# Patient Record
Sex: MALE | Race: WHITE | NOT HISPANIC OR LATINO | Employment: FULL TIME | URBAN - METROPOLITAN AREA
[De-identification: names, ages, dates, MRNs, and addresses within clinical notes are randomized per-mention and may not be internally consistent; named-entity substitution may affect disease eponyms.]

---

## 2014-06-03 LAB — LDLC SERPL CALC-MCNC: 125 MG/DL

## 2014-06-12 LAB — LDLC SERPL CALC-MCNC: 137 MG/DL

## 2017-01-03 ENCOUNTER — CARE COORDINATION (OUTPATIENT)
Dept: CARDIOLOGY | Facility: CLINIC | Age: 57
End: 2017-01-03

## 2017-01-03 NOTE — PROGRESS NOTES
Patient states that his last dose of Praluent was 12/20/16.  He is unable to obtain any further medication from the drug .  He denies every trying Zetia in the past.  He would be willing to try this medication. He understands that he will be contacted with further recommendations.

## 2017-01-09 DIAGNOSIS — I26.99 PULMONARY EMBOLISM (H): ICD-10-CM

## 2017-01-09 LAB — INR PPP: 1.98 (ref 0.86–1.14)

## 2017-01-10 ENCOUNTER — ANTICOAGULATION THERAPY VISIT (OUTPATIENT)
Dept: ANTICOAGULATION | Facility: CLINIC | Age: 57
End: 2017-01-10

## 2017-01-10 DIAGNOSIS — Z79.01 LONG-TERM (CURRENT) USE OF ANTICOAGULANTS: Primary | ICD-10-CM

## 2017-01-10 DIAGNOSIS — I26.99 PULMONARY EMBOLI (H): ICD-10-CM

## 2017-01-10 NOTE — PROGRESS NOTES
ANTICOAGULATION FOLLOW-UP CLINIC VISIT    Patient Name:  Oswaldo Prabhakar  Date:  1/10/2017  Contact Type:  Telephone    SUBJECTIVE:        OBJECTIVE    INR   Date Value Ref Range Status   01/09/2017 1.98* 0.86 - 1.14 Final     CHROMOGENIC FACTOR 10   Date Value Ref Range Status   06/18/2011 81 60 - 140 % Final     Comment:     Therapeutic Range: A Chromogenic Factor 10 level of 20-40% inversely   correlates   with an INR of 2-3 for patients receiving Warfarin. Chromogenic Factor 10   levels below 20% indicate an INR greater than 3, and levels above 40%   indicate   an INR less than 2.     FACTOR 2 ASSAY   Date Value Ref Range Status   04/04/2012  60 - 140 % Final    (Note)  CANCELLED AND CREDITED PER [employer] IN MED. MONITORING,4/4/12,       ASSESSMENT / PLAN  INR assessment THER    Recheck INR In: 2 WEEKS    INR Location Clinic      Anticoagulation Summary as of 1/10/2017     INR goal 2.0-3.0   Selected INR 1.98! (1/9/2017)   Maintenance plan 7.5 mg (5 mg x 1.5) on Mon, Wed, Fri; 5 mg (5 mg x 1) all other days   Full instructions 7.5 mg on Mon, Wed, Fri; 5 mg all other days   Weekly total 42.5 mg   No change documented Marielena Chacon RN   Plan last modified Liliam Hyman, RN (9/27/2016)   Next INR check 1/24/2017   Priority INR   Target end date Indefinite    Indications   Pulmonary emboli (H) [I26.99]  Long-term (current) use of anticoagulants [Z79.01] [Z79.01]         Anticoagulation Episode Summary     INR check location     Preferred lab     Send INR reminders to [facility]    Comments HIPPA INFO OK to speak with wife Josselyn OK to leave messages on Home.work and cell phones  Contact  [phone]      Anticoagulation Care Providers     Provider Role Specialty Phone number    Samm Vazquez MD [facility] Internal Medicine [phone]            See the Encounter Report to view Anticoagulation Flowsheet and Dosing Calendar (Go to Encounters tab in chart review, and find the Anticoagulation  Therapy Visit)    Left message for patient with results and dosing recommendations. Asked patient to call back to report any missed doses, falls, signs and symptoms of bleeding or clotting, any changes in health, medication, or diet. Asked patient to call back with any questions or concerns.   Did not adjust warfarin dose because his INR tends to swing up and down, will keep dose the same and see where it is in 2 weeks.  Asked on message that Oswaldo call if he missed any doses of warfarin or if he has had any changes.    Marielena Chacon RN

## 2017-01-26 ENCOUNTER — TELEPHONE (OUTPATIENT)
Dept: OTHER | Facility: CLINIC | Age: 57
End: 2017-01-26

## 2017-01-27 NOTE — TELEPHONE ENCOUNTER
Call Regarding Onboarding [employer]    Attempt 1    Message on voicemail     Comments: dependents:  [phone], [phone], [phone]      Outreach   Sharon Eid

## 2017-01-31 ENCOUNTER — ANTICOAGULATION THERAPY VISIT (OUTPATIENT)
Dept: ANTICOAGULATION | Facility: CLINIC | Age: 57
End: 2017-01-31

## 2017-01-31 DIAGNOSIS — Z79.01 LONG-TERM (CURRENT) USE OF ANTICOAGULANTS: Primary | ICD-10-CM

## 2017-01-31 DIAGNOSIS — I26.99 PULMONARY EMBOLI (H): ICD-10-CM

## 2017-01-31 DIAGNOSIS — I26.99 PULMONARY EMBOLISM (H): ICD-10-CM

## 2017-01-31 DIAGNOSIS — E78.5 HYPERLIPIDEMIA: ICD-10-CM

## 2017-01-31 LAB
CHOLEST SERPL-MCNC: 204 MG/DL
HDLC SERPL-MCNC: 34 MG/DL
INR PPP: 2.98 (ref 0.86–1.14)
LDLC SERPL CALC-MCNC: 138 MG/DL
NONHDLC SERPL-MCNC: 171 MG/DL
TRIGL SERPL-MCNC: 163 MG/DL

## 2017-01-31 NOTE — PROGRESS NOTES
ANTICOAGULATION FOLLOW-UP CLINIC VISIT    Patient Name:  Oswaldo Prabhakar  Date:  1/31/2017  Contact Type:  Telephone    SUBJECTIVE:        OBJECTIVE    INR   Date Value Ref Range Status   01/31/2017 2.98* 0.86 - 1.14 Final     CHROMOGENIC FACTOR 10   Date Value Ref Range Status   06/18/2011 81 60 - 140 % Final     Comment:     Therapeutic Range: A Chromogenic Factor 10 level of 20-40% inversely   correlates   with an INR of 2-3 for patients receiving Warfarin. Chromogenic Factor 10   levels below 20% indicate an INR greater than 3, and levels above 40%   indicate   an INR less than 2.     FACTOR 2 ASSAY   Date Value Ref Range Status   04/04/2012  60 - 140 % Final    (Note)  CANCELLED AND CREDITED PER [employer] IN MED. MONITORING,4/4/12,       ASSESSMENT / PLAN  INR assessment THER    Recheck INR In: 2 WEEKS    INR Location Clinic      Anticoagulation Summary as of 1/31/2017     INR goal 2.0-3.0   Selected INR 2.98 (1/31/2017)   Maintenance plan 7.5 mg (5 mg x 1.5) on Mon, Wed, Fri; 5 mg (5 mg x 1) all other days   Full instructions 7.5 mg on Mon, Wed, Fri; 5 mg all other days   Weekly total 42.5 mg   Plan last modified Liliam Hyman, RN (9/27/2016)   Next INR check 2/14/2017   Priority INR   Target end date Indefinite    Indications   Pulmonary emboli (H) [I26.99]  Long-term (current) use of anticoagulants [Z79.01] [Z79.01]         Anticoagulation Episode Summary     INR check location     Preferred lab     Send INR reminders to [facility]    Comments HIPPA INFO OK to speak with wife Josselyn MICHELLE to leave messages on Home.work and cell phones  Contact  [phone]      Anticoagulation Care Providers     Provider Role Specialty Phone number    Samm Vazquez MD [facility] Internal Medicine [phone]            See the Encounter Report to view Anticoagulation Flowsheet and Dosing Calendar (Go to Encounters tab in chart review, and find the Anticoagulation Therapy Visit)    LM for patient.    Nano  ROSA M Greer RN

## 2017-02-01 ENCOUNTER — TELEPHONE (OUTPATIENT)
Dept: CARDIOLOGY | Facility: CLINIC | Age: 57
End: 2017-02-01

## 2017-02-01 PROBLEM — Z78.9 STATIN INTOLERANCE: Status: ACTIVE | Noted: 2017-02-01

## 2017-02-01 NOTE — TELEPHONE ENCOUNTER
Prior Authorization [employer] Medication Request  Medication/Dose: Praluent 75 mg    Diagnosis and ICD code: 272.0 Hyperlipidemia, V45.81s/p CABG, Z78.9 Statin intolerance   New/Renewal/Insurance Change PA: New  Previously Tried and Failed Therapies: Simvastatin, Pravastatin, Lopid  Current therapy includes Atorvastatin 80 mg once daily, Fenofibrate 160 mg once daily, Praluent 150 mg once every 14 days    Patient has been provided medication since March 2016 by the Patient Assistance Program through [employer]. He started Praluent 75 mg once every 14 days on March 1st, 2016.  His dose was increased May 24th, 2016 .  LDL was 164 (H) prior to starting Praluent 75 mg, and decreased to 106.  His LDL has improved once dosage was increased to 150 mg once every 14 days; his current LDL is 33. Patient has not had any cardiovascular events while on medications, nor any side effects.  Patient Assistance Program ended January 1st, 2017. His last dose of praluent was 12/20/16.  Patient's LDL was 33 on Praluent and increased to 138 on 1/31/17 (6 weeks after cessation of therapy).         !LIPID/HEPATIC Latest Ref Rng 3/11/2014 4/4/2014   CHOLESTEROL <200 mg/dL 219 (H)    TRIGLYCERIDES <150 mg/dL 160 (H)    HDL CHOLESTEROL >39 mg/dL 38 (L)    LDL CHOLESTEROL DIRECT      LDL CHOLESTEROL, CALCULATED <100 mg/dL 149 (H)    VLDL-CHOLESTEROL 0 - 30 mg/dL 32 (H)    NON HDL CHOLESTEROL <130 mg/dL     CHOLESTEROL/HDL RATIO 0.0 - 5.0 5.8 (H)    AST 0 - 45 U/L 29 77 (H)   ALT 0 - 70 U/L 30 64     !LIPID/HEPATIC Latest Ref Rng 6/3/2014 6/12/2014   CHOLESTEROL <200 mg/dL     TRIGLYCERIDES <150 mg/dL     HDL CHOLESTEROL >39 mg/dL     LDL CHOLESTEROL DIRECT  125 137   LDL CHOLESTEROL, CALCULATED <100 mg/dL     VLDL-CHOLESTEROL 0 - 30 mg/dL     NON HDL CHOLESTEROL <130 mg/dL     CHOLESTEROL/HDL RATIO 0.0 - 5.0     AST 0 - 45 U/L     ALT 0 - 70 U/L       !LIPID/HEPATIC Latest Ref Rng 3/4/2015 9/16/2015   CHOLESTEROL <200 mg/dL  246 (H)    TRIGLYCERIDES <150 mg/dL  183 (H)   HDL CHOLESTEROL >39 mg/dL  35 (L)   LDL CHOLESTEROL DIRECT      LDL CHOLESTEROL, CALCULATED <100 mg/dL  174 (H)   VLDL-CHOLESTEROL 0 - 30 mg/dL  37 (H)   NON HDL CHOLESTEROL <130 mg/dL     CHOLESTEROL/HDL RATIO 0.0 - 5.0  7.0 (H)   AST 0 - 45 U/L 20 25   ALT 0 - 70 U/L 29 36     !LIPID/HEPATIC Latest Ref Rng 3/1/2016 3/18/2016   CHOLESTEROL <200 mg/dL 240 (H) 179   TRIGLYCERIDES <150 mg/dL 183 (H) 171 (H)   HDL CHOLESTEROL >39 mg/dL 40 39 (L)   LDL CHOLESTEROL DIRECT      LDL CHOLESTEROL, CALCULATED <100 mg/dL 164 (H) 106 (H)   VLDL-CHOLESTEROL 0 - 30 mg/dL     NON HDL CHOLESTEROL <130 mg/dL 200 (H) 140 (H)   CHOLESTEROL/HDL RATIO 0.0 - 5.0     AST 0 - 45 U/L 25    ALT 0 - 70 U/L 40      !LIPID/HEPATIC Latest Ref Rng 6/3/2016 11/14/2016   CHOLESTEROL <200 mg/dL 108 100   TRIGLYCERIDES <150 mg/dL 103 123   HDL CHOLESTEROL >39 mg/dL 43 42   LDL CHOLESTEROL DIRECT      LDL CHOLESTEROL, CALCULATED <100 mg/dL 44 33   VLDL-CHOLESTEROL 0 - 30 mg/dL     NON HDL CHOLESTEROL <130 mg/dL 65 57   CHOLESTEROL/HDL RATIO 0.0 - 5.0     AST 0 - 45 U/L     ALT 0 - 70 U/L       !LIPID/HEPATIC Latest Ref Rng 1/31/2017   CHOLESTEROL <200 mg/dL 204 (H)   TRIGLYCERIDES <150 mg/dL 163 (H)   HDL CHOLESTEROL >39 mg/dL 34 (L)   LDL CHOLESTEROL DIRECT     LDL CHOLESTEROL, CALCULATED <100 mg/dL 138 (H)   VLDL-CHOLESTEROL 0 - 30 mg/dL    NON HDL CHOLESTEROL <130 mg/dL 171 (H)   CHOLESTEROL/HDL RATIO 0.0 - 5.0    AST 0 - 45 U/L    ALT 0 - 70 U/L

## 2017-02-02 NOTE — TELEPHONE ENCOUNTER
[facility] Prior Authorization Team   Phone: [phone]  Fax: [phone]    PA Initiation    Medication: Praluent 150 mg   Insurance Company: [employer] - Phone [phone] Fax [phone]  Pharmacy Filling the Rx: [facility] - [place], [place] - [address]  Filling Pharmacy Phone: [phone]  Filling Pharmacy Fax: [phone]  Start Date: 2/2/2017

## 2017-02-10 NOTE — TELEPHONE ENCOUNTER
PRIOR AUTHORIZATION DENIED    Medication: Praluent 150 mg - Denied    Denial Date: 2/4/2017    Denial Rational: Please see below denial letter    Appeal Information: Denial can be appealed within 180 days from denial date. Send appeal info to:    [employer]  Attn: Clinical Review Dept  [address] [place] [address]  Fax: [phone]

## 2017-02-13 ENCOUNTER — CARE COORDINATION (OUTPATIENT)
Dept: CARDIOLOGY | Facility: CLINIC | Age: 57
End: 2017-02-13

## 2017-02-13 DIAGNOSIS — I25.10 ATHEROSCLEROSIS OF CORONARY ARTERY OF NATIVE HEART WITHOUT ANGINA PECTORIS, UNSPECIFIED VESSEL OR LESION TYPE: ICD-10-CM

## 2017-02-13 DIAGNOSIS — Z78.9 STATIN INTOLERANCE: Primary | ICD-10-CM

## 2017-02-13 DIAGNOSIS — E78.5 HYPERLIPIDEMIA LDL GOAL <70: ICD-10-CM

## 2017-02-13 DIAGNOSIS — Z95.1 S/P CABG (CORONARY ARTERY BYPASS GRAFT): ICD-10-CM

## 2017-02-13 RX ORDER — EZETIMIBE 10 MG/1
10 TABLET ORAL DAILY
Qty: 90 TABLET | Refills: 3 | Status: SHIPPED | OUTPATIENT
Start: 2017-02-13 | End: 2017-09-28

## 2017-02-13 NOTE — PROGRESS NOTES
Date: 2/13/2017    Time of Call: 10:51 AM     Diagnosis:  Hyperlipidemia goal LDL<70, statin intolerance, CAD s/p CABG     [ TORB ] Ordering provider: Dr. Ocampo  Order: Start Zetia 10 mg by mouth once daily. Repeat fasting lipid profile with reflex LDL and CMP in 6-8 weeks.      Order received by: Indu Osorio, RN, BSN     Follow-up/additional notes: Attempted to call patient to discuss above recommendations without success. [employer] message sent.

## 2017-03-03 ENCOUNTER — ANTICOAGULATION THERAPY VISIT (OUTPATIENT)
Dept: ANTICOAGULATION | Facility: CLINIC | Age: 57
End: 2017-03-03

## 2017-03-03 DIAGNOSIS — I26.99 PULMONARY EMBOLI (H): ICD-10-CM

## 2017-03-03 DIAGNOSIS — I26.99 PULMONARY EMBOLISM (H): ICD-10-CM

## 2017-03-03 DIAGNOSIS — Z79.01 LONG-TERM (CURRENT) USE OF ANTICOAGULANTS: ICD-10-CM

## 2017-03-03 LAB — INR PPP: 2.72 (ref 0.86–1.14)

## 2017-03-03 NOTE — MR AVS SNAPSHOT
Oswaldo Prabhakar   3/3/2017   Anticoagulation Therapy Visit    Description:  56 year old male   Provider:  Marielena Chacon, RN   [facility]           INR as of 3/3/2017     Today's INR 2.72      Anticoagulation Summary as of 3/3/2017     INR goal 2.0-3.0   Today's INR 2.72   Full instructions 7.5 mg on Mon, Wed, Fri; 5 mg all other days   Next INR check 3/24/2017    Indications   Pulmonary emboli (H) [I26.99]  Long-term (current) use of anticoagulants [Z79.01] [Z79.01]         March 2017 Details    Sun Mon Tue Wed Thu Fri Sat        1               2               3      7.5 mg   See details      4      5 mg           5      5 mg         6      7.5 mg         7      5 mg         8      7.5 mg         9      5 mg         10      7.5 mg         11      5 mg           12      5 mg         13      7.5 mg         14      5 mg         15      7.5 mg         16      5 mg         17      7.5 mg         18      5 mg           19      5 mg         20      7.5 mg         21      5 mg         22      7.5 mg         23      5 mg         24            25                 26               27               28               29               30               31                 Date Details   03/03 This INR check       Date of next INR:  3/24/2017         How to take your warfarin dose     To take:  5 mg Take 1 of the 5 mg tablets.    To take:  7.5 mg Take 1.5 of the 5 mg tablets.

## 2017-03-03 NOTE — PROGRESS NOTES
ANTICOAGULATION FOLLOW-UP CLINIC VISIT    Patient Name:  Oswaldo Prabhakar  Date:  3/3/2017  Contact Type:  Telephone    SUBJECTIVE:        OBJECTIVE    INR   Date Value Ref Range Status   03/03/2017 2.72 (H) 0.86 - 1.14 Final     Chromogenic Factor 10   Date Value Ref Range Status   06/18/2011 81 60 - 140 % Final     Comment:     Therapeutic Range: A Chromogenic Factor 10 level of 20-40% inversely   correlates   with an INR of 2-3 for patients receiving Warfarin. Chromogenic Factor 10   levels below 20% indicate an INR greater than 3, and levels above 40%   indicate   an INR less than 2.     Factor 2 Assay   Date Value Ref Range Status   04/04/2012  60 - 140 % Final    (Note)  CANCELLED AND CREDITED PER [employer] IN MED. MONITORING,4/4/12,       ASSESSMENT / PLAN  INR assessment THER    Recheck INR In: 3 WEEKS    INR Location Clinic      Anticoagulation Summary as of 3/3/2017     INR goal 2.0-3.0   Today's INR 2.72   Maintenance plan 7.5 mg (5 mg x 1.5) on Mon, Wed, Fri; 5 mg (5 mg x 1) all other days   Full instructions 7.5 mg on Mon, Wed, Fri; 5 mg all other days   Weekly total 42.5 mg   No change documented Marielena Chacon RN   Plan last modified Liliam Hyman, RN (9/27/2016)   Next INR check 3/24/2017   Priority INR   Target end date Indefinite    Indications   Pulmonary emboli (H) [I26.99]  Long-term (current) use of anticoagulants [Z79.01] [Z79.01]         Anticoagulation Episode Summary     INR check location     Preferred lab     Send INR reminders to [facility]    Comments HIPPA INFO OK to speak with wife Josselyn OK to leave messages on Home.work and cell phones  Contact  [phone]      Anticoagulation Care Providers     Provider Role Specialty Phone number    Samm Vazquez MD [facility] Internal Medicine [phone]            See the Encounter Report to view Anticoagulation Flowsheet and Dosing Calendar (Go to Encounters tab in chart review, and find the Anticoagulation Therapy  Visit)    Left message for patient with results and dosing recommendations. Asked patient to call back to report any missed doses, falls, signs and symptoms of bleeding or clotting, any changes in health, medication, or diet. Asked patient to call back with any questions or concerns.     Marielena Chacon RN

## 2017-03-06 NOTE — TELEPHONE ENCOUNTER
3/6/2017    [facility], Patient & kids onboard. Spouse still needs onboarding, called and left message for spouse to callback.    Outreach ,  Satnam Crook

## 2017-04-03 ENCOUNTER — MYC MEDICAL ADVICE (OUTPATIENT)
Dept: CARDIOLOGY | Facility: CLINIC | Age: 57
End: 2017-04-03

## 2017-04-03 ENCOUNTER — ANTICOAGULATION THERAPY VISIT (OUTPATIENT)
Dept: ANTICOAGULATION | Facility: CLINIC | Age: 57
End: 2017-04-03

## 2017-04-03 DIAGNOSIS — Z95.1 S/P CABG (CORONARY ARTERY BYPASS GRAFT): ICD-10-CM

## 2017-04-03 DIAGNOSIS — Z78.9 STATIN INTOLERANCE: ICD-10-CM

## 2017-04-03 DIAGNOSIS — Z79.01 LONG-TERM (CURRENT) USE OF ANTICOAGULANTS: ICD-10-CM

## 2017-04-03 DIAGNOSIS — I26.99 PULMONARY EMBOLISM (H): ICD-10-CM

## 2017-04-03 DIAGNOSIS — I26.99 PULMONARY EMBOLI (H): ICD-10-CM

## 2017-04-03 DIAGNOSIS — E78.5 HYPERLIPIDEMIA LDL GOAL <70: ICD-10-CM

## 2017-04-03 DIAGNOSIS — I25.10 ATHEROSCLEROSIS OF CORONARY ARTERY OF NATIVE HEART WITHOUT ANGINA PECTORIS, UNSPECIFIED VESSEL OR LESION TYPE: ICD-10-CM

## 2017-04-03 LAB — INR PPP: 2.9 (ref 0.86–1.14)

## 2017-04-03 NOTE — MR AVS SNAPSHOT
Oswaldo Prabhakar   4/3/2017   Anticoagulation Therapy Visit    Description:  56 year old male   Provider:  Laurita Cespedes, RN   Department:  [facility]           INR as of 4/3/2017     Today's INR 2.90      Anticoagulation Summary as of 4/3/2017     INR goal 2.0-3.0   Today's INR 2.90   Full instructions 7.5 mg on Mon, Wed, Fri; 5 mg all other days   Next INR check 5/1/2017    Indications   Pulmonary emboli (H) [I26.99]  Long-term (current) use of anticoagulants [Z79.01] [Z79.01]         April 2017 Details    Sun Mon Tue Wed Thu Fri Sat           1                 2               3      7.5 mg   See details      4      5 mg         5      7.5 mg         6      5 mg         7      7.5 mg         8      5 mg           9      5 mg         10      7.5 mg         11      5 mg         12      7.5 mg         13      5 mg         14      7.5 mg         15      5 mg           16      5 mg         17      7.5 mg         18      5 mg         19      7.5 mg         20      5 mg         21      7.5 mg         22      5 mg           23      5 mg         24      7.5 mg         25      5 mg         26      7.5 mg         27      5 mg         28      7.5 mg         29      5 mg           30      5 mg                Date Details   04/03 This INR check               How to take your warfarin dose     To take:  5 mg Take 1 of the 5 mg tablets.    To take:  7.5 mg Take 1.5 of the 5 mg tablets.           May 2017 Details    Sun Mon Tue Wed Thu Fri Sat      1            2               3               4               5               6                 7               8               9               10               11               12               13                 14               15               16               17               18               19               20                 21               22               23               24               25               26               27                 28               29                30               31                   Date Details   No additional details    Date of next INR:  5/1/2017         How to take your warfarin dose     To take:  7.5 mg Take 1.5 of the 5 mg tablets.

## 2017-04-03 NOTE — PROGRESS NOTES
ANTICOAGULATION FOLLOW-UP CLINIC VISIT    Patient Name:  Oswaldo Prabhakar  Date:  4/3/2017  Contact Type:  Telephone    SUBJECTIVE:     Patient Findings     Positives No Problem Findings           OBJECTIVE    INR   Date Value Ref Range Status   04/03/2017 2.90 (H) 0.86 - 1.14 Final     Chromogenic Factor 10   Date Value Ref Range Status   06/18/2011 81 60 - 140 % Final     Comment:     Therapeutic Range: A Chromogenic Factor 10 level of 20-40% inversely   correlates   with an INR of 2-3 for patients receiving Warfarin. Chromogenic Factor 10   levels below 20% indicate an INR greater than 3, and levels above 40%   indicate   an INR less than 2.     Factor 2 Assay   Date Value Ref Range Status   04/04/2012  60 - 140 % Final    (Note)  CANCELLED AND CREDITED PER [employer] IN MED. MONITORING,4/4/12,       ASSESSMENT / PLAN  INR assessment THER    Recheck INR In: 4 WEEKS    INR Location Clinic      Anticoagulation Summary as of 4/3/2017     INR goal 2.0-3.0   Today's INR 2.90   Maintenance plan 7.5 mg (5 mg x 1.5) on Mon, Wed, Fri; 5 mg (5 mg x 1) all other days   Full instructions 7.5 mg on Mon, Wed, Fri; 5 mg all other days   Weekly total 42.5 mg   No change documented Laurita Cespedes, DEMETRA   Plan last modified Liliam Hyman, RN (9/27/2016)   Next INR check 5/1/2017   Priority INR   Target end date Indefinite    Indications   Pulmonary emboli (H) [I26.99]  Long-term (current) use of anticoagulants [Z79.01] [Z79.01]         Anticoagulation Episode Summary     INR check location     Preferred lab     Send INR reminders to [facility]    Comments HIPPA INFO OK to speak with wife Josselyn OK to leave messages on Home.work and cell phones  Contact  [phone]      Anticoagulation Care Providers     Provider Role Specialty Phone number    Samm Vazquez MD [facility] Internal Medicine [phone]            See the Encounter Report to view Anticoagulation Flowsheet and Dosing Calendar (Go to Encounters tab in  chart review, and find the Anticoagulation Therapy Visit)    Message is left on cell phone as writer is unable to leave a message on the primary contact phone #.  Advised pt to contact us if any questions or concerns.     Laurita Cespedes RN

## 2017-04-18 DIAGNOSIS — I10 ESSENTIAL HYPERTENSION: ICD-10-CM

## 2017-04-20 RX ORDER — LISINOPRIL 2.5 MG/1
2.5 TABLET ORAL DAILY
Qty: 91 TABLET | Refills: 1 | Status: SHIPPED | OUTPATIENT
Start: 2017-04-20 | End: 2018-01-23

## 2017-04-27 ENCOUNTER — OFFICE VISIT (OUTPATIENT)
Dept: INTERNAL MEDICINE | Facility: CLINIC | Age: 57
End: 2017-04-27

## 2017-04-27 ENCOUNTER — HOSPITAL ENCOUNTER (OUTPATIENT)
Facility: CLINIC | Age: 57
Setting detail: OBSERVATION
Discharge: HOME | End: 2017-04-28
Attending: EMERGENCY MEDICINE | Admitting: EMERGENCY MEDICINE
Payer: COMMERCIAL

## 2017-04-27 ENCOUNTER — APPOINTMENT (OUTPATIENT)
Dept: GENERAL RADIOLOGY | Facility: CLINIC | Age: 57
End: 2017-04-27
Attending: EMERGENCY MEDICINE
Payer: COMMERCIAL

## 2017-04-27 VITALS
BODY MASS INDEX: 29.75 KG/M2 | TEMPERATURE: 99.3 F | DIASTOLIC BLOOD PRESSURE: 97 MMHG | WEIGHT: 210.3 LBS | SYSTOLIC BLOOD PRESSURE: 146 MMHG | OXYGEN SATURATION: 97 % | HEART RATE: 97 BPM

## 2017-04-27 DIAGNOSIS — Z86.711 PERSONAL HISTORY OF PE (PULMONARY EMBOLISM): ICD-10-CM

## 2017-04-27 DIAGNOSIS — R94.31 ACUTE ELECTROCARDIOGRAM CHANGES: ICD-10-CM

## 2017-04-27 DIAGNOSIS — J18.9 COMMUNITY ACQUIRED PNEUMONIA: ICD-10-CM

## 2017-04-27 DIAGNOSIS — J18.9 PNEUMONIA OF LEFT LUNG DUE TO INFECTIOUS ORGANISM, UNSPECIFIED PART OF LUNG: ICD-10-CM

## 2017-04-27 DIAGNOSIS — Z79.01 LONG TERM (CURRENT) USE OF ANTICOAGULANTS: ICD-10-CM

## 2017-04-27 DIAGNOSIS — R07.89 ATYPICAL CHEST PAIN: ICD-10-CM

## 2017-04-27 DIAGNOSIS — E87.6 HYPOKALEMIA: ICD-10-CM

## 2017-04-27 DIAGNOSIS — R07.89 ATYPICAL CHEST PAIN: Primary | ICD-10-CM

## 2017-04-27 LAB
ANION GAP SERPL CALCULATED.3IONS-SCNC: 11 MMOL/L (ref 3–14)
BASOPHILS # BLD AUTO: 0 10E9/L (ref 0–0.2)
BASOPHILS NFR BLD AUTO: 0.4 %
BUN SERPL-MCNC: 8 MG/DL (ref 7–30)
CALCIUM SERPL-MCNC: 9.5 MG/DL (ref 8.5–10.1)
CHLORIDE SERPL-SCNC: 105 MMOL/L (ref 94–109)
CO2 SERPL-SCNC: 25 MMOL/L (ref 20–32)
CREAT SERPL-MCNC: 1.01 MG/DL (ref 0.66–1.25)
DIFFERENTIAL METHOD BLD: NORMAL
EOSINOPHIL # BLD AUTO: 0.4 10E9/L (ref 0–0.7)
EOSINOPHIL NFR BLD AUTO: 4.7 %
ERYTHROCYTE [DISTWIDTH] IN BLOOD BY AUTOMATED COUNT: 13.6 % (ref 10–15)
FLUAV+FLUBV AG SPEC QL: NEGATIVE
FLUAV+FLUBV AG SPEC QL: NORMAL
FLUAV+FLUBV RNA SPEC QL NAA+PROBE: ABNORMAL
GFR SERPL CREATININE-BSD FRML MDRD: 77 ML/MIN/1.7M2
GLUCOSE SERPL-MCNC: 98 MG/DL (ref 70–99)
HCT VFR BLD AUTO: 42.8 % (ref 40–53)
HGB BLD-MCNC: 14.2 G/DL (ref 13.3–17.7)
IMM GRANULOCYTES # BLD: 0 10E9/L (ref 0–0.4)
IMM GRANULOCYTES NFR BLD: 0.3 %
INR PPP: 1.58 (ref 0.86–1.14)
INTERPRETATION ECG - MUSE: NORMAL
LACTATE BLD-SCNC: 1.4 MMOL/L (ref 0.7–2.1)
LYMPHOCYTES # BLD AUTO: 1.8 10E9/L (ref 0.8–5.3)
LYMPHOCYTES NFR BLD AUTO: 24.7 %
MCH RBC QN AUTO: 30 PG (ref 26.5–33)
MCHC RBC AUTO-ENTMCNC: 33.2 G/DL (ref 31.5–36.5)
MCV RBC AUTO: 90 FL (ref 78–100)
MONOCYTES # BLD AUTO: 0.4 10E9/L (ref 0–1.3)
MONOCYTES NFR BLD AUTO: 5.7 %
NEUTROPHILS # BLD AUTO: 4.8 10E9/L (ref 1.6–8.3)
NEUTROPHILS NFR BLD AUTO: 64.2 %
NRBC # BLD AUTO: 0 10*3/UL
NRBC BLD AUTO-RTO: 0 /100
NT-PROBNP SERPL-MCNC: 108 PG/ML (ref 0–900)
PLATELET # BLD AUTO: 237 10E9/L (ref 150–450)
POTASSIUM SERPL-SCNC: 3.3 MMOL/L (ref 3.4–5.3)
PROCALCITONIN SERPL-MCNC: NORMAL NG/ML
RBC # BLD AUTO: 4.74 10E12/L (ref 4.4–5.9)
SODIUM SERPL-SCNC: 141 MMOL/L (ref 133–144)
SPECIMEN SOURCE: ABNORMAL
SPECIMEN SOURCE: NORMAL
TROPONIN I BLD-MCNC: 0 UG/L (ref 0–0.1)
TROPONIN I SERPL-MCNC: NORMAL UG/L (ref 0–0.04)
WBC # BLD AUTO: 7.4 10E9/L (ref 4–11)

## 2017-04-27 PROCEDURE — 83605 ASSAY OF LACTIC ACID: CPT | Performed by: NURSE PRACTITIONER

## 2017-04-27 PROCEDURE — 36415 COLL VENOUS BLD VENIPUNCTURE: CPT | Performed by: NURSE PRACTITIONER

## 2017-04-27 PROCEDURE — 27210995 ZZH RX 272

## 2017-04-27 PROCEDURE — 84145 PROCALCITONIN (PCT): CPT | Performed by: EMERGENCY MEDICINE

## 2017-04-27 PROCEDURE — 25000132 ZZH RX MED GY IP 250 OP 250 PS 637: Performed by: NURSE PRACTITIONER

## 2017-04-27 PROCEDURE — G0378 HOSPITAL OBSERVATION PER HR: HCPCS

## 2017-04-27 PROCEDURE — 93005 ELECTROCARDIOGRAM TRACING: CPT

## 2017-04-27 PROCEDURE — 83880 ASSAY OF NATRIURETIC PEPTIDE: CPT | Performed by: NURSE PRACTITIONER

## 2017-04-27 PROCEDURE — 25000132 ZZH RX MED GY IP 250 OP 250 PS 637: Performed by: EMERGENCY MEDICINE

## 2017-04-27 PROCEDURE — 80048 BASIC METABOLIC PNL TOTAL CA: CPT | Performed by: EMERGENCY MEDICINE

## 2017-04-27 PROCEDURE — 99285 EMERGENCY DEPT VISIT HI MDM: CPT | Mod: 25

## 2017-04-27 PROCEDURE — 40000141 ZZH STATISTIC PERIPHERAL IV START W/O US GUIDANCE

## 2017-04-27 PROCEDURE — 85025 COMPLETE CBC W/AUTO DIFF WBC: CPT | Performed by: EMERGENCY MEDICINE

## 2017-04-27 PROCEDURE — 93010 ELECTROCARDIOGRAM REPORT: CPT | Mod: Z6 | Performed by: EMERGENCY MEDICINE

## 2017-04-27 PROCEDURE — 71020 XR CHEST 2 VW: CPT

## 2017-04-27 PROCEDURE — 87804 INFLUENZA ASSAY W/OPTIC: CPT | Performed by: EMERGENCY MEDICINE

## 2017-04-27 PROCEDURE — 99207 ZZC APP CREDIT; MD BILLING SHARED VISIT: CPT | Mod: 25 | Performed by: EMERGENCY MEDICINE

## 2017-04-27 PROCEDURE — 84484 ASSAY OF TROPONIN QUANT: CPT | Performed by: NURSE PRACTITIONER

## 2017-04-27 PROCEDURE — 83735 ASSAY OF MAGNESIUM: CPT | Performed by: EMERGENCY MEDICINE

## 2017-04-27 PROCEDURE — 84484 ASSAY OF TROPONIN QUANT: CPT

## 2017-04-27 PROCEDURE — 99219 ZZC INITIAL OBSERVATION CARE,LEVL II: CPT | Mod: Z6 | Performed by: NURSE PRACTITIONER

## 2017-04-27 PROCEDURE — 85610 PROTHROMBIN TIME: CPT | Performed by: EMERGENCY MEDICINE

## 2017-04-27 PROCEDURE — 93010 ELECTROCARDIOGRAM REPORT: CPT | Performed by: INTERNAL MEDICINE

## 2017-04-27 RX ORDER — ASPIRIN 81 MG/1
81 TABLET ORAL EVERY EVENING
Status: DISCONTINUED | OUTPATIENT
Start: 2017-04-27 | End: 2017-04-28 | Stop reason: HOSPADM

## 2017-04-27 RX ORDER — NALOXONE HYDROCHLORIDE 0.4 MG/ML
.1-.4 INJECTION, SOLUTION INTRAMUSCULAR; INTRAVENOUS; SUBCUTANEOUS
Status: DISCONTINUED | OUTPATIENT
Start: 2017-04-27 | End: 2017-04-28 | Stop reason: HOSPADM

## 2017-04-27 RX ORDER — POTASSIUM CHLORIDE 750 MG/1
40 TABLET, EXTENDED RELEASE ORAL ONCE
Status: COMPLETED | OUTPATIENT
Start: 2017-04-27 | End: 2017-04-27

## 2017-04-27 RX ORDER — LEVOFLOXACIN 750 MG/1
750 TABLET, FILM COATED ORAL DAILY
Status: DISCONTINUED | OUTPATIENT
Start: 2017-04-27 | End: 2017-04-28 | Stop reason: HOSPADM

## 2017-04-27 RX ORDER — LIDOCAINE 40 MG/G
CREAM TOPICAL
Status: DISCONTINUED | OUTPATIENT
Start: 2017-04-27 | End: 2017-04-27

## 2017-04-27 RX ORDER — LEVOFLOXACIN 500 MG/1
500 TABLET, FILM COATED ORAL DAILY
Qty: 7 TABLET | Refills: 0 | Status: CANCELLED | OUTPATIENT
Start: 2017-04-27

## 2017-04-27 RX ORDER — ACETAMINOPHEN 650 MG/1
650 SUPPOSITORY RECTAL EVERY 4 HOURS PRN
Status: DISCONTINUED | OUTPATIENT
Start: 2017-04-27 | End: 2017-04-28 | Stop reason: HOSPADM

## 2017-04-27 RX ORDER — ASPIRIN 81 MG/1
81 TABLET ORAL DAILY
Status: DISCONTINUED | OUTPATIENT
Start: 2017-04-28 | End: 2017-04-27

## 2017-04-27 RX ORDER — ACETAMINOPHEN 325 MG/1
650 TABLET ORAL EVERY 4 HOURS PRN
Status: DISCONTINUED | OUTPATIENT
Start: 2017-04-27 | End: 2017-04-28 | Stop reason: HOSPADM

## 2017-04-27 RX ORDER — ALUMINA, MAGNESIA, AND SIMETHICONE 2400; 2400; 240 MG/30ML; MG/30ML; MG/30ML
15-30 SUSPENSION ORAL EVERY 4 HOURS PRN
Status: DISCONTINUED | OUTPATIENT
Start: 2017-04-27 | End: 2017-04-28 | Stop reason: HOSPADM

## 2017-04-27 RX ORDER — NITROGLYCERIN 0.4 MG/1
0.4 TABLET SUBLINGUAL EVERY 5 MIN PRN
Status: DISCONTINUED | OUTPATIENT
Start: 2017-04-27 | End: 2017-04-28 | Stop reason: HOSPADM

## 2017-04-27 RX ORDER — LIDOCAINE 40 MG/G
CREAM TOPICAL
Status: DISCONTINUED | OUTPATIENT
Start: 2017-04-27 | End: 2017-04-28 | Stop reason: HOSPADM

## 2017-04-27 RX ORDER — LISINOPRIL 2.5 MG/1
2.5 TABLET ORAL EVERY EVENING
Status: DISCONTINUED | OUTPATIENT
Start: 2017-04-27 | End: 2017-04-28 | Stop reason: HOSPADM

## 2017-04-27 RX ORDER — FENOFIBRATE 160 MG/1
160 TABLET ORAL EVERY EVENING
Status: DISCONTINUED | OUTPATIENT
Start: 2017-04-27 | End: 2017-04-28 | Stop reason: HOSPADM

## 2017-04-27 RX ORDER — WARFARIN SODIUM 7.5 MG/1
7.5 TABLET ORAL
Status: COMPLETED | OUTPATIENT
Start: 2017-04-27 | End: 2017-04-27

## 2017-04-27 RX ORDER — BENZONATATE 100 MG/1
100 CAPSULE ORAL 3 TIMES DAILY PRN
Status: DISCONTINUED | OUTPATIENT
Start: 2017-04-27 | End: 2017-04-28 | Stop reason: HOSPADM

## 2017-04-27 RX ORDER — EZETIMIBE 10 MG/1
10 TABLET ORAL EVERY EVENING
Status: DISCONTINUED | OUTPATIENT
Start: 2017-04-27 | End: 2017-04-28 | Stop reason: HOSPADM

## 2017-04-27 RX ORDER — ATORVASTATIN CALCIUM 40 MG/1
80 TABLET, FILM COATED ORAL EVERY EVENING
Status: DISCONTINUED | OUTPATIENT
Start: 2017-04-27 | End: 2017-04-28 | Stop reason: HOSPADM

## 2017-04-27 RX ADMIN — LEVOFLOXACIN 750 MG: 750 TABLET, FILM COATED ORAL at 18:50

## 2017-04-27 RX ADMIN — WARFARIN SODIUM 7.5 MG: 7.5 TABLET ORAL at 22:54

## 2017-04-27 RX ADMIN — ATORVASTATIN CALCIUM 80 MG: 40 TABLET, FILM COATED ORAL at 22:03

## 2017-04-27 RX ADMIN — METOPROLOL TARTRATE 12.5 MG: 25 TABLET, FILM COATED ORAL at 22:02

## 2017-04-27 RX ADMIN — BENZONATATE 100 MG: 100 CAPSULE, LIQUID FILLED ORAL at 22:54

## 2017-04-27 RX ADMIN — FENOFIBRATE 160 MG: 160 TABLET ORAL at 22:54

## 2017-04-27 RX ADMIN — EZETIMIBE 10 MG: 10 TABLET ORAL at 22:54

## 2017-04-27 RX ADMIN — POTASSIUM CHLORIDE 40 MEQ: 750 TABLET, EXTENDED RELEASE ORAL at 22:02

## 2017-04-27 RX ADMIN — ASPIRIN 81 MG: 81 TABLET, COATED ORAL at 22:03

## 2017-04-27 RX ADMIN — LISINOPRIL 2.5 MG: 2.5 TABLET ORAL at 22:03

## 2017-04-27 ASSESSMENT — ENCOUNTER SYMPTOMS
SHORTNESS OF BREATH: 0
ABDOMINAL PAIN: 0
FEVER: 0
COUGH: 1

## 2017-04-27 ASSESSMENT — PAIN SCALES - GENERAL: PAINLEVEL: NO PAIN (0)

## 2017-04-27 NOTE — IP AVS SNAPSHOT
MRN:[id number]                      After Visit Summary   4/27/2017    Oswaldo Prabhakar    MRN: [id number]           Thank you!     Thank you for choosing [facility] for your care. Our goal is always to provide you with excellent care. Hearing back from our patients is one way we can continue to improve our services. Please take a few minutes to complete the written survey that you may receive in the mail after you visit with us. Thank you!        Patient Information     Date Of Birth          1960        Designated Caregiver       Most Recent Value    Caregiver    Will someone help with your care after discharge? no      About your hospital stay     You were admitted on:  April 27, 2017 You last received care in the:  Unit 6D Observation [facility] [place]    You were discharged on:  April 28, 2017        Reason for your hospital stay       Chest pain and pneumonia                  Who to Call     For medical emergencies, please call 911.  For non-urgent questions about your medical care, please call your primary care provider or clinic, [phone]          Attending Provider     Provider Specialty    Jerrod Aguilar MD Emergency Medicine    [facility], Priscila Maldonado MD Emergency Medicine       Primary Care Provider Office Phone # Fax #    Samm Vazquez [phone]        PHYSICIANS [address] [facility] [address]  [facility] [address]         When to contact your care team       Please go to your nearest emergency room If you were to have a change in the type of chest pain i.e more severe, lasting longer or radiating to your shoulder, arm, neck, jaw or back, shortness of breath or increased pain with breathing, coughing up blood, feel dizzy or lightheaded, or notice swelling in one leg.                  After Care Instructions     Activity       Your activity upon discharge: As tolerated            Diet       Follow this diet upon discharge: Cardiac diet            Discharge  Instructions       The chest pain you came into the emergency room for does not appear to be cardiac chest pain. Your heart enzymes were normal which tells us there was no damage to the heart muscle. Your stress test was negative.      Your pain appears to be related pneumonia which was noted on your chest xray on admission. You have been prescribed Levaquin (an antibiotic). Please continue this antibiotic for the next 5 days to complete a 7 day course.  You were also given a prescription for Robitussin with Codeine for symptomatic relief.     Your INR was noted to be lower than your goal INR. Today it was 1.71. Please take 7.5 mg tonight and 5 mg on Saturday and 5 mg on Sunday. Please have your INR checked this weekend if possible.     Your cholesterol was checked while you were here: Cholesterol 182,   Triglycerides 174. Please follow up with your PCP for further management recommendations.      Continue to drink plenty of fluids and follow up with your PCP in one week for hospital follow up.                  Follow-up Appointments     Adult [facility] Follow-up and recommended labs and tests       Follow up in Coumadin clinic Saturday or Sunday if possible for INR check . Follow up with your PCP in one week for hospital follow up.     Appointments on [place] and/or [facility] (with [facility] or [facility] provider or service). Call [phone] if you haven't heard regarding these appointments within 7 days of discharge.                  Warfarin Instruction     You have started taking a medicine called warfarin. This is a blood-thinning medicine (anticoagulant). It helps prevent and treat blood clots.      Before leaving the hospital, make sure you know how much to take and how long to take it.      You will need regular blood tests to make sure your blood is clotting safely. It is very important to see your doctor for regular blood tests.    Talk to your doctor before taking any new medicine (this  "includes over-the-counter drugs and herbal products). Many medicines can interact with warfarin. This may cause more bleeding or too much clotting.     Eating a lot of vitamin K--found in green, leafy vegetables--can change the way warfarin works in your body. Do NOT avoid these foods. Instead, try to eat the same amount each day.     Bleeding is the most common side-effect of warfarin. You may notice bleeding gums, a bloody nose, bruises and bleeding longer when you cut yourself. See a doctor at once if:   o You cough up blood  o You find blood in your stool (poop)  o You have a deep cut, or a cut that bleeds longer than 10 minutes   o You have a bad cut, hard fall, accident or hit your head (go to urgent care or the emergency room).    For women who can get pregnant: This medicine can harm an unborn baby. Be very careful not to get pregnant while taking this medicine. If you think you might be pregnant, call your doctor right away.    For more information, read \"Guide to Warfarin Therapy,  the booklet you received in the hospital.        Pending Results     No orders found from 4/25/2017 to 4/28/2017.            Statement of Approval     Ordered          04/28/17 1139  I have reviewed and agree with all the recommendations and orders detailed in this document.  EFFECTIVE NOW     Approved and electronically signed by:  Sherry Hamlin APRN CNP             Admission Information     Date & Time Department Dept. Phone    4/27/2017 Unit 6D Observation [facility] [place] [phone]      Your Vitals Were     Blood Pressure Temperature Respirations Height Weight Pulse Oximetry    122/79 (BP Location: Right arm) 98.7  F (37.1  C) (Oral) 18 1.803 m (5' 11\") 95.3 kg (210 lb) 95%    BMI (Body Mass Index)                   29.29 kg/m2           [employer] Information     [employer] gives you secure access to your electronic health record. If you see a primary care provider, you can also send messages to your care team and make " appointments. If you have questions, please call your primary care clinic.  If you do not have a primary care provider, please call [phone] and they will assist you.        Care EveryWhere ID     This is your Care EveryWhere ID. This could be used by other organizations to access your [facility] medical records  [id number]           Review of your medicines      START taking        Dose / Directions    guaiFENesin-codeine 100-10 MG/5ML Soln solution   Commonly known as:  ROBITUSSIN AC   Used for:  Pneumonia of left lung due to infectious organism, unspecified part of lung        Dose:  1-2 tsp.   Take 5-10 mLs by mouth every 4 hours as needed for cough   Quantity:  236 mL   Refills:  1       levofloxacin 750 MG tablet   Commonly known as:  LEVAQUIN   Indication:  Community Acquired Pneumonia   Used for:  Pneumonia of left lung due to infectious organism, unspecified part of lung        Dose:  750 mg   Take 1 tablet (750 mg) by mouth daily for 5 days   Quantity:  5 tablet   Refills:  0         CONTINUE these medicines which may have CHANGED, or have new prescriptions. If we are uncertain of the size of tablets/capsules you have at home, strength may be listed as something that might have changed.        Dose / Directions    * WARFARIN SODIUM PO   Indication:  Obstructive Blood Clot in a Blood Vessel of the Lung   This may have changed:  Another medication with the same name was changed. Make sure you understand how and when to take each.        Dose:  7.5 mg   Take 7.5 mg by mouth   Refills:  0       * warfarin 5 MG tablet   Commonly known as:  COUMADIN   This may have changed:  additional instructions   Used for:  Long term (current) use of anticoagulants        As directed. 7.5 mg on Mon, Wed, Fri and 5 mg daily the rest of the week.   Quantity:  145 tablet   Refills:  6       * Notice:  This list has 2 medication(s) that are the same as other medications prescribed for you. Read the directions carefully, and  ask your doctor or other care provider to review them with you.      CONTINUE these medicines which have NOT CHANGED        Dose / Directions    aspirin EC 81 MG EC tablet   Used for:  S/P CABG x 4        Dose:  81 mg   Take 1 tablet (81 mg) by mouth daily   Quantity:  91 tablet   Refills:  3       atorvastatin 80 MG tablet   Commonly known as:  LIPITOR   Used for:  Pure hyperglyceridemia, Hyperlipidemia LDL goal <130        Dose:  80 mg   Take 1 tablet (80 mg) by mouth daily   Quantity:  91 tablet   Refills:  3       BENADRYL PO        Dose:  25-50 mg   Take 25-50 mg by mouth daily as needed   Refills:  0       calcium carbonate 500 MG chewable tablet   Commonly known as:  TUMS        Dose:  1 chew tab   Take 1 chew tab by mouth daily as needed   Refills:  0       ezetimibe 10 MG tablet   Commonly known as:  ZETIA   Used for:  Statin intolerance, Hyperlipidemia LDL goal <70, Atherosclerosis of coronary artery of native heart without angina pectoris, unspecified vessel or lesion type, S/P CABG (coronary artery bypass graft)        Dose:  10 mg   Take 1 tablet (10 mg) by mouth daily   Quantity:  90 tablet   Refills:  3       fenofibrate 160 MG tablet   Used for:  Hyperlipidemia LDL goal <130        Dose:  160 mg   Take 1 tablet (160 mg) by mouth daily   Quantity:  91 tablet   Refills:  3       lisinopril 2.5 MG tablet   Commonly known as:  PRINIVIL/Zestril   Used for:  Essential hypertension        Dose:  2.5 mg   Take 1 tablet (2.5 mg) by mouth daily , or as directed by Dr. Ocampo.   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 Ask your nurse if you have questions.     Bring a paper prescription for each of these medications     guaiFENesin-codeine 100-10 MG/5ML Soln solution                Protect others around you: Learn how to safely use, store and throw away your medicines at www.disposemymeds.org.             Medication List: This is a list of all your medications and when to take them. Check marks below indicate your daily home schedule. Keep this list as a reference.      Medications           Morning Afternoon Evening Bedtime As Needed    aspirin EC 81 MG EC tablet   Take 1 tablet (81 mg) by mouth daily   Last time this was given:  81 mg on 4/27/2017 10:03 PM                                atorvastatin 80 MG tablet   Commonly known as:  LIPITOR   Take 1 tablet (80 mg) by mouth daily   Last time this was given:  80 mg on 4/27/2017 10:03 PM                                BENADRYL PO   Take 25-50 mg by mouth daily as needed                                calcium carbonate 500 MG chewable tablet   Commonly known as:  TUMS   Take 1 chew tab by mouth daily as needed                                ezetimibe 10 MG tablet   Commonly known as:  ZETIA   Take 1 tablet (10 mg) by mouth daily   Last time this was given:  10 mg on 4/27/2017 10:54 PM                                fenofibrate 160 MG tablet   Take 1 tablet (160 mg) by mouth daily   Last time this was given:  160 mg on 4/27/2017 10:54  PM                                guaiFENesin-codeine 100-10 MG/5ML Soln solution   Commonly known as:  ROBITUSSIN AC   Take 5-10 mLs by mouth every 4 hours as needed for cough                                levofloxacin 750 MG tablet   Commonly known as:  LEVAQUIN   Take 1 tablet (750 mg) by mouth daily for 5 days   Last time this was given:  750 mg on 4/28/2017  7:41 AM                                lisinopril 2.5 MG tablet   Commonly known as:  PRINIVIL/Zestril   Take 1 tablet (2.5 mg) by mouth daily , or as directed by Dr. Ocampo.   Last time this was given:  2.5 mg on 4/27/2017 10:03 PM                                metoprolol 25 MG tablet   Commonly known as:  LOPRESSOR   Take 0.5 tablets (12.5 mg) by mouth 2 times daily   Last time this was given:  12.5 mg on 4/27/2017 10:02 PM                                TYLENOL 500 MG tablet   Take 500-1,000 mg by mouth every 6 hours as needed for mild pain   Generic drug:  acetaminophen                                vitamin B complex with vitamin C Tabs tablet   Take 1 tablet by mouth daily                                vitamin D 2000 UNITS Caps   Take 2,000 Units by mouth daily                                * WARFARIN SODIUM PO   Take 7.5 mg by mouth   Last time this was given:  7.5 mg on 4/27/2017 10:54 PM                                * warfarin 5 MG tablet   Commonly known as:  COUMADIN   As directed. 7.5 mg on Mon, Wed, Fri and 5 mg daily the rest of the week.   Last time this was given:  7.5 mg on 4/27/2017 10:54 PM                                * Notice:  This list has 2 medication(s) that are the same as other medications prescribed for you. Read the directions carefully, and ask your doctor or other care provider to review them with you.

## 2017-04-27 NOTE — IP AVS SNAPSHOT
Unit 6D Observation [address] [address]    Phone:  [phone]    Fax:  [phone]                                       After Visit Summary   4/27/2017    Oswaldo Prabhakar    MRN: [id number]           After Visit Summary Signature Page     I have received my discharge instructions, and my questions have been answered. I have discussed any challenges I see with this plan with the nurse or doctor.    ..........................................................................................................................................  Patient/Patient Representative Signature      ..........................................................................................................................................  Patient Representative Print Name and Relationship to Patient    ..................................................               ................................................  Date                                            Time    ..........................................................................................................................................  Reviewed by Signature/Title    ...................................................              ..............................................  Date                                                            Time

## 2017-04-27 NOTE — ED NOTES
Oswaldo reports a productive cough for approximately 2 weeks.  Seen in the clinic today and reported for the last 2 to 3 day intermittent episodes of left sided chest pain lasting 5 to 15 seconds.  Patient is currently pain free and denies SOB.  Took scheduled dose of baby ASA 81 mg and coumadin last night.  Referred to the ED today for further evaluation.

## 2017-04-27 NOTE — NURSING NOTE
EKG preformed results to provider patient tolerated well. Sara Biswas LPN at 3:45 PM on 4/27/2017.

## 2017-04-27 NOTE — NURSING NOTE
Chief Complaint   Patient presents with     Cough     Patient here for on going cough and fever.     Nahed Singer LPN at 3:09 PM on 4/27/2017.

## 2017-04-27 NOTE — MR AVS SNAPSHOT
After Visit Summary   4/27/2017    Oswaldo Prabhakar    MRN: [id number]           Patient Information     Date Of Birth          1960        Visit Information        Provider Department      4/27/2017 2:50 PM Yogi De Los Santos MD [facility]        Today's Diagnoses     Atypical chest pain    -  1    Community acquired pneumonia           Follow-ups after your visit        Who to contact     Please call your clinic at [phone] to:    Ask questions about your health    Make or cancel appointments    Discuss your medicines    Learn about your test results    Speak to your doctor   If you have compliments or concerns about an experience at your clinic, or if you wish to file a complaint, please contact [facility] Patient Relations at [phone] or email us at [email or web address]         Additional Information About Your Visit        MyChart Information     [employer] gives you secure access to your electronic health record. If you see a primary care provider, you can also send messages to your care team and make appointments. If you have questions, please call your primary care clinic.  If you do not have a primary care provider, please call [phone] and they will assist you.      [employer]Me is an electronic gateway that provides easy, online access to your medical records. With [employer], you can request a clinic appointment, read your test results, renew a prescription or communicate with your care team.     To access your existing account, please contact your [facility] Clinic or call [phone] for assistance.        Care EveryWhere ID     This is your Care EveryWhere ID. This could be used by other organizations to access your [facility] medical records  [id number]        Your Vitals Were     Pulse Temperature Pulse Oximetry BMI (Body Mass Index)          97 99.3  F (37.4  C) (Oral) 97% 29.75 kg/m2          Blood Pressure from Last 3 Encounters:   04/27/17 (!) 146/97   11/28/16 120/81   05/19/16 125/83    Weight from Last 3 Encounters:   04/27/17 95.4 kg (210 lb 4.8 oz)   11/28/16 94.7 kg (208 lb 11.2 oz)   05/19/16 95.9 kg (211 lb 6.4 oz)              We Performed the Following     EKG Performed in Clinic w/ Provider Reading Fee          Today's Medication Changes          These changes are accurate as of: 4/27/17  3:59 PM.  If you have any questions, ask your nurse or doctor.               Stop taking these medicines if you haven't already. Please contact your care team if you have questions.     alirocumab 150 MG/ML injectable pen   Commonly known as:  PRALUENT   Stopped by:  Yogi De Los Santos MD           chlorthalidone 25 MG tablet   Commonly known as:  HYGROTON   Stopped by:  Yogi De Los Santos MD           guaiFENesin-codeine 100-10 MG/5ML Soln   Commonly known as:  ROBITUSSIN AC   Stopped by:  Yogi De Los Santos MD                    Primary Care Provider Office Phone # Fax #    Samm Vazquez [phone]        PHYSICIANS [address] [facility] [address]  [facility] [address]        Thank you!     Thank you for choosing [facility]  for your care. Our goal is always to provide you with excellent care. Hearing back from our patients is one way we can continue to improve our services. Please take a few minutes to complete the written survey that you may receive in the mail after your visit with us. Thank you!             Your Updated Medication List - Protect others around you: Learn how to safely use, store and throw away your medicines at www.disposemymeds.org.          This list is accurate as of: 4/27/17  3:59 PM.  Always use your most recent med list.                   Brand Name Dispense Instructions for use    aspirin EC 81 MG EC tablet     91 tablet    Take 1 tablet (81 mg) by mouth daily       atorvastatin 80 MG tablet    LIPITOR    91 tablet    Take 1 tablet  (80 mg) by mouth daily       BENADRYL PO      Take 25-50 mg by mouth daily as needed       calcium carbonate 500 MG chewable tablet    TUMS     Take 1 chew tab by mouth daily as needed       ezetimibe 10 MG tablet    ZETIA    90 tablet    Take 1 tablet (10 mg) by mouth daily       fenofibrate 160 MG tablet     91 tablet    Take 1 tablet (160 mg) by mouth daily       lisinopril 2.5 MG tablet    PRINIVIL/Zestril    91 tablet    Take 1 tablet (2.5 mg) by mouth daily , or as directed by Dr. Ocampo.       metoprolol 25 MG tablet    LOPRESSOR    90 tablet    Take 0.5 tablets (12.5 mg) by mouth 2 times daily       STOOL SOFTENER PO      Take  by mouth as needed.       TYLENOL 500 MG tablet   Generic drug:  acetaminophen      Take 500-1,000 mg by mouth every 6 hours as needed for mild pain       vitamin D 2000 UNITS Caps      Take 2,000 Units by mouth daily       warfarin 5 MG tablet    COUMADIN    145 tablet    As directed. 5 mg all days except 7.5 mg on Tu and Sat.

## 2017-04-27 NOTE — ED NOTES
Bed: [facility]  Expected date:   Expected time:   Means of arrival:   Comments:  Oswaldo Prabhakar:  56 year old sp cabg with cough and chest pain.  ECG shows new ant/lat st depression.

## 2017-04-27 NOTE — ED PROVIDER NOTES
"  History     Chief Complaint   Patient presents with     Chest Pain     HPI  Oswaldo Prabhakar is a 56 year old male with a history of PE and infarction (s/p hemothorax and filter s/p filter removal now on Coumadin), s/p cardiac bypass (2 years ago) stented coronary artery, statin intolerance, and CAD who presents to the ED with chest pain. Patient states he was seen earlier in clinic today for cough and chest discomfort and had an irregular EKG. His cough is constant throughout the day and occasionally feels \"twinges\" that last for 15 sec. He also has complaints of fevers (subjective), and his cough is productive with yellow sputum. He denies shortness of breath or swelling in ankles. His last echo was 2 years ago, and his INR was last checked 3-4 weeks ago.       PAST MEDICAL HISTORY  Past Medical History:   Diagnosis Date     Antiplatelet or antithrombotic long-term use      Diaphragmatic hernia without mention of obstruction or gangrene      Nephrolithiasis 4/2010     Other and unspecified hyperlipidemia      Pulmonary embolus and infarction (H)     s/p hemothorax and filter s/p filter removal (2011), now on long term anti-coagulation     Statin intolerance 2/1/2017     Stented coronary artery      Unspecified retinal detachment     Childhood trauma, unrepaired     PAST SURGICAL HISTORY  Past Surgical History:   Procedure Laterality Date     BYPASS GRAFT ARTERY CORONARY  4/4/2014    Procedure: BYPASS GRAFT ARTERY CORONARY;  Median Sternotomy, Coronary Artery Bypass Bypass x 4 using Left Internal Mammary, Left Saphenous Vein, on pump oxygenation;  Surgeon: Sherry Armando MD;  Location: [facility] OR     C NONSPECIFIC PROCEDURE      Spermatocele resection     CLOSED REDUCTION, PERCUTANEOUS PINNING  HAND, COMBINED Left 11/5/2015    Procedure: COMBINED CLOSED REDUCTION, PERCUTANEOUS PINNING HAND;  Surgeon: Carmella Love MD;  Location: [facility]     COLONOSCOPY  3/15/2013    Procedure: COLONOSCOPY;;  Surgeon: Ramonita, " Racheal BOWENS MD;  Location:  GI     LITHOTRIPSY  4/2010     FAMILY HISTORY  Family History   Problem Relation Age of Onset     C.A.D. Paternal Grandmother      DIABETES No family hx of      Hypertension Paternal Grandmother      Thyroid Disease No family hx of      C.A.D. Paternal Grandmother      Alzheimer Disease Paternal Grandmother      HEART DISEASE Mother      C.A.D. Father      3 vessel CABG, age 70     CANCER Father      bladder cancer     Cancer - colorectal No family hx of      SOCIAL HISTORY  Social History   Substance Use Topics     Smoking status: Never Smoker     Smokeless tobacco: Never Used     Alcohol use No      Comment: very rare     MEDICATIONS  Current Facility-Administered Medications   Medication     lidocaine 1 % 1 mL     lidocaine (LMX4) kit     sodium chloride (PF) 0.9% PF flush 3 mL     sodium chloride (PF) 0.9% PF flush 3 mL     levofloxacin (LEVAQUIN) tablet 750 mg     Current Outpatient Prescriptions   Medication     warfarin (COUMADIN) 5 MG tablet     aspirin EC 81 MG tablet     lisinopril (PRINIVIL/ZESTRIL) 2.5 MG tablet     ezetimibe (ZETIA) 10 MG tablet     fenofibrate 160 MG tablet     atorvastatin (LIPITOR) 80 MG tablet     metoprolol (LOPRESSOR) 25 MG tablet     Cholecalciferol (VITAMIN D) 2000 UNITS CAPS     acetaminophen (TYLENOL) 500 MG tablet     calcium carbonate (TUMS) 500 MG chewable tablet     DiphenhydrAMINE HCl (BENADRYL PO)     Docusate Calcium (STOOL SOFTENER PO)     Facility-Administered Medications Ordered in Other Encounters   Medication     heparin (porcine) injection     aminocaproic acid (AMICAR) drip     ALLERGIES  Allergies   Allergen Reactions     Cats      Hay Fever & [A.R.M.]      Hydrocodone-Acetaminophen Nausea and Vomiting         I have reviewed the Medications, Allergies, Past Medical and Surgical History, and Social History in the Epic system.    Review of Systems   Constitutional: Negative for fever.   Respiratory: Positive for cough  "(productive). Negative for shortness of breath.    Cardiovascular: Positive for chest pain. Negative for leg swelling.   Gastrointestinal: Negative for abdominal pain.   All other systems reviewed and are negative.      Physical Exam   BP: (!) 168/99  Heart Rate: 92  Resp: 16  Height: 179.1 cm (5' 10.5\")  Weight: 95.3 kg (210 lb)  SpO2: 97 %  Physical Exam   Constitutional: No distress.   HENT:   Head: Atraumatic.   Mouth/Throat: Oropharynx is clear and moist. No oropharyngeal exudate.   Eyes: Pupils are equal, round, and reactive to light. No scleral icterus.   Cardiovascular: Normal heart sounds and intact distal pulses.    Pulmonary/Chest: No respiratory distress. He has no wheezes. He has rhonchi (mild) in the left middle field and the left lower field.   Abdominal: Soft. Bowel sounds are normal. There is no tenderness.   Musculoskeletal: He exhibits no edema or tenderness.   Skin: Skin is warm. No rash noted. He is not diaphoretic.       ED Course     ED Course     Procedures   4:24 PM  The patient was seen and examined by Dr. Aguilar in [facility]                EKG Interpretation:      Interpreted by Jerrod Aguilar  Time reviewed: 1450  Symptoms at time of EKG: Cough   Rhythm: normal sinus   Rate: normal  Axis: normal  Ectopy: none  Conduction: normal  ST Segments/ T Waves: Inverted T-wave and ST depression in V2 through V5  Q Waves: V3  Comparison to prior: New ST and T-wave changes in the anterior leads    Clinical Impression: New EKG changes without any ST elevation          Critical Care time:  none               Labs Ordered and Resulted from Time of ED Arrival Up to the Time of Departure from the ED   BASIC METABOLIC PANEL - Abnormal; Notable for the following:        Result Value    Potassium 3.3 (*)     All other components within normal limits   INR - Abnormal; Notable for the following:     INR 1.58 (*)     All other components within normal limits   CBC WITH PLATELETS DIFFERENTIAL "   ISTAT TROPONIN NURSING POCT   PERIPHERAL IV CATHETER   TROPONIN POCT            Assessments & Plan (with Medical Decision Making)   The patient was seen in clinic for Crestor infection symptoms.  He has a left lingular pneumonia on chest x-ray and will be started on levofloxacin.  I spoke with the patient about adjusting his Coumadin and monitoring his INR more closely because of the antibiotic.  The patient had EKG Changes found incidentally in the clinic.  He did not have any chest pain or ischemic symptoms.  His last EKG was more than 2 years ago so we do not know when the changes occurred.  There is no sign of ST elevation.  His troponin is 0.  I spoke with the cardiologist on call, Dr. Starks, who reviewed the patient's EKG and last angiogram and stress test results.  He agreed that the patient is low risk and that this may be an incidental finding of the change that occurred quite some time ago.  He recommended that we admit the patient to the observation unit for serial troponins and stress echocardiogram in the morning.      I have reviewed the nursing notes.    I have reviewed the findings, diagnosis, plan and need for follow up with the patient.    New Prescriptions    No medications on file       Final diagnoses:   Pneumonia of left lung due to infectious organism, unspecified part of lung   Acute electrocardiogram changes     IRyan, am serving as a trained medical scribe to document services personally performed by Jerrod Aguilar MD, based on the provider's statements to me.      Jerrod CHO MD, was physically present and have reviewed and verified the accuracy of this note documented by Ryan Reina.     4/27/2017   [facility], [place], EMERGENCY DEPARTMENT     Jerrod Aguilar MD  04/27/17 [id number]

## 2017-04-27 NOTE — PROGRESS NOTES
Chief Complaint   Patient presents with     Cough     Patient here for on going cough and fever.         SUBJECTIVE:   Oswaldo Prabhakar is a 56 year old male here for evaluation of cough.  States that is started 1.5 weeks ago and hasn't gotten better.  Cough productive of thick sputum of half-dollar size.  Has low-grade temps at home.  No sick contacts.  Also, describes 10-15 second episodes of chest pain that started 4-5 days ago.  CP 0.5 out of 10 when he has an episode and currently does not have chest pain.      Review of systems:  10 point ROS negative except as noted above.    Past Medical History:   Diagnosis Date     Antiplatelet or antithrombotic long-term use      Diaphragmatic hernia without mention of obstruction or gangrene      Nephrolithiasis 4/2010     Other and unspecified hyperlipidemia      Pulmonary embolus and infarction (H)     s/p hemothorax and filter s/p filter removal (2011), now on long term anti-coagulation     Statin intolerance 2/1/2017     Stented coronary artery      Unspecified retinal detachment     Childhood trauma, unrepaired        Current Outpatient Prescriptions on File Prior to Visit:  lisinopril (PRINIVIL/ZESTRIL) 2.5 MG tablet Take 1 tablet (2.5 mg) by mouth daily , or as directed by Dr. Ocampo.   ezetimibe (ZETIA) 10 MG tablet Take 1 tablet (10 mg) by mouth daily   warfarin (COUMADIN) 5 MG tablet As directed. 5 mg all days except 7.5 mg on Tu and Sat.   fenofibrate 160 MG tablet Take 1 tablet (160 mg) by mouth daily   atorvastatin (LIPITOR) 80 MG tablet Take 1 tablet (80 mg) by mouth daily   metoprolol (LOPRESSOR) 25 MG tablet Take 0.5 tablets (12.5 mg) by mouth 2 times daily   Cholecalciferol (VITAMIN D) 2000 UNITS CAPS Take 2,000 Units by mouth daily   aspirin EC 81 MG tablet Take 1 tablet (81 mg) by mouth daily   acetaminophen (TYLENOL) 500 MG tablet Take 500-1,000 mg by mouth every 6 hours as needed for mild pain   calcium carbonate (TUMS) 500 MG chewable tablet Take 1  chew tab by mouth daily as needed   DiphenhydrAMINE HCl (BENADRYL PO) Take 25-50 mg by mouth daily as needed   Docusate Calcium (STOOL SOFTENER PO) Take  by mouth as needed.     Current Facility-Administered Medications on File Prior to Visit:  heparin (porcine) injection   aminocaproic acid (AMICAR) drip        OBJECTIVE:  Physical exam:  BP (!) 146/97  Pulse 97  Temp 99.3  F (37.4  C) (Oral)  Wt 95.4 kg (210 lb 4.8 oz)  SpO2 97%  BMI 29.75 kg/m2  Body mass index is 29.75 kg/(m^2).   Gen: Well-developed, well-nourished and in no apparent distress, slightly diaphoretic  HEENT: normocephalic, atraumatic, PERRL, no scleral icterus, cranial nerves II-XII grossly intact  Neck: supple, no LAD, no thyromegaly  CV: regular rate and rhythm, normal S1 S2 and no murmur, click, or rub  Resp: coarse breath sounds in right lower lobe, otherwise CTAB, normal respiratory effort  Abd: bowel sounds presents, soft. non-tender, non-distended, no masses or hepatosplenomegaly  Ext: WWP, no LE edema, 5/5 strength in all extremities, normal gait  Skin: warm and dry, no rashes or ecchymoses  Psych: normal mood, normal affect, alert and oriented x3    ASSESSMENT and PLAN:   Oswaldo was seen today for cough.    Diagnoses and all orders for this visit:    Atypical chest pain  -     EKG Performed in Clinic w/ Provider Reading Fee    EKG with ST segment depressions and T wave inversions in anterio-lateral leads with concerns for ischemic changes, especially given his history of CAD with CABG.  Arranged transport to ED by ambulance.  ED provider aware and ready for transfer.    Community acquired pneumonia  Cough concerning for CAP.  Would have started levofloxacin, but possible ischemic changes took priority and send patient to ED for evaluation.    Return to clinic in PRN    Patient seen and discussed with Dr. Thompson who agrees with this plan.    Yogi De Los Santos MD  Internal Medicine, PGY-3  Pager: [phone]      Pt was seen and  examined with Dr. De Los Santos.  I agree with his documentation as noted above.    My additional comments: None    Josselyn Thompson MD    EKG reviewed - NSR, ST segment depression and T wave inversions in anterolateral leads, new since last EKG available for review.

## 2017-04-28 ENCOUNTER — APPOINTMENT (OUTPATIENT)
Dept: CARDIOLOGY | Facility: CLINIC | Age: 57
End: 2017-04-28
Attending: NURSE PRACTITIONER
Payer: COMMERCIAL

## 2017-04-28 VITALS
BODY MASS INDEX: 29.4 KG/M2 | WEIGHT: 210 LBS | SYSTOLIC BLOOD PRESSURE: 122 MMHG | TEMPERATURE: 98.7 F | RESPIRATION RATE: 18 BRPM | HEIGHT: 71 IN | OXYGEN SATURATION: 95 % | DIASTOLIC BLOOD PRESSURE: 79 MMHG

## 2017-04-28 LAB
ALBUMIN SERPL-MCNC: 3.4 G/DL (ref 3.4–5)
ALP SERPL-CCNC: 45 U/L (ref 40–150)
ALT SERPL W P-5'-P-CCNC: 36 U/L (ref 0–70)
ANION GAP SERPL CALCULATED.3IONS-SCNC: 10 MMOL/L (ref 3–14)
AST SERPL W P-5'-P-CCNC: 20 U/L (ref 0–45)
BASOPHILS # BLD AUTO: 0.1 10E9/L (ref 0–0.2)
BASOPHILS NFR BLD AUTO: 0.8 %
BILIRUB SERPL-MCNC: 1.6 MG/DL (ref 0.2–1.3)
BUN SERPL-MCNC: 11 MG/DL (ref 7–30)
CALCIUM SERPL-MCNC: 8.6 MG/DL (ref 8.5–10.1)
CHLORIDE SERPL-SCNC: 105 MMOL/L (ref 94–109)
CHOLEST SERPL-MCNC: 182 MG/DL
CO2 SERPL-SCNC: 24 MMOL/L (ref 20–32)
CREAT SERPL-MCNC: 0.98 MG/DL (ref 0.66–1.25)
DIFFERENTIAL METHOD BLD: ABNORMAL
EOSINOPHIL # BLD AUTO: 0.4 10E9/L (ref 0–0.7)
EOSINOPHIL NFR BLD AUTO: 5.5 %
ERYTHROCYTE [DISTWIDTH] IN BLOOD BY AUTOMATED COUNT: 13.6 % (ref 10–15)
GFR SERPL CREATININE-BSD FRML MDRD: 80 ML/MIN/1.7M2
GLUCOSE SERPL-MCNC: 87 MG/DL (ref 70–99)
HCT VFR BLD AUTO: 39.6 % (ref 40–53)
HDLC SERPL-MCNC: 30 MG/DL
HGB BLD-MCNC: 13.2 G/DL (ref 13.3–17.7)
IMM GRANULOCYTES # BLD: 0 10E9/L (ref 0–0.4)
IMM GRANULOCYTES NFR BLD: 0.1 %
INR PPP: 1.71 (ref 0.86–1.14)
INTERPRETATION ECG - MUSE: NORMAL
LDLC SERPL CALC-MCNC: 117 MG/DL
LYMPHOCYTES # BLD AUTO: 1.5 10E9/L (ref 0.8–5.3)
LYMPHOCYTES NFR BLD AUTO: 21.7 %
MAGNESIUM SERPL-MCNC: 2 MG/DL (ref 1.6–2.3)
MCH RBC QN AUTO: 29.5 PG (ref 26.5–33)
MCHC RBC AUTO-ENTMCNC: 33.3 G/DL (ref 31.5–36.5)
MCV RBC AUTO: 89 FL (ref 78–100)
MONOCYTES # BLD AUTO: 0.6 10E9/L (ref 0–1.3)
MONOCYTES NFR BLD AUTO: 7.7 %
NEUTROPHILS # BLD AUTO: 4.6 10E9/L (ref 1.6–8.3)
NEUTROPHILS NFR BLD AUTO: 64.2 %
NONHDLC SERPL-MCNC: 152 MG/DL
NRBC # BLD AUTO: 0 10*3/UL
NRBC BLD AUTO-RTO: 0 /100
PLATELET # BLD AUTO: 210 10E9/L (ref 150–450)
POTASSIUM SERPL-SCNC: 3.8 MMOL/L (ref 3.4–5.3)
PROT SERPL-MCNC: 6.7 G/DL (ref 6.8–8.8)
RBC # BLD AUTO: 4.47 10E12/L (ref 4.4–5.9)
SODIUM SERPL-SCNC: 139 MMOL/L (ref 133–144)
TRIGL SERPL-MCNC: 174 MG/DL
TROPONIN I SERPL-MCNC: NORMAL UG/L (ref 0–0.04)
TROPONIN I SERPL-MCNC: NORMAL UG/L (ref 0–0.04)
WBC # BLD AUTO: 7.1 10E9/L (ref 4–11)

## 2017-04-28 PROCEDURE — 93350 STRESS TTE ONLY: CPT | Mod: 26 | Performed by: INTERNAL MEDICINE

## 2017-04-28 PROCEDURE — 80061 LIPID PANEL: CPT | Performed by: NURSE PRACTITIONER

## 2017-04-28 PROCEDURE — 93018 CV STRESS TEST I&R ONLY: CPT | Performed by: INTERNAL MEDICINE

## 2017-04-28 PROCEDURE — 25000128 H RX IP 250 OP 636: Performed by: INTERNAL MEDICINE

## 2017-04-28 PROCEDURE — 36415 COLL VENOUS BLD VENIPUNCTURE: CPT | Performed by: NURSE PRACTITIONER

## 2017-04-28 PROCEDURE — 25000125 ZZHC RX 250: Performed by: INTERNAL MEDICINE

## 2017-04-28 PROCEDURE — 93016 CV STRESS TEST SUPVJ ONLY: CPT | Performed by: INTERNAL MEDICINE

## 2017-04-28 PROCEDURE — 93325 DOPPLER ECHO COLOR FLOW MAPG: CPT | Mod: 26 | Performed by: INTERNAL MEDICINE

## 2017-04-28 PROCEDURE — 80053 COMPREHEN METABOLIC PANEL: CPT | Performed by: NURSE PRACTITIONER

## 2017-04-28 PROCEDURE — 25000132 ZZH RX MED GY IP 250 OP 250 PS 637: Performed by: EMERGENCY MEDICINE

## 2017-04-28 PROCEDURE — 85025 COMPLETE CBC W/AUTO DIFF WBC: CPT | Performed by: NURSE PRACTITIONER

## 2017-04-28 PROCEDURE — 85610 PROTHROMBIN TIME: CPT | Performed by: NURSE PRACTITIONER

## 2017-04-28 PROCEDURE — 25500064 ZZH RX 255 OP 636: Performed by: INTERNAL MEDICINE

## 2017-04-28 PROCEDURE — G0378 HOSPITAL OBSERVATION PER HR: HCPCS

## 2017-04-28 PROCEDURE — 40000264 ECHO DOBUTAMINE STRESS TEST WITH DEFINITY

## 2017-04-28 PROCEDURE — 84484 ASSAY OF TROPONIN QUANT: CPT | Performed by: NURSE PRACTITIONER

## 2017-04-28 PROCEDURE — 80048 BASIC METABOLIC PNL TOTAL CA: CPT | Performed by: NURSE PRACTITIONER

## 2017-04-28 PROCEDURE — 99217 ZZC OBSERVATION CARE DISCHARGE: CPT | Mod: Z6 | Performed by: NURSE PRACTITIONER

## 2017-04-28 PROCEDURE — 93321 DOPPLER ECHO F-UP/LMTD STD: CPT | Mod: 26 | Performed by: INTERNAL MEDICINE

## 2017-04-28 PROCEDURE — 25000132 ZZH RX MED GY IP 250 OP 250 PS 637: Performed by: NURSE PRACTITIONER

## 2017-04-28 RX ORDER — LEVOFLOXACIN 750 MG/1
750 TABLET, FILM COATED ORAL DAILY
Qty: 5 TABLET | Refills: 0 | Status: SHIPPED | OUTPATIENT
Start: 2017-04-29 | End: 2017-05-04

## 2017-04-28 RX ORDER — METOPROLOL TARTRATE 1 MG/ML
.5-5 INJECTION, SOLUTION INTRAVENOUS
Status: DISCONTINUED | OUTPATIENT
Start: 2017-04-28 | End: 2017-04-28 | Stop reason: HOSPADM

## 2017-04-28 RX ORDER — CODEINE PHOSPHATE AND GUAIFENESIN 10; 100 MG/5ML; MG/5ML
1-2 SOLUTION ORAL EVERY 4 HOURS PRN
Qty: 236 ML | Refills: 1 | Status: SHIPPED | OUTPATIENT
Start: 2017-04-28 | End: 2017-06-21

## 2017-04-28 RX ORDER — WARFARIN SODIUM 5 MG/1
TABLET ORAL
Qty: 145 TABLET | Refills: 6 | COMMUNITY
Start: 2017-04-28 | End: 2018-03-20

## 2017-04-28 RX ORDER — WARFARIN SODIUM 7.5 MG/1
7.5 TABLET ORAL
Status: DISCONTINUED | OUTPATIENT
Start: 2017-04-28 | End: 2017-04-28 | Stop reason: HOSPADM

## 2017-04-28 RX ORDER — DOBUTAMINE HYDROCHLORIDE 200 MG/100ML
5-50 INJECTION INTRAVENOUS CONTINUOUS PRN
Status: DISCONTINUED | OUTPATIENT
Start: 2017-04-28 | End: 2017-04-28 | Stop reason: HOSPADM

## 2017-04-28 RX ADMIN — Medication 1 LOZENGE: at 01:21

## 2017-04-28 RX ADMIN — LEVOFLOXACIN 750 MG: 750 TABLET, FILM COATED ORAL at 07:41

## 2017-04-28 RX ADMIN — METOPROLOL TARTRATE 5 MG: 5 INJECTION INTRAVENOUS at 09:53

## 2017-04-28 RX ADMIN — PERFLUTREN 7 ML: 6.52 INJECTION, SUSPENSION INTRAVENOUS at 09:59

## 2017-04-28 RX ADMIN — ATROPINE SULFATE 0.2 MG: 0.4 INJECTION, SOLUTION INTRAMUSCULAR; INTRAVENOUS; SUBCUTANEOUS at 09:55

## 2017-04-28 NOTE — PROGRESS NOTES
Inpatient presents this morning for a dobutamine stress following new pneumonia diagnosis and ECG changes noted in the clinic.  Patient denies CP or SOB.  The procedure for the stress test is explained to the patient including the medications that will be used and their possible side effects.  Patient verbalizes understanding.  Patient is appropriately NPO and his metoprolol has been held this morning.  Patient achieved his target heart rate for this test reporting only diaphoresis during stress test.  VSS.  Patient is monitored x 10 mn post stress and then is transported back to  via stretcher.

## 2017-04-28 NOTE — PLAN OF CARE
"Problem: Discharge Planning  Goal: Discharge Planning (Adult, OB, Behavioral, Peds)  Outpatient/Observation goals to be met before discharge home:      List all goals to be met before discharge home:   - Serial troponins and stress test complete. - trop neg x3. Another one to be drawn at 0500. Dobutamine stress test to be completed tomorrow AM. Clears at MN.   - Seen and cleared by consultant if applicable - cards consult pending.  - Able to tolerate oral antibiotics - yes, levaquin given PO in ED.   - No hypoxia; maintain oxygen saturation above 93% room air - no  - Cough controlled with oral cough medication - no complaints at this time.  - Passes ambulation oximetry test - pending  - Adequate pain control on oral analgesia - yes  - Vital signs normal or at patient baseline BP (!) 133/95  Temp 98.9  F (37.2  C) (Oral)  Resp 18  Ht 1.803 m (5' 11\")  Wt 95.3 kg (210 lb)  SpO2 96%  BMI 29.29 kg/m2  - Safe disposition plan has been identified - pending    - Nurse to notify provider when observation goals have been met and patient is ready for discharge.      Denies CP, no SOB. Plan for dobutamine stress in AM. Clears at midnight. Will continue to monitor and update team with changes/concerns.      "

## 2017-04-28 NOTE — PLAN OF CARE
"Problem: Discharge Planning  Goal: Discharge Planning (Adult, OB, Behavioral, Peds)  Outpatient/Observation goals to be met before discharge home:     List all goals to be met before discharge home:   - Serial troponins and stress test complete. - trop neg x1, stress test to be completed tomorrow AM. Clears at [place].   - Seen and cleared by consultant if applicable - cards consult pending.  - Able to tolerate oral antibiotics - yes, levaquin given PO in ED.   - No hypoxia; maintain oxygen saturation above 93% room air - no  - Cough controlled with oral cough medication - no complaints at this time.  - Passes ambulation oximetry test - pending  - Adequate pain control on oral analgesia - yes  - Vital signs normal or at patient baseline BP (!) 143/93  Temp 98.1  F (36.7  C) (Oral)  Resp 18  Ht 1.803 m (5' 11\")  Wt 95.3 kg (210 lb)  SpO2 96%  BMI 29.29 kg/m2  - Safe disposition plan has been identified - pending    - Nurse to notify provider when observation goals have been met and patient is ready for discharge.     Denies CP, no SOB. Observation sheet given to patient. No further questions or concerns. Admission checklist completed. Will continue to monitor and update team with changes/concerns.      "

## 2017-04-28 NOTE — DISCHARGE SUMMARY
Discharge Summary    Oswaldo Prabhakar MRN# [id number]   YOB: 1960 Age: 56 year old     Date of Admission:  4/27/2017  Date of Discharge:  4/28/2017  Admitting Physician:  Jerrod Aguilar MD  Discharge Physician:  LINDSAY GARCIA MD   Discharging Service:  Internal Medicine     Primary Provider: Samm Vazquez          Discharge Diagnosis:     * No active hospital problems. *    * No resolved hospital problems. *               Discharge Disposition:   Discharged to home           Condition on Discharge:   Discharge condition: Stable               Procedures:   Imaging performed:   Chest x-ray     Cardiology procedures perfromed:   Stress testing             Discharge Medications:     Current Discharge Medication List      START taking these medications    Details   levofloxacin (LEVAQUIN) 750 MG tablet Take 1 tablet (750 mg) by mouth daily for 5 days  Qty: 5 tablet, Refills: 0    Associated Diagnoses: Pneumonia of left lung due to infectious organism, unspecified part of lung      guaiFENesin-codeine (ROBITUSSIN AC) 100-10 MG/5ML SOLN solution Take 5-10 mLs by mouth every 4 hours as needed for cough  Qty: 236 mL, Refills: 1    Associated Diagnoses: Pneumonia of left lung due to infectious organism, unspecified part of lung         CONTINUE these medications which have CHANGED    Details   !! warfarin (COUMADIN) 5 MG tablet As directed. 7.5 mg on Mon, Wed, Fri and 5 mg daily the rest of the week.  Qty: 145 tablet, Refills: 6    Comments: Take 7.5 mg PO tonight (4/28). Take 5mg on Saturday (4/29) and Sunday(4/30) . If possible have your INR checked on Saturday or Sunday. If not able to over the weekend, have your INR checked on Monday.  Associated Diagnoses: Long term (current) use of anticoagulants       !! - Potential duplicate medications found. Please discuss with provider.      CONTINUE these medications which have NOT CHANGED    Details   vitamin B complex with vitamin C (VITAMIN  B COMPLEX) TABS  tablet Take 1 tablet by mouth daily      !! WARFARIN SODIUM PO Take 7.5 mg by mouth      lisinopril (PRINIVIL/ZESTRIL) 2.5 MG tablet Take 1 tablet (2.5 mg) by mouth daily , or as directed by Dr. Ocampo.  Qty: 91 tablet, Refills: 1    Comments: Pt is due for follow-up with Dr. Ocampo. Please call [phone] to schedule.  Associated Diagnoses: Essential hypertension      ezetimibe (ZETIA) 10 MG tablet Take 1 tablet (10 mg) by mouth daily  Qty: 90 tablet, Refills: 3    Associated Diagnoses: Statin intolerance; Hyperlipidemia LDL goal <70; Atherosclerosis of coronary artery of native heart without angina pectoris, unspecified vessel or lesion type; S/P CABG (coronary artery bypass graft)      fenofibrate 160 MG tablet Take 1 tablet (160 mg) by mouth daily  Qty: 91 tablet, Refills: 3    Associated Diagnoses: Hyperlipidemia LDL goal <130      atorvastatin (LIPITOR) 80 MG tablet Take 1 tablet (80 mg) by mouth daily  Qty: 91 tablet, Refills: 3    Associated Diagnoses: Pure hyperglyceridemia; Hyperlipidemia LDL goal <130      metoprolol (LOPRESSOR) 25 MG tablet Take 0.5 tablets (12.5 mg) by mouth 2 times daily  Qty: 90 tablet, Refills: 3    Associated Diagnoses: S/P CABG x 4      Cholecalciferol (VITAMIN D) 2000 UNITS CAPS Take 2,000 Units by mouth daily    Associated Diagnoses: 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 - Potential duplicate medications found. Please discuss with provider.                Consultations:   No consultations were requested during this admission             Brief History of Illness:  "  Oswaldo Prabhakar is a 56 year old male with a history of PE and infarction (s/p hemothorax and filter s/p filter removal now on Coumadin), s/p cardiac bypass (2 years ago) stented coronary artery, statin intolerance, and CAD who presents to the ED with chest pain          Hospital Course:   ## 1. Chest Pain: Given extensive cardiac history, chest pain and abnormal EKG: ER Physician consulted Dr. Starks cardiology \" who reviewed the patient's EKG and last angiogram and stress test results. He agreed that the patient is low risk and that this may be an incidental finding of the change that occurred quite some time ago. He recommended that we admit the patient to the observation unit for serial troponins and stress echocardiogram in the morning.\"    Currently chest pain free.  denies shortness of breath on exertion not hypoxic, so PE workup not completed. EKGS review by overnight cardiology fellow and did not see any changes from patient last EKG. Chest pain most likely related to pneumonia. Serial troponins negative. Dobutamine stress test negative. .        ## 2. CAP: Chest x-ray concerning for left lung pneumonia. Negative Influenza. Shortness of breath with coughing spell. Able to speak in full sentences. No hypoxia, hypotension or tachycardia.  Patient states negative Quanteferon TB gold test 1.5 weeks ago- done due to job occupation here at [facility]. Levaquin 750 mg po daily x 6 more days Procal undetectable. Patient reported Tessalon Perils were not beneficial. Patient given a prescription for Robitussin with Codeine at discharge. Lactic Acid 1.4.  CBC stable.      ## 3. S/P Pulmonary Embolism: INR sub-therapeutic 1.58. States does not skip any medications.Takes Warfarin 7.5 mg Tuesday & Saturday. Warfarin 5 mg all other days. Denies overt shortness of breath and no chest pain, leg pain or swelling. No bleeding. Recommended to take 7.5 mg tonight and take 5 mg Saturday and Sunday and attempt to get an INR " "over the weekend. If not able to then get it Monday.        ## 4. Hypokalemia: Mild-> 3.3 was 3.8 on discharge.      ## 5. Hypertension:Currently denies headache, dizziness or vision change.  Takes Lisinopril 2.5 mg po daily and Metoprolol. Due for Lisinopril dose tonight. Normal kidney function->Creatinine1.01/GFR 77. Metoprolol held for stress test and resumed at discharge.     ## 6. Hyperlipidemia: Elevated lipid panel in January 2017. Known statin intolerance.   Patient was continued on  PTA Zetia Lipid panel completed and will be follow up with patient's PCP.                  Final Day of Progress before Discharge:       Physical Exam:  Blood pressure 122/79, temperature 98.7  F (37.1  C), temperature source Oral, resp. rate 18, height 1.803 m (5' 11\"), weight 95.3 kg (210 lb), SpO2 95 %.    EXAM:  Constitutional: healthy, alert and no distress   Head: Normocephalic. No masses, lesions, tenderness or abnormalities   Neck: Neck supple. No adenopathy. Thyroid symmetric, normal size,, Carotids without bruits.   ENT: ENT exam normal, no neck nodes or sinus tenderness   Cardiovascular: RRR. No murmurs, clicks gallops or rub   Respiratory: . Good diaphragmatic excursion. Lungs clear   Gastrointestinal: Abdomen soft,. BS normal. No masses, organomegaly.   : Deferred   Musculoskeletal: extremities normal- no gross deformities noted, gait normal and normal muscle tone   Skin: no suspicious lesions or rashes   Neurologic: Gait normal. Reflexes normal and symmetric. Sensation grossly WNL.   Psychiatric: mentation appears normal and affect normal/bright   Hematologic/Lymphatic/Immunologic: normal ant/post cervical, axillary, supraclavicular and inguinal          Data:  All laboratory data reviewed             Significant Results:   None  Results for orders placed or performed during the hospital encounter of 04/27/17   XR Chest 2 Views    Narrative    Exam: XR CHEST 2 VW, 4/27/2017 4:49 PM    Indication: " cough    Comparison: Chest x-ray 11/28/2016    Findings:   Postoperative changes of coronary artery bypass. Cardiothymic  silhouette within normal limits. No pleural effusion or pneumothorax.  Increased left lingular opacity. Central airways clear. Upper abdomen  is unremarkable.      Impression    Impression:   Increased opacities in the lingula suspicious for pneumonia.    I have personally reviewed the examination and initial interpretation  and I agree with the findings.    PERRY RIVERA MD   CBC with platelets differential   Result Value Ref Range    WBC 7.4 4.0 - 11.0 10e9/L    RBC Count 4.74 4.4 - 5.9 10e12/L    Hemoglobin 14.2 13.3 - 17.7 g/dL    Hematocrit 42.8 40.0 - 53.0 %    MCV 90 78 - 100 fl    MCH 30.0 26.5 - 33.0 pg    MCHC 33.2 31.5 - 36.5 g/dL    RDW 13.6 10.0 - 15.0 %    Platelet Count 237 150 - 450 10e9/L    Diff Method Automated Method     % Neutrophils 64.2 %    % Lymphocytes 24.7 %    % Monocytes 5.7 %    % Eosinophils 4.7 %    % Basophils 0.4 %    % Immature Granulocytes 0.3 %    Nucleated RBCs 0 0 /100    Absolute Neutrophil 4.8 1.6 - 8.3 10e9/L    Absolute Lymphocytes 1.8 0.8 - 5.3 10e9/L    Absolute Monocytes 0.4 0.0 - 1.3 10e9/L    Absolute Eosinophils 0.4 0.0 - 0.7 10e9/L    Absolute Basophils 0.0 0.0 - 0.2 10e9/L    Abs Immature Granulocytes 0.0 0 - 0.4 10e9/L    Absolute Nucleated RBC 0.0    Basic metabolic panel   Result Value Ref Range    Sodium 141 133 - 144 mmol/L    Potassium 3.3 (L) 3.4 - 5.3 mmol/L    Chloride 105 94 - 109 mmol/L    Carbon Dioxide 25 20 - 32 mmol/L    Anion Gap 11 3 - 14 mmol/L    Glucose 98 70 - 99 mg/dL    Urea Nitrogen 8 7 - 30 mg/dL    Creatinine 1.01 0.66 - 1.25 mg/dL    GFR Estimate 77 >60 mL/min/1.7m2    GFR Estimate If Black >90   GFR Calc   >60 mL/min/1.7m2    Calcium 9.5 8.5 - 10.1 mg/dL   INR   Result Value Ref Range    INR 1.58 (H) 0.86 - 1.14   Nt probnp inpatient   Result Value Ref Range    N-Terminal Pro BNP Inpatient 108 0 -  900 pg/mL   Troponin I - Now then in 4 hours x 2    Result Value Ref Range    Troponin I ES  0.000 - 0.045 ug/L     <0.015  The 99th percentile for upper reference range is 0.045 ug/L.  Troponin values in   the range of 0.045 - 0.120 ug/L may be associated with risks of adverse   clinical events.     Troponin I - Now then in 4 hours x 2    Result Value Ref Range    Troponin I ES  0.000 - 0.045 ug/L     <0.015  The 99th percentile for upper reference range is 0.045 ug/L.  Troponin values in   the range of 0.045 - 0.120 ug/L may be associated with risks of adverse   clinical events.     Lactic acid whole blood   Result Value Ref Range    Lactic Acid 1.4 0.7 - 2.1 mmol/L   Magnesium   Result Value Ref Range    Magnesium 2.0 1.6 - 2.3 mg/dL   Procalcitonin   Result Value Ref Range    Procalcitonin  ng/ml     <0.05  <0.05 ng/ml  Normal  Recommendation: Very low risk of bacterial infection.   Discourage antibiotics unless strong clinical suspicion for serious infection.     CBC with platelets differential   Result Value Ref Range    WBC 7.1 4.0 - 11.0 10e9/L    RBC Count 4.47 4.4 - 5.9 10e12/L    Hemoglobin 13.2 (L) 13.3 - 17.7 g/dL    Hematocrit 39.6 (L) 40.0 - 53.0 %    MCV 89 78 - 100 fl    MCH 29.5 26.5 - 33.0 pg    MCHC 33.3 31.5 - 36.5 g/dL    RDW 13.6 10.0 - 15.0 %    Platelet Count 210 150 - 450 10e9/L    Diff Method Automated Method     % Neutrophils 64.2 %    % Lymphocytes 21.7 %    % Monocytes 7.7 %    % Eosinophils 5.5 %    % Basophils 0.8 %    % Immature Granulocytes 0.1 %    Nucleated RBCs 0 0 /100    Absolute Neutrophil 4.6 1.6 - 8.3 10e9/L    Absolute Lymphocytes 1.5 0.8 - 5.3 10e9/L    Absolute Monocytes 0.6 0.0 - 1.3 10e9/L    Absolute Eosinophils 0.4 0.0 - 0.7 10e9/L    Absolute Basophils 0.1 0.0 - 0.2 10e9/L    Abs Immature Granulocytes 0.0 0 - 0.4 10e9/L    Absolute Nucleated RBC 0.0    INR   Result Value Ref Range    INR 1.71 (H) 0.86 - 1.14   Lipid panel reflex to direct LDL   Result Value Ref Range     Cholesterol 182 <200 mg/dL    Triglycerides 174 (H) <150 mg/dL    HDL Cholesterol 30 (L) >39 mg/dL    LDL Cholesterol Calculated 117 (H) <100 mg/dL    Non HDL Cholesterol 152 (H) <130 mg/dL   Comprehensive metabolic panel   Result Value Ref Range    Sodium 139 133 - 144 mmol/L    Potassium 3.8 3.4 - 5.3 mmol/L    Chloride 105 94 - 109 mmol/L    Carbon Dioxide 24 20 - 32 mmol/L    Anion Gap 10 3 - 14 mmol/L    Glucose 87 70 - 99 mg/dL    Urea Nitrogen 11 7 - 30 mg/dL    Creatinine 0.98 0.66 - 1.25 mg/dL    GFR Estimate 80 >60 mL/min/1.7m2    GFR Estimate If Black >90   GFR Calc   >60 mL/min/1.7m2    Calcium 8.6 8.5 - 10.1 mg/dL    Bilirubin Total 1.6 (H) 0.2 - 1.3 mg/dL    Albumin 3.4 3.4 - 5.0 g/dL    Protein Total 6.7 (L) 6.8 - 8.8 g/dL    Alkaline Phosphatase 45 40 - 150 U/L    ALT 36 0 - 70 U/L    AST 20 0 - 45 U/L   Troponin I   Result Value Ref Range    Troponin I ES  0.000 - 0.045 ug/L     <0.015  The 99th percentile for upper reference range is 0.045 ug/L.  Troponin values in   the range of 0.045 - 0.120 ug/L may be associated with risks of adverse   clinical events.     EKG 12-lead, complete   Result Value Ref Range    Interpretation ECG Click View Image link to view waveform and result    Troponin POCT   Result Value Ref Range    Troponin I 0.00 0.00 - 0.10 ug/L   Echo  stress test with definity    Narrative    [id number]  [id number]  [id number]  [id number]^COSMO^PALOMA^[facility],[place]  Echocardiography Laboratory  [address] [place] [id number]     Name: RUFINO NICHOLS  MRN: [id number]  : 1960  Study Date: 2017 09:30 AM  Age: 56 yrs  Gender: Male  Patient Location: [facility]  Reason For Study: Abn EKG  Ordering Physician: PALOMA WILBURN  Performed By: Sandhya Tanner RDCS     BSA: 2.1 m2  Height: 71 in  Weight: 209 lb  HR: 76  BP: 140/85  mmHg  _____________________________________________________________________________  __     _____________________________________________________________________________  __        Interpretation Summary  Normal dobutamine stress echocardiogram at target heartrate.  No symptoms during stress.  Normal stress EKG.  Normal BP response to stress.  No significant valvular abnormality.  _____________________________________________________________________________  __     Stress  The drug infusion was stopped due to target heart rate achieved.  The patient did not exhibit any symptoms during drug infusion.  The maximum dose of dobutamine was 50mcg/kg/min.  The maximum dose of atropine was 0.2mg.  The maximum dose of metoprolol was 5mg.  Definity (NDC #32503-931-37) given intravenously.  Patient was given 7ml mixture of 1.5ml Definity and 8.5ml saline.  3 ml wasted.  Definity Expiration 04/01/2018 .  Definity Lot # 4697 .  The EKG portion of this stress test was negative for inducible ischemia (see  echo results below).  This was a normal stress echocardiogram with no evidence of stress-induced  ischemia.  The visual ejection fraction is estimated at >70%.     Baseline  Normal baseline electrocardiogram.  The resting phase of the study was normal.  No regional wall motion abnormalities noted.  The visual ejection fraction is estimated at 55-60%.     Stress Results                                       Maximum Predicted HR:   166 bpm             Target HR: 141 bpm        % Maximum Predicted HR: 86 %                           Stage DurationHeart Rate  BP   Dose                               (mm:ss)   (bpm)                      BASELINE            76    140/85                        PEAK    8:58      142   142/4850.00                             Stress Duration:   8:58 mm:ss                       Maximum Stress HR: 142 bpm     Left Ventricle  Normal dobutamine stress echocardiogram at target heartrate.  No symptoms during  stress.  Normal stress EKG.  Normal BP response to stress.  No significant valvular abnormality.     Vessels  Normal size aorta.     Procedure  Dobutamine Echo Complete. Contrast Definity.     _____________________________________________________________________________  __                             Report approved by: Ayana Jennings 04/28/2017 10:16 AM                       _____________________________________________________________________________  __      Influenza A and B and RSV PCR   Result Value Ref Range    Specimen Description Other     Influenza A PCR (A) NEG     Canceled, Test credited   Test canceled by PCU/Clinic  PER DEMETRA ALICEA     Influenza A/B antigen   Result Value Ref Range    Influenza A/B Agn Specimen Nasopharyngeal     Influenza A Negative NEG    Influenza B  NEG     Negative   Test results must be correlated with clinical data. If necessary, results   should be confirmed by a molecular assay or viral culture.        Recent Results (from the past 48 hour(s))   XR Chest 2 Views    Narrative    Exam: XR CHEST 2 VW, 4/27/2017 4:49 PM    Indication: cough    Comparison: Chest x-ray 11/28/2016    Findings:   Postoperative changes of coronary artery bypass. Cardiothymic  silhouette within normal limits. No pleural effusion or pneumothorax.  Increased left lingular opacity. Central airways clear. Upper abdomen  is unremarkable.      Impression    Impression:   Increased opacities in the lingula suspicious for pneumonia.    I have personally reviewed the examination and initial interpretation  and I agree with the findings.    PERRY RIVERA MD                Pending Results:   Unresulted Labs Ordered in the Past 30 Days of this Admission     No orders found for last 61 day(s).                  Discharge Instructions and Follow-Up:     Discharge Procedure Orders  Reason for your hospital stay   Order Comments: Chest pain and pneumonia     Adult [facility] Follow-up and recommended labs and  tests   Order Comments: Follow up in Coumadin clinic Saturday or Sunday if possible for INR check . Follow up with your PCP in one week for hospital follow up.     Appointments on [facility] and/or [facility] (with [facility] or [facility] provider or service). Call [phone] if you haven't heard regarding these appointments within 7 days of discharge.     Activity   Order Comments: Your activity upon discharge: As tolerated   Order Specific Question Answer Comments   Is discharge order? Yes      When to contact your care team   Order Comments: Please go to your nearest emergency room If you were to have a change in the type of chest pain i.e more severe, lasting longer or radiating to your shoulder, arm, neck, jaw or back, shortness of breath or increased pain with breathing, coughing up blood, feel dizzy or lightheaded, or notice swelling in one leg.     Discharge Instructions   Order Comments: The chest pain you came into the emergency room for does not appear to be cardiac chest pain. Your heart enzymes were normal which tells us there was no damage to the heart muscle. Your stress test was negative.      Your pain appears to be related pneumonia which was noted on your chest xray on admission. You have been prescribed Levaquin (an antibiotic). Please continue this antibiotic for the next 5 days to complete a 7 day course.  You were also given a prescription for Robitussin with Codeine for symptomatic relief.     Your INR was noted to be lower than your goal INR. Today it was 1.71. Please take 7.5 mg tonight and 5 mg on Saturday and 5 mg on Sunday. Please have your INR checked this weekend if possible.     Your cholesterol was checked while you were here: Cholesterol 182,   Triglycerides 174. Please follow up with your PCP for further management recommendations.      Continue to drink plenty of fluids and follow up with your PCP in one week for hospital follow up.     Full Code     Diet   Order  Comments: Follow this diet upon discharge: Cardiac diet   Order Specific Question Answer Comments   Is discharge order? Yes             Attestation:  Sherry Hamlin.

## 2017-04-28 NOTE — PROGRESS NOTES
8:43 AM  Patient seen and examined, electronic medical record reviewed.  Plan discussed with YOHANA.  Subjective: Patient denies ongoing symptoms such as chest pain, shortness breath, fever, chills or sweats.  He has slight cough per his report that is largely nonproductive currently.  He notes that he only slept 3 hours overnight.  Objective:  Vitals:    04/27/17 2201 04/28/17 0242 04/28/17 0243 04/28/17 0723   BP: (!) 140/93 (!) 150/94 (!) 133/95 122/79   BP Location:    Right arm   Resp: 18 18 18   Temp: 98.3  F (36.8  C) 98.9  F (37.2  C)  98.1  F (36.7  C)   TempSrc: Axillary Oral  Oral   SpO2: 96% 96%  95%   Weight:       Height:         Chest was clear to auscultation.  Cardiovascular exam with regular rate and rhythm.  Abdomen soft and nontender.  Results for orders placed or performed during the hospital encounter of 04/27/17   XR Chest 2 Views    Narrative    Exam: XR CHEST 2 VW, 4/27/2017 4:49 PM    Indication: cough    Comparison: Chest x-ray 11/28/2016    Findings:   Postoperative changes of coronary artery bypass. Cardiothymic  silhouette within normal limits. No pleural effusion or pneumothorax.  Increased left lingular opacity. Central airways clear. Upper abdomen  is unremarkable.      Impression    Impression:   Increased opacities in the lingula suspicious for pneumonia.    I have personally reviewed the examination and initial interpretation  and I agree with the findings.    PERRY RIVERA MD   CBC with platelets differential   Result Value Ref Range    WBC 7.4 4.0 - 11.0 10e9/L    RBC Count 4.74 4.4 - 5.9 10e12/L    Hemoglobin 14.2 13.3 - 17.7 g/dL    Hematocrit 42.8 40.0 - 53.0 %    MCV 90 78 - 100 fl    MCH 30.0 26.5 - 33.0 pg    MCHC 33.2 31.5 - 36.5 g/dL    RDW 13.6 10.0 - 15.0 %    Platelet Count 237 150 - 450 10e9/L    Diff Method Automated Method     % Neutrophils 64.2 %    % Lymphocytes 24.7 %    % Monocytes 5.7 %    % Eosinophils 4.7 %    % Basophils 0.4 %    % Immature Granulocytes  0.3 %    Nucleated RBCs 0 0 /100    Absolute Neutrophil 4.8 1.6 - 8.3 10e9/L    Absolute Lymphocytes 1.8 0.8 - 5.3 10e9/L    Absolute Monocytes 0.4 0.0 - 1.3 10e9/L    Absolute Eosinophils 0.4 0.0 - 0.7 10e9/L    Absolute Basophils 0.0 0.0 - 0.2 10e9/L    Abs Immature Granulocytes 0.0 0 - 0.4 10e9/L    Absolute Nucleated RBC 0.0    Basic metabolic panel   Result Value Ref Range    Sodium 141 133 - 144 mmol/L    Potassium 3.3 (L) 3.4 - 5.3 mmol/L    Chloride 105 94 - 109 mmol/L    Carbon Dioxide 25 20 - 32 mmol/L    Anion Gap 11 3 - 14 mmol/L    Glucose 98 70 - 99 mg/dL    Urea Nitrogen 8 7 - 30 mg/dL    Creatinine 1.01 0.66 - 1.25 mg/dL    GFR Estimate 77 >60 mL/min/1.7m2    GFR Estimate If Black >90   GFR Calc   >60 mL/min/1.7m2    Calcium 9.5 8.5 - 10.1 mg/dL   INR   Result Value Ref Range    INR 1.58 (H) 0.86 - 1.14   Nt probnp inpatient   Result Value Ref Range    N-Terminal Pro BNP Inpatient 108 0 - 900 pg/mL   Troponin I - Now then in 4 hours x 2    Result Value Ref Range    Troponin I ES  0.000 - 0.045 ug/L     <0.015  The 99th percentile for upper reference range is 0.045 ug/L.  Troponin values in   the range of 0.045 - 0.120 ug/L may be associated with risks of adverse   clinical events.     Troponin I - Now then in 4 hours x 2    Result Value Ref Range    Troponin I ES  0.000 - 0.045 ug/L     <0.015  The 99th percentile for upper reference range is 0.045 ug/L.  Troponin values in   the range of 0.045 - 0.120 ug/L may be associated with risks of adverse   clinical events.     Lactic acid whole blood   Result Value Ref Range    Lactic Acid 1.4 0.7 - 2.1 mmol/L   Magnesium   Result Value Ref Range    Magnesium 2.0 1.6 - 2.3 mg/dL   Procalcitonin   Result Value Ref Range    Procalcitonin  ng/ml     <0.05  <0.05 ng/ml  Normal  Recommendation: Very low risk of bacterial infection.   Discourage antibiotics unless strong clinical suspicion for serious infection.     CBC with platelets differential    Result Value Ref Range    WBC 7.1 4.0 - 11.0 10e9/L    RBC Count 4.47 4.4 - 5.9 10e12/L    Hemoglobin 13.2 (L) 13.3 - 17.7 g/dL    Hematocrit 39.6 (L) 40.0 - 53.0 %    MCV 89 78 - 100 fl    MCH 29.5 26.5 - 33.0 pg    MCHC 33.3 31.5 - 36.5 g/dL    RDW 13.6 10.0 - 15.0 %    Platelet Count 210 150 - 450 10e9/L    Diff Method Automated Method     % Neutrophils 64.2 %    % Lymphocytes 21.7 %    % Monocytes 7.7 %    % Eosinophils 5.5 %    % Basophils 0.8 %    % Immature Granulocytes 0.1 %    Nucleated RBCs 0 0 /100    Absolute Neutrophil 4.6 1.6 - 8.3 10e9/L    Absolute Lymphocytes 1.5 0.8 - 5.3 10e9/L    Absolute Monocytes 0.6 0.0 - 1.3 10e9/L    Absolute Eosinophils 0.4 0.0 - 0.7 10e9/L    Absolute Basophils 0.1 0.0 - 0.2 10e9/L    Abs Immature Granulocytes 0.0 0 - 0.4 10e9/L    Absolute Nucleated RBC 0.0    INR   Result Value Ref Range    INR 1.71 (H) 0.86 - 1.14   Lipid panel reflex to direct LDL   Result Value Ref Range    Cholesterol 182 <200 mg/dL    Triglycerides 174 (H) <150 mg/dL    HDL Cholesterol 30 (L) >39 mg/dL    LDL Cholesterol Calculated 117 (H) <100 mg/dL    Non HDL Cholesterol 152 (H) <130 mg/dL   Comprehensive metabolic panel   Result Value Ref Range    Sodium 139 133 - 144 mmol/L    Potassium 3.8 3.4 - 5.3 mmol/L    Chloride 105 94 - 109 mmol/L    Carbon Dioxide 24 20 - 32 mmol/L    Anion Gap 10 3 - 14 mmol/L    Glucose 87 70 - 99 mg/dL    Urea Nitrogen 11 7 - 30 mg/dL    Creatinine 0.98 0.66 - 1.25 mg/dL    GFR Estimate 80 >60 mL/min/1.7m2    GFR Estimate If Black >90   GFR Calc   >60 mL/min/1.7m2    Calcium 8.6 8.5 - 10.1 mg/dL    Bilirubin Total 1.6 (H) 0.2 - 1.3 mg/dL    Albumin 3.4 3.4 - 5.0 g/dL    Protein Total 6.7 (L) 6.8 - 8.8 g/dL    Alkaline Phosphatase 45 40 - 150 U/L    ALT 36 0 - 70 U/L    AST 20 0 - 45 U/L   Troponin I   Result Value Ref Range    Troponin I ES  0.000 - 0.045 ug/L     <0.015  The 99th percentile for upper reference range is 0.045 ug/L.  Troponin values  in   the range of 0.045 - 0.120 ug/L may be associated with risks of adverse   clinical events.     EKG 12-lead, complete   Result Value Ref Range    Interpretation ECG Click View Image link to view waveform and result    Troponin POCT   Result Value Ref Range    Troponin I 0.00 0.00 - 0.10 ug/L   Influenza A and B and RSV PCR   Result Value Ref Range    Specimen Description Other     Influenza A PCR (A) NEG     Canceled, Test credited   Test canceled by PCU/Clinic  PER RN MELA ALICEA     Influenza A/B antigen   Result Value Ref Range    Influenza A/B Agn Specimen Nasopharyngeal     Influenza A Negative NEG    Influenza B  NEG     Negative   Test results must be correlated with clinical data. If necessary, results   should be confirmed by a molecular assay or viral culture.       Assessment/plan: A 56-year-old male with extensive cardiac history admitted observation secondary to chest pain.  He has ruled out using serial troponins.  Await dobutamine stress results.  Patient noted on x-ray to have findings consistent with lingular pneumonitis in the setting of cough over the past 2 weeks currently tolerating Levaquin without difficulty.  Patient admitted with subtherapeutic INR which has increased since admission.

## 2017-04-28 NOTE — PLAN OF CARE
"Problem: Discharge Planning  Goal: Discharge Planning (Adult, OB, Behavioral, Peds)  Outpatient/Observation goals to be met before discharge home:      List all goals to be met before discharge home:   - Serial troponins and stress test complete. - trop neg x2, 3rd trop pending. Stress test to be completed tomorrow AM. Clears at MN.   - Seen and cleared by consultant if applicable - cards consult pending.  - Able to tolerate oral antibiotics - yes, levaquin given PO in ED.   - No hypoxia; maintain oxygen saturation above 93% room air - no  - Cough controlled with oral cough medication - no complaints at this time.  - Passes ambulation oximetry test - pending  - Adequate pain control on oral analgesia - yes  - Vital signs normal or at patient baseline BP (!) 140/93  Temp 98.3  F (36.8  C) (Axillary)  Resp 18  Ht 1.803 m (5' 11\")  Wt 95.3 kg (210 lb)  SpO2 96%  BMI 29.29 kg/m2  - Safe disposition plan has been identified - pending    - Nurse to notify provider when observation goals have been met and patient is ready for discharge.      Denies CP, no SOB. Plan for dobutamine stress in AM. Clears at midnight. Will continue to monitor and update team with changes/concerns.      "

## 2017-04-28 NOTE — PHARMACY-ANTICOAGULATION SERVICE
Clinical Pharmacy - Warfarin Dosing Consult     Pharmacy has been consulted to manage this patient s warfarin therapy.  Indication: DVT/PE Prophylaxis  Therapy Goal: INR 2-3  Warfarin Prior to Admission: Yes  Warfarin PTA Regimen: 7.5mg on Mon/Wed/Fri and 5mg daily the rest of the week; last dose 4/26  Significant drug interactions: aspirin, fenofibrate, levofloxacin (started 4/27)    INR   Date Value Ref Range Status   04/27/2017 1.58 (H) 0.86 - 1.14 Final   04/03/2017 2.90 (H) 0.86 - 1.14 Final     Chromogenic Factor 10   Date Value Ref Range Status   06/18/2011 81 60 - 140 % Final     Comment:     Therapeutic Range: A Chromogenic Factor 10 level of 20-40% inversely   correlates   with an INR of 2-3 for patients receiving Warfarin. Chromogenic Factor 10   levels below 20% indicate an INR greater than 3, and levels above 40%   indicate   an INR less than 2.     Factor 2 Assay   Date Value Ref Range Status   04/04/2012  60 - 140 % Final    (Note)  CANCELLED AND CREDITED PER [facility] IN MED. MONITORING,4/4/12,       Recommend warfarin 7.5 mg today given sub therapeutic INR. Please note that patient was started on levofloxacin today and will likely need dose reduction.  Pharmacy will monitor Oswaldo Prabhakar daily and order warfarin doses to achieve specified goal.      Please contact pharmacy as soon as possible if the warfarin needs to be held for a procedure or if the warfarin goals change.      Nadine Briones, PharmD  Pager: [phone]

## 2017-04-28 NOTE — PHARMACY-ADMISSION MEDICATION HISTORY
Admission medication history interview status for the 4/27/2017 admission is complete. See Epic admission navigator for allergy information, pharmacy, prior to admission medications and immunization status.     Medication history interview sources:  Patient and EPIC Chart Review    Changes made to PTA medication list (reason)  Added: Vitamin B Complex - Take 1 tablet daily (per patient, takes for general health)  Deleted: Docusate Calcium - Take 1 tablet daily as needed (per patient, no longer uses)  Changed: Warfarin - Take 7.5 mg on Mon/Wed/Fri and 5 mg daily the rest of the week (per patient and [facility] EPIC record)    Additional medication history information (including reliability of information, actions taken by pharmacist): Patient is a reliable historian. He uses calcium carbonate (Tums) infrequently and only as needed, with last usage yesterday afternoon.  He also uses diphenhydramine infrequently and as needed for allergies, with last usage earlier this week. Patient states the he takes all medications in the evening, with the exception of his morning metoprolol dose.  Patient confirmed the warfarin dosing change listed above per the [facility] EPIC record. Home pharmacy verified as [facility] in [place]. Flu shot documented in [facility] on 10/13/16. Patient denies the use of any other over-the-counter vitamins, supplements, or other medications at this time.    Prior to Admission medications    Medication Sig Last Dose Taking? Auth Provider   vitamin B complex with vitamin C (VITAMIN  B COMPLEX) TABS tablet Take 1 tablet by mouth daily 4/26/2017 at PM Yes Unknown, Entered By History   lisinopril (PRINIVIL/ZESTRIL) 2.5 MG tablet Take 1 tablet (2.5 mg) by mouth daily , or as directed by Dr. Ocampo. 4/26/2017 at PM Yes Reji Ocampo MD   ezetimibe (ZETIA) 10 MG tablet Take 1 tablet (10 mg) by mouth daily 4/26/2017 at PM Yes Reji Ocampo MD   warfarin (COUMADIN) 5 MG tablet As directed. 5 mg all days except 7.5 mg on Tu and  Sat.  Patient taking differently: As directed. 7.5 mg on Mon, Wed, Fri and 5 mg daily the rest of the week. 4/26/2017 at PM Yes Reji Ocampo MD   fenofibrate 160 MG tablet Take 1 tablet (160 mg) by mouth daily 4/26/2017 at PM Yes Reji Ocampo MD   atorvastatin (LIPITOR) 80 MG tablet Take 1 tablet (80 mg) by mouth daily 4/26/2017 at PM Yes Reji Ocampo MD   metoprolol (LOPRESSOR) 25 MG tablet Take 0.5 tablets (12.5 mg) by mouth 2 times daily 4/26/2017 at PM Yes Samm Vazquez MD   Cholecalciferol (VITAMIN D) 2000 UNITS CAPS Take 2,000 Units by mouth daily 4/26/2017 at PM Yes Reported, Patient   aspirin EC 81 MG tablet Take 1 tablet (81 mg) by mouth daily 4/26/2017 at PM Yes Reji Ocampo MD   acetaminophen (TYLENOL) 500 MG tablet Take 500-1,000 mg by mouth every 6 hours as needed for mild pain 4/27/2017 at AM Yes Reported, Patient   calcium carbonate (TUMS) 500 MG chewable tablet Take 1 chew tab by mouth daily as needed 4/26/2017 at NOON Yes Reported, Patient   DiphenhydrAMINE HCl (BENADRYL PO) Take 25-50 mg by mouth daily as needed Past Week at PM Yes Reported, Patient       Medication history completed by:  Deondre Young, PharmD Candidate

## 2017-04-28 NOTE — H&P
"Per ER Physician note, \" Oswaldo Prabhakar is a 56 year old male with a history of PE and infarction (s/p hemothorax and filter s/p filter removal now on Coumadin), s/p cardiac bypass (2 years ago) stented coronary artery, statin intolerance, and CAD who presents to the ED with chest pain. Patient states he was seen earlier in clinic today for cough and chest discomfort and had an irregular EKG. His cough is constant throughout the day and occasionally feels \"twinges\" that last for 15 sec. He also has complaints of fevers (subjective), and his cough is productive with yellow sputum. He denies shortness of breath or swelling in ankles. His last echo was 2 years ago, and his INR was last checked 3-4 weeks ago. \"    In the ED, Vital Signs: /99; RR 16; Pulse 92; Temperature 99.3; Oxygen Saturation 97% Room Air.  Lab work: Troponin 0.00; Hemoglobin 14.2; WBC 7.4; Glucose 98; Platelets 237; RBC 4.74; Hematocrit 42.8; Potassium 3.3; Sodium 141; Chloride 105; BUN 8; Creatinine 1.01; GFR 77; Anion gap 11  Imaging: Chest x-ray: Increased opacities in the lingula suspicious for pneumonia.  Medication: No medication given in ED.  Clinic EKG done prior to ED arrival: Sinus Rhythm, ST & T wave abnormality; ST depression in lateral leads; T wave inversion no longer evident in Inferior leads; T wave inversion now evident in Anterior leads.    ED Observation: Productive yellow sputum x 2 weeks; noted left chest achy twinge sensation rating 1/0 \" about 6 -7 times \" in past 2 weeks; Shortness of breath only when coughing.  Currently denies chest pain, nausea, vomiting, leg pain or swelling, back pain, chills, body aches or urinary symptoms. No recent long car rides or long bone extremity surgery requiring prolonged immobilization.  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  Dr. Ocampo-cardiologist  Hx: PE- Warfarin 7.5 mg alternate with 5.0 mg. Last night " "took 7.5 mg  IVC filter 5-6 months-> removed 3-4 years ago.  PCP: Dr. Vazquez    Past Medical History:   Diagnosis Date     Antiplatelet or antithrombotic long-term use      Diaphragmatic hernia without mention of obstruction or gangrene      Nephrolithiasis 4/2010     Other and unspecified hyperlipidemia      Pulmonary embolus and infarction (H)     s/p hemothorax and filter s/p filter removal (2011), now on long term anti-coagulation     Statin intolerance 2/1/2017     Stented coronary artery      Unspecified retinal detachment     Childhood trauma, unrepaired       Allergies:   Allergies   Allergen Reactions     Cats      Hay Fever & [A.R.M.]      Heparin Other (See Comments)     Heparin resistance per cardio-thoracic surgeon Dr. Armando     Hydrocodone-Acetaminophen Nausea and Vomiting       Active Problems:    * No active hospital problems. *    Blood pressure (!) 157/91, resp. rate 16, height 1.791 m (5' 10.5\"), weight 95.3 kg (210 lb), SpO2 97 %.    ROS    ROS: 10 point ROS neg other than the symptoms noted above in the HPI.    Physical Exam   Constitutional: He is oriented to person, place, and time. He appears well-developed and well-nourished. No distress.   HENT:   Head: Normocephalic and atraumatic.   Right Ear: External ear normal.   Left Ear: External ear normal.   Mouth/Throat: Oropharynx is clear and moist.   Eyes: Conjunctivae are normal. Pupils are equal, round, and reactive to light. Right eye exhibits no discharge. Left eye exhibits no discharge.   Right eye exotropia- chronic per patient   Neck: Normal range of motion. Neck supple. No JVD present.   Pulmonary/Chest: Effort normal. No stridor. No respiratory distress. He exhibits no tenderness.   Crackles left base. No wheezing.   Abdominal: Soft. Bowel sounds are normal. He exhibits no distension and no mass. There is no tenderness. There is no rebound and no guarding.   Obese abdomen   Musculoskeletal: Normal range of motion. He exhibits no edema, " "tenderness or deformity.   Lymphadenopathy:     He has no cervical adenopathy.   Neurological: He is alert and oriented to person, place, and time. He has normal reflexes. He displays normal reflexes. No cranial nerve deficit. He exhibits normal muscle tone. Coordination normal.   Skin: Skin is warm and dry. No rash noted. He is not diaphoretic. No erythema. No pallor.   Surgical scar mid-chest  Surgical scars left flank   Psychiatric: He has a normal mood and affect. His behavior is normal. Judgment and thought content normal.       Assessment and Plan: 56 year old male with past medical history of cardiac coronary bypass (2 years ago), stented coronary artery, CAD, pulmonary embolism and infarction (s/p hemothorax and IVC filter s/p filter removal, now on daily Warfarin presented to the emergency department with chest pain, cough and an abnormal EKG done in clinic today.    ## 1. Chest Pain: Given extensive cardiac history, chest pain and abnormal EKG: ER Physician consulted Dr. Starks cardiology \" who reviewed the patient's EKG and last angiogram and stress test results. He agreed that the patient is low risk and that this may be an incidental finding of the change that occurred quite some time ago. He recommended that we admit the patient to the observation unit for serial troponins and stress echocardiogram in the morning.\"    Currently chest pain free. Per Dr. Luis, low threshold for PE/DVT work-up given no hypoxia, stable vital signs, denies shortness of breath on exertion, no chest or back pain.  Last stress echo 03/02/2015:   Normal exercise echocardiogram without evidence of coronary artery disease or   stress-induced ischemia.  Normal resting LV function with EF of approximately 60-65%; normal response   to exercise with increase to approximately 70-75%.  No stress induced regional wall motion abnormalities.  No ECG evidence of ischemia.  - Admit to Observation  - Will repeat EKG upon ED Observation " arrival.  - Given cardiac history and active pneumonia diagnosis, will consult cardiology tonight for appropriate cardiac imaging, appreciate recommendations  - Serial Troponin every 4 hours x 2  - Pro BNP (add-on)  - Magnesium (add-on)  - EKG prn  - Nitroglycerin prn  - GI cocktail prn  - Continuous Cardiac and Pulse Oximetry monitoring  - Dobutamine stress echo in am per cardiology fellow recommendation given active lung disease  - Hold Metoprolol in am given pending Dobutamine stress echo  - Clear liquids after midnight  - Cardiology consult in am, appreciate recommendations.    ## 2. CAP: Chest x-ray concerning for left lung pneumonia. Negative Influenza. Shortness of breath with coughing spell. Able to speak in full sentences. No hypoxia, hypotension or tachycardia.  Patient states negative Quanteferon TB gold test 1.5 weeks ago- done due to job occupation here at [facility].  - Levaquin 750 mg po daily x 6 more days  - Tessalon pearls TID prn  - Incentive Spirometry every 2 hours while awake  - Procalcitonin (add-on)  - Lactic acid tonight  - Trend CBC in am    ## 3. S/P Pulmonary Embolism: INR sub-therapeutic 1.58. States does not skip any medications.Takes Warfarin 7.5 mg Tuesday & Saturday. Warfarin 5 mg all other days. Denies overt shortness of breath and no chest pain, leg pain or swelling. No bleeding.  - Pharmacy to manage INR & dose Warfarin daily    ## 4. Hypokalemia: Mild-> 3.3  - Will give K-dur 40 mEq po x 1  - Recheck K+ in am    ## 5. Hypertension: Noted elevated BP in ED. Currently denies headache, dizziness or vision change. Takes Lisinopril 2.5 mg po daily. Due for Lisinopril dose tonight. Normal kidney function->Creatinine1.01/GFR 77  - Continue Lisinopril per home dose regimen  - Monitor BP  - CMP in am    ## 6. Hyperlipidemia: Elevated lipid panel in January 2017. Known statin intolerance.  - Continue PTA Zetia  - Lipid panel in am  - Follow outpatient         EKG Interpretation:      04/27/2017  @ 21:07  Normal Sinus Rhythm; Cannot rule out inferior infarct, age undetermined; ST & T wave abnormality, consider anterior ischemia; Abnormal ECG. Vent rate 83 bpm; OH interval 130 ms; QRS duration 84 ms; QTc interval 451 ms  Per Dr. Luis, No change compared to ECG 04/27/2017 @ 14:44    04/27/2017 @ 23:00: Troponin 0.015;   04/28/2017 @ 0037: Troponin 0.015; Procalcitonin < 0.05; Lactic acid 1.4; Magnesium 2.0.   Patient requesting cough drops, tried tessalon pearls->little relief. Cepacol cough drops ordered.  Third Troponin ordered for 05:00        FEN: Cardiac diet, No caffeine, Clear liquids after midnight  PIV: Peripheral IV Access  DVT Prophylaxis: Encourage early ambulation  Code Status: Full  Disposition: Pending cardiac stress test; able to tolerate po antibiotics; pass ambulation oximetry test; stable lab work and vital signs    Discussed case with Dr. Luis.    NOLBERTO Renteria, CNP  ED Observation Unit  [facility]  4/27/2017

## 2017-04-28 NOTE — PLAN OF CARE
Problem: Discharge Planning  Goal: Discharge Planning (Adult, OB, Behavioral, Peds)  Outpatient/Observation goals to be met before discharge home:     - Serial troponins and stress test complete: NOhailey 3 of 3 negative. Stress test planned for this AM.  - Seen and cleared by consultant if applicable : NO, cardiology consult ordered.  - Able to tolerate oral antibiotics : YES, levaquin PO  - No hypoxia; maintain oxygen saturation above 93% room air: YES   - Cough controlled with oral cough medication : YES, refuses cough medicaiton  - Passes ambulation oximetry test : YES, see progress note  - Adequate pain control on oral analgesia : YES, denies pain.  - Vital signs normal or at patient baseline YES  - Safe disposition plan has been identified : YES

## 2017-04-28 NOTE — PROGRESS NOTES
Ambulation Oximetry Test: 93-95% on room air HR 88-95. Patient ambulated independently no complaints of SOB or discomfort.

## 2017-04-28 NOTE — PROGRESS NOTES
Discharge instructions reviewed with patient and wife. Questions answered, PIV removed. Patient going to ambulate off the unit.

## 2017-05-01 ENCOUNTER — ANTICOAGULATION THERAPY VISIT (OUTPATIENT)
Dept: ANTICOAGULATION | Facility: CLINIC | Age: 57
End: 2017-05-01

## 2017-05-01 DIAGNOSIS — Z78.9 STATIN INTOLERANCE: ICD-10-CM

## 2017-05-01 DIAGNOSIS — I26.99 PULMONARY EMBOLISM (H): ICD-10-CM

## 2017-05-01 DIAGNOSIS — Z79.01 LONG-TERM (CURRENT) USE OF ANTICOAGULANTS: ICD-10-CM

## 2017-05-01 DIAGNOSIS — E78.5 HYPERLIPIDEMIA LDL GOAL <70: ICD-10-CM

## 2017-05-01 DIAGNOSIS — Z95.1 S/P CABG (CORONARY ARTERY BYPASS GRAFT): ICD-10-CM

## 2017-05-01 DIAGNOSIS — I25.10 ATHEROSCLEROSIS OF CORONARY ARTERY OF NATIVE HEART WITHOUT ANGINA PECTORIS, UNSPECIFIED VESSEL OR LESION TYPE: ICD-10-CM

## 2017-05-01 DIAGNOSIS — I26.99 PULMONARY EMBOLI (H): ICD-10-CM

## 2017-05-01 DIAGNOSIS — E78.5 HYPERLIPIDEMIA: ICD-10-CM

## 2017-05-01 PROCEDURE — 36415 COLL VENOUS BLD VENIPUNCTURE: CPT | Performed by: INTERNAL MEDICINE

## 2017-05-01 PROCEDURE — 80053 COMPREHEN METABOLIC PANEL: CPT | Performed by: INTERNAL MEDICINE

## 2017-05-01 PROCEDURE — 85610 PROTHROMBIN TIME: CPT | Performed by: INTERNAL MEDICINE

## 2017-05-01 PROCEDURE — 80061 LIPID PANEL: CPT | Performed by: INTERNAL MEDICINE

## 2017-05-01 NOTE — PROGRESS NOTES
Dates of hospitalization: 4/27 to 4/28  Reason for hospitalization: Compalints of CP, and pneumonia.  Procedures performed: antibiotic therapy.  Vitamin K or FFP administered? no  Inpatient warfarin doses added to calendar? yes  Medication changes at discharge: Started on Levaquin X5 days and robitussin.  Warfarin dosing after DC: 7.5mg MWF and 5 mg TuThSatSun.  Patient discharged on Lovenox? no  Next INR date: 5/5  Where is the patient discharging to? (home, TCU, staying locally, etc.): home  Will patient have home care? no

## 2017-05-01 NOTE — MR AVS SNAPSHOT
Oswaldo Prabhakar   5/1/2017   Anticoagulation Therapy Visit    Description:  56 year old male   Provider:  Nano Greer, RN   Department:  [facility]           INR as of 5/1/2017     Today's INR No new INR was available at the time of this encounter.      Anticoagulation Summary as of 5/1/2017     INR goal 2.0-3.0   Today's INR No new INR was available at the time of this encounter.   Full instructions 7.5 mg on Mon, Wed, Fri; 5 mg all other days   Next INR check 5/5/2017    Indications   Pulmonary emboli (H) [I26.99]  Long-term (current) use of anticoagulants [Z79.01] [Z79.01]         May 2017 Details    Sun Mon Tue Wed Thu Fri Sat      1      7.5 mg   See details      2      5 mg         3      7.5 mg         4      5 mg         5            6                 7               8               9               10               11               12               13                 14               15               16               17               18               19               20                 21               22               23               24               25               26               27                 28               29               30               31                   Date Details   05/01 This INR check       Date of next INR:  5/5/2017         How to take your warfarin dose     To take:  5 mg Take 1 of the 5 mg tablets.    To take:  7.5 mg Take 1.5 of the 5 mg tablets.

## 2017-05-02 LAB
ALBUMIN SERPL-MCNC: 4 G/DL (ref 3.4–5)
ALP SERPL-CCNC: 50 U/L (ref 40–150)
ALT SERPL W P-5'-P-CCNC: 37 U/L (ref 0–70)
ANION GAP SERPL CALCULATED.3IONS-SCNC: 8 MMOL/L (ref 3–14)
AST SERPL W P-5'-P-CCNC: 26 U/L (ref 0–45)
BILIRUB SERPL-MCNC: 0.6 MG/DL (ref 0.2–1.3)
BUN SERPL-MCNC: 16 MG/DL (ref 7–30)
CALCIUM SERPL-MCNC: 10.2 MG/DL (ref 8.5–10.1)
CHLORIDE SERPL-SCNC: 105 MMOL/L (ref 94–109)
CHOLEST SERPL-MCNC: 215 MG/DL
CO2 SERPL-SCNC: 27 MMOL/L (ref 20–32)
CREAT SERPL-MCNC: 1.09 MG/DL (ref 0.66–1.25)
GFR SERPL CREATININE-BSD FRML MDRD: 70 ML/MIN/1.7M2
GLUCOSE SERPL-MCNC: 105 MG/DL (ref 70–99)
HDLC SERPL-MCNC: 35 MG/DL
INR PPP: 1.87 (ref 0.86–1.14)
LDLC SERPL CALC-MCNC: 147 MG/DL
NONHDLC SERPL-MCNC: 180 MG/DL
POTASSIUM SERPL-SCNC: 4.3 MMOL/L (ref 3.4–5.3)
PROT SERPL-MCNC: 7.7 G/DL (ref 6.8–8.8)
SODIUM SERPL-SCNC: 140 MMOL/L (ref 133–144)
TRIGL SERPL-MCNC: 164 MG/DL

## 2017-05-03 ENCOUNTER — OFFICE VISIT (OUTPATIENT)
Dept: PEDIATRICS | Facility: CLINIC | Age: 57
End: 2017-05-03
Payer: COMMERCIAL

## 2017-05-03 VITALS
HEART RATE: 92 BPM | WEIGHT: 196 LBS | DIASTOLIC BLOOD PRESSURE: 70 MMHG | HEIGHT: 71 IN | TEMPERATURE: 97.5 F | OXYGEN SATURATION: 97 % | SYSTOLIC BLOOD PRESSURE: 104 MMHG | BODY MASS INDEX: 27.44 KG/M2

## 2017-05-03 DIAGNOSIS — R11.2 NON-INTRACTABLE VOMITING WITH NAUSEA, UNSPECIFIED VOMITING TYPE: Primary | ICD-10-CM

## 2017-05-03 DIAGNOSIS — E86.0 DEHYDRATION, MILD: ICD-10-CM

## 2017-05-03 LAB
ALBUMIN SERPL-MCNC: 4.3 G/DL (ref 3.4–5)
ALBUMIN UR-MCNC: 100 MG/DL
ALP SERPL-CCNC: 42 U/L (ref 40–150)
ALT SERPL W P-5'-P-CCNC: 36 U/L (ref 0–70)
ANION GAP SERPL CALCULATED.3IONS-SCNC: 4 MMOL/L (ref 3–14)
APPEARANCE UR: ABNORMAL
AST SERPL W P-5'-P-CCNC: 37 U/L (ref 0–45)
BACTERIA #/AREA URNS HPF: ABNORMAL /HPF
BASOPHILS # BLD AUTO: 0.1 10E9/L (ref 0–0.2)
BASOPHILS NFR BLD AUTO: 0.6 %
BILIRUB SERPL-MCNC: 0.7 MG/DL (ref 0.2–1.3)
BILIRUB UR QL STRIP: ABNORMAL
BUN SERPL-MCNC: 19 MG/DL (ref 7–30)
CALCIUM SERPL-MCNC: 10.2 MG/DL (ref 8.5–10.1)
CHLORIDE SERPL-SCNC: 104 MMOL/L (ref 94–109)
CO2 SERPL-SCNC: 29 MMOL/L (ref 20–32)
COLOR UR AUTO: YELLOW
CREAT SERPL-MCNC: 1.1 MG/DL (ref 0.66–1.25)
DIFFERENTIAL METHOD BLD: NORMAL
EOSINOPHIL # BLD AUTO: 0.3 10E9/L (ref 0–0.7)
EOSINOPHIL NFR BLD AUTO: 3.8 %
ERYTHROCYTE [DISTWIDTH] IN BLOOD BY AUTOMATED COUNT: 13.5 % (ref 10–15)
GFR SERPL CREATININE-BSD FRML MDRD: 70 ML/MIN/1.7M2
GLUCOSE SERPL-MCNC: 105 MG/DL (ref 70–99)
GLUCOSE UR STRIP-MCNC: NEGATIVE MG/DL
HCT VFR BLD AUTO: 46.9 % (ref 40–53)
HGB BLD-MCNC: 15.5 G/DL (ref 13.3–17.7)
HGB UR QL STRIP: NEGATIVE
KETONES UR STRIP-MCNC: 15 MG/DL
LEUKOCYTE ESTERASE UR QL STRIP: NEGATIVE
LIPASE SERPL-CCNC: 229 U/L (ref 73–393)
LYMPHOCYTES # BLD AUTO: 1.6 10E9/L (ref 0.8–5.3)
LYMPHOCYTES NFR BLD AUTO: 18.8 %
MCH RBC QN AUTO: 29.3 PG (ref 26.5–33)
MCHC RBC AUTO-ENTMCNC: 33 G/DL (ref 31.5–36.5)
MCV RBC AUTO: 89 FL (ref 78–100)
MONOCYTES # BLD AUTO: 0.7 10E9/L (ref 0–1.3)
MONOCYTES NFR BLD AUTO: 8.5 %
MUCOUS THREADS #/AREA URNS LPF: PRESENT /LPF
NEUTROPHILS # BLD AUTO: 5.6 10E9/L (ref 1.6–8.3)
NEUTROPHILS NFR BLD AUTO: 68.3 %
NITRATE UR QL: NEGATIVE
NON-SQ EPI CELLS #/AREA URNS LPF: ABNORMAL /LPF
PH UR STRIP: 5.5 PH (ref 5–7)
PLATELET # BLD AUTO: 309 10E9/L (ref 150–450)
POTASSIUM SERPL-SCNC: 4 MMOL/L (ref 3.4–5.3)
PROT SERPL-MCNC: 8.4 G/DL (ref 6.8–8.8)
RBC # BLD AUTO: 5.29 10E12/L (ref 4.4–5.9)
RBC #/AREA URNS AUTO: ABNORMAL /HPF (ref 0–2)
SODIUM SERPL-SCNC: 137 MMOL/L (ref 133–144)
SP GR UR STRIP: 1.02 (ref 1–1.03)
URN SPEC COLLECT METH UR: ABNORMAL
UROBILINOGEN UR STRIP-ACNC: 0.2 EU/DL (ref 0.2–1)
WBC # BLD AUTO: 8.3 10E9/L (ref 4–11)
WBC #/AREA URNS AUTO: ABNORMAL /HPF (ref 0–2)

## 2017-05-03 PROCEDURE — 80053 COMPREHEN METABOLIC PANEL: CPT | Performed by: INTERNAL MEDICINE

## 2017-05-03 PROCEDURE — 36415 COLL VENOUS BLD VENIPUNCTURE: CPT | Performed by: INTERNAL MEDICINE

## 2017-05-03 PROCEDURE — 99214 OFFICE O/P EST MOD 30 MIN: CPT | Mod: GC | Performed by: INTERNAL MEDICINE

## 2017-05-03 PROCEDURE — 81001 URINALYSIS AUTO W/SCOPE: CPT | Performed by: INTERNAL MEDICINE

## 2017-05-03 PROCEDURE — 83690 ASSAY OF LIPASE: CPT | Performed by: INTERNAL MEDICINE

## 2017-05-03 PROCEDURE — 85025 COMPLETE CBC W/AUTO DIFF WBC: CPT | Performed by: INTERNAL MEDICINE

## 2017-05-03 RX ORDER — ONDANSETRON 4 MG/1
4-8 TABLET, ORALLY DISINTEGRATING ORAL EVERY 8 HOURS PRN
Qty: 20 TABLET | Refills: 1 | Status: SHIPPED | OUTPATIENT
Start: 2017-05-03 | End: 2017-06-21

## 2017-05-03 NOTE — NURSING NOTE
"No chief complaint on file.      Initial /70 (BP Location: Right arm, Patient Position: Chair, Cuff Size: Adult Large)  Pulse 92  Temp 97.5  F (36.4  C) (Tympanic)  Ht 5' 11\" (1.803 m)  Wt 196 lb (88.9 kg)  SpO2 97%  BMI 27.34 kg/m2 Estimated body mass index is 27.34 kg/(m^2) as calculated from the following:    Height as of this encounter: 5' 11\" (1.803 m).    Weight as of this encounter: 196 lb (88.9 kg).  Medication Reconciliation: complete   Adelaida Buckley LPN      "

## 2017-05-03 NOTE — LETTER
[facility]  [address]  [place] [address]  [phone]      May 3, 2017    RE:  Oswaldo Prabhakar                                                                                                                                                       [address] [address]            To whom it may concern:    Oswaldo Prabhakar was seen in clinic due to illness. Please excuse him from work during 5/3 and 5/4 as he is recovering.         Sincerely,      Jared Vidales MD  [facility]

## 2017-05-03 NOTE — PROGRESS NOTES
"  SUBJECTIVE:                                                    Oswaldo Prabhakar is a 56 year old male who presents to clinic today for the following health issues:    Hospital Follow-up Visit:    Hospital/Nursing Home/IP Rehab Facility: [facility]  Date of Admission: 4/27/17  Date of Discharge: 4/28/17  Reason(s) for Admission: pneumonia. Pt has extensive cardiac history (see problem list). Had CXR concerning for lingular PNA. Serial troponins negative. Dobutamine stress test negative during admission. Sent home on levofloxacin, given cheritussin for cough.            Problems taking medications regularly:  \"Threw up\" all of his pills last evening. Otherwise has been able to take his medication, including levofloxacin        Medication changes since discharge: None       Problems adhering to non-medication therapy:  See above    Summary of hospitalization:  [facility] discharge summary reviewed  Diagnostic Tests/Treatments reviewed.  Follow up needed: none  Other Healthcare Providers Involved in Patient s Care:         No new specialists involved  Update since discharge: Since discharge, patient continues to have recurrent vomiting. He reports in the last 1.5 days, he has been able to \"keep down about 8 oz of fluids.\" Has not been taking cheritussin. Went in for INR draw on 5/1, still sub-therapeutic (hx of PE, on lifelong warfarin). CMP on 5/1 unremarkable. Presents to clinic d/t concern for dehydration, wants to make sure lytes are ok, given cardiac hx. Emesis has been non bilious, non bloody. However, it is \"approaching bile.\" Denies fevers, chills, chest pain, palpitations, syncopal symptoms, abd pain, diarrhea, rash. He is passing gas, having BMs (not bloody or black). He still has a cough productive of yellow sputum. Does not drink ETOH or smoke. He is unsure if he has had levofloxacin before.     Post Discharge Medication Reconciliation: discharge medications reconciled, continue " medications without change.  Plan of care communicated with patient     Coding guidelines for this visit:  Type of Medical   Decision Making Face-to-Face Visit       within 7 Days of discharge Face-to-Face Visit        within 14 days of discharge   Moderate Complexity [id number]   High Complexity [id number]          Problem list and histories reviewed & adjusted, as indicated.  Additional history: as documented    Patient Active Problem List   Diagnosis     Retinal detachment     HYPERLIPIDEMIA LDL GOAL <130     Pulmonary emboli (H)     Pure hyperglyceridemia     Calculus of kidney     Warfarin anticoagulation     Atypical chest pain     S/P CABG x 4     CAD (coronary artery disease)     Finger stiffness, left     Fracture of phalanx of finger     Long-term (current) use of anticoagulants [Z79.01]     Statin intolerance     Past Surgical History:   Procedure Laterality Date     BYPASS GRAFT ARTERY CORONARY  4/4/2014    Procedure: BYPASS GRAFT ARTERY CORONARY;  Median Sternotomy, Coronary Artery Bypass Bypass x 4 using Left Internal Mammary, Left Saphenous Vein, on pump oxygenation;  Surgeon: Sherry Armando MD;  Location:  OR     C NONSPECIFIC PROCEDURE      Spermatocele resection     CLOSED REDUCTION, PERCUTANEOUS PINNING  HAND, COMBINED Left 11/5/2015    Procedure: COMBINED CLOSED REDUCTION, PERCUTANEOUS PINNING HAND;  Surgeon: Carmella Love MD;  Location: [facility]     COLONOSCOPY  3/15/2013    Procedure: COLONOSCOPY;;  Surgeon: Racheal Shipman MD;  Location:  GI     LITHOTRIPSY  4/2010       Social History   Substance Use Topics     Smoking status: Never Smoker     Smokeless tobacco: Never Used     Alcohol use No      Comment: very rare     Family History   Problem Relation Age of Onset     HEART DISEASE Mother      C.A.D. Father      3 vessel CABG, age 70     CANCER Father      bladder cancer     C.A.D. Paternal Grandmother      Hypertension Paternal Grandmother      Alzheimer Disease  Paternal Grandmother      DIABETES No family hx of      Thyroid Disease No family hx of      Cancer - colorectal No family hx of          Current Outpatient Prescriptions   Medication Sig Dispense Refill     ondansetron (ZOFRAN ODT) 4 MG ODT tab Take 1-2 tablets (4-8 mg) by mouth every 8 hours as needed for nausea 20 tablet 1     warfarin (COUMADIN) 5 MG tablet As directed. 7.5 mg on Mon, Wed, Fri and 5 mg daily the rest of the week. 145 tablet 6     levofloxacin (LEVAQUIN) 750 MG tablet Take 1 tablet (750 mg) by mouth daily for 5 days 5 tablet 0     vitamin B complex with vitamin C (VITAMIN  B COMPLEX) TABS tablet Take 1 tablet by mouth daily       lisinopril (PRINIVIL/ZESTRIL) 2.5 MG tablet Take 1 tablet (2.5 mg) by mouth daily , or as directed by Dr. Ocampo. 91 tablet 1     ezetimibe (ZETIA) 10 MG tablet Take 1 tablet (10 mg) by mouth daily 90 tablet 3     fenofibrate 160 MG tablet Take 1 tablet (160 mg) by mouth daily 91 tablet 3     atorvastatin (LIPITOR) 80 MG tablet Take 1 tablet (80 mg) by mouth daily 91 tablet 3     metoprolol (LOPRESSOR) 25 MG tablet Take 0.5 tablets (12.5 mg) by mouth 2 times daily 90 tablet 3     Cholecalciferol (VITAMIN D) 2000 UNITS CAPS Take 2,000 Units by mouth daily       aspirin EC 81 MG tablet Take 1 tablet (81 mg) by mouth daily 91 tablet 3     calcium carbonate (TUMS) 500 MG chewable tablet Take 1 chew tab by mouth daily as needed       DiphenhydrAMINE HCl (BENADRYL PO) Take 25-50 mg by mouth daily as needed       guaiFENesin-codeine (ROBITUSSIN AC) 100-10 MG/5ML SOLN solution Take 5-10 mLs by mouth every 4 hours as needed for cough (Patient not taking: Reported on 5/3/2017) 236 mL 1     WARFARIN SODIUM PO Take 7.5 mg by mouth       acetaminophen (TYLENOL) 500 MG tablet Take 500-1,000 mg by mouth every 6 hours as needed for mild pain       Allergies   Allergen Reactions     Cats      Hay Fever & [A.R.M.]      Heparin Other (See Comments)     Heparin resistance per  "cardio-thoracic surgeon Dr. Armando     Hydrocodone-Acetaminophen Nausea and Vomiting     Labs reviewed in EPIC    ROS:  Constitutional, HEENT, cardiovascular, pulmonary, gi and gu systems are negative, except as otherwise noted.    OBJECTIVE:                                                    /70 (BP Location: Right arm, Patient Position: Chair, Cuff Size: Adult Large)  Pulse 92  Temp 97.5  F (36.4  C) (Tympanic)  Ht 5' 11\" (1.803 m)  Wt 196 lb (88.9 kg)  SpO2 97%  BMI 27.34 kg/m2  Body mass index is 27.34 kg/(m^2).  GENERAL: healthy, alert and no distress. Non toxic appearing.   EYES: Eyes grossly normal to inspection, conjunctivae and sclerae normal  HENT: ear canals and TM's normal, no oral lesions, no tonsillar edema or exudates, back of OP irritated likely from vomiting   NECK: supple, no adenopathy  RESP: lungs clear to auscultation - no rales, rhonchi or wheezes  CV: regular rate and rhythm, normal S1 S2, no S3 or S4, no murmur, click or rub, no peripheral edema  ABDOMEN: soft, nontender, no masses and bowel sounds normal, NEGATIVE alonzo's, NO CVA tenderness, no epigastric pain with palpation   MS: no gross musculoskeletal defects noted  SKIN: no suspicious lesions or rashes  NEURO: Normal strength and tone, mentation intact and speech normal  PSYCH: mentation appears normal, affect normal/bright    CMP - Ca++ 10.2, otherwise unremarkable (normal renal, hepatobiliary labs)  CBC - unremarkable, no leukocytosis   Urinalysis - no e/o infection, more c/w dehydration      ASSESSMENT/PLAN:                                                      (R11.2) Non-intractable vomiting with nausea, unspecified vomiting type  (primary encounter diagnosis)  (E86.0) Dehydration, mild  Comment: Differential diagnosis for recurrent emesis include viral gastroenteritis, pneumonia, medication rxn, less likely SBO, ileus, pancreatitis, UTI, nephrolithiasis or cardiac etiology. Lytes wnl on CMP. Renal and hepatobiliary " labs acceptable. UA w/o e/o infection, more c/w dehydration. Unlikely there is stone, given no RBCs. Less likely acute infectious process as no leukocytosis or fevers. Possible this is rxn to levofloxacin, though pt has one day left of abx. Also do not suspect worsening PNA, as he has no fever, hypoxia, worsening leukocytosis, would not suspect abx failure. CMP acceptable given reported hx of poor PO intake and recurrent emesis, however, labs, lower than usual BP (without tachycardia) and exam c/w mild dehydration. Will order antiemetic and encourage trial of oral rehydration at home.  Plan:   - CMP, CBC with diff, UA with reflex to UC (as per above)  - lipase pending  - dissolvable zofran prn nausea (see order)  - pt supplied with information for restarting diet in setting of nausea/vomitting  - if patient is unable to keep down fluids despite anti-emetic, or if lipase is significantly elevated, recommend pt present to ER for possible IV rehydration    Red flag symptoms to seek immediate medical care reviewed with patient  For additional instructions, including when to RTC, see AVS    Pt seen and d/w Dr. Cole who agrees with plan     Jared Vidales MD  PGY2 Med [facility]      I have seen this patient and examined him in the presence of Dr. Vidales.  I was present during the key components of the presenting complaints, physical exam, diagnosis, and plan, and fully concur with the plan as listed below above in the resident's note. Labs reassuring. Hopefully, he will respond to oral anti-emetics and be able to hydrate at home. If not, will need to go to ED.    Светлана Cole MD  Internal Medicine/Pediatrics

## 2017-05-03 NOTE — MR AVS SNAPSHOT
After Visit Summary   5/3/2017    Oswaldo Prabhakar    MRN: [id number]           Patient Information     Date Of Birth          1960        Visit Information        Provider Department      5/3/2017 11:15 AM Simba Vidales MD [facility]        Today's Diagnoses     Non-intractable vomiting with nausea, unspecified vomiting type    -  1    Dehydration, mild          Care Instructions    - your blood and urine tests showed mild dehydration, likely due to decreased fluid intake and vomiting  - you were prescribed zofran, to use as needed, for preventing vomiting  - take zofran and follow the instructions below for food/fluid intake  - if you are unable to keep down fluids, and continue vomiting, despite these measures, you may need to go to the emergency room to have IV fluids for rehydration  - call your doctor if you have fevers/chills, dizziness/lightheadeness, chest pain, difficulty breathing, belly pain, bloody or green vomit, decreased urine, bloody urine, bloody bowel movements, or constipation with increased vomiting.   - if you are not better following antibiotics, call your doctor  - call with questions  Dehydration (Adult)  Dehydration occurs when your body loses too much fluid. This may be the result of prolonged vomiting or diarrhea, excessive sweating, or a high fever. It may also happen if you don t drink enough fluid when you re sick or out in the heat. Misuse of diuretics (water pills) can also be a cause.  Symptoms include thirst and decreased urine output. You may also feel dizzy, weak, fatigued, or very drowsy. The diet described below is usually enough to treat dehydration. In some cases, you may need medicine.  Home care    Drink at least 12 8-ounce glasses of fluid every day to resolve the dehydration. Fluid may include water; orange juice; lemonade; apple, grape, or cranberry juice; clear fruit drinks; electrolyte replacement and sports drinks; and teas and coffee  without caffeine. If you have been diagnosed with a kidney disease, ask your doctor how much and what types of fluids you should drink to prevent dehydration. If you have kidney disease, fluid can build up in the body. This can be dangerous to your health.     If you have a fever, muscle aches, or a headache as a result of a cold or flu, you may take acetaminophen or ibuprofen, unless another medicine was prescribed. If you have chronic liver or kidney disease, or have ever had a stomach ulcer or gastrointestinal bleeding, talk with your health care provider before using these medicines. Don't take aspirin if you are younger than 18 and have a fever. Aspirin raises the chance for severe liver injury.  Follow-up care  Follow up with your health care provider, or as advised.  When to seek medical advice  Call your health care provider right away if any of these occur:    Continued vomiting    Frequent diarrhea (more than 5 times a day); blood (red or black color) or mucus in diarrhea    Blood in vomit or stool    Swollen abdomen or increasing abdominal pain    Weakness, dizziness, or fainting    Unusual drowsiness or confusion    Reduced urine output or extreme thirst    Fever of 100.4 F (34 C) or higher     6334-4154 [employer]. [address] [place] [address]. All rights reserved. This information is not intended as a substitute for professional medical care. Always follow your healthcare professional's instructions.        Diet for Vomiting or Diarrhea (Adult)    If your symptoms return or get worse after eating certain foods listed below, you should stop eating them until your symptoms ease and you feel better.  Once the vomiting stops, then follow the steps below.   During the first 12 to 24 hours  During the first 12 to 24 hours, follow this diet:    Beverages. Plain water, sport drinks like electrolyte solutions, soft drinks without caffeine, mineral water (plain or flavored), clear  fruit juices, and decaffeinated tea and coffee.    Soups. Clear broth, consommé, and bouillon.    Desserts. Plain gelatin, popsicles, and fruit juice bars. As you feel better, you may add 6 to 8 ounces of yogurt per day. If you have diarrhea, don't have foods or beverages that contain sugar, high-fructose corn syrup, or sugar alcohols.  During the next 24 hours  During the next 24 hours you may add the following to the above:    Hot cereal, plain toast, bread, rolls, and crackers    Plain noodles, rice, mashed potatoes, and chicken noodle or rice soup    Unsweetened canned fruit (but not pineapple) and bananas  Don't have more than 15 grams of fat a day. Do this by staying away from margarine, butter, oils, mayonnaise, sauces, gravies, fried foods, peanut butter, meat, poultry, and fish.  Don't eat much fiber. Stay away from raw or cooked vegetables, fresh fruits (except bananas), and bran cereals.  Limit how much caffeine and chocolate you have. Do not use any spices or seasonings except salt.  During the next 24 hours  Gradually go back to your normal diet, as you feel better and your symptoms ease.    8932-6511 [employer]. [address] [place] [address]. All rights reserved. This information is not intended as a substitute for professional medical care. Always follow your healthcare professional's instructions.        How to Control Nausea and Vomiting     Taken before meals, medicines can help ease nausea.    Nausea is feeling that you need to throw up. Throwing up occurs when your body forces food that is in your stomach out through your mouth. Nausea and vomiting are symptoms that are caused by many things. They can happen when a condition or disease, medicine, medical treatment, or a poisonous substance affects the area in your brain that controls vomiting. Some conditions or diseases can cause nausea, abdominal pain or cramps, and vomiting. The symptoms can be mild and go away by  "themselves. Other symptoms can be serious. You will need to see your healthcare provider for these.  Nausea and vomiting are common. They can be caused by many things. These include:    \"Stomach flu\" (gastroenteritis)    Food poisoning    Stomach pain (gastritis)    Blockages  They can also be caused by a head injury, an infection in the brain or inside the ear, or migraines. Other common causes of nausea and vomiting include:    Brain tumor    Brain bruise    Motion sickness    Drugs. These include alcohol, pain medicines such as morphine, and cancer medicines.    Toxins. These are poisonous things like plants or liquids that are swallowed by accident.    Advanced types of cancer    Movement problems (psychogenic problems)    Extra pressure in the fluid that surrounds the brain and spinal cord (elevated intracranial pressure)     Nausea and vomiting are also common side effects of chemotherapy and radiation therapy. Side effects happen when treatment changes some normal cells as well as cancer cells. In this case, the cells lining your stomach and the part of your brain that controls vomiting are affected. Other more serious causes of vomiting may be hard to find early in the illness.     When to seek medical advice  Call your healthcare provider right away if you have the following:    Nausea or vomiting that lasts 24 hours or more    Trouble keeping fluids down   Medicines can help  Nausea or vomiting can often be prevented or controlled with medicines (antiemetics). Your doctor may give you antiemetics before or after treatment if you are getting chemotherapy or other medical treatments that cause nausea or vomiting.  Eating tips    If you have medicines to control nausea, take them before meals as directed.    Avoid fatty or greasy foods while nauseated.    Eat small meals slowly throughout the day.    Ask someone to sit with you while you eat to keep you from thinking about feeling nauseated.    Eat foods at " room temperature or colder to avoid strong smells.    Eat dry foods, such as toast, crackers, or pretzels. Also eat cool, light foods, such as applesauce, and bland foods, such as oatmeal or skinned chicken.   Other ways to feel better    Get a little fresh air. Take a short walk.    Talk to a friend, listen to music, or watch TV.    Take a few deep, slow breaths.    Eat by candlelight or in surroundings that you find relaxing.    Use a technique, such as guided imagery, to help you relax. Imagine yourself in a beautiful, restful scene. Or daydream about the place you d most like to be.    0136-4177 [employer]. [address] [place] [address], [place] [address]. All rights reserved. This information is not intended as a substitute for professional medical care. Always follow your healthcare professional's instructions.        Self-Care for Vomiting and Diarrhea  Vomiting and diarrhea can make you miserable. Your stomach and bowels are reacting to an irritant. This might be food, medicine, or viral stomach flu. Vomiting and diarrhea are 2 ways your body can remove the problem from your system. Nausea is a symptom that discourages you from eating. This gives your stomach and bowels time to recover. To get back to normal, start with self-care to ease your discomfort.    Drink liquids  Drink or sip liquids to avoid losing too much fluid (dehydration):    Clear liquids such as water or broth are the best choices.    Do not drink beverages with a lot of sugar in them, such as juices and sodas. These can make diarrhea worse.    Do not drink sport drinks such as electrolyte solutions. These don t have the right mix of water, sugar, and minerals. They can also make the symptoms worse.    Suck on ice chips if the thought of drinking something makes you queasy.  When you re able to eat again    As your appetite comes back, you can resume your normal diet.    Ask your doctor whether you should stay away from any  foods.  Medicines    Vomiting and diarrhea are ways your body uses to rid itself of harmful substances such as bacteria. DO NOT use antidiarrheal or antivomiting (antiemetic) medicines unless your healthcare provider tells you to do so.    Aspirin, medicine with aspirin, and many aspirin substitutes can irritate your stomach. So avoid them when you have stomach upset.    Certain prescription and over-the-counter medicines can cause vomiting and diarrhea. Talk with your doctor about any medicines you take that may be causing these symptoms.    Certain over-the-counter antihistamines can help control nausea. Other medicines can help soothe stomach upset. Ask your healthcare provider which medicines may help you.  When to call your healthcare provider  Call your doctor if you have:    Bloody or black vomit or stools    Severe, steady abdominal pain    Vomiting with a severe headache or vomiting after a head injury    Vomiting and diarrhea together for more than an hour    An inability to hold down even sips of liquids for more than 12 hours    Vomiting that lasts more than 24 hours    Severe diarrhea that lasts more than 2 days    Yellowish color to your skin or the whites of your eyes     9547-4528 [employer]. [address] [place] [address]. All rights reserved. This information is not intended as a substitute for professional medical care. Always follow your healthcare professional's instructions.              Follow-ups after your visit        Who to contact     If you have questions or need follow up information about today's clinic visit or your schedule please contact [facility] directly at [phone].  Normal or non-critical lab and imaging results will be communicated to you by MyChart, letter or phone within 4 business days after the clinic has received the results. If you do not hear from us within 7 days, please contact the clinic through MyChart or phone. If you have  "a critical or abnormal lab result, we will notify you by phone as soon as possible.  Submit refill requests through [employer] or call your pharmacy and they will forward the refill request to us. Please allow 3 business days for your refill to be completed.          Additional Information About Your Visit        [employer] Information     [employer] gives you secure access to your electronic health record. If you see a primary care provider, you can also send messages to your care team and make appointments. If you have questions, please call your primary care clinic.  If you do not have a primary care provider, please call [phone] and they will assist you.        Care EveryWhere ID     This is your Care EveryWhere ID. This could be used by other organizations to access your [place] medical records  [id number]        Your Vitals Were     Pulse Temperature Height Pulse Oximetry BMI (Body Mass Index)       92 97.5  F (36.4  C) (Tympanic) 5' 11\" (1.803 m) 97% 27.34 kg/m2        Blood Pressure from Last 3 Encounters:   05/03/17 104/70   04/28/17 122/79   04/27/17 (!) 146/97    Weight from Last 3 Encounters:   05/03/17 196 lb (88.9 kg)   04/27/17 210 lb (95.3 kg)   04/27/17 210 lb 4.8 oz (95.4 kg)              We Performed the Following     CBC with platelets and differential     Comprehensive metabolic panel (BMP + Alb, Alk Phos, ALT, AST, Total. Bili, TP)     Lipase     UA with Microscopic reflex to Culture          Today's Medication Changes          These changes are accurate as of: 5/3/17  1:25 PM.  If you have any questions, ask your nurse or doctor.               Start taking these medicines.        Dose/Directions    ondansetron 4 MG ODT tab   Commonly known as:  ZOFRAN ODT   Used for:  Non-intractable vomiting with nausea, unspecified vomiting type   Started by:  Simba Vidales MD        Dose:  4-8 mg   Take 1-2 tablets (4-8 mg) by mouth every 8 hours as needed for nausea   Quantity:  20 tablet   Refills:  1 "            Where to get your medicines      These medications were sent to [facility] - [place], [place] - [address]  [address], [place] [address]     Phone:  [phone]     ondansetron 4 MG ODT tab                Primary Care Provider Office Phone # Fax #    Samm Vazquez [phone]        [facility] [address]  [facility] [address]        Thank you!     Thank you for choosing [facility]  for your care. Our goal is always to provide you with excellent care. Hearing back from our patients is one way we can continue to improve our services. Please take a few minutes to complete the written survey that you may receive in the mail after your visit with us. Thank you!             Your Updated Medication List - Protect others around you: Learn how to safely use, store and throw away your medicines at www.disposemymeds.org.          This list is accurate as of: 5/3/17  1:25 PM.  Always use your most recent med list.                   Brand Name Dispense Instructions for use    aspirin EC 81 MG EC tablet     91 tablet    Take 1 tablet (81 mg) by mouth daily       atorvastatin 80 MG tablet    LIPITOR    91 tablet    Take 1 tablet (80 mg) by mouth daily       BENADRYL PO      Take 25-50 mg by mouth daily as needed       calcium carbonate 500 MG chewable tablet    TUMS     Take 1 chew tab by mouth daily as needed       ezetimibe 10 MG tablet    ZETIA    90 tablet    Take 1 tablet (10 mg) by mouth daily       fenofibrate 160 MG tablet     91 tablet    Take 1 tablet (160 mg) by mouth daily       guaiFENesin-codeine 100-10 MG/5ML Soln solution    ROBITUSSIN AC    236 mL    Take 5-10 mLs by mouth every 4 hours as needed for cough       levofloxacin 750 MG tablet    LEVAQUIN    5 tablet    Take 1 tablet (750 mg) by mouth daily for 5 days       lisinopril 2.5 MG tablet    PRINIVIL/Zestril    91 tablet    Take 1 tablet (2.5 mg) by mouth daily , or as directed by   [facility].       metoprolol 25 MG tablet    LOPRESSOR    90 tablet    Take 0.5 tablets (12.5 mg) by mouth 2 times daily       ondansetron 4 MG ODT tab    ZOFRAN ODT    20 tablet    Take 1-2 tablets (4-8 mg) by mouth every 8 hours as needed for nausea       TYLENOL 500 MG tablet   Generic drug:  acetaminophen      Take 500-1,000 mg by mouth every 6 hours as needed for mild pain       vitamin B complex with vitamin C Tabs tablet      Take 1 tablet by mouth daily       vitamin D 2000 UNITS Caps      Take 2,000 Units by mouth daily       * WARFARIN SODIUM PO      Take 7.5 mg by mouth       * warfarin 5 MG tablet    COUMADIN    145 tablet    As directed. 7.5 mg on Mon, Wed, Fri and 5 mg daily the rest of the week.       * Notice:  This list has 2 medication(s) that are the same as other medications prescribed for you. Read the directions carefully, and ask your doctor or other care provider to review them with you.

## 2017-05-03 NOTE — PATIENT INSTRUCTIONS
- your blood and urine tests showed mild dehydration, likely due to decreased fluid intake and vomiting  - you were prescribed zofran, to use as needed, for preventing vomiting  - take zofran and follow the instructions below for food/fluid intake  - if you are unable to keep down fluids, and continue vomiting, despite these measures, you may need to go to the emergency room to have IV fluids for rehydration  - call your doctor if you have fevers/chills, dizziness/lightheadeness, chest pain, difficulty breathing, belly pain, bloody or green vomit, decreased urine, bloody urine, bloody bowel movements, or constipation with increased vomiting.   - if you are not better following antibiotics, call your doctor  - call with questions  Dehydration (Adult)  Dehydration occurs when your body loses too much fluid. This may be the result of prolonged vomiting or diarrhea, excessive sweating, or a high fever. It may also happen if you don t drink enough fluid when you re sick or out in the heat. Misuse of diuretics (water pills) can also be a cause.  Symptoms include thirst and decreased urine output. You may also feel dizzy, weak, fatigued, or very drowsy. The diet described below is usually enough to treat dehydration. In some cases, you may need medicine.  Home care    Drink at least 12 8-ounce glasses of fluid every day to resolve the dehydration. Fluid may include water; orange juice; lemonade; apple, grape, or cranberry juice; clear fruit drinks; electrolyte replacement and sports drinks; and teas and coffee without caffeine. If you have been diagnosed with a kidney disease, ask your doctor how much and what types of fluids you should drink to prevent dehydration. If you have kidney disease, fluid can build up in the body. This can be dangerous to your health.     If you have a fever, muscle aches, or a headache as a result of a cold or flu, you may take acetaminophen or ibuprofen, unless another medicine was  prescribed. If you have chronic liver or kidney disease, or have ever had a stomach ulcer or gastrointestinal bleeding, talk with your health care provider before using these medicines. Don't take aspirin if you are younger than 18 and have a fever. Aspirin raises the chance for severe liver injury.  Follow-up care  Follow up with your health care provider, or as advised.  When to seek medical advice  Call your health care provider right away if any of these occur:    Continued vomiting    Frequent diarrhea (more than 5 times a day); blood (red or black color) or mucus in diarrhea    Blood in vomit or stool    Swollen abdomen or increasing abdominal pain    Weakness, dizziness, or fainting    Unusual drowsiness or confusion    Reduced urine output or extreme thirst    Fever of 100.4 F (34 C) or higher     6741-0959 [employer]. [address] [place] [address]. All rights reserved. This information is not intended as a substitute for professional medical care. Always follow your healthcare professional's instructions.        Diet for Vomiting or Diarrhea (Adult)    If your symptoms return or get worse after eating certain foods listed below, you should stop eating them until your symptoms ease and you feel better.  Once the vomiting stops, then follow the steps below.   During the first 12 to 24 hours  During the first 12 to 24 hours, follow this diet:    Beverages. Plain water, sport drinks like electrolyte solutions, soft drinks without caffeine, mineral water (plain or flavored), clear fruit juices, and decaffeinated tea and coffee.    Soups. Clear broth, consommé, and bouillon.    Desserts. Plain gelatin, popsicles, and fruit juice bars. As you feel better, you may add 6 to 8 ounces of yogurt per day. If you have diarrhea, don't have foods or beverages that contain sugar, high-fructose corn syrup, or sugar alcohols.  During the next 24 hours  During the next 24 hours you may add the  "following to the above:    Hot cereal, plain toast, bread, rolls, and crackers    Plain noodles, rice, mashed potatoes, and chicken noodle or rice soup    Unsweetened canned fruit (but not pineapple) and bananas  Don't have more than 15 grams of fat a day. Do this by staying away from margarine, butter, oils, mayonnaise, sauces, gravies, fried foods, peanut butter, meat, poultry, and fish.  Don't eat much fiber. Stay away from raw or cooked vegetables, fresh fruits (except bananas), and bran cereals.  Limit how much caffeine and chocolate you have. Do not use any spices or seasonings except salt.  During the next 24 hours  Gradually go back to your normal diet, as you feel better and your symptoms ease.    4330-6256 [employer]. [address] [place] [address]. All rights reserved. This information is not intended as a substitute for professional medical care. Always follow your healthcare professional's instructions.        How to Control Nausea and Vomiting     Taken before meals, medicines can help ease nausea.    Nausea is feeling that you need to throw up. Throwing up occurs when your body forces food that is in your stomach out through your mouth. Nausea and vomiting are symptoms that are caused by many things. They can happen when a condition or disease, medicine, medical treatment, or a poisonous substance affects the area in your brain that controls vomiting. Some conditions or diseases can cause nausea, abdominal pain or cramps, and vomiting. The symptoms can be mild and go away by themselves. Other symptoms can be serious. You will need to see your healthcare provider for these.  Nausea and vomiting are common. They can be caused by many things. These include:    \"Stomach flu\" (gastroenteritis)    Food poisoning    Stomach pain (gastritis)    Blockages  They can also be caused by a head injury, an infection in the brain or inside the ear, or migraines. Other common causes of nausea " and vomiting include:    Brain tumor    Brain bruise    Motion sickness    Drugs. These include alcohol, pain medicines such as morphine, and cancer medicines.    Toxins. These are poisonous things like plants or liquids that are swallowed by accident.    Advanced types of cancer    Movement problems (psychogenic problems)    Extra pressure in the fluid that surrounds the brain and spinal cord (elevated intracranial pressure)     Nausea and vomiting are also common side effects of chemotherapy and radiation therapy. Side effects happen when treatment changes some normal cells as well as cancer cells. In this case, the cells lining your stomach and the part of your brain that controls vomiting are affected. Other more serious causes of vomiting may be hard to find early in the illness.     When to seek medical advice  Call your healthcare provider right away if you have the following:    Nausea or vomiting that lasts 24 hours or more    Trouble keeping fluids down   Medicines can help  Nausea or vomiting can often be prevented or controlled with medicines (antiemetics). Your doctor may give you antiemetics before or after treatment if you are getting chemotherapy or other medical treatments that cause nausea or vomiting.  Eating tips    If you have medicines to control nausea, take them before meals as directed.    Avoid fatty or greasy foods while nauseated.    Eat small meals slowly throughout the day.    Ask someone to sit with you while you eat to keep you from thinking about feeling nauseated.    Eat foods at room temperature or colder to avoid strong smells.    Eat dry foods, such as toast, crackers, or pretzels. Also eat cool, light foods, such as applesauce, and bland foods, such as oatmeal or skinned chicken.   Other ways to feel better    Get a little fresh air. Take a short walk.    Talk to a friend, listen to music, or watch TV.    Take a few deep, slow breaths.    Eat by candlelight or in surroundings  that you find relaxing.    Use a technique, such as guided imagery, to help you relax. Imagine yourself in a beautiful, restful scene. Or daydream about the place you d most like to be.    8230-8258 [employer]. [address] [place] [address]. All rights reserved. This information is not intended as a substitute for professional medical care. Always follow your healthcare professional's instructions.        Self-Care for Vomiting and Diarrhea  Vomiting and diarrhea can make you miserable. Your stomach and bowels are reacting to an irritant. This might be food, medicine, or viral stomach flu. Vomiting and diarrhea are 2 ways your body can remove the problem from your system. Nausea is a symptom that discourages you from eating. This gives your stomach and bowels time to recover. To get back to normal, start with self-care to ease your discomfort.    Drink liquids  Drink or sip liquids to avoid losing too much fluid (dehydration):    Clear liquids such as water or broth are the best choices.    Do not drink beverages with a lot of sugar in them, such as juices and sodas. These can make diarrhea worse.    Do not drink sport drinks such as electrolyte solutions. These don t have the right mix of water, sugar, and minerals. They can also make the symptoms worse.    Suck on ice chips if the thought of drinking something makes you queasy.  When you re able to eat again    As your appetite comes back, you can resume your normal diet.    Ask your doctor whether you should stay away from any foods.  Medicines    Vomiting and diarrhea are ways your body uses to rid itself of harmful substances such as bacteria. DO NOT use antidiarrheal or antivomiting (antiemetic) medicines unless your healthcare provider tells you to do so.    Aspirin, medicine with aspirin, and many aspirin substitutes can irritate your stomach. So avoid them when you have stomach upset.    Certain prescription and over-the-counter  medicines can cause vomiting and diarrhea. Talk with your doctor about any medicines you take that may be causing these symptoms.    Certain over-the-counter antihistamines can help control nausea. Other medicines can help soothe stomach upset. Ask your healthcare provider which medicines may help you.  When to call your healthcare provider  Call your doctor if you have:    Bloody or black vomit or stools    Severe, steady abdominal pain    Vomiting with a severe headache or vomiting after a head injury    Vomiting and diarrhea together for more than an hour    An inability to hold down even sips of liquids for more than 12 hours    Vomiting that lasts more than 24 hours    Severe diarrhea that lasts more than 2 days    Yellowish color to your skin or the whites of your eyes     8731-8822 [employer]. [address] [place] [address], [place] [address]. All rights reserved. This information is not intended as a substitute for professional medical care. Always follow your healthcare professional's instructions.

## 2017-05-09 NOTE — TELEPHONE ENCOUNTER
Zetia did not improve patient's cholesterol nor LDL values.  Dr. Ocampo recommends patient start Praluent 150 mg subcutaneous once every 14 days.  See previous documentation for further documentation of need for PCSK9 therapy.     Component      Latest Ref Rng & Units 1/31/2017 5/1/2017   Cholesterol      <200 mg/dL 204 (H) 215 (H)   Triglycerides      <150 mg/dL 163 (H) 164 (H)   HDL Cholesterol      >39 mg/dL 34 (L) 35 (L)   LDL Cholesterol Calculated      <100 mg/dL 138 (H) 147 (H)   Non HDL Cholesterol      <130 mg/dL 171 (H) 180 (H)

## 2017-05-11 ENCOUNTER — OFFICE VISIT (OUTPATIENT)
Dept: INTERNAL MEDICINE | Facility: CLINIC | Age: 57
End: 2017-05-11

## 2017-05-11 VITALS
DIASTOLIC BLOOD PRESSURE: 87 MMHG | WEIGHT: 207.9 LBS | HEART RATE: 77 BPM | RESPIRATION RATE: 20 BRPM | TEMPERATURE: 98.4 F | SYSTOLIC BLOOD PRESSURE: 125 MMHG | OXYGEN SATURATION: 96 % | BODY MASS INDEX: 29 KG/M2

## 2017-05-11 DIAGNOSIS — J18.9 PNEUMONIA OF BOTH LUNGS DUE TO INFECTIOUS ORGANISM, UNSPECIFIED PART OF LUNG: Primary | ICD-10-CM

## 2017-05-11 RX ORDER — ALBUTEROL SULFATE 90 UG/1
2 AEROSOL, METERED RESPIRATORY (INHALATION) EVERY 6 HOURS PRN
Qty: 1 INHALER | Refills: 1 | Status: SHIPPED | OUTPATIENT
Start: 2017-05-11 | End: 2018-09-14

## 2017-05-11 RX ORDER — LEVOFLOXACIN 500 MG/1
500 TABLET, FILM COATED ORAL DAILY
Qty: 7 TABLET | Refills: 0 | Status: SHIPPED | OUTPATIENT
Start: 2017-05-11 | End: 2017-06-21

## 2017-05-11 ASSESSMENT — PAIN SCALES - GENERAL: PAINLEVEL: NO PAIN (1)

## 2017-05-11 NOTE — MR AVS SNAPSHOT
After Visit Summary   5/11/2017    Oswaldo Prabhakar    MRN: [id number]           Patient Information     Date Of Birth          1960        Visit Information        Provider Department      5/11/2017 4:40 PM Mony Menchaca, [facility]        Today's Diagnoses     Pneumonia of both lungs due to infectious organism, unspecified part of lung    -  1      Care Instructions    Primary Care Center Medication Refill Request Information:  * Please contact your pharmacy regarding ANY request for medication refills.  ** [facility] Prescription Fax = [phone]  * Please allow 3 business days for routine medication refills.  * Please allow 5 business days for controlled substance medication refills.     Primary Care Center Test Result notification information:  *You will be notified with in 7-10 days of your appointment day regarding the results of your test.  If you are on MyChart you will be notified as soon as the provider has reviewed the results and signed off on them.    Primary Care Center [phone]           Follow-ups after your visit        Future tests that were ordered for you today     Open Future Orders        Priority Expected Expires Ordered    XR Chest 2 Views STAT 5/11/2017 5/11/2018 5/11/2017            Who to contact     Please call your clinic at [phone] to:    Ask questions about your health    Make or cancel appointments    Discuss your medicines    Learn about your test results    Speak to your doctor   If you have compliments or concerns about an experience at your clinic, or if you wish to file a complaint, please contact [facility] Patient Relations at [phone] or email us at [email or web address]         Additional Information About Your Visit        MyChart Information     [employer] gives you secure access to your electronic health record. If you see a primary care provider, you can also send messages to your  care team and make appointments. If you have questions, please call your primary care clinic.  If you do not have a primary care provider, please call [phone] and they will assist you.      [employer] is an electronic gateway that provides easy, online access to your medical records. With [employer], you can request a clinic appointment, read your test results, renew a prescription or communicate with your care team.     To access your existing account, please contact your [facility] or call [phone] for assistance.        Care EveryWhere ID     This is your Care EveryWhere ID. This could be used by other organizations to access your [facility] medical records  [id number]        Your Vitals Were     Pulse Temperature Respirations Pulse Oximetry BMI (Body Mass Index)       77 98.4  F (36.9  C) (Oral) 20 96% 29 kg/m2        Blood Pressure from Last 3 Encounters:   05/11/17 125/87   05/03/17 104/70   04/28/17 122/79    Weight from Last 3 Encounters:   05/11/17 94.3 kg (207 lb 14.4 oz)   05/03/17 88.9 kg (196 lb)   04/27/17 95.3 kg (210 lb)                 Today's Medication Changes          These changes are accurate as of: 5/11/17  6:05 PM.  If you have any questions, ask your nurse or doctor.               Start taking these medicines.        Dose/Directions    albuterol 108 (90 BASE) MCG/ACT Inhaler   Commonly known as:  PROAIR HFA/PROVENTIL HFA/VENTOLIN HFA   Used for:  Pneumonia of both lungs due to infectious organism, unspecified part of lung   Started by:  Mony Menchaca APRN CNP        Dose:  2 puff   Inhale 2 puffs into the lungs every 6 hours as needed for shortness of breath / dyspnea or wheezing   Quantity:  1 Inhaler   Refills:  1       levofloxacin 500 MG tablet   Commonly known as:  LEVAQUIN   Used for:  Pneumonia of both lungs due to infectious organism, unspecified part of lung   Started by:  Mony Menchaca APRN CNP        Dose:  500 mg   Take 1 tablet (500  mg) by mouth daily   Quantity:  7 tablet   Refills:  0            Where to get your medicines      These medications were sent to [place] [address] [facility] - [place], [place] - [address]  [address] [facility] [address]     Phone:  [phone]     albuterol 108 (90 BASE) MCG/ACT Inhaler    levofloxacin 500 MG tablet                Primary Care Provider Office Phone # Fax #    Samm Vazquez [phone]        [facility] [address] [place] [address]  [facility] [address]        Thank you!     Thank you for choosing [facility]  for your care. Our goal is always to provide you with excellent care. Hearing back from our patients is one way we can continue to improve our services. Please take a few minutes to complete the written survey that you may receive in the mail after your visit with us. Thank you!             Your Updated Medication List - Protect others around you: Learn how to safely use, store and throw away your medicines at www.disposemymeds.org.          This list is accurate as of: 5/11/17  6:05 PM.  Always use your most recent med list.                   Brand Name Dispense Instructions for use    albuterol 108 (90 BASE) MCG/ACT Inhaler    PROAIR HFA/PROVENTIL HFA/VENTOLIN HFA    1 Inhaler    Inhale 2 puffs into the lungs every 6 hours as needed for shortness of breath / dyspnea or wheezing       aspirin EC 81 MG EC tablet     91 tablet    Take 1 tablet (81 mg) by mouth daily       atorvastatin 80 MG tablet    LIPITOR    91 tablet    Take 1 tablet (80 mg) by mouth daily       BENADRYL PO      Take 25-50 mg by mouth daily as needed       calcium carbonate 500 MG chewable tablet    TUMS     Take 1 chew tab by mouth daily as needed       ezetimibe 10 MG tablet    ZETIA    90 tablet    Take 1 tablet (10 mg) by mouth daily       fenofibrate 160 MG tablet     91 tablet    Take 1 tablet (160 mg) by mouth daily       guaiFENesin-codeine 100-10 MG/5ML Soln solution    ROBITUSSIN  AC    236 mL    Take 5-10 mLs by mouth every 4 hours as needed for cough       levofloxacin 500 MG tablet    LEVAQUIN    7 tablet    Take 1 tablet (500 mg) by mouth daily       lisinopril 2.5 MG tablet    PRINIVIL/Zestril    91 tablet    Take 1 tablet (2.5 mg) by mouth daily , or as directed by Dr. Ocampo.       metoprolol 25 MG tablet    LOPRESSOR    90 tablet    Take 0.5 tablets (12.5 mg) by mouth 2 times daily       ondansetron 4 MG ODT tab    ZOFRAN ODT    20 tablet    Take 1-2 tablets (4-8 mg) by mouth every 8 hours as needed for nausea       TYLENOL 500 MG tablet   Generic drug:  acetaminophen      Take 500-1,000 mg by mouth every 6 hours as needed for mild pain       vitamin B complex with vitamin C Tabs tablet      Take 1 tablet by mouth daily       vitamin D 2000 UNITS Caps      Take 2,000 Units by mouth daily       * WARFARIN SODIUM PO      Take 7.5 mg by mouth       * warfarin 5 MG tablet    COUMADIN    145 tablet    As directed. 7.5 mg on Mon, Wed, Fri and 5 mg daily the rest of the week.       * Notice:  This list has 2 medication(s) that are the same as other medications prescribed for you. Read the directions carefully, and ask your doctor or other care provider to review them with you.

## 2017-05-11 NOTE — PROGRESS NOTES
"HPI: Oswaldo Prabhakar is a 56 year old male who comes in for continued cough and chest congestion  despite being treated for pneumonia with Levaquine 750 mg QD x 8 days.  He still has a headache, wheezing with reclining, coughing fits, c/o lungs feeling \"tight, and heart pounding/\" He has clear, thick ,phlegm.  He feels winded with exertion.      Patient Active Problem List   Diagnosis     Retinal detachment     HYPERLIPIDEMIA LDL GOAL <130     Pulmonary emboli (H)     Pure hyperglyceridemia     Calculus of kidney     Warfarin anticoagulation     Atypical chest pain     S/P CABG x 4     CAD (coronary artery disease)     Finger stiffness, left     Fracture of phalanx of finger     Long-term (current) use of anticoagulants [Z79.01]     Statin intolerance       He has a medical hx of  does not have any pertinent problems on file.    Current Outpatient Prescriptions   Medication Sig Dispense Refill     ondansetron (ZOFRAN ODT) 4 MG ODT tab Take 1-2 tablets (4-8 mg) by mouth every 8 hours as needed for nausea 20 tablet 1     warfarin (COUMADIN) 5 MG tablet As directed. 7.5 mg on Mon, Wed, Fri and 5 mg daily the rest of the week. 145 tablet 6     guaiFENesin-codeine (ROBITUSSIN AC) 100-10 MG/5ML SOLN solution Take 5-10 mLs by mouth every 4 hours as needed for cough 236 mL 1     vitamin B complex with vitamin C (VITAMIN  B COMPLEX) TABS tablet Take 1 tablet by mouth daily       WARFARIN SODIUM PO Take 7.5 mg by mouth       lisinopril (PRINIVIL/ZESTRIL) 2.5 MG tablet Take 1 tablet (2.5 mg) by mouth daily , or as directed by Dr. Ocampo. 91 tablet 1     ezetimibe (ZETIA) 10 MG tablet Take 1 tablet (10 mg) by mouth daily 90 tablet 3     fenofibrate 160 MG tablet Take 1 tablet (160 mg) by mouth daily 91 tablet 3     atorvastatin (LIPITOR) 80 MG tablet Take 1 tablet (80 mg) by mouth daily 91 tablet 3     metoprolol (LOPRESSOR) 25 MG tablet Take 0.5 tablets (12.5 mg) by mouth 2 times daily 90 tablet 3     Cholecalciferol (VITAMIN " D) 2000 UNITS CAPS Take 2,000 Units by mouth daily       aspirin EC 81 MG tablet Take 1 tablet (81 mg) by mouth daily 91 tablet 3     acetaminophen (TYLENOL) 500 MG tablet Take 500-1,000 mg by mouth every 6 hours as needed for mild pain       calcium carbonate (TUMS) 500 MG chewable tablet Take 1 chew tab by mouth daily as needed       DiphenhydrAMINE HCl (BENADRYL PO) Take 25-50 mg by mouth daily as needed           ALLERGIES: Cats; Hay fever & [a.r.m.]; Heparin; and Hydrocodone-acetaminophen    PAST MEDICAL HX:   Past Medical History:   Diagnosis Date     Antiplatelet or antithrombotic long-term use      Diaphragmatic hernia without mention of obstruction or gangrene      Heart disease      Hypertension      Nephrolithiasis 4/2010     Other and unspecified hyperlipidemia      Pulmonary embolus and infarction (H)     s/p hemothorax and filter s/p filter removal (2011), now on long term anti-coagulation     Statin intolerance 2/1/2017     Stented coronary artery      Unspecified retinal detachment     Childhood trauma, unrepaired       PAST SURGICAL HX:   Past Surgical History:   Procedure Laterality Date     BYPASS GRAFT ARTERY CORONARY  4/4/2014    Procedure: BYPASS GRAFT ARTERY CORONARY;  Median Sternotomy, Coronary Artery Bypass Bypass x 4 using Left Internal Mammary, Left Saphenous Vein, on pump oxygenation;  Surgeon: Sherry Armando MD;  Location: [facility]     C NONSPECIFIC PROCEDURE      Spermatocele resection     CLOSED REDUCTION, PERCUTANEOUS PINNING  HAND, COMBINED Left 11/5/2015    Procedure: COMBINED CLOSED REDUCTION, PERCUTANEOUS PINNING HAND;  Surgeon: Carmella Love MD;  Location: [facility]     COLONOSCOPY  3/15/2013    Procedure: COLONOSCOPY;;  Surgeon: Racheal Shipman MD;  Location: [facility]     LITHOTRIPSY  4/2010       IMMUNIZATION HX:   Immunization History   Administered Date(s) Administered     Influenza (IIV3) 10/03/2011, 02/01/2013, 10/13/2016     Pneumococcal (PCV 13)  03/04/2015     TD (ADULT, 7+) 08/01/2005     TDAP Vaccine (Boostrix) 07/05/2013       SOCIAL HX:   Social History     Social History Narrative       ROS: See HPI.    OBJECTIVE:  /87  Pulse 77  Temp 98.4  F (36.9  C) (Oral)  Resp 20  Wt 94.3 kg (207 lb 14.4 oz)  SpO2 96%  BMI 29 kg/m2   Wt Readings from Last 1 Encounters:   05/11/17 94.3 kg (207 lb 14.4 oz)     Constitutional: no distress, comfortable, pleasant   Eyes: anicteric,conjunctiva pink, normal extra-ocular movements   Ears, Nose and Throat: tympanic membranes clear, nose clear and free of lesions, throat clear.   Neck: supple with full range of motion, no thyromegaly.   Cardiovascular: regular rate and rhythm, normal S1 and S2, no murmurs, rubs or gallops, peripheral pulses full and symmetric   Respiratory: clear to auscultation, no wheezes or crackles, normal breath sounds   Gastrointestinal: positive bowel sounds, nontender, no hepatosplenomegaly, no masses   Musculoskeletal: full range of motion, no edema   Skin: no concerning lesions, no jaundice, temp normal   Neurological: cranial nerves intact, normal strength and sensation, reflexes at patella and biceps normal, normal gait,normal speech, no tremor   Psychological: appropriate mood   LYMPH: no axillary, cervical,  supraclavicular, infraclavicular or inguinal nodes.    ASSESSMENT/PLAN:  Oswaldo was seen today for hospital f/u.    Diagnoses and all orders for this visit:    Pneumonia of both lungs due to infectious organism, unspecified part of lung  -     XR Chest 2 Views; Future  -     levofloxacin (LEVAQUIN) 500 MG tablet; Take 1 tablet (500 mg) by mouth daily  -     albuterol (PROAIR HFA/PROVENTIL HFA/VENTOLIN HFA) 108 (90 BASE) MCG/ACT Inhaler; Inhale 2 puffs into the lungs every 6 hours as needed for shortness of breath / dyspnea or wheezing    Pt is to call and inform us if still having breathing difficulty after additional antibiotic treatment.    Total time spent 25 minutes.  More  than 50% of the time spent with Mr. Prabhakar on counseling / coordinating his care    Mony ARVIZU CNP

## 2017-05-11 NOTE — NURSING NOTE
Chief Complaint   Patient presents with     Hospital F/U     Patient is here to follow up on [facility]. Patient states he is still having congestion, and a fever.      Sara Biswas LPN at 5:11 PM on 5/11/2017.

## 2017-05-11 NOTE — PATIENT INSTRUCTIONS
[facility] Medication Refill Request Information:  * Please contact your pharmacy regarding ANY request for medication refills.  ** [facility] Prescription Fax = [phone]  * Please allow 3 business days for routine medication refills.  * Please allow 5 business days for controlled substance medication refills.     [facility] Test Result notification information:  *You will be notified with in 7-10 days of your appointment day regarding the results of your test.  If you are on MyChart you will be notified as soon as the provider has reviewed the results and signed off on them.    [facility] [phone]

## 2017-05-23 NOTE — TELEPHONE ENCOUNTER
Medication Appeal Initiation    We have initiated an appeal for the requested medication:  Medication: Praluent 150 mg - Denied  Appeal Start Date:  5/23/2017  Insurance Company: [employer] - Phone [phone] Fax [phone]  Comments:  Faxed Appeal Letter to [employer] at [phone] and sent to scanning.

## 2017-05-25 NOTE — TELEPHONE ENCOUNTER
[facility] tel [phone] to check status of appeal. Appeal was received and is in process as of 5-23-17. Appeal may take up to 48 hours.

## 2017-05-26 NOTE — TELEPHONE ENCOUNTER
MEDICATION APPEAL DENIED    Medication: Praluent 150 mg - Appeal Denied    Denial Date: 5/25/2017    Denial Rational: Diagnosis of HeFH is apparent, however no supporting lab tests available to prove such condition exists.      Second Level Appeal Information:  Can appeal, however please provide guidance on appropriate chart notes or lab tests to submit.      ** Appeal letter is scanned in Epic under Media tab **

## 2017-06-09 ENCOUNTER — ANTICOAGULATION THERAPY VISIT (OUTPATIENT)
Dept: ANTICOAGULATION | Facility: CLINIC | Age: 57
End: 2017-06-09

## 2017-06-09 DIAGNOSIS — Z79.01 LONG-TERM (CURRENT) USE OF ANTICOAGULANTS: ICD-10-CM

## 2017-06-09 DIAGNOSIS — I26.99 PULMONARY EMBOLI (H): ICD-10-CM

## 2017-06-09 NOTE — MR AVS SNAPSHOT
Oswaldo Prabhakar   6/9/2017   Anticoagulation Therapy Visit    Description:  56 year old male   Provider:  Marielena Lerma, RN   Department:  [facility]           INR as of 6/9/2017     Today's INR       Anticoagulation Summary as of 6/9/2017     INR goal 2.0-3.0   Today's INR    Next INR check     Indications   Pulmonary emboli (H) [I26.99]  Long-term (current) use of anticoagulants [Z79.01] [Z79.01]         May 2017 Details    Sun Mon Tue Wed Thu Fri Sat      1      7.5 mg   See details      2      5 mg         3            4               5               6                 7               8               9               10               11               12               13                 14               15               16               17               18               19               20                 21               22               23               24               25               26               27                 28               29               30               31                   Date Details   05/01 This INR check       Date of next INR:  5/3/2017         How to take your warfarin dose     To take:  5 mg Take 1 of the 5 mg tablets.    To take:  7.5 mg Take 1.5 of the 5 mg tablets.

## 2017-06-09 NOTE — PROGRESS NOTES
Left message for patient that they have not been compliant with having the necessary lab work for their anticoagulation therapy. Patient  has been sent two letters stating that they have missed their lab appointments and that it is very important to have labs done to make sure that they are in their therapeutic range to minimize the risks of bleeding and clotting. I asked them to call us or come in for their lab work as soon as possible. Patient will be inactivated from our anticoagulation monitoring service if they do not respond within one week.       Received an email from Oswaldo stating he will be in for an INR next week. He prefers to be contacted by cell phone or email ([email or web address] or [email or web address]). Notes he has been on 2 courses of Levaquin since his last INR. -MM

## 2017-06-12 DIAGNOSIS — I26.99 PULMONARY EMBOLI (H): ICD-10-CM

## 2017-06-12 DIAGNOSIS — Z79.01 LONG-TERM (CURRENT) USE OF ANTICOAGULANTS: ICD-10-CM

## 2017-06-13 ENCOUNTER — ANTICOAGULATION THERAPY VISIT (OUTPATIENT)
Dept: ANTICOAGULATION | Facility: CLINIC | Age: 57
End: 2017-06-13

## 2017-06-13 DIAGNOSIS — Z79.01 LONG-TERM (CURRENT) USE OF ANTICOAGULANTS: ICD-10-CM

## 2017-06-13 DIAGNOSIS — I26.99 PULMONARY EMBOLI (H): ICD-10-CM

## 2017-06-13 LAB — INR PPP: 1.44 (ref 0.86–1.14)

## 2017-06-13 NOTE — PROGRESS NOTES
ANTICOAGULATION FOLLOW-UP CLINIC VISIT    Patient Name:  Oswaldo Prabhakar  Date:  6/13/2017  Contact Type:  Telephone    SUBJECTIVE:        OBJECTIVE    INR   Date Value Ref Range Status   06/12/2017 1.44 (H) 0.86 - 1.14 Final     Chromogenic Factor 10   Date Value Ref Range Status   06/18/2011 81 60 - 140 % Final     Comment:     Therapeutic Range: A Chromogenic Factor 10 level of 20-40% inversely   correlates   with an INR of 2-3 for patients receiving Warfarin. Chromogenic Factor 10   levels below 20% indicate an INR greater than 3, and levels above 40%   indicate   an INR less than 2.     Factor 2 Assay   Date Value Ref Range Status   04/04/2012  60 - 140 % Final    (Note)  CANCELLED AND CREDITED PER [facility]. MONITORING,4/4/12,       ASSESSMENT / PLAN  INR assessment SUB    Recheck INR In: 1 WEEK    INR Location Clinic      Anticoagulation Summary as of 6/13/2017     INR goal 2.0-3.0   Today's INR 1.44! (6/12/2017)   Maintenance plan 7.5 mg (5 mg x 1.5) on Mon, Wed, Fri; 5 mg (5 mg x 1) all other days   Full instructions 6/13: 7.5 mg; 6/14: 10 mg; Otherwise 7.5 mg on Mon, Wed, Fri; 5 mg all other days   Weekly total 42.5 mg   Plan last modified Liliam Hyman, RN (9/27/2016)   Next INR check 6/20/2017   Priority INR   Target end date Indefinite    Indications   Pulmonary emboli (H) [I26.99]  Long-term (current) use of anticoagulants [Z79.01] [Z79.01]         Anticoagulation Episode Summary     INR check location     Preferred lab     Send INR reminders to [facility]    Comments HIPPA INFO OK to speak with wife Josselyn OK to leave messages on Home.work and cell phones  use cell phone #[phone]      Anticoagulation Care Providers     Provider Role Specialty Phone number    Samm Vazquez MD [facility] [phone]            See the Encounter Report to view Anticoagulation Flowsheet and Dosing Calendar (Go to Encounters tab in chart review, and find the Anticoagulation  Therapy Visit)    Left message for patient with results and dosing recommendations. Asked patient to call back to report any missed doses, falls, signs and symptoms of bleeding or clotting, any changes in health, medication, or diet. Asked patient to call back with any questions or concerns.     Left vm on cell ph and also e-mailed pt with the above instructions per previous note     Liliam Hyman RN

## 2017-06-13 NOTE — MR AVS SNAPSHOT
Oswaldo Prabhakar   6/13/2017   Anticoagulation Therapy Visit    Description:  56 year old male   Provider:  Liliam Hyman, RN   Department:  [facility]           INR as of 6/13/2017     Today's INR 1.44! (6/12/2017)      Anticoagulation Summary as of 6/13/2017     INR goal 2.0-3.0   Today's INR 1.44! (6/12/2017)   Full instructions 6/13: 7.5 mg; 6/14: 10 mg; Otherwise 7.5 mg on Mon, Wed, Fri; 5 mg all other days   Next INR check 6/20/2017    Indications   Pulmonary emboli (H) [I26.99]  Long-term (current) use of anticoagulants [Z79.01] [Z79.01]         June 2017 Details    Sun Mon Tue Wed Thu Fri Sat         1               2               3                 4               5               6               7               8               9               10                 11               12               13      7.5 mg   See details      14      10 mg         15      5 mg         16      7.5 mg         17      5 mg           18      5 mg         19      7.5 mg         20            21               22               23               24                 25               26               27               28               29               30                 Date Details   06/13 This INR check       Date of next INR:  6/20/2017         How to take your warfarin dose     To take:  5 mg Take 1 of the 5 mg tablets.    To take:  7.5 mg Take 1.5 of the 5 mg tablets.    To take:  10 mg Take 2 of the 5 mg tablets.

## 2017-06-21 ENCOUNTER — OFFICE VISIT (OUTPATIENT)
Dept: INTERNAL MEDICINE | Facility: CLINIC | Age: 57
End: 2017-06-21

## 2017-06-21 VITALS
HEART RATE: 69 BPM | SYSTOLIC BLOOD PRESSURE: 115 MMHG | BODY MASS INDEX: 28.94 KG/M2 | TEMPERATURE: 98.3 F | WEIGHT: 207.5 LBS | DIASTOLIC BLOOD PRESSURE: 69 MMHG | OXYGEN SATURATION: 98 %

## 2017-06-21 DIAGNOSIS — H69.93 DYSFUNCTION OF EUSTACHIAN TUBE, BILATERAL: ICD-10-CM

## 2017-06-21 DIAGNOSIS — J18.9 PNEUMONIA DUE TO INFECTIOUS ORGANISM, UNSPECIFIED LATERALITY, UNSPECIFIED PART OF LUNG: Primary | ICD-10-CM

## 2017-06-21 DIAGNOSIS — R06.2 WHEEZING: ICD-10-CM

## 2017-06-21 RX ORDER — PREDNISONE 20 MG/1
40 TABLET ORAL DAILY
Qty: 10 TABLET | Refills: 0 | Status: SHIPPED | OUTPATIENT
Start: 2017-06-21 | End: 2017-06-26

## 2017-06-21 RX ORDER — FLUTICASONE PROPIONATE 50 MCG
2 SPRAY, SUSPENSION (ML) NASAL DAILY
Qty: 1 BOTTLE | Refills: 1 | Status: SHIPPED | OUTPATIENT
Start: 2017-06-21 | End: 2018-03-29

## 2017-06-21 ASSESSMENT — PAIN SCALES - GENERAL: PAINLEVEL: NO PAIN (0)

## 2017-06-21 NOTE — PROGRESS NOTES
[facility]       SUBJECTIVE:  Oswaldo Prabhakar is a 56 year old male who comes in for ongoing cough, wheezing, crackles in his chest. Oswaldo has a PMHx of CAD s/p 4vCABG and was hospitalized at the end of April with a LLL pneumonia. He also had EKG changes at that time but stress test was negative. He was treated with a course of levofloxacin. However, he was still having cough/congestion, so he was seen again in clinic on 5/11. Repeat CXR at that time showed improving infiltrates in the LLL. He was given an additional course of levofloxacin.     Today Oswaldo returns and notes he is still struggling. When he lays down at night, he feels wheezing and crackles in his chest. He ends up coughing most of the night. His cough is still productive of yellow sputum. He also feels some pain in his ears. He denies any fevers/chills. 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 He does not have a history of smoking.     Medications and allergies reviewed by me today.     OBJECTIVE:    /69  Pulse 69  Temp 98.3  F (36.8  C) (Oral)  Wt 94.1 kg (207 lb 8 oz)  SpO2 98%  BMI 28.94 kg/m2   Wt Readings from Last 1 Encounters:   06/21/17 94.1 kg (207 lb 8 oz)     General: Pleasant male, in NAD   ENT: TMs with serous effusions bilaterally, oropharynx with mild cobblestoning  Neck: no LAD  Resp: lungs initially with rhonchi and wheezing in bases, R>L, that clears with repeat breathing  CV: Heart RRR, no MRG     ASSESSMENT/PLAN:    Oswaldo was seen today for uri.    Diagnoses and all orders for this visit:    Pneumonia due to infectious organism, unspecified laterality, unspecified part of lung  Wheezing. I doubt at this time that Oswaldo has an ongoing bacterial process, but will go ahead and get a CXR to confirm no infiltrates. If CXR is positive, will go ahead and give him different antibiotics with Omnicef and Azithromycin. For the ongoing cough/wheezing, however, his lungs seem  quite reactive right now, so I will give him a burst of steroids and start him on an Advair inhaler.   -     predniSONE (DELTASONE) 20 MG tablet; Take 2 tablets (40 mg) by mouth daily for 5 days  -     fluticasone-salmeterol (ADVAIR) 250-50 MCG/DOSE diskus inhaler; Inhale 1 puff into the lungs 2 times daily    Dysfunction of eustachian tube, bilateral. Does have serous effusions bilaterally. This may also be contributing to cough, so will provide a nasal steroid.   -     fluticasone (FLONASE) 50 MCG/ACT spray; Spray 2 sprays into both nostrils daily      Pt should return to clinic for f/u with me PRN      Marian Forte MD  06/21/17

## 2017-06-21 NOTE — NURSING NOTE
Chief Complaint   Patient presents with     URI     Patient here for ongoing URI.       Sonali Lei CMA at 1:26 PM on 6/21/2017.

## 2017-06-21 NOTE — MR AVS SNAPSHOT
After Visit Summary   6/21/2017    Oswaldo Prabhakar    MRN: [id number]           Patient Information     Date Of Birth          1960        Visit Information        Provider Department      6/21/2017 1:30 PM Marian Viveros MD [facility]        Today's Diagnoses     Pneumonia due to infectious organism, unspecified laterality, unspecified part of lung    -  1    Wheezing        Dysfunction of eustachian tube, bilateral          Care Instructions    Primary Care Center Medication Refill Request Information:  * Please contact your pharmacy regarding ANY request for medication refills.  ** [facility] Prescription Fax = [phone]  * Please allow 3 business days for routine medication refills.  * Please allow 5 business days for controlled substance medication refills.     Primary Care Center Test Result notification information:  *You will be notified with in 7-10 days of your appointment day regarding the results of your test.  If you are on MyChart you will be notified as soon as the provider has reviewed the results and signed off on them.    Primary Care Center [phone]             Follow-ups after your visit        Future tests that were ordered for you today     Open Future Orders        Priority Expected Expires Ordered    XR Chest 2 Views Routine 6/21/2017 6/21/2018 6/21/2017            Who to contact     Please call your clinic at [phone] to:    Ask questions about your health    Make or cancel appointments    Discuss your medicines    Learn about your test results    Speak to your doctor   If you have compliments or concerns about an experience at your clinic, or if you wish to file a complaint, please contact [facility] Patient Relations at [phone] or email us at [email or web address]         Additional Information About Your Visit        MyChart Information     [employer] gives you secure access to your electronic  health record. If you see a primary care provider, you can also send messages to your care team and make appointments. If you have questions, please call your primary care clinic.  If you do not have a primary care provider, please call [phone] and they will assist you.      [employer] is an electronic gateway that provides easy, online access to your medical records. With [employer], you can request a clinic appointment, read your test results, renew a prescription or communicate with your care team.     To access your existing account, please contact your [facility] or call [phone] for assistance.        Care EveryWhere ID     This is your Care EveryWhere ID. This could be used by other organizations to access your [facility] medical records  [id number]        Your Vitals Were     Pulse Temperature Pulse Oximetry BMI (Body Mass Index)          69 98.3  F (36.8  C) (Oral) 98% 28.94 kg/m2         Blood Pressure from Last 3 Encounters:   06/21/17 115/69   05/11/17 125/87   05/03/17 104/70    Weight from Last 3 Encounters:   06/21/17 94.1 kg (207 lb 8 oz)   05/11/17 94.3 kg (207 lb 14.4 oz)   05/03/17 88.9 kg (196 lb)                 Today's Medication Changes          These changes are accurate as of: 6/21/17  1:40 PM.  If you have any questions, ask your nurse or doctor.               Start taking these medicines.        Dose/Directions    fluticasone 50 MCG/ACT spray   Commonly known as:  FLONASE   Used for:  Dysfunction of eustachian tube, bilateral   Started by:  Marian Viveros MD        Dose:  2 spray   Spray 2 sprays into both nostrils daily   Quantity:  1 Bottle   Refills:  1       fluticasone-salmeterol 250-50 MCG/DOSE diskus inhaler   Commonly known as:  ADVAIR   Used for:  Wheezing, Pneumonia due to infectious organism, unspecified laterality, unspecified part of lung   Started by:  Marian Viveros MD        Dose:  1 puff   Inhale 1 puff into the  lungs 2 times daily   Quantity:  1 Inhaler   Refills:  1       predniSONE 20 MG tablet   Commonly known as:  DELTASONE   Used for:  Pneumonia due to infectious organism, unspecified laterality, unspecified part of lung, Wheezing   Started by:  Marian Viveros MD        Dose:  40 mg   Take 2 tablets (40 mg) by mouth daily for 5 days   Quantity:  10 tablet   Refills:  0         Stop taking these medicines if you haven't already. Please contact your care team if you have questions.     guaiFENesin-codeine 100-10 MG/5ML Soln solution   Commonly known as:  ROBITUSSIN AC   Stopped by:  Marian Viveros MD           levofloxacin 500 MG tablet   Commonly known as:  LEVAQUIN   Stopped by:  Marian Viveros MD           ondansetron 4 MG ODT tab   Commonly known as:  ZOFRAN ODT   Stopped by:  Marian Viveros MD           TYLENOL 500 MG tablet   Generic drug:  acetaminophen   Stopped by:  Marian Viveros MD                Where to get your medicines      These medications were sent to [place] [address] [place], [place] - [address] [facility]  [address] [facility] [address]     Phone:  [phone]     fluticasone 50 MCG/ACT spray    fluticasone-salmeterol 250-50 MCG/DOSE diskus inhaler    predniSONE 20 MG tablet                Primary Care Provider Office Phone # Fax #    Samm Vazquez [phone]        [facility] [address] [place] [address] [phone]  [facility] [address]        Equal Access to Services     [facility] AH: Hadii aad ku hadasho Soomaali, waaxda luqadaha, qaybta kaalmada adeegyada, waxay idiin hayminaln adechristiano penaloza . So [facility] [phone].    ATENCIÓN: Si habla maya, tiene a krueger disposición servicios gratuitos de asistencia lingüística. Llame al [phone].    We comply with applicable federal civil rights laws and [place] laws. We do not discriminate on the basis of race, color, national origin, age, disability sex, sexual  orientation or gender identity.            Thank you!     Thank you for choosing [facility]  for your care. Our goal is always to provide you with excellent care. Hearing back from our patients is one way we can continue to improve our services. Please take a few minutes to complete the written survey that you may receive in the mail after your visit with us. Thank you!             Your Updated Medication List - Protect others around you: Learn how to safely use, store and throw away your medicines at www.disposemymeds.org.          This list is accurate as of: 6/21/17  1:40 PM.  Always use your most recent med list.                   Brand Name Dispense Instructions for use Diagnosis    albuterol 108 (90 BASE) MCG/ACT Inhaler    PROAIR HFA/PROVENTIL HFA/VENTOLIN HFA    1 Inhaler    Inhale 2 puffs into the lungs every 6 hours as needed for shortness of breath / dyspnea or wheezing    Pneumonia of both lungs due to infectious organism, unspecified part of lung       aspirin EC 81 MG EC tablet     91 tablet    Take 1 tablet (81 mg) by mouth daily    S/P CABG x 4       atorvastatin 80 MG tablet    LIPITOR    91 tablet    Take 1 tablet (80 mg) by mouth daily    Pure hyperglyceridemia, Hyperlipidemia LDL goal <130       BENADRYL PO      Take 25-50 mg by mouth daily as needed        calcium carbonate 500 MG chewable tablet    TUMS     Take 1 chew tab by mouth daily as needed        ezetimibe 10 MG tablet    ZETIA    90 tablet    Take 1 tablet (10 mg) by mouth daily    Statin intolerance, Hyperlipidemia LDL goal <70, Atherosclerosis of coronary artery of native heart without angina pectoris, unspecified vessel or lesion type, S/P CABG (coronary artery bypass graft)       fenofibrate 160 MG tablet     91 tablet    Take 1 tablet (160 mg) by mouth daily    Hyperlipidemia LDL goal <130       fluticasone 50 MCG/ACT spray    FLONASE    1 Bottle    Spray 2 sprays into both nostrils daily    Dysfunction of  eustachian tube, bilateral       fluticasone-salmeterol 250-50 MCG/DOSE diskus inhaler    ADVAIR    1 Inhaler    Inhale 1 puff into the lungs 2 times daily    Wheezing, Pneumonia due to infectious organism, unspecified laterality, unspecified part of lung       lisinopril 2.5 MG tablet    PRINIVIL/Zestril    91 tablet    Take 1 tablet (2.5 mg) by mouth daily , or as directed by Dr. Ocampo.    Essential hypertension       metoprolol 25 MG tablet    LOPRESSOR    90 tablet    Take 0.5 tablets (12.5 mg) by mouth 2 times daily    S/P CABG x 4       predniSONE 20 MG tablet    DELTASONE    10 tablet    Take 2 tablets (40 mg) by mouth daily for 5 days    Pneumonia due to infectious organism, unspecified laterality, unspecified part of lung, Wheezing       vitamin B complex with vitamin C Tabs tablet      Take 1 tablet by mouth daily        vitamin D 2000 UNITS Caps      Take 2,000 Units by mouth daily    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 7.5 mg on Mon, Wed, Fri and 5 mg daily the rest of the week.    Long term (current) use of anticoagulants       * Notice:  This list has 2 medication(s) that are the same as other medications prescribed for you. Read the directions carefully, and ask your doctor or other care provider to review them with you.

## 2017-06-21 NOTE — PATIENT INSTRUCTIONS
[facility] Medication Refill Request Information:  * Please contact your pharmacy regarding ANY request for medication refills.  ** [facility] Fax = [phone]  * Please allow 3 business days for routine medication refills.  * Please allow 5 business days for controlled substance medication refills.     [facility] Test Result notification information:  *You will be notified with in 7-10 days of your appointment day regarding the results of your test.  If you are on MyChart you will be notified as soon as the provider has reviewed the results and signed off on them.    [facility] [phone]

## 2017-06-26 ENCOUNTER — ANTICOAGULATION THERAPY VISIT (OUTPATIENT)
Dept: ANTICOAGULATION | Facility: CLINIC | Age: 57
End: 2017-06-26

## 2017-06-26 DIAGNOSIS — I26.99 PULMONARY EMBOLISM (H): ICD-10-CM

## 2017-06-26 DIAGNOSIS — I26.99 PULMONARY EMBOLI (H): ICD-10-CM

## 2017-06-26 DIAGNOSIS — E78.5 HYPERLIPIDEMIA LDL GOAL <70: ICD-10-CM

## 2017-06-26 DIAGNOSIS — E78.5 HYPERLIPIDEMIA: ICD-10-CM

## 2017-06-26 DIAGNOSIS — Z79.01 LONG-TERM (CURRENT) USE OF ANTICOAGULANTS: ICD-10-CM

## 2017-06-26 DIAGNOSIS — Z95.1 S/P CABG (CORONARY ARTERY BYPASS GRAFT): ICD-10-CM

## 2017-06-26 DIAGNOSIS — I25.10 ATHEROSCLEROSIS OF CORONARY ARTERY OF NATIVE HEART WITHOUT ANGINA PECTORIS, UNSPECIFIED VESSEL OR LESION TYPE: ICD-10-CM

## 2017-06-26 DIAGNOSIS — Z78.9 STATIN INTOLERANCE: ICD-10-CM

## 2017-06-26 LAB — INR PPP: 2.44 (ref 0.86–1.14)

## 2017-06-26 NOTE — MR AVS SNAPSHOT
Oswaldo Prabhakar   6/26/2017   Anticoagulation Therapy Visit    Description:  56 year old male   Provider:  Nano Greer, RN   Department:  [facility]           INR as of 6/26/2017     Today's INR 2.44      Anticoagulation Summary as of 6/26/2017     INR goal 2.0-3.0   Today's INR 2.44   Full instructions 5 mg on Tue, Thu, Sat; 7.5 mg all other days   Next INR check 7/11/2017    Indications   Pulmonary emboli (H) [I26.99]  Long-term (current) use of anticoagulants [Z79.01] [Z79.01]         June 2017 Details    Sun Mon Tue Wed Thu Fri Sat         1               2               3                 4               5               6               7               8               9               10                 11               12               13               14               15               16               17                 18               19               20               21               22               23               24                 25               26      7.5 mg   See details      27      5 mg         28      7.5 mg         29      5 mg         30      7.5 mg           Date Details   06/26 This INR check               How to take your warfarin dose     To take:  5 mg Take 1 of the 5 mg tablets.    To take:  7.5 mg Take 1.5 of the 5 mg tablets.           July 2017 Details    Sun Mon Tue Wed Thu Fri Sat           1      5 mg           2      7.5 mg         3      7.5 mg         4      5 mg         5      7.5 mg         6      5 mg         7      7.5 mg         8      5 mg           9      7.5 mg         10      7.5 mg         11            12               13               14               15                 16               17               18               19               20               21               22                 23               24               25               26               27               28               29                 30               31                      Date Details   No additional details    Date of next INR:  7/11/2017         How to take your warfarin dose     To take:  5 mg Take 1 of the 5 mg tablets.    To take:  7.5 mg Take 1.5 of the 5 mg tablets.

## 2017-06-26 NOTE — PROGRESS NOTES
ANTICOAGULATION FOLLOW-UP CLINIC VISIT    Patient Name:  Oswaldo Prabhakar  Date:  6/26/2017  Contact Type:  Telephone    SUBJECTIVE:     Patient Findings     Positives Change in medications (Just completed a 5 day regimen of prednisone 40mg for congestion R/T pneumonia)    Comments Using Advair and flonase for congestion symptoms.           OBJECTIVE    INR   Date Value Ref Range Status   06/26/2017 2.44 (H) 0.86 - 1.14 Final     Chromogenic Factor 10   Date Value Ref Range Status   06/18/2011 81 60 - 140 % Final     Comment:     Therapeutic Range: A Chromogenic Factor 10 level of 20-40% inversely   correlates   with an INR of 2-3 for patients receiving Warfarin. Chromogenic Factor 10   levels below 20% indicate an INR greater than 3, and levels above 40%   indicate   an INR less than 2.     Factor 2 Assay   Date Value Ref Range Status   04/04/2012  60 - 140 % Final    (Note)  CANCELLED AND CREDITED PER [facility] IN MED. MONITORING,4/4/12,       ASSESSMENT / PLAN  INR assessment THER    Recheck INR In: 2 WEEKS    INR Location Clinic      Anticoagulation Summary as of 6/26/2017     INR goal 2.0-3.0   Today's INR 2.44   Maintenance plan 5 mg (5 mg x 1) on Tue, Thu, Sat; 7.5 mg (5 mg x 1.5) all other days   Full instructions 5 mg on Tue, Thu, Sat; 7.5 mg all other days   Weekly total 45 mg   Plan last modified Nano Greer RN (6/26/2017)   Next INR check 7/11/2017   Priority INR   Target end date Indefinite    Indications   Pulmonary emboli (H) [I26.99]  Long-term (current) use of anticoagulants [Z79.01] [Z79.01]         Anticoagulation Episode Summary     INR check location     Preferred lab     Send INR reminders to [facility]    Comments HIPPA INFO OK to speak with wife Josselyn OK to leave messages on Home.work and cell phones  use cell phone #[phone]      Anticoagulation Care Providers     Provider Role Specialty Phone number    Samm Vazquez MD [facility] [phone]             See the Encounter Report to view Anticoagulation Flowsheet and Dosing Calendar (Go to Encounters tab in chart review, and find the Anticoagulation Therapy Visit)    Spoke with patient.    Nano Greer RN

## 2017-07-07 DIAGNOSIS — Z95.1 S/P CABG X 4: ICD-10-CM

## 2017-07-10 RX ORDER — METOPROLOL TARTRATE 25 MG/1
12.5 TABLET, FILM COATED ORAL 2 TIMES DAILY
Qty: 90 TABLET | Refills: 3 | Status: SHIPPED | OUTPATIENT
Start: 2017-07-10 | End: 2018-10-08

## 2017-07-10 NOTE — TELEPHONE ENCOUNTER
metoprolol (LOPRESSOR) 25 MG      Last Written Prescription Date:  7/21/16  Last Fill Quantity: 90,   # refills: 3  Last Office Visit : 6/21/17  Future Office visit:  none  BP Readings from Last 3 Encounters:   06/21/17 115/69   05/11/17 125/87   05/03/17 104/70

## 2017-07-20 DIAGNOSIS — I25.10 ATHEROSCLEROSIS OF CORONARY ARTERY OF NATIVE HEART WITHOUT ANGINA PECTORIS, UNSPECIFIED VESSEL OR LESION TYPE: ICD-10-CM

## 2017-07-20 DIAGNOSIS — I26.99 PULMONARY EMBOLI (H): ICD-10-CM

## 2017-07-20 DIAGNOSIS — E78.5 HYPERLIPIDEMIA LDL GOAL <70: ICD-10-CM

## 2017-07-20 DIAGNOSIS — Z95.1 S/P CABG (CORONARY ARTERY BYPASS GRAFT): ICD-10-CM

## 2017-07-20 DIAGNOSIS — Z78.9 STATIN INTOLERANCE: ICD-10-CM

## 2017-07-20 DIAGNOSIS — Z79.01 LONG-TERM (CURRENT) USE OF ANTICOAGULANTS: ICD-10-CM

## 2017-07-20 LAB — INR PPP: 1.47 (ref 0.86–1.14)

## 2017-07-21 ENCOUNTER — ANTICOAGULATION THERAPY VISIT (OUTPATIENT)
Dept: ANTICOAGULATION | Facility: CLINIC | Age: 57
End: 2017-07-21

## 2017-07-21 DIAGNOSIS — Z79.01 LONG-TERM (CURRENT) USE OF ANTICOAGULANTS: ICD-10-CM

## 2017-07-21 DIAGNOSIS — I26.99 PULMONARY EMBOLI (H): ICD-10-CM

## 2017-07-21 NOTE — PROGRESS NOTES
ANTICOAGULATION FOLLOW-UP CLINIC VISIT    Patient Name:  Oswaldo Prabhakar  Date:  7/21/2017  Contact Type:  Telephone    SUBJECTIVE:     Patient Findings     Comments Pt missed a 5mg dose last week.  Pneumonia resolved, but some slight congestion persists.           OBJECTIVE    INR   Date Value Ref Range Status   07/20/2017 1.47 (H) 0.86 - 1.14 Final     Chromogenic Factor 10   Date Value Ref Range Status   06/18/2011 81 60 - 140 % Final     Comment:     Therapeutic Range: A Chromogenic Factor 10 level of 20-40% inversely   correlates   with an INR of 2-3 for patients receiving Warfarin. Chromogenic Factor 10   levels below 20% indicate an INR greater than 3, and levels above 40%   indicate   an INR less than 2.     Factor 2 Assay   Date Value Ref Range Status   04/04/2012  60 - 140 % Final    (Note)  CANCELLED AND CREDITED PER [facility] IN MED. MONITORING,4/4/12,       ASSESSMENT / PLAN  INR assessment SUB    Recheck INR In: 2 WEEKS    INR Location Clinic      Anticoagulation Summary as of 7/21/2017     INR goal 2.0-3.0   Today's INR 1.47! (7/20/2017)   Maintenance plan 5 mg (5 mg x 1) on Tue, Thu, Sat; 7.5 mg (5 mg x 1.5) all other days   Full instructions 7/22: 7.5 mg; Otherwise 5 mg on Tue, Thu, Sat; 7.5 mg all other days   Weekly total 45 mg   Plan last modified Nano Greer RN (6/26/2017)   Next INR check 8/4/2017   Priority INR   Target end date Indefinite    Indications   Pulmonary emboli (H) [I26.99]  Long-term (current) use of anticoagulants [Z79.01] [Z79.01]         Anticoagulation Episode Summary     INR check location     Preferred lab     [facility]    Comments HIPPA INFO OK to speak with wife Josselyn OK to leave messages on Home.work and cell phones  use cell phone #[phone]      Anticoagulation Care Providers     Provider Role Specialty Phone number    Samm Vazquez MD [facility] Internal Medicine [phone]            See the Encounter Report to view  Anticoagulation Flowsheet and Dosing Calendar (Go to Encounters tab in chart review, and find the Anticoagulation Therapy Visit)  Spoke with patient.  Nano Greer RN

## 2017-07-21 NOTE — MR AVS SNAPSHOT
Oswaldo Prabhakar   7/21/2017   Anticoagulation Therapy Visit    Description:  56 year old male   Provider:  Nano Greer RN   [facility]           INR as of 7/21/2017     Today's INR 1.47! (7/20/2017)      Anticoagulation Summary as of 7/21/2017     INR goal 2.0-3.0   Today's INR 1.47! (7/20/2017)   Full instructions 7/22: 7.5 mg; Otherwise 5 mg on Tue, Thu, Sat; 7.5 mg all other days   Next INR check 8/4/2017    Indications   Pulmonary emboli (H) [I26.99]  Long-term (current) use of anticoagulants [Z79.01] [Z79.01]         July 2017 Details    Sun Mon Tue Wed Thu Fri Sat           1                 2               3               4               5               6               7               8                 9               10               11               12               13               14               15                 16               17               18               19               20               21      7.5 mg   See details      22      7.5 mg           23      7.5 mg         24      7.5 mg         25      5 mg         26      7.5 mg         27      5 mg         28      7.5 mg         29      5 mg           30      7.5 mg         31      7.5 mg               Date Details   07/21 This INR check               How to take your warfarin dose     To take:  5 mg Take 1 of the 5 mg tablets.    To take:  7.5 mg Take 1.5 of the 5 mg tablets.           August 2017 Details    Sun Mon Tue Wed Thu Fri Sat       1      5 mg         2      7.5 mg         3      5 mg         4            5                 6               7               8               9               10               11               12                 13               14               15               16               17               18               19                 20               21               22               23               24               25               26                 27               28                29               30               31                  Date Details   No additional details    Date of next INR:  8/4/2017         How to take your warfarin dose     To take:  5 mg Take 1 of the 5 mg tablets.    To take:  7.5 mg Take 1.5 of the 5 mg tablets.

## 2017-08-03 ENCOUNTER — ANTICOAGULATION THERAPY VISIT (OUTPATIENT)
Dept: ANTICOAGULATION | Facility: CLINIC | Age: 57
End: 2017-08-03

## 2017-08-03 DIAGNOSIS — Z95.1 S/P CABG (CORONARY ARTERY BYPASS GRAFT): ICD-10-CM

## 2017-08-03 DIAGNOSIS — I26.99 PULMONARY EMBOLI (H): ICD-10-CM

## 2017-08-03 DIAGNOSIS — Z79.01 LONG-TERM (CURRENT) USE OF ANTICOAGULANTS: ICD-10-CM

## 2017-08-03 DIAGNOSIS — Z78.9 STATIN INTOLERANCE: ICD-10-CM

## 2017-08-03 DIAGNOSIS — E78.5 HYPERLIPIDEMIA LDL GOAL <70: ICD-10-CM

## 2017-08-03 DIAGNOSIS — I25.10 ATHEROSCLEROSIS OF CORONARY ARTERY OF NATIVE HEART WITHOUT ANGINA PECTORIS, UNSPECIFIED VESSEL OR LESION TYPE: ICD-10-CM

## 2017-08-03 LAB
CHOLEST SERPL-MCNC: 172 MG/DL
HDLC SERPL-MCNC: 36 MG/DL
INR PPP: 2.49 (ref 0.86–1.14)
LDLC SERPL CALC-MCNC: 100 MG/DL
NONHDLC SERPL-MCNC: 136 MG/DL
TRIGL SERPL-MCNC: 177 MG/DL

## 2017-08-03 NOTE — MR AVS SNAPSHOT
Oswaldo Prabhakar   8/3/2017   Anticoagulation Therapy Visit    Description:  56 year old male   Provider:  Liliam Hyman, RN   Department:  [facility]           INR as of 8/3/2017     Today's INR 2.49      Anticoagulation Summary as of 8/3/2017     INR goal 2.0-3.0   Today's INR 2.49   Full instructions 5 mg on Tue, Thu, Sat; 7.5 mg all other days   Next INR check 8/17/2017    Indications   Pulmonary emboli (H) [I26.99]  Long-term (current) use of anticoagulants [Z79.01] [Z79.01]         August 2017 Details    Sun Mon Tue Wed Thu Fri Sat       1               2               3      5 mg   See details      4      7.5 mg         5      5 mg           6      7.5 mg         7      7.5 mg         8      5 mg         9      7.5 mg         10      5 mg         11      7.5 mg         12      5 mg           13      7.5 mg         14      7.5 mg         15      5 mg         16      7.5 mg         17            18               19                 20               21               22               23               24               25               26                 27               28               29               30               31                  Date Details   08/03 This INR check       Date of next INR:  8/17/2017         How to take your warfarin dose     To take:  5 mg Take 1 of the 5 mg tablets.    To take:  7.5 mg Take 1.5 of the 5 mg tablets.

## 2017-08-03 NOTE — PROGRESS NOTES
ANTICOAGULATION FOLLOW-UP CLINIC VISIT    Patient Name:  Oswaldo Prabhakar  Date:  8/3/2017  Contact Type:  Telephone    SUBJECTIVE:     Patient Findings     Positives No Problem Findings    Comments Pt continues Advair inhaler. Pt reports that he is going to be out of town 8/21-8/25 so if we could refrain from scheduling an INR from there.           OBJECTIVE    INR   Date Value Ref Range Status   08/03/2017 2.49 (H) 0.86 - 1.14 Final     Chromogenic Factor 10   Date Value Ref Range Status   06/18/2011 81 60 - 140 % Final     Comment:     Therapeutic Range: A Chromogenic Factor 10 level of 20-40% inversely   correlates   with an INR of 2-3 for patients receiving Warfarin. Chromogenic Factor 10   levels below 20% indicate an INR greater than 3, and levels above 40%   indicate   an INR less than 2.     Factor 2 Assay   Date Value Ref Range Status   04/04/2012  60 - 140 % Final    (Note)  CANCELLED AND CREDITED PER [facility] IN MED. MONITORING,4/4/12,       ASSESSMENT / PLAN  INR assessment THER    Recheck INR In: 2 WEEKS    INR Location Clinic      Anticoagulation Summary as of 8/3/2017     INR goal 2.0-3.0   Today's INR 2.49   Maintenance plan 5 mg (5 mg x 1) on Tue, Thu, Sat; 7.5 mg (5 mg x 1.5) all other days   Full instructions 5 mg on Tue, Thu, Sat; 7.5 mg all other days   Weekly total 45 mg   Plan last modified Nano Greer RN (6/26/2017)   Next INR check 8/17/2017   Priority INR   Target end date Indefinite    Indications   Pulmonary emboli (H) [I26.99]  Long-term (current) use of anticoagulants [Z79.01] [Z79.01]         Anticoagulation Episode Summary     INR check location     Preferred lab     Send INR reminders to [facility]    Comments HIPPA INFO OK to speak with wife Josselyn OK to leave messages on Home.work and cell phones  use cell phone #[phone]      Anticoagulation Care Providers     Provider Role Specialty Phone number    Samm Vazquez MD Responsible Internal Medicine  [phone]            See the Encounter Report to view Anticoagulation Flowsheet and Dosing Calendar (Go to Encounters tab in chart review, and find the Anticoagulation Therapy Visit)    Spoke with patient. Gave them their lab results and new warfarin recommendation.  No changes in health, medication, or diet. No missed doses, no falls. No signs or symptoms of bleed or clotting.     Liliam Hyman RN

## 2017-08-11 ENCOUNTER — TELEPHONE (OUTPATIENT)
Dept: INTERNAL MEDICINE | Facility: CLINIC | Age: 57
End: 2017-08-11

## 2017-08-11 ENCOUNTER — OFFICE VISIT (OUTPATIENT)
Dept: FAMILY MEDICINE | Facility: CLINIC | Age: 57
End: 2017-08-11

## 2017-08-11 VITALS
HEART RATE: 71 BPM | TEMPERATURE: 98.6 F | SYSTOLIC BLOOD PRESSURE: 134 MMHG | WEIGHT: 211.6 LBS | RESPIRATION RATE: 16 BRPM | BODY MASS INDEX: 29.51 KG/M2 | OXYGEN SATURATION: 96 % | DIASTOLIC BLOOD PRESSURE: 83 MMHG

## 2017-08-11 DIAGNOSIS — R05.3 CHRONIC COUGH: Primary | ICD-10-CM

## 2017-08-11 RX ORDER — PREDNISONE 5 MG/1
TABLET ORAL
Qty: 63 TABLET | Refills: 0 | Status: SHIPPED | OUTPATIENT
Start: 2017-08-11 | End: 2017-12-14

## 2017-08-11 ASSESSMENT — PAIN SCALES - GENERAL
PAINLEVEL: NO PAIN (0)
PAINLEVEL: NO PAIN (0)

## 2017-08-11 NOTE — TELEPHONE ENCOUNTER
**Vaccinated for measles? Yes 10 yrs ago and  childhood       **Internations travel in the past 30 days:   no    Where to?  **Exposure to measles:  no    Who:    When:  **Do you work in or go to a day care center?   no  **Symptoms:     Fever  no  - How long?  - How high?  - What other symptoms along with the fever?    Rash  no  - How long?  - Unusual for you?  - Exposed to any new fabric or plants or laundry detergent etc?  - What does it look like?  - Where did it start? (concerning if started on the scalp)    Runny nose/Red eyes  no  - How long?  - Usual for you?  - Seasonal or chemical exposure?    Sore throat no  - How long?  **Other considerations?  Cough x 2 mths. Hx pneumonia    Recommendation:   Negative continue scheduling    If negative: Continue scheduling -  takes the call back    If positive: Contact I.AWA or Wendy Walker (I.P. M-F 7-4:30, Wendy after 4:30 & W/E)  - I.P. -  [phone]  - Wendy  [phone]

## 2017-08-11 NOTE — PATIENT INSTRUCTIONS
Primary Care Center Phone Number [phone]  Primary Care Center Medication Refill Request Information:  * Please contact your pharmacy regarding ANY request for medication refills.  ** [facility] Prescription Fax = [phone]  * Please allow 3 business days for routine medication refills.  * Please allow 5 business days for controlled substance medication refills.     Primary Care Center Test Result notification information:  *You will be notified with in 7-10 days of your appointment day regarding the results of your test.  If you are on MyChart you will be notified as soon as the provider has reviewed the results and signed off on them.

## 2017-08-11 NOTE — NURSING NOTE
Chief Complaint   Patient presents with     Cough     pt is here to follow up on pneumonia        Jana Patterson CMA at 1:33 PM on 8/11/2017

## 2017-08-11 NOTE — MR AVS SNAPSHOT
After Visit Summary   8/11/2017    Oswaldo Prabhakar    MRN: [id number]           Patient Information     Date Of Birth          1960        Visit Information        Provider Department      8/11/2017 1:35 PM Lavelle Solis MD [facility]        Today's Diagnoses     Chronic cough    -  1      Care Instructions    Primary Care Center Phone Number [phone]  Primary Care Center Medication Refill Request Information:  * Please contact your pharmacy regarding ANY request for medication refills.  ** [facility] Prescription Fax = [phone]  * Please allow 3 business days for routine medication refills.  * Please allow 5 business days for controlled substance medication refills.     Primary Care Center Test Result notification information:  *You will be notified with in 7-10 days of your appointment day regarding the results of your test.  If you are on MyChart you will be notified as soon as the provider has reviewed the results and signed off on them.                  Follow-ups after your visit        Additional Services     PULMONARY MEDICINE REFERRAL       Your provider has referred you to: [facility]: [place] [facility] [phone]   [email or web address]    Please be aware that coverage of these services is subject to the terms and limitations of your health insurance plan.  Call member services at your health plan with any benefit or coverage questions.      Please bring the following with you to your appointment:    (1) Any X-Rays, CTs or MRIs which have been performed.  Contact the facility where they were done to arrange for  prior to your scheduled appointment.    (2) List of current medications   (3) This referral request   (4) Any documents/labs given to you for this referral                  Your next 10 appointments already scheduled     Aug 31, 2017  9:10 AM CDT   (Arrive by 8:55 AM)   New  Patient Visit with Donny Vizcaino MD   [facility] ([facility])    [address]  [address] [facility] [address] [phone]              Future tests that were ordered for you today     Open Future Orders        Priority Expected Expires Ordered    CT Chest w/o contrast Routine  8/11/2018 8/11/2017            Who to contact     Please call your clinic at [phone] to:    Ask questions about your health    Make or cancel appointments    Discuss your medicines    Learn about your test results    Speak to your doctor   If you have compliments or concerns about an experience at your clinic, or if you wish to file a complaint, please contact [facility] Patient Relations at [phone] or email us at [email or web address]         Additional Information About Your Visit        MyChart Information     [employer] gives you secure access to your electronic health record. If you see a primary care provider, you can also send messages to your care team and make appointments. If you have questions, please call your primary care clinic.  If you do not have a primary care provider, please call [phone] and they will assist you.      [employer]z is an electronic gateway that provides easy, online access to your medical records. With [employer], you can request a clinic appointment, read your test results, renew a prescription or communicate with your care team.     To access your existing account, please contact your [facility] Clinic or call [phone] for assistance.        Care EveryWhere ID     This is your Care EveryWhere ID. This could be used by other organizations to access your [place] medical records  [id number]        Your Vitals Were     Pulse Temperature Respirations Pulse Oximetry BMI (Body Mass Index)       71 98.6  F (37  C) (Oral) 16 96% 29.51 kg/m2        Blood Pressure  from Last 3 Encounters:   08/11/17 134/83   06/21/17 115/69   05/11/17 125/87    Weight from Last 3 Encounters:   08/11/17 96 kg (211 lb 9.6 oz)   06/21/17 94.1 kg (207 lb 8 oz)   05/11/17 94.3 kg (207 lb 14.4 oz)              We Performed the Following     PULMONARY MEDICINE REFERRAL          Today's Medication Changes          These changes are accurate as of: 8/11/17  2:31 PM.  If you have any questions, ask your nurse or doctor.               Start taking these medicines.        Dose/Directions    predniSONE 5 MG tablet   Commonly known as:  DELTASONE   Used for:  Chronic cough   Started by:  Lavelle Solis MD        4 po bid x 3 d, then 3 po bid x 3 d, then 2 po bid x 3 d, then 1 po bid x 3 d, then 1 qd x 3 d, then off   Quantity:  63 tablet   Refills:  0            Where to get your medicines      These medications were sent to [place] [address] [place] [facility] [address]  [address] [facility] [address]     Phone:  [phone]     predniSONE 5 MG tablet                Primary Care Provider Office Phone # Fax #    Samm Vazquez [phone]       [address] [place] [address] [address] [place] [address]        Equal Access to Services     SURESH ZURITA AH: Hadii peggy ackerman hadasho Soomaali, waaxda luqadaha, qaybta kaalmada adeegyada, franklin nunn hayporsha flores. So [facility] [phone].    ATENCIÓN: Si habla español, tiene a krueger disposición servicios gratuitos de asistencia lingüística. Llame al [phone].    We comply with applicable federal civil rights laws and [place] laws. We do not discriminate on the basis of race, color, national origin, age, disability sex, sexual orientation or gender identity.            Thank you!     Thank you for choosing [facility]  for your care. Our goal is always to provide you with excellent care. Hearing back from our patients is one way we can continue to improve our services. Please take a few minutes to complete the  written survey that you may receive in the mail after your visit with us. Thank you!             Your Updated Medication List - Protect others around you: Learn how to safely use, store and throw away your medicines at [email or web address].          This list is accurate as of: 8/11/17  2:31 PM.  Always use your most recent med list.                   Brand Name Dispense Instructions for use Diagnosis    albuterol 108 (90 BASE) MCG/ACT Inhaler    PROAIR HFA/PROVENTIL HFA/VENTOLIN HFA    1 Inhaler    Inhale 2 puffs into the lungs every 6 hours as needed for shortness of breath / dyspnea or wheezing    Pneumonia of both lungs due to infectious organism, unspecified part of lung       aspirin EC 81 MG EC tablet     91 tablet    Take 1 tablet (81 mg) by mouth daily    S/P CABG x 4       atorvastatin 80 MG tablet    LIPITOR    91 tablet    Take 1 tablet (80 mg) by mouth daily    Pure hyperglyceridemia, Hyperlipidemia LDL goal <130       BENADRYL PO      Take 25-50 mg by mouth daily as needed        calcium carbonate 500 MG chewable tablet    TUMS     Take 1 chew tab by mouth daily as needed        ezetimibe 10 MG tablet    ZETIA    90 tablet    Take 1 tablet (10 mg) by mouth daily    Statin intolerance, Hyperlipidemia LDL goal <70, Atherosclerosis of coronary artery of native heart without angina pectoris, unspecified vessel or lesion type, S/P CABG (coronary artery bypass graft)       fenofibrate 160 MG tablet     91 tablet    Take 1 tablet (160 mg) by mouth daily    Hyperlipidemia LDL goal <130       fluticasone 50 MCG/ACT spray    FLONASE    1 Bottle    Spray 2 sprays into both nostrils daily    Dysfunction of eustachian tube, bilateral       fluticasone-salmeterol 250-50 MCG/DOSE diskus inhaler    ADVAIR    1 Inhaler    Inhale 1 puff into the lungs 2 times daily    Wheezing, Pneumonia due to infectious organism, unspecified laterality, unspecified part of lung       lisinopril 2.5 MG tablet    PRINIVIL/Zestril    91  tablet    Take 1 tablet (2.5 mg) by mouth daily , or as directed by Dr. Ocampo.    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 7.5 mg on Mon, Wed, Fri and 5 mg daily the rest of the week.    Long term (current) use of anticoagulants       * Notice:  This list has 2 medication(s) that are the same as other medications prescribed for you. Read the directions carefully, and ask your doctor or other care provider to review them with you.

## 2017-08-18 ENCOUNTER — HOSPITAL ENCOUNTER (OUTPATIENT)
Facility: CLINIC | Age: 57
Setting detail: SPECIMEN
Discharge: HOME | End: 2017-08-18
Admitting: INTERNAL MEDICINE
Payer: COMMERCIAL

## 2017-08-18 ENCOUNTER — ANTICOAGULATION THERAPY VISIT (OUTPATIENT)
Dept: ANTICOAGULATION | Facility: CLINIC | Age: 57
End: 2017-08-18

## 2017-08-18 DIAGNOSIS — Z79.01 LONG-TERM (CURRENT) USE OF ANTICOAGULANTS: ICD-10-CM

## 2017-08-18 DIAGNOSIS — I26.99 PULMONARY EMBOLISM (H): ICD-10-CM

## 2017-08-18 DIAGNOSIS — I26.99 PULMONARY EMBOLI (H): ICD-10-CM

## 2017-08-18 LAB — INR PPP: 4.26 (ref 0.86–1.14)

## 2017-08-18 PROCEDURE — 85610 PROTHROMBIN TIME: CPT | Performed by: INTERNAL MEDICINE

## 2017-08-18 PROCEDURE — 36415 COLL VENOUS BLD VENIPUNCTURE: CPT | Performed by: INTERNAL MEDICINE

## 2017-08-18 NOTE — MR AVS SNAPSHOT
Oswaldo Prabhakar   8/18/2017   Anticoagulation Therapy Visit    Description:  56 year old male   Provider:  Nano Greer RN   Department:  [facility]           INR as of 8/18/2017     Today's INR 4.26!      Anticoagulation Summary as of 8/18/2017     INR goal 2.0-3.0   Today's INR 4.26!   Full instructions 8/18: Hold; Otherwise 5 mg on Tue, Thu, Sat; 7.5 mg all other days   Next INR check 8/25/2017    Indications   Pulmonary emboli (H) [I26.99]  Long-term (current) use of anticoagulants [Z79.01] [Z79.01]         August 2017 Details    Sun Mon Tue Wed Thu Fri Sat       1               2               3               4               5                 6               7               8               9               10               11               12                 13               14               15               16               17               18      Hold   See details      19      5 mg           20      7.5 mg         21      7.5 mg         22      5 mg         23      7.5 mg         24      5 mg         25            26                 27               28               29               30               31                  Date Details   08/18 This INR check       Date of next INR:  8/25/2017         How to take your warfarin dose     To take:  5 mg Take 1 of the 5 mg tablets.    To take:  7.5 mg Take 1.5 of the 5 mg tablets.    Hold Do not take your warfarin dose. See the Details table to the right for additional instructions.

## 2017-08-18 NOTE — PROGRESS NOTES
ANTICOAGULATION FOLLOW-UP CLINIC VISIT    Patient Name:  Oswaldo Prabhakar  Date:  8/18/2017  Contact Type:  Telephone    SUBJECTIVE:        OBJECTIVE    INR   Date Value Ref Range Status   08/18/2017 4.26 (H) 0.86 - 1.14 Final     Chromogenic Factor 10   Date Value Ref Range Status   06/18/2011 81 60 - 140 % Final     Comment:     Therapeutic Range: A Chromogenic Factor 10 level of 20-40% inversely   correlates   with an INR of 2-3 for patients receiving Warfarin. Chromogenic Factor 10   levels below 20% indicate an INR greater than 3, and levels above 40%   indicate   an INR less than 2.     Factor 2 Assay   Date Value Ref Range Status   04/04/2012  60 - 140 % Final    (Note)  CANCELLED AND CREDITED PER [facility] IN MED. MONITORING,4/4/12,       ASSESSMENT / PLAN  INR assessment SUPRA    Recheck INR In: 1 WEEK    INR Location Clinic      Anticoagulation Summary as of 8/18/2017     INR goal 2.0-3.0   Today's INR 4.26!   Maintenance plan 5 mg (5 mg x 1) on Tue, Thu, Sat; 7.5 mg (5 mg x 1.5) all other days   Full instructions 8/18: Hold; Otherwise 5 mg on Tue, Thu, Sat; 7.5 mg all other days   Weekly total 45 mg   Plan last modified Nano Greer RN (6/26/2017)   Next INR check 8/25/2017   Priority INR   Target end date Indefinite    Indications   Pulmonary emboli (H) [I26.99]  Long-term (current) use of anticoagulants [Z79.01] [Z79.01]         Anticoagulation Episode Summary     INR check location     Preferred lab     Send INR reminders to [facility]    Comments HIPPA INFO OK to speak with wife Josselyn OK to leave messages on Home.work and cell phones  use cell phone #[phone]      Anticoagulation Care Providers     Provider Role Specialty Phone number    Samm Vazquez MD [facility] [phone]            See the Encounter Report to view Anticoagulation Flowsheet and Dosing Calendar (Go to Encounters tab in chart review, and find the Anticoagulation Therapy Visit)  Left message  for patient with results and dosing recommendations. Asked patient to call back to report any missed doses, falls, signs and symptoms of bleeding or clotting, any changes in health, medication, or diet. Asked patient to call back with any questions or concerns.      Nano Greer RN

## 2017-08-19 DIAGNOSIS — J18.9 PNEUMONIA DUE TO INFECTIOUS ORGANISM, UNSPECIFIED LATERALITY, UNSPECIFIED PART OF LUNG: ICD-10-CM

## 2017-08-19 DIAGNOSIS — R06.2 WHEEZING: ICD-10-CM

## 2017-08-19 NOTE — TELEPHONE ENCOUNTER
fluticasone-salmeterol (ADVAIR) 250-50 MCG/DOSE diskus inhaler  Last Written Prescription Date:  6/21/17  Last Fill Quantity: 1 inhaler,   # refills: 1  Last Office Visit with [facility], [facility] or [facility] prescribing provider: 8/11/17  Future Office visit:   none

## 2017-08-22 NOTE — TELEPHONE ENCOUNTER
Medication Appeal Initiation    We have initiated an appeal for the requested medication:  Medication: Praluent 150 mg - 2nd level appeal pending  Appeal Start Date:  5/23/2017  Insurance Company: [employer] - Phone [phone] Fax [phone]  Comments:  Initiated 2nd level appeal to [employer] [facility] Fax # [phone], Ph # [phone]    Submitted 2nd level appeal along with letter, office visit from 2/1/17 and 4/27/17, LDL labs, and FDA Approval letter for Praluent.     Appeal letter scanned into Media tab.

## 2017-08-24 ENCOUNTER — PRE VISIT (OUTPATIENT)
Dept: PULMONOLOGY | Facility: CLINIC | Age: 57
End: 2017-08-24

## 2017-08-24 NOTE — TELEPHONE ENCOUNTER
1.  Date/reason for appt:8/31/17, chronic cough  2.  Referring provider: YOLANDA MONTALVO  3.  Call to patient (Yes / No - short description): No, referred   4.  Previous care at / records requested from:   [facility]   [facility]

## 2017-08-28 DIAGNOSIS — I26.99 PULMONARY EMBOLI (H): ICD-10-CM

## 2017-08-28 DIAGNOSIS — Z79.01 LONG-TERM (CURRENT) USE OF ANTICOAGULANTS: ICD-10-CM

## 2017-08-28 NOTE — PROGRESS NOTES
SUBJECTIVE:    Pt is a 56 year old male with pmh of     Patient Active Problem List   Diagnosis     Retinal detachment     HYPERLIPIDEMIA LDL GOAL <130     Pulmonary emboli (H)     Pure hyperglyceridemia     Calculus of kidney     Warfarin anticoagulation     Atypical chest pain     S/P CABG x 4     CAD (coronary artery disease)     Finger stiffness, left     Fracture of phalanx of finger     Long-term (current) use of anticoagulants [Z79.01]     Statin intolerance       who is here for evaluation of had concerns including Cough.    Here new to me, not to clinic.  Cough.    Past Medical History:   Diagnosis Date     Antiplatelet or antithrombotic long-term use      Diaphragmatic hernia without mention of obstruction or gangrene      Heart disease      Hypertension      Nephrolithiasis 4/2010     Other and unspecified hyperlipidemia      Pulmonary embolus and infarction (H)     s/p hemothorax and filter s/p filter removal (2011), now on long term anti-coagulation     Statin intolerance 2/1/2017     Stented coronary artery      Unspecified retinal detachment     Childhood trauma, unrepaired     Past Surgical History:   Procedure Laterality Date     BYPASS GRAFT ARTERY CORONARY  4/4/2014    Procedure: BYPASS GRAFT ARTERY CORONARY;  Median Sternotomy, Coronary Artery Bypass Bypass x 4 using Left Internal Mammary, Left Saphenous Vein, on pump oxygenation;  Surgeon: Sherry Armando MD;  Location:  OR     C NONSPECIFIC PROCEDURE      Spermatocele resection     CLOSED REDUCTION, PERCUTANEOUS PINNING  HAND, COMBINED Left 11/5/2015    Procedure: COMBINED CLOSED REDUCTION, PERCUTANEOUS PINNING HAND;  Surgeon: Carmella Love MD;  Location: [facility]     COLONOSCOPY  3/15/2013    Procedure: COLONOSCOPY;;  Surgeon: Racheal Shipman MD;  Location: [facility]     LITHOTRIPSY  4/2010     Allergies   Allergen Reactions     Cats      Hay Fever & [A.R.M.]      Heparin Other (See Comments)     Heparin resistance per  cardio-thoracic surgeon Dr. Armando     Hydrocodone-Acetaminophen Nausea and Vomiting     H/o CABG, PE, partial left lung removal    Pneumonia several mo ago, since gets chest congestion, can cough up nonbloody phlegm, tan to dark, worse if supine    No smoke, no dx COPD or asthma          Current Outpatient Prescriptions   Medication Sig Dispense Refill     predniSONE (DELTASONE) 5 MG tablet 4 po bid x 3 d, then 3 po bid x 3 d, then 2 po bid x 3 d, then 1 po bid x 3 d, then 1 qd x 3 d, then off 63 tablet 0     metoprolol (LOPRESSOR) 25 MG tablet Take 0.5 tablets (12.5 mg) by mouth 2 times daily 90 tablet 3     fluticasone (FLONASE) 50 MCG/ACT spray Spray 2 sprays into both nostrils daily 1 Bottle 1     albuterol (PROAIR HFA/PROVENTIL HFA/VENTOLIN HFA) 108 (90 BASE) MCG/ACT Inhaler Inhale 2 puffs into the lungs every 6 hours as needed for shortness of breath / dyspnea or wheezing 1 Inhaler 1     warfarin (COUMADIN) 5 MG tablet As directed. 7.5 mg on Mon, Wed, Fri and 5 mg daily the rest of the week. 145 tablet 6     vitamin B complex with vitamin C (VITAMIN  B COMPLEX) TABS tablet Take 1 tablet by mouth daily       WARFARIN SODIUM PO Take 7.5 mg by mouth       lisinopril (PRINIVIL/ZESTRIL) 2.5 MG tablet Take 1 tablet (2.5 mg) by mouth daily , or as directed by Dr. Ocampo. 91 tablet 1     ezetimibe (ZETIA) 10 MG tablet Take 1 tablet (10 mg) by mouth daily 90 tablet 3     fenofibrate 160 MG tablet Take 1 tablet (160 mg) by mouth daily 91 tablet 3     atorvastatin (LIPITOR) 80 MG tablet Take 1 tablet (80 mg) by mouth daily 91 tablet 3     Cholecalciferol (VITAMIN D) 2000 UNITS CAPS Take 2,000 Units by mouth daily       aspirin EC 81 MG tablet Take 1 tablet (81 mg) by mouth daily 91 tablet 3     calcium carbonate (TUMS) 500 MG chewable tablet Take 1 chew tab by mouth daily as needed       DiphenhydrAMINE HCl (BENADRYL PO) Take 25-50 mg by mouth daily as needed       fluticasone-salmeterol (ADVAIR) 250-50 MCG/DOSE diskus  inhaler Inhale 1 puff into the lungs 2 times daily 60 Inhaler 11       Social History   Substance Use Topics     Smoking status: Never Smoker     Smokeless tobacco: Never Used     Alcohol use No      Comment: very rare     Slight runny nose, no ear ache, sore throat, fever, minimal GERD sx, not short of breath      OBJECTIVE:  /83  Pulse 71  Temp 98.6  F (37  C) (Oral)  Resp 16  Wt 96 kg (211 lb 9.6 oz)  SpO2 96%  BMI 29.51 kg/m2  GENERAL APPEARANCE: Alert, no acute distress  EYES: PERRL, EOM normal, conjunctiva and lids normal  HENT: Ears and TMs normal, oral mucosa and posterior oropharynx normal  NECK: No adenopathy,masses or thyromegaly  RESP: lungs clear to auscultation   CV: normal rate, regular rhythm, no murmur or gallop  ABDOMEN: soft, no organomegaly, masses or tenderness  NEURO: Alert, oriented, speech and mentation normal  PSYCHE: mentation appears normal, affect and mood normal    ASSESSMENT/PLAN:    Cough several months w/ complex history  CT chest  See pulm  In meantime slow pred burst/taper        YOLANDA MONTALVO MD

## 2017-08-29 NOTE — TELEPHONE ENCOUNTER
Spoke with rep at [facility], the fax was received, but was not forwarded to department. Resent fax as requested. Submitted 2nd level appeal along with letter, office visit from 2/1/17 and 4/27/17, LDL labs, and FDA Approval letter for PraluentSubmitted 2nd level appeal along with letter, office visit from 2/1/17 and 4/27/17, LDL labs, and FDA Approval letter for Praluent

## 2017-09-01 ENCOUNTER — ANTICOAGULATION THERAPY VISIT (OUTPATIENT)
Dept: ANTICOAGULATION | Facility: CLINIC | Age: 57
End: 2017-09-01

## 2017-09-01 DIAGNOSIS — I26.99 PULMONARY EMBOLI (H): ICD-10-CM

## 2017-09-01 DIAGNOSIS — Z79.01 LONG-TERM (CURRENT) USE OF ANTICOAGULANTS: ICD-10-CM

## 2017-09-01 LAB — INR PPP: 1.66 (ref 0.86–1.14)

## 2017-09-01 NOTE — MR AVS SNAPSHOT
Oswaldo Prabhakar   9/1/2017   Anticoagulation Therapy Visit    Description:  56 year old male   Provider:  Rigoberto Harmon [facility]   Department:  [facility]           INR as of 9/1/2017     Today's INR 1.66!      Anticoagulation Summary as of 9/1/2017     INR goal 2.0-3.0   Today's INR 1.66!   Full instructions 9/1: 10 mg; 9/2: 7.5 mg; 9/3: 7.5 mg; 9/4: 5 mg   Next INR check 9/5/2017    Indications   Pulmonary emboli (H) [I26.99]  Long-term (current) use of anticoagulants [Z79.01] [Z79.01]         September 2017 Details    Sun Mon Tue Wed Thu Fri Sat          1      10 mg   See details      2      7.5 mg           3      7.5 mg         4      5 mg         5            6               7               8               9                 10               11               12               13               14               15               16                 17               18               19               20               21               22               23                 24               25               26               27               28               29               30                Date Details   09/01 This INR check       Date of next INR:  9/5/2017         How to take your warfarin dose     To take:  5 mg Take 1 of the 5 mg tablets.    To take:  7.5 mg Take 1.5 of the 5 mg tablets.    To take:  10 mg Take 2 of the 5 mg tablets.

## 2017-09-02 NOTE — PROGRESS NOTES
ANTICOAGULATION FOLLOW-UP CLINIC VISIT    Patient Name:  Oswaldo Prabhakar  Date:  9/1/2017  Contact Type:  Telephone    SUBJECTIVE:        OBJECTIVE    INR   Date Value Ref Range Status   09/01/2017 1.66 (H) 0.86 - 1.14 Final     Chromogenic Factor 10   Date Value Ref Range Status   06/18/2011 81 60 - 140 % Final     Comment:     Therapeutic Range: A Chromogenic Factor 10 level of 20-40% inversely   correlates   with an INR of 2-3 for patients receiving Warfarin. Chromogenic Factor 10   levels below 20% indicate an INR greater than 3, and levels above 40%   indicate   an INR less than 2.     Factor 2 Assay   Date Value Ref Range Status   04/04/2012  60 - 140 % Final    (Note)  CANCELLED AND CREDITED PER [employer] IN [employer]. MONITORING,4/4/12,       ASSESSMENT / PLAN  INR assessment SUB    Recheck INR In: 4 DAYS    INR Location Clinic      Anticoagulation Summary as of 9/1/2017     INR goal 2.0-3.0   Today's INR 1.66!   Maintenance plan No maintenance plan   Full instructions 9/1: 10 mg; 9/2: 7.5 mg; 9/3: 7.5 mg; 9/4: 5 mg   Plan last modified Rigoberto Harmon, [facility] (9/1/2017)   Next INR check 9/5/2017   Priority INR   Target end date Indefinite    Indications   Pulmonary emboli (H) [I26.99]  Long-term (current) use of anticoagulants [Z79.01] [Z79.01]         Anticoagulation Episode Summary     INR check location     Preferred lab     Send INR reminders to [facility]    Comments HIPPA INFO OK to speak with wife Josselyn OK to leave messages on Home.work and cell phones  use cell phone #[phone]      Anticoagulation Care Providers     Provider Role Specialty Phone number    Samm Vazquez MD [facility] Internal Medicine [phone]            See the Encounter Report to view Anticoagulation Flowsheet and Dosing Calendar (Go to Encounters tab in chart review, and find the Anticoagulation Therapy Visit)    Left message for patient with results and dosing recommendations. Asked patient to call back to report  any missed doses, falls, signs and symptoms of bleeding or clotting, any changes in health, medication, or diet. Asked patient to call back with any questions or concerns.      Rigoberto Harmon [facility]

## 2017-09-12 NOTE — TELEPHONE ENCOUNTER
MEDICATION APPEAL DENIED    Medication: Praluent 150 mg - 2nd level Appeal Denied    Denial Date: 9/12/2017    Denial Rational: Requires confirmed genetic testing for HeFH and a diagnosis of ASCVD. Please, see letter below regarding determination including further details    Second Level Appeal Information: Denied

## 2017-09-13 NOTE — TELEPHONE ENCOUNTER
Medication Appeal Initiation    We have initiated an appeal for the requested medication:  Medication: Praluent 150 mg - External Appeal Initiated  Appeal Start Date:  5/23/2017  Insurance Company: [employer] - Phone [phone] Fax [phone]  Comments:  External appeal submitted to [employer] [place] [employer] Fax: [phone] Ph.:[phone]    Appeal letters submitted:

## 2017-09-18 NOTE — TELEPHONE ENCOUNTER
Per Rep at  [employer] [place] [employer] (Ph, [phone]. [phone]). The external appeal review was received and is under review.

## 2017-09-21 DIAGNOSIS — I26.99 PULMONARY EMBOLISM (H): ICD-10-CM

## 2017-09-21 DIAGNOSIS — E78.5 HYPERLIPIDEMIA: ICD-10-CM

## 2017-09-21 LAB — INR PPP: 4.16 (ref 0.86–1.14)

## 2017-09-22 ENCOUNTER — ANTICOAGULATION THERAPY VISIT (OUTPATIENT)
Dept: ANTICOAGULATION | Facility: CLINIC | Age: 57
End: 2017-09-22

## 2017-09-22 DIAGNOSIS — I26.99 PULMONARY EMBOLI (H): ICD-10-CM

## 2017-09-22 DIAGNOSIS — Z79.01 LONG-TERM (CURRENT) USE OF ANTICOAGULANTS: ICD-10-CM

## 2017-09-22 NOTE — MR AVS SNAPSHOT
Oswaldo Prabhakar   9/22/2017   Anticoagulation Therapy Visit    Description:  57 year old male   Provider:  Kristina Wright, RN   Department:  [facility]           INR as of 9/22/2017     Today's INR 4.16! (9/21/2017)      Anticoagulation Summary as of 9/22/2017     INR goal 2.0-3.0   Today's INR 4.16! (9/21/2017)   Full instructions 9/22: Hold; Otherwise 7.5 mg on Sun, Tue, Thu; 5 mg all other days   Next INR check 9/29/2017    Indications   Pulmonary emboli (H) [I26.99]  Long-term (current) use of anticoagulants [Z79.01] [Z79.01]         September 2017 Details    Sun Mon Tue Wed Thu Fri Sat          1               2                 3               4               5               6               7               8               9                 10               11               12               13               14               15               16                 17               18               19               20               21               22      Hold   See details      23      5 mg           24      7.5 mg         25      5 mg         26      7.5 mg         27      5 mg         28      7.5 mg         29            30                Date Details   09/22 This INR check       Date of next INR:  9/29/2017         How to take your warfarin dose     To take:  5 mg Take 1 of the 5 mg tablets.    To take:  7.5 mg Take 1.5 of the 5 mg tablets.    Hold Do not take your warfarin dose. See the Details table to the right for additional instructions.

## 2017-09-22 NOTE — PROGRESS NOTES
ANTICOAGULATION FOLLOW-UP CLINIC VISIT    Patient Name:  Oswaldo Prabhakar  Date:  9/22/2017  Contact Type:  Telephone    SUBJECTIVE:        OBJECTIVE    INR   Date Value Ref Range Status   09/21/2017 4.16 (H) 0.86 - 1.14 Final     Chromogenic Factor 10   Date Value Ref Range Status   06/18/2011 81 60 - 140 % Final     Comment:     Therapeutic Range: A Chromogenic Factor 10 level of 20-40% inversely   correlates   with an INR of 2-3 for patients receiving Warfarin. Chromogenic Factor 10   levels below 20% indicate an INR greater than 3, and levels above 40%   indicate   an INR less than 2.     Factor 2 Assay   Date Value Ref Range Status   04/04/2012  60 - 140 % Final    (Note)  CANCELLED AND CREDITED PER [employer]. MONITORING,4/4/12,       ASSESSMENT / PLAN  INR assessment SUPRA    Recheck INR In: 1 WEEK    INR Location Clinic      Anticoagulation Summary as of 9/22/2017     INR goal 2.0-3.0   Today's INR 4.16! (9/21/2017)   Maintenance plan 7.5 mg (5 mg x 1.5) on Sun, Tue, Thu; 5 mg (5 mg x 1) all other days   Full instructions 9/22: Hold; Otherwise 7.5 mg on Sun, Tue, Thu; 5 mg all other days   Weekly total 42.5 mg   Plan last modified Kristina Wright RN (9/22/2017)   Next INR check 9/29/2017   Priority INR   Target end date Indefinite    Indications   Pulmonary emboli (H) [I26.99]  Long-term (current) use of anticoagulants [Z79.01] [Z79.01]         Anticoagulation Episode Summary     INR check location     Preferred lab     Send INR reminders to [facility]    Comments HIPPA INFO OK to speak with wife Josselyn OK to leave messages on Home.work and cell phones  use cell phone #[phone]      Anticoagulation Care Providers     Provider Role Specialty Phone number    Samm Vazquez MD [facility] Internal Medicine [phone]            See the Encounter Report to view Anticoagulation Flowsheet and Dosing Calendar (Go to Encounters tab in chart review, and find the Anticoagulation Therapy  Visit)    Left message for patient with results and dosing recommendations. Asked patient to call back to report any missed doses, falls, signs and symptoms of bleeding or clotting, any changes in health, medication, or diet. Asked patient to call back with any questions or concerns.     Kristina Wright RN

## 2017-09-26 NOTE — TELEPHONE ENCOUNTER
Per Rep at [employer] [place] [employer] (Ph. [phone]. [phone]). The external appeal review is still being reviewed and stated they will have a determination by 10/29.

## 2017-09-28 ENCOUNTER — ANTICOAGULATION THERAPY VISIT (OUTPATIENT)
Dept: ANTICOAGULATION | Facility: CLINIC | Age: 57
End: 2017-09-28

## 2017-09-28 ENCOUNTER — OFFICE VISIT (OUTPATIENT)
Dept: CARDIOLOGY | Facility: CLINIC | Age: 57
End: 2017-09-28
Attending: INTERNAL MEDICINE
Payer: COMMERCIAL

## 2017-09-28 VITALS
HEIGHT: 71 IN | HEART RATE: 64 BPM | BODY MASS INDEX: 29.82 KG/M2 | SYSTOLIC BLOOD PRESSURE: 135 MMHG | OXYGEN SATURATION: 97 % | WEIGHT: 213 LBS | DIASTOLIC BLOOD PRESSURE: 85 MMHG

## 2017-09-28 DIAGNOSIS — I26.99 PULMONARY EMBOLI (H): ICD-10-CM

## 2017-09-28 DIAGNOSIS — E78.5 HYPERLIPIDEMIA LDL GOAL <70: ICD-10-CM

## 2017-09-28 DIAGNOSIS — Z78.9 STATIN INTOLERANCE: ICD-10-CM

## 2017-09-28 DIAGNOSIS — I26.99 PULMONARY EMBOLISM (H): ICD-10-CM

## 2017-09-28 DIAGNOSIS — E78.5 HYPERLIPIDEMIA: ICD-10-CM

## 2017-09-28 DIAGNOSIS — I26.99 BILATERAL PULMONARY EMBOLISM (H): Primary | ICD-10-CM

## 2017-09-28 DIAGNOSIS — Z95.1 S/P CABG (CORONARY ARTERY BYPASS GRAFT): ICD-10-CM

## 2017-09-28 DIAGNOSIS — R79.1 ABNORMAL INR: ICD-10-CM

## 2017-09-28 DIAGNOSIS — I25.10 ATHEROSCLEROSIS OF CORONARY ARTERY OF NATIVE HEART WITHOUT ANGINA PECTORIS, UNSPECIFIED VESSEL OR LESION TYPE: ICD-10-CM

## 2017-09-28 DIAGNOSIS — Z79.01 LONG-TERM (CURRENT) USE OF ANTICOAGULANTS: ICD-10-CM

## 2017-09-28 DIAGNOSIS — Z79.01 CHRONIC ANTICOAGULATION: ICD-10-CM

## 2017-09-28 LAB
CHOLEST SERPL-MCNC: 172 MG/DL
HDLC SERPL-MCNC: 38 MG/DL
INR PPP: 1.75 (ref 0.86–1.14)
LDLC SERPL CALC-MCNC: 98 MG/DL
NONHDLC SERPL-MCNC: 134 MG/DL
TRIGL SERPL-MCNC: 180 MG/DL

## 2017-09-28 PROCEDURE — 85610 PROTHROMBIN TIME: CPT | Performed by: INTERNAL MEDICINE

## 2017-09-28 PROCEDURE — 80061 LIPID PANEL: CPT | Performed by: INTERNAL MEDICINE

## 2017-09-28 PROCEDURE — 99213 OFFICE O/P EST LOW 20 MIN: CPT | Mod: ZF

## 2017-09-28 PROCEDURE — 36415 COLL VENOUS BLD VENIPUNCTURE: CPT | Performed by: INTERNAL MEDICINE

## 2017-09-28 PROCEDURE — 99214 OFFICE O/P EST MOD 30 MIN: CPT | Mod: GC | Performed by: INTERNAL MEDICINE

## 2017-09-28 RX ORDER — EZETIMIBE 10 MG/1
10 TABLET ORAL DAILY
Qty: 90 TABLET | Refills: 3 | Status: SHIPPED | OUTPATIENT
Start: 2017-09-28 | End: 2019-02-15

## 2017-09-28 ASSESSMENT — PAIN SCALES - GENERAL: PAINLEVEL: NO PAIN (0)

## 2017-09-28 NOTE — PROGRESS NOTES
HPI:  Mr. 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 He also has history of statin intolerance. He returns to clinic for routine follow up.     From a clinical standpoint, he is feeling well. 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 No weight change, orthopnea, PND, palpitations, claudication.     Although he has not tolerated many statins, he is currently taking 80mg of atorvastatin which he has tolerated well. In addition, he has been taking ezetimibe for more than 5 months. His cholesterol was rechecked today on this combination and his LDL was 98, well above the target goal of 70mg/dL. We have been trying to get approval for a PCSK9i with the indication of secondary prevention in the setting of LDL >70 despite treatment with high intensity statin and ezetimibe. Mr. Prabhakar received a rejection letter 2 weeks ago and an appeal letter was already sent. There should be a reply by the end of October.       PAST MEDICAL HISTORY:  Past Medical History:   Diagnosis Date     Antiplatelet or antithrombotic long-term use      Diaphragmatic hernia without mention of obstruction or gangrene      Heart disease      Hypertension      Nephrolithiasis 4/2010     Other and unspecified hyperlipidemia      Pulmonary embolus and infarction (H)     s/p hemothorax and filter s/p filter removal (2011), now on long term anti-coagulation     Statin intolerance 2/1/2017     Stented coronary artery      Unspecified retinal detachment     Childhood trauma, unrepaired       CURRENT MEDICATIONS:  Current Outpatient Prescriptions   Medication Sig Dispense Refill     fluticasone-salmeterol (ADVAIR) 250-50 MCG/DOSE diskus inhaler Inhale 1 puff into the lungs 2 times daily 60 Inhaler 11     predniSONE (DELTASONE) 5 MG tablet 4 po bid x 3 d, then 3 po bid x 3 d, then 2 po bid x 3 d, then 1 po bid x 3 d, then 1 qd x 3 d, then off 63 tablet 0      metoprolol (LOPRESSOR) 25 MG tablet Take 0.5 tablets (12.5 mg) by mouth 2 times daily 90 tablet 3     fluticasone (FLONASE) 50 MCG/ACT spray Spray 2 sprays into both nostrils daily 1 Bottle 1     albuterol (PROAIR HFA/PROVENTIL HFA/VENTOLIN HFA) 108 (90 BASE) MCG/ACT Inhaler Inhale 2 puffs into the lungs every 6 hours as needed for shortness of breath / dyspnea or wheezing 1 Inhaler 1     warfarin (COUMADIN) 5 MG tablet As directed. 7.5 mg on Mon, Wed, Fri and 5 mg daily the rest of the week. 145 tablet 6     vitamin B complex with vitamin C (VITAMIN  B COMPLEX) TABS tablet Take 1 tablet by mouth daily       WARFARIN SODIUM PO Take 7.5 mg by mouth       lisinopril (PRINIVIL/ZESTRIL) 2.5 MG tablet Take 1 tablet (2.5 mg) by mouth daily , or as directed by Dr. Ocampo. 91 tablet 1     ezetimibe (ZETIA) 10 MG tablet Take 1 tablet (10 mg) by mouth daily 90 tablet 3     fenofibrate 160 MG tablet Take 1 tablet (160 mg) by mouth daily 91 tablet 3     atorvastatin (LIPITOR) 80 MG tablet Take 1 tablet (80 mg) by mouth daily 91 tablet 3     Cholecalciferol (VITAMIN D) 2000 UNITS CAPS Take 2,000 Units by mouth daily       aspirin EC 81 MG tablet Take 1 tablet (81 mg) by mouth daily 91 tablet 3     calcium carbonate (TUMS) 500 MG chewable tablet Take 1 chew tab by mouth daily as needed       DiphenhydrAMINE HCl (BENADRYL PO) Take 25-50 mg by mouth daily as needed         PAST SURGICAL HISTORY:  Past Surgical History:   Procedure Laterality Date     BYPASS GRAFT ARTERY CORONARY  4/4/2014    Procedure: BYPASS GRAFT ARTERY CORONARY;  Median Sternotomy, Coronary Artery Bypass Bypass x 4 using Left Internal Mammary, Left Saphenous Vein, on pump oxygenation;  Surgeon: Sherry Armando MD;  Location: [facility] OR     C NONSPECIFIC PROCEDURE      Spermatocele resection     CLOSED REDUCTION, PERCUTANEOUS PINNING  HAND, COMBINED Left 11/5/2015    Procedure: COMBINED CLOSED REDUCTION, PERCUTANEOUS PINNING HAND;  Surgeon: Carmella Love,  "MD;  Location: [facility]     COLONOSCOPY  3/15/2013    Procedure: COLONOSCOPY;;  Surgeon: Racheal Shipman MD;  Location:  GI     LITHOTRIPSY  4/2010       ALLERGIES     Allergies   Allergen Reactions     Cats      Hay Fever & [A.R.M.]      Heparin Other (See Comments)     Heparin resistance per cardio-thoracic surgeon Dr. Armando     Hydrocodone-Acetaminophen Nausea and Vomiting       FAMILY HISTORY:  Family History   Problem Relation Age of Onset     HEART DISEASE Mother      C.A.D. Father      3 vessel CABG, age 70     CANCER Father      bladder cancer     C.A.D. Paternal Grandmother      Hypertension Paternal Grandmother      Alzheimer Disease Paternal Grandmother      DIABETES No family hx of      Thyroid Disease No family hx of      Cancer - colorectal No family hx of        SOCIAL HISTORY:  Social History     Social History     Marital status:      Spouse name: N/A     Number of children: N/A     Years of education: N/A     Social History Main Topics     Smoking status: Never Smoker     Smokeless tobacco: Never Used     Alcohol use No      Comment: very rare     Drug use: No     Sexual activity: Not on file     Other Topics Concern     Not on file     Social History Narrative       ROS:   A complete ROS is negative except as noted above.     EXAM:  /85 (BP Location: Right arm, Patient Position: Chair, Cuff Size: Adult Regular)  Pulse 64  Ht 1.803 m (5' 11\")  Wt 96.6 kg (213 lb)  SpO2 97%  BMI 29.71 kg/m2  In general, the patient is a pleasant male in no apparent distress.    Head: nc/at  Eyes: EOMI, Sclerae white, not injected.   ENT: no OP lesions or erythema  Neck: supple, no masses or LAD, carotids are +2/2 without bruit  CV: nml s1, s2; no murmur, rub, gallop; no JVD  Chest: lungs are clear without wheezing or rhonchi  Abd: Soft, nontender, nondistended. No organomegaly.  No bruits.   Ext: No clubbing, cyanosis, or edema.  The pulses are +2/2 at the radial, brachial, DP, and " PT sites bilaterally.  No bruits are noted.  Skin: no erythema or rash on limited exam  Neuro: alert, oriented; normal speech, gait and affect    Labs:  LIPID RESULTS:  Lab Results   Component Value Date    CHOL 172 09/28/2017    HDL 38 (L) 09/28/2017    LDL 98 09/28/2017    TRIG 180 (H) 09/28/2017    CHOLHDLRATIO 7.0 (H) 09/16/2015    NHDL 134 (H) 09/28/2017       LIVER ENZYME RESULTS:  Lab Results   Component Value Date    AST 37 05/03/2017    ALT 36 05/03/2017       CBC RESULTS:  Lab Results   Component Value Date    WBC 8.3 05/03/2017    RBC 5.29 05/03/2017    HGB 15.5 05/03/2017    HCT 46.9 05/03/2017    MCV 89 05/03/2017    MCH 29.3 05/03/2017    MCHC 33.0 05/03/2017    RDW 13.5 05/03/2017     05/03/2017       BMP RESULTS:  Lab Results   Component Value Date     05/03/2017    POTASSIUM 4.0 05/03/2017    CHLORIDE 104 05/03/2017    CO2 29 05/03/2017    ANIONGAP 4 05/03/2017     (H) 05/03/2017    BUN 19 05/03/2017    CR 1.10 05/03/2017    GFRESTIMATED 70 05/03/2017    GFRESTBLACK 84 05/03/2017    GONZALES 10.2 (H) 05/03/2017        A1C RESULTS:  Lab Results   Component Value Date    A1C 5.9 03/01/2016       INR RESULTS:  Lab Results   Component Value Date    INR 1.75 (H) 09/28/2017    INR 4.16 (H) 09/21/2017           Assessment and Plan:   Mr. Prabhakar is a 56 yo male with history of premature atherosclerosis and CABG in April 2014, HTN and HLD. He has a history of statin intolerance but is currently tolerating 80mg of atorvastatin and 10mg of ezetimibe daily.    We discussed the results with patient:  We re-emphasized the importance of a heart healthy diet.      LDL is still not at target and an appeal for PCSK9i has been sent to his insurance.   We encouraged him to continue his healthy lifestyle.    It was a pleasure to see Mr. Prabhakar in clinic today with Dr. Ocampo.    Tacos Hernadez MD  Cardiovascular Medicine Fellow, PGY-6  [phone]    I interviewed and examined the patient with the  house staff.  I agree with the assessment and plan as documented.    Reji Ocampo MD, PhD  Professor of Medicine  Division of Cardiology  CC  Patient Care Team:  Simi Guevara MD as PCP - General (Internal Medicine)  Inud Osorio, RN as Nurse Coordinator (Cardiology)  Reji Ocampo MD as MD (Cardiology)  Shiv Vizcarra MD as MD (Family Practice)  Carmella Love MD as MD (Orthopedics)  Donya Boland AuD as Audiologist (Audiology)  [facility] CenterLavelle sommer MD as MD (Family Practice)  SIMI GUEVARA

## 2017-09-28 NOTE — LETTER
9/28/2017      RE: Oswaldo Prabhakar  [address]  [place] [address]       Dear Colleague,    Thank you for the opportunity to participate in the care of your patient, Oswaldo Prabhakar, at the [facility]. Please see a copy of my visit note below.    HPI:  Mr. Prabhakar is a 56 yo male with history of premature atherosclerosis and CABG in April 2014, HTN and HLD. He also has history of statin intolerance. He returns to clinic for routine follow up.     From a clinical standpoint, he is feeling well. 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 No weight change, orthopnea, PND, palpitations, claudication.     Although he has not tolerated many statins, he is currently taking 80mg of atorvastatin which he has tolerated well. In addition, he has been taking ezetimibe for more than 5 months. His cholesterol was rechecked today on this combination and his LDL was 98, well above the target goal of 70mg/dL. We have been trying to get approval for a PCSK9i with the indication of secondary prevention in the setting of LDL >70 despite treatment with high intensity statin and ezetimibe. Mr. Prabhakar received a rejection letter 2 weeks ago and an appeal letter was already sent. There should be a reply by the end of October.       PAST MEDICAL HISTORY:  Past Medical History:   Diagnosis Date     Antiplatelet or antithrombotic long-term use      Diaphragmatic hernia without mention of obstruction or gangrene      Heart disease      Hypertension      Nephrolithiasis 4/2010     Other and unspecified hyperlipidemia      Pulmonary embolus and infarction (H)     s/p hemothorax and filter s/p filter removal (2011), now on long term anti-coagulation     Statin intolerance 2/1/2017     Stented coronary artery      Unspecified retinal detachment     Childhood trauma, unrepaired       CURRENT MEDICATIONS:  Current Outpatient Prescriptions   Medication  Sig Dispense Refill     fluticasone-salmeterol (ADVAIR) 250-50 MCG/DOSE diskus inhaler Inhale 1 puff into the lungs 2 times daily 60 Inhaler 11     predniSONE (DELTASONE) 5 MG tablet 4 po bid x 3 d, then 3 po bid x 3 d, then 2 po bid x 3 d, then 1 po bid x 3 d, then 1 qd x 3 d, then off 63 tablet 0     metoprolol (LOPRESSOR) 25 MG tablet Take 0.5 tablets (12.5 mg) by mouth 2 times daily 90 tablet 3     fluticasone (FLONASE) 50 MCG/ACT spray Spray 2 sprays into both nostrils daily 1 Bottle 1     albuterol (PROAIR HFA/PROVENTIL HFA/VENTOLIN HFA) 108 (90 BASE) MCG/ACT Inhaler Inhale 2 puffs into the lungs every 6 hours as needed for shortness of breath / dyspnea or wheezing 1 Inhaler 1     warfarin (COUMADIN) 5 MG tablet As directed. 7.5 mg on Mon, Wed, Fri and 5 mg daily the rest of the week. 145 tablet 6     vitamin B complex with vitamin C (VITAMIN  B COMPLEX) TABS tablet Take 1 tablet by mouth daily       WARFARIN SODIUM PO Take 7.5 mg by mouth       lisinopril (PRINIVIL/ZESTRIL) 2.5 MG tablet Take 1 tablet (2.5 mg) by mouth daily , or as directed by Dr. Ocampo. 91 tablet 1     ezetimibe (ZETIA) 10 MG tablet Take 1 tablet (10 mg) by mouth daily 90 tablet 3     fenofibrate 160 MG tablet Take 1 tablet (160 mg) by mouth daily 91 tablet 3     atorvastatin (LIPITOR) 80 MG tablet Take 1 tablet (80 mg) by mouth daily 91 tablet 3     Cholecalciferol (VITAMIN D) 2000 UNITS CAPS Take 2,000 Units by mouth daily       aspirin EC 81 MG tablet Take 1 tablet (81 mg) by mouth daily 91 tablet 3     calcium carbonate (TUMS) 500 MG chewable tablet Take 1 chew tab by mouth daily as needed       DiphenhydrAMINE HCl (BENADRYL PO) Take 25-50 mg by mouth daily as needed         PAST SURGICAL HISTORY:  Past Surgical History:   Procedure Laterality Date     BYPASS GRAFT ARTERY CORONARY  4/4/2014    Procedure: BYPASS GRAFT ARTERY CORONARY;  Median Sternotomy, Coronary Artery Bypass Bypass x 4 using Left Internal Mammary, Left Saphenous Vein,  "on pump oxygenation;  Surgeon: Sherry Armando MD;  Location: [facility] OR     C NONSPECIFIC PROCEDURE      Spermatocele resection     CLOSED REDUCTION, PERCUTANEOUS PINNING  HAND, COMBINED Left 11/5/2015    Procedure: COMBINED CLOSED REDUCTION, PERCUTANEOUS PINNING HAND;  Surgeon: Carmella Love MD;  Location[facility]     COLONOSCOPY  3/15/2013    Procedure: COLONOSCOPY;;  Surgeon: Racheal Shipman MD;  Location: [facility] GI     LITHOTRIPSY  4/2010       ALLERGIES     Allergies   Allergen Reactions     Cats      Hay Fever & [A.R.M.]      Heparin Other (See Comments)     Heparin resistance per cardio-thoracic surgeon Dr. Armando     Hydrocodone-Acetaminophen Nausea and Vomiting       FAMILY HISTORY:  Family History   Problem Relation Age of Onset     HEART DISEASE Mother      C.A.D. Father      3 vessel CABG, age 70     CANCER Father      bladder cancer     C.A.D. Paternal Grandmother      Hypertension Paternal Grandmother      Alzheimer Disease Paternal Grandmother      DIABETES No family hx of      Thyroid Disease No family hx of      Cancer - colorectal No family hx of        SOCIAL HISTORY:  Social History     Social History     Marital status:      Spouse name: N/A     Number of children: N/A     Years of education: N/A     Social History Main Topics     Smoking status: Never Smoker     Smokeless tobacco: Never Used     Alcohol use No      Comment: very rare     Drug use: No     Sexual activity: Not on file     Other Topics Concern     Not on file     Social History Narrative       ROS:   A complete ROS is negative except as noted above.     EXAM:  /85 (BP Location: Right arm, Patient Position: Chair, Cuff Size: Adult Regular)  Pulse 64  Ht 1.803 m (5' 11\")  Wt 96.6 kg (213 lb)  SpO2 97%  BMI 29.71 kg/m2  In general, the patient is a pleasant male in no apparent distress.    Head: nc/at  Eyes: EOMI, Sclerae white, not injected.   ENT: no OP lesions or erythema  Neck: supple, no masses " or LAD, carotids are +2/2 without bruit  CV: nml s1, s2; no murmur, rub, gallop; no JVD  Chest: lungs are clear without wheezing or rhonchi  Abd: Soft, nontender, nondistended. No organomegaly.  No bruits.   Ext: No clubbing, cyanosis, or edema.  The pulses are +2/2 at the radial, brachial, DP, and PT sites bilaterally.  No bruits are noted.  Skin: no erythema or rash on limited exam  Neuro: alert, oriented; normal speech, gait and affect    Labs:  LIPID RESULTS:  Lab Results   Component Value Date    CHOL 172 09/28/2017    HDL 38 (L) 09/28/2017    LDL 98 09/28/2017    TRIG 180 (H) 09/28/2017    CHOLHDLRATIO 7.0 (H) 09/16/2015    NHDL 134 (H) 09/28/2017       LIVER ENZYME RESULTS:  Lab Results   Component Value Date    AST 37 05/03/2017    ALT 36 05/03/2017       CBC RESULTS:  Lab Results   Component Value Date    WBC 8.3 05/03/2017    RBC 5.29 05/03/2017    HGB 15.5 05/03/2017    HCT 46.9 05/03/2017    MCV 89 05/03/2017    MCH 29.3 05/03/2017    MCHC 33.0 05/03/2017    RDW 13.5 05/03/2017     05/03/2017       BMP RESULTS:  Lab Results   Component Value Date     05/03/2017    POTASSIUM 4.0 05/03/2017    CHLORIDE 104 05/03/2017    CO2 29 05/03/2017    ANIONGAP 4 05/03/2017     (H) 05/03/2017    BUN 19 05/03/2017    CR 1.10 05/03/2017    GFRESTIMATED 70 05/03/2017    GFRESTBLACK 84 05/03/2017    GONZALES 10.2 (H) 05/03/2017        A1C RESULTS:  Lab Results   Component Value Date    A1C 5.9 03/01/2016       INR RESULTS:  Lab Results   Component Value Date    INR 1.75 (H) 09/28/2017    INR 4.16 (H) 09/21/2017           Assessment and Plan:   Mr. 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 He has a history of statin intolerance but is currently tolerating 80mg of atorvastatin and 10mg of ezetimibe daily.    We discussed the results with patient:  We re-emphasized the importance of a heart healthy diet.      LDL is still not at target and an appeal  for PCSK9i has been sent to his insurance.   We encouraged him to continue his healthy lifestyle.    It was a pleasure to see Mr. Prabhakar in clinic today with Dr. Ocampo.    Tacos Hernadez MD  Cardiovascular Medicine Fellow, PGY-6  [phone]    I interviewed and examined the patient with the house staff.  I agree with the assessment and plan as documented.    Reji Ocampo MD, PhD  Professor of Medicine  Division of Cardiology    CC  Patient Care Team:  Samm Vazquez MD as PCP - General (Internal Medicine)  Indu Osorio, RN as Nurse Coordinator (Cardiology)  Shiv Vizcarra MD as MD (Family Practice)  Carmella Love MD as MD (Orthopedics)  Donya Boland AuD as Audiologist (Audiology)  [facility] sommer MD as MD (Family Practice)

## 2017-09-28 NOTE — MR AVS SNAPSHOT
After Visit Summary   9/28/2017    Oswaldo Prabhakar    MRN: [id number]           Patient Information     Date Of Birth          1960        Visit Information        Provider Department      9/28/2017 6:30 PM Reji Ocampo MD [facility]        Today's Diagnoses     Bilateral pulmonary embolism (H)    -  1    Statin intolerance        Hyperlipidemia LDL goal <70        Atherosclerosis of coronary artery of native heart without angina pectoris, unspecified vessel or lesion type        S/P CABG (coronary artery bypass graft)        Chronic anticoagulation        Abnormal INR          Care Instructions    Return to clinic in one year.  Fasting labs will be needed prior to this appointment.     Please do not hesitate to call if you have any cardiology related questions or concerns, or need to schedule an appointment, at [phone].         Cardiology Medication Refill Request Information:  * Please contact your pharmacy regarding ANY request for medication refills.  ** [facility] Prescription Fax = [phone]  * Please allow 3 business days for routine medication refills.    Cardiology Test Result notification information:  *You will be notified with in 7-10 days of your appointment day regarding the results of your test. If you are on MyChart you will be notified as soon as the provider has reviewed the results and signed off on them. Please call RN Care Coordinator with questions regarding results.              Follow-ups after your visit        Additional Services     ONC/HEME ADULT REFERRAL       Your provider has referred you to: Dr. Murillo, [facility]: [facility] [phone]   [email or web address]    Please be aware that coverage of these services is subject to the terms and limitations of your health insurance plan.  Call member services at your health plan with any benefit or coverage  questions.      Please bring the following with you to your appointment:    (1) Any X-Rays, CTs or MRIs which have been performed.  Contact the facility where they were done to arrange for  prior to your scheduled appointment.   (2) List of current medications  (3) This referral request   (4) Any documents/labs given to you for this referral                  Follow-up notes from your care team     Discussed this visit Return in about 1 year (around 9/28/2018) for Rachel Ocampo, Routine Visit, FASTING lab work.      Your next 10 appointments already scheduled     Sep 28, 2017  6:30 PM CDT   (Arrive by 6:15 PM)   RETURN LIPID VISIT with Reji Ocampo MD   [facility]    [address] [facility]  [address] [facility] [address] [phone]              Future tests that were ordered for you today     Open Future Orders        Priority Expected Expires Ordered    Comprehensive metabolic panel Routine  9/28/2018 9/28/2017    Lipid panel reflex to direct LDL Routine  9/28/2018 9/28/2017            Who to contact     If you have questions or need follow up information about today's clinic visit or your schedule please contact [facility] directly at [phone].  Normal or non-critical lab and imaging results will be communicated to you by MyChart, letter or phone within 4 business days after the clinic has received the results. If you do not hear from us within 7 days, please contact the clinic through [employer] or phone. If you have a critical or abnormal lab result, we will notify you by phone as soon as possible.  Submit refill requests through [employer] or call your pharmacy and they will forward the refill request to us. Please allow 3 business days for your refill to be completed.          Additional Information About Your Visit        Bunkr Information     [employer]r gives you secure access to your electronic health record. If you see a  "primary care provider, you can also send messages to your care team and make appointments. If you have questions, please call your primary care clinic.  If you do not have a primary care provider, please call [phone] and they will assist you.        Care EveryWhere ID     This is your Care EveryWhere ID. This could be used by other organizations to access your [place] medical records  [id number]        Your Vitals Were     Pulse Height Pulse Oximetry BMI (Body Mass Index)          64 1.803 m (5' 11\") 97% 29.71 kg/m2         Blood Pressure from Last 3 Encounters:   09/28/17 135/85   08/11/17 134/83   06/21/17 115/69    Weight from Last 3 Encounters:   09/28/17 96.6 kg (213 lb)   08/11/17 96 kg (211 lb 9.6 oz)   06/21/17 94.1 kg (207 lb 8 oz)              We Performed the Following     ONC/HEME ADULT REFERRAL          Where to get your medicines      These medications were sent to [place] [address] [place] [facility] [address] [facility]  [address] [facility] [address]     Phone:  [phone]     ezetimibe 10 MG tablet          Primary Care Provider Office Phone # Fax #    Samm Vazquez [phone]       [address] [place] [address] [address] [place] [address]        Equal Access to Services     SURESH ZURITA : Hadii aad ku hadasho Soomaali, waaxda luqadaha, qaybta kaalmada adechristianoyada, franklin flores. So [facility] [phone].    ATENCIÓN: Si habla español, tiene a krueger disposición servicios gratuitos de asistencia lingüística. Llame al [phone].    We comply with applicable federal civil rights laws and [place] laws. We do not discriminate on the basis of race, color, national origin, age, disability sex, sexual orientation or gender identity.            Thank you!     Thank you for choosing [facility]  for your care. Our goal is always to provide you with excellent care. Hearing back from our patients is one way we can continue to improve our services. Please take " a few minutes to complete the written survey that you may receive in the mail after your visit with us. Thank you!             Your Updated Medication List - Protect others around you: Learn how to safely use, store and throw away your medicines at www.disposemymeds.org.          This list is accurate as of: 9/28/17  4:38 PM.  Always use your most recent med list.                   Brand Name Dispense Instructions for use Diagnosis    albuterol 108 (90 BASE) MCG/ACT Inhaler    PROAIR HFA/PROVENTIL HFA/VENTOLIN HFA    1 Inhaler    Inhale 2 puffs into the lungs every 6 hours as needed for shortness of breath / dyspnea or wheezing    Pneumonia of both lungs due to infectious organism, unspecified part of lung       aspirin EC 81 MG EC tablet     91 tablet    Take 1 tablet (81 mg) by mouth daily    S/P CABG x 4       atorvastatin 80 MG tablet    LIPITOR    91 tablet    Take 1 tablet (80 mg) by mouth daily    Pure hyperglyceridemia, Hyperlipidemia LDL goal <130       BENADRYL PO      Take 25-50 mg by mouth daily as needed        calcium carbonate 500 MG chewable tablet    TUMS     Take 1 chew tab by mouth daily as needed        ezetimibe 10 MG tablet    ZETIA    90 tablet    Take 1 tablet (10 mg) by mouth daily    Statin intolerance, Hyperlipidemia LDL goal <70, Atherosclerosis of coronary artery of native heart without angina pectoris, unspecified vessel or lesion type, S/P CABG (coronary artery bypass graft)       fenofibrate 160 MG tablet     91 tablet    Take 1 tablet (160 mg) by mouth daily    Hyperlipidemia LDL goal <130       fluticasone 50 MCG/ACT spray    FLONASE    1 Bottle    Spray 2 sprays into both nostrils daily    Dysfunction of eustachian tube, bilateral       fluticasone-salmeterol 250-50 MCG/DOSE diskus inhaler    ADVAIR    60 Inhaler    Inhale 1 puff into the lungs 2 times daily    Wheezing, Pneumonia due to infectious organism, unspecified laterality, unspecified part of lung       lisinopril 2.5 MG  tablet    PRINIVIL/Zestril    91 tablet    Take 1 tablet (2.5 mg) by mouth daily , or as directed by Dr. Ocampo.    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 7.5 mg on Mon, Wed, Fri and 5 mg daily the rest of the week.    Long term (current) use of anticoagulants       * Notice:  This list has 2 medication(s) that are the same as other medications prescribed for you. Read the directions carefully, and ask your doctor or other care provider to review them with you.

## 2017-09-28 NOTE — PROGRESS NOTES
ANTICOAGULATION FOLLOW-UP CLINIC VISIT    Patient Name:  Oswaldo Prabhakar  Date:  9/28/2017  Contact Type:  Telephone    SUBJECTIVE:        OBJECTIVE    INR   Date Value Ref Range Status   09/28/2017 1.75 (H) 0.86 - 1.14 Final     Chromogenic Factor 10   Date Value Ref Range Status   06/18/2011 81 60 - 140 % Final     Comment:     Therapeutic Range: A Chromogenic Factor 10 level of 20-40% inversely   correlates   with an INR of 2-3 for patients receiving Warfarin. Chromogenic Factor 10   levels below 20% indicate an INR greater than 3, and levels above 40%   indicate   an INR less than 2.     Factor 2 Assay   Date Value Ref Range Status   04/04/2012  60 - 140 % Final    (Note)  CANCELLED AND CREDITED PER [facility]. MONITORING,4/4/12,       ASSESSMENT / PLAN  INR assessment SUB    Recheck INR In: 1 WEEK    INR Location Clinic      Anticoagulation Summary as of 9/28/2017     INR goal 2.0-3.0   Today's INR 1.75!   Maintenance plan 7.5 mg (5 mg x 1.5) on Sun, Tue, Thu; 5 mg (5 mg x 1) all other days   Full instructions 7.5 mg on Sun, Tue, Thu; 5 mg all other days   Weekly total 42.5 mg   Plan last modified Kristina Wright RN (9/22/2017)   Next INR check 10/5/2017   Priority INR   Target end date Indefinite    Indications   Pulmonary emboli (H) [I26.99]  Long-term (current) use of anticoagulants [Z79.01] [Z79.01]         Anticoagulation Episode Summary     INR check location     Preferred lab     Send INR reminders to [facility]    Comments HIPPA INFO OK to speak with wife Josselyn MICHELLE to leave messages on Home.work and cell phones  use cell phone #[phone]      Anticoagulation Care Providers     Provider Role Specialty Phone number    Samm Vazquez MD [facility] Internal Medicine [phone]            See the Encounter Report to view Anticoagulation Flowsheet and Dosing Calendar (Go to Encounters tab in chart review, and find the Anticoagulation Therapy Visit)    Left message for patient with  results and dosing recommendations. Asked patient to call back to report any missed doses, falls, signs and symptoms of bleeding or clotting, any changes in health, medication, or diet. Asked patient to call back with any questions or concerns.     Kristina Wright RN

## 2017-09-28 NOTE — NURSING NOTE
Diet: Patient instructed regarding a heart healthy diet, including discussion of reduced fat and sodium intake. Patient demonstrated understanding of this information and agreed to call with further questions or concerns.  Labs: Patient was instructed to return for the next laboratory testing 3-5 days after his 2nd dose of PCSK9 inhibitor. Patient demonstrated understanding of this information and agreed to call with further questions or concerns.   New Medication: Patient was educated regarding future prescribed medication, including discussion of  the indication, administration, side effects, and when to report to MD or RN. Patient demonstrated understanding of this information and agreed to call with further questions or concerns.  Return Appointment: Patient given instructions regarding scheduling next clinic visit. Patient demonstrated understanding of this information and agreed to call with further questions or concerns.

## 2017-09-28 NOTE — MR AVS SNAPSHOT
Oswaldo Prabhakar   9/28/2017   Anticoagulation Therapy Visit    Description:  57 year old male   Provider:  Kristina Wright, RN   Department:  [facility]           INR as of 9/28/2017     Today's INR 1.75!      Anticoagulation Summary as of 9/28/2017     INR goal 2.0-3.0   Today's INR 1.75!   Full instructions 7.5 mg on Sun, Tue, Thu; 5 mg all other days   Next INR check 10/5/2017    Indications   Pulmonary emboli (H) [I26.99]  Long-term (current) use of anticoagulants [Z79.01] [Z79.01]         September 2017 Details    Sun Mon Tue Wed Thu Fri Sat          1               2                 3               4               5               6               7               8               9                 10               11               12               13               14               15               16                 17               18               19               20               21               22               23                 24               25               26               27               28      7.5 mg   See details      29      5 mg         30      5 mg          Date Details   09/28 This INR check               How to take your warfarin dose     To take:  5 mg Take 1 of the 5 mg tablets.    To take:  7.5 mg Take 1.5 of the 5 mg tablets.           October 2017 Details    Sun Mon Tue Wed Thu Fri Sat     1      7.5 mg         2      5 mg         3      7.5 mg         4      5 mg         5            6               7                 8               9               10               11               12               13               14                 15               16               17               18               19               20               21                 22               23               24               25               26               27               28                 29               30               31                    Date Details   No additional details    Date  of next INR:  10/5/2017         How to take your warfarin dose     To take:  5 mg Take 1 of the 5 mg tablets.    To take:  7.5 mg Take 1.5 of the 5 mg tablets.

## 2017-09-28 NOTE — LETTER
9/28/2017       RE: Oswaldo Prabhakar  [address]  [place] [address]     Dear Colleague,    Thank you for referring your patient, Oswaldo Prabhakar, to the [facility] at [facility]. Please see a copy of my visit note below.    HPI:  Mr. Prabhakar is a 56 yo male with history of premature atherosclerosis and CABG in April 2014, HTN and HLD. He also has history of statin intolerance. He returns to clinic for routine follow up.     From a clinical standpoint, he is feeling well. 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 No weight change, orthopnea, PND, palpitations, claudication.     Although he has not tolerated many statins, he is currently taking 80mg of atorvastatin which he has tolerated well. In addition, he has been taking ezetimibe for more than 5 months. His cholesterol was rechecked today on this combination and his LDL was 98, well above the target goal of 70mg/dL. We have been trying to get approval for a PCSK9i with the indication of secondary prevention in the setting of LDL >70 despite treatment with high intensity statin and ezetimibe. Mr. Prabhakar received a rejection letter 2 weeks ago and an appeal letter was already sent. There should be a reply by the end of October.       PAST MEDICAL HISTORY:  Past Medical History:   Diagnosis Date     Antiplatelet or antithrombotic long-term use      Diaphragmatic hernia without mention of obstruction or gangrene      Heart disease      Hypertension      Nephrolithiasis 4/2010     Other and unspecified hyperlipidemia      Pulmonary embolus and infarction (H)     s/p hemothorax and filter s/p filter removal (2011), now on long term anti-coagulation     Statin intolerance 2/1/2017     Stented coronary artery      Unspecified retinal detachment     Childhood trauma, unrepaired       CURRENT MEDICATIONS:  Current Outpatient Prescriptions   Medication Sig Dispense Refill      fluticasone-salmeterol (ADVAIR) 250-50 MCG/DOSE diskus inhaler Inhale 1 puff into the lungs 2 times daily 60 Inhaler 11     predniSONE (DELTASONE) 5 MG tablet 4 po bid x 3 d, then 3 po bid x 3 d, then 2 po bid x 3 d, then 1 po bid x 3 d, then 1 qd x 3 d, then off 63 tablet 0     metoprolol (LOPRESSOR) 25 MG tablet Take 0.5 tablets (12.5 mg) by mouth 2 times daily 90 tablet 3     fluticasone (FLONASE) 50 MCG/ACT spray Spray 2 sprays into both nostrils daily 1 Bottle 1     albuterol (PROAIR HFA/PROVENTIL HFA/VENTOLIN HFA) 108 (90 BASE) MCG/ACT Inhaler Inhale 2 puffs into the lungs every 6 hours as needed for shortness of breath / dyspnea or wheezing 1 Inhaler 1     warfarin (COUMADIN) 5 MG tablet As directed. 7.5 mg on Mon, Wed, Fri and 5 mg daily the rest of the week. 145 tablet 6     vitamin B complex with vitamin C (VITAMIN  B COMPLEX) TABS tablet Take 1 tablet by mouth daily       WARFARIN SODIUM PO Take 7.5 mg by mouth       lisinopril (PRINIVIL/ZESTRIL) 2.5 MG tablet Take 1 tablet (2.5 mg) by mouth daily , or as directed by Dr. Ocampo. 91 tablet 1     ezetimibe (ZETIA) 10 MG tablet Take 1 tablet (10 mg) by mouth daily 90 tablet 3     fenofibrate 160 MG tablet Take 1 tablet (160 mg) by mouth daily 91 tablet 3     atorvastatin (LIPITOR) 80 MG tablet Take 1 tablet (80 mg) by mouth daily 91 tablet 3     Cholecalciferol (VITAMIN D) 2000 UNITS CAPS Take 2,000 Units by mouth daily       aspirin EC 81 MG tablet Take 1 tablet (81 mg) by mouth daily 91 tablet 3     calcium carbonate (TUMS) 500 MG chewable tablet Take 1 chew tab by mouth daily as needed       DiphenhydrAMINE HCl (BENADRYL PO) Take 25-50 mg by mouth daily as needed         PAST SURGICAL HISTORY:  Past Surgical History:   Procedure Laterality Date     BYPASS GRAFT ARTERY CORONARY  4/4/2014    Procedure: BYPASS GRAFT ARTERY CORONARY;  Median Sternotomy, Coronary Artery Bypass Bypass x 4 using Left Internal Mammary, Left Saphenous Vein, on pump oxygenation;   "Surgeon: Sherry Armando MD;  Location: [facility] OR     C NONSPECIFIC PROCEDURE      Spermatocele resection     CLOSED REDUCTION, PERCUTANEOUS PINNING  HAND, COMBINED Left 11/5/2015    Procedure: COMBINED CLOSED REDUCTION, PERCUTANEOUS PINNING HAND;  Surgeon: Carmella Love MD;  [facility]     COLONOSCOPY  3/15/2013    Procedure: COLONOSCOPY;;  Surgeon: Racheal Shipman MD;  Location: [facility] GI     LITHOTRIPSY  4/2010       ALLERGIES     Allergies   Allergen Reactions     Cats      Hay Fever & [A.R.M.]      Heparin Other (See Comments)     Heparin resistance per cardio-thoracic surgeon Dr. Armando     Hydrocodone-Acetaminophen Nausea and Vomiting       FAMILY HISTORY:  Family History   Problem Relation Age of Onset     HEART DISEASE Mother      C.A.D. Father      3 vessel CABG, age 70     CANCER Father      bladder cancer     C.A.D. Paternal Grandmother      Hypertension Paternal Grandmother      Alzheimer Disease Paternal Grandmother      DIABETES No family hx of      Thyroid Disease No family hx of      Cancer - colorectal No family hx of        SOCIAL HISTORY:  Social History     Social History     Marital status:      Spouse name: N/A     Number of children: N/A     Years of education: N/A     Social History Main Topics     Smoking status: Never Smoker     Smokeless tobacco: Never Used     Alcohol use No      Comment: very rare     Drug use: No     Sexual activity: Not on file     Other Topics Concern     Not on file     Social History Narrative       ROS:   A complete ROS is negative except as noted above.     EXAM:  /85 (BP Location: Right arm, Patient Position: Chair, Cuff Size: Adult Regular)  Pulse 64  Ht 1.803 m (5' 11\")  Wt 96.6 kg (213 lb)  SpO2 97%  BMI 29.71 kg/m2  In general, the patient is a pleasant male in no apparent distress.    Head: nc/at  Eyes: EOMI, Sclerae white, not injected.   ENT: no OP lesions or erythema  Neck: supple, no masses or LAD, carotids are " +2/2 without bruit  CV: nml s1, s2; no murmur, rub, gallop; no JVD  Chest: lungs are clear without wheezing or rhonchi  Abd: Soft, nontender, nondistended. No organomegaly.  No bruits.   Ext: No clubbing, cyanosis, or edema.  The pulses are +2/2 at the radial, brachial, DP, and PT sites bilaterally.  No bruits are noted.  Skin: no erythema or rash on limited exam  Neuro: alert, oriented; normal speech, gait and affect    Labs:  LIPID RESULTS:  Lab Results   Component Value Date    CHOL 172 09/28/2017    HDL 38 (L) 09/28/2017    LDL 98 09/28/2017    TRIG 180 (H) 09/28/2017    CHOLHDLRATIO 7.0 (H) 09/16/2015    NHDL 134 (H) 09/28/2017       LIVER ENZYME RESULTS:  Lab Results   Component Value Date    AST 37 05/03/2017    ALT 36 05/03/2017       CBC RESULTS:  Lab Results   Component Value Date    WBC 8.3 05/03/2017    RBC 5.29 05/03/2017    HGB 15.5 05/03/2017    HCT 46.9 05/03/2017    MCV 89 05/03/2017    MCH 29.3 05/03/2017    MCHC 33.0 05/03/2017    RDW 13.5 05/03/2017     05/03/2017       BMP RESULTS:  Lab Results   Component Value Date     05/03/2017    POTASSIUM 4.0 05/03/2017    CHLORIDE 104 05/03/2017    CO2 29 05/03/2017    ANIONGAP 4 05/03/2017     (H) 05/03/2017    BUN 19 05/03/2017    CR 1.10 05/03/2017    GFRESTIMATED 70 05/03/2017    GFRESTBLACK 84 05/03/2017    GONZALES 10.2 (H) 05/03/2017        A1C RESULTS:  Lab Results   Component Value Date    A1C 5.9 03/01/2016       INR RESULTS:  Lab Results   Component Value Date    INR 1.75 (H) 09/28/2017    INR 4.16 (H) 09/21/2017           Assessment and Plan:   Mr. 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 He has a history of statin intolerance but is currently tolerating 80mg of atorvastatin and 10mg of ezetimibe daily.    We discussed the results with patient:  We re-emphasized the importance of a heart healthy diet.      LDL is still not at target and an appeal for PCSK9i has been  sent to his insurance.   We encouraged him to continue his healthy lifestyle.    It was a pleasure to see Mr. Prabhakar in clinic today with Dr. Ocampo.    Tacos Hernadez MD  Cardiovascular Medicine Fellow, PGY-6  [phone]    I interviewed and examined the patient with the house staff.  I agree with the assessment and plan as documented.    Reji Ocampo MD, PhD  Professor of Medicine  Division of Cardiology  CC  Patient Care Team:  Simi Guevara MD as PCP - General (Internal Medicine)  Indu Osorio, RN as Nurse Coordinator (Cardiology)  Reji Ocampo MD as MD (Cardiology)  Shiv Vizcarra MD as MD (Family Practice)  Carmella Love MD as MD (Orthopedics)  Donya Boland AuD as Audiologist (Audiology)  CarolLavelle holm MD as MD (Family Practice)  SIMI GUEVARA

## 2017-09-28 NOTE — PATIENT INSTRUCTIONS
Return to clinic in one year.  Fasting labs will be needed prior to this appointment.     Please do not hesitate to call if you have any cardiology related questions or concerns, or need to schedule an appointment, at [phone].         Cardiology Medication Refill Request Information:  * Please contact your pharmacy regarding ANY request for medication refills.  ** [facility] Prescription Fax = [phone]  * Please allow 3 business days for routine medication refills.    Cardiology Test Result notification information:  *You will be notified with in 7-10 days of your appointment day regarding the results of your test. If you are on MyChart you will be notified as soon as the provider has reviewed the results and signed off on them. Please call RN Care Coordinator with questions regarding results.

## 2017-09-28 NOTE — NURSING NOTE
Chief Complaint   Patient presents with     Follow Up For     follow up with labs prior     Vitals were taken and medications were reconciled.  Willie Bower, OFELIA  3:21 PM

## 2017-10-10 ENCOUNTER — MYC MEDICAL ADVICE (OUTPATIENT)
Dept: CARDIOLOGY | Facility: CLINIC | Age: 57
End: 2017-10-10

## 2017-10-10 DIAGNOSIS — Z95.1 S/P CABG X 4: ICD-10-CM

## 2017-10-10 DIAGNOSIS — Z78.9 STATIN INTOLERANCE: ICD-10-CM

## 2017-10-10 DIAGNOSIS — E78.5 HYPERLIPIDEMIA LDL GOAL <130: ICD-10-CM

## 2017-10-10 DIAGNOSIS — I25.10 CAD (CORONARY ARTERY DISEASE): Primary | ICD-10-CM

## 2017-10-11 DIAGNOSIS — E78.5 HYPERLIPIDEMIA LDL GOAL <130: ICD-10-CM

## 2017-10-11 RX ORDER — FENOFIBRATE 160 MG/1
160 TABLET ORAL DAILY
Qty: 91 TABLET | Refills: 3 | Status: SHIPPED | OUTPATIENT
Start: 2017-10-11 | End: 2018-09-25

## 2017-10-11 NOTE — TELEPHONE ENCOUNTER
LAST VISIT    9/28/17  NEXT VISIT    NONE  PLAN                Assessment and Plan:   Mr. 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 He has a history of statin intolerance but is currently tolerating 80mg of atorvastatin and 10mg of ezetimibe daily.     We discussed the results with patient:  We re-emphasized the importance of a heart healthy diet.        LDL is still not at target and an appeal for PCSK9i has been sent to his insurance.   We encouraged him to continue his healthy lifestyle.

## 2017-10-12 NOTE — TELEPHONE ENCOUNTER
MEDICATION APPEAL APPROVED    Medication: Praluent 150 mg - External Appeal Approved  Authorization Effective Date:  10/4/2017  Authorization Expiration Date:    Approved Dose/Quantity: 2ML  Reference #: [id number]   Insurance Company: [employer] - Phone [phone] Fax [phone]  Expected CoPay:       CoPay Card Available:      Foundation Assistance Needed:    Which Pharmacy is filling the prescription (Not needed for infusion/clinic administered): [facility] - [place], [place] - [facility] [address]

## 2017-10-18 DIAGNOSIS — I25.10 CAD (CORONARY ARTERY DISEASE): ICD-10-CM

## 2017-10-18 DIAGNOSIS — E78.5 HYPERLIPIDEMIA LDL GOAL <130: ICD-10-CM

## 2017-10-18 DIAGNOSIS — I26.99 PULMONARY EMBOLI (H): ICD-10-CM

## 2017-10-18 DIAGNOSIS — Z78.9 STATIN INTOLERANCE: ICD-10-CM

## 2017-10-18 DIAGNOSIS — Z95.1 S/P CABG X 4: ICD-10-CM

## 2017-10-18 DIAGNOSIS — Z79.01 LONG-TERM (CURRENT) USE OF ANTICOAGULANTS: ICD-10-CM

## 2017-10-18 LAB
CHOLEST SERPL-MCNC: 198 MG/DL
HDLC SERPL-MCNC: 35 MG/DL
INR PPP: 1.33 (ref 0.86–1.14)
LDLC SERPL CALC-MCNC: 93 MG/DL
NONHDLC SERPL-MCNC: 163 MG/DL
TRIGL SERPL-MCNC: 351 MG/DL

## 2017-10-19 ENCOUNTER — ANTICOAGULATION THERAPY VISIT (OUTPATIENT)
Dept: ANTICOAGULATION | Facility: CLINIC | Age: 57
End: 2017-10-19

## 2017-10-19 DIAGNOSIS — I26.99 PULMONARY EMBOLI (H): ICD-10-CM

## 2017-10-19 DIAGNOSIS — Z79.01 LONG-TERM (CURRENT) USE OF ANTICOAGULANTS: ICD-10-CM

## 2017-10-19 NOTE — MR AVS SNAPSHOT
Oswaldo Prabhakar   10/19/2017   Anticoagulation Therapy Visit    Description:  57 year old male   Provider:  Kristina Wright, RN   Department:  [facility]           INR as of 10/19/2017     Today's INR 1.33! (10/18/2017)      Anticoagulation Summary as of 10/19/2017     INR goal 2.0-3.0   Today's INR 1.33! (10/18/2017)   Full instructions 10/19: 10 mg; 10/20: 10 mg; 10/21: 7.5 mg; Otherwise 7.5 mg on Sun, Tue, Thu; 5 mg all other days   Next INR check 10/23/2017    Indications   Pulmonary emboli (H) [I26.99]  Long-term (current) use of anticoagulants [Z79.01] [Z79.01]         October 2017 Details    Sun Mon Tue Wed Thu Fri Sat     1               2               3               4               5               6               7                 8               9               10               11               12               13               14                 15               16               17               18               19      10 mg   See details      20      10 mg         21      7.5 mg           22      7.5 mg         23            24               25               26               27               28                 29               30               31                    Date Details   10/19 This INR check       Date of next INR:  10/23/2017         How to take your warfarin dose     To take:  5 mg Take 1 of the 5 mg tablets.    To take:  7.5 mg Take 1.5 of the 5 mg tablets.    To take:  10 mg Take 2 of the 5 mg tablets.

## 2017-10-19 NOTE — PROGRESS NOTES
ANTICOAGULATION FOLLOW-UP CLINIC VISIT    Patient Name:  Oswaldo Prabhakar  Date:  10/19/2017  Contact Type:  Telephone    SUBJECTIVE:        OBJECTIVE    INR   Date Value Ref Range Status   10/18/2017 1.33 (H) 0.86 - 1.14 Final     Chromogenic Factor 10   Date Value Ref Range Status   06/18/2011 81 60 - 140 % Final     Comment:     Therapeutic Range: A Chromogenic Factor 10 level of 20-40% inversely   correlates   with an INR of 2-3 for patients receiving Warfarin. Chromogenic Factor 10   levels below 20% indicate an INR greater than 3, and levels above 40%   indicate   an INR less than 2.     Factor 2 Assay   Date Value Ref Range Status   04/04/2012  60 - 140 % Final    (Note)  CANCELLED AND CREDITED PER [facility] IN MED. MONITORING,4/4/12,       ASSESSMENT / PLAN  INR assessment SUB    Recheck INR In: 4 DAYS    INR Location Clinic      Anticoagulation Summary as of 10/19/2017     INR goal 2.0-3.0   Today's INR 1.33! (10/18/2017)   Maintenance plan 7.5 mg (5 mg x 1.5) on Sun, Tue, Thu; 5 mg (5 mg x 1) all other days   Full instructions 10/19: 10 mg; 10/20: 10 mg; 10/21: 7.5 mg; Otherwise 7.5 mg on Sun, Tue, Thu; 5 mg all other days   Weekly total 42.5 mg   Plan last modified Kristina Wright RN (9/22/2017)   Next INR check 10/23/2017   Priority INR   Target end date Indefinite    Indications   Pulmonary emboli (H) [I26.99]  Long-term (current) use of anticoagulants [Z79.01] [Z79.01]         Anticoagulation Episode Summary     INR check location     Preferred lab     Send INR reminders to [facility]    Comments HIPPA INFO OK to speak with wife Josselyn OK to leave messages on Home.work and cell phones  use cell phone #[phone]      Anticoagulation Care Providers     Provider Role Specialty Phone number    Samm Vazquez MD Responsible Internal Medicine             See the Encounter Report to view Anticoagulation Flowsheet and Dosing Calendar (Go to Encounters tab in chart review, and find the  Anticoagulation Therapy Visit)    Left message for patient with results and dosing recommendations. Asked patient to call back to report any missed doses, falls, signs and symptoms of bleeding or clotting, any changes in health, medication, or diet. Asked patient to call back with any questions or concerns.     Kristina Wright RN

## 2017-11-06 ENCOUNTER — ANTICOAGULATION THERAPY VISIT (OUTPATIENT)
Dept: ANTICOAGULATION | Facility: CLINIC | Age: 57
End: 2017-11-06

## 2017-11-06 ENCOUNTER — CARE COORDINATION (OUTPATIENT)
Dept: CARDIOLOGY | Facility: CLINIC | Age: 57
End: 2017-11-06

## 2017-11-06 DIAGNOSIS — I26.99 PULMONARY EMBOLI (H): ICD-10-CM

## 2017-11-06 DIAGNOSIS — Z79.01 LONG-TERM (CURRENT) USE OF ANTICOAGULANTS: ICD-10-CM

## 2017-11-06 DIAGNOSIS — E78.5 HYPERLIPIDEMIA LDL GOAL <70: ICD-10-CM

## 2017-11-06 DIAGNOSIS — E83.52 HYPERCALCEMIA: Primary | ICD-10-CM

## 2017-11-06 LAB
ALBUMIN SERPL-MCNC: 4.2 G/DL (ref 3.4–5)
ALP SERPL-CCNC: 39 U/L (ref 40–150)
ALT SERPL W P-5'-P-CCNC: 33 U/L (ref 0–70)
ANION GAP SERPL CALCULATED.3IONS-SCNC: 7 MMOL/L (ref 3–14)
AST SERPL W P-5'-P-CCNC: 23 U/L (ref 0–45)
BILIRUB SERPL-MCNC: 0.4 MG/DL (ref 0.2–1.3)
BUN SERPL-MCNC: 12 MG/DL (ref 7–30)
CALCIUM SERPL-MCNC: 10.2 MG/DL (ref 8.5–10.1)
CHLORIDE SERPL-SCNC: 106 MMOL/L (ref 94–109)
CHOLEST SERPL-MCNC: 80 MG/DL
CO2 SERPL-SCNC: 25 MMOL/L (ref 20–32)
CREAT SERPL-MCNC: 1.01 MG/DL (ref 0.66–1.25)
GFR SERPL CREATININE-BSD FRML MDRD: 77 ML/MIN/1.7M2
GLUCOSE SERPL-MCNC: 97 MG/DL (ref 70–99)
HDLC SERPL-MCNC: 49 MG/DL
INR PPP: 1.68 (ref 0.86–1.14)
LDLC SERPL CALC-MCNC: 9 MG/DL
NONHDLC SERPL-MCNC: 30 MG/DL
POTASSIUM SERPL-SCNC: 3.8 MMOL/L (ref 3.4–5.3)
PROT SERPL-MCNC: 7.7 G/DL (ref 6.8–8.8)
SODIUM SERPL-SCNC: 138 MMOL/L (ref 133–144)
TRIGL SERPL-MCNC: 106 MG/DL

## 2017-11-06 NOTE — PROGRESS NOTES
ANTICOAGULATION FOLLOW-UP CLINIC VISIT    Patient Name:  Oswaldo Prabhakar  Date:  11/6/2017  Contact Type:  Telephone    SUBJECTIVE:     Patient Findings     Positives Change in medications, Med error    Comments Pt is phoned with a request to call us, as we have not spoken to him in 3 months.  Upon the return call, pt reports he has been taking a 7.5 mg dose two days/wk - not three as we have documented.  Adjustment is made on the calendar with strong direction to check the INR again in 2 weeks.  He reports he has started on praluent - an injection q other week to decrease his LDL.  He reports cardiology instructed him to follow up with Dr. Mcginnis re: continuing the coumadin.  Pt has yet to make that appointment. He is strongly encouraged to do this.           OBJECTIVE    INR   Date Value Ref Range Status   11/06/2017 1.68 (H) 0.86 - 1.14 Final     Chromogenic Factor 10   Date Value Ref Range Status   06/18/2011 81 60 - 140 % Final     Comment:     Therapeutic Range: A Chromogenic Factor 10 level of 20-40% inversely   correlates   with an INR of 2-3 for patients receiving Warfarin. Chromogenic Factor 10   levels below 20% indicate an INR greater than 3, and levels above 40%   indicate   an INR less than 2.     Factor 2 Assay   Date Value Ref Range Status   04/04/2012  60 - 140 % Final    (Note)  CANCELLED AND CREDITED PER [employer] IN MED. MONITORING,4/4/12,       ASSESSMENT / PLAN  INR assessment SUB    Recheck INR In: 2 WEEKS    INR Location Clinic      Anticoagulation Summary as of 11/6/2017     INR goal 2.0-3.0   Today's INR 1.68!   Maintenance plan 7.5 mg (5 mg x 1.5) on Sun, Tue, Thu; 5 mg (5 mg x 1) all other days   Full instructions 11/6: 10 mg; Otherwise 7.5 mg on Sun, Tue, Thu; 5 mg all other days   Weekly total 42.5 mg   Plan last modified Kristina Wright RN (9/22/2017)   Next INR check 11/20/2017   Priority INR   Target end date Indefinite    Indications   Pulmonary emboli (H) [I26.99]  Long-term  (current) use of anticoagulants [Z79.01] [Z79.01]         Anticoagulation Episode Summary     INR check location     Preferred lab     Send INR reminders to [facility]    Comments HIPPA INFO OK to speak with wife Josselyn MICHELLE to leave messages on Home.work and cell phones  use cell phone #[phone]      Anticoagulation Care Providers     Provider Role Specialty Phone number    Samm Vazquez MD Responsible Internal Medicine             See the Encounter Report to view Anticoagulation Flowsheet and Dosing Calendar (Go to Encounters tab in chart review, and find the Anticoagulation Therapy Visit)    Spoke with patient. Gave them their lab results and new warfarin recommendation.  No changes in health or diet. No falls. No signs or symptoms of bleed or clotting.      Laurita Cespedes RN

## 2017-11-06 NOTE — MR AVS SNAPSHOT
Oswaldo Prabhakar   11/6/2017   Anticoagulation Therapy Visit    Description:  57 year old male   Provider:  Laurita Cespedes, RN   Department:  [facility]           INR as of 11/6/2017     Today's INR 1.68!      Anticoagulation Summary as of 11/6/2017     INR goal 2.0-3.0   Today's INR 1.68!   Full instructions 11/6: 10 mg; Otherwise 7.5 mg on Sun, Tue, Thu; 5 mg all other days   Next INR check 11/20/2017    Indications   Pulmonary emboli (H) [I26.99]  Long-term (current) use of anticoagulants [Z79.01] [Z79.01]         November 2017 Details    Sun Mon Tue Wed Thu Fri Sat        1               2               3               4                 5               6      10 mg   See details      7      7.5 mg         8      5 mg         9      7.5 mg         10      5 mg         11      5 mg           12      7.5 mg         13      5 mg         14      7.5 mg         15      5 mg         16      7.5 mg         17      5 mg         18      5 mg           19      7.5 mg         20            21               22               23               24               25                 26               27               28               29               30                  Date Details   11/06 This INR check       Date of next INR:  11/20/2017         How to take your warfarin dose     To take:  5 mg Take 1 of the 5 mg tablets.    To take:  7.5 mg Take 1.5 of the 5 mg tablets.    To take:  10 mg Take 2 of the 5 mg tablets.

## 2017-11-07 NOTE — PROGRESS NOTES
Date: 11/6/2017    Time of Call: 7:04 PM     Diagnosis:  Hypercalcemia, low LDL      [ TORB ] Ordering provider: Dr. Ocampo  Order: Obtain PTH level.  Hold Praluent for 2 weeks.       Order received by: Indu Osorio RN, BSN     Follow-up/additional notes: Lab results and above instructions discussed with patient.  Patient agreeable to this plan and understands that he will receive return call with further instruction.

## 2017-11-09 ENCOUNTER — CARE COORDINATION (OUTPATIENT)
Dept: CARDIOLOGY | Facility: CLINIC | Age: 57
End: 2017-11-09

## 2017-11-09 DIAGNOSIS — I25.10 CAD (CORONARY ARTERY DISEASE): ICD-10-CM

## 2017-11-09 DIAGNOSIS — E78.5 HYPERLIPIDEMIA LDL GOAL <130: ICD-10-CM

## 2017-11-09 DIAGNOSIS — Z78.9 STATIN INTOLERANCE: ICD-10-CM

## 2017-11-09 DIAGNOSIS — Z95.1 S/P CABG X 4: ICD-10-CM

## 2017-11-09 NOTE — PROGRESS NOTES
Date: 11/9/2017    Time of Call: 5:26 PM     Diagnosis:  Hyperlipidemia, CAD      [ TORB ] Ordering provider: Dr. Ocampo  Order: Start Praluent 75 mg once every 14 days subcutaneous starting on November 21st, 2017.  Repeat fasting lipid profile 5-7 days after 2nd injection of 75 mg dosage.       Order received by: Indu Osorio RN, BSN     Follow-up/additional notes: Left patient voice message to return call to discuss above recommendations.  Opzihart message sent with instructions as well.

## 2017-11-17 ENCOUNTER — ANTICOAGULATION THERAPY VISIT (OUTPATIENT)
Dept: ANTICOAGULATION | Facility: CLINIC | Age: 57
End: 2017-11-17

## 2017-11-17 DIAGNOSIS — Z79.01 LONG-TERM (CURRENT) USE OF ANTICOAGULANTS: ICD-10-CM

## 2017-11-17 DIAGNOSIS — I26.99 PULMONARY EMBOLISM (H): ICD-10-CM

## 2017-11-17 DIAGNOSIS — I26.99 PULMONARY EMBOLI (H): ICD-10-CM

## 2017-11-17 LAB — INR PPP: 1.78 (ref 0.86–1.14)

## 2017-11-17 NOTE — MR AVS SNAPSHOT
Oswaldo Prabhakar   11/17/2017   Anticoagulation Therapy Visit    Description:  57 year old male   Provider:  Liliam Hyman, RN   [facility]           INR as of 11/17/2017     Today's INR 1.78!      Anticoagulation Summary as of 11/17/2017     INR goal 2.0-3.0   Today's INR 1.78!   Full instructions 5 mg on Sun, Tue, Thu; 7.5 mg all other days   Next INR check 12/1/2017    Indications   Pulmonary emboli (H) [I26.99]  Long-term (current) use of anticoagulants [Z79.01] [Z79.01]         November 2017 Details    Sun Mon Tue Wed Thu Fri Sat        1               2               3               4                 5               6               7               8               9               10               11                 12               13               14               15               16               17      7.5 mg   See details      18      7.5 mg           19      5 mg         20      7.5 mg         21      5 mg         22      7.5 mg         23      5 mg         24      7.5 mg         25      7.5 mg           26      5 mg         27      7.5 mg         28      5 mg         29      7.5 mg         30      5 mg            Date Details   11/17 This INR check               How to take your warfarin dose     To take:  5 mg Take 1 of the 5 mg tablets.    To take:  7.5 mg Take 1.5 of the 5 mg tablets.           December 2017 Details    Sun Mon Tue Wed Thu Fri Sat          1            2                 3               4               5               6               7               8               9                 10               11               12               13               14               15               16                 17               18               19               20               21               22               23                 24               25               26               27               28               29               30                 31                       Date Details   No additional details    Date of next INR:  12/1/2017         How to take your warfarin dose     To take:  7.5 mg Take 1.5 of the 5 mg tablets.

## 2017-11-17 NOTE — PROGRESS NOTES
ANTICOAGULATION FOLLOW-UP CLINIC VISIT    Patient Name:  Oswaldo Prabhakar  Date:  11/17/2017  Contact Type:  Telephone    SUBJECTIVE:     Patient Findings     Comments Unsure if pt is following our instructions or if there are any missed doses. Pt has not been within his goal range since 4/3/17           OBJECTIVE    INR   Date Value Ref Range Status   11/17/2017 1.78 (H) 0.86 - 1.14 Final     Chromogenic Factor 10   Date Value Ref Range Status   06/18/2011 81 60 - 140 % Final     Comment:     Therapeutic Range: A Chromogenic Factor 10 level of 20-40% inversely   correlates   with an INR of 2-3 for patients receiving Warfarin. Chromogenic Factor 10   levels below 20% indicate an INR greater than 3, and levels above 40%   indicate   an INR less than 2.     Factor 2 Assay   Date Value Ref Range Status   04/04/2012  60 - 140 % Final    (Note)  CANCELLED AND CREDITED PER [facility]PLE IN MED. MONITORING,4/4/12,       ASSESSMENT / PLAN  INR assessment SUB    Recheck INR In: 2 WEEKS    INR Location Clinic      Anticoagulation Summary as of 11/17/2017     INR goal 2.0-3.0   Today's INR 1.78!   Maintenance plan 5 mg (5 mg x 1) on Sun, Tue, Thu; 7.5 mg (5 mg x 1.5) all other days   Full instructions 5 mg on Sun, Tue, Thu; 7.5 mg all other days   Weekly total 45 mg   Plan last modified Liliam Hyman, RN (11/17/2017)   Next INR check 12/1/2017   Priority INR   Target end date Indefinite    Indications   Pulmonary emboli (H) [I26.99]  Long-term (current) use of anticoagulants [Z79.01] [Z79.01]         Anticoagulation Episode Summary     INR check location     Preferred lab     Send INR reminders to [facility]    Comments HIPPA INFO OK to speak with wife Josselyn OK to leave messages on Home.work and cell phones  use cell phone #[phone]      Anticoagulation Care Providers     Provider Role Specialty Phone number    Samm Vazquez MD [facility] Internal Medicine [phone]            See the Encounter Report to view  Anticoagulation Flowsheet and Dosing Calendar (Go to Encounters tab in chart review, and find the Anticoagulation Therapy Visit)    Left message for patient with results and dosing recommendations. Asked patient to call back to report any missed doses, falls, signs and symptoms of bleeding or clotting, any changes in health, medication, or diet. Asked patient to call back with any questions or concerns.     Changed maintenance dose, but emphasized pt to call us if he has not been follow our recommendations or if he has been missing doses. Unable to call home ph# due to not accepting calls    Liliam Hyman RN

## 2017-12-11 ENCOUNTER — ANTICOAGULATION THERAPY VISIT (OUTPATIENT)
Dept: ANTICOAGULATION | Facility: CLINIC | Age: 57
End: 2017-12-11

## 2017-12-11 DIAGNOSIS — Z79.01 LONG-TERM (CURRENT) USE OF ANTICOAGULANTS: ICD-10-CM

## 2017-12-11 DIAGNOSIS — I26.99 PULMONARY EMBOLI (H): ICD-10-CM

## 2017-12-11 DIAGNOSIS — E78.5 HYPERLIPIDEMIA LDL GOAL <130: ICD-10-CM

## 2017-12-11 DIAGNOSIS — I25.10 CAD (CORONARY ARTERY DISEASE): ICD-10-CM

## 2017-12-11 DIAGNOSIS — Z78.9 STATIN INTOLERANCE: ICD-10-CM

## 2017-12-11 DIAGNOSIS — E83.52 HYPERCALCEMIA: ICD-10-CM

## 2017-12-11 DIAGNOSIS — Z95.1 S/P CABG X 4: ICD-10-CM

## 2017-12-11 LAB
CHOLEST SERPL-MCNC: 100 MG/DL
HDLC SERPL-MCNC: 46 MG/DL
INR PPP: 1.75 (ref 0.86–1.14)
LDLC SERPL CALC-MCNC: 28 MG/DL
NONHDLC SERPL-MCNC: 54 MG/DL
PTH-INTACT SERPL-MCNC: 3 PG/ML (ref 12–72)
TRIGL SERPL-MCNC: 127 MG/DL

## 2017-12-11 NOTE — PROGRESS NOTES
ANTICOAGULATION FOLLOW-UP CLINIC VISIT    Patient Name:  Oswaldo Prabhakar  Date:  12/11/2017  Contact Type:  Telephone    SUBJECTIVE:     Patient Findings     Positives Med error (Oswaldo reports he has been taking warfarin 7.5 mg TuSa and 5 mg all other days of the week.  Recommended dose was 5 mg TuThSu and 7.5 other 4 days of the week)           OBJECTIVE    INR   Date Value Ref Range Status   12/11/2017 1.75 (H) 0.86 - 1.14 Final     Chromogenic Factor 10   Date Value Ref Range Status   06/18/2011 81 60 - 140 % Final     Comment:     Therapeutic Range: A Chromogenic Factor 10 level of 20-40% inversely   correlates   with an INR of 2-3 for patients receiving Warfarin. Chromogenic Factor 10   levels below 20% indicate an INR greater than 3, and levels above 40%   indicate   an INR less than 2.     Factor 2 Assay   Date Value Ref Range Status   04/04/2012  60 - 140 % Final    (Note)  CANCELLED AND CREDITED PER APPLE IN MED. MONITORING,4/4/12,       ASSESSMENT / PLAN  INR assessment SUB    Recheck INR In: 1 WEEK    INR Location Clinic      Anticoagulation Summary as of 12/11/2017     INR goal 2.0-3.0   Today's INR    Maintenance plan 5 mg (5 mg x 1) on Sun, Tue, Thu; 7.5 mg (5 mg x 1.5) all other days   Full instructions 5 mg on Sun, Tue, Thu; 7.5 mg all other days   Weekly total 45 mg   No change documented Marielena Chacon RN   Plan last modified Liliam Hyman RN (11/17/2017)   Next INR check 12/18/2017   Priority INR   Target end date Indefinite    Indications   Pulmonary emboli (H) [I26.99]  Long-term (current) use of anticoagulants [Z79.01] [Z79.01]         Anticoagulation Episode Summary     INR check location     Preferred lab     Send INR reminders to [facility]    Comments HIPPA INFO OK to speak with wife Josselyn OK to leave messages on Home.work and cell phones  use cell phone #[phone]      Anticoagulation Care Providers     Provider Role Specialty Phone number    Samm Vazquez MD  [facility] Internal Medicine [phone]            See the Encounter Report to view Anticoagulation Flowsheet and Dosing Calendar (Go to Encounters tab in chart review, and find the Anticoagulation Therapy Visit)    Spoke with Oswaldo.  He reports he has been taking warfarin 7.5 mg TuSa and 5 mg all other days of the week instead of the recommended 5 mg TuThSu and 7.5 mg other 4 days of the week.  He will take recommended dosing for one week and check INR.      Marielena Chacon RN

## 2017-12-11 NOTE — MR AVS SNAPSHOT
Oswaldo Prabhakar   12/11/2017   Anticoagulation Therapy Visit    Description:  57 year old male   Provider:  Marielena Chacon, RN   [facility]           INR as of 12/11/2017     Today's INR       Anticoagulation Summary as of 12/11/2017     INR goal 2.0-3.0   Today's INR    Full instructions 5 mg on Sun, Tue, Thu; 7.5 mg all other days   Next INR check 12/18/2017    Indications   Pulmonary emboli (H) [I26.99]  Long-term (current) use of anticoagulants [Z79.01] [Z79.01]         December 2017 Details    Sun Mon Tue Wed Thu Fri Sat          1               2                 3               4               5               6               7               8               9                 10               11      7.5 mg   See details      12      5 mg         13      7.5 mg         14      5 mg         15      7.5 mg         16      7.5 mg           17      5 mg         18            19               20               21               22               23                 24               25               26               27               28               29               30                 31                      Date Details   12/11 This INR check       Date of next INR:  12/18/2017         How to take your warfarin dose     To take:  5 mg Take 1 of the 5 mg tablets.    To take:  7.5 mg Take 1.5 of the 5 mg tablets.

## 2017-12-14 ENCOUNTER — OFFICE VISIT (OUTPATIENT)
Dept: URGENT CARE | Facility: URGENT CARE | Age: 57
End: 2017-12-14
Payer: COMMERCIAL

## 2017-12-14 VITALS
WEIGHT: 205 LBS | BODY MASS INDEX: 28.59 KG/M2 | DIASTOLIC BLOOD PRESSURE: 84 MMHG | HEART RATE: 66 BPM | SYSTOLIC BLOOD PRESSURE: 132 MMHG | RESPIRATION RATE: 12 BRPM | TEMPERATURE: 98.2 F | OXYGEN SATURATION: 99 %

## 2017-12-14 DIAGNOSIS — N20.0 KIDNEY STONE: Primary | ICD-10-CM

## 2017-12-14 DIAGNOSIS — R10.819 LEFT FLANK TENDERNESS: ICD-10-CM

## 2017-12-14 LAB
ALBUMIN UR-MCNC: NEGATIVE MG/DL
APPEARANCE UR: CLEAR
BILIRUB UR QL STRIP: NEGATIVE
COLOR UR AUTO: YELLOW
GLUCOSE UR STRIP-MCNC: NEGATIVE MG/DL
HGB UR QL STRIP: ABNORMAL
KETONES UR STRIP-MCNC: NEGATIVE MG/DL
LEUKOCYTE ESTERASE UR QL STRIP: NEGATIVE
NITRATE UR QL: NEGATIVE
NON-SQ EPI CELLS #/AREA URNS LPF: ABNORMAL /LPF
PH UR STRIP: 7 PH (ref 5–7)
RBC #/AREA URNS AUTO: >100 /HPF
SOURCE: ABNORMAL
SP GR UR STRIP: 1.02 (ref 1–1.03)
UROBILINOGEN UR STRIP-ACNC: 0.2 EU/DL (ref 0.2–1)
WBC #/AREA URNS AUTO: ABNORMAL /HPF

## 2017-12-14 PROCEDURE — 87086 URINE CULTURE/COLONY COUNT: CPT | Performed by: FAMILY MEDICINE

## 2017-12-14 PROCEDURE — 81001 URINALYSIS AUTO W/SCOPE: CPT | Performed by: FAMILY MEDICINE

## 2017-12-14 PROCEDURE — 99214 OFFICE O/P EST MOD 30 MIN: CPT | Performed by: FAMILY MEDICINE

## 2017-12-14 RX ORDER — OXYCODONE AND ACETAMINOPHEN 5; 325 MG/1; MG/1
1-2 TABLET ORAL EVERY 6 HOURS PRN
Qty: 12 TABLET | Refills: 0 | Status: SHIPPED | OUTPATIENT
Start: 2017-12-14 | End: 2018-01-04

## 2017-12-14 RX ORDER — TAMSULOSIN HYDROCHLORIDE 0.4 MG/1
0.4 CAPSULE ORAL DAILY
Qty: 30 CAPSULE | Refills: 0 | Status: SHIPPED | OUTPATIENT
Start: 2017-12-14 | End: 2018-03-29

## 2017-12-14 NOTE — MR AVS SNAPSHOT
"              After Visit Summary   12/14/2017    Oswaldo Prabhakar    MRN: [id number]           Patient Information     Date Of Birth          1960        Visit Information        Provider Department      12/14/2017 6:35 PM Jose Acharya MD [facility]        Today's Diagnoses     Kidney stone    -  1    Left flank tenderness          Care Instructions    Take flomax daily to help with passing of probable kidney stone.  Okay to take percocet for pain.    If worsening pain, go to ER.       * KIDNEY STONE (w/ Colic)    The sharp cramping pain and nausea/vomiting that you have is due to a small stone which has formed in the kidney and is now passing down a narrow tube (ureter) on its way to your bladder. Once it reaches your bladder, the pain will stop. The stone may pass in your urine stream in one piece. [The size may be 1/16\" to 1/4\" (1-6mm)]. Or, the stone may also break up into riya fragments which you may not even notice.  Once you have had a kidney stone there is a risk for recurrence in the future.  HOME CARE:    Drink lots of fluid (at least 8-10 glasses of water a day).    Most stones will pass on their own, but may take from a few hours to a few days.    Each time you urinate, do so in a jar. Pour the urine from the jar through the strainer and into the toilet. Continue doing this until 24 hours after your pain stops. By then, if there was a kidney stone, it should pass from your bladder. Some stones dissolve into sand-like particles and pass right through the strainer. In that case, you won't ever see a stone.    Save any stone that you find in the strainer and bring it to your doctor for analysis. It may be possible to prevent certain types of stones from forming. Therefore, it is important to know what kind of stone you have.    Try to stay as active as possible since this will help the stone pass. Do not stay in bed unless your pain prevents you from getting up. You may notice a red, " pink or brown color to your urine. This is normal while passing a kidney stone.  FOLLOW UP with your doctor or return to this facility if the pain lasts more than 48 hours.  GET PROMPT MEDICAL ATTENTION if any of the following occur:    Pain that is not controlled by the medicine given    Repeated vomiting or unable to keep down fluids    Weakness, dizziness or fainting    Fever over 101  F (38.3  C)    Passage of solid red or brown urine (can't see through it) or urine with lots of blood clots    Unable to pass urine for 8 hours and increasing bladder pressure    1300-3705 [employer]. [address] [place] [id number]. All rights reserved. This information is not intended as a substitute for professional medical care. Always follow your healthcare professional's instructions.  This information has been modified by your health care provider with permission from the publisher.            Follow-ups after your visit        Who to contact     If you have questions or need follow up information about today's clinic visit or your schedule please contact [facility] directly at [phone].  Normal or non-critical lab and imaging results will be communicated to you by [employer], letter or phone within 4 business days after the clinic has received the results. If you do not hear from us within 7 days, please contact the clinic through [employer] or phone. If you have a critical or abnormal lab result, we will notify you by phone as soon as possible.  Submit refill requests through [employer] or call your pharmacy and they will forward the refill request to us. Please allow 3 business days for your refill to be completed.          Additional Information About Your Visit        [employer] Information     [employer] gives you secure access to your electronic health record. If you see a primary care provider, you can also send messages to your care team and make appointments. If you have questions,  please call your primary care clinic.  If you do not have a primary care provider, please call [phone] and they will assist you.        Care EveryWhere ID     This is your Care EveryWhere ID. This could be used by other organizations to access your [place] medical records  [id number]        Your Vitals Were     Pulse Temperature Respirations Pulse Oximetry BMI (Body Mass Index)       66 98.2  F (36.8  C) (Oral) 12 99% 28.59 kg/m2        Blood Pressure from Last 3 Encounters:   12/14/17 132/84   09/28/17 135/85   08/11/17 134/83    Weight from Last 3 Encounters:   12/14/17 205 lb (93 kg)   09/28/17 213 lb (96.6 kg)   08/11/17 211 lb 9.6 oz (96 kg)              We Performed the Following     *UA reflex to Microscopic and Culture ([facility] (except [place] and [place])     Urine Culture Aerobic Bacterial     Urine Microscopic          Today's Medication Changes          These changes are accurate as of: 12/14/17  7:48 PM.  If you have any questions, ask your nurse or doctor.               Start taking these medicines.        Dose/Directions    oxyCODONE-acetaminophen 5-325 MG per tablet   Commonly known as:  PERCOCET   Used for:  Left flank tenderness, Kidney stone   Started by:  Jose Acharya MD        Dose:  1-2 tablet   Take 1-2 tablets by mouth every 6 hours as needed for pain   Quantity:  12 tablet   Refills:  0       tamsulosin 0.4 MG capsule   Commonly known as:  FLOMAX   Used for:  Kidney stone   Started by:  Jose Acharya MD        Dose:  0.4 mg   Take 1 capsule (0.4 mg) by mouth daily   Quantity:  30 capsule   Refills:  0         Stop taking these medicines if you haven't already. Please contact your care team if you have questions.     predniSONE 5 MG tablet   Commonly known as:  DELTASONE   Stopped by:  Jose Acharya MD                Where to get your medicines      These medications were sent to [facility] [address] [place] [facility] [address] [facility]  [address] [facility]  [place] [address]     Phone:  [phone]     tamsulosin 0.4 MG capsule         Some of these will need a paper prescription and others can be bought over the counter.  Ask your nurse if you have questions.     Bring a paper prescription for each of these medications     oxyCODONE-acetaminophen 5-325 MG per tablet                Primary Care Provider Office Phone # Fax #    Samm Vazquez [phone]       [address] [place] [address] [address]  [facility] [address]        Equal Access to Services     SURESH ZURITA : Hadii aad ku hadasho Soomaali, waaxda luqadaha, qaybta kaalmada adeegyada, waxay idiin hayaan adeeg kharash laguillermina . So [facility] [place] [phone].    ATENCIÓN: Si habla español, tiene a krueger disposición servicios gratuitos de asistencia lingüística. [facility] [phone].    We comply with applicable federal civil rights laws and [place] laws. We do not discriminate on the basis of race, color, national origin, age, disability, sex, sexual orientation, or gender identity.            Thank you!     Thank you for choosing [facility]  for your care. Our goal is always to provide you with excellent care. Hearing back from our patients is one way we can continue to improve our services. Please take a few minutes to complete the written survey that you may receive in the mail after your visit with us. Thank you!             Your Updated Medication List - Protect others around you: Learn how to safely use, store and throw away your medicines at www.disposemymeds.org.          This list is accurate as of: 12/14/17  7:48 PM.  Always use your most recent med list.                   Brand Name Dispense Instructions for use Diagnosis    albuterol 108 (90 BASE) MCG/ACT Inhaler    PROAIR HFA/PROVENTIL HFA/VENTOLIN HFA    1 Inhaler    Inhale 2 puffs into the lungs every 6 hours as needed for shortness of breath / dyspnea or wheezing    Pneumonia of both lungs due to infectious organism, unspecified part of lung        alirocumab 75 MG/ML injectable pen    PRALUENT    2 mL    Inject 1 mL (75 mg) Subcutaneous every 14 days    CAD (coronary artery disease), S/P CABG x 4, Hyperlipidemia LDL goal <130, Statin intolerance       aspirin EC 81 MG EC tablet     91 tablet    Take 1 tablet (81 mg) by mouth daily    S/P CABG x 4       atorvastatin 80 MG tablet    LIPITOR    91 tablet    Take 1 tablet (80 mg) by mouth daily    Pure hyperglyceridemia, Hyperlipidemia LDL goal <130       BENADRYL PO      Take 25-50 mg by mouth daily as needed        calcium carbonate 500 MG chewable tablet    TUMS     Take 1 chew tab by mouth daily as needed        ezetimibe 10 MG tablet    ZETIA    90 tablet    Take 1 tablet (10 mg) by mouth daily    Statin intolerance, Hyperlipidemia LDL goal <70, Atherosclerosis of coronary artery of native heart without angina pectoris, unspecified vessel or lesion type, S/P CABG (coronary artery bypass graft)       fenofibrate 160 MG tablet     91 tablet    Take 1 tablet (160 mg) by mouth daily    Hyperlipidemia LDL goal <130       fluticasone 50 MCG/ACT spray    FLONASE    1 Bottle    Spray 2 sprays into both nostrils daily    Dysfunction of Eustachian tube, bilateral       fluticasone-salmeterol 250-50 MCG/DOSE diskus inhaler    ADVAIR    60 Inhaler    Inhale 1 puff into the lungs 2 times daily    Wheezing, Pneumonia due to infectious organism, unspecified laterality, unspecified part of lung       lisinopril 2.5 MG tablet    PRINIVIL/Zestril    91 tablet    Take 1 tablet (2.5 mg) by mouth daily , or as directed by Dr. Ocampo.    Essential hypertension       metoprolol 25 MG tablet    LOPRESSOR    90 tablet    Take 0.5 tablets (12.5 mg) by mouth 2 times daily    S/P CABG x 4       oxyCODONE-acetaminophen 5-325 MG per tablet    PERCOCET    12 tablet    Take 1-2 tablets by mouth every 6 hours as needed for pain    Left flank tenderness, Kidney stone       tamsulosin 0.4 MG capsule    FLOMAX    30 capsule    Take 1 capsule  (0.4 mg) by mouth daily    Kidney stone       vitamin B complex with vitamin C Tabs tablet      Take 1 tablet by mouth daily        vitamin D 2000 UNITS Caps      Take 2,000 Units by mouth daily    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 7.5 mg on Mon, Wed, Fri and 5 mg daily the rest of the week.    Long term (current) use of anticoagulants       * Notice:  This list has 2 medication(s) that are the same as other medications prescribed for you. Read the directions carefully, and ask your doctor or other care provider to review them with you.

## 2017-12-15 NOTE — NURSING NOTE
"Chief Complaint   Patient presents with     Urgent Care     Kidney Problem     Has had some pain for a couple days.  Pain on left lower back  for a couple days and pain increased today.  Lots of nausea today. He has had kidney stones before and thinks this is similar.       Initial /84 (BP Location: Right arm, Patient Position: Sitting, Cuff Size: Adult Regular)  Pulse 66  Temp 98.2  F (36.8  C) (Oral)  Resp 12  Wt 205 lb (93 kg)  SpO2 99%  BMI 28.59 kg/m2 Estimated body mass index is 28.59 kg/(m^2) as calculated from the following:    Height as of 9/28/17: 5' 11\" (1.803 m).    Weight as of this encounter: 205 lb (93 kg)..  BP completed using cuff size: regular  Amaya Kamara R.N.    "

## 2017-12-15 NOTE — PATIENT INSTRUCTIONS
"Take flomax daily to help with passing of probable kidney stone.  Okay to take percocet for pain.    If worsening pain, go to ER.       * KIDNEY STONE (w/ Colic)    The sharp cramping pain and nausea/vomiting that you have is due to a small stone which has formed in the kidney and is now passing down a narrow tube (ureter) on its way to your bladder. Once it reaches your bladder, the pain will stop. The stone may pass in your urine stream in one piece. [The size may be 1/16\" to 1/4\" (1-6mm)]. Or, the stone may also break up into riya fragments which you may not even notice.  Once you have had a kidney stone there is a risk for recurrence in the future.  HOME CARE:    Drink lots of fluid (at least 8-10 glasses of water a day).    Most stones will pass on their own, but may take from a few hours to a few days.    Each time you urinate, do so in a jar. Pour the urine from the jar through the strainer and into the toilet. Continue doing this until 24 hours after your pain stops. By then, if there was a kidney stone, it should pass from your bladder. Some stones dissolve into sand-like particles and pass right through the strainer. In that case, you won't ever see a stone.    Save any stone that you find in the strainer and bring it to your doctor for analysis. It may be possible to prevent certain types of stones from forming. Therefore, it is important to know what kind of stone you have.    Try to stay as active as possible since this will help the stone pass. Do not stay in bed unless your pain prevents you from getting up. You may notice a red, pink or brown color to your urine. This is normal while passing a kidney stone.  FOLLOW UP with your doctor or return to this facility if the pain lasts more than 48 hours.  GET PROMPT MEDICAL ATTENTION if any of the following occur:    Pain that is not controlled by the medicine given    Repeated vomiting or unable to keep down fluids    Weakness, dizziness or " fainting    Fever over 101  F (38.3  C)    Passage of solid red or brown urine (can't see through it) or urine with lots of blood clots    Unable to pass urine for 8 hours and increasing bladder pressure    [id number] [employer]. [address] [place] [address], [place] [address]. All rights reserved. This information is not intended as a substitute for professional medical care. Always follow your healthcare professional's instructions.  This information has been modified by your health care provider with permission from the publisher.

## 2017-12-15 NOTE — PROGRESS NOTES
SUBJECTIVE:   Oswaldo Prabhakar is a 57 year old male presenting with a chief complaint of possible kidney stone.    Patient with history of kidney stones, last one was about 5-7 years ago.  Patient states that feels similar, developed severe left sided flank pain earlier today, rated pain 8-9/10.  Endorsed nausea.  Denies any fever, has not noted blood in urine.  Patient states that his is on coumadin and is concerned that if there is bleeding that may have more problems.  BM normal.  Patient states that had to have surgery to remove stone in the past.  Patient states that is feeling better from this morning.    Past Medical History:   Diagnosis Date     Antiplatelet or antithrombotic long-term use      Diaphragmatic hernia without mention of obstruction or gangrene      Heart disease      Hypertension      Nephrolithiasis 4/2010     Other and unspecified hyperlipidemia      Pulmonary embolus and infarction (H)     s/p hemothorax and filter s/p filter removal (2011), now on long term anti-coagulation     Statin intolerance 2/1/2017     Stented coronary artery      Unspecified retinal detachment     Childhood trauma, unrepaired     Current Outpatient Prescriptions   Medication Sig Dispense Refill     alirocumab (PRALUENT) 75 MG/ML injectable pen Inject 1 mL (75 mg) Subcutaneous every 14 days 2 mL 12     fenofibrate 160 MG tablet Take 1 tablet (160 mg) by mouth daily 91 tablet 3     ezetimibe (ZETIA) 10 MG tablet Take 1 tablet (10 mg) by mouth daily 90 tablet 3     fluticasone-salmeterol (ADVAIR) 250-50 MCG/DOSE diskus inhaler Inhale 1 puff into the lungs 2 times daily 60 Inhaler 11     metoprolol (LOPRESSOR) 25 MG tablet Take 0.5 tablets (12.5 mg) by mouth 2 times daily 90 tablet 3     fluticasone (FLONASE) 50 MCG/ACT spray Spray 2 sprays into both nostrils daily 1 Bottle 1     albuterol (PROAIR HFA/PROVENTIL HFA/VENTOLIN HFA) 108 (90 BASE) MCG/ACT Inhaler Inhale 2 puffs into the lungs every 6 hours as needed for  shortness of breath / dyspnea or wheezing 1 Inhaler 1     warfarin (COUMADIN) 5 MG tablet As directed. 7.5 mg on Mon, Wed, Fri and 5 mg daily the rest of the week. 145 tablet 6     vitamin B complex with vitamin C (VITAMIN  B COMPLEX) TABS tablet Take 1 tablet by mouth daily       WARFARIN SODIUM PO Take 7.5 mg by mouth       lisinopril (PRINIVIL/ZESTRIL) 2.5 MG tablet Take 1 tablet (2.5 mg) by mouth daily , or as directed by Dr. Ocampo. 91 tablet 1     atorvastatin (LIPITOR) 80 MG tablet Take 1 tablet (80 mg) by mouth daily 91 tablet 3     Cholecalciferol (VITAMIN D) 2000 UNITS CAPS Take 2,000 Units by mouth daily       aspirin EC 81 MG tablet Take 1 tablet (81 mg) by mouth daily 91 tablet 3     calcium carbonate (TUMS) 500 MG chewable tablet Take 1 chew tab by mouth daily as needed       DiphenhydrAMINE HCl (BENADRYL PO) Take 25-50 mg by mouth daily as needed       Social History   Substance Use Topics     Smoking status: Never Smoker     Smokeless tobacco: Never Used     Alcohol use No      Comment: very rare       ROS:  CONSTITUTIONAL:NEGATIVE for fever, chills, change in weight  INTEGUMENTARY/SKIN: NEGATIVE for worrisome rashes, moles or lesions  ENT/MOUTH: NEGATIVE for ear, mouth and throat problems  RESP:NEGATIVE for significant cough or SOB  CV: NEGATIVE for chest pain, palpitations or peripheral edema  GI: NEGATIVE for nausea, abdominal pain, heartburn, or change in bowel habits and POSITIVE for nausea  : positive for    OBJECTIVE:  /84 (BP Location: Right arm, Patient Position: Sitting, Cuff Size: Adult Regular)  Pulse 66  Temp 98.2  F (36.8  C) (Oral)  Resp 12  Wt 205 lb (93 kg)  SpO2 99%  BMI 28.59 kg/m2  GENERAL APPEARANCE: healthy, alert and mild distress  RESP: lungs clear to auscultation - no rales, rhonchi or wheezes  CV: regular rates and rhythm, normal S1 S2, no murmur noted  ABDOMEN:  soft, nontender, no HSM or masses and bowel sounds normal  BACK: mild left flank tenderness  SKIN:  no suspicious lesions or rashes  PSYCH: mentation appears normal and affect normal/bright    Results for orders placed or performed in visit on 12/14/17   *UA reflex to Microscopic and Culture ([facility] (except [place] and [place])   Result Value Ref Range    Color Urine Yellow     Appearance Urine Clear     Glucose Urine Negative NEG^Negative mg/dL    Bilirubin Urine Negative NEG^Negative    Ketones Urine Negative NEG^Negative mg/dL    Specific Gravity Urine 1.020 1.003 - 1.035    Blood Urine Large (A) NEG^Negative    pH Urine 7.0 5.0 - 7.0 pH    Protein Albumin Urine Negative NEG^Negative mg/dL    Urobilinogen Urine 0.2 0.2 - 1.0 EU/dL    Nitrite Urine Negative NEG^Negative    Leukocyte Esterase Urine Negative NEG^Negative    Source Midstream Urine    Urine Microscopic   Result Value Ref Range    WBC Urine O - 2 OTO2^O - 2 /HPF    RBC Urine >100 (A) OTO2^O - 2 /HPF    Squamous Epithelial /LPF Urine Few FEW^Few /LPF       ASSESSMENT/PLAN:  (N20.0) Kidney stone  (primary encounter diagnosis)  Plan: tamsulosin (FLOMAX) 0.4 MG capsule,         oxyCODONE-acetaminophen (PERCOCET) 5-325 MG per        tablet            (R10.819) Left flank tenderness  Comment: probable kidney stone  Plan: *UA reflex to Microscopic and Culture ([facility] (except [place] and         [place]), Urine Microscopic, Urine Culture         Aerobic Bacterial, oxyCODONE-acetaminophen         (PERCOCET) 5-325 MG per tablet            Reviewed initial UA with positive for blood and most likely kidney stone.  RX flomax given to see if this will help him pass stone.  Small quantity of percocet 5/325 mg #12 given for pain control.  Will send urine culture to assess for possible infection and if positive, will treat.  Discussed that if not able to pass stone that will most likely need CT scan, can follow up with primary for this otherwise go to ER if worsening symptoms.    Follow up with primary if no  resolution of symptoms.    Jose Acharya MD

## 2017-12-16 LAB
BACTERIA SPEC CULT: NORMAL
SPECIMEN SOURCE: NORMAL

## 2018-01-02 ENCOUNTER — ANTICOAGULATION THERAPY VISIT (OUTPATIENT)
Dept: ANTICOAGULATION | Facility: CLINIC | Age: 58
End: 2018-01-02

## 2018-01-02 DIAGNOSIS — I26.99 PULMONARY EMBOLI (H): ICD-10-CM

## 2018-01-02 DIAGNOSIS — Z79.01 LONG-TERM (CURRENT) USE OF ANTICOAGULANTS: ICD-10-CM

## 2018-01-02 LAB — INR PPP: 1.65 (ref 0.86–1.14)

## 2018-01-02 NOTE — PROGRESS NOTES
ANTICOAGULATION FOLLOW-UP CLINIC VISIT    Patient Name:  Oswaldo Prabhakar  Date:  1/2/2018  Contact Type:  Telephone    SUBJECTIVE:     Patient Findings     Positives Change in medications (Praluent dose increased.)    Comments 2 weeks ago , had a kidney stone, took 2 doses of flomax, and passed the stone.             OBJECTIVE    INR   Date Value Ref Range Status   01/02/2018 1.65 (H) 0.86 - 1.14 Final     Chromogenic Factor 10   Date Value Ref Range Status   06/18/2011 81 60 - 140 % Final     Comment:     Therapeutic Range: A Chromogenic Factor 10 level of 20-40% inversely   correlates   with an INR of 2-3 for patients receiving Warfarin. Chromogenic Factor 10   levels below 20% indicate an INR greater than 3, and levels above 40%   indicate   an INR less than 2.     Factor 2 Assay   Date Value Ref Range Status   04/04/2012  60 - 140 % Final    (Note)  CANCELLED AND CREDITED PER [facility]. MONITORING,4/4/12,       ASSESSMENT / PLAN  INR assessment SUB    Recheck INR In: 1 WEEK    INR Location Clinic      Anticoagulation Summary as of 1/2/2018     INR goal 2.0-3.0   Today's INR 1.65!   Maintenance plan 5 mg (5 mg x 1) on Sun, Tue, Thu; 7.5 mg (5 mg x 1.5) all other days   Full instructions 1/2: 7.5 mg; 1/3: 5 mg; 1/4: 7.5 mg; 1/5: 5 mg; 1/7: 7.5 mg; 1/8: 5 mg; Otherwise 5 mg on Sun, Tue, Thu; 7.5 mg all other days   Weekly total 45 mg   Plan last modified Liliam Hyman, RN (11/17/2017)   Next INR check 1/9/2018   Priority INR   Target end date Indefinite    Indications   Pulmonary emboli (H) [I26.99]  Long-term (current) use of anticoagulants [Z79.01] [Z79.01]         Anticoagulation Episode Summary     INR check location     Preferred lab     Send INR reminders to [facility]    Comments HIPPA INFO OK to speak with wife Josselyn OK to leave messages on Home.work and cell phones  use cell phone #[phone]      Anticoagulation Care Providers     Provider Role Specialty Phone number    Samm Vazquez,  MD [facility] Internal Medicine [phone]            See the Encounter Report to view Anticoagulation Flowsheet and Dosing Calendar (Go to Encounters tab in chart review, and find the Anticoagulation Therapy Visit)  Spoke with patient.    Nano Greer RN

## 2018-01-02 NOTE — MR AVS SNAPSHOT
Oswaldo Prabhakar   1/2/2018   Anticoagulation Therapy Visit    Description:  57 year old male   Provider:  Nano Greer RN   Department:  [facility]           INR as of 1/2/2018     Today's INR 1.65!      Anticoagulation Summary as of 1/2/2018     INR goal 2.0-3.0   Today's INR 1.65!   Full instructions 1/2: 7.5 mg; 1/3: 5 mg; 1/4: 7.5 mg; 1/5: 5 mg; 1/7: 7.5 mg; 1/8: 5 mg; Otherwise 5 mg on Sun, Tue, Thu; 7.5 mg all other days   Next INR check 1/9/2018    Indications   Pulmonary emboli (H) [I26.99]  Long-term (current) use of anticoagulants [Z79.01] [Z79.01]         January 2018 Details    Sun Mon Tue Wed Thu Fri Sat      1               2      7.5 mg   See details      3      5 mg         4      7.5 mg         5      5 mg         6      7.5 mg           7      7.5 mg         8      5 mg         9            10               11               12               13                 14               15               16               17               18               19               20                 21               22               23               24               25               26               27                 28               29               30               31                   Date Details   01/02 This INR check       Date of next INR:  1/9/2018         How to take your warfarin dose     To take:  5 mg Take 1 of the 5 mg tablets.    To take:  7.5 mg Take 1.5 of the 5 mg tablets.

## 2018-01-04 ENCOUNTER — OFFICE VISIT (OUTPATIENT)
Dept: FAMILY MEDICINE | Facility: CLINIC | Age: 58
End: 2018-01-04
Payer: COMMERCIAL

## 2018-01-04 VITALS
SYSTOLIC BLOOD PRESSURE: 130 MMHG | HEART RATE: 52 BPM | RESPIRATION RATE: 16 BRPM | DIASTOLIC BLOOD PRESSURE: 82 MMHG | BODY MASS INDEX: 29.12 KG/M2 | HEIGHT: 71 IN | OXYGEN SATURATION: 99 % | TEMPERATURE: 97.3 F | WEIGHT: 208 LBS

## 2018-01-04 DIAGNOSIS — R10.9 FLANK PAIN: ICD-10-CM

## 2018-01-04 DIAGNOSIS — N20.0 KIDNEY STONE: Primary | ICD-10-CM

## 2018-01-04 LAB
BILIRUBIN UR: NEGATIVE MG/DL
BLOOD UR: NEGATIVE MG/DL
CLARITY, URINE: CLEAR
COLOR UR: YELLOW
GLUCOSE URINE: NEGATIVE MG/DL
KETONES UR QL: NEGATIVE MG/DL
LEUKOCYTE ESTERASE UR: NEGATIVE U/L
NITRITE UR QL STRIP: NEGATIVE MG/DL
PH UR STRIP: 7 [PH] (ref 5–7)
PROTEIN UR: NEGATIVE MG/DL
SP GR UR STRIP: 1.01
UROBILINOGEN UR STRIP-ACNC: NORMAL E.U./DL

## 2018-01-04 RX ORDER — TRAMADOL HYDROCHLORIDE 50 MG/1
50-100 TABLET ORAL EVERY 6 HOURS PRN
Qty: 40 TABLET | Refills: 0 | Status: SHIPPED | OUTPATIENT
Start: 2018-01-04 | End: 2018-03-29

## 2018-01-04 ASSESSMENT — PAIN SCALES - GENERAL: PAINLEVEL: NO PAIN (1)

## 2018-01-04 NOTE — NURSING NOTE
"57 year old  Chief Complaint   Patient presents with     Kidney Stone Related     Kidney stone brought one with from 12/17/2017 from left side. Has another one coming from right side. Pain Scale 1/10.       Blood pressure 130/82, pulse 52, temperature 97.3  F (36.3  C), temperature source Oral, resp. rate 16, height 5' 10.5\" (179.1 cm), weight 208 lb (94.3 kg), SpO2 99 %. Body mass index is 29.42 kg/(m^2).  BP completed using cuff size: regular    Patient Active Problem List   Diagnosis     Retinal detachment     HYPERLIPIDEMIA LDL GOAL <130     Pulmonary emboli (H)     Pure hyperglyceridemia     Calculus of kidney     Warfarin anticoagulation     Atypical chest pain     S/P CABG x 4     CAD (coronary artery disease)     Finger stiffness, left     Fracture of phalanx of finger     Long-term (current) use of anticoagulants [Z79.01]     Statin intolerance       Wt Readings from Last 2 Encounters:   01/04/18 208 lb (94.3 kg)   12/14/17 205 lb (93 kg)     BP Readings from Last 3 Encounters:   01/04/18 130/82   12/14/17 132/84   09/28/17 135/85       Allergies   Allergen Reactions     Cats      Hay Fever & [A.R.M.]      Heparin Other (See Comments)     Heparin resistance per cardio-thoracic surgeon Dr. Armando     Hydrocodone-Acetaminophen Nausea and Vomiting       Current Outpatient Prescriptions   Medication     tamsulosin (FLOMAX) 0.4 MG capsule     alirocumab (PRALUENT) 75 MG/ML injectable pen     fenofibrate 160 MG tablet     ezetimibe (ZETIA) 10 MG tablet     fluticasone-salmeterol (ADVAIR) 250-50 MCG/DOSE diskus inhaler     metoprolol (LOPRESSOR) 25 MG tablet     fluticasone (FLONASE) 50 MCG/ACT spray     albuterol (PROAIR HFA/PROVENTIL HFA/VENTOLIN HFA) 108 (90 BASE) MCG/ACT Inhaler     warfarin (COUMADIN) 5 MG tablet     vitamin B complex with vitamin C (VITAMIN  B COMPLEX) TABS tablet     WARFARIN SODIUM PO     lisinopril (PRINIVIL/ZESTRIL) 2.5 MG tablet     atorvastatin (LIPITOR) 80 MG tablet     " Cholecalciferol (VITAMIN D) 2000 UNITS CAPS     aspirin EC 81 MG tablet     calcium carbonate (TUMS) 500 MG chewable tablet     DiphenhydrAMINE HCl (BENADRYL PO)     No current facility-administered medications for this visit.      Facility-Administered Medications Ordered in Other Visits   Medication     heparin (porcine) injection     aminocaproic acid (AMICAR) drip       Social History   Substance Use Topics     Smoking status: Never Smoker     Smokeless tobacco: Never Used     Alcohol use No      Comment: very rare       Health Maintenance Due   Topic Date Due     OP ANNUAL INR REFERRAL  04/21/2017     ADVANCE DIRECTIVE PLANNING Q5 YRS  09/02/2017       No results found for: PAP    PHQ-2 ( 1999 [employer]) 1/4/2018 7/5/2012   Q1: Little interest or pleasure in doing things 0 0   Q2: Feeling down, depressed or hopeless 0 0   PHQ-2 Score 0 0       No flowsheet data found.    No flowsheet data found.    No flowsheet data found.    Caitlyn Hernandez CMA  January 4, 2018 1:05 PM

## 2018-01-04 NOTE — PROGRESS NOTES
Oswaldo Prabhakar is a 57 year old male patient of Dr. Samm Abel,  who presents today for right flank pain.  Mr. Prabhakar has a history of complicated kidney stones, requiring surgical intervention at one point. 2 hours ago he noticed right flank pain that feels very much like pain he had with a kidney stone, which he subsequently passed (and has brought with him) in December, just last month.  At that time he had Flomax and pain management with Percocet.  The Percocet was helpful with the pain but made him very nauseated.  He has used tramadol in the past for pain, with relief.      Mr Prabhakar is on warfarin, for a history of PE, has had CABG surgery (4-vessel), cholelithiasis,  he had no symptoms of chest pain, shortness of breath, GI concerns.  No dsyuria; frequency, observable hematuria, pain with urination.   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       Mr Prabhakar brought in the stone that he passed in December.  He has not had it analyzed, he would like us to do that.    Review Of Systems   ROS: 10 point ROS neg other than the symptoms noted above in the HPI.    Past Medical History:   Diagnosis Date     Antiplatelet or antithrombotic long-term use      Diaphragmatic hernia without mention of obstruction or gangrene      Heart disease      Hypertension      Nephrolithiasis 4/2010     Other and unspecified hyperlipidemia      Pulmonary embolus and infarction (H)     s/p hemothorax and filter s/p filter removal (2011), now on long term anti-coagulation     Statin intolerance 2/1/2017     Stented coronary artery      Unspecified retinal detachment     Childhood trauma, unrepaired     Past Surgical History:   Procedure Laterality Date     BYPASS GRAFT ARTERY CORONARY  4/4/2014    Procedure: BYPASS GRAFT ARTERY CORONARY;  Median Sternotomy, Coronary Artery Bypass Bypass x 4 using Left Internal Mammary, Left Saphenous Vein, on pump oxygenation;   "Surgeon: Sherry Armando MD;  Location: [facility] OR     C NONSPECIFIC PROCEDURE      Spermatocele resection     CLOSED REDUCTION, PERCUTANEOUS PINNING  HAND, COMBINED Left 11/5/2015    Procedure: COMBINED CLOSED REDUCTION, PERCUTANEOUS PINNING HAND;  Surgeon: Carmella Love MD;  Location: [facility]     COLONOSCOPY  3/15/2013    Procedure: COLONOSCOPY;;  Surgeon: Racheal Shipman MD;  Location: [facility] GI     LITHOTRIPSY  4/2010     Social History     Social History     Marital status:      Spouse name: N/A     Number of children: N/A     Years of education: N/A     Occupational History     Not on file.     Social History Main Topics     Smoking status: Never Smoker     Smokeless tobacco: Never Used     Alcohol use No      Comment: very rare     Drug use: No     Sexual activity: Not on file     Other Topics Concern     Not on file     Social History Narrative     Family History   Problem Relation Age of Onset     HEART DISEASE Mother      C.A.D. Father      3 vessel CABG, age 70     CANCER Father      bladder cancer     C.A.D. Paternal Grandmother      Hypertension Paternal Grandmother      Alzheimer Disease Paternal Grandmother      DIABETES No family hx of      Thyroid Disease No family hx of      Cancer - colorectal No family hx of        /82 (BP Location: Right arm, Patient Position: Chair, Cuff Size: Adult Regular)  Pulse 52  Temp 97.3  F (36.3  C) (Oral)  Resp 16  Ht 5' 10.5\" (179.1 cm)  Wt 208 lb (94.3 kg)  SpO2 99%  BMI 29.42 kg/m2    Exam:  Constitutional: healthy, alert and no distress  Head: Normocephalic. No masses, lesions, tenderness or abnormalities  Neck: Neck supple. No adenopathy. Thyroid symmetric, normal size,, Carotids without bruits.  Cardiovascular: negative, PMI normal. No lifts, heaves, or thrills. RRR. No murmurs, clicks gallops or rub  Respiratory: negative, Percussion normal. Good diaphragmatic excursion. Lungs clear  Gastrointestinal: Abdomen soft, non-tender. " "BS normal. No masses, organomegaly  : Deferred.  UA normal, no negative hematuria.  ~ 1-2 mm crystalline sharp/pointy stone   Musculoskeletal: extremities normal- no gross deformities noted, gait normal and normal muscle tone.  Pain with deep palpation of along right flank/latissimus dorsi muscle.  Flank also tender, more \"visceral\" feeling with exam with \"pounding\"  Skin: no suspicious lesions or rashes  Psychiatric: mentation appears normal and affect normal/bright    Assessment/Plan:  1. Flank pain      2. Kidney stone    - Stone analysis; Future- sent to lab  - Urinalysis, Micro If (UA) ([facility])  - Urine Culture Aerobic Bacterial  - traMADol (ULTRAM) 50 MG tablet; Take 1-2 tablets ( mg) by mouth every 6 hours as needed for moderate pain  Dispense: 40 tablet; Refill: 0    Patient will re-start Flomax today that he has at home  He will follow up with a phone call tomorrow to let us know his progress, we will discuss further options if not passing stone (if determined to be that)  We sent him to the urology clinic at the [facility] for strainers to strain urine for stones  RTC see Dr. Taylor paulson  "

## 2018-01-04 NOTE — LETTER
January 11, 2018       TO: Oswaldo Prabhakar  [address]  [place] [address]       DearMr.Aki,    We are writing to inform you of your test results.    Your test results fall within the expected range(s) or remain unchanged from previous results.  Please continue with current treatment plan.    Resulted Orders   Urinalysis, Micro If (UA) ([facility])   Result Value Ref Range    Leukocyte Esterase UR Negative Negative U/l    Nitrite Urine Negative Negative mg/dL    Protein UR Negative Negative mg/dL    Glucose Urine Negative Negative mg/dL    Ketones Urine Negative Negative mg/dL    Urobilinogen mg/dL 0.2 E.U./dL 0.2 E.U./dL    Bilirubin UR Negative Negative mg/dL    Blood UR Negative Negative mg/dL    Specific Gravity Urine 1.015 1.005 - 1.030    pH Urine 7.0 4.5 - 8.0    Color Urine Yellow Yellow    Clarity, urine Clear Clear   Urine Culture Aerobic Bacterial   Result Value Ref Range    Specimen Description Unspecified Urine     Special Requests Specimen received in preservative     Culture Micro <10,000 colonies/mL  mixed urogenital francis      Stone analysis   Result Value Ref Range    Stone Composition SEE NOTE       Comment:      (Note)  Calculi composed primarily of:  20% calcium oxalate dihydrate, and  80% calcium phosphate (hydroxy- and carbonate- apatite).  INTERPRETIVE INFORMATION: Calculi (Stone) analysis  Calculi are the products of physiological processes that   yield crystalline compounds in a matrix of biological   compounds and blood.  Matrix components are not reported.    The clinically significant crystalline components   identified in calculi specimens are reported.  Gross   description may not be consistent with composition   determined by FTIR analysis.  Performed by [facility],  [address] [place] [id number] [phone]  [email or web address] Apolinar Sifuentes MD, Lab. Director      Calculi Number 1     Calculi Size 5 to 9 mm    Calculi Description SEE NOTE       Comment:       (Note)  Specimen consists of a single, medium,  tan, irregular calculus.      Stone Mass 28 mg     Just wanted to send this to [facility] for your records.  Please let me know if there is anything I can do for you, let me know how you are doing!    Jennifer

## 2018-01-04 NOTE — MR AVS SNAPSHOT
After Visit Summary   2018    Oswaldo Prabhakar    MRN: [id number]           Patient Information     Date Of Birth          1960        Visit Information        Provider Department      2018 1:00 PM Jennifer Cintron NP [facility] [employer]        Today's Diagnoses     Kidney stone    -  1    Flank pain           Follow-ups after your visit        Future tests that were ordered for you today     Open Future Orders        Priority Expected Expires Ordered    Stone analysis Routine  2019            Who to contact     Please call your clinic at [phone] to:    Ask questions about your health    Make or cancel appointments    Discuss your medicines    Learn about your test results    Speak to your doctor   If you have compliments or concerns about an experience at your clinic, or if you wish to file a complaint, please contact [facility] Patient Relations at [phone] or email us at [email or web address]         Additional Information About Your Visit        MyChart Information     [employer] is an electronic gateway that provides easy, online access to your medical records. With [facility], you can request a clinic appointment, read your test results, renew a prescription or communicate with your care team.     To sign up for [employer] visit the website at [email or web address]   You will be asked to enter the access code listed below, as well as some personal information. Please follow the directions to create your username and password.     Your access code is: [email or web address]  Expires: 2018  2:12 PM     Your access code will  in 90 days. If you need help or a new code, please contact your [facility] or call [phone] for assistance.        Care EveryWhere ID     This is your Care EveryWhere ID. This could be used by other organizations to access your [place] medical records  [id number]       "  Your Vitals Were     Pulse Temperature Respirations Height Pulse Oximetry BMI (Body Mass Index)    52 97.3  F (36.3  C) (Oral) 16 5' 10.5\" (179.1 cm) 99% 29.42 kg/m2       Blood Pressure from Last 3 Encounters:   01/04/18 130/82   12/14/17 132/84   09/28/17 135/85    Weight from Last 3 Encounters:   01/04/18 208 lb (94.3 kg)   12/14/17 205 lb (93 kg)   09/28/17 213 lb (96.6 kg)              We Performed the Following     Urinalysis, Micro If (UA) ([facility])     Urine Culture Aerobic Bacterial          Today's Medication Changes          These changes are accurate as of: 1/4/18  2:12 PM.  If you have any questions, ask your nurse or doctor.               Start taking these medicines.        Dose/Directions    traMADol 50 MG tablet   Commonly known as:  ULTRAM   Used for:  Kidney stone   Started by:  Jennifer Cintron, NP        Dose:   mg   Take 1-2 tablets ( mg) by mouth every 6 hours as needed for moderate pain   Quantity:  40 tablet   Refills:  0            Where to get your medicines      Some of these will need a paper prescription and others can be bought over the counter.  Ask your nurse if you have questions.     Bring a paper prescription for each of these medications     traMADol 50 MG tablet                Primary Care Provider Office Phone # Fax #    Samm Vazquez [phone]       [address] [place] [address] [address] [place] [address]        Equal Access to Services     SURESH ZURITA AH: Hadii peggy sheltono Soginger, waaxda luqadaha, qaybta kaalmada adeegyaisaías, waxay edouard flores. So [facility] [phone].    ATENCIÓN: Si habla español, tiene a krueger disposición servicios gratuitos de asistencia lingüística. Llame al [phone].    We comply with applicable federal civil rights laws and [place] laws. We do not discriminate on the basis of race, color, national origin, age, disability, sex, sexual orientation, or gender identity.            Thank you!     " Thank you for choosing [facility]  for your care. Our goal is always to provide you with excellent care. Hearing back from our patients is one way we can continue to improve our services. Please take a few minutes to complete the written survey that you may receive in the mail after your visit with us. Thank you!             Your Updated Medication List - Protect others around you: Learn how to safely use, store and throw away your medicines at www.disposemymeds.org.          This list is accurate as of: 1/4/18  2:12 PM.  Always use your most recent med list.                   Brand Name Dispense Instructions for use Diagnosis    albuterol 108 (90 BASE) MCG/ACT Inhaler    PROAIR HFA/PROVENTIL HFA/VENTOLIN HFA    1 Inhaler    Inhale 2 puffs into the lungs every 6 hours as needed for shortness of breath / dyspnea or wheezing    Pneumonia of both lungs due to infectious organism, unspecified part of lung       alirocumab 75 MG/ML injectable pen    PRALUENT    2 mL    Inject 1 mL (75 mg) Subcutaneous every 14 days    CAD (coronary artery disease), S/P CABG x 4, Hyperlipidemia LDL goal <130, Statin intolerance       aspirin EC 81 MG EC tablet     91 tablet    Take 1 tablet (81 mg) by mouth daily    S/P CABG x 4       atorvastatin 80 MG tablet    LIPITOR    91 tablet    Take 1 tablet (80 mg) by mouth daily    Pure hyperglyceridemia, Hyperlipidemia LDL goal <130       BENADRYL PO      Take 25-50 mg by mouth daily as needed        calcium carbonate 500 MG chewable tablet    TUMS     Take 1 chew tab by mouth daily as needed        ezetimibe 10 MG tablet    ZETIA    90 tablet    Take 1 tablet (10 mg) by mouth daily    Statin intolerance, Hyperlipidemia LDL goal <70, Atherosclerosis of coronary artery of native heart without angina pectoris, unspecified vessel or lesion type, S/P CABG (coronary artery bypass graft)       fenofibrate 160 MG tablet     91 tablet    Take 1 tablet (160 mg) by mouth daily     Hyperlipidemia LDL goal <130       fluticasone 50 MCG/ACT spray    FLONASE    1 Bottle    Spray 2 sprays into both nostrils daily    Dysfunction of Eustachian tube, bilateral       fluticasone-salmeterol 250-50 MCG/DOSE diskus inhaler    ADVAIR    60 Inhaler    Inhale 1 puff into the lungs 2 times daily    Wheezing, Pneumonia due to infectious organism, unspecified laterality, unspecified part of lung       lisinopril 2.5 MG tablet    PRINIVIL/Zestril    91 tablet    Take 1 tablet (2.5 mg) by mouth daily , or as directed by Dr. Ocampo.    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 7.5 mg on Mon, Wed, Fri and 5 mg daily the rest of the week.    Long term (current) use of anticoagulants       * Notice:  This list has 2 medication(s) that are the same as other medications prescribed for you. Read the directions carefully, and ask your doctor or other care provider to review them with you.

## 2018-01-06 LAB
BACTERIA SPEC CULT: NORMAL
Lab: NORMAL
SPECIMEN SOURCE: NORMAL

## 2018-01-07 LAB
APPEARANCE STONE: NORMAL
COMPN STONE: NORMAL
NUMBER STONE: 1
SIZE STONE: NORMAL MM
WT STONE: 28 MG

## 2018-01-11 DIAGNOSIS — I26.99 PULMONARY EMBOLI (H): ICD-10-CM

## 2018-01-11 DIAGNOSIS — Z79.01 LONG-TERM (CURRENT) USE OF ANTICOAGULANTS: ICD-10-CM

## 2018-01-11 LAB — INR PPP: 2.47 (ref 0.86–1.14)

## 2018-01-12 ENCOUNTER — ANTICOAGULATION THERAPY VISIT (OUTPATIENT)
Dept: ANTICOAGULATION | Facility: CLINIC | Age: 58
End: 2018-01-12

## 2018-01-12 DIAGNOSIS — Z79.01 LONG-TERM (CURRENT) USE OF ANTICOAGULANTS: ICD-10-CM

## 2018-01-12 DIAGNOSIS — I26.99 PULMONARY EMBOLI (H): ICD-10-CM

## 2018-01-12 NOTE — PROGRESS NOTES
ANTICOAGULATION FOLLOW-UP CLINIC VISIT    Patient Name:  Oswaldo Prabhakar  Date:  1/12/2018  Contact Type:  Telephone    SUBJECTIVE:        OBJECTIVE    INR   Date Value Ref Range Status   01/11/2018 2.47 (H) 0.86 - 1.14 Final     Chromogenic Factor 10   Date Value Ref Range Status   06/18/2011 81 60 - 140 % Final     Comment:     Therapeutic Range: A Chromogenic Factor 10 level of 20-40% inversely   correlates   with an INR of 2-3 for patients receiving Warfarin. Chromogenic Factor 10   levels below 20% indicate an INR greater than 3, and levels above 40%   indicate   an INR less than 2.     Factor 2 Assay   Date Value Ref Range Status   04/04/2012  60 - 140 % Final    (Note)  CANCELLED AND CREDITED PER [facility] IN MED. MONITORING,4/4/12,       ASSESSMENT / PLAN  INR assessment THER    Recheck INR In: 2 WEEKS    INR Location Clinic      Anticoagulation Summary as of 1/12/2018     INR goal 2.0-3.0   Today's INR 2.47 (1/11/2018)   Maintenance plan 5 mg (5 mg x 1), then 7.5 mg (5 mg x 1.5) repeating every 2 days   Full instructions 5 mg, then 7.5 mg repeating every 2 days   Average weekly total 43.75 mg   Plan last modified Kwesi Garcia RN (1/12/2018)   Next INR check 1/26/2018   Priority INR   Target end date Indefinite    Indications   Pulmonary emboli (H) [I26.99]  Long-term (current) use of anticoagulants [Z79.01] [Z79.01]         Anticoagulation Episode Summary     INR check location     Preferred lab     Send INR reminders to [facility]    Comments HIPPA INFO OK to speak with wife Josselyn MICHELLE to leave messages on Home.work and cell phones  use cell phone #[phone]      Anticoagulation Care Providers     Provider Role Specialty Phone number    Samm Vazquez MD [facility] Internal Medicine [phone]            See the Encounter Report to view Anticoagulation Flowsheet and Dosing Calendar (Go to Encounters tab in chart review, and find the Anticoagulation Therapy Visit)    Left message for  patient with results and dosing recommendations. Asked patient to call back to report any missed doses, falls, signs and symptoms of bleeding or clotting, any changes in health, medication, or diet. Asked patient to call back with any questions or concerns.    Kwesi Garcia, RN

## 2018-01-12 NOTE — MR AVS SNAPSHOT
Oswaldo Prabhakar   1/12/2018   Anticoagulation Therapy Visit    Description:  57 year old male   Provider:  Kwesi Garcia RN   Department:  [facility]           INR as of 1/12/2018     Today's INR 2.47 (1/11/2018)      Anticoagulation Summary as of 1/12/2018     INR goal 2.0-3.0   Today's INR 2.47 (1/11/2018)   Full instructions 5 mg, then 7.5 mg repeating every 2 days   Next INR check 1/26/2018    Indications   Pulmonary emboli (H) [I26.99]  Long-term (current) use of anticoagulants [Z79.01] [Z79.01]         January 2018 Details    Sun Mon Tue Wed Thu Fri Sat      1               2               3               4               5               6                 7               8               9               10               11               12      7.5 mg   See details      13      5 mg           14      7.5 mg         15      5 mg         16      7.5 mg         17      5 mg         18      7.5 mg         19      5 mg         20      7.5 mg           21      5 mg         22      7.5 mg         23      5 mg         24      7.5 mg         25      5 mg         26            27                 28               29               30               31                   Date Details   01/12 This INR check       Date of next INR:  1/26/2018         How to take your warfarin dose     To take:  5 mg Take 1 of the 5 mg tablets.    To take:  7.5 mg Take 1.5 of the 5 mg tablets.

## 2018-01-16 DIAGNOSIS — Z79.01 CHRONIC ANTICOAGULATION: ICD-10-CM

## 2018-01-16 DIAGNOSIS — Z86.711 HISTORY OF PULMONARY EMBOLISM: Primary | ICD-10-CM

## 2018-01-17 RX ORDER — WARFARIN SODIUM 5 MG/1
7.5 TABLET ORAL DAILY
Qty: 135 TABLET | Refills: 2 | Status: SHIPPED | OUTPATIENT
Start: 2018-01-17 | End: 2019-02-26

## 2018-01-18 ENCOUNTER — MYC MEDICAL ADVICE (OUTPATIENT)
Dept: CARDIOLOGY | Facility: CLINIC | Age: 58
End: 2018-01-18

## 2018-01-18 DIAGNOSIS — R42 DIZZINESS: Primary | ICD-10-CM

## 2018-01-23 DIAGNOSIS — I10 ESSENTIAL HYPERTENSION: ICD-10-CM

## 2018-01-24 RX ORDER — LISINOPRIL 2.5 MG/1
2.5 TABLET ORAL DAILY
Qty: 91 TABLET | Refills: 1 | Status: SHIPPED | OUTPATIENT
Start: 2018-01-24 | End: 2018-07-18

## 2018-01-24 NOTE — TELEPHONE ENCOUNTER
Date: 1/24/2018    Time of Call: 2:59 PM     Diagnosis:  Dizziness      [ TORB ] Ordering provider: Dr. Ocampo  Order: Bilateral carotid artery ultrasound      Order received by: Indu Osorio, RN, BSN     Follow-up/additional notes: Voice message left for patient with above recommendation and scheduling telephone number.  Encouraged him to use proper body mechanics when bending down and ensure he bends at the knees to prevent dizziness.  Encouraged him to change positions slowly.  Encouraged him to call with questions.      [employer] deactivated. Unable to respond to patient.

## 2018-01-30 DIAGNOSIS — I26.99 PULMONARY EMBOLI (H): ICD-10-CM

## 2018-01-30 DIAGNOSIS — Z79.01 LONG-TERM (CURRENT) USE OF ANTICOAGULANTS: ICD-10-CM

## 2018-01-30 LAB — INR PPP: 1.86 (ref 0.86–1.14)

## 2018-01-31 ENCOUNTER — ANTICOAGULATION THERAPY VISIT (OUTPATIENT)
Dept: ANTICOAGULATION | Facility: CLINIC | Age: 58
End: 2018-01-31

## 2018-01-31 DIAGNOSIS — Z79.01 LONG-TERM (CURRENT) USE OF ANTICOAGULANTS: ICD-10-CM

## 2018-01-31 DIAGNOSIS — I26.99 PULMONARY EMBOLI (H): ICD-10-CM

## 2018-01-31 NOTE — MR AVS SNAPSHOT
Oswaldo Prabhakar   1/31/2018   Anticoagulation Therapy Visit    Description:  57 year old male   Provider:  Laurita Cespedes, RN   Department:  [facility]           INR as of 1/31/2018     Today's INR 1.86! (1/30/2018)      Anticoagulation Summary as of 1/31/2018     INR goal 2.0-3.0   Today's INR 1.86! (1/30/2018)   Full instructions 5 mg, then 7.5 mg repeating every 2 days   Next INR check 2/14/2018    Indications   Pulmonary emboli (H) [I26.99]  Long-term (current) use of anticoagulants [Z79.01] [Z79.01]         January 2018 Details    Sun Mon Tue Wed Thu Fri Sat      1               2               3               4               5               6                 7               8               9               10               11               12               13                 14               15               16               17               18               19               20                 21               22               23               24               25               26               27                 28               29               30               31      5 mg   See details          Date Details   01/31 This INR check               How to take your warfarin dose     To take:  5 mg Take 1 of the 5 mg tablets.           February 2018 Details    Sun Mon Tue Wed Thu Fri Sat         1      7.5 mg         2      5 mg         3      7.5 mg           4      5 mg         5      7.5 mg         6      5 mg         7      7.5 mg         8      5 mg         9      7.5 mg         10      5 mg           11      7.5 mg         12      5 mg         13      7.5 mg         14            15               16               17                 18               19               20               21               22               23               24                 25               26               27               28                   Date Details   No additional details    Date of next INR:  2/14/2018          How to take your warfarin dose     To take:  5 mg Take 1 of the 5 mg tablets.    To take:  7.5 mg Take 1.5 of the 5 mg tablets.

## 2018-02-08 ENCOUNTER — MYC MEDICAL ADVICE (OUTPATIENT)
Dept: CARDIOLOGY | Facility: CLINIC | Age: 58
End: 2018-02-08

## 2018-02-09 ENCOUNTER — RADIANT APPOINTMENT (OUTPATIENT)
Dept: ULTRASOUND IMAGING | Facility: CLINIC | Age: 58
End: 2018-02-09
Attending: INTERNAL MEDICINE
Payer: COMMERCIAL

## 2018-02-09 DIAGNOSIS — R42 DIZZINESS: ICD-10-CM

## 2018-02-22 ENCOUNTER — ANTICOAGULATION THERAPY VISIT (OUTPATIENT)
Dept: ANTICOAGULATION | Facility: CLINIC | Age: 58
End: 2018-02-22

## 2018-02-22 DIAGNOSIS — I26.99 PULMONARY EMBOLI (H): ICD-10-CM

## 2018-02-22 DIAGNOSIS — Z79.01 LONG-TERM (CURRENT) USE OF ANTICOAGULANTS: ICD-10-CM

## 2018-02-22 LAB — INR PPP: 1.73 (ref 0.86–1.14)

## 2018-02-22 NOTE — MR AVS SNAPSHOT
Oswaldo Prabhakar   2/22/2018   Anticoagulation Therapy Visit    Description:  57 year old male   Provider:  Kristina Wright, RN   Department:  [facility]           INR as of 2/22/2018     Today's INR 1.73!      Anticoagulation Summary as of 2/22/2018     INR goal 2.0-3.0   Today's INR 1.73!   Full instructions 2/22: 10 mg; Otherwise 5 mg on Mon, Fri; 7.5 mg all other days   Next INR check 3/1/2018    Indications   Pulmonary emboli (H) [I26.99]  Long-term (current) use of anticoagulants [Z79.01] [Z79.01]         February 2018 Details    Sun Mon Tue Wed Thu Fri Sat         1               2               3                 4               5               6               7               8               9               10                 11               12               13               14               15               16               17                 18               19               20               21               22      10 mg   See details      23      5 mg         24      7.5 mg           25      7.5 mg         26      5 mg         27      7.5 mg         28      7.5 mg             Date Details   02/22 This INR check               How to take your warfarin dose     To take:  5 mg Take 1 of the 5 mg tablets.    To take:  7.5 mg Take 1.5 of the 5 mg tablets.    To take:  10 mg Take 2 of the 5 mg tablets.           March 2018 Details    Sun Mon Tue Wed Thu Fri Sat         1            2               3                 4               5               6               7               8               9               10                 11               12               13               14               15               16               17                 18               19               20               21               22               23               24                 25               26               27               28               29               30               31                Date Details    No additional details    Date of next INR:  3/1/2018         How to take your warfarin dose     To take:  7.5 mg Take 1.5 of the 5 mg tablets.

## 2018-02-22 NOTE — PROGRESS NOTES
ANTICOAGULATION FOLLOW-UP CLINIC VISIT    Patient Name:  Oswaldo Prabhakar  Date:  2/22/2018  Contact Type:  Telephone    SUBJECTIVE:     Patient Findings     Positives Unexplained INR or factor level change           OBJECTIVE    INR   Date Value Ref Range Status   02/22/2018 1.73 (H) 0.86 - 1.14 Final     Chromogenic Factor 10   Date Value Ref Range Status   06/18/2011 81 60 - 140 % Final     Comment:     Therapeutic Range: A Chromogenic Factor 10 level of 20-40% inversely   correlates   with an INR of 2-3 for patients receiving Warfarin. Chromogenic Factor 10   levels below 20% indicate an INR greater than 3, and levels above 40%   indicate   an INR less than 2.     Factor 2 Assay   Date Value Ref Range Status   04/04/2012  60 - 140 % Final    (Note)  CANCELLED AND CREDITED PER [facility]. MONITORING,4/4/12,       ASSESSMENT / PLAN  INR assessment SUB    Recheck INR In: 2 WEEKS    INR Location Clinic      Anticoagulation Summary as of 2/22/2018     INR goal 2.0-3.0   Today's INR 1.73!   Maintenance plan 5 mg (5 mg x 1) on Mon, Fri; 7.5 mg (5 mg x 1.5) all other days   Full instructions 2/22: 10 mg; Otherwise 5 mg on Mon, Fri; 7.5 mg all other days   Weekly total 47.5 mg   Plan last modified Kristina Wright RN (2/22/2018)   Next INR check 3/1/2018   Priority INR   Target end date Indefinite    Indications   Pulmonary emboli (H) [I26.99]  Long-term (current) use of anticoagulants [Z79.01] [Z79.01]         Anticoagulation Episode Summary     INR check location     Preferred lab     Send INR reminders to [facility]    Comments HIPPA INFO OK to speak with wife Josselyn OK to leave messages on Home.work and cell phones  use cell phone #[phone]      Anticoagulation Care Providers     Provider Role Specialty Phone number    Samm Vazquez MD [facility] Internal Medicine [phone]            See the Encounter Report to view Anticoagulation Flowsheet and Dosing Calendar (Go to Encounters tab in  chart review, and find the Anticoagulation Therapy Visit)    Spoke with Oswaldo.    Kristina Wright RN

## 2018-03-07 ENCOUNTER — ANTICOAGULATION THERAPY VISIT (OUTPATIENT)
Dept: ANTICOAGULATION | Facility: CLINIC | Age: 58
End: 2018-03-07

## 2018-03-07 DIAGNOSIS — Z79.01 LONG-TERM (CURRENT) USE OF ANTICOAGULANTS: ICD-10-CM

## 2018-03-07 DIAGNOSIS — I26.99 PULMONARY EMBOLI (H): ICD-10-CM

## 2018-03-07 LAB — INR PPP: 3.61 (ref 0.86–1.14)

## 2018-03-07 NOTE — PROGRESS NOTES
ANTICOAGULATION FOLLOW-UP CLINIC VISIT    Patient Name:  Oswaldo Prabhakar  Date:  3/7/2018  Contact Type:  Telephone    SUBJECTIVE:     Patient Findings     Comments Pt is being hard to keep INR within goal range. We have not had pt within his goal range for consecutive weeks. Looks like pt does have some pain issues and can take Tramadol PRN. Unable to assess this            OBJECTIVE    INR   Date Value Ref Range Status   03/07/2018 3.61 (H) 0.86 - 1.14 Final     Chromogenic Factor 10   Date Value Ref Range Status   06/18/2011 81 60 - 140 % Final     Comment:     Therapeutic Range: A Chromogenic Factor 10 level of 20-40% inversely   correlates   with an INR of 2-3 for patients receiving Warfarin. Chromogenic Factor 10   levels below 20% indicate an INR greater than 3, and levels above 40%   indicate   an INR less than 2.     Factor 2 Assay   Date Value Ref Range Status   04/04/2012  60 - 140 % Final    (Note)  CANCELLED AND CREDITED PER [facility] IN MED. MONITORING,4/4/12,       ASSESSMENT / PLAN  INR assessment SUPRA    Recheck INR In: 2 WEEKS    INR Location Clinic      Anticoagulation Summary as of 3/7/2018     INR goal 2.0-3.0   Today's INR 3.61!   Maintenance plan 5 mg (5 mg x 1) on Mon, Fri; 7.5 mg (5 mg x 1.5) all other days   Full instructions 3/7: 5 mg; Otherwise 5 mg on Mon, Fri; 7.5 mg all other days   Weekly total 47.5 mg   Plan last modified Kristina Wright RN (2/22/2018)   Next INR check 3/21/2018   Priority INR   Target end date Indefinite    Indications   Pulmonary emboli (H) [I26.99]  Long-term (current) use of anticoagulants [Z79.01] [Z79.01]         Anticoagulation Episode Summary     INR check location     Preferred lab     Send INR reminders to [facility]    Comments HIPPA INFO OK to speak with wife Josselyn OK to leave messages on Home.work and cell phones  use cell phone #[phone]      Anticoagulation Care Providers     Provider Role Specialty Phone number    Samm Vazquez MD  [facility] Internal Medicine [phone]            See the Encounter Report to view Anticoagulation Flowsheet and Dosing Calendar (Go to Encounters tab in chart review, and find the Anticoagulation Therapy Visit)    Left message for patient with results and dosing recommendations. Asked patient to call back to report any missed doses, falls, signs and symptoms of bleeding or clotting, any changes in health, medication, or diet. Asked patient to call back with any questions or concerns.     Liliam Hyman RN

## 2018-03-07 NOTE — MR AVS SNAPSHOT
Oswaldo Prabhakar   3/7/2018   Anticoagulation Therapy Visit    Description:  57 year old male   Provider:  Liliam Hyman, RN   Department:  [facility]           INR as of 3/7/2018     Today's INR 3.61!      Anticoagulation Summary as of 3/7/2018     INR goal 2.0-3.0   Today's INR 3.61!   Full instructions 3/7: 5 mg; Otherwise 5 mg on Mon, Fri; 7.5 mg all other days   Next INR check 3/21/2018    Indications   Pulmonary emboli (H) [I26.99]  Long-term (current) use of anticoagulants [Z79.01] [Z79.01]         March 2018 Details    Sun Mon Tue Wed Thu Fri Sat         1               2               3                 4               5               6               7      5 mg   See details      8      7.5 mg         9      5 mg         10      7.5 mg           11      7.5 mg         12      5 mg         13      7.5 mg         14      7.5 mg         15      7.5 mg         16      5 mg         17      7.5 mg           18      7.5 mg         19      5 mg         20      7.5 mg         21            22               23               24                 25               26               27               28               29               30               31                Date Details   03/07 This INR check       Date of next INR:  3/21/2018         How to take your warfarin dose     To take:  5 mg Take 1 of the 5 mg tablets.    To take:  7.5 mg Take 1.5 of the 5 mg tablets.

## 2018-03-20 ENCOUNTER — MYC REFILL (OUTPATIENT)
Dept: CARDIOLOGY | Facility: CLINIC | Age: 58
End: 2018-03-20

## 2018-03-20 ENCOUNTER — OFFICE VISIT (OUTPATIENT)
Dept: URGENT CARE | Facility: URGENT CARE | Age: 58
End: 2018-03-20
Payer: COMMERCIAL

## 2018-03-20 ENCOUNTER — RADIANT APPOINTMENT (OUTPATIENT)
Dept: GENERAL RADIOLOGY | Facility: CLINIC | Age: 58
End: 2018-03-20
Attending: PHYSICIAN ASSISTANT
Payer: COMMERCIAL

## 2018-03-20 VITALS
BODY MASS INDEX: 29.26 KG/M2 | SYSTOLIC BLOOD PRESSURE: 132 MMHG | OXYGEN SATURATION: 97 % | HEIGHT: 71 IN | DIASTOLIC BLOOD PRESSURE: 78 MMHG | TEMPERATURE: 99.1 F | WEIGHT: 209 LBS | HEART RATE: 58 BPM

## 2018-03-20 DIAGNOSIS — S20.411A: ICD-10-CM
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
## 2018-03-20 DIAGNOSIS — Z79.01 WARFARIN ANTICOAGULATION: ICD-10-CM

## 2018-03-20 DIAGNOSIS — E78.1 PURE HYPERGLYCERIDEMIA: ICD-10-CM

## 2018-03-20 DIAGNOSIS — E78.5 HYPERLIPIDEMIA LDL GOAL <130: ICD-10-CM

## 2018-03-20 DIAGNOSIS — S20.219A: Primary | ICD-10-CM

## 2018-03-20 PROCEDURE — 71046 X-RAY EXAM CHEST 2 VIEWS: CPT

## 2018-03-20 PROCEDURE — 99214 OFFICE O/P EST MOD 30 MIN: CPT | Performed by: PHYSICIAN ASSISTANT

## 2018-03-20 NOTE — NURSING NOTE
Patients' cut was cleaned with sodium chloride, applied antibiotic and telfa-bandage per provider verbal order.   Jakub Gregory, Medical Assistant

## 2018-03-20 NOTE — PATIENT INSTRUCTIONS
Ice, tylenol, rest (no twisting with weight)    Monitor for signs of internal bleeding: Coughing blood, shortness of breath, blood in urine/stool, weakness, fatigue

## 2018-03-20 NOTE — PROGRESS NOTES
SUBJECTIVE:  Chief Complaint   Patient presents with     Fall     fell this morning on right side of body, has a scrape on back due to falling on an object.       Oswaldo Prabhakar is a 57 year old male presents with a chief complaint of fall onto back.  Slipped on snow covered ice, striking back on metal pole. Injury occurred 2 hours ago.  No pain, numbess, cough, shortness of breath, hemoptysis, hematuria, hematochezia, blood in stool.      Currently on warfarin (3.61), long term use. Supratherapeutic at last check, dose adjusted downward.  rechecking this week.      Past Medical History:   Diagnosis Date     Antiplatelet or antithrombotic long-term use      Diaphragmatic hernia without mention of obstruction or gangrene      Heart disease      Hypertension      Nephrolithiasis 4/2010     Other and unspecified hyperlipidemia      Pulmonary embolus and infarction (H)     s/p hemothorax and filter s/p filter removal (2011), now on long term anti-coagulation     Statin intolerance 2/1/2017     Stented coronary artery      Unspecified retinal detachment     Childhood trauma, unrepaired     Current Outpatient Prescriptions   Medication Sig Dispense Refill     lisinopril (PRINIVIL/ZESTRIL) 2.5 MG tablet Take 1 tablet (2.5 mg) by mouth daily , or as directed by Dr. Ocampo. 91 tablet 1     warfarin (COUMADIN) 5 MG tablet Take 1.5 tablets (7.5 mg) by mouth daily 135 tablet 2     alirocumab (PRALUENT) 75 MG/ML injectable pen Inject 1 mL (75 mg) Subcutaneous every 14 days 2 mL 12     fenofibrate 160 MG tablet Take 1 tablet (160 mg) by mouth daily 91 tablet 3     ezetimibe (ZETIA) 10 MG tablet Take 1 tablet (10 mg) by mouth daily 90 tablet 3     metoprolol (LOPRESSOR) 25 MG tablet Take 0.5 tablets (12.5 mg) by mouth 2 times daily 90 tablet 3     atorvastatin (LIPITOR) 80 MG tablet Take 1 tablet (80 mg) by mouth daily 91 tablet 3     Cholecalciferol (VITAMIN D) 2000 UNITS CAPS Take 2,000 Units by mouth daily       aspirin EC 81  MG tablet Take 1 tablet (81 mg) by mouth daily 91 tablet 3     calcium carbonate (TUMS) 500 MG chewable tablet Take 1 chew tab by mouth daily as needed       DiphenhydrAMINE HCl (BENADRYL PO) Take 25-50 mg by mouth daily as needed       traMADol (ULTRAM) 50 MG tablet Take 1-2 tablets ( mg) by mouth every 6 hours as needed for moderate pain (Patient not taking: Reported on 3/20/2018) 40 tablet 0     tamsulosin (FLOMAX) 0.4 MG capsule Take 1 capsule (0.4 mg) by mouth daily (Patient not taking: Reported on 3/20/2018) 30 capsule 0     fluticasone-salmeterol (ADVAIR) 250-50 MCG/DOSE diskus inhaler Inhale 1 puff into the lungs 2 times daily (Patient not taking: Reported on 3/20/2018) 60 Inhaler 11     fluticasone (FLONASE) 50 MCG/ACT spray Spray 2 sprays into both nostrils daily (Patient not taking: Reported on 3/20/2018) 1 Bottle 1     albuterol (PROAIR HFA/PROVENTIL HFA/VENTOLIN HFA) 108 (90 BASE) MCG/ACT Inhaler Inhale 2 puffs into the lungs every 6 hours as needed for shortness of breath / dyspnea or wheezing (Patient not taking: Reported on 3/20/2018) 1 Inhaler 1     [DISCONTINUED] warfarin (COUMADIN) 5 MG tablet As directed. 7.5 mg on Mon, Wed, Fri and 5 mg daily the rest of the week. 145 tablet 6     vitamin B complex with vitamin C (VITAMIN  B COMPLEX) TABS tablet Take 1 tablet by mouth daily       Social History   Substance Use Topics     Smoking status: Never Smoker     Smokeless tobacco: Never Used     Alcohol use No      Comment: very rare       ROS:  CONSTITUTIONAL:NEGATIVE for fever, chills, change in weight  INTEGUMENTARY/SKIN: Abrasion of middle back.  No active bleeding  ENT/MOUTH: NEGATIVE for ear, mouth and throat problems  RESP:NEGATIVE for significant cough or SOB and hemoptysis  CV: NEGATIVE for chest pain, palpitations or peripheral edema  GI: NEGATIVE for nausea, abdominal pain, heartburn, or change in bowel habits, hematemesis and hematochezia  : negative for dysuria, hematuria, decreased  "urinary stream, erectile dysfunction  MUSCULOSKELETAL: NEGATIVE for significant arthralgias or myalgia  NEURO: NEGATIVE for weakness, dizziness or paresthesias  HEME/ALLERGY/IMMUNE: POSITIVE  for warfarin    EXAM:   /78 (BP Location: Right arm, Cuff Size: Adult Regular)  Pulse 58  Temp 99.1  F (37.3  C) (Oral)  Ht 5' 10.5\" (1.791 m)  Wt 209 lb (94.8 kg)  SpO2 97%  BMI 29.56 kg/m2  Gen: healthy,alert,no distress  BACK: No flail chest, no referred pain with rib cage compression, ROM intact  GENERAL APPEARANCE: healthy, alert and no distress  NECK: supple, non-tender to palpation, FROM   CHEST: clear to auscultation  CV: regular rate and rhythm  SKIN: mild abrasion of right posterior inferior rib cage area  NEURO: Normal strength and tone, sensory exam grossly normal, mentation intact and speech normal    X-RAY was done.    ASSESSMENT:   (S20.219A) Contusion of rib, initial encounter  (primary encounter diagnosis)  Comment; no observed evidence of internal bleeding  Plan: XR Chest 2 Views  Red flags and emergent follow up discussed, and understood by patient  Follow up with PCP if symptoms worsen or fail to improve    (Z79.01) Warfarin anticoagulation  Plan: XR Chest 2 Views      (S20.411A) Abrasion of back, right, initial encounter  Comment:  No active bleeding  Plan: Wound cleaned, dressed              "

## 2018-03-20 NOTE — MR AVS SNAPSHOT
After Visit Summary   3/20/2018    Oswaldo Prabhakar    MRN: [id number]           Patient Information     Date Of Birth          1960        Visit Information        Provider Department      3/20/2018 9:35 AM Lavelle Martinez PA-C [facility]        Today's Diagnoses     Fall, initial encounter    -  1    Warfarin anticoagulation          Care Instructions    Ice, tylenol, rest (no twisting with weight)    Monitor for signs of internal bleeding: Coughing blood, shortness of breath, blood in urine/stool, weakness, fatigue          Follow-ups after your visit        Your next 10 appointments already scheduled     Mar 29, 2018  3:30 PM CDT   (Arrive by 3:15 PM)   RETURN LIPID VISIT with Reji Ocampo MD   [facility] ([facility])    [address] [place] [address]  [address] [place] [id number] [phone]              Who to contact     If you have questions or need follow up information about today's clinic visit or your schedule please contact [facility] directly at [phone].  Normal or non-critical lab and imaging results will be communicated to you by MyChart, letter or phone within 4 business days after the clinic has received the results. If you do not hear from us within 7 days, please contact the clinic through [facility] or phone. If you have a critical or abnormal lab result, we will notify you by phone as soon as possible.  Submit refill requests through [employer] or call your pharmacy and they will forward the refill request to us. Please allow 3 business days for your refill to be completed.          Additional Information About Your Visit        MyChart Information     [employer] gives you secure access to your electronic health record. If you see a primary care provider, you can also send messages to your care team and make appointments. If you have questions, please call your primary care clinic.  If you do not have  "a primary care provider, please call [phone] and they will assist you.        Care EveryWhere ID     This is your Care EveryWhere ID. This could be used by other organizations to access your [facility] medical records  [id number]        Your Vitals Were     Pulse Temperature Height Pulse Oximetry BMI (Body Mass Index)       58 99.1  F (37.3  C) (Oral) 5' 10.5\" (1.791 m) 97% 29.56 kg/m2        Blood Pressure from Last 3 Encounters:   03/20/18 132/78   01/04/18 130/82   12/14/17 132/84    Weight from Last 3 Encounters:   03/20/18 209 lb (94.8 kg)   01/04/18 208 lb (94.3 kg)   12/14/17 205 lb (93 kg)                 Today's Medication Changes          These changes are accurate as of 3/20/18 10:17 AM.  If you have any questions, ask your nurse or doctor.               These medicines have changed or have updated prescriptions.        Dose/Directions    warfarin 5 MG tablet   Commonly known as:  COUMADIN   Indication:  Obstructive Blood Clot in a Blood Vessel of the Lung   This may have changed:  Another medication with the same name was removed. Continue taking this medication, and follow the directions you see here.   Used for:  History of pulmonary embolism, Chronic anticoagulation   Changed by:  Lavelle Martinez PA-C        Dose:  7.5 mg   Take 1.5 tablets (7.5 mg) by mouth daily   Quantity:  135 tablet   Refills:  2                Primary Care Provider Office Phone # Fax #    Samm Vazquez [phone]       [address] [place] [address] [address] [place] [address]        Equal Access to Services     [facility]: Hadii peggy ackerman hadyulyo Soginger, waaxda luqadaha, qaybta kaalmafranklin briones idiin hayaan adeeg kharash la'aan . So [facility] [phone].    ATENCIÓN: Si habla español, tiene a krueger disposición servicios gratuitos de asistencia lingüística. Llame al [phone].    We comply with applicable federal civil rights laws and [place] laws. We do not discriminate on the basis of race, " color, national origin, age, disability, sex, sexual orientation, or gender identity.            Thank you!     Thank you for choosing [facility]  for your care. Our goal is always to provide you with excellent care. Hearing back from our patients is one way we can continue to improve our services. Please take a few minutes to complete the written survey that you may receive in the mail after your visit with us. Thank you!             Your Updated Medication List - Protect others around you: Learn how to safely use, store and throw away your medicines at www.disposemymeds.org.          This list is accurate as of 3/20/18 10:17 AM.  Always use your most recent med list.                   Brand Name Dispense Instructions for use Diagnosis    albuterol 108 (90 BASE) MCG/ACT Inhaler    PROAIR HFA/PROVENTIL HFA/VENTOLIN HFA    1 Inhaler    Inhale 2 puffs into the lungs every 6 hours as needed for shortness of breath / dyspnea or wheezing    Pneumonia of both lungs due to infectious organism, unspecified part of lung       alirocumab 75 MG/ML injectable pen    PRALUENT    2 mL    Inject 1 mL (75 mg) Subcutaneous every 14 days    CAD (coronary artery disease), S/P CABG x 4, Hyperlipidemia LDL goal <130, Statin intolerance       aspirin EC 81 MG EC tablet     91 tablet    Take 1 tablet (81 mg) by mouth daily    S/P CABG x 4       atorvastatin 80 MG tablet    LIPITOR    91 tablet    Take 1 tablet (80 mg) by mouth daily    Pure hyperglyceridemia, Hyperlipidemia LDL goal <130       BENADRYL PO      Take 25-50 mg by mouth daily as needed        calcium carbonate 500 MG chewable tablet    TUMS     Take 1 chew tab by mouth daily as needed        ezetimibe 10 MG tablet    ZETIA    90 tablet    Take 1 tablet (10 mg) by mouth daily    Statin intolerance, Hyperlipidemia LDL goal <70, Atherosclerosis of coronary artery of native heart without angina pectoris, unspecified vessel or lesion type, S/P CABG (coronary artery  bypass graft)       fenofibrate 160 MG tablet     91 tablet    Take 1 tablet (160 mg) by mouth daily    Hyperlipidemia LDL goal <130       fluticasone 50 MCG/ACT spray    FLONASE    1 Bottle    Spray 2 sprays into both nostrils daily    Dysfunction of Eustachian tube, bilateral       fluticasone-salmeterol 250-50 MCG/DOSE diskus inhaler    ADVAIR    60 Inhaler    Inhale 1 puff into the lungs 2 times daily    Wheezing, Pneumonia due to infectious organism, unspecified laterality, unspecified part of lung       lisinopril 2.5 MG tablet    PRINIVIL/Zestril    91 tablet    Take 1 tablet (2.5 mg) by mouth daily , or as directed by Dr. Ocampo.    Essential hypertension       metoprolol tartrate 25 MG tablet    LOPRESSOR    90 tablet    Take 0.5 tablets (12.5 mg) by mouth 2 times daily    S/P CABG x 4       tamsulosin 0.4 MG capsule    FLOMAX    30 capsule    Take 1 capsule (0.4 mg) by mouth daily    Kidney stone       traMADol 50 MG tablet    ULTRAM    40 tablet    Take 1-2 tablets ( mg) by mouth every 6 hours as needed for moderate pain    Kidney stone       vitamin B complex with vitamin C Tabs tablet      Take 1 tablet by mouth daily        vitamin D 2000 UNITS Caps      Take 2,000 Units by mouth daily    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       warfarin 5 MG tablet    COUMADIN    135 tablet    Take 1.5 tablets (7.5 mg) by mouth daily    History of pulmonary embolism, Chronic anticoagulation

## 2018-03-21 RX ORDER — ATORVASTATIN CALCIUM 80 MG/1
80 TABLET, FILM COATED ORAL DAILY
Qty: 91 TABLET | Refills: 3 | Status: SHIPPED | OUTPATIENT
Start: 2018-03-21 | End: 2019-03-06

## 2018-03-21 NOTE — TELEPHONE ENCOUNTER
Message from MyChart:  Original authorizing provider: MD Oswaldo Talbot would like a refill of the following medications:  atorvastatin (LIPITOR) 80 MG tablet [Reji Ocampo MD]    Preferred pharmacy: [address]    Comment:

## 2018-03-22 ASSESSMENT — ENCOUNTER SYMPTOMS
TROUBLE SWALLOWING: 0
LOSS OF CONSCIOUSNESS: 0
SMELL DISTURBANCE: 0
HOARSE VOICE: 0
TASTE DISTURBANCE: 0
NUMBNESS: 0
WEAKNESS: 0
NECK MASS: 0
TREMORS: 0
TINGLING: 0
SPEECH CHANGE: 0
SINUS PAIN: 0
SINUS CONGESTION: 0
SEIZURES: 0
DISTURBANCES IN COORDINATION: 0
HEADACHES: 0
PARALYSIS: 0
SORE THROAT: 0
MEMORY LOSS: 0
DIZZINESS: 1

## 2018-03-23 ENCOUNTER — ANTICOAGULATION THERAPY VISIT (OUTPATIENT)
Dept: ANTICOAGULATION | Facility: CLINIC | Age: 58
End: 2018-03-23

## 2018-03-23 DIAGNOSIS — Z79.01 LONG-TERM (CURRENT) USE OF ANTICOAGULANTS: ICD-10-CM

## 2018-03-23 DIAGNOSIS — E78.5 HYPERLIPIDEMIA LDL GOAL <130: ICD-10-CM

## 2018-03-23 DIAGNOSIS — I25.10 CAD (CORONARY ARTERY DISEASE): ICD-10-CM

## 2018-03-23 DIAGNOSIS — I26.99 PULMONARY EMBOLI (H): ICD-10-CM

## 2018-03-23 DIAGNOSIS — Z78.9 STATIN INTOLERANCE: ICD-10-CM

## 2018-03-23 DIAGNOSIS — Z95.1 S/P CABG X 4: ICD-10-CM

## 2018-03-23 LAB
CHOLEST SERPL-MCNC: 109 MG/DL
HDLC SERPL-MCNC: 42 MG/DL
INR PPP: 2.56 (ref 0.86–1.14)
LDLC SERPL CALC-MCNC: 45 MG/DL
NONHDLC SERPL-MCNC: 67 MG/DL
TRIGL SERPL-MCNC: 106 MG/DL

## 2018-03-23 NOTE — PROGRESS NOTES
ANTICOAGULATION FOLLOW-UP CLINIC VISIT    Patient Name:  Oswaldo Prabhakar  Date:  3/23/2018  Contact Type:  Telephone    SUBJECTIVE:        OBJECTIVE    INR   Date Value Ref Range Status   03/23/2018 2.56 (H) 0.86 - 1.14 Final     Chromogenic Factor 10   Date Value Ref Range Status   06/18/2011 81 60 - 140 % Final     Comment:     Therapeutic Range: A Chromogenic Factor 10 level of 20-40% inversely   correlates   with an INR of 2-3 for patients receiving Warfarin. Chromogenic Factor 10   levels below 20% indicate an INR greater than 3, and levels above 40%   indicate   an INR less than 2.     Factor 2 Assay   Date Value Ref Range Status   04/04/2012  60 - 140 % Final    (Note)  CANCELLED AND CREDITED PER [facility]. MONITORING,4/4/12,       ASSESSMENT / PLAN  No question data found.  Anticoagulation Summary as of 3/23/2018     INR goal 2.0-3.0   Today's INR 2.56   Maintenance plan 5 mg (5 mg x 1) on Mon, Fri; 7.5 mg (5 mg x 1.5) all other days   Full instructions 5 mg on Mon, Fri; 7.5 mg all other days   Weekly total 47.5 mg   No change documented Kristina Wright RN   Plan last modified Kristina Wright RN (2/22/2018)   Next INR check 4/6/2018   Priority INR   Target end date Indefinite    Indications   Pulmonary emboli (H) [I26.99]  Long-term (current) use of anticoagulants [Z79.01] [Z79.01]         Anticoagulation Episode Summary     INR check location     Preferred lab     Send INR reminders to [facility]    Comments HIPPA INFO OK to speak with wife Josselyn MICHELLE to leave messages on Home.work and cell phones  use cell phone #[phone]      Anticoagulation Care Providers     Provider Role Specialty Phone number    Samm Vazquez MD [facility] Medicine [phone]            See the Encounter Report to view Anticoagulation Flowsheet and Dosing Calendar (Go to Encounters tab in chart review, and find the Anticoagulation Therapy Visit)    Left message for patient with results and dosing  recommendations. Asked patient to call back to report any missed doses, falls, signs and symptoms of bleeding or clotting, any changes in health, medication, or diet. Asked patient to call back with any questions or concerns.     Kristina Wright RN

## 2018-03-23 NOTE — MR AVS SNAPSHOT
Oswaldo Prabhakar   3/23/2018   Anticoagulation Therapy Visit    Description:  57 year old male   Provider:  Kristina Wright, RN   Department:  [facility]           INR as of 3/23/2018     Today's INR 2.56      Anticoagulation Summary as of 3/23/2018     INR goal 2.0-3.0   Today's INR 2.56   Full instructions 5 mg on Mon, Fri; 7.5 mg all other days   Next INR check 4/6/2018    Indications   Pulmonary emboli (H) [I26.99]  Long-term (current) use of anticoagulants [Z79.01] [Z79.01]         March 2018 Details    Sun Mon Tue Wed Thu Fri Sat         1               2               3                 4               5               6               7               8               9               10                 11               12               13               14               15               16               17                 18               19               20               21               22               23      5 mg   See details      24      7.5 mg           25      7.5 mg         26      5 mg         27      7.5 mg         28      7.5 mg         29      7.5 mg         30      5 mg         31      7.5 mg          Date Details   03/23 This INR check               How to take your warfarin dose     To take:  5 mg Take 1 of the 5 mg tablets.    To take:  7.5 mg Take 1.5 of the 5 mg tablets.           April 2018 Details    Sun Mon Tue Wed Thu Fri Sat     1      7.5 mg         2      5 mg         3      7.5 mg         4      7.5 mg         5      7.5 mg         6            7                 8               9               10               11               12               13               14                 15               16               17               18               19               20               21                 22               23               24               25               26               27               28                 29               30                     Date Details   No  additional details    Date of next INR:  4/6/2018         How to take your warfarin dose     To take:  5 mg Take 1 of the 5 mg tablets.    To take:  7.5 mg Take 1.5 of the 5 mg tablets.

## 2018-03-29 ENCOUNTER — MYC MEDICAL ADVICE (OUTPATIENT)
Dept: CARDIOLOGY | Facility: CLINIC | Age: 58
End: 2018-03-29

## 2018-03-29 ENCOUNTER — OFFICE VISIT (OUTPATIENT)
Dept: CARDIOLOGY | Facility: CLINIC | Age: 58
End: 2018-03-29
Attending: INTERNAL MEDICINE
Payer: COMMERCIAL

## 2018-03-29 VITALS
BODY MASS INDEX: 28.92 KG/M2 | WEIGHT: 206.6 LBS | HEIGHT: 71 IN | DIASTOLIC BLOOD PRESSURE: 83 MMHG | HEART RATE: 65 BPM | SYSTOLIC BLOOD PRESSURE: 123 MMHG | OXYGEN SATURATION: 98 %

## 2018-03-29 DIAGNOSIS — H93.13 TINNITUS, BILATERAL: ICD-10-CM

## 2018-03-29 DIAGNOSIS — E78.5 HYPERLIPIDEMIA LDL GOAL <70: Primary | ICD-10-CM

## 2018-03-29 PROCEDURE — 99214 OFFICE O/P EST MOD 30 MIN: CPT | Mod: GC | Performed by: INTERNAL MEDICINE

## 2018-03-29 PROCEDURE — G0463 HOSPITAL OUTPT CLINIC VISIT: HCPCS | Mod: ZF

## 2018-03-29 ASSESSMENT — PAIN SCALES - GENERAL: PAINLEVEL: NO PAIN (0)

## 2018-03-29 NOTE — LETTER
3/29/2018      RE: Oswaldo Prabhakar  [address]  [place] [address]       Dear Colleague,    Thank you for the opportunity to participate in the care of your patient, Oswaldo Prabhakar, at the [facility] at [facility]. Please see a copy of my visit note below.    HPI:  Mr. 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 He also has history of statin intolerance, however is currently tolerating Atorvastatin. He is being treated with PCSK9 inhibitor.  He returns to clinic for routine follow up.     From a cardiovascular standpoint he feels he is doing well. However, he's been dealing with pulsatile tinnitus for the past couple of years, and it has been more noticeable the past few months. He describes it as being able to continuously hear his pulse. No associated vision changes, dizziness, jaw claudication. MRA of head/neck was normal in the past, and recent carotid ultrasound was again normal.  He also noted a separate incident at [employer] that occurred 3-4 months ago where he stood up from bending and felt unsteady/lost sense of time. It resolved after seconds as far as he could tell, and he hasn't had a repeat episode.    He's been taking his praluent 75 mg/ml every other Tuesday without difficulty. He states he religiously takes his other medications as well (still tolerating atorvastatin and ezetimibe).  Denies any symptoms of angina. No weight change, orthopnea, PND, palpitations, claudication.       PAST MEDICAL HISTORY:  Past Medical History:   Diagnosis Date     Antiplatelet or antithrombotic long-term use      Diaphragmatic hernia without mention of obstruction or gangrene      Heart disease      Hypertension      Nephrolithiasis 4/2010     Other and unspecified hyperlipidemia      Pulmonary embolus and infarction (H)     s/p hemothorax and filter s/p filter removal (2011), now on long term anti-coagulation      Statin intolerance 2/1/2017     Stented coronary artery      Unspecified retinal detachment     Childhood trauma, unrepaired       CURRENT MEDICATIONS:  Current Outpatient Prescriptions   Medication Sig Dispense Refill     atorvastatin (LIPITOR) 80 MG tablet Take 1 tablet (80 mg) by mouth daily 91 tablet 3     lisinopril (PRINIVIL/ZESTRIL) 2.5 MG tablet Take 1 tablet (2.5 mg) by mouth daily , or as directed by Dr. Ocampo. 91 tablet 1     warfarin (COUMADIN) 5 MG tablet Take 1.5 tablets (7.5 mg) by mouth daily 135 tablet 2     alirocumab (PRALUENT) 75 MG/ML injectable pen Inject 1 mL (75 mg) Subcutaneous every 14 days 2 mL 12     fenofibrate 160 MG tablet Take 1 tablet (160 mg) by mouth daily 91 tablet 3     ezetimibe (ZETIA) 10 MG tablet Take 1 tablet (10 mg) by mouth daily 90 tablet 3     metoprolol (LOPRESSOR) 25 MG tablet Take 0.5 tablets (12.5 mg) by mouth 2 times daily 90 tablet 3     albuterol (PROAIR HFA/PROVENTIL HFA/VENTOLIN HFA) 108 (90 BASE) MCG/ACT Inhaler Inhale 2 puffs into the lungs every 6 hours as needed for shortness of breath / dyspnea or wheezing 1 Inhaler 1     Cholecalciferol (VITAMIN D) 2000 UNITS CAPS Take 2,000 Units by mouth daily       aspirin EC 81 MG tablet Take 1 tablet (81 mg) by mouth daily 91 tablet 3     calcium carbonate (TUMS) 500 MG chewable tablet Take 1 chew tab by mouth daily as needed       DiphenhydrAMINE HCl (BENADRYL PO) Take 25-50 mg by mouth daily as needed       traMADol (ULTRAM) 50 MG tablet Take 1-2 tablets ( mg) by mouth every 6 hours as needed for moderate pain (Patient not taking: Reported on 3/20/2018) 40 tablet 0     tamsulosin (FLOMAX) 0.4 MG capsule Take 1 capsule (0.4 mg) by mouth daily (Patient not taking: Reported on 3/20/2018) 30 capsule 0     fluticasone-salmeterol (ADVAIR) 250-50 MCG/DOSE diskus inhaler Inhale 1 puff into the lungs 2 times daily (Patient not taking: Reported on 3/20/2018) 60 Inhaler 11     fluticasone (FLONASE) 50 MCG/ACT spray  Spray 2 sprays into both nostrils daily (Patient not taking: Reported on 3/20/2018) 1 Bottle 1     vitamin B complex with vitamin C (VITAMIN  B COMPLEX) TABS tablet Take 1 tablet by mouth daily         PAST SURGICAL HISTORY:  Past Surgical History:   Procedure Laterality Date     BYPASS GRAFT ARTERY CORONARY  4/4/2014    Procedure: BYPASS GRAFT ARTERY CORONARY;  Median Sternotomy, Coronary Artery Bypass Bypass x 4 using Left Internal Mammary, Left Saphenous Vein, on pump oxygenation;  Surgeon: Sherry Armando MD;  Location: [facility] OR     C NONSPECIFIC PROCEDURE      Spermatocele resection     CLOSED REDUCTION, PERCUTANEOUS PINNING  HAND, COMBINED Left 11/5/2015    Procedure: COMBINED CLOSED REDUCTION, PERCUTANEOUS PINNING HAND;  Surgeon: Carmella Love MD;  [facility]     COLONOSCOPY  3/15/2013    Procedure: COLONOSCOPY;;  Surgeon: Racheal Shipman MD;  [facility]     LITHOTRIPSY  4/2010       ALLERGIES     Allergies   Allergen Reactions     Cats      Hay Fever & [A.R.M.]      Heparin Other (See Comments)     Heparin resistance per cardio-thoracic surgeon Dr. Armando     Hydrocodone-Acetaminophen Nausea and Vomiting       FAMILY HISTORY:  Family History   Problem Relation Age of Onset     HEART DISEASE Mother      C.A.D. Father      3 vessel CABG, age 70     CANCER Father      bladder cancer     C.A.D. Paternal Grandmother      Hypertension Paternal Grandmother      Alzheimer Disease Paternal Grandmother      DIABETES No family hx of      Thyroid Disease No family hx of      Cancer - colorectal No family hx of        SOCIAL HISTORY:  Social History     Social History     Marital status:      Spouse name: N/A     Number of children: N/A     Years of education: N/A     Social History Main Topics     Smoking status: Never Smoker     Smokeless tobacco: Never Used     Alcohol use No      Comment: very rare     Drug use: No     Sexual activity: Not on file     Other Topics Concern      "Not on file     Social History Narrative       ROS:   A complete ROS is negative except as noted above.     EXAM:  /83 (BP Location: Left arm, Patient Position: Chair, Cuff Size: Adult Regular)  Pulse 65  Ht 1.803 m (5' 11\")  Wt 93.7 kg (206 lb 9.6 oz)  SpO2 98%  BMI 28.81 kg/m2  In general, the patient is a pleasant male in no apparent distress.    Head: nc/at  Eyes: EOMI, Sclerae white, not injected. Blind in right eye  ENT: no OP lesions or erythema  Neck: supple, no masses or LAD, carotids are +2/2 without bruit  CV: nml s1, s2; no murmur, rub, gallop; no JVD  Chest: lungs are clear without wheezing or rhonchi  Abd: Soft, nontender, nondistended. No organomegaly.  No bruits.   Ext: No clubbing, cyanosis, or edema.  The pulses are +2/2 at the radial bilaterally.  No bruits are noted.  Skin: no erythema or rash on limited exam  Neuro: alert, oriented; normal speech, gait and affect    Labs:  LIPID RESULTS:  Lab Results   Component Value Date    CHOL 109 03/23/2018    HDL 42 03/23/2018    LDL 45 03/23/2018    TRIG 106 03/23/2018    CHOLHDLRATIO 7.0 (H) 09/16/2015    NHDL 67 03/23/2018       LIVER ENZYME RESULTS:  Lab Results   Component Value Date    AST 23 11/06/2017    ALT 33 11/06/2017       CBC RESULTS:  Lab Results   Component Value Date    WBC 8.3 05/03/2017    RBC 5.29 05/03/2017    HGB 15.5 05/03/2017    HCT 46.9 05/03/2017    MCV 89 05/03/2017    MCH 29.3 05/03/2017    MCHC 33.0 05/03/2017    RDW 13.5 05/03/2017     05/03/2017       BMP RESULTS:  Lab Results   Component Value Date     11/06/2017    POTASSIUM 3.8 11/06/2017    CHLORIDE 106 11/06/2017    CO2 25 11/06/2017    ANIONGAP 7 11/06/2017    GLC 97 11/06/2017    BUN 12 11/06/2017    CR 1.01 11/06/2017    GFRESTIMATED 77 11/06/2017    GFRESTBLACK >90 11/06/2017    GONZALES 10.2 (H) 11/06/2017        A1C RESULTS:  Lab Results   Component Value Date    A1C 5.9 03/01/2016       INR RESULTS:  Lab Results   Component Value Date    INR " 2.56 (H) 03/23/2018    INR 3.61 (H) 03/07/2018           Assessment and Plan:   Mr. 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 He has a history of statin intolerance but is currently tolerating 80mg of atorvastatin and 10mg of ezetimibe daily. His lipids have significantly improved with Alirocumab (Praluent). His current dose is 75 mg/ml (every other Tuesday). He has maintained lipids at goal with this dose.    We discussed the results with patient:  We re-emphasized the importance of a heart healthy diet.  With regard to his pulsatile tinnitus, provided a referral to ENT for further evaluation and more thorough HEENT exam. Follow-up with audiology may also be helpful given h/o mild high frequency hearing loss.    Follow-up in 1 year    It was a pleasure to see Mr. Prabhakar in clinic today with Dr. Ocampo.    Pia Wong MD  Cardiology Fellow  [phone]      I interviewed and examined the patient with the CV fellow.  I agree with the assessment and plan as documented.    Reji Ocampo MD, PhD  Professor of Medicine  Division of Cardiology    CC  Patient Care Team:  Simi Guevara MD as PCP - General (Internal Medicine)  Indu Osorio, RN as Nurse Coordinator (Cardiology)  Reji Ocampo MD as MD (Cardiology)  Shiv Vizcarra MD as MD (Family Practice)  Carmella Love MD as MD (Orthopedics)  Donya Boland AuD as Audiologist (Audiology)  Lavelle Solis MD as MD (Family Practice)  SIMI GUEVARA

## 2018-03-29 NOTE — NURSING NOTE
Chief Complaint   Patient presents with     Follow Up For     manage essential hypertension/CABG/hx of pulmonary emboli     Vitals were taken and medications were reconciled.     Keisha Puentes MA    3:28 PM

## 2018-03-29 NOTE — PATIENT INSTRUCTIONS
Patient Instructions:  It was a pleasure to see you in the cardiology clinic today.      If you have any questions, you can reach my nurse, Olivia Lira, at [phone].  Press Option #1 for the [facility], and then press Option #3 for nursing.    We are encouraging the use of [employer] to communicate with your HealthCare Provider    Medication Changes:None    Studies Ordered:in 1 year  1) Labs    The results from today include: None    Referral: ENT    Please follow up: With Dr. Reji Ocampo in 1 year    Sincerely,    Reji Ocampo MD     If you have an urgent need after hours (8:00 am to 4:30 pm) please call [phone] and ask for the cardiology fellow on call.

## 2018-03-29 NOTE — PROGRESS NOTES
HPI:  Mr. Prabhakar is a 56 yo male with history of premature atherosclerosis and CABG in April 2014, HTN and HLD. He also has history of statin intolerance, however is currently tolerating Atorvastatin. He is being treated with PCSK9 inhibitor.  He returns to clinic for routine follow up.     From a cardiovascular standpoint he feels he is doing well. However, he's been dealing with pulsatile tinnitus for the past couple of years, and it has been more noticeable the past few months. He describes it as being able to continuously hear his pulse. No associated vision changes, dizziness, jaw claudication. MRA of head/neck was normal in the past, and recent carotid ultrasound was again normal.  He also noted a separate incident at [employer] that occurred 3-4 months ago where he stood up from bending and felt unsteady/lost sense of time. It resolved after seconds as far as he could tell, and he hasn't had a repeat episode.    He's been taking his praluent 75 mg/ml every other Tuesday without difficulty. He states he religiously takes his other medications as well (still tolerating atorvastatin and ezetimibe).  Denies any symptoms of angina. No weight change, orthopnea, PND, palpitations, claudication.       PAST MEDICAL HISTORY:  Past Medical History:   Diagnosis Date     Antiplatelet or antithrombotic long-term use      Diaphragmatic hernia without mention of obstruction or gangrene      Heart disease      Hypertension      Nephrolithiasis 4/2010     Other and unspecified hyperlipidemia      Pulmonary embolus and infarction (H)     s/p hemothorax and filter s/p filter removal (2011), now on long term anti-coagulation     Statin intolerance 2/1/2017     Stented coronary artery      Unspecified retinal detachment     Childhood trauma, unrepaired       CURRENT MEDICATIONS:  Current Outpatient Prescriptions   Medication Sig Dispense Refill     atorvastatin (LIPITOR) 80 MG tablet Take 1 tablet (80 mg) by mouth daily 91 tablet 3      lisinopril (PRINIVIL/ZESTRIL) 2.5 MG tablet Take 1 tablet (2.5 mg) by mouth daily , or as directed by Dr. Ocampo. 91 tablet 1     warfarin (COUMADIN) 5 MG tablet Take 1.5 tablets (7.5 mg) by mouth daily 135 tablet 2     alirocumab (PRALUENT) 75 MG/ML injectable pen Inject 1 mL (75 mg) Subcutaneous every 14 days 2 mL 12     fenofibrate 160 MG tablet Take 1 tablet (160 mg) by mouth daily 91 tablet 3     ezetimibe (ZETIA) 10 MG tablet Take 1 tablet (10 mg) by mouth daily 90 tablet 3     metoprolol (LOPRESSOR) 25 MG tablet Take 0.5 tablets (12.5 mg) by mouth 2 times daily 90 tablet 3     albuterol (PROAIR HFA/PROVENTIL HFA/VENTOLIN HFA) 108 (90 BASE) MCG/ACT Inhaler Inhale 2 puffs into the lungs every 6 hours as needed for shortness of breath / dyspnea or wheezing 1 Inhaler 1     Cholecalciferol (VITAMIN D) 2000 UNITS CAPS Take 2,000 Units by mouth daily       aspirin EC 81 MG tablet Take 1 tablet (81 mg) by mouth daily 91 tablet 3     calcium carbonate (TUMS) 500 MG chewable tablet Take 1 chew tab by mouth daily as needed       DiphenhydrAMINE HCl (BENADRYL PO) Take 25-50 mg by mouth daily as needed       traMADol (ULTRAM) 50 MG tablet Take 1-2 tablets ( mg) by mouth every 6 hours as needed for moderate pain (Patient not taking: Reported on 3/20/2018) 40 tablet 0     tamsulosin (FLOMAX) 0.4 MG capsule Take 1 capsule (0.4 mg) by mouth daily (Patient not taking: Reported on 3/20/2018) 30 capsule 0     fluticasone-salmeterol (ADVAIR) 250-50 MCG/DOSE diskus inhaler Inhale 1 puff into the lungs 2 times daily (Patient not taking: Reported on 3/20/2018) 60 Inhaler 11     fluticasone (FLONASE) 50 MCG/ACT spray Spray 2 sprays into both nostrils daily (Patient not taking: Reported on 3/20/2018) 1 Bottle 1     vitamin B complex with vitamin C (VITAMIN  B COMPLEX) TABS tablet Take 1 tablet by mouth daily         PAST SURGICAL HISTORY:  Past Surgical History:   Procedure Laterality Date     BYPASS GRAFT ARTERY CORONARY   "4/4/2014    Procedure: BYPASS GRAFT ARTERY CORONARY;  Median Sternotomy, Coronary Artery Bypass Bypass x 4 using Left Internal Mammary, Left Saphenous Vein, on pump oxygenation;  Surgeon: Sherry Armando MD;  Location: [facility]     C NONSPECIFIC PROCEDURE      Spermatocele resection     CLOSED REDUCTION, PERCUTANEOUS PINNING  HAND, COMBINED Left 11/5/2015    Procedure: COMBINED CLOSED REDUCTION, PERCUTANEOUS PINNING HAND;  Surgeon: Carmella Love MD;  [facility]     COLONOSCOPY  3/15/2013    Procedure: COLONOSCOPY;;  Surgeon: Racheal Shipman MD;  [facility]     LITHOTRIPSY  4/2010       ALLERGIES     Allergies   Allergen Reactions     Cats      Hay Fever & [A.R.M.]      Heparin Other (See Comments)     Heparin resistance per cardio-thoracic surgeon Dr. Armando     Hydrocodone-Acetaminophen Nausea and Vomiting       FAMILY HISTORY:  Family History   Problem Relation Age of Onset     HEART DISEASE Mother      C.A.D. Father      3 vessel CABG, age 70     CANCER Father      bladder cancer     C.A.D. Paternal Grandmother      Hypertension Paternal Grandmother      Alzheimer Disease Paternal Grandmother      DIABETES No family hx of      Thyroid Disease No family hx of      Cancer - colorectal No family hx of        SOCIAL HISTORY:  Social History     Social History     Marital status:      Spouse name: N/A     Number of children: N/A     Years of education: N/A     Social History Main Topics     Smoking status: Never Smoker     Smokeless tobacco: Never Used     Alcohol use No      Comment: very rare     Drug use: No     Sexual activity: Not on file     Other Topics Concern     Not on file     Social History Narrative       ROS:   A complete ROS is negative except as noted above.     EXAM:  /83 (BP Location: Left arm, Patient Position: Chair, Cuff Size: Adult Regular)  Pulse 65  Ht 1.803 m (5' 11\")  Wt 93.7 kg (206 lb 9.6 oz)  SpO2 98%  BMI 28.81 kg/m2  In general, the " patient is a pleasant male in no apparent distress.    Head: nc/at  Eyes: EOMI, Sclerae white, not injected. Blind in right eye  ENT: no OP lesions or erythema  Neck: supple, no masses or LAD, carotids are +2/2 without bruit  CV: nml s1, s2; no murmur, rub, gallop; no JVD  Chest: lungs are clear without wheezing or rhonchi  Abd: Soft, nontender, nondistended. No organomegaly.  No bruits.   Ext: No clubbing, cyanosis, or edema.  The pulses are +2/2 at the radial bilaterally.  No bruits are noted.  Skin: no erythema or rash on limited exam  Neuro: alert, oriented; normal speech, gait and affect    Labs:  LIPID RESULTS:  Lab Results   Component Value Date    CHOL 109 03/23/2018    HDL 42 03/23/2018    LDL 45 03/23/2018    TRIG 106 03/23/2018    CHOLHDLRATIO 7.0 (H) 09/16/2015    NHDL 67 03/23/2018       LIVER ENZYME RESULTS:  Lab Results   Component Value Date    AST 23 11/06/2017    ALT 33 11/06/2017       CBC RESULTS:  Lab Results   Component Value Date    WBC 8.3 05/03/2017    RBC 5.29 05/03/2017    HGB 15.5 05/03/2017    HCT 46.9 05/03/2017    MCV 89 05/03/2017    MCH 29.3 05/03/2017    MCHC 33.0 05/03/2017    RDW 13.5 05/03/2017     05/03/2017       BMP RESULTS:  Lab Results   Component Value Date     11/06/2017    POTASSIUM 3.8 11/06/2017    CHLORIDE 106 11/06/2017    CO2 25 11/06/2017    ANIONGAP 7 11/06/2017    GLC 97 11/06/2017    BUN 12 11/06/2017    CR 1.01 11/06/2017    GFRESTIMATED 77 11/06/2017    GFRESTBLACK >90 11/06/2017    GONZALES 10.2 (H) 11/06/2017        A1C RESULTS:  Lab Results   Component Value Date    A1C 5.9 03/01/2016       INR RESULTS:  Lab Results   Component Value Date    INR 2.56 (H) 03/23/2018    INR 3.61 (H) 03/07/2018           Assessment and Plan:   Mr. Prabhakar is a 56 yo male with history of premature atherosclerosis and CABG in April 2014, HTN and HLD. He has a history of statin intolerance but is currently tolerating 80mg of atorvastatin and 10mg of ezetimibe daily. His  lipids have significantly improved with Alirocumab (Praluent). His current dose is 75 mg/ml (every other Tuesday). He has maintained lipids at goal with this dose.    We discussed the results with patient:  We re-emphasized the importance of a heart healthy diet.  With regard to his pulsatile tinnitus, provided a referral to ENT for further evaluation and more thorough HEENT exam. Follow-up with audiology may also be helpful given h/o mild high frequency hearing loss.    Follow-up in 1 year    It was a pleasure to see Mr. Prabhakar in clinic today with Dr. Ocampo.    Pia Wong MD  Cardiology Fellow  [phone]      I interviewed and examined the patient with the CV fellow.  I agree with the assessment and plan as documented.    Reji Ocampo MD, PhD  Professor of Medicine  Division of Cardiology  CC  Patient Care Team:  Simi Guevara MD as PCP - General (Internal Medicine)  Indu Osorio, RN as Nurse Coordinator (Cardiology)  Reji Ocampo MD as MD (Cardiology)  Shiv Vizcarra MD as MD (Family Practice)  Carmella Love MD as MD (Orthopedics)  Donya Boland AuD as Audiologist (Audiology)  [facility], Lavelle BLACKWOOD MD as MD (Family Practice)  SIMI GUEVARA  Answers for HPI/ROS submitted by the patient on 3/22/2018   General Symptoms: No  Skin Symptoms: No  HENT Symptoms: Yes  EYE SYMPTOMS: No  HEART SYMPTOMS: No  LUNG SYMPTOMS: No  INTESTINAL SYMPTOMS: No  URINARY SYMPTOMS: No  REPRODUCTIVE SYMPTOMS: No  SKELETAL SYMPTOMS: No  BLOOD SYMPTOMS: No  NERVOUS SYSTEM SYMPTOMS: Yes  MENTAL HEALTH SYMPTOMS: No  Ear pain: No  Ear discharge: No  Hearing loss: No  Tinnitus: Yes  Nosebleeds: No  Congestion: No  Sinus pain: No  Trouble swallowing: No   Voice hoarseness: No  Mouth sores: No  Sore throat: No  Tooth pain: No  Gum tenderness: No  Bleeding gums: No  Change in taste: No  Change in sense of smell: No  Dry mouth: No  Neck lump: No  Trouble with coordination: No  Dizziness or trouble with  balance: Yes  Fainting or black-out spells: No  Memory loss: No  Headache: No  Seizures: No  Speech problems: No  Tingling: No  Tremor: No  Weakness: No  Difficulty walking: No  Paralysis: No  Numbness: No

## 2018-03-29 NOTE — MR AVS SNAPSHOT
After Visit Summary   3/29/2018    Oswaldo Prabhakar    MRN: [id number]           Patient Information     Date Of Birth          1960        Visit Information        Provider Department      3/29/2018 3:30 PM Reji Ocampo MD [facility]        Today's Diagnoses     Hyperlipidemia LDL goal <70    -  1      Care Instructions    Patient Instructions:  It was a pleasure to see you in the cardiology clinic today.      If you have any questions, you can reach my nurse, Olivia Lira, at [phone].  Press Option #1 for the [facility], and then press Option #3 for nursing.    We are encouraging the use of [employer] to communicate with your HealthCare Provider    Medication Changes:None    Studies Ordered:in 1 year  1) Labs    The results from today include: None    Referral: ENT    Please follow up: With Dr. Reji Ocampo in 1 year    Sincerely,    Reji Ocampo MD     If you have an urgent need after hours (8:00 am to 4:30 pm) please call [phone] and ask for the cardiology fellow on call.                    Follow-ups after your visit        Additional Services     Follow-Up with Cardiologist                 Future tests that were ordered for you today     Open Future Orders        Priority Expected Expires Ordered    Lipid panel reflex to direct LDL Fasting Routine 9/29/2018 3/29/2019 3/29/2018    Comprehensive metabolic panel Routine 3/29/2019 3/30/2019 3/29/2018    Lipid panel reflex to direct LDL Fasting Routine 3/29/2019 3/30/2019 3/29/2018    Follow-Up with Cardiologist Routine 3/29/2019 3/30/2019 3/29/2018            Who to contact     If you have questions or need follow up information about today's clinic visit or your schedule please contact [facility] directly at [phone].  Normal or non-critical lab and imaging results will be communicated to you by MyChart, letter or phone within 4 business days after the clinic has received the  "results. If you do not hear from us within 7 days, please contact the clinic through [employer] or phone. If you have a critical or abnormal lab result, we will notify you by phone as soon as possible.  Submit refill requests through [employer] or call your pharmacy and they will forward the refill request to us. Please allow 3 business days for your refill to be completed.          Additional Information About Your Visit        [employer] Information     [employer] gives you secure access to your electronic health record. If you see a primary care provider, you can also send messages to your care team and make appointments. If you have questions, please call your primary care clinic.  If you do not have a primary care provider, please call [phone] and they will assist you.        Care EveryWhere ID     This is your Care EveryWhere ID. This could be used by other organizations to access your [place] medical records  [id number]        Your Vitals Were     Pulse Height Pulse Oximetry BMI (Body Mass Index)          65 1.803 m (5' 11\") 98% 28.81 kg/m2         Blood Pressure from Last 3 Encounters:   03/29/18 123/83   03/20/18 132/78   01/04/18 130/82    Weight from Last 3 Encounters:   03/29/18 93.7 kg (206 lb 9.6 oz)   03/20/18 94.8 kg (209 lb)   01/04/18 94.3 kg (208 lb)               Primary Care Provider Office Phone # Fax #    Samm Vazquez [phone]       [address] [place] [address] [address]  [facility] [address]        Equal Access to Services     [facility]: Hadii aad ku hadasho Soomaali, waaxda luqadaha, qaybta kaalmada adeegyada, waxaj penaloza . So [facility] [phone].    ATENCIÓN: Si habla español, tiene a krueger disposición servicios gratuitos de asistencia lingüística. Llame al [phone].    We comply with applicable federal civil rights laws and [place] laws. We do not discriminate on the basis of race, color, national origin, age, disability, sex, sexual orientation, or " gender identity.            Thank you!     Thank you for choosing [facility]  for your care. Our goal is always to provide you with excellent care. Hearing back from our patients is one way we can continue to improve our services. Please take a few minutes to complete the written survey that you may receive in the mail after your visit with us. Thank you!             Your Updated Medication List - Protect others around you: Learn how to safely use, store and throw away your medicines at www.disposemymeds.org.          This list is accurate as of 3/29/18  5:11 PM.  Always use your most recent med list.                   Brand Name Dispense Instructions for use Diagnosis    albuterol 108 (90 BASE) MCG/ACT Inhaler    PROAIR HFA/PROVENTIL HFA/VENTOLIN HFA    1 Inhaler    Inhale 2 puffs into the lungs every 6 hours as needed for shortness of breath / dyspnea or wheezing    Pneumonia of both lungs due to infectious organism, unspecified part of lung       alirocumab 75 MG/ML injectable pen    PRALUENT    2 mL    Inject 1 mL (75 mg) Subcutaneous every 14 days    CAD (coronary artery disease), S/P CABG x 4, Hyperlipidemia LDL goal <130, Statin intolerance       aspirin EC 81 MG EC tablet     91 tablet    Take 1 tablet (81 mg) by mouth daily    S/P CABG x 4       atorvastatin 80 MG tablet    LIPITOR    91 tablet    Take 1 tablet (80 mg) by mouth daily    Pure hyperglyceridemia, Hyperlipidemia LDL goal <130       BENADRYL PO      Take 25-50 mg by mouth daily as needed        calcium carbonate 500 MG chewable tablet    TUMS     Take 1 chew tab by mouth daily as needed        ezetimibe 10 MG tablet    ZETIA    90 tablet    Take 1 tablet (10 mg) by mouth daily    Statin intolerance, Hyperlipidemia LDL goal <70, Atherosclerosis of coronary artery of native heart without angina pectoris, unspecified vessel or lesion type, S/P CABG (coronary artery bypass graft)       fenofibrate 160 MG tablet     91 tablet    Take 1  tablet (160 mg) by mouth daily    Hyperlipidemia LDL goal <130       lisinopril 2.5 MG tablet    PRINIVIL/Zestril    91 tablet    Take 1 tablet (2.5 mg) by mouth daily , or as directed by Dr. Ocampo.    Essential hypertension       metoprolol tartrate 25 MG tablet    LOPRESSOR    90 tablet    Take 0.5 tablets (12.5 mg) by mouth 2 times daily    S/P CABG x 4       vitamin B complex with vitamin C Tabs tablet      Take 1 tablet by mouth daily        vitamin D 2000 UNITS Caps      Take 2,000 Units by mouth daily    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       warfarin 5 MG tablet    COUMADIN    135 tablet    Take 1.5 tablets (7.5 mg) by mouth daily    History of pulmonary embolism, Chronic anticoagulation

## 2018-04-13 ENCOUNTER — ANTICOAGULATION THERAPY VISIT (OUTPATIENT)
Dept: ANTICOAGULATION | Facility: CLINIC | Age: 58
End: 2018-04-13

## 2018-04-13 DIAGNOSIS — Z79.01 LONG-TERM (CURRENT) USE OF ANTICOAGULANTS: ICD-10-CM

## 2018-04-13 DIAGNOSIS — I26.99 PULMONARY EMBOLI (H): ICD-10-CM

## 2018-04-13 DIAGNOSIS — E78.5 HYPERLIPIDEMIA LDL GOAL <70: ICD-10-CM

## 2018-04-13 LAB
ALBUMIN SERPL-MCNC: 4.2 G/DL (ref 3.4–5)
ALP SERPL-CCNC: 40 U/L (ref 40–150)
ALT SERPL W P-5'-P-CCNC: 34 U/L (ref 0–70)
ANION GAP SERPL CALCULATED.3IONS-SCNC: 7 MMOL/L (ref 3–14)
AST SERPL W P-5'-P-CCNC: 27 U/L (ref 0–45)
BILIRUB SERPL-MCNC: 0.4 MG/DL (ref 0.2–1.3)
BUN SERPL-MCNC: 16 MG/DL (ref 7–30)
CALCIUM SERPL-MCNC: 9.6 MG/DL (ref 8.5–10.1)
CHLORIDE SERPL-SCNC: 107 MMOL/L (ref 94–109)
CO2 SERPL-SCNC: 25 MMOL/L (ref 20–32)
CREAT SERPL-MCNC: 1.04 MG/DL (ref 0.66–1.25)
GFR SERPL CREATININE-BSD FRML MDRD: 74 ML/MIN/1.7M2
GLUCOSE SERPL-MCNC: 134 MG/DL (ref 70–99)
INR PPP: 3.43 (ref 0.86–1.14)
POTASSIUM SERPL-SCNC: 3.9 MMOL/L (ref 3.4–5.3)
PROT SERPL-MCNC: 7.2 G/DL (ref 6.8–8.8)
SODIUM SERPL-SCNC: 139 MMOL/L (ref 133–144)

## 2018-04-13 NOTE — PROGRESS NOTES
ANTICOAGULATION FOLLOW-UP CLINIC VISIT    Patient Name:  Oswaldo Prabhakar  Date:  4/13/2018  Contact Type:  Telephone    SUBJECTIVE:     Patient Findings     Positives Unexplained INR or factor level change           OBJECTIVE    INR   Date Value Ref Range Status   04/13/2018 3.43 (H) 0.86 - 1.14 Final     Chromogenic Factor 10   Date Value Ref Range Status   06/18/2011 81 60 - 140 % Final     Comment:     Therapeutic Range: A Chromogenic Factor 10 level of 20-40% inversely   correlates   with an INR of 2-3 for patients receiving Warfarin. Chromogenic Factor 10   levels below 20% indicate an INR greater than 3, and levels above 40%   indicate   an INR less than 2.     Factor 2 Assay   Date Value Ref Range Status   04/04/2012  60 - 140 % Final    (Note)  CANCELLED AND CREDITED PER [employer] IN MED. MONITORING,4/4/12,       ASSESSMENT / PLAN  INR assessment SUPRA    Recheck INR In: 2 WEEKS    INR Location Clinic      Anticoagulation Summary as of 4/13/2018     INR goal 2.0-3.0   Today's INR 3.43!   Maintenance plan 5 mg (5 mg x 1) on Mon, Wed, Fri; 7.5 mg (5 mg x 1.5) all other days   Full instructions 4/13: 2.5 mg; Otherwise 5 mg on Mon, Wed, Fri; 7.5 mg all other days   Weekly total 45 mg   Plan last modified Marielena Chacon RN (4/13/2018)   Next INR check 4/27/2018   Priority INR   Target end date Indefinite    Indications   Pulmonary emboli (H) [I26.99]  Long-term (current) use of anticoagulants [Z79.01] [Z79.01]         Anticoagulation Episode Summary     INR check location     Preferred lab     Send INR reminders to [facility]    Comments HIPPA INFO OK to speak with wife Josselyn OK to leave messages on Home.work and cell phones  use cell phone #[phone]      Anticoagulation Care Providers     Provider Role Specialty Phone number    Samm Vazquez MD [facility] Internal Medicine [phone]            See the Encounter Report to view Anticoagulation Flowsheet and Dosing Calendar (Go to Encounters  tab in chart review, and find the Anticoagulation Therapy Visit)    Spoke with Oswaldo.  He denies any missed doses of warfarin.  Will try warfarin 5 mg MWF and 7.5 mg all other days of the week.  He states he will recheck INR in 2 weeks.  No changes in health, diet, medications.    Marielena Chacon RN

## 2018-04-13 NOTE — MR AVS SNAPSHOT
Oswaldo Prabhakar   4/13/2018   Anticoagulation Therapy Visit    Description:  57 year old male   Provider:  Marielena Chacon, RN   Department:  [facility]           INR as of 4/13/2018     Today's INR 3.43!      Anticoagulation Summary as of 4/13/2018     INR goal 2.0-3.0   Today's INR 3.43!   Full instructions 4/13: 2.5 mg; Otherwise 5 mg on Mon, Wed, Fri; 7.5 mg all other days   Next INR check 4/27/2018    Indications   Pulmonary emboli (H) [I26.99]  Long-term (current) use of anticoagulants [Z79.01] [Z79.01]         April 2018 Details    Sun Mon Tue Wed Thu Fri Sat     1               2               3               4               5               6               7                 8               9               10               11               12               13      2.5 mg   See details      14      7.5 mg           15      7.5 mg         16      5 mg         17      7.5 mg         18      5 mg         19      7.5 mg         20      5 mg         21      7.5 mg           22      7.5 mg         23      5 mg         24      7.5 mg         25      5 mg         26      7.5 mg         27            28                 29               30                     Date Details   04/13 This INR check       Date of next INR:  4/27/2018         How to take your warfarin dose     To take:  2.5 mg Take 0.5 of a 5 mg tablet.    To take:  5 mg Take 1 of the 5 mg tablets.    To take:  7.5 mg Take 1.5 of the 5 mg tablets.

## 2018-05-01 DIAGNOSIS — I26.99 PULMONARY EMBOLI (H): ICD-10-CM

## 2018-05-01 DIAGNOSIS — Z79.01 LONG-TERM (CURRENT) USE OF ANTICOAGULANTS: ICD-10-CM

## 2018-05-01 LAB — INR PPP: 1.7 (ref 0.86–1.14)

## 2018-05-02 ENCOUNTER — ANTICOAGULATION THERAPY VISIT (OUTPATIENT)
Dept: ANTICOAGULATION | Facility: CLINIC | Age: 58
End: 2018-05-02

## 2018-05-02 DIAGNOSIS — Z79.01 LONG-TERM (CURRENT) USE OF ANTICOAGULANTS: ICD-10-CM

## 2018-05-02 DIAGNOSIS — I26.99 PULMONARY EMBOLI (H): ICD-10-CM

## 2018-05-02 NOTE — MR AVS SNAPSHOT
Oswaldo Prabhakar   5/2/2018   Anticoagulation Therapy Visit    Description:  57 year old male   Provider:  Nano Greer RN   Department:  [facility]           INR as of 5/2/2018     Today's INR 1.70! (5/1/2018)      Anticoagulation Summary as of 5/2/2018     INR goal 2.0-3.0   Today's INR 1.70! (5/1/2018)   Full instructions 5 mg on Mon, Fri; 7.5 mg all other days   Next INR check 5/16/2018    Indications   Pulmonary emboli (H) [I26.99]  Long-term (current) use of anticoagulants [Z79.01] [Z79.01]         May 2018 Details    Sun Mon Tue Wed Thu Fri Sat       1               2      7.5 mg   See details      3      7.5 mg         4      5 mg         5      7.5 mg           6      7.5 mg         7      5 mg         8      7.5 mg         9      7.5 mg         10      7.5 mg         11      5 mg         12      7.5 mg           13      7.5 mg         14      5 mg         15      7.5 mg         16            17               18               19                 20               21               22               23               24               25               26                 27               28               29               30               31                  Date Details   05/02 This INR check       Date of next INR:  5/16/2018         How to take your warfarin dose     To take:  5 mg Take 1 of the 5 mg tablets.    To take:  7.5 mg Take 1.5 of the 5 mg tablets.

## 2018-05-02 NOTE — PROGRESS NOTES
ANTICOAGULATION FOLLOW-UP CLINIC VISIT    Patient Name:  Oswaldo Prabhakar  Date:  5/2/2018  Contact Type:  Telephone    SUBJECTIVE:        OBJECTIVE    INR   Date Value Ref Range Status   05/01/2018 1.70 (H) 0.86 - 1.14 Final     Chromogenic Factor 10   Date Value Ref Range Status   06/18/2011 81 60 - 140 % Final     Comment:     Therapeutic Range: A Chromogenic Factor 10 level of 20-40% inversely   correlates   with an INR of 2-3 for patients receiving Warfarin. Chromogenic Factor 10   levels below 20% indicate an INR greater than 3, and levels above 40%   indicate   an INR less than 2.     Factor 2 Assay   Date Value Ref Range Status   04/04/2012  60 - 140 % Final    (Note)  CANCELLED AND CREDITED PER [employer] IN MED. MONITORING,4/4/12,       ASSESSMENT / PLAN  INR assessment SUB    Recheck INR In: 2 WEEKS    INR Location Clinic      Anticoagulation Summary as of 5/2/2018     INR goal 2.0-3.0   Today's INR 1.70! (5/1/2018)   Maintenance plan 5 mg (5 mg x 1) on Mon, Fri; 7.5 mg (5 mg x 1.5) all other days   Full instructions 5 mg on Mon, Fri; 7.5 mg all other days   Weekly total 47.5 mg   Plan last modified Nano Greer RN (5/2/2018)   Next INR check 5/16/2018   Priority INR   Target end date Indefinite    Indications   Pulmonary emboli (H) [I26.99]  Long-term (current) use of anticoagulants [Z79.01] [Z79.01]         Anticoagulation Episode Summary     INR check location     Preferred lab     Send INR reminders to [facility]    Comments HIPPA INFO OK to speak with wife Josselyn OK to leave messages on Home.work and cell phones  use cell phone #[phone]      Anticoagulation Care Providers     Provider Role Specialty Phone number    Samm Vazquez MD [facility] Internal Medicine [phone]            See the Encounter Report to view Anticoagulation Flowsheet and Dosing Calendar (Go to Encounters tab in chart review, and find the Anticoagulation Therapy Visit)  Left message for patient with  results and dosing recommendations. Asked patient to call back to report any missed doses, falls, signs and symptoms of bleeding or clotting, any changes in health, medication, or diet. Asked patient to call back with any questions or concerns.      Nano Greer RN

## 2018-05-24 DIAGNOSIS — I26.99 PULMONARY EMBOLI (H): ICD-10-CM

## 2018-05-24 DIAGNOSIS — Z79.01 LONG-TERM (CURRENT) USE OF ANTICOAGULANTS: ICD-10-CM

## 2018-05-24 LAB — INR PPP: 4.62 (ref 0.86–1.14)

## 2018-05-25 ENCOUNTER — ANTICOAGULATION THERAPY VISIT (OUTPATIENT)
Dept: ANTICOAGULATION | Facility: CLINIC | Age: 58
End: 2018-05-25

## 2018-05-25 DIAGNOSIS — Z79.01 LONG-TERM (CURRENT) USE OF ANTICOAGULANTS: ICD-10-CM

## 2018-05-25 DIAGNOSIS — I26.99 PULMONARY EMBOLI (H): ICD-10-CM

## 2018-05-25 NOTE — MR AVS SNAPSHOT
Oswaldo Prabhakar   5/25/2018   Anticoagulation Therapy Visit    Description:  57 year old male   Provider:  Liliam Hyman, RN   Department:  [facility]           INR as of 5/25/2018     Today's INR 4.62! (5/24/2018)      Anticoagulation Summary as of 5/25/2018     INR goal 2.0-3.0   Today's INR 4.62! (5/24/2018)   Full warfarin instructions 5/25: Hold; Otherwise 5 mg on Mon, Fri; 7.5 mg all other days   Next INR check 6/1/2018    Indications   Pulmonary emboli (H) [I26.99]  Long-term (current) use of anticoagulants [Z79.01] [Z79.01]         May 2018 Details    Sun Mon Tue Wed Thu Fri Sat       1               2               3               4               5                 6               7               8               9               10               11               12                 13               14               15               16               17               18               19                 20               21               22               23 24               25      Hold   See details      26      7.5 mg           27      7.5 mg         28      5 mg         29      7.5 mg         30      7.5 mg         31      7.5 mg            Date Details   05/25 This INR check               How to take your warfarin dose     To take:  5 mg Take 1 of the 5 mg tablets.    To take:  7.5 mg Take 1.5 of the 5 mg tablets.    Hold Do not take your warfarin dose. See the Details table to the right for additional instructions.                June 2018 Details    Sun Mon Tue Wed Thu Fri Sat          1            2                 3               4               5               6               7               8               9                 10               11               12               13               14               15               16                 17               18               19               20               21               22 23                 24                25               26               27               28               29               30                Date Details   No additional details    Date of next INR:  6/1/2018         How to take your warfarin dose     To take:  5 mg Take 1 of the 5 mg tablets.

## 2018-05-25 NOTE — PROGRESS NOTES
ANTICOAGULATION FOLLOW-UP CLINIC VISIT    Patient Name:  Oswaldo Prabhakar  Date:  5/25/2018  Contact Type:  Telephone    SUBJECTIVE:     Patient Findings     Comments Pt's INR's have been fluctuating and no reason for this and unable to assess pt about this           OBJECTIVE    INR   Date Value Ref Range Status   05/24/2018 4.62 (H) 0.86 - 1.14 Final     Chromogenic Factor 10   Date Value Ref Range Status   06/18/2011 81 60 - 140 % Final     Comment:     Therapeutic Range: A Chromogenic Factor 10 level of 20-40% inversely   correlates   with an INR of 2-3 for patients receiving Warfarin. Chromogenic Factor 10   levels below 20% indicate an INR greater than 3, and levels above 40%   indicate   an INR less than 2.     Factor 2 Assay   Date Value Ref Range Status   04/04/2012  60 - 140 % Final    (Note)  CANCELLED AND CREDITED PER [employer] IN MED. MONITORING,4/4/12,       ASSESSMENT / PLAN  INR assessment SUPRA    Recheck INR In: 1 WEEK    INR Location Clinic      Anticoagulation Summary as of 5/25/2018     INR goal 2.0-3.0   Today's INR 4.62! (5/24/2018)   Warfarin maintenance plan 5 mg (5 mg x 1) on Mon, Fri; 7.5 mg (5 mg x 1.5) all other days   Full warfarin instructions 5/25: Hold; Otherwise 5 mg on Mon, Fri; 7.5 mg all other days   Weekly warfarin total 47.5 mg   Plan last modified Nano Greer RN (5/2/2018)   Next INR check 6/1/2018   Priority INR   Target end date Indefinite    Indications   Pulmonary emboli (H) [I26.99]  Long-term (current) use of anticoagulants [Z79.01] [Z79.01]         Anticoagulation Episode Summary     INR check location     Preferred lab     Send [facility]R reminders to [facility]    Comments HIPPA INFO OK to speak with wife Josselyn OK to leave messages on Home.work and cell phones  use cell phone #[phone]      Anticoagulation Care Providers     Provider Role Specialty Phone number    Samm Vazquez MD [facility] Internal Medicine [phone]            See the Encounter  Report to view Anticoagulation Flowsheet and Dosing Calendar (Go to Encounters tab in chart review, and find the Anticoagulation Therapy Visit)    Left message for patient with results and dosing recommendations. Asked patient to call back to report any missed doses, falls, signs and symptoms of bleeding or clotting, any changes in health, medication, or diet. Asked patient to call back with any questions or concerns.     Liliam Hyman RN

## 2018-06-21 DIAGNOSIS — Z95.1 S/P CABG X 4: ICD-10-CM

## 2018-06-24 RX ORDER — METOPROLOL TARTRATE 25 MG/1
TABLET, FILM COATED ORAL
Qty: 90 TABLET | Refills: 3 | OUTPATIENT
Start: 2018-06-24

## 2018-07-03 ENCOUNTER — ANTICOAGULATION THERAPY VISIT (OUTPATIENT)
Dept: ANTICOAGULATION | Facility: CLINIC | Age: 58
End: 2018-07-03

## 2018-07-03 DIAGNOSIS — Z79.01 LONG-TERM (CURRENT) USE OF ANTICOAGULANTS: ICD-10-CM

## 2018-07-03 DIAGNOSIS — I26.99 PULMONARY EMBOLI (H): ICD-10-CM

## 2018-07-03 NOTE — PROGRESS NOTES
Left message for patient that they have not been compliant with having the necessary lab work for their anticoagulation therapy. Patient  has been sent two letters stating that they have missed their lab appointments and that it is very important to have labs done to make sure that they are in their therapeutic range to minimize the risks of bleeding and clotting. I asked them to call us or come in for their lab work as soon as possible. Patient will be inactivated from our anticoagulation monitoring service if they do not respond within one week.  (7/10/18)

## 2018-07-03 NOTE — MR AVS SNAPSHOT
Oswaldo Prabhakar   7/3/2018   Anticoagulation Therapy Visit    Description:  57 year old male   Provider:  Rigoberto Harmon, [facility]   Department:  [facility]           INR as of 7/3/2018     Today's INR       Anticoagulation Summary as of 7/3/2018     INR goal 2.0-3.0   Today's INR    Full warfarin instructions 5 mg on Mon, Fri; 7.5 mg all other days   Next INR check 7/3/2018    Indications   Pulmonary emboli (H) [I26.99]  Long-term (current) use of anticoagulants [Z79.01] [Z79.01]         July 2018 Details    Sun Mon Tue Wed Thu Fri Sat     1               2               3      See details      4               5               6               7                 8               9               10               11               12               13               14                 15               16               17               18               19               20               21                 22               23               24               25               26               27               28                 29               30               31                    Date Details   07/03 This INR check       Date of next INR:  7/3/2018         How to take your warfarin dose     To take:  7.5 mg Take 1.5 of the 5 mg tablets.

## 2018-07-06 DIAGNOSIS — Z79.01 LONG-TERM (CURRENT) USE OF ANTICOAGULANTS: ICD-10-CM

## 2018-07-06 DIAGNOSIS — I26.99 PULMONARY EMBOLI (H): ICD-10-CM

## 2018-07-06 LAB — INR PPP: 2.74 (ref 0.86–1.14)

## 2018-07-09 ENCOUNTER — ANTICOAGULATION THERAPY VISIT (OUTPATIENT)
Dept: ANTICOAGULATION | Facility: CLINIC | Age: 58
End: 2018-07-09

## 2018-07-09 DIAGNOSIS — I26.99 PULMONARY EMBOLI (H): ICD-10-CM

## 2018-07-09 DIAGNOSIS — Z79.01 LONG-TERM (CURRENT) USE OF ANTICOAGULANTS: ICD-10-CM

## 2018-07-09 NOTE — PROGRESS NOTES
ANTICOAGULATION FOLLOW-UP CLINIC VISIT    Patient Name:  Oswaldo Prabhakar  Date:  7/9/2018  Contact Type:  Telephone    SUBJECTIVE:     Patient Findings     Positives No Problem Findings    Comments Left message for Oswaldo.           OBJECTIVE    INR   Date Value Ref Range Status   07/06/2018 2.74 (H) 0.86 - 1.14 Final     Chromogenic Factor 10   Date Value Ref Range Status   06/18/2011 81 60 - 140 % Final     Comment:     Therapeutic Range: A Chromogenic Factor 10 level of 20-40% inversely   correlates   with an INR of 2-3 for patients receiving Warfarin. Chromogenic Factor 10   levels below 20% indicate an INR greater than 3, and levels above 40%   indicate   an INR less than 2.     Factor 2 Assay   Date Value Ref Range Status   04/04/2012  60 - 140 % Final    (Note)  CANCELLED AND CREDITED PER [employer] IN MED. MONITORING,4/4/12,       ASSESSMENT / PLAN  INR assessment THER    Recheck INR In: 2 WEEKS    INR Location Clinic      Anticoagulation Summary as of 7/9/2018     INR goal 2.0-3.0   Today's INR 2.74 (7/6/2018)   Warfarin maintenance plan 5 mg (5 mg x 1) on Mon, Fri; 7.5 mg (5 mg x 1.5) all other days   Full warfarin instructions 5 mg on Mon, Fri; 7.5 mg all other days   Weekly warfarin total 47.5 mg   No change documented Kwesi Garcia RN   Plan last modified Nano Greer RN (5/2/2018)   Next INR check 7/23/2018   Priority INR   Target end date Indefinite    Indications   Pulmonary emboli (H) [I26.99]  Long-term (current) use of anticoagulants [Z79.01] [Z79.01]         Anticoagulation Episode Summary     INR check location     Preferred lab     Send INR reminders to [facility]    Comments HIPPA INFO OK to speak with wife Josselyn OK to leave messages on Home.work and cell phones  use cell phone #[phone]      Anticoagulation Care Providers     Provider Role Specialty Phone number    Samm Vazquez MD [facility] Internal Medicine [phone]            See the Encounter Report to view  Anticoagulation Flowsheet and Dosing Calendar (Go to Encounters tab in chart review, and find the Anticoagulation Therapy Visit)    Left message for patient with results and dosing recommendations. Asked patient to call back to report any missed doses, falls, signs and symptoms of bleeding or clotting, any changes in health, medication, or diet. Asked patient to call back with any questions or concerns.    Kwesi Garcia, RN

## 2018-07-09 NOTE — MR AVS SNAPSHOT
Oswaldo Prabhakar   7/9/2018   Anticoagulation Therapy Visit    Description:  57 year old male   Provider:  Kwesi Garcia RN   [facility]           INR as of 7/9/2018     Today's INR 2.74 (7/6/2018)      Anticoagulation Summary as of 7/9/2018     INR goal 2.0-3.0   Today's INR 2.74 (7/6/2018)   Full warfarin instructions 5 mg on Mon, Fri; 7.5 mg all other days   Next INR check 7/23/2018    Indications   Pulmonary emboli (H) [I26.99]  Long-term (current) use of anticoagulants [Z79.01] [Z79.01]         July 2018 Details    Sun Mon Tue Wed Thu Fri Sat     1               2               3               4               5               6               7                 8               9      5 mg   See details      10      7.5 mg         11      7.5 mg         12      7.5 mg         13      5 mg         14      7.5 mg           15      7.5 mg         16      5 mg         17      7.5 mg         18      7.5 mg         19      7.5 mg         20      5 mg         21      7.5 mg           22      7.5 mg         23            24               25               26               27               28                 29               30               31                    Date Details   07/09 This INR check       Date of next INR:  7/23/2018         How to take your warfarin dose     To take:  5 mg Take 1 of the 5 mg tablets.    To take:  7.5 mg Take 1.5 of the 5 mg tablets.

## 2018-07-18 DIAGNOSIS — I10 ESSENTIAL HYPERTENSION: ICD-10-CM

## 2018-07-20 ENCOUNTER — OFFICE VISIT (OUTPATIENT)
Dept: URGENT CARE | Facility: URGENT CARE | Age: 58
End: 2018-07-20
Payer: COMMERCIAL

## 2018-07-20 VITALS
DIASTOLIC BLOOD PRESSURE: 62 MMHG | SYSTOLIC BLOOD PRESSURE: 120 MMHG | HEART RATE: 69 BPM | WEIGHT: 200 LBS | BODY MASS INDEX: 27.89 KG/M2 | TEMPERATURE: 97.5 F | OXYGEN SATURATION: 97 %

## 2018-07-20 DIAGNOSIS — M54.2 NECK PAIN: Primary | ICD-10-CM

## 2018-07-20 PROCEDURE — 99213 OFFICE O/P EST LOW 20 MIN: CPT | Performed by: PHYSICIAN ASSISTANT

## 2018-07-20 RX ORDER — TRAMADOL HYDROCHLORIDE 50 MG/1
50 TABLET ORAL EVERY 6 HOURS PRN
Qty: 10 TABLET | Refills: 0 | Status: SHIPPED | OUTPATIENT
Start: 2018-07-20 | End: 2018-07-30

## 2018-07-20 RX ORDER — METHOCARBAMOL 500 MG/1
1000 TABLET, FILM COATED ORAL 3 TIMES DAILY PRN
Qty: 30 TABLET | Refills: 0 | Status: SHIPPED | OUTPATIENT
Start: 2018-07-20 | End: 2018-07-25

## 2018-07-20 NOTE — MR AVS SNAPSHOT
After Visit Summary   7/20/2018    Oswaldo Prabhakar    MRN: [id number]           Patient Information     Date Of Birth          1960        Visit Information        Provider Department      7/20/2018 2:00 PM Cele Montoya PA-C [facility]        Today's Diagnoses     Neck pain    -  1      Care Instructions      Neck Exercises: Passive Neck Rotation    To start, lie on your back, knees bent and feet flat on the floor. Keep your ears, shoulders, and hips aligned, but don t press your lower back to the floor. Rest your hands on your pelvis. Breathe deeply and relax.  Here are the steps for passive neck rotation:     With your neck relaxed, place the palm of one hand on your forehead. Use your hand to turn your head to one side until you feel a stretch in the neck muscles. Do not push through pain.    Hold for 5 seconds. Then turn to the other side.    Repeat 5 times on each side.   Note: Keep your shoulders on the floor. Don t lift your chin as you turn your head.  Date Last Reviewed: 11/1/2017 2000-2017 [employer]. [address] [place] [address]. All rights reserved. This information is not intended as a substitute for professional medical care. Always follow your healthcare professional's instructions.        Head Tilt / Upper Trapezius Stretch (Flexibility)    1. Sit up straight in a chair with your head and neck in a neutral position, ears in line with shoulders. Hold the edge of your chair seat with your right hand. Tuck your chin in slightly.  2. Tilt your head to the left, while looking straight ahead.  3. Put your left hand on the right side of your head. Gently pull your head to the left. Hold for 30 to 60 seconds. Use gentle pressure to increase the stretch. Don t force your head into position.  4. Return your head and neck to the neutral position.  5. Repeat this exercise 2 times, or as instructed.  6. Switch sides and repeat 2 times, or  as instructed.  Challenge yourself  Tuck one end of a towel under your left arm. Then bring the other end over your right shoulder. Pull the towel down on your right shoulder with both hands as you side-bend your head to the left. Repeat with the other side.   Date Last Reviewed: 3/10/2016    2444-4085 [employer]. [address] [place] [address]. All rights reserved. This information is not intended as a substitute for professional medical care. Always follow your healthcare professional's instructions.        Neck Problems: Relieving Your Symptoms  The first goal of treatment is to relieve your symptoms. Your healthcare provider may recommend self-care treatments. These include resting, applying ice and heat, taking medicine, and doing exercises. Your healthcare provider may also recommend that you see a physical therapist who can teach you ways to care for and strengthen your neck.     Heat relaxes sore muscles and helps relieve spasms.   Self-care treatments  Pain can end quickly or last a while. Either way, you ll want relief as soon as possible. Your healthcare provider can tell you which treatments to do at home to help relieve your pain:    Lying down for a short time takes pressure from the head off the neck.    Ice and heat can help reduce pain. To bring down swelling, rest an ice pack wrapped in a thin towel on your neck for 10 to 15 minutes. To relax sore muscles, apply a warm, wet towel to the area. Or you can take a warm bath or shower.    Over-the-counter medicines, such as ibuprofen, naproxen, and aspirin, can help reduce pain and swelling. Acetaminophen can help relieve pain. Use these only as directed.    Exercises can relax muscles and ease stiffness. To prepare, drape a warm, wet towel around your neck and shoulders for 5 minutes. Remove the towel. Then do any exercises recommended to you by your healthcare provider.  Physical therapy  If self-care treatments aren t  helping relieve neck pain, your healthcare provider may suggest physical therapy. Physical therapy is done by a specialist trained to treat injuries and musculoskeletal disorders. Your physical therapist (PT) will teach you how to strengthen muscles, improve the spine s alignment, and help you move properly. Treatment methods used in physical therapy may include:    Heat. A special heating pad called a neck pack may be applied to your neck.    Exercises. Your PT will teach you exercises to help strengthen your neck and improve its range of motion.    Joint mobilization. The PT gently moves your vertebrae to help restore motion in your neck joints and reduce neck pain.    Soft tissue mobilization. The PT massages and stretches the muscles in your neck and shoulders.    Electrical stimulation. Electrical impulses are sent into your neck. This helps reduce soreness and inflammation.    Education in body mechanics. The PT shows you ways to position and move your body that protect the neck.  Other treatments  If physical therapy doesn t relieve your neck pain, your healthcare provider may suggest other treatments. For example, medicines or injections can help relieve pain and swelling. In some cases, surgery may be needed to treat neck problems.  Date Last Reviewed: 10/1/2017    2075-7236 [employer]. [address] [place] [address]. All rights reserved. This information is not intended as a substitute for professional medical care. Always follow your healthcare professional's instructions.                Follow-ups after your visit        Who to contact     If you have questions or need follow up information about today's clinic visit or your schedule please contact [facility] directly at [phone].  Normal or non-critical lab and imaging results will be communicated to you by MyChart, letter or phone within 4 business days after the clinic has received the results. If you do  not hear from us within 7 days, please contact the clinic through [employer] or phone. If you have a critical or abnormal lab result, we will notify you by phone as soon as possible.  Submit refill requests through [employer] or call your pharmacy and they will forward the refill request to us. Please allow 3 business days for your refill to be completed.          Additional Information About Your Visit        [employer] Information     [employer] gives you secure access to your electronic health record. If you see a primary care provider, you can also send messages to your care team and make appointments. If you have questions, please call your primary care clinic.  If you do not have a primary care provider, please call [phone] and they will assist you.        Care EveryWhere ID     This is your Care EveryWhere ID. This could be used by other organizations to access your [place] medical records  [id number]        Your Vitals Were     Pulse Temperature Pulse Oximetry BMI (Body Mass Index)          69 97.5  F (36.4  C) (Tympanic) 97% 27.89 kg/m2         Blood Pressure from Last 3 Encounters:   07/20/18 120/62   03/29/18 123/83   03/20/18 132/78    Weight from Last 3 Encounters:   07/20/18 200 lb (90.7 kg)   03/29/18 206 lb 9.6 oz (93.7 kg)   03/20/18 209 lb (94.8 kg)              Today, you had the following     No orders found for display         Today's Medication Changes          These changes are accurate as of 7/20/18  2:41 PM.  If you have any questions, ask your nurse or doctor.               Start taking these medicines.        Dose/Directions    methocarbamol 500 MG tablet   Commonly known as:  ROBAXIN   Used for:  Neck pain   Started by:  Cele Montoya PA-C        Dose:  1000 mg   Take 2 tablets (1,000 mg) by mouth 3 times daily as needed for muscle spasms   Quantity:  30 tablet   Refills:  0       traMADol 50 MG tablet   Commonly known as:  ULTRAM   Used for:  Neck pain   Started by:  Lindsay  Cele Barrera PA-C        Dose:  50 mg   Take 1 tablet (50 mg) by mouth every 6 hours as needed for severe pain   Quantity:  10 tablet   Refills:  0            Where to get your medicines      These medications were sent to [place] [id number] [place] [facility] [place] - [address]  [address], [place] [address]     Phone:  [phone]     methocarbamol 500 MG tablet         Some of these will need a paper prescription and others can be bought over the counter.  Ask your nurse if you have questions.     Bring a paper prescription for each of these medications     traMADol 50 MG tablet               Information about OPIOIDS     PRESCRIPTION OPIOIDS: WHAT YOU NEED TO KNOW   We gave you an opioid (narcotic) pain medicine. It is important to manage your pain, but opioids are not always the best choice. You should first try all the other options your care team gave you. Take this medicine for as short a time (and as few doses) as possible.     These medicines have risks:    DO NOT drive when on new or higher doses of pain medicine. These medicines can affect your alertness and reaction times, and you could be arrested for driving under the influence (DUI). If you need to use opioids long-term, talk to your care team about driving.    DO NOT operate heave machinery    DO NOT do any other dangerous activities while taking these medicines.     DO NOT drink any alcohol while taking these medicines.      If the opioid prescribed includes acetaminophen, DO NOT take with any other medicines that contain acetaminophen. Read all labels carefully. Look for the word  acetaminophen  or  Tylenol.  Ask your pharmacist if you have questions or are unsure.    You can get addicted to pain medicines, especially if you have a history of addiction (chemical, alcohol or substance dependence). Talk to your care team about ways to reduce this risk.    Store your pills in a secure place, locked if possible. We will not replace any lost or  stolen medicine. If you don t finish your medicine, please throw away (dispose) as directed by your pharmacist. The [place] Pollution Control Agency has more information about safe disposal: https://www.pca.[facility].[place]/living-green/managing-unwanted-medications.     All opioids tend to cause constipation. Drink plenty of water and eat foods that have a lot of fiber, such as fruits, vegetables, prune juice, apple juice and high-fiber cereal. Take a laxative (Miralax, milk of magnesia, Colace, Senna) if you don t move your bowels at least every other day.          Primary Care Provider Office Phone # Fax #    Samm Vazquez [phone]       [address] [place] [address]        Equal Access to Services     SURESH ZURITA : Jaspreet sheltono Soginger, waaxda luqadaha, qaybta kaalmada adeegyada, franklin penaloza . So [facility] [phone].    ATENCIÓN: Si habla español, tiene a krueger disposición servicios gratuitos de asistencia lingüística. Llame al [phone].    We comply with applicable federal civil rights laws and [place] laws. We do not discriminate on the basis of race, color, national origin, age, disability, sex, sexual orientation, or gender identity.            Thank you!     Thank you for choosing [facility]  for your care. Our goal is always to provide you with excellent care. Hearing back from our patients is one way we can continue to improve our services. Please take a few minutes to complete the written survey that you may receive in the mail after your visit with us. Thank you!             Your Updated Medication List - Protect others around you: Learn how to safely use, store and throw away your medicines at www.disposemymeds.org.          This list is accurate as of 7/20/18  2:41 PM.  Always use your most recent med list.                   Brand Name Dispense Instructions for use Diagnosis    albuterol 108 (90 Base) MCG/ACT  Inhaler    PROAIR HFA/PROVENTIL HFA/VENTOLIN HFA    1 Inhaler    Inhale 2 puffs into the lungs every 6 hours as needed for shortness of breath / dyspnea or wheezing    Pneumonia of both lungs due to infectious organism, unspecified part of lung       alirocumab 75 MG/ML injectable pen    PRALUENT    2 mL    Inject 1 mL (75 mg) Subcutaneous every 14 days    CAD (coronary artery disease), S/P CABG x 4, Hyperlipidemia LDL goal <130, Statin intolerance       aspirin 81 MG EC tablet     91 tablet    Take 1 tablet (81 mg) by mouth daily    S/P CABG x 4       atorvastatin 80 MG tablet    LIPITOR    91 tablet    Take 1 tablet (80 mg) by mouth daily    Pure hyperglyceridemia, Hyperlipidemia LDL goal <130       BENADRYL PO      Take 25-50 mg by mouth daily as needed        calcium carbonate 500 MG chewable tablet    TUMS     Take 1 chew tab by mouth daily as needed        ezetimibe 10 MG tablet    ZETIA    90 tablet    Take 1 tablet (10 mg) by mouth daily    Statin intolerance, Hyperlipidemia LDL goal <70, Atherosclerosis of coronary artery of native heart without angina pectoris, unspecified vessel or lesion type, S/P CABG (coronary artery bypass graft)       fenofibrate 160 MG tablet     91 tablet    Take 1 tablet (160 mg) by mouth daily    Hyperlipidemia LDL goal <130       lisinopril 2.5 MG tablet    PRINIVIL/Zestril    91 tablet    Take 1 tablet (2.5 mg) by mouth daily , or as directed by Dr. Ocampo.    Essential hypertension       methocarbamol 500 MG tablet    ROBAXIN    30 tablet    Take 2 tablets (1,000 mg) by mouth 3 times daily as needed for muscle spasms    Neck pain       metoprolol tartrate 25 MG tablet    LOPRESSOR    90 tablet    Take 0.5 tablets (12.5 mg) by mouth 2 times daily    S/P CABG x 4       traMADol 50 MG tablet    ULTRAM    10 tablet    Take 1 tablet (50 mg) by mouth every 6 hours as needed for severe pain    Neck pain       vitamin B complex with vitamin C Tabs tablet      Take 1 tablet by mouth  daily        vitamin D 2000 units Caps      Take 2,000 Units by mouth daily    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       warfarin 5 MG tablet    COUMADIN    135 tablet    Take 1.5 tablets (7.5 mg) by mouth daily    History of pulmonary embolism, Chronic anticoagulation

## 2018-07-20 NOTE — PATIENT INSTRUCTIONS
Neck Exercises: Passive Neck Rotation    To start, lie on your back, knees bent and feet flat on the floor. Keep your ears, shoulders, and hips aligned, but don t press your lower back to the floor. Rest your hands on your pelvis. Breathe deeply and relax.  Here are the steps for passive neck rotation:     With your neck relaxed, place the palm of one hand on your forehead. Use your hand to turn your head to one side until you feel a stretch in the neck muscles. Do not push through pain.    Hold for 5 seconds. Then turn to the other side.    Repeat 5 times on each side.   Note: Keep your shoulders on the floor. Don t lift your chin as you turn your head.  Date Last Reviewed: 11/1/2017 2000-2017 [employer]. [address] [place] [address], [place] [address]. All rights reserved. This information is not intended as a substitute for professional medical care. Always follow your healthcare professional's instructions.        Head Tilt / Upper Trapezius Stretch (Flexibility)    1. Sit up straight in a chair with your head and neck in a neutral position, ears in line with shoulders. Hold the edge of your chair seat with your right hand. Tuck your chin in slightly.  2. Tilt your head to the left, while looking straight ahead.  3. Put your left hand on the right side of your head. Gently pull your head to the left. Hold for 30 to 60 seconds. Use gentle pressure to increase the stretch. Don t force your head into position.  4. Return your head and neck to the neutral position.  5. Repeat this exercise 2 times, or as instructed.  6. Switch sides and repeat 2 times, or as instructed.  Challenge yourself  Tuck one end of a towel under your left arm. Then bring the other end over your right shoulder. Pull the towel down on your right shoulder with both hands as you side-bend your head to the left. Repeat with the other side.   Date Last Reviewed: 3/10/2016    0683-7082 [employer]. [address] [place] [address]  Road, [place] [address]. All rights reserved. This information is not intended as a substitute for professional medical care. Always follow your healthcare professional's instructions.        Neck Problems: Relieving Your Symptoms  The first goal of treatment is to relieve your symptoms. Your healthcare provider may recommend self-care treatments. These include resting, applying ice and heat, taking medicine, and doing exercises. Your healthcare provider may also recommend that you see a physical therapist who can teach you ways to care for and strengthen your neck.     Heat relaxes sore muscles and helps relieve spasms.   Self-care treatments  Pain can end quickly or last a while. Either way, you ll want relief as soon as possible. Your healthcare provider can tell you which treatments to do at home to help relieve your pain:    Lying down for a short time takes pressure from the head off the neck.    Ice and heat can help reduce pain. To bring down swelling, rest an ice pack wrapped in a thin towel on your neck for 10 to 15 minutes. To relax sore muscles, apply a warm, wet towel to the area. Or you can take a warm bath or shower.    Over-the-counter medicines, such as ibuprofen, naproxen, and aspirin, can help reduce pain and swelling. Acetaminophen can help relieve pain. Use these only as directed.    Exercises can relax muscles and ease stiffness. To prepare, drape a warm, wet towel around your neck and shoulders for 5 minutes. Remove the towel. Then do any exercises recommended to you by your healthcare provider.  Physical therapy  If self-care treatments aren t helping relieve neck pain, your healthcare provider may suggest physical therapy. Physical therapy is done by a specialist trained to treat injuries and musculoskeletal disorders. Your physical therapist (PT) will teach you how to strengthen muscles, improve the spine s alignment, and help you move properly. Treatment methods used in physical therapy  may include:    Heat. A special heating pad called a neck pack may be applied to your neck.    Exercises. Your PT will teach you exercises to help strengthen your neck and improve its range of motion.    Joint mobilization. The PT gently moves your vertebrae to help restore motion in your neck joints and reduce neck pain.    Soft tissue mobilization. The PT massages and stretches the muscles in your neck and shoulders.    Electrical stimulation. Electrical impulses are sent into your neck. This helps reduce soreness and inflammation.    Education in body mechanics. The PT shows you ways to position and move your body that protect the neck.  Other treatments  If physical therapy doesn t relieve your neck pain, your healthcare provider may suggest other treatments. For example, medicines or injections can help relieve pain and swelling. In some cases, surgery may be needed to treat neck problems.  Date Last Reviewed: 10/1/2017    2592-7769 [employer]. [address] [place] [address]. All rights reserved. This information is not intended as a substitute for professional medical care. Always follow your healthcare professional's instructions.

## 2018-07-20 NOTE — PROGRESS NOTES
SUBJECTIVE:  Chief Complaint   Patient presents with     Urgent Care     Neck Pain     Pt states has had neck pain on the right side x 2 weeks with no known injury. Pt has not taken any otc medications for the pain.      Oswaldo Prabhakar is a 57 year old male who presents with a chief complaint of right neck pain.  Symptoms began approximately 2 weeks ago are moderate and constant patient notices that the symptoms are present at night and if he turns his head or moves a certain way.  He notes that he feels a sensation in his right arm intermittently. He denies having any injury or any new physical exacerbations.  He has not taken any medications for pain.   Patient denies fever, chills, weight loss.  The pain does not radiate into his jaw or into his back.  He denies having CP, shortness of breath or palpitations.    Past Medical History:   Diagnosis Date     Antiplatelet or antithrombotic long-term use      Diaphragmatic hernia without mention of obstruction or gangrene      Heart disease      Hypertension      Nephrolithiasis 4/2010     Other and unspecified hyperlipidemia      Pulmonary embolus and infarction (H)     s/p hemothorax and filter s/p filter removal (2011), now on long term anti-coagulation     Statin intolerance 2/1/2017     Stented coronary artery      Unspecified retinal detachment     Childhood trauma, unrepaired     Current Outpatient Prescriptions   Medication Sig Dispense Refill     alirocumab (PRALUENT) 75 MG/ML injectable pen Inject 1 mL (75 mg) Subcutaneous every 14 days 2 mL 12     aspirin EC 81 MG tablet Take 1 tablet (81 mg) by mouth daily 91 tablet 3     atorvastatin (LIPITOR) 80 MG tablet Take 1 tablet (80 mg) by mouth daily 91 tablet 3     Cholecalciferol (VITAMIN D) 2000 UNITS CAPS Take 2,000 Units by mouth daily       DiphenhydrAMINE HCl (BENADRYL PO) Take 25-50 mg by mouth daily as needed       ezetimibe (ZETIA) 10 MG tablet Take 1 tablet (10 mg) by mouth daily 90 tablet 3      fenofibrate 160 MG tablet Take 1 tablet (160 mg) by mouth daily 91 tablet 3     lisinopril (PRINIVIL/ZESTRIL) 2.5 MG tablet Take 1 tablet (2.5 mg) by mouth daily , or as directed by Dr. Ocampo. 91 tablet 1     metoprolol (LOPRESSOR) 25 MG tablet Take 0.5 tablets (12.5 mg) by mouth 2 times daily 90 tablet 3     warfarin (COUMADIN) 5 MG tablet Take 1.5 tablets (7.5 mg) by mouth daily 135 tablet 2     albuterol (PROAIR HFA/PROVENTIL HFA/VENTOLIN HFA) 108 (90 BASE) MCG/ACT Inhaler Inhale 2 puffs into the lungs every 6 hours as needed for shortness of breath / dyspnea or wheezing 1 Inhaler 1     calcium carbonate (TUMS) 500 MG chewable tablet Take 1 chew tab by mouth daily as needed       vitamin B complex with vitamin C (VITAMIN  B COMPLEX) TABS tablet Take 1 tablet by mouth daily       Social History   Substance Use Topics     Smoking status: Never Smoker     Smokeless tobacco: Never Used     Alcohol use No      Comment: very rare       ROS:  Review of systems negative except as stated above.    EXAM:   /62 (BP Location: Right arm, Patient Position: Chair, Cuff Size: Adult Large)  Pulse 69  Temp 97.5  F (36.4  C) (Tympanic)  Wt 200 lb (90.7 kg)  SpO2 97%  BMI 27.89 kg/m2  M/S Exam:Right trapezius has mild tenderness to palpation. No erythema or swelling.  Patient feels a pull on his right side when he bends his head down. Pain when he moves his head to the right.   GENERAL APPEARANCE: healthy, alert and no distress  EXTREMITIES: peripheral pulses normal  SKIN: no suspicious lesions or rashes  NEURO: Normal strength and tone, sensory exam grossly normal, mentation intact and speech normal  CARDIO: RRR  Lungs: CTA B/L    X-RAY was not done.    ASSESSMENT / PLAN:  1. Neck pain  M/S in nature.  Patient has not taken anything for his pain.  We will start with a muscle relaxer, Robaxin, as well as Ultram sparingly to be used at night.  Patient understands black box warning.  - traMADol (ULTRAM) 50 MG tablet; Take  1 tablet (50 mg) by mouth every 6 hours as needed for severe pain  Dispense: 10 tablet; Refill: 0  - methocarbamol (ROBAXIN) 500 MG tablet; Take 2 tablets (1,000 mg) by mouth 3 times daily as needed for muscle spasms  Dispense: 30 tablet; Refill: 0    Cele Montoya PA-C

## 2018-07-23 RX ORDER — LISINOPRIL 2.5 MG/1
2.5 TABLET ORAL DAILY
Qty: 90 TABLET | Refills: 2 | Status: SHIPPED | OUTPATIENT
Start: 2018-07-23 | End: 2019-04-25

## 2018-07-25 ENCOUNTER — HOSPITAL ENCOUNTER (EMERGENCY)
Facility: CLINIC | Age: 58
Discharge: HOME | End: 2018-07-25
Attending: EMERGENCY MEDICINE | Admitting: EMERGENCY MEDICINE
Payer: COMMERCIAL

## 2018-07-25 ENCOUNTER — ANTICOAGULATION THERAPY VISIT (OUTPATIENT)
Dept: ANTICOAGULATION | Facility: CLINIC | Age: 58
End: 2018-07-25

## 2018-07-25 ENCOUNTER — OFFICE VISIT (OUTPATIENT)
Dept: PEDIATRICS | Facility: CLINIC | Age: 58
End: 2018-07-25
Payer: COMMERCIAL

## 2018-07-25 ENCOUNTER — APPOINTMENT (OUTPATIENT)
Dept: GENERAL RADIOLOGY | Facility: CLINIC | Age: 58
End: 2018-07-25
Attending: EMERGENCY MEDICINE
Payer: COMMERCIAL

## 2018-07-25 VITALS
SYSTOLIC BLOOD PRESSURE: 155 MMHG | RESPIRATION RATE: 16 BRPM | OXYGEN SATURATION: 100 % | DIASTOLIC BLOOD PRESSURE: 92 MMHG | TEMPERATURE: 98.4 F

## 2018-07-25 VITALS
DIASTOLIC BLOOD PRESSURE: 88 MMHG | WEIGHT: 206.7 LBS | OXYGEN SATURATION: 98 % | BODY MASS INDEX: 28.94 KG/M2 | HEIGHT: 71 IN | TEMPERATURE: 98.2 F | HEART RATE: 96 BPM | SYSTOLIC BLOOD PRESSURE: 134 MMHG

## 2018-07-25 DIAGNOSIS — R11.2 NAUSEA AND VOMITING, INTRACTABILITY OF VOMITING NOT SPECIFIED, UNSPECIFIED VOMITING TYPE: Primary | ICD-10-CM

## 2018-07-25 DIAGNOSIS — I26.99 PULMONARY EMBOLI (H): ICD-10-CM

## 2018-07-25 DIAGNOSIS — K80.20 GALLSTONES: ICD-10-CM

## 2018-07-25 DIAGNOSIS — I10 ESSENTIAL HYPERTENSION: ICD-10-CM

## 2018-07-25 DIAGNOSIS — R53.83 FATIGUE, UNSPECIFIED TYPE: ICD-10-CM

## 2018-07-25 DIAGNOSIS — R79.1 SUBTHERAPEUTIC INTERNATIONAL NORMALIZED RATIO (INR): ICD-10-CM

## 2018-07-25 DIAGNOSIS — R11.2 NON-INTRACTABLE VOMITING WITH NAUSEA, UNSPECIFIED VOMITING TYPE: ICD-10-CM

## 2018-07-25 DIAGNOSIS — Z79.01 LONG-TERM (CURRENT) USE OF ANTICOAGULANTS: ICD-10-CM

## 2018-07-25 LAB
ALBUMIN SERPL-MCNC: 4.3 G/DL (ref 3.4–5)
ALP SERPL-CCNC: 46 U/L (ref 40–150)
ALT SERPL W P-5'-P-CCNC: 41 U/L (ref 0–70)
ANION GAP SERPL CALCULATED.3IONS-SCNC: 8 MMOL/L (ref 3–14)
AST SERPL W P-5'-P-CCNC: 32 U/L (ref 0–45)
BASOPHILS # BLD AUTO: 0.1 10E9/L (ref 0–0.2)
BASOPHILS NFR BLD AUTO: 0.7 %
BILIRUB SERPL-MCNC: 0.9 MG/DL (ref 0.2–1.3)
BUN SERPL-MCNC: 15 MG/DL (ref 7–30)
CALCIUM SERPL-MCNC: 9.2 MG/DL (ref 8.5–10.1)
CHLORIDE SERPL-SCNC: 107 MMOL/L (ref 94–109)
CO2 SERPL-SCNC: 25 MMOL/L (ref 20–32)
CREAT SERPL-MCNC: 0.95 MG/DL (ref 0.66–1.25)
DIFFERENTIAL METHOD BLD: NORMAL
EOSINOPHIL # BLD AUTO: 0.3 10E9/L (ref 0–0.7)
EOSINOPHIL NFR BLD AUTO: 4.7 %
ERYTHROCYTE [DISTWIDTH] IN BLOOD BY AUTOMATED COUNT: 12.6 % (ref 10–15)
GFR SERPL CREATININE-BSD FRML MDRD: 82 ML/MIN/1.7M2
GLUCOSE SERPL-MCNC: 100 MG/DL (ref 70–99)
HCT VFR BLD AUTO: 46.1 % (ref 40–53)
HGB BLD-MCNC: 15.6 G/DL (ref 13.3–17.7)
IMM GRANULOCYTES # BLD: 0 10E9/L (ref 0–0.4)
IMM GRANULOCYTES NFR BLD: 0.1 %
INR PPP: 1.19 (ref 0.86–1.14)
INTERPRETATION ECG - MUSE: NORMAL
LIPASE SERPL-CCNC: 188 U/L (ref 73–393)
LYMPHOCYTES # BLD AUTO: 1.4 10E9/L (ref 0.8–5.3)
LYMPHOCYTES NFR BLD AUTO: 20.7 %
MCH RBC QN AUTO: 30.2 PG (ref 26.5–33)
MCHC RBC AUTO-ENTMCNC: 33.8 G/DL (ref 31.5–36.5)
MCV RBC AUTO: 89 FL (ref 78–100)
MONOCYTES # BLD AUTO: 0.6 10E9/L (ref 0–1.3)
MONOCYTES NFR BLD AUTO: 9 %
NEUTROPHILS # BLD AUTO: 4.4 10E9/L (ref 1.6–8.3)
NEUTROPHILS NFR BLD AUTO: 64.8 %
NRBC # BLD AUTO: 0 10*3/UL
NRBC BLD AUTO-RTO: 0 /100
PLATELET # BLD AUTO: 261 10E9/L (ref 150–450)
POTASSIUM SERPL-SCNC: 3.4 MMOL/L (ref 3.4–5.3)
PROT SERPL-MCNC: 8.2 G/DL (ref 6.8–8.8)
RBC # BLD AUTO: 5.16 10E12/L (ref 4.4–5.9)
SODIUM SERPL-SCNC: 140 MMOL/L (ref 133–144)
TROPONIN I SERPL-MCNC: <0.015 UG/L (ref 0–0.04)
WBC # BLD AUTO: 6.8 10E9/L (ref 4–11)

## 2018-07-25 PROCEDURE — 99213 OFFICE O/P EST LOW 20 MIN: CPT | Performed by: PHYSICIAN ASSISTANT

## 2018-07-25 PROCEDURE — 85610 PROTHROMBIN TIME: CPT | Performed by: EMERGENCY MEDICINE

## 2018-07-25 PROCEDURE — 25000132 ZZH RX MED GY IP 250 OP 250 PS 637: Performed by: EMERGENCY MEDICINE

## 2018-07-25 PROCEDURE — 96372 THER/PROPH/DIAG INJ SC/IM: CPT

## 2018-07-25 PROCEDURE — 25000128 H RX IP 250 OP 636: Performed by: EMERGENCY MEDICINE

## 2018-07-25 PROCEDURE — 25000125 ZZHC RX 250: Performed by: EMERGENCY MEDICINE

## 2018-07-25 PROCEDURE — 96374 THER/PROPH/DIAG INJ IV PUSH: CPT

## 2018-07-25 PROCEDURE — 99285 EMERGENCY DEPT VISIT HI MDM: CPT | Mod: 25

## 2018-07-25 PROCEDURE — 96361 HYDRATE IV INFUSION ADD-ON: CPT

## 2018-07-25 PROCEDURE — 74018 RADEX ABDOMEN 1 VIEW: CPT

## 2018-07-25 PROCEDURE — 93005 ELECTROCARDIOGRAM TRACING: CPT

## 2018-07-25 PROCEDURE — 96375 TX/PRO/DX INJ NEW DRUG ADDON: CPT

## 2018-07-25 PROCEDURE — 84484 ASSAY OF TROPONIN QUANT: CPT | Performed by: EMERGENCY MEDICINE

## 2018-07-25 PROCEDURE — 80053 COMPREHEN METABOLIC PANEL: CPT | Performed by: EMERGENCY MEDICINE

## 2018-07-25 PROCEDURE — 83690 ASSAY OF LIPASE: CPT | Performed by: EMERGENCY MEDICINE

## 2018-07-25 PROCEDURE — 85025 COMPLETE CBC W/AUTO DIFF WBC: CPT | Performed by: EMERGENCY MEDICINE

## 2018-07-25 RX ORDER — ENALAPRILAT 1.25 MG/ML
2.5 INJECTION INTRAVENOUS ONCE
Status: COMPLETED | OUTPATIENT
Start: 2018-07-25 | End: 2018-07-25

## 2018-07-25 RX ORDER — SODIUM CHLORIDE 9 MG/ML
1000 INJECTION, SOLUTION INTRAVENOUS CONTINUOUS
Status: DISCONTINUED | OUTPATIENT
Start: 2018-07-25 | End: 2018-07-25 | Stop reason: HOSPADM

## 2018-07-25 RX ORDER — ONDANSETRON 2 MG/ML
4 INJECTION INTRAMUSCULAR; INTRAVENOUS ONCE
Status: COMPLETED | OUTPATIENT
Start: 2018-07-25 | End: 2018-07-25

## 2018-07-25 RX ORDER — ONDANSETRON 4 MG/1
4 TABLET, ORALLY DISINTEGRATING ORAL EVERY 8 HOURS PRN
Qty: 10 TABLET | Refills: 0 | Status: SHIPPED | OUTPATIENT
Start: 2018-07-25 | End: 2018-07-26

## 2018-07-25 RX ORDER — ASPIRIN 325 MG
325 TABLET ORAL ONCE
Status: COMPLETED | OUTPATIENT
Start: 2018-07-25 | End: 2018-07-25

## 2018-07-25 RX ADMIN — SODIUM CHLORIDE 1000 ML: 9 INJECTION, SOLUTION INTRAVENOUS at 11:19

## 2018-07-25 RX ADMIN — SODIUM CHLORIDE 1000 ML: 9 INJECTION, SOLUTION INTRAVENOUS at 09:08

## 2018-07-25 RX ADMIN — ENOXAPARIN SODIUM 90 MG: 100 INJECTION SUBCUTANEOUS at 11:18

## 2018-07-25 RX ADMIN — ONDANSETRON 4 MG: 2 INJECTION INTRAMUSCULAR; INTRAVENOUS at 09:10

## 2018-07-25 RX ADMIN — ASPIRIN 325 MG ORAL TABLET 325 MG: 325 PILL ORAL at 10:17

## 2018-07-25 RX ADMIN — ENALAPRILAT 2.5 MG: 1.25 INJECTION INTRAVENOUS at 10:18

## 2018-07-25 ASSESSMENT — ENCOUNTER SYMPTOMS
CONSTIPATION: 0
NAUSEA: 1
ABDOMINAL PAIN: 0
VOMITING: 1
DIARRHEA: 0

## 2018-07-25 NOTE — MR AVS SNAPSHOT
"              After Visit Summary   7/25/2018    Oswaldo Prabhakar    MRN: [id number]           Patient Information     Date Of Birth          1960        Visit Information        Provider Department      7/25/2018 7:50 AM Chaitanya Trejo PA-C [facility]        Today's Diagnoses     Nausea and vomiting, intractability of vomiting not specified, unspecified vomiting type    -  1    Fatigue, unspecified type           Follow-ups after your visit        Who to contact     If you have questions or need follow up information about today's clinic visit or your schedule please contact [facility] directly at [phone].  Normal or non-critical lab and imaging results will be communicated to you by MyChart, letter or phone within 4 business days after the clinic has received the results. If you do not hear from us within 7 days, please contact the clinic through [employer] or phone. If you have a critical or abnormal lab result, we will notify you by phone as soon as possible.  Submit refill requests through [employer] or call your pharmacy and they will forward the refill request to us. Please allow 3 business days for your refill to be completed.          Additional Information About Your Visit        MyChart Information     [employer] gives you secure access to your electronic health record. If you see a primary care provider, you can also send messages to your care team and make appointments. If you have questions, please call your primary care clinic.  If you do not have a primary care provider, please call [phone] and they will assist you.        Care EveryWhere ID     This is your Care EveryWhere ID. This could be used by other organizations to access your [facility] medical records  [id number]        Your Vitals Were     Pulse Temperature Height Pulse Oximetry BMI (Body Mass Index)       96 98.2  F (36.8  C) (Oral) 5' 11\" (1.803 m) 98% 28.83 kg/m2        Blood Pressure from Last 3 " Encounters:   07/25/18 (!) 157/107   07/25/18 134/88   07/20/18 120/62    Weight from Last 3 Encounters:   07/25/18 206 lb 11.2 oz (93.8 kg)   07/20/18 200 lb (90.7 kg)   03/29/18 206 lb 9.6 oz (93.7 kg)              Today, you had the following     No orders found for display       Primary Care Provider Office Phone # Fax #    Samm Vazquez [phone]       [address] [place] [id number]        Equal Access to Services     [facility]: Hadii peggy ku hadasho Soginger, waaxda luqadaha, qaybta kaalmada liz, franklin penaloza . So [facility] [phone].    ATENCIÓN: Si habla español, tiene a krueger disposición servicios gratuitos de asistencia lingüística. [facility] [phone].    We comply with applicable federal civil rights laws and [place] laws. We do not discriminate on the basis of race, color, national origin, age, disability, sex, sexual orientation, or gender identity.            Thank you!     Thank you for choosing [facility]  for your care. Our goal is always to provide you with excellent care. Hearing back from our patients is one way we can continue to improve our services. Please take a few minutes to complete the written survey that you may receive in the mail after your visit with us. Thank you!             Your Updated Medication List - Protect others around you: Learn how to safely use, store and throw away your medicines at www.disposemymeds.org.          This list is accurate as of 7/25/18  9:00 AM.  Always use your most recent med list.                   Brand Name Dispense Instructions for use Diagnosis    albuterol 108 (90 Base) MCG/ACT Inhaler    PROAIR HFA/PROVENTIL HFA/VENTOLIN HFA    1 Inhaler    Inhale 2 puffs into the lungs every 6 hours as needed for shortness of breath / dyspnea or wheezing    Pneumonia of both lungs due to infectious organism, unspecified part of lung       alirocumab 75 MG/ML injectable pen     PRALUENT    2 mL    Inject 1 mL (75 mg) Subcutaneous every 14 days    CAD (coronary artery disease), S/P CABG x 4, Hyperlipidemia LDL goal <130, Statin intolerance       aspirin 81 MG EC tablet     91 tablet    Take 1 tablet (81 mg) by mouth daily    S/P CABG x 4       atorvastatin 80 MG tablet    LIPITOR    91 tablet    Take 1 tablet (80 mg) by mouth daily    Pure hyperglyceridemia, Hyperlipidemia LDL goal <130       BENADRYL PO      Take 25-50 mg by mouth daily as needed        calcium carbonate 500 MG chewable tablet    TUMS     Take 1 chew tab by mouth daily as needed        ezetimibe 10 MG tablet    ZETIA    90 tablet    Take 1 tablet (10 mg) by mouth daily    Statin intolerance, Hyperlipidemia LDL goal <70, Atherosclerosis of coronary artery of native heart without angina pectoris, unspecified vessel or lesion type, S/P CABG (coronary artery bypass graft)       fenofibrate 160 MG tablet     91 tablet    Take 1 tablet (160 mg) by mouth daily    Hyperlipidemia LDL goal <130       lisinopril 2.5 MG tablet    PRINIVIL/Zestril    90 tablet    Take 1 tablet (2.5 mg) by mouth daily , or as directed by Dr. Ocampo.    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

## 2018-07-25 NOTE — ED AVS SNAPSHOT
[facility] Emergency Department    [address] [place] [address]    Phone:  [phone]    Fax:  [phone]                                       Oswaldo Prabhakar   MRN: [id number]    Department:  [facility] Emergency Department   Date of Visit:  7/25/2018           Patient Information     Date Of Birth          1960        Your diagnoses for this visit were:     Non-intractable vomiting with nausea, unspecified vomiting type     Subtherapeutic international normalized ratio (INR)     Gallstones     Essential hypertension        You were seen by Collin Hawk MD.      Follow-up Information     Follow up with Samm Vazquez MD. Schedule an appointment as soon as possible for a visit in 2 days.    Specialty:  Internal Medicine    Contact information:    [address] [place] [phone]  [facility] [address] [phone]          Discharge Instructions         Discharge Instructions  Vomiting    You have been seen today for vomiting. This is usually caused by a virus, but some bacteria, parasites, medicines or other medical conditions can cause similar symptoms. At this time your doctor does not find that your vomiting is a sign of anything dangerous or life-threatening. However, sometimes the signs of serious illness do not show up right away. If you have new or worse symptoms, you may need to be seen again in the emergency department or by your primary doctor. Remember that serious problems like appendicitis can start as vomiting.     Return to the Emergency Department if:    You keep throwing up and you are not able to keep liquids down.     You feel you are getting dehydrated, such as being very thirsty, not urinating at least every 8-12 hours, or feeling faint or lightheaded.     You develop a new fever, or your fever continues for more than 2 days.     You have belly pain that seems worse than cramps, is in one spot, or is getting worse over time.     You have  blood in your vomit or stools.     You feel very weak.    You are not starting to improve within 24 hours of your visit here.     What can I do to help myself?    The most important thing to do is to drink clear liquids. If you have been vomiting a lot, it is best to have only small, frequent sips of liquids. Drinking too much at once may cause more vomiting. If you are vomiting often, you must replace minerals, sodium and potassium lost with your illness. Pedialyte  and sports drinks can help you replace these minerals. You can also drink clear liquids such as water, weak tea, apple juice, and 7-Up . Avoid acid liquids (orange), caffeine (coffee) or alcohol. Do not drink milk until you no longer have diarrhea.     After liquids are staying down, you may start eating mild foods. Soda crackers, toast, plain noodles, gelatin, applesauce and bananas are good first choices. Avoid foods that have acid, are spicy, fatty or have a lot of fiber (such as meats, coarse grains, vegetables). You may start eating these foods again in about 3 days when you are better.     Sometimes treatment includes prescription medicine to prevent nausea and vomiting. If your doctor prescribes these for you, take them as directed.     Don t take ibuprofen, or other nonsteroidal anti-inflammatory medicines without checking with your healthcare provider.   If you were given a prescription for medicine here today, be sure to read all of the information (including the package insert) that comes with your prescription.  This will include important information about the medicine, its side effects, and any warnings that you need to know about.  The pharmacist who fills the prescription can provide more information and answer questions you may have about the medicine.  If you have questions or concerns that the pharmacist cannot address, please call or return to the Emergency Department.       Opioid Medication Information    Pain medications are among  the most commonly prescribed medicines, so we are including this information for all our patients. If you did not receive pain medication or get a prescription for pain medicine, you can ignore it.     You may have been given a prescription for an opioid (narcotic) pain medicine and/or have received a pain medicine while here in the Emergency Department. These medicines can make you drowsy or impaired. You must not drive, operate dangerous equipment, or engage in any other dangerous activities while taking these medications. If you drive while taking these medications, you could be arrested for DUI, or driving under the influence. Do not drink any alcohol while you are taking these medications.     Opioid pain medications can cause addiction. If you have a history of chemical dependency of any type, you are at a higher risk of becoming addicted to pain medications.  Only take these prescribed medications to treat your pain when all other options have been tried. Take it for as short a time and as few doses as possible. Store your pain pills in a secure place, as they are frequently stolen and provide a dangerous opportunity for children or visitors in your house to start abusing these powerful medications. We will not replace any lost or stolen medicine.  As soon as your pain is better, you should flush all your remaining medication.     Many prescription pain medications contain Tylenol  (acetaminophen), including Vicodin , Tylenol #3 , Norco , Lortab , and Percocet .  You should not take any extra pills of Tylenol  if you are using these prescription medications or you can get very sick.  Do not ever take more than 3000 mg of acetaminophen in any 24 hour period.    All opioids tend to cause constipation. Drink plenty of water and eat foods that have a lot of fiber, such as fruits, vegetables, prune juice, apple juice and high fiber cereal.  Take a laxative if you don t move your bowels at least every other day.  Miralax , Milk of Magnesia, Colace , or Senna  can be used to keep you regular.      Remember that you can always come back to the Emergency Department if you are not able to see your regular doctor in the amount of time listed above, if you get any new symptoms, or if there is anything that worries you.          24 Hour Appointment Hotline       To make an appointment at any [facility], call [phone] ([phone]). If you don't have a family doctor or clinic, we will help you find one. [facility] clinics are conveniently located to serve the needs of you and your family.             Review of your medicines      START taking        Dose / Directions Last dose taken    ondansetron 4 MG ODT tab   Commonly known as:  ZOFRAN ODT   Dose:  4 mg   Quantity:  10 tablet        Take 1 tablet (4 mg) by mouth every 8 hours as needed   Refills:  0          Our records show that you are taking the medicines listed below. If these are incorrect, please call your family doctor or clinic.        Dose / Directions Last dose taken    albuterol 108 (90 Base) MCG/ACT Inhaler   Commonly known as:  PROAIR HFA/PROVENTIL HFA/VENTOLIN HFA   Dose:  2 puff   Quantity:  1 Inhaler        Inhale 2 puffs into the lungs every 6 hours as needed for shortness of breath / dyspnea or wheezing   Refills:  1        alirocumab 75 MG/ML injectable pen   Commonly known as:  PRALUENT   Dose:  75 mg   Quantity:  2 mL        Inject 1 mL (75 mg) Subcutaneous every 14 days   Refills:  12        aspirin 81 MG EC tablet   Dose:  81 mg   Quantity:  91 tablet        Take 1 tablet (81 mg) by mouth daily   Refills:  3        atorvastatin 80 MG tablet   Commonly known as:  LIPITOR   Dose:  80 mg   Quantity:  91 tablet        Take 1 tablet (80 mg) by mouth daily   Refills:  3        BENADRYL PO   Dose:  25-50 mg        Take 25-50 mg by mouth daily as needed   Refills:  0        calcium carbonate 500 MG chewable tablet   Commonly known as:  TUMS   Dose:  1  chew tab        Take 1 chew tab by mouth daily as needed   Refills:  0        ezetimibe 10 MG tablet   Commonly known as:  ZETIA   Dose:  10 mg   Quantity:  90 tablet        Take 1 tablet (10 mg) by mouth daily   Refills:  3        fenofibrate 160 MG tablet   Dose:  160 mg   Quantity:  91 tablet        Take 1 tablet (160 mg) by mouth daily   Refills:  3        lisinopril 2.5 MG tablet   Commonly known as:  PRINIVIL/Zestril   Dose:  2.5 mg   Quantity:  90 tablet        Take 1 tablet (2.5 mg) by mouth daily , or as directed by Dr. Ocampo.   Refills:  2        metoprolol tartrate 25 MG tablet   Commonly known as:  LOPRESSOR   Dose:  12.5 mg   Quantity:  90 tablet        Take 0.5 tablets (12.5 mg) by mouth 2 times daily   Refills:  3        traMADol 50 MG tablet   Commonly known as:  ULTRAM   Dose:  50 mg   Quantity:  10 tablet        Take 1 tablet (50 mg) by mouth every 6 hours as needed for severe pain   Refills:  0        vitamin D 2000 units Caps   Dose:  2000 Units        Take 2,000 Units by mouth daily   Refills:  0        warfarin 5 MG tablet   Commonly known as:  COUMADIN   Dose:  7.5 mg   Indication:  Obstructive Blood Clot in a Blood Vessel of the Lung   Quantity:  135 tablet        Take 1.5 tablets (7.5 mg) by mouth daily   Refills:  2                Prescriptions were sent or printed at these locations (1 Prescription)                   Other Prescriptions                Printed at Department/Unit printer (1 of 1)         ondansetron (ZOFRAN ODT) 4 MG ODT tab                Procedures and tests performed during your visit     Abdomen XR 1 vw    CBC with platelets differential    Comprehensive metabolic panel    EKG 12-lead, tracing only    INR    Lipase    Troponin I      Orders Needing Specimen Collection     None      Pending Results     Date and Time Order Name Status Description    7/25/2018 0900 EKG 12-lead, tracing only Preliminary             Pending Culture Results     No orders found from 7/23/2018  to 7/26/2018.            Pending Results Instructions     If you had any lab results that were not finalized at the time of your Discharge, you can call the ED Lab Result RN at [phone]. You will be contacted by this team for any positive Lab results or changes in treatment. The nurses are available 7 days a week from 10A to 6:30P.  You can leave a message 24 hours per day and they will return your call.        Test Results From Your Hospital Stay        7/25/2018  9:13 AM      Component Results     Component Value Ref Range & Units Status    WBC 6.8 4.0 - 11.0 10e9/L Final    RBC Count 5.16 4.4 - 5.9 10e12/L Final    Hemoglobin 15.6 13.3 - 17.7 g/dL Final    Hematocrit 46.1 40.0 - 53.0 % Final    MCV 89 78 - 100 fl Final    MCH 30.2 26.5 - 33.0 pg Final    MCHC 33.8 31.5 - 36.5 g/dL Final    RDW 12.6 10.0 - 15.0 % Final    Platelet Count 261 150 - 450 10e9/L Final    Diff Method Automated Method  Final    % Neutrophils 64.8 % Final    % Lymphocytes 20.7 % Final    % Monocytes 9.0 % Final    % Eosinophils 4.7 % Final    % Basophils 0.7 % Final    % Immature Granulocytes 0.1 % Final    Nucleated RBCs 0 0 /100 Final    Absolute Neutrophil 4.4 1.6 - 8.3 10e9/L Final    Absolute Lymphocytes 1.4 0.8 - 5.3 10e9/L Final    Absolute Monocytes 0.6 0.0 - 1.3 10e9/L Final    Absolute Eosinophils 0.3 0.0 - 0.7 10e9/L Final    Absolute Basophils 0.1 0.0 - 0.2 10e9/L Final    Abs Immature Granulocytes 0.0 0 - 0.4 10e9/L Final    Absolute Nucleated RBC 0.0  Final         7/25/2018  9:35 AM      Component Results     Component Value Ref Range & Units Status    Sodium 140 133 - 144 mmol/L Final    Potassium 3.4 3.4 - 5.3 mmol/L Final    Chloride 107 94 - 109 mmol/L Final    Carbon Dioxide 25 20 - 32 mmol/L Final    Anion Gap 8 3 - 14 mmol/L Final    Glucose 100 (H) 70 - 99 mg/dL Final    Urea Nitrogen 15 7 - 30 mg/dL Final    Creatinine 0.95 0.66 - 1.25 mg/dL Final    GFR Estimate 82 >60 mL/min/1.7m2 Final    Non   American GFR Calc    GFR Estimate If Black >90 >60 mL/min/1.7m2 Final    African American GFR Calc    Calcium 9.2 8.5 - 10.1 mg/dL Final    Bilirubin Total 0.9 0.2 - 1.3 mg/dL Final    Albumin 4.3 3.4 - 5.0 g/dL Final    Protein Total 8.2 6.8 - 8.8 g/dL Final    Alkaline Phosphatase 46 40 - 150 U/L Final    ALT 41 0 - 70 U/L Final    AST 32 0 - 45 U/L Final         7/25/2018  9:35 AM      Component Results     Component Value Ref Range & Units Status    Lipase 188 73 - 393 U/L Final         7/25/2018  9:35 AM      Component Results     Component Value Ref Range & Units Status    Troponin I ES <0.015 0.000 - 0.045 ug/L Final    The 99th percentile for upper reference range is 0.045 ug/L.  Troponin values   in the range of 0.045 - 0.120 ug/L may be associated with risks of adverse   clinical events.           7/25/2018 10:22 AM      Narrative     XR ABDOMEN 1 VW 7/25/2018 10:19 AM    HISTORY: vomiting, upper abd/lower chest dicomfort;         Impression     IMPRESSION: Gallstones. Exam otherwise negative.     OMAR SIMMS MD         7/25/2018  9:31 AM      Component Results     Component Value Ref Range & Units Status    INR 1.19 (H) 0.86 - 1.14 Final                Clinical Quality Measure: Blood Pressure Screening     Your blood pressure was checked while you were in the emergency department today. The last reading we obtained was  BP: (!) 157/98 . Please read the guidelines below about what these numbers mean and what you should do about them.  If your systolic blood pressure (the top number) is less than 120 and your diastolic blood pressure (the bottom number) is less than 80, then your blood pressure is normal. There is nothing more that you need to do about it.  If your systolic blood pressure (the top number) is 120-139 or your diastolic blood pressure (the bottom number) is 80-89, your blood pressure may be higher than it should be. You should have your blood pressure rechecked within a year by a primary  care provider.  If your systolic blood pressure (the top number) is 140 or greater or your diastolic blood pressure (the bottom number) is 90 or greater, you may have high blood pressure. High blood pressure is treatable, but if left untreated over time it can put you at risk for heart attack, stroke, or kidney failure. You should have your blood pressure rechecked by a primary care provider within the next 4 weeks.  If your provider in the emergency department today gave you specific instructions to follow-up with your doctor or provider even sooner than that, you should follow that instruction and not wait for up to 4 weeks for your follow-up visit.        Thank you for choosing [facility]       Thank you for choosing [facility] for your care. Our goal is always to provide you with excellent care. Hearing back from our patients is one way we can continue to improve our services. Please take a few minutes to complete the written survey that you may receive in the mail after you visit with us. Thank you!        [facility] Information     [employer] gives you secure access to your electronic health record. If you see a primary care provider, you can also send messages to your care team and make appointments. If you have questions, please call your primary care clinic.  If you do not have a primary care provider, please call [phone] and they will assist you.        Care EveryWhere ID     This is your Care EveryWhere ID. This could be used by other organizations to access your [facility] medical records  [id number]        Equal Access to Services     SURESH ZURITA : Hadii peggy Fountain, wagordon bowen, qaybmary pickettalfranklin lafleur. So [facility] [phone].    ATENCIÓN: Si habla español, tiene a krueger disposición servicios gratuitos de asistencia lingüística. Llame al [phone].    We comply with applicable federal civil rights laws and [place] laws. We do not discriminate on  the basis of race, color, national origin, age, disability, sex, sexual orientation, or gender identity.            After Visit Summary       This is your record. Keep this with you and show to your community pharmacist(s) and doctor(s) at your next visit.

## 2018-07-25 NOTE — PROGRESS NOTES
ANTICOAGULATION FOLLOW-UP CLINIC VISIT    Patient Name:  Oswaldo Prabhakar  Date:  7/25/2018  Contact Type:  Telephone    SUBJECTIVE:     Patient Findings     Comments Patient was in the ER for nausea and vomiting.  A scan was done and gallstones were found. It appears there was injection of Lovenox given in the ER but not given for discharge.           OBJECTIVE    INR   Date Value Ref Range Status   07/25/2018 1.19 (H) 0.86 - 1.14 Final     Chromogenic Factor 10   Date Value Ref Range Status   06/18/2011 81 60 - 140 % Final     Comment:     Therapeutic Range: A Chromogenic Factor 10 level of 20-40% inversely   correlates   with an INR of 2-3 for patients receiving Warfarin. Chromogenic Factor 10   levels below 20% indicate an INR greater than 3, and levels above 40%   indicate   an INR less than 2.     Factor 2 Assay   Date Value Ref Range Status   04/04/2012  60 - 140 % Final    (Note)  CANCELLED AND CREDITED PER [facility]. MONITORING,4/4/12,       ASSESSMENT / PLAN  No question data found.  Anticoagulation Summary as of 7/25/2018     INR goal 2.0-3.0   Today's INR 1.19!   Warfarin maintenance plan 5 mg (5 mg x 1) on Mon, Fri; 7.5 mg (5 mg x 1.5) all other days   Full warfarin instructions 7/25: 10 mg; 7/26: 10 mg; Otherwise 5 mg on Mon, Fri; 7.5 mg all other days   Weekly warfarin total 47.5 mg   Plan last modified Nano Greer RN (5/2/2018)   Next INR check 8/1/2018   Priority INR   Target end date Indefinite    Indications   Pulmonary emboli (H) [I26.99]  Long-term (current) use of anticoagulants [Z79.01] [Z79.01]         Anticoagulation Episode Summary     INR check location     Preferred lab     Send INR reminders to [facility]    Comments HIPPA INFO OK to speak with wife Josselyn OK to leave messages on Home.work and cell phones  use cell phone #[phone]      Anticoagulation Care Providers     Provider Role Specialty Phone number    Samm Vazquez MD Responsible Internal Medicine  [phone]            See the Encounter Report to view Anticoagulation Flowsheet and Dosing Calendar (Go to Encounters tab in chart review, and find the Anticoagulation Therapy Visit)    Left message for patient with results and dosing recommendations. Asked patient to call back to report any missed doses, falls, signs and symptoms of bleeding or clotting, any changes in health, medication, or diet. Asked patient to call back with any questions or concerns.     Kristina Wright RN

## 2018-07-25 NOTE — ED NOTES
EKG was done. [employer] monitoring equipment onto Patient (Pulse ox and BP). Assisted with patient triage.

## 2018-07-25 NOTE — PROGRESS NOTES
"  SUBJECTIVE:   Oswaldo Prabhakar is a 57 year old male who presents to clinic today for the following health issues:    Acute Illness   Acute illness concerns: vomiting  Onset: 2 days    Fever: no    Chills/Sweats: YES- Both    Headache (location?): no    Sinus Pressure:no    Conjunctivitis:  no    Ear Pain: no    Rhinorrhea: no    Congestion: no    Sore Throat: no     Cough: no    Wheeze: no    Decreased Appetite: YES    Nausea: YES    Vomiting: YES    Diarrhea:  no    Dysuria/Freq.: no    Fatigue/Achiness: YES    Sick/Strep Exposure: no  Unable to keep fluids and foods in  Unable to take medicines  Urinate small amount this am   Therapies Tried and outcome: none    No sick contacts.   No recent travel.   No new foods/dining out.     Reviewed PMH and medications.    ROS:  ROS otherwise negative    OBJECTIVE:                                                    /88 (BP Location: Right arm, Cuff Size: Adult Large)  Pulse 96  Temp 98.2  F (36.8  C) (Oral)  Ht 5' 11\" (1.803 m)  Wt 206 lb 11.2 oz (93.8 kg)  SpO2 98%  BMI 28.83 kg/m2  Body mass index is 28.83 kg/(m^2).   GENERAL: alert, no distress  HENT: ear canals- normal; TMs- normal; Nose- normal; Mouth-moist mucous membranes  NECK: no tenderness, no adenopathy  RESP: lungs clear to auscultation - no rales, no rhonchi, no wheezes  CV: regular rates and rhythm, normal S1 S2, no S3 or S4 and no murmur, no click or rub    Diagnostic test results:  No results found for this or any previous visit (from the past 24 hour(s)).     ASSESSMENT/PLAN:                                                    (R11.2) Nausea and vomiting, intractability of vomiting not specified, unspecified vomiting type  (primary encounter diagnosis)  Comment: concerning for dehydration. Patient also unable to take medications including warfarin. Referred to ED for labs and further management. Patient in agreement with plan.  Plan:     (R53.83) Fatigue, unspecified type  Comment:   Plan: "     Chaitanya Trejo PA-C  [facility]

## 2018-07-25 NOTE — ED PROVIDER NOTES
History     Chief Complaint:  Vomiting    HPI   Oswaldo Prabhakar is a 57 year old male, with a history of CAD, HTN and hyperlipidemia, who presents  to the emergency department for evaluation of vomiting. The patient reports he has been vomiting for two days, feels shaky and dehydrated, and is having decreased urine output. He reports having 4-5 vomiting episodes after drinking some water and has not tried to eat. He reports not being able to sleep but reports his bowel movements are normal. He denies any black or blood in the vomit, syncope, abdominal pain or chest pain. He reports experiencing symptoms like this before but is unsure as to what is the cause. He denies any recent travel, exposure to other sick people or antibiotic resistance.    Allergies:  Heparin  Hydrocodone-Acetaminophen: nausea and vomiting     Medications:    81 mg aspirin  Lipitor  Tums  Vitamin D  Benadryl  Zetia  Fenofibrate  Lisinopril  Lopressor  Ultram  Coumadin    Past Medical History:    Phalanx finger fracture  CAD  Hyperglyceridemia  Calculus of kidney  Pulmonary emboli  Hyperlipidemia  Retinal detachment  Diaphragmatic hernia  HTN  Nephrolithiasis  Statin intolerance  Stented coronary artery    Past Surgical History:    Coronary artery bypass  Spermatocele resection  Left percutaneous pinning hand  Lithotripsy    Family History:    Heart disease  CAD  Cancer    Social History:  Smoking status: Never  Alcohol use: No  The patient presents to the emergency department by himself.  Marital Status:   [2]     Review of Systems   Cardiovascular: Negative for chest pain.   Gastrointestinal: Positive for nausea and vomiting. Negative for abdominal pain, constipation and diarrhea.   Genitourinary: Positive for decreased urine volume.   Neurological: Negative for syncope.   All other systems reviewed and are negative.    Physical Exam   First Vitals:  BP: (!) 157/107  Heart Rate: 82  Temp: 98.4  F (36.9  C)  Resp: 16  SpO2: 98  %  Recheck 155/92    Physical Exam  General: The patient is alert, in no respiratory distress.    HENT: Mucous membranes slightly dry.    Cardiovascular: Regular rate and rhythm. Good pulses in all four extremities. Delayed capillary refill and skin turgor.     Respiratory: Lungs are clear. No nasal flaring. No retractions. No wheezing, no crackles.    Gastrointestinal: Abdomen soft. No guarding, no rebound. No palpable hernias. No abdominal tenderness.    Musculoskeletal: No gross deformity.     Skin: No rashes or petechiae.     Neurologic: The patient is alert and oriented x3. GCS 15. No testable cranial nerve deficit. Follows commands with clear and appropriate speech. Gives appropriate answers. Good strength in all extremities. No gross neurologic deficit. Gross sensation intact. Pupils are round and reactive. No meningismus. Disconjugate gaze, normal at baseline for patient.    Lymphatic: No cervical adenopathy. No lower extremity swelling.    Psychiatric: The patient is non-tearful.    Emergency Department Course   ECG (9:05:46):  Rate 77 bpm. MO interval 138. QRS duration 88. QT/QTc 384/434. P-R-T axes 27 21 99. Normal sinus rhythm. ST & T wave abnormality, with significant change to previous EKG dated 4/27/17. Interpreted at 0916 by Collin Hawk MD.    Imaging:  Radiographic findings were communicated with the patient who voiced understanding of the findings.    Abdomen XR 1 vw  IMPRESSION: Gallstones. Exam otherwise negative.  As read by Radiology.    Laboratory:  CBC: WNL (WBC 6.8, HGB 15.6, )  CMP: Glucose 100 (H) o/w WNL (Creatinine 0.95)    Lipase: 188  Troponin I (0854): <0.015  INR: 1.19 (H)    Interventions:  0908 NS 1L IV Bolus  0910 Zofran 4 mg IV  1017 Aspirin 325 mg PO  1018 Vasotec 2.5 mg IV  1118 Lovenox 90 mg Subcutaneous    Emergency Department Course:  Past medical records, nursing notes, and vitals reviewed.  0852: I performed an exam of the patient and obtained history, as  documented above.     IV inserted and blood drawn.    The patient was sent for a abdomen x-ray while in the emergency department, findings above.    1038: I rechecked the patient. Explained findings to the patient. Patient reported he is doing better and will be discharged to home.    Findings and plan explained to the Patient. Patient discharged home with instructions regarding supportive care, medications, and reasons to return. The importance of close follow-up was reviewed.   Impression & Plan    Medical Decision Making:  The patient has a cardiac history and therefore concerned because he has had nausea and vomiting and been unable to take his medications, including warfarin. The chart does say he has a history of heparin, but I did feel that the potential benefit from giving him any more anticoagulation in that his subtherapeutic would outweigh any other detrimental effect from that. I discussed with the patient the possibility of there being intraabdominal causes, like bowel obstruction, but it sounds like he has been passing things and has no other GI symptoms. I did order x-rays, as well as lab, which were quite reassuring. The patient was feeling improved and was able to tolerate PO. I believe he is appropriate for outpatient follow-up and discharge. There are no signs of a cardiac cause behind this and he was discharged home in good condition. We did discuss the gallstones, which are present on the x-ray. He is aware of these but I do not believe it is likely cholecystitis is the cause behind this.    Diagnosis:    ICD-10-CM   1. Non-intractable vomiting with nausea, unspecified vomiting type R11.2   2. Subtherapeutic international normalized ratio (INR) R79.1   3. Gallstones K80.20   4. Essential hypertension I10     Disposition:  Discharged to home.    Discharge Medications:  New Prescriptions    ONDANSETRON (ZOFRAN ODT) 4 MG ODT TAB    Take 1 tablet (4 mg) by mouth every 8 hours as needed     Kerry  Joseluis  7/25/2018   [facility] EMERGENCY DEPARTMENT  Scribe Disclosure:  I, Candidaalejandro Joseluis, am serving as a scribe at 8:52 AM on 7/25/2018 to document services personally performed by Collin Hawk MD based on my observations and the provider's statements to me.      Collin Hawk MD  07/25/18 1400

## 2018-07-25 NOTE — ED TRIAGE NOTES
Patient presents with nausea and vomiting for the last 2 days. Patient has been unable to keep anything down, including his cardiac meds. ABCDs intact, alert and oriented x 4.

## 2018-07-25 NOTE — ED AVS SNAPSHOT
[facility] Emergency Department    [address]    [facility] [address]    Phone:  [phone]    Fax:  [phone]                                       Oswaldo Prabhakar   MRN: [id number]    Department:  [facility] Emergency Department   Date of Visit:  7/25/2018           After Visit Summary Signature Page     I have received my discharge instructions, and my questions have been answered. I have discussed any challenges I see with this plan with the nurse or doctor.    ..........................................................................................................................................  Patient/Patient Representative Signature      ..........................................................................................................................................  Patient Representative Print Name and Relationship to Patient    ..................................................               ................................................  Date                                            Time    ..........................................................................................................................................  Reviewed by Signature/Title    ...................................................              ..............................................  Date                                                            Time

## 2018-07-25 NOTE — MR AVS SNAPSHOT
Oswaldo Prabhakar   7/25/2018   Anticoagulation Therapy Visit    Description:  57 year old male   Provider:  Kristina Wright, RN   Department:  [facility]           INR as of 7/25/2018     Today's INR 1.19!      Anticoagulation Summary as of 7/25/2018     INR goal 2.0-3.0   Today's INR 1.19!   Full warfarin instructions 7/25: 10 mg; 7/26: 10 mg; Otherwise 5 mg on Mon, Fri; 7.5 mg all other days   Next INR check 8/1/2018    Indications   Pulmonary emboli (H) [I26.99]  Long-term (current) use of anticoagulants [Z79.01] [Z79.01]         July 2018 Details    Sun Mon Tue Wed Thu Fri Sat     1               2               3               4               5               6               7                 8               9               10               11               12               13               14                 15               16               17               18               19               20               21                 22               23               24               25      10 mg   See details      26      10 mg         27      5 mg         28      7.5 mg           29      7.5 mg         30      5 mg         31      7.5 mg              Date Details   07/25 This INR check               How to take your warfarin dose     To take:  5 mg Take 1 of the 5 mg tablets.    To take:  7.5 mg Take 1.5 of the 5 mg tablets.    To take:  10 mg Take 2 of the 5 mg tablets.           August 2018 Details    Sun Mon Tue Wed Thu Fri Sat        1            2               3               4                 5               6               7               8               9               10               11                 12               13               14               15               16               17               18                 19               20               21               22               23               24               25                 26               27               28                29               30               31                 Date Details   No additional details    Date of next INR:  8/1/2018         How to take your warfarin dose     To take:  7.5 mg Take 1.5 of the 5 mg tablets.

## 2018-07-26 ENCOUNTER — OFFICE VISIT (OUTPATIENT)
Dept: FAMILY MEDICINE | Facility: CLINIC | Age: 58
End: 2018-07-26
Payer: COMMERCIAL

## 2018-07-26 ENCOUNTER — TELEPHONE (OUTPATIENT)
Dept: INTERNAL MEDICINE | Facility: CLINIC | Age: 58
End: 2018-07-26

## 2018-07-26 ENCOUNTER — HOSPITAL ENCOUNTER (OUTPATIENT)
Dept: ULTRASOUND IMAGING | Facility: CLINIC | Age: 58
Discharge: HOME | End: 2018-07-26
Attending: INTERNAL MEDICINE | Admitting: INTERNAL MEDICINE
Payer: COMMERCIAL

## 2018-07-26 VITALS
OXYGEN SATURATION: 97 % | SYSTOLIC BLOOD PRESSURE: 150 MMHG | HEART RATE: 85 BPM | WEIGHT: 206 LBS | TEMPERATURE: 98.7 F | BODY MASS INDEX: 28.73 KG/M2 | DIASTOLIC BLOOD PRESSURE: 100 MMHG

## 2018-07-26 DIAGNOSIS — Z86.711 HISTORY OF PULMONARY EMBOLISM: ICD-10-CM

## 2018-07-26 DIAGNOSIS — I10 ESSENTIAL HYPERTENSION: ICD-10-CM

## 2018-07-26 DIAGNOSIS — K80.20 GALLSTONES: Primary | ICD-10-CM

## 2018-07-26 DIAGNOSIS — K80.20 GALLSTONES: ICD-10-CM

## 2018-07-26 DIAGNOSIS — R11.0 NAUSEA: ICD-10-CM

## 2018-07-26 PROCEDURE — 76700 US EXAM ABDOM COMPLETE: CPT

## 2018-07-26 PROCEDURE — 96372 THER/PROPH/DIAG INJ SC/IM: CPT | Performed by: INTERNAL MEDICINE

## 2018-07-26 PROCEDURE — 99214 OFFICE O/P EST MOD 30 MIN: CPT | Mod: 25 | Performed by: INTERNAL MEDICINE

## 2018-07-26 RX ORDER — METOCLOPRAMIDE HYDROCHLORIDE 5 MG/5ML
10 SOLUTION ORAL 4 TIMES DAILY PRN
Qty: 473 ML | Refills: 5 | Status: SHIPPED | OUTPATIENT
Start: 2018-07-26 | End: 2018-09-14

## 2018-07-26 ASSESSMENT — PAIN SCALES - GENERAL: PAINLEVEL: MODERATE PAIN (4)

## 2018-07-26 NOTE — PATIENT INSTRUCTIONS
At [facility], we strive to deliver an exceptional experience to you, every time we see you.  If you receive a survey in the mail, please send us back your thoughts. We really do value your feedback.    Based on your medical history, these are the current health maintenance/preventive care services that you are due for (some may have been done at this visit.)  Health Maintenance Due   Topic Date Due     OP ANNUAL INR REFERRAL  04/21/2017     ADVANCE DIRECTIVE PLANNING Q5 YRS  09/02/2017       Suggested websites for health information:  Www.AudioName.org : Up to date and easily searchable information on multiple topics.  Www.[place].gov : medication info, interactive tutorials, watch real surgeries online  Www.familydoctor.org : good info from the Academy of Family Physicians  Www.cdc.gov : public health info, travel advisories, epidemics (H1N1)  Www.aap.org : children's health info, normal development, vaccinations  Www.health.[facility].[place].us : [place] dept of health, public health issues in [place], N1N1    Your care team:                            Family Medicine Internal Medicine   MD Jose Martin Monahan MD Shantel Branch-Fleming, MD Katya Georgiev PA-C Megan Hill, APRN CNP    Adonis Woody MD Pediatrics   Júnior Luther, PAYAW Sky, CNP MD Leila Tinajero APRN CNP   MD Ryanne Tapia MD Deborah Mielke, MD Kim Thein, APRN [facility]      Clinic hours: Monday - Thursday 7 am-7 pm; Fridays 7 am-5 pm.   Urgent care: Monday - Friday 11 am-9 pm; Saturday and Sunday 9 am-5 pm.  Pharmacy : Monday -Thursday 8 am-8 pm; Friday 8 am-6 pm; Saturday and Sunday 9 am-5 pm.     Clinic: [phone]   Pharmacy: [phone]

## 2018-07-26 NOTE — PROGRESS NOTES
SUBJECTIVE:   Oswaldo Prabhakar is a 57 year old male who presents to clinic today for the following health issues:      ED/UC Followup:    Facility: [facility]  Date of visit: 7/25/18  Reason for visit: abdominal and vomiting  Current Status: worse. The patient is unable to take any oral medications, including Lopressor and Warfarin (indicated for previous pulmonary embolism).   Description (location/character/radiation): upper middle       Associated flank pain: None  Intensity:  3-4/10  Accompanying signs and symptoms: Denies any jaundice, melena, flank pain hematuria nor hematemesis.       Fever/Chills: YES, chills            Gas/Bloating: YES       Nausea/vomitting: YES       Diarrhea: no        Dysuria or Hematuria: no        Reduced appetite: YES.  History (previous similar pain/trauma/previous testing):KUB during ER visit shows gallstones  Precipitating or alleviating factors:Myocardial ischemia and pancreatitis ruled out.        Pain worse with eating:  Yes       Pain relieved by BM: no   Therapies tried and outcome: Zofran (minimal benefit)      Problem list and histories reviewed & adjusted, as indicated.  Additional history: as documented    Patient Active Problem List   Diagnosis     Retinal detachment     HYPERLIPIDEMIA LDL GOAL <130     Pulmonary emboli (H)     Pure hyperglyceridemia     Calculus of kidney     Warfarin anticoagulation     Atypical chest pain     S/P CABG x 4     CAD (coronary artery disease)     Finger stiffness, left     Fracture of phalanx of finger     Long-term (current) use of anticoagulants [Z79.01]     Statin intolerance     Past Surgical History:   Procedure Laterality Date     BYPASS GRAFT ARTERY CORONARY  4/4/2014    Procedure: BYPASS GRAFT ARTERY CORONARY;  Median Sternotomy, Coronary Artery Bypass Bypass x 4 using Left Internal Mammary, Left Saphenous Vein, on pump oxygenation;  Surgeon: Sherry Armando MD;  Location: [facility] OR     C NONSPECIFIC PROCEDURE       Spermatocele resection     CLOSED REDUCTION, PERCUTANEOUS PINNING  HAND, COMBINED Left 11/5/2015    Procedure: COMBINED CLOSED REDUCTION, PERCUTANEOUS PINNING HAND;  Surgeon: Carmella Love MD;  Location: [facility]     COLONOSCOPY  3/15/2013    Procedure: COLONOSCOPY;;  Surgeon: Racheal Shipman MD;  Location: [facility]     LITHOTRIPSY  4/2010     THORACIC SURGERY      lung surgery, thoracotomy, lung adhesion       Social History   Substance Use Topics     Smoking status: Never Smoker     Smokeless tobacco: Never Used     Alcohol use No      Comment: very rare     Family History   Problem Relation Age of Onset     HEART DISEASE Mother      C.A.D. Father      3 vessel CABG, age 70     Cancer Father      bladder cancer     C.A.D. Paternal Grandmother      Hypertension Paternal Grandmother      Alzheimer Disease Paternal Grandmother      Diabetes No family hx of      Thyroid Disease No family hx of      Cancer - colorectal No family hx of          Allergies   Allergen Reactions     Cats      Hay Fever & [A.R.M.]      Heparin Other (See Comments)     Heparin resistance per cardio-thoracic surgeon Dr. Armando     Hydrocodone-Acetaminophen Nausea and Vomiting     Acetaminophen is ok     Recent Labs   Lab Test  07/25/18   0854  04/13/18   1331  03/23/18   1531  12/11/17   1242  11/06/17   0935   03/01/16   1439  09/16/15   1047   04/06/14   0655   A1C   --    --    --    --    --    --   5.9  6.0   --   5.9   LDL   --    --   45  28  9   < >  164*  174*   < >   --    HDL   --    --   42  46  49   < >  40  35*   --    --    TRIG   --    --   106  127  106   < >  183*  183*   --    --    ALT  41  34   --    --   33   < >  40  36   < >   --    CR  0.95  1.04   --    --   1.01   < >  1.11  1.11   < >  1.01   GFRESTIMATED  82  74   --    --   77   < >  69  69   < >  78   GFRESTBLACK  >90  90   --    --   >90   < >  84  84   < >  >90   POTASSIUM  3.4  3.9   --    --   3.8   < >  3.9  3.4   < >  4.1    < > = values in  this interval not displayed.      BP Readings from Last 3 Encounters:   07/30/18 132/80   07/26/18 (!) 150/100   07/25/18 (!) 155/92    Wt Readings from Last 3 Encounters:   07/30/18 203 lb (92.1 kg)   07/26/18 206 lb (93.4 kg)   07/25/18 206 lb 11.2 oz (93.8 kg)                 ROS:  CONSTITUTIONAL: NEGATIVE for fever, chills, change in weight  INTEGUMENTARY/SKIN: NEGATIVE for worrisome rashes, moles or lesions  EYES: NEGATIVE for vision changes or irritation  ENT/MOUTH: NEGATIVE for ear, mouth and throat problems  RESP: NEGATIVE for significant cough or SOB  CV: NEGATIVE for chest pain, palpitations or peripheral edema  GI: NEGATIVE for jaundice  : NEGATIVE for frequency, dysuria, or hematuria  MUSCULOSKELETAL: NEGATIVE for significant arthralgias or myalgia  NEURO: NEGATIVE for weakness, dizziness or paresthesias  ENDOCRINE: NEGATIVE for temperature intolerance, skin/hair changes  HEME: NEGATIVE for bleeding problems  PSYCHIATRIC: NEGATIVE for changes in mood or affect    OBJECTIVE:     BP (!) 150/100  Pulse 85  Temp 98.7  F (37.1  C) (Oral)  Wt 206 lb (93.4 kg)  SpO2 97%  BMI 28.73 kg/m2  Body mass index is 28.73 kg/(m^2).  GENERAL: healthy, alert and no distress  EYES: Eyes grossly normal to inspection and conjunctivae and sclerae normal  HENT: normal cephalic/atraumatic and oral mucous membranes moist  RESP: lungs clear to auscultation - no rales, rhonchi or wheezes  CV: regular rate and rhythm, normal S1 S2, no S3 or S4, no murmur, click or rub, no peripheral edema and peripheral pulses strong  ABDOMEN: tenderness RUQ and normoactive bowel sounds.  MS: no gross musculoskeletal defects noted, no edema  SKIN: no suspicious lesions or rashes  NEURO: Normal strength and tone, mentation intact and speech normal  PSYCH: mentation appears normal, anxious and fatigued    Diagnostic Test Results:  Results for orders placed or performed during the hospital encounter of 07/25/18   Abdomen XR 1 vw    Narrative     XR ABDOMEN 1 VW 7/25/2018 10:19 AM    HISTORY: vomiting, upper abd/lower chest dicomfort;       Impression    IMPRESSION: Gallstones. Exam otherwise negative.     OMAR SIMMS MD   CBC with platelets differential   Result Value Ref Range    WBC 6.8 4.0 - 11.0 10e9/L    RBC Count 5.16 4.4 - 5.9 10e12/L    Hemoglobin 15.6 13.3 - 17.7 g/dL    Hematocrit 46.1 40.0 - 53.0 %    MCV 89 78 - 100 fl    MCH 30.2 26.5 - 33.0 pg    MCHC 33.8 31.5 - 36.5 g/dL    RDW 12.6 10.0 - 15.0 %    Platelet Count 261 150 - 450 10e9/L    Diff Method Automated Method     % Neutrophils 64.8 %    % Lymphocytes 20.7 %    % Monocytes 9.0 %    % Eosinophils 4.7 %    % Basophils 0.7 %    % Immature Granulocytes 0.1 %    Nucleated RBCs 0 0 /100    Absolute Neutrophil 4.4 1.6 - 8.3 10e9/L    Absolute Lymphocytes 1.4 0.8 - 5.3 10e9/L    Absolute Monocytes 0.6 0.0 - 1.3 10e9/L    Absolute Eosinophils 0.3 0.0 - 0.7 10e9/L    Absolute Basophils 0.1 0.0 - 0.2 10e9/L    Abs Immature Granulocytes 0.0 0 - 0.4 10e9/L    Absolute Nucleated RBC 0.0    Comprehensive metabolic panel   Result Value Ref Range    Sodium 140 133 - 144 mmol/L    Potassium 3.4 3.4 - 5.3 mmol/L    Chloride 107 94 - 109 mmol/L    Carbon Dioxide 25 20 - 32 mmol/L    Anion Gap 8 3 - 14 mmol/L    Glucose 100 (H) 70 - 99 mg/dL    Urea Nitrogen 15 7 - 30 mg/dL    Creatinine 0.95 0.66 - 1.25 mg/dL    GFR Estimate 82 >60 mL/min/1.7m2    GFR Estimate If Black >90 >60 mL/min/1.7m2    Calcium 9.2 8.5 - 10.1 mg/dL    Bilirubin Total 0.9 0.2 - 1.3 mg/dL    Albumin 4.3 3.4 - 5.0 g/dL    Protein Total 8.2 6.8 - 8.8 g/dL    Alkaline Phosphatase 46 40 - 150 U/L    ALT 41 0 - 70 U/L    AST 32 0 - 45 U/L   Lipase   Result Value Ref Range    Lipase 188 73 - 393 U/L   Troponin I   Result Value Ref Range    Troponin I ES <0.015 0.000 - 0.045 ug/L   INR   Result Value Ref Range    INR 1.19 (H) 0.86 - 1.14   EKG 12-lead, tracing only   Result Value Ref Range    Interpretation ECG Click View Image link to  view waveform and result        ASSESSMENT/PLAN:     (K80.20) Gallstones  (primary encounter diagnosis)  Comment: Cause of RUQ pain and nausea. No evidence of cholecystitis.  Plan: US Abdomen Complete, GENERAL SURG ADULT         REFERRAL            (Z86.641) History of pulmonary embolism  Comment: Due to his nausea, switch to Lovenox and hold Warfarin.  Plan: enoxaparin (LOVENOX) 120 MG/0.8ML injection          (R11.0) Nausea  Comment: Secondary to condition #1.  Plan: metoclopramide (REGLAN) 10 MG/10ML SOLN         solution, ZOFRAN 1 MG (ONDANSETRON) HCL         INJECTION , INJECTION INTRAMUSCULAR OR SUB-Q          (I10) Essential hypertension  Comment: Due to poor oral intake, switch to Lopressor elixir.  Plan: metoprolol (LOPRESSOR) 10 mg/mL SUSP          Follow-up visit if condition worsens.    Jose Martin Gabriel MD  [facility]

## 2018-07-26 NOTE — NURSING NOTE
Prior to injection verified patient identity using patient's name and date of birth.  Due to injection administration, patient instructed to remain in clinic for 15 minutes  afterwards, and to report any adverse reaction to me immediately.    Sebastien WATTERS

## 2018-07-26 NOTE — MR AVS SNAPSHOT
After Visit Summary   7/26/2018    Oswaldo Prabhakar    MRN: [id number]           Patient Information     Date Of Birth          1960        Visit Information        Provider Department      7/26/2018 1:40 PM Jose Martin Gabriel MD [facility]        Today's Diagnoses     Gallstones    -  1    History of pulmonary embolism        Nausea        Essential hypertension          Care Instructions    At [facility], we strive to deliver an exceptional experience to you, every time we see you.  If you receive a survey in the mail, please send us back your thoughts. We really do value your feedback.    Based on your medical history, these are the current health maintenance/preventive care services that you are due for (some may have been done at this visit.)  Health Maintenance Due   Topic Date Due     OP ANNUAL INR REFERRAL  04/21/2017     ADVANCE DIRECTIVE PLANNING Q5 YRS  09/02/2017       Suggested websites for health information:  WwwWearYouWant : Up to date and easily searchable information on multiple topics.  Www.Rackup.gov : medication info, interactive tutorials, watch real surgeries online  Www.familydoctor.org : good info from the Academy of Family Physicians  Www.cdc.gov : public health info, travel advisories, epidemics (H1N1)  Www.aap.org : children's health info, normal development, vaccinations  Www.health.state.[place].us : [place] dept of health, public health issues in [place], N1N1    Your care team:                            Family Medicine Internal Medicine   MD Jose Martin Monahan MD Shantel Branch-Fleming, MD Katya Georgiev PA-C Megan Hill, NOLBERTO Woody MD Pediatrics   MARVIN Abarca, MD Leila Bliss APRN CNP   MD Ryanne Tapia MD Deborah Mielke, MD Kim Thein, APRN [facility]      Clinic hours: Monday - Thursday 7 am-7 pm; Fridays 7 am-5 pm.   Urgent care: Monday - Friday  11 am-9 pm; Saturday and Sunday 9 am-5 pm.  Pharmacy : Monday -Thursday 8 am-8 pm; Friday 8 am-6 pm; Saturday and Sunday 9 am-5 pm.     Clinic: [phone]   Pharmacy: [phone]              Follow-ups after your visit        Additional Services     GENERAL SURG ADULT REFERRAL       Your provider has referred you to: FM[facility]: [facility] [place] [phone]   [email or web address]    Please be aware that coverage of these services is subject to the terms and limitations of your health insurance plan.  Call member services at your health plan with any benefit or coverage questions.      Please bring the following with you to your appointment:    (1) Any X-Rays, CTs or MRIs which have been performed.  Contact the facility where they were done to arrange for  prior to your scheduled appointment.   (2) List of current medications   (3) This referral request   (4) Any documents/labs given to you for this referral                  Your next 10 appointments already scheduled     Jul 26, 2018  6:10 PM CDT   US ABDOMEN COMPLETE with [id number]   [facility] ([facility])    [address]  [facility] [address] [phone]           Please bring a list of your medicines (including vitamins, minerals and over-the-counter drugs). Also, tell your doctor about any allergies you may have. Wear comfortable clothes and leave your valuables at home.  Adults: No eating or drinking for 8 hours before the exam. You may take medicine with a small sip of water.  Children: - Infants, breast-fed: may have breast milk up to 2 hours before exam. - Infants, formula: may have bottle until 4 hours before exam. - Children 1-5 years: No food or drink for 4 hours before exam. - Children 6 -12 years: No food or drink for 6 hours before exam. - Children over 12 years: No food or drink for 8 hours before exam. - J Tube  Fed: No need to stop feedings.  Please call the Imaging Department at your exam site with any questions.            Jul 30, 2018 11:30 AM CDT   CONSULT with José Miguel Stuart MD   [facility]    [address], [address]  [facility] [id number] [phone]              Future tests that were ordered for you today     Open Future Orders        Priority Expected Expires Ordered    US Abdomen Complete Routine  7/26/2019 7/26/2018            Who to contact     If you have questions or need follow up information about today's clinic visit or your schedule please contact [facility] directly at [phone].  Normal or non-critical lab and imaging results will be communicated to you by [employer], letter or phone within 4 business days after the clinic has received the results. If you do not hear from us within 7 days, please contact the clinic through [employer] or phone. If you have a critical or abnormal lab result, we will notify you by phone as soon as possible.  Submit refill requests through [employer] or call your pharmacy and they will forward the refill request to us. Please allow 3 business days for your refill to be completed.          Additional Information About Your Visit        [employer] Information     [employer] gives you secure access to your electronic health record. If you see a primary care provider, you can also send messages to your care team and make appointments. If you have questions, please call your primary care clinic.  If you do not have a primary care provider, please call [phone] and they will assist you.        Care EveryWhere ID     This is your Care EveryWhere ID. This could be used by other organizations to access your [place] medical records  [id number]        Your Vitals Were     Pulse Temperature Pulse Oximetry BMI (Body Mass Index)          85 98.7  F (37.1  C) (Oral) 97% 28.73 kg/m2         Blood  Pressure from Last 3 Encounters:   07/26/18 (!) 150/100   07/25/18 (!) 155/92   07/25/18 134/88    Weight from Last 3 Encounters:   07/26/18 206 lb (93.4 kg)   07/25/18 206 lb 11.2 oz (93.8 kg)   07/20/18 200 lb (90.7 kg)              We Performed the Following     GENERAL SURG ADULT REFERRAL          Today's Medication Changes          These changes are accurate as of 7/26/18  2:38 PM.  If you have any questions, ask your nurse or doctor.               Start taking these medicines.        Dose/Directions    enoxaparin 120 MG/0.8ML injection   Commonly known as:  LOVENOX   Used for:  History of pulmonary embolism   Started by:  Jose Martin Gabriel MD        Dose:  120 mg   Inject 0.8 mLs (120 mg) Subcutaneous daily for 5 days   Quantity:  4 mL   Refills:  5       metoclopramide 10 MG/10ML Soln solution   Commonly known as:  REGLAN   Used for:  Nausea   Started by:  Jose Martin Gabriel MD        Dose:  10 mg   Take 10 mLs (10 mg) by mouth 4 times daily as needed   Quantity:  473 mL   Refills:  5         These medicines have changed or have updated prescriptions.        Dose/Directions    * metoprolol tartrate 25 MG tablet   Commonly known as:  LOPRESSOR   This may have changed:  additional instructions   Used for:  S/P CABG x 4        Dose:  12.5 mg   Take 0.5 tablets (12.5 mg) by mouth 2 times daily   Quantity:  90 tablet   Refills:  3       * metoprolol 10 mg/mL Susp   Commonly known as:  LOPRESSOR   This may have changed:  You were already taking a medication with the same name, and this prescription was added. Make sure you understand how and when to take each.   Used for:  Essential hypertension   Changed by:  Jose Martin Gabriel MD        Dose:  12.5 mg   Take 1.25 mLs (12.5 mg) by mouth 2 times daily   Quantity:  100 mL   Refills:  5       warfarin 5 MG tablet   Commonly known as:  COUMADIN   Indication:  Obstructive Blood Clot in a Blood Vessel of the Lung   This may have changed:  additional  instructions   Used for:  History of pulmonary embolism, Chronic anticoagulation        Dose:  7.5 mg   Take 1.5 tablets (7.5 mg) by mouth daily   Quantity:  135 tablet   Refills:  2       * Notice:  This list has 2 medication(s) that are the same as other medications prescribed for you. Read the directions carefully, and ask your doctor or other care provider to review them with you.         Where to get your medicines      These medications were sent to [place] [address] [facility] - [place], [place] - [address]  [address] [address]     Phone:  [phone]     enoxaparin 120 MG/0.8ML injection    metoclopramide 10 MG/10ML Soln solution    metoprolol 10 mg/mL Susp                Primary Care Provider Office Phone # Fax #    Samm Vazquez [phone]       [address] [place] [address]  [facility] [place] [facility] [address]        Equal Access to Services     [facility]: Hadii peggy ackerman hadasho Soginger, waaxda luqadaha, qaybta kaalmada adeegyada, franklin nunn hayporsha penaloza . So [facility] [phone].    ATENCIÓN: Si habla español, tiene a krueger disposición servicios gratuitos de asistencia lingüística. Elbaradha al [phone].    We comply with applicable federal civil rights laws and [place] laws. We do not discriminate on the basis of race, color, national origin, age, disability, sex, sexual orientation, or gender identity.            Thank you!     Thank you for choosing [facility]  for your care. Our goal is always to provide you with excellent care. Hearing back from our patients is one way we can continue to improve our services. Please take a few minutes to complete the written survey that you may receive in the mail after your visit with us. Thank you!             Your Updated Medication List - Protect others around you: Learn how to safely use, store and throw away your medicines at www.disposemymeds.org.          This list is accurate as of 7/26/18  2:38 PM.  Always use  your most recent med list.                   Brand Name Dispense Instructions for use Diagnosis    albuterol 108 (90 Base) MCG/ACT Inhaler    PROAIR HFA/PROVENTIL HFA/VENTOLIN HFA    1 Inhaler    Inhale 2 puffs into the lungs every 6 hours as needed for shortness of breath / dyspnea or wheezing    Pneumonia of both lungs due to infectious organism, unspecified part of lung       alirocumab 75 MG/ML injectable pen    PRALUENT    2 mL    Inject 1 mL (75 mg) Subcutaneous every 14 days    CAD (coronary artery disease), S/P CABG x 4, Hyperlipidemia LDL goal <130, Statin intolerance       aspirin 81 MG EC tablet     91 tablet    Take 1 tablet (81 mg) by mouth daily    S/P CABG x 4       atorvastatin 80 MG tablet    LIPITOR    91 tablet    Take 1 tablet (80 mg) by mouth daily    Pure hyperglyceridemia, Hyperlipidemia LDL goal <130       BENADRYL PO      Take 25-50 mg by mouth daily as needed        calcium carbonate 500 MG chewable tablet    TUMS     Take 1 chew tab by mouth daily as needed        enoxaparin 120 MG/0.8ML injection    LOVENOX    4 mL    Inject 0.8 mLs (120 mg) Subcutaneous daily for 5 days    History of pulmonary embolism       ezetimibe 10 MG tablet    ZETIA    90 tablet    Take 1 tablet (10 mg) by mouth daily    Statin intolerance, Hyperlipidemia LDL goal <70, Atherosclerosis of coronary artery of native heart without angina pectoris, unspecified vessel or lesion type, S/P CABG (coronary artery bypass graft)       fenofibrate 160 MG tablet     91 tablet    Take 1 tablet (160 mg) by mouth daily    Hyperlipidemia LDL goal <130       lisinopril 2.5 MG tablet    PRINIVIL/Zestril    90 tablet    Take 1 tablet (2.5 mg) by mouth daily , or as directed by Dr. Ocampo.    Essential hypertension       metoclopramide 10 MG/10ML Soln solution    REGLAN    473 mL    Take 10 mLs (10 mg) by mouth 4 times daily as needed    Nausea       * metoprolol tartrate 25 MG tablet    LOPRESSOR    90 tablet    Take 0.5 tablets (12.5  mg) by mouth 2 times daily    S/P CABG x 4       * metoprolol 10 mg/mL Susp    LOPRESSOR    100 mL    Take 1.25 mLs (12.5 mg) by mouth 2 times daily    Essential hypertension       traMADol 50 MG tablet    ULTRAM    10 tablet    Take 1 tablet (50 mg) by mouth every 6 hours as needed for severe pain    Neck pain       vitamin D 2000 units Caps      Take 2,000 Units by mouth daily    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       warfarin 5 MG tablet    COUMADIN    135 tablet    Take 1.5 tablets (7.5 mg) by mouth daily    History of pulmonary embolism, Chronic anticoagulation       * Notice:  This list has 2 medication(s) that are the same as other medications prescribed for you. Read the directions carefully, and ask your doctor or other care provider to review them with you.

## 2018-07-26 NOTE — NURSING NOTE
The following medication was given at 2:55 pm:     MEDICATION: Ondansetron Hydrochloride inj 2mg/mL  ROUTE: IM  SITE: Deltoid - Right  DOSE: 2.0 ML  LOT #:   :  Sari   EXPIRATION DATE:  01/2019  NDC 39861-326-53    Sebastien WATTERS

## 2018-07-26 NOTE — TELEPHONE ENCOUNTER
[facility]    Phone Message    May a detailed message be left on voicemail: yes    Reason for Call: Other: Pt requesting appt for today for an ER f/u from 7/25, pt of Dr. Vazquez. Pt did not want to wait until first available which was tomorrow afternoon     Action Taken: Message routed to:  [facility] ([facility] primary care

## 2018-07-30 ENCOUNTER — TELEPHONE (OUTPATIENT)
Dept: SURGERY | Facility: CLINIC | Age: 58
End: 2018-07-30

## 2018-07-30 ENCOUNTER — ANTICOAGULATION THERAPY VISIT (OUTPATIENT)
Dept: ANTICOAGULATION | Facility: CLINIC | Age: 58
End: 2018-07-30

## 2018-07-30 ENCOUNTER — OFFICE VISIT (OUTPATIENT)
Dept: SURGERY | Facility: CLINIC | Age: 58
End: 2018-07-30
Payer: COMMERCIAL

## 2018-07-30 VITALS
BODY MASS INDEX: 28.42 KG/M2 | OXYGEN SATURATION: 96 % | SYSTOLIC BLOOD PRESSURE: 132 MMHG | DIASTOLIC BLOOD PRESSURE: 80 MMHG | HEIGHT: 71 IN | RESPIRATION RATE: 16 BRPM | HEART RATE: 73 BPM | WEIGHT: 203 LBS

## 2018-07-30 DIAGNOSIS — I26.99 PULMONARY EMBOLI (H): ICD-10-CM

## 2018-07-30 DIAGNOSIS — I25.10 CORONARY ARTERY DISEASE INVOLVING NATIVE HEART WITHOUT ANGINA PECTORIS, UNSPECIFIED VESSEL OR LESION TYPE: ICD-10-CM

## 2018-07-30 DIAGNOSIS — K80.20 GALLSTONES: Primary | ICD-10-CM

## 2018-07-30 DIAGNOSIS — Z79.01 LONG-TERM (CURRENT) USE OF ANTICOAGULANTS: ICD-10-CM

## 2018-07-30 PROCEDURE — 99244 OFF/OP CNSLTJ NEW/EST MOD 40: CPT | Performed by: SURGERY

## 2018-07-30 NOTE — LETTER
2018    Re: Oswaldo Prabhakar, : 1960    Chief complaint:  Upper abdominal discomfort     HPI:  I am asked by Dr. Jose Martin Gabriel to evaluate this patient regarding gallstones and upper abdominal symptoms.  This patient is a 57 year old male who presents with fairly vague upper abdominal symptoms.  The patient was found to have gallstones on both plain film and ultrasound.  The patient has a complex past medical history including coronary artery bypass graft in , a history of unprovoked pulmonary embolism and thoracoscopic surgery for a chest hematoma.  The patient is typically maintained on Coumadin, but has been using bridging Lovenox for the last several days in anticipation of possible surgery.  Patient states that his symptoms are making it difficult for him to sleep.  He describes severe heartburn types of symptoms.     Past Medical History:  has a past medical history of Antiplatelet or antithrombotic long-term use; Diaphragmatic hernia without mention of obstruction or gangrene; Heart disease; Hypertension; Nephrolithiasis (2010); Other and unspecified hyperlipidemia; Pulmonary embolus and infarction (H); Statin intolerance (2017); Stented coronary artery; and Unspecified retinal detachment.     Physical Exam:  General - This is a well developed, well nourished male in no apparent distress.  HEENT - Normocephalic, atraumatic.  No scleral icterus.  Neck - supple without masses  Lungs - clear to ascultation.    Heart - Regular rate & rhythm without murmur  Abdomen: soft, non-distended with mild epigastric tenderness.  Extremities - warm without edema  Neurologic - nonfocal     Relevant labs:  Normal liver function tests.     Imaging:  Ultrasound revealed gallstones and borderline gallbladder wall thickening.     Assessment and Plan:  This is a patient with somewhat atypical upper abdominal symptoms who has been found to have stones in the gallbladder.  I suspect this is indeed the cause of  his symptoms.  I have recommended Laparoscopic cholecystectomy.  Discussed procedure, risks and complications.  We will work on scheduling surgery at the patient s convenience.     José Miguel Stuart MD  [facility]

## 2018-07-30 NOTE — MR AVS SNAPSHOT
Oswaldo Prabhakar   7/30/2018   Anticoagulation Therapy Visit    Description:  57 year old male   Provider:  Laurita Cespedes, RN   Department:  [facility]           INR as of 7/30/2018     Today's INR No new INR was available at the time of this encounter.      Anticoagulation Summary as of 7/30/2018     INR goal 2.0-3.0   Today's INR No new INR was available at the time of this encounter.   Full warfarin instructions 7/30: Hold; 7/31: Hold; 8/1: Hold; 8/2: Hold; 8/3: Hold; 8/4: Hold; 8/5: Hold; 8/6: Hold; Otherwise 5 mg on Mon, Fri; 7.5 mg all other days   Next INR check 8/8/2018    Indications   Pulmonary emboli (H) [I26.99]  Long-term (current) use of anticoagulants [Z79.01] [Z79.01]         July 2018 Details    Sun Mon Tue Wed Thu Fri Sat     1               2               3               4               5               6               7                 8               9               10               11               12               13               14                 15               16               17               18               19               20               21                 22               23               24               25               26               27               28                 29               30      Hold   See details      31      Hold              Date Details   07/30 This INR check               How to take your warfarin dose     Hold Do not take your warfarin dose. See the Details table to the right for additional instructions.                August 2018 Details    Sun Mon Tue Wed Thu Fri Sat        1      Hold         2      Hold         3      Hold         4      Hold           5      Hold         6      Hold         7      7.5 mg         8            9               10               11                 12               13               14               15               16               17               18                 19               20               21                22               23               24               25                 26               27               28               29               30               31                 Date Details   No additional details    Date of next INR:  8/8/2018         How to take your warfarin dose     To take:  7.5 mg Take 1.5 of the 5 mg tablets.    Hold Do not take your warfarin dose. See the Details table to the right for additional instructions.

## 2018-07-30 NOTE — TELEPHONE ENCOUNTER
Type of surgery: LAPAROSCOPIC CHOLECYSTECTOMY   Location of surgery: [facility]  Date and time of surgery: 8-6-18 AT 12:15 PM   Surgeon: DR. GRIGSBY   Pre-Op Appt Date: [facility] 7/25  Post-Op Appt Date: PATIENT TO SCHEDULE    Packet sent out: GIVEN TO PATIENT   Pre-cert/Authorization completed:  Not Applicable  Date: 7-30-18     LAPAROSCOPIC CHOLECYSTECTOMY  GENERAL H&P DONE [facility] 7/25  90 MINS REQ  PA ASSIST DFB  ALW

## 2018-07-30 NOTE — MR AVS SNAPSHOT
After Visit Summary   7/30/2018    Oswaldo Prabhakar    MRN: [id number]           Patient Information     Date Of Birth          1960        Visit Information        Provider Department      7/30/2018 11:30 AM José Miguel Stuart MD [facility] General Surgery      Today's Diagnoses     Gallstones    -  1    Coronary artery disease involving native heart without angina pectoris, unspecified vessel or lesion type           Follow-ups after your visit        Your next 10 appointments already scheduled     Aug 06, 2018   Procedure with José Miguel Stuart MD   [facility] PeriOp Services (--)    [address]  [facility] [address]   [phone]            Aug 06, 2018 12:15 PM CDT   [facility] Same Day Surgery with José Miguel Stuart MD   [facility] ([facility])    [facility] ([facility])   [phone]              Who to contact     If you have questions or need follow up information about today's clinic visit or your schedule please contact [facility] directly at [phone].  Normal or non-critical lab and imaging results will be communicated to you by Peerlysthart, letter or phone within 4 business days after the clinic has received the results. If you do not hear from us within 7 days, please contact the clinic through PhotoBoxt or phone. If you have a critical or abnormal lab result, we will notify you by phone as soon as possible.  Submit refill requests through Westcrete or call your pharmacy and they will forward the refill request to us. Please allow 3 business days for your refill to be completed.          Additional Information About Your Visit        Peerlysthart Information     Westcrete gives you secure access to your electronic health record. If you see a primary care provider, you can also send messages to your care team and make  "appointments. If you have questions, please call your primary care clinic.  If you do not have a primary care provider, please call [phone] and they will assist you.        Care EveryWhere ID     This is your Care EveryWhere ID. This could be used by other organizations to access your [facility] medical records  [id number]        Your Vitals Were     Pulse Respirations Height Pulse Oximetry BMI (Body Mass Index)       73 16 5' 11\" (1.803 m) 96% 28.31 kg/m2        Blood Pressure from Last 3 Encounters:   07/30/18 132/80   07/26/18 (!) 150/100   07/25/18 (!) 155/92    Weight from Last 3 Encounters:   07/30/18 203 lb (92.1 kg)   07/26/18 206 lb (93.4 kg)   07/25/18 206 lb 11.2 oz (93.8 kg)              Today, you had the following     No orders found for display         Today's Medication Changes          These changes are accurate as of 7/30/18 12:33 PM.  If you have any questions, ask your nurse or doctor.               These medicines have changed or have updated prescriptions.        Dose/Directions    * metoprolol tartrate 25 MG tablet   Commonly known as:  LOPRESSOR   This may have changed:  additional instructions   Used for:  S/P CABG x 4        Dose:  12.5 mg   Take 0.5 tablets (12.5 mg) by mouth 2 times daily   Quantity:  90 tablet   Refills:  3       * metoprolol 10 mg/mL Susp   Commonly known as:  LOPRESSOR   This may have changed:  Another medication with the same name was changed. Make sure you understand how and when to take each.   Used for:  Essential hypertension        Dose:  12.5 mg   Take 1.25 mLs (12.5 mg) by mouth 2 times daily   Quantity:  100 mL   Refills:  5       warfarin 5 MG tablet   Commonly known as:  COUMADIN   Indication:  Obstructive Blood Clot in a Blood Vessel of the Lung   This may have changed:  additional instructions   Used for:  History of pulmonary embolism, Chronic anticoagulation        Dose:  7.5 mg   Take 1.5 tablets (7.5 mg) by mouth daily   Quantity:  135 tablet "   Refills:  2       * Notice:  This list has 2 medication(s) that are the same as other medications prescribed for you. Read the directions carefully, and ask your doctor or other care provider to review them with you.             Primary Care Provider Office Phone # Fax #    Samm Vazquez [phone]       [address] [place] [phone]  [facility] [address]        Equal Access to Services     [facility]: Hadii aad ku hadasho Soomaali, waaxda luqadaha, qaybta kaalmada adeegyada, waxay idiin hayaan adeeg kharash la'aan . So [facility] [phone].    ATENCIÓN: Si habla espwyatt, tiene a krueger disposición servicios gratuitos de asistencia lingüística. Elbaame al [phone].    We comply with applicable federal civil rights laws and [place] laws. We do not discriminate on the basis of race, color, national origin, age, disability, sex, sexual orientation, or gender identity.            Thank you!     Thank you for choosing [facility]  for your care. Our goal is always to provide you with excellent care. Hearing back from our patients is one way we can continue to improve our services. Please take a few minutes to complete the written survey that you may receive in the mail after your visit with us. Thank you!             Your Updated Medication List - Protect others around you: Learn how to safely use, store and throw away your medicines at www.disposemymeds.org.          This list is accurate as of 7/30/18 12:33 PM.  Always use your most recent med list.                   Brand Name Dispense Instructions for use Diagnosis    albuterol 108 (90 Base) MCG/ACT Inhaler    PROAIR HFA/PROVENTIL HFA/VENTOLIN HFA    1 Inhaler    Inhale 2 puffs into the lungs every 6 hours as needed for shortness of breath / dyspnea or wheezing    Pneumonia of both lungs due to infectious organism, unspecified part of lung       alirocumab 75 MG/ML injectable pen    PRALUENT    2 mL    Inject 1 mL (75 mg)  Subcutaneous every 14 days    CAD (coronary artery disease), S/P CABG x 4, Hyperlipidemia LDL goal <130, Statin intolerance       aspirin 81 MG EC tablet     91 tablet    Take 1 tablet (81 mg) by mouth daily    S/P CABG x 4       atorvastatin 80 MG tablet    LIPITOR    91 tablet    Take 1 tablet (80 mg) by mouth daily    Pure hyperglyceridemia, Hyperlipidemia LDL goal <130       BENADRYL PO      Take 25-50 mg by mouth daily as needed        calcium carbonate 500 MG chewable tablet    TUMS     Take 1 chew tab by mouth daily as needed        enoxaparin 120 MG/0.8ML injection    LOVENOX    4 mL    Inject 0.8 mLs (120 mg) Subcutaneous daily for 5 days    History of pulmonary embolism       ezetimibe 10 MG tablet    ZETIA    90 tablet    Take 1 tablet (10 mg) by mouth daily    Statin intolerance, Hyperlipidemia LDL goal <70, Atherosclerosis of coronary artery of native heart without angina pectoris, unspecified vessel or lesion type, S/P CABG (coronary artery bypass graft)       fenofibrate 160 MG tablet     91 tablet    Take 1 tablet (160 mg) by mouth daily    Hyperlipidemia LDL goal <130       lisinopril 2.5 MG tablet    PRINIVIL/Zestril    90 tablet    Take 1 tablet (2.5 mg) by mouth daily , or as directed by Dr. Ocampo.    Essential hypertension       metoclopramide 10 MG/10ML Soln solution    REGLAN    473 mL    Take 10 mLs (10 mg) by mouth 4 times daily as needed    Nausea       * metoprolol tartrate 25 MG tablet    LOPRESSOR    90 tablet    Take 0.5 tablets (12.5 mg) by mouth 2 times daily    S/P CABG x 4       * metoprolol 10 mg/mL Susp    LOPRESSOR    100 mL    Take 1.25 mLs (12.5 mg) by mouth 2 times daily    Essential hypertension       vitamin D 2000 units Caps      Take 2,000 Units by mouth daily    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       warfarin 5 MG tablet    COUMADIN    135 tablet    Take 1.5 tablets (7.5 mg) by mouth daily    History of pulmonary embolism, Chronic anticoagulation       * Notice:   This list has 2 medication(s) that are the same as other medications prescribed for you. Read the directions carefully, and ask your doctor or other care provider to review them with you.

## 2018-07-30 NOTE — PROGRESS NOTES
Chief complaint:  Upper abdominal discomfort    HPI:  I am asked by Dr. Jose Martin Gabriel to evaluate this patient regarding gallstones and upper abdominal symptoms.  This patient is a 57 year old male who presents with fairly vague upper abdominal symptoms.  The patient was found to have gallstones on both plain film and ultrasound.  The patient has a complex past medical history including coronary artery bypass graft in 2014, a history of unprovoked pulmonary embolism and thoracoscopic surgery for a chest hematoma.  The patient is typically maintained on Coumadin, but has been using bridging Lovenox for the last several days in anticipation of possible surgery.  Patient states that his symptoms are making it difficult for him to sleep.  He describes severe heartburn types of symptoms.    Past Medical History:   has a past medical history of Antiplatelet or antithrombotic long-term use; Diaphragmatic hernia without mention of obstruction or gangrene; Heart disease; Hypertension; Nephrolithiasis (4/2010); Other and unspecified hyperlipidemia; Pulmonary embolus and infarction (H); Statin intolerance (2/1/2017); Stented coronary artery; and Unspecified retinal detachment.    Past Surgical History:  Past Surgical History:   Procedure Laterality Date     BYPASS GRAFT ARTERY CORONARY  4/4/2014    Procedure: BYPASS GRAFT ARTERY CORONARY;  Median Sternotomy, Coronary Artery Bypass Bypass x 4 using Left Internal Mammary, Left Saphenous Vein, on pump oxygenation;  Surgeon: Sherry Armando MD;  Location:  OR     C NONSPECIFIC PROCEDURE      Spermatocele resection     CLOSED REDUCTION, PERCUTANEOUS PINNING  HAND, COMBINED Left 11/5/2015    Procedure: COMBINED CLOSED REDUCTION, PERCUTANEOUS PINNING HAND;  Surgeon: Carmella Love MD;  [facility]     COLONOSCOPY  3/15/2013    Procedure: COLONOSCOPY;;  Surgeon: Racheal Shipman MD;  [facility]     LITHOTRIPSY  4/2010        Social History:  Social  History     Social History     Marital status:      Spouse name: N/A     Number of children: N/A     Years of education: N/A     Occupational History     Not on file.     Social History Main Topics     Smoking status: Never Smoker     Smokeless tobacco: Never Used     Alcohol use No      Comment: very rare     Drug use: No     Sexual activity: Not on file     Other Topics Concern     Not on file     Social History Narrative        Family History:  Family History   Problem Relation Age of Onset     HEART DISEASE Mother      C.A.D. Father      3 vessel CABG, age 70     Cancer Father      bladder cancer     C.A.D. Paternal Grandmother      Hypertension Paternal Grandmother      Alzheimer Disease Paternal Grandmother      Diabetes No family hx of      Thyroid Disease No family hx of      Cancer - colorectal No family hx of        Review of Systems:  The 10 point Review of Systems is negative other than noted in the HPI and above.    Physical Exam:  General - This is a well developed, well nourished male in no apparent distress.  HEENT - Normocephalic, atraumatic.  No scleral icterus.  Neck - supple without masses  Lungs - clear to ascultation.    Heart - Regular rate & rhythm without murmur  Abdomen:   soft, non-distended with mild epigastric tenderness.  Extremities - warm without edema  Neurologic - nonfocal    Relevant labs:  Normal liver function tests.    Imaging:  Ultrasound revealed gallstones and borderline gallbladder wall thickening.    Assessment and Plan:  This is a patient with somewhat atypical upper abdominal symptoms who has been found to have stones in the gallbladder.  I suspect this is indeed the cause of his symptoms.  I have recommended Laparoscopic cholecystectomy.  Discussed procedure, risks and complications.  We will work on scheduling surgery at the patient s convenience.    José Miguel Stuart MD  [facility]    Please route or send letter to:  Primary Care Provider (PCP) and  Referring Provider

## 2018-08-02 ENCOUNTER — ANESTHESIA EVENT (OUTPATIENT)
Dept: SURGERY | Facility: CLINIC | Age: 58
End: 2018-08-02
Payer: COMMERCIAL

## 2018-08-03 NOTE — H&P (VIEW-ONLY)
SUBJECTIVE:   Oswaldo Prabhakar is a 57 year old male who presents to clinic today for the following health issues:      ED/UC Followup:    Facility: [facility]  Date of visit: 7/25/18  Reason for visit: abdominal and vomiting  Current Status: worse. The patient is unable to take any oral medications, including Lopressor and Warfarin (indicated for previous pulmonary embolism).   Description (location/character/radiation): upper middle       Associated flank pain: None  Intensity:  3-4/10  Accompanying signs and symptoms: Denies any jaundice, melena, flank pain hematuria nor hematemesis.       Fever/Chills: YES, chills            Gas/Bloating: YES       Nausea/vomitting: YES       Diarrhea: no        Dysuria or Hematuria: no        Reduced appetite: YES.  History (previous similar pain/trauma/previous testing):KUB during ER visit shows gallstones  Precipitating or alleviating factors:Myocardial ischemia and pancreatitis ruled out.        Pain worse with eating:  Yes       Pain relieved by BM: no   Therapies tried and outcome: Zofran (minimal benefit)      Problem list and histories reviewed & adjusted, as indicated.  Additional history: as documented    Patient Active Problem List   Diagnosis     Retinal detachment     HYPERLIPIDEMIA LDL GOAL <130     Pulmonary emboli (H)     Pure hyperglyceridemia     Calculus of kidney     Warfarin anticoagulation     Atypical chest pain     S/P CABG x 4     CAD (coronary artery disease)     Finger stiffness, left     Fracture of phalanx of finger     Long-term (current) use of anticoagulants [Z79.01]     Statin intolerance     Past Surgical History:   Procedure Laterality Date     BYPASS GRAFT ARTERY CORONARY  4/4/2014    Procedure: BYPASS GRAFT ARTERY CORONARY;  Median Sternotomy, Coronary Artery Bypass Bypass x 4 using Left Internal Mammary, Left Saphenous Vein, on pump oxygenation;  Surgeon: Sherry Armando MD;  [facility]     C NONSPECIFIC PROCEDURE       Spermatocele resection     CLOSED REDUCTION, PERCUTANEOUS PINNING  HAND, COMBINED Left 11/5/2015    Procedure: COMBINED CLOSED REDUCTION, PERCUTANEOUS PINNING HAND;  Surgeon: Carmella Love MD;  [facility]     COLONOSCOPY  3/15/2013    Procedure: COLONOSCOPY;;  Surgeon: Racheal Shipman MD;  [facility]     LITHOTRIPSY  4/2010     THORACIC SURGERY      lung surgery, thoracotomy, lung adhesion       Social History   Substance Use Topics     Smoking status: Never Smoker     Smokeless tobacco: Never Used     Alcohol use No      Comment: very rare     Family History   Problem Relation Age of Onset     HEART DISEASE Mother      C.A.D. Father      3 vessel CABG, age 70     Cancer Father      bladder cancer     C.A.D. Paternal Grandmother      Hypertension Paternal Grandmother      Alzheimer Disease Paternal Grandmother      Diabetes No family hx of      Thyroid Disease No family hx of      Cancer - colorectal No family hx of          Allergies   Allergen Reactions     Cats      Hay Fever & [A.R.M.]      Heparin Other (See Comments)     Heparin resistance per cardio-thoracic surgeon Dr. Armando     Hydrocodone-Acetaminophen Nausea and Vomiting     Acetaminophen is ok     Recent Labs   Lab Test  07/25/18   0854  04/13/18   1331  03/23/18   1531  12/11/17   1242  11/06/17   0935   03/01/16   1439  09/16/15   1047   04/06/14   0655   A1C   --    --    --    --    --    --   5.9  6.0   --   5.9   LDL   --    --   45  28  9   < >  164*  174*   < >   --    HDL   --    --   42  46  49   < >  40  35*   --    --    TRIG   --    --   106  127  106   < >  183*  183*   --    --    ALT  41  34   --    --   33   < >  40  36   < >   --    CR  0.95  1.04   --    --   1.01   < >  1.11  1.11   < >  1.01   GFRESTIMATED  82  74   --    --   77   < >  69  69   < >  78   GFRESTBLACK  >90  90   --    --   >90   < >  84  84   < >  >90   POTASSIUM  3.4  3.9   --    --   3.8   < >  3.9  3.4   < >  4.1    < > = values in  this interval not displayed.      BP Readings from Last 3 Encounters:   07/30/18 132/80   07/26/18 (!) 150/100   07/25/18 (!) 155/92    Wt Readings from Last 3 Encounters:   07/30/18 203 lb (92.1 kg)   07/26/18 206 lb (93.4 kg)   07/25/18 206 lb 11.2 oz (93.8 kg)                 ROS:  CONSTITUTIONAL: NEGATIVE for fever, chills, change in weight  INTEGUMENTARY/SKIN: NEGATIVE for worrisome rashes, moles or lesions  EYES: NEGATIVE for vision changes or irritation  ENT/MOUTH: NEGATIVE for ear, mouth and throat problems  RESP: NEGATIVE for significant cough or SOB  CV: NEGATIVE for chest pain, palpitations or peripheral edema  GI: NEGATIVE for jaundice  : NEGATIVE for frequency, dysuria, or hematuria  MUSCULOSKELETAL: NEGATIVE for significant arthralgias or myalgia  NEURO: NEGATIVE for weakness, dizziness or paresthesias  ENDOCRINE: NEGATIVE for temperature intolerance, skin/hair changes  HEME: NEGATIVE for bleeding problems  PSYCHIATRIC: NEGATIVE for changes in mood or affect    OBJECTIVE:     BP (!) 150/100  Pulse 85  Temp 98.7  F (37.1  C) (Oral)  Wt 206 lb (93.4 kg)  SpO2 97%  BMI 28.73 kg/m2  Body mass index is 28.73 kg/(m^2).  GENERAL: healthy, alert and no distress  EYES: Eyes grossly normal to inspection and conjunctivae and sclerae normal  HENT: normal cephalic/atraumatic and oral mucous membranes moist  RESP: lungs clear to auscultation - no rales, rhonchi or wheezes  CV: regular rate and rhythm, normal S1 S2, no S3 or S4, no murmur, click or rub, no peripheral edema and peripheral pulses strong  ABDOMEN: tenderness RUQ and normoactive bowel sounds.  MS: no gross musculoskeletal defects noted, no edema  SKIN: no suspicious lesions or rashes  NEURO: Normal strength and tone, mentation intact and speech normal  PSYCH: mentation appears normal, anxious and fatigued    Diagnostic Test Results:  Results for orders placed or performed during the hospital encounter of 07/25/18   Abdomen XR 1 vw    Narrative     XR ABDOMEN 1 VW 7/25/2018 10:19 AM    HISTORY: vomiting, upper abd/lower chest dicomfort;       Impression    IMPRESSION: Gallstones. Exam otherwise negative.     OMAR SIMMS MD   CBC with platelets differential   Result Value Ref Range    WBC 6.8 4.0 - 11.0 10e9/L    RBC Count 5.16 4.4 - 5.9 10e12/L    Hemoglobin 15.6 13.3 - 17.7 g/dL    Hematocrit 46.1 40.0 - 53.0 %    MCV 89 78 - 100 fl    MCH 30.2 26.5 - 33.0 pg    MCHC 33.8 31.5 - 36.5 g/dL    RDW 12.6 10.0 - 15.0 %    Platelet Count 261 150 - 450 10e9/L    Diff Method Automated Method     % Neutrophils 64.8 %    % Lymphocytes 20.7 %    % Monocytes 9.0 %    % Eosinophils 4.7 %    % Basophils 0.7 %    % Immature Granulocytes 0.1 %    Nucleated RBCs 0 0 /100    Absolute Neutrophil 4.4 1.6 - 8.3 10e9/L    Absolute Lymphocytes 1.4 0.8 - 5.3 10e9/L    Absolute Monocytes 0.6 0.0 - 1.3 10e9/L    Absolute Eosinophils 0.3 0.0 - 0.7 10e9/L    Absolute Basophils 0.1 0.0 - 0.2 10e9/L    Abs Immature Granulocytes 0.0 0 - 0.4 10e9/L    Absolute Nucleated RBC 0.0    Comprehensive metabolic panel   Result Value Ref Range    Sodium 140 133 - 144 mmol/L    Potassium 3.4 3.4 - 5.3 mmol/L    Chloride 107 94 - 109 mmol/L    Carbon Dioxide 25 20 - 32 mmol/L    Anion Gap 8 3 - 14 mmol/L    Glucose 100 (H) 70 - 99 mg/dL    Urea Nitrogen 15 7 - 30 mg/dL    Creatinine 0.95 0.66 - 1.25 mg/dL    GFR Estimate 82 >60 mL/min/1.7m2    GFR Estimate If Black >90 >60 mL/min/1.7m2    Calcium 9.2 8.5 - 10.1 mg/dL    Bilirubin Total 0.9 0.2 - 1.3 mg/dL    Albumin 4.3 3.4 - 5.0 g/dL    Protein Total 8.2 6.8 - 8.8 g/dL    Alkaline Phosphatase 46 40 - 150 U/L    ALT 41 0 - 70 U/L    AST 32 0 - 45 U/L   Lipase   Result Value Ref Range    Lipase 188 73 - 393 U/L   Troponin I   Result Value Ref Range    Troponin I ES <0.015 0.000 - 0.045 ug/L   INR   Result Value Ref Range    INR 1.19 (H) 0.86 - 1.14   EKG 12-lead, tracing only   Result Value Ref Range    Interpretation ECG Click View Image link to  view waveform and result        ASSESSMENT/PLAN:     (K80.20) Gallstones  (primary encounter diagnosis)  Comment: Cause of RUQ pain and nausea. No evidence of cholecystitis.  Plan: US Abdomen Complete, GENERAL SURG ADULT         REFERRAL            (Z86.371) History of pulmonary embolism  Comment: Due to his nausea, switch to Lovenox and hold Warfarin.  Plan: enoxaparin (LOVENOX) 120 MG/0.8ML injection          (R11.0) Nausea  Comment: Secondary to condition #1.  Plan: metoclopramide (REGLAN) 10 MG/10ML SOLN         solution, ZOFRAN 1 MG (ONDANSETRON) HCL         INJECTION , INJECTION INTRAMUSCULAR OR SUB-Q          (I10) Essential hypertension  Comment: Due to poor oral intake, switch to Lopressor elixir.  Plan: metoprolol (LOPRESSOR) 10 mg/mL SUSP          Follow-up visit if condition worsens.    Jose Martin Gabriel MD  [facility]

## 2018-08-06 ENCOUNTER — HOSPITAL ENCOUNTER (OUTPATIENT)
Facility: CLINIC | Age: 58
Discharge: HOME | End: 2018-08-07
Attending: SURGERY | Admitting: SURGERY
Payer: COMMERCIAL

## 2018-08-06 ENCOUNTER — ANESTHESIA (OUTPATIENT)
Dept: SURGERY | Facility: CLINIC | Age: 58
End: 2018-08-06
Payer: COMMERCIAL

## 2018-08-06 ENCOUNTER — APPOINTMENT (OUTPATIENT)
Dept: SURGERY | Facility: PHYSICIAN GROUP | Age: 58
End: 2018-08-06
Payer: COMMERCIAL

## 2018-08-06 DIAGNOSIS — K80.20 GALLSTONES: Primary | ICD-10-CM

## 2018-08-06 LAB — INR PPP: 0.98 (ref 0.86–1.14)

## 2018-08-06 PROCEDURE — 40000306 ZZH STATISTIC PRE PROC ASSESS II: Performed by: SURGERY

## 2018-08-06 PROCEDURE — 25000566 ZZH SEVOFLURANE, EA 15 MIN: Performed by: SURGERY

## 2018-08-06 PROCEDURE — 25000128 H RX IP 250 OP 636: Performed by: ANESTHESIOLOGY

## 2018-08-06 PROCEDURE — 88304 TISSUE EXAM BY PATHOLOGIST: CPT | Performed by: SURGERY

## 2018-08-06 PROCEDURE — 47562 LAPAROSCOPIC CHOLECYSTECTOMY: CPT | Mod: AS | Performed by: PHYSICIAN ASSISTANT

## 2018-08-06 PROCEDURE — 25000128 H RX IP 250 OP 636: Performed by: NURSE ANESTHETIST, CERTIFIED REGISTERED

## 2018-08-06 PROCEDURE — 27210794 ZZH OR GENERAL SUPPLY STERILE: Performed by: SURGERY

## 2018-08-06 PROCEDURE — 25000128 H RX IP 250 OP 636: Performed by: SURGERY

## 2018-08-06 PROCEDURE — 37000008 ZZH ANESTHESIA TECHNICAL FEE, 1ST 30 MIN: Performed by: SURGERY

## 2018-08-06 PROCEDURE — 71000013 ZZH RECOVERY PHASE 1 LEVEL 1 EA ADDTL HR: Performed by: SURGERY

## 2018-08-06 PROCEDURE — 36000058 ZZH SURGERY LEVEL 3 EA 15 ADDTL MIN: Performed by: SURGERY

## 2018-08-06 PROCEDURE — 25800025 ZZH RX 258: Performed by: SURGERY

## 2018-08-06 PROCEDURE — 47562 LAPAROSCOPIC CHOLECYSTECTOMY: CPT | Performed by: SURGERY

## 2018-08-06 PROCEDURE — 37000009 ZZH ANESTHESIA TECHNICAL FEE, EACH ADDTL 15 MIN: Performed by: SURGERY

## 2018-08-06 PROCEDURE — 71000027 ZZH RECOVERY PHASE 2 EACH 15 MINS: Performed by: SURGERY

## 2018-08-06 PROCEDURE — 85610 PROTHROMBIN TIME: CPT | Performed by: ANESTHESIOLOGY

## 2018-08-06 PROCEDURE — 25000125 ZZHC RX 250: Performed by: NURSE ANESTHETIST, CERTIFIED REGISTERED

## 2018-08-06 PROCEDURE — 88304 TISSUE EXAM BY PATHOLOGIST: CPT | Mod: 26 | Performed by: SURGERY

## 2018-08-06 PROCEDURE — 71000012 ZZH RECOVERY PHASE 1 LEVEL 1 FIRST HR: Performed by: SURGERY

## 2018-08-06 PROCEDURE — 36000056 ZZH SURGERY LEVEL 3 1ST 30 MIN: Performed by: SURGERY

## 2018-08-06 PROCEDURE — 25000132 ZZH RX MED GY IP 250 OP 250 PS 637: Performed by: SURGERY

## 2018-08-06 PROCEDURE — 25000125 ZZHC RX 250: Performed by: SURGERY

## 2018-08-06 PROCEDURE — 36415 COLL VENOUS BLD VENIPUNCTURE: CPT | Performed by: ANESTHESIOLOGY

## 2018-08-06 RX ORDER — NALOXONE HYDROCHLORIDE 0.4 MG/ML
.1-.4 INJECTION, SOLUTION INTRAMUSCULAR; INTRAVENOUS; SUBCUTANEOUS
Status: DISCONTINUED | OUTPATIENT
Start: 2018-08-06 | End: 2018-08-07 | Stop reason: HOSPADM

## 2018-08-06 RX ORDER — LIDOCAINE HYDROCHLORIDE 10 MG/ML
INJECTION, SOLUTION INFILTRATION; PERINEURAL PRN
Status: DISCONTINUED | OUTPATIENT
Start: 2018-08-06 | End: 2018-08-06

## 2018-08-06 RX ORDER — PROPOFOL 10 MG/ML
INJECTION, EMULSION INTRAVENOUS PRN
Status: DISCONTINUED | OUTPATIENT
Start: 2018-08-06 | End: 2018-08-06

## 2018-08-06 RX ORDER — ONDANSETRON 4 MG/1
4 TABLET, ORALLY DISINTEGRATING ORAL EVERY 30 MIN PRN
Status: DISCONTINUED | OUTPATIENT
Start: 2018-08-06 | End: 2018-08-07 | Stop reason: HOSPADM

## 2018-08-06 RX ORDER — CEFAZOLIN SODIUM 2 G/100ML
2 INJECTION, SOLUTION INTRAVENOUS
Status: COMPLETED | OUTPATIENT
Start: 2018-08-06 | End: 2018-08-06

## 2018-08-06 RX ORDER — OXYCODONE HYDROCHLORIDE 5 MG/1
5 TABLET ORAL
Status: COMPLETED | OUTPATIENT
Start: 2018-08-06 | End: 2018-08-06

## 2018-08-06 RX ORDER — NEOSTIGMINE METHYLSULFATE 1 MG/ML
VIAL (ML) INJECTION PRN
Status: DISCONTINUED | OUTPATIENT
Start: 2018-08-06 | End: 2018-08-06

## 2018-08-06 RX ORDER — FENTANYL CITRATE 50 UG/ML
25-50 INJECTION, SOLUTION INTRAMUSCULAR; INTRAVENOUS
Status: DISCONTINUED | OUTPATIENT
Start: 2018-08-06 | End: 2018-08-07 | Stop reason: HOSPADM

## 2018-08-06 RX ORDER — ONDANSETRON 2 MG/ML
4 INJECTION INTRAMUSCULAR; INTRAVENOUS EVERY 30 MIN PRN
Status: DISCONTINUED | OUTPATIENT
Start: 2018-08-06 | End: 2018-08-07 | Stop reason: HOSPADM

## 2018-08-06 RX ORDER — HYDROMORPHONE HYDROCHLORIDE 1 MG/ML
.3-.5 INJECTION, SOLUTION INTRAMUSCULAR; INTRAVENOUS; SUBCUTANEOUS EVERY 10 MIN PRN
Status: DISCONTINUED | OUTPATIENT
Start: 2018-08-06 | End: 2018-08-07 | Stop reason: HOSPADM

## 2018-08-06 RX ORDER — ALBUTEROL SULFATE 0.83 MG/ML
2.5 SOLUTION RESPIRATORY (INHALATION) EVERY 4 HOURS PRN
Status: DISCONTINUED | OUTPATIENT
Start: 2018-08-06 | End: 2018-08-06 | Stop reason: HOSPADM

## 2018-08-06 RX ORDER — METOPROLOL TARTRATE 1 MG/ML
1-2 INJECTION, SOLUTION INTRAVENOUS EVERY 5 MIN PRN
Status: DISCONTINUED | OUTPATIENT
Start: 2018-08-06 | End: 2018-08-06 | Stop reason: HOSPADM

## 2018-08-06 RX ORDER — FENTANYL CITRATE 50 UG/ML
INJECTION, SOLUTION INTRAMUSCULAR; INTRAVENOUS PRN
Status: DISCONTINUED | OUTPATIENT
Start: 2018-08-06 | End: 2018-08-06

## 2018-08-06 RX ORDER — GLYCOPYRROLATE 0.2 MG/ML
INJECTION, SOLUTION INTRAMUSCULAR; INTRAVENOUS PRN
Status: DISCONTINUED | OUTPATIENT
Start: 2018-08-06 | End: 2018-08-06

## 2018-08-06 RX ORDER — TAMSULOSIN HYDROCHLORIDE 0.4 MG/1
0.4 CAPSULE ORAL
Status: DISCONTINUED | OUTPATIENT
Start: 2018-08-06 | End: 2018-08-07 | Stop reason: HOSPADM

## 2018-08-06 RX ORDER — CEFAZOLIN SODIUM 1 G/3ML
1 INJECTION, POWDER, FOR SOLUTION INTRAMUSCULAR; INTRAVENOUS SEE ADMIN INSTRUCTIONS
Status: DISCONTINUED | OUTPATIENT
Start: 2018-08-06 | End: 2018-08-06 | Stop reason: HOSPADM

## 2018-08-06 RX ORDER — DEXAMETHASONE SODIUM PHOSPHATE 4 MG/ML
INJECTION, SOLUTION INTRA-ARTICULAR; INTRALESIONAL; INTRAMUSCULAR; INTRAVENOUS; SOFT TISSUE PRN
Status: DISCONTINUED | OUTPATIENT
Start: 2018-08-06 | End: 2018-08-06

## 2018-08-06 RX ORDER — FENTANYL CITRATE 50 UG/ML
25-50 INJECTION, SOLUTION INTRAMUSCULAR; INTRAVENOUS
Status: DISCONTINUED | OUTPATIENT
Start: 2018-08-06 | End: 2018-08-06 | Stop reason: HOSPADM

## 2018-08-06 RX ORDER — LIDOCAINE 40 MG/G
CREAM TOPICAL
Status: DISCONTINUED | OUTPATIENT
Start: 2018-08-06 | End: 2018-08-06 | Stop reason: HOSPADM

## 2018-08-06 RX ORDER — MEPERIDINE HYDROCHLORIDE 50 MG/ML
12.5 INJECTION INTRAMUSCULAR; INTRAVENOUS; SUBCUTANEOUS
Status: DISCONTINUED | OUTPATIENT
Start: 2018-08-06 | End: 2018-08-07 | Stop reason: HOSPADM

## 2018-08-06 RX ORDER — ONDANSETRON 2 MG/ML
INJECTION INTRAMUSCULAR; INTRAVENOUS PRN
Status: DISCONTINUED | OUTPATIENT
Start: 2018-08-06 | End: 2018-08-06

## 2018-08-06 RX ORDER — BUPIVACAINE HYDROCHLORIDE AND EPINEPHRINE 5; 5 MG/ML; UG/ML
INJECTION, SOLUTION PERINEURAL PRN
Status: DISCONTINUED | OUTPATIENT
Start: 2018-08-06 | End: 2018-08-06 | Stop reason: HOSPADM

## 2018-08-06 RX ORDER — SODIUM CHLORIDE, SODIUM LACTATE, POTASSIUM CHLORIDE, CALCIUM CHLORIDE 600; 310; 30; 20 MG/100ML; MG/100ML; MG/100ML; MG/100ML
INJECTION, SOLUTION INTRAVENOUS CONTINUOUS
Status: DISCONTINUED | OUTPATIENT
Start: 2018-08-06 | End: 2018-08-07 | Stop reason: HOSPADM

## 2018-08-06 RX ORDER — SODIUM CHLORIDE, SODIUM LACTATE, POTASSIUM CHLORIDE, CALCIUM CHLORIDE 600; 310; 30; 20 MG/100ML; MG/100ML; MG/100ML; MG/100ML
INJECTION, SOLUTION INTRAVENOUS CONTINUOUS
Status: DISCONTINUED | OUTPATIENT
Start: 2018-08-06 | End: 2018-08-06 | Stop reason: HOSPADM

## 2018-08-06 RX ORDER — OXYCODONE HYDROCHLORIDE 5 MG/1
5-10 TABLET ORAL
Qty: 20 TABLET | Refills: 0 | Status: SHIPPED | OUTPATIENT
Start: 2018-08-06 | End: 2018-09-14

## 2018-08-06 RX ADMIN — HYDROMORPHONE HYDROCHLORIDE 0.5 MG: 1 INJECTION, SOLUTION INTRAMUSCULAR; INTRAVENOUS; SUBCUTANEOUS at 12:57

## 2018-08-06 RX ADMIN — OXYCODONE HYDROCHLORIDE 5 MG: 5 TABLET ORAL at 14:27

## 2018-08-06 RX ADMIN — ROCURONIUM BROMIDE 5 MG: 10 INJECTION INTRAVENOUS at 12:30

## 2018-08-06 RX ADMIN — CEFAZOLIN SODIUM 2 G: 2 INJECTION, SOLUTION INTRAVENOUS at 12:22

## 2018-08-06 RX ADMIN — DEXAMETHASONE SODIUM PHOSPHATE 4 MG: 4 INJECTION, SOLUTION INTRA-ARTICULAR; INTRALESIONAL; INTRAMUSCULAR; INTRAVENOUS; SOFT TISSUE at 12:30

## 2018-08-06 RX ADMIN — ROCURONIUM BROMIDE 20 MG: 10 INJECTION INTRAVENOUS at 12:35

## 2018-08-06 RX ADMIN — ONDANSETRON 4 MG: 2 INJECTION INTRAMUSCULAR; INTRAVENOUS at 12:26

## 2018-08-06 RX ADMIN — GLYCOPYRROLATE 0.7 MG: 0.2 INJECTION, SOLUTION INTRAMUSCULAR; INTRAVENOUS at 13:14

## 2018-08-06 RX ADMIN — SODIUM CHLORIDE, POTASSIUM CHLORIDE, SODIUM LACTATE AND CALCIUM CHLORIDE: 600; 310; 30; 20 INJECTION, SOLUTION INTRAVENOUS at 13:08

## 2018-08-06 RX ADMIN — FENTANYL CITRATE 100 MCG: 50 INJECTION, SOLUTION INTRAMUSCULAR; INTRAVENOUS at 12:30

## 2018-08-06 RX ADMIN — MIDAZOLAM 2 MG: 1 INJECTION INTRAMUSCULAR; INTRAVENOUS at 12:21

## 2018-08-06 RX ADMIN — FENTANYL CITRATE 25 MCG: 50 INJECTION INTRAMUSCULAR; INTRAVENOUS at 13:54

## 2018-08-06 RX ADMIN — LIDOCAINE HYDROCHLORIDE 30 MG: 10 INJECTION, SOLUTION INFILTRATION; PERINEURAL at 12:30

## 2018-08-06 RX ADMIN — PROPOFOL 200 MG: 10 INJECTION, EMULSION INTRAVENOUS at 12:30

## 2018-08-06 RX ADMIN — ONDANSETRON 4 MG: 2 INJECTION INTRAMUSCULAR; INTRAVENOUS at 15:12

## 2018-08-06 RX ADMIN — ROCURONIUM BROMIDE 15 MG: 10 INJECTION INTRAVENOUS at 12:52

## 2018-08-06 RX ADMIN — Medication 100 MG: at 12:30

## 2018-08-06 RX ADMIN — Medication 5 MG: at 13:14

## 2018-08-06 RX ADMIN — GLYCOPYRROLATE 0.2 MG: 0.2 INJECTION, SOLUTION INTRAMUSCULAR; INTRAVENOUS at 12:30

## 2018-08-06 RX ADMIN — SODIUM CHLORIDE, POTASSIUM CHLORIDE, SODIUM LACTATE AND CALCIUM CHLORIDE: 600; 310; 30; 20 INJECTION, SOLUTION INTRAVENOUS at 12:21

## 2018-08-06 NOTE — IP AVS SNAPSHOT
[facility] PreOP/PostOP    [address]    [facility] [address]    Phone:  [phone]    Fax:  [phone]                                       After Visit Summary   8/6/2018    Oswaldo Prabhakar    MRN: [id number]           After Visit Summary Signature Page     I have received my discharge instructions, and my questions have been answered. I have discussed any challenges I see with this plan with the nurse or doctor.    ..........................................................................................................................................  Patient/Patient Representative Signature      ..........................................................................................................................................  Patient Representative Print Name and Relationship to Patient    ..................................................               ................................................  Date                                            Time    ..........................................................................................................................................  Reviewed by Signature/Title    ...................................................              ..............................................  Date                                                            Time

## 2018-08-06 NOTE — ANESTHESIA PREPROCEDURE EVALUATION
Anesthesia Evaluation     .             ROS/MED HX    ENT/Pulmonary:       Neurologic: Comment: Retinal detachment      Cardiovascular:     (+) hypertension--CAD, --. Taking blood thinners : . . . :. .       METS/Exercise Tolerance:     Hematologic:     (+) History of blood clots -      Musculoskeletal:         GI/Hepatic:         Renal/Genitourinary:         Endo: Comment: .Body mass index is 28.17 kg/(m^2).          Psychiatric:         Infectious Disease:         Malignancy:         Other: Comment: .Lab Test        08/06/18 07/25/18 07/06/18      --          05/03/17      --          04/28/17                       0914          0854          1717           --           1224           --           0740          WBC           --          6.8           --           --          8.3           --          7.1           HGB           --          15.6          --           --          15.5          --          13.2*         MCV           --          89            --           --          89            --          89            PLT           --          261           --           --          309           --          210           INR          0.98         1.19*        2.74*          < >         --            < >        1.71*          < > = values in this interval not displayed.                  Lab Test        07/25/18 04/13/18 11/06/17                       0854          1331          0935          NA           140          139          138           POTASSIUM    3.4          3.9          3.8           CHLORIDE     107          107          106           CO2          25           25           25            BUN          15           16           12            CR           0.95         1.04         1.01          ANIONGAP     8            7            7             GONZALES          9.2          9.6          10.2*         GLC          100*         134*         97                                Physical  Exam  Normal systems: cardiovascular and pulmonary    Airway   Mallampati: II    Dental     Cardiovascular   Rhythm and rate: regular and normal      Pulmonary    breath sounds clear to auscultation                    Anesthesia Plan      History & Physical Review  History and physical reviewed and following examination; no interval change.    ASA Status:  3 .        Plan for General and ETT with Intravenous induction. Maintenance will be Inhalation and Balanced.    PONV prophylaxis:  Ondansetron (or other 5HT-3) and Dexamethasone or Solumedrol       Postoperative Care  Postoperative pain management:  IV analgesics, Oral pain medications and Multi-modal analgesia.      Consents  Anesthetic plan, risks, benefits and alternatives discussed with:  Patient or representative..                          .

## 2018-08-06 NOTE — ANESTHESIA POSTPROCEDURE EVALUATION
Patient: Oswaldo Prabhakar    Procedure(s):  LAPAROSCOPIC CHOLECYSTECTOMY  - Wound Class: II-Clean Contaminated    Diagnosis:Gall Stones   Diagnosis Additional Information: Gallstones      Anesthesia Type:  General, ETT    Note:  Anesthesia Post Evaluation    Patient location during evaluation: PACU  Patient participation: Able to fully participate in evaluation  Level of consciousness: awake  Pain management: adequate  Airway patency: patent  Cardiovascular status: acceptable  Respiratory status: acceptable  Hydration status: acceptable  PONV: controlled     Anesthetic complications: None          Last vitals:  Vitals:    08/06/18 1345 08/06/18 1350 08/06/18 1354   BP: 136/88 (!) 141/92    Pulse:      Resp: 13 12 17   Temp:      SpO2: 99% 100% 96%         Electronically Signed By: Franck Seo DO  August 6, 2018  2:15 PM

## 2018-08-06 NOTE — IP AVS SNAPSHOT
MRN:[id number]                      After Visit Summary   8/6/2018    Oswaldo Prabhakar    MRN: [id number]           Thank you!     Thank you for choosing [facility] for your care. Our goal is always to provide you with excellent care. Hearing back from our patients is one way we can continue to improve our services. Please take a few minutes to complete the written survey that you may receive in the mail after you visit. If you would like to speak to someone directly about your visit please contact Patient Relations at [phone]. Thank you!          Patient Information     Date Of Birth          1960        Designated Caregiver       Most Recent Value    Caregiver    Will someone help with your care after discharge? yes    Name of designated caregiver shobha    Phone number of caregiver home      About your hospital stay     You were admitted on:  August 6, 2018 You last received care in the:  [facility] PreOP/PostOP    You were discharged on:  August 7, 2018       Who to Call     For medical emergencies, please call 911.  For non-urgent questions about your medical care, please call your primary care provider or clinic, [phone]  For questions related to your surgery, please call your surgery clinic        Attending Provider     Provider Specialty    José Miguel Stuart MD General Surgery       Primary Care Provider Office Phone # Fax #    Samm Vazquez [phone]      Further instructions from your care team       HOME CARE FOLLOWING LAPAROSCOPIC CHOLECYSTECTOMY  THELMA Rhoades, RA Lyman D. Maurer. JANIS Lopez      INCISIONAL CARE:  Replace the bandage over your incisions until all drainage stops, or if more comfortable to have in place.  If present, leave the steri-strips (white paper tapes) in place until they fall off.  If Dermabond (a type of skin glue) is present, leave in place until it wears/flakes off.      BATHING:  Avoid baths for 1 week after surgery.  Showers are okay.  You may wash your hair at any time.  Gently pat your incisions dry after bathing.    ACTIVITY:  Light Activity -- you may immediately be up and about as tolerated.  Driving -- you may drive when comfortable and off narcotic pain medications.  Light Work -- resume when comfortable off pain medications.  (If you can drive, you probably can work.)  Strenuous Work/Activity -- limit lifting to 20 pounds for 1 week.  Progressively increase with time.  Active Sports (running, biking, etc.) -- cautiously resume after 2 weeks.    DISCOMFORT:  Use pain medications as prescribed by your surgeon.  Take the pain medication with some food, when possible, to minimize side effects.  Intermittent use of ice packs at the incision sites may help during the first 48 hours.  Expect gradual improvement.  You may experience shoulder pain, which is due to the air placed within your abdomen during the procedure.  This is temporary and usually passes within 2 days.    DIET:  Drink plenty of fluids.  While taking pain medications, increase dietary fiber or add a fiber supplementation like Metamucil or Citrucel to help prevent constipation - a possible side effect of pain medications.  If taking Metamucil or Citrucel, take with plenty of fluids as instructed.  It is not uncommon to experience some bowel changes (loose stools or constipation) after surgery.  Your body has to adapt to you no longer having a gall bladder.  To help minimize this side effect, avoid fatty foods for the first week after surgery.  You may then slowly increase the amount of fatty foods in your diet.      NAUSEA:  If nauseated from the anesthetic/pain meds; rest in bed, get up cautiously with assistance, and drink clear liquids (juice, tea, broth).    RETURN APPOINTMENT:  Schedule a follow-up visit 1-3 weeks post-op (you may do this any time after surgery is scheduled).  Office Phone:   [phone]              CONTACT US IF THE FOLLOWING DEVELOPS:  1.  A fever that is above 101    2.  If there is a large amount of drainage, bleeding, or swelling.  3.  Severe pain that is not relieved by your prescription.  4.  Drainage that is thick, cloudy, yellow, green or white.  5.  Any other questions not answered by  Frequently Asked Questions  sheet.       GENERAL ANESTHESIA OR SEDATION ADULT DISCHARGE INSTRUCTIONS   SPECIAL PRECAUTIONS FOR 24 HOURS AFTER SURGERY    IT IS NOT UNUSUAL TO FEEL LIGHT-HEADED OR FAINT, UP TO 24 HOURS AFTER SURGERY OR WHILE TAKING PAIN MEDICATION.  IF YOU HAVE THESE SYMPTOMS; SIT FOR A FEW MINUTES BEFORE STANDING AND HAVE SOMEONE ASSIST YOU WHEN YOU GET UP TO WALK OR USE THE BATHROOM.    YOU SHOULD REST AND RELAX FOR THE NEXT 24 HOURS AND YOU MUST MAKE ARRANGEMENTS TO HAVE SOMEONE STAY WITH YOU FOR AT LEAST 24 HOURS AFTER YOUR DISCHARGE.  AVOID HAZARDOUS AND STRENUOUS ACTIVITIES.  DO NOT MAKE IMPORTANT DECISIONS FOR 24 HOURS.    DO NOT DRIVE ANY VEHICLE OR OPERATE MECHANICAL EQUIPMENT FOR 24 HOURS FOLLOWING THE END OF YOUR SURGERY.  EVEN THOUGH YOU MAY FEEL NORMAL, YOUR REACTIONS MAY BE AFFECTED BY THE MEDICATION YOU HAVE RECEIVED.    DO NOT DRINK ALCOHOLIC BEVERAGES FOR 24 HOURS FOLLOWING YOUR SURGERY.    DRINK CLEAR LIQUIDS (APPLE JUICE, GINGER ALE, 7-UP, BROTH, ETC.).  PROGRESS TO YOUR REGULAR DIET AS YOU FEEL ABLE.    YOU MAY HAVE A DRY MOUTH, A SORE THROAT, MUSCLES ACHES OR TROUBLE SLEEPING.  THESE SHOULD GO AWAY AFTER 24 HOURS.    CALL YOUR DOCTOR FOR ANY OF THE FOLLOWING:  SIGNS OF INFECTION (FEVER, GROWING TENDERNESS AT THE SURGERY SITE, A LARGE AMOUNT OF DRAINAGE OR BLEEDING, SEVERE PAIN, FOUL-SMELLING DRAINAGE, REDNESS OR SWELLING.    IT HAS BEEN OVER 8 TO 10 HOURS SINCE SURGERY AND YOU ARE STILL NOT ABLE TO URINATE (PASS WATER).     You had  OXYCODONE at 2:30 pm and again at 1:30 AM.   You had a bullock catheter placed post procedure. You need to contact urology the  "next day for timing of removal.  You were straight cathed at 8:40 PM and the bullock was placed at 1:10. Bladder scat at 8:15 PM was 530 ml   and 12:45 AM was 325 ml    .      Pending Results     Date and Time Order Name Status Description    8/6/2018 1305 Surgical pathology exam In process             Admission Information     Date & Time Provider Department Dept. Phone    8/6/2018 José Miguel Stuart MD [facility] PreOP/PostOP [phone]      Your Vitals Were     Blood Pressure Pulse Temperature Respirations Height Weight    151/83 52 98  F (36.7  C) (Temporal) 16 1.803 m (5' 11\") 91.6 kg (202 lb)    Pulse Oximetry BMI (Body Mass Index)                97% 28.17 kg/m2          MyChart Information     [employer] gives you secure access to your electronic health record. If you see a primary care provider, you can also send messages to your care team and make appointments. If you have questions, please call your primary care clinic.  If you do not have a primary care provider, please call [phone] and they will assist you.        Care EveryWhere ID     This is your Care EveryWhere ID. This could be used by other organizations to access your [place] medical records  [id number]        Equal Access to Services     SURESH ZURITA AH: Jaspreet sheltono Soginger, waaxda luqadaha, qaybta kaalmada adeegyada, franklin flores. So [facility] [phone].    ATENCIÓN: Si habla español, tiene a krueger disposición servicios gratuitos de asistencia lingüística. Llame al [phone].    We comply with applicable federal civil rights laws and [place] laws. We do not discriminate on the basis of race, color, national origin, age, disability, sex, sexual orientation, or gender identity.               Review of your medicines      START taking        Dose / Directions    oxyCODONE IR 5 MG tablet   Commonly known as:  ROXICODONE   Used for:  Gallstones        Dose:  5-10 mg   Take 1-2 tablets (5-10 mg) by mouth " every 3 hours as needed for pain or other (Moderate to Severe)   Quantity:  20 tablet   Refills:  0         CONTINUE these medicines which may have CHANGED, or have new prescriptions. If we are uncertain of the size of tablets/capsules you have at home, strength may be listed as something that might have changed.        Dose / Directions    * metoprolol tartrate 25 MG tablet   Commonly known as:  LOPRESSOR   This may have changed:  additional instructions   Used for:  S/P CABG x 4        Dose:  12.5 mg   Take 0.5 tablets (12.5 mg) by mouth 2 times daily   Quantity:  90 tablet   Refills:  3       * metoprolol 10 mg/mL Susp   Commonly known as:  LOPRESSOR   This may have changed:  Another medication with the same name was changed. Make sure you understand how and when to take each.   Used for:  Essential hypertension        Dose:  12.5 mg   Take 1.25 mLs (12.5 mg) by mouth 2 times daily   Quantity:  100 mL   Refills:  5       warfarin 5 MG tablet   Commonly known as:  COUMADIN   Indication:  Obstructive Blood Clot in a Blood Vessel of the Lung   This may have changed:  additional instructions   Used for:  History of pulmonary embolism, Chronic anticoagulation        Dose:  7.5 mg   Take 1.5 tablets (7.5 mg) by mouth daily   Quantity:  135 tablet   Refills:  2       * Notice:  This list has 2 medication(s) that are the same as other medications prescribed for you. Read the directions carefully, and ask your doctor or other care provider to review them with you.      CONTINUE these medicines which have NOT CHANGED        Dose / Directions    albuterol 108 (90 Base) MCG/ACT Inhaler   Commonly known as:  PROAIR HFA/PROVENTIL HFA/VENTOLIN HFA   Used for:  Pneumonia of both lungs due to infectious organism, unspecified part of lung        Dose:  2 puff   Inhale 2 puffs into the lungs every 6 hours as needed for shortness of breath / dyspnea or wheezing   Quantity:  1 Inhaler   Refills:  1       alirocumab 75 MG/ML  injectable pen   Commonly known as:  PRALUENT   Used for:  CAD (coronary artery disease), S/P CABG x 4, Hyperlipidemia LDL goal <130, Statin intolerance        Dose:  75 mg   Inject 1 mL (75 mg) Subcutaneous every 14 days   Quantity:  2 mL   Refills:  12       aspirin 81 MG EC tablet   Used for:  S/P CABG x 4        Dose:  81 mg   Take 1 tablet (81 mg) by mouth daily   Quantity:  91 tablet   Refills:  3       atorvastatin 80 MG tablet   Commonly known as:  LIPITOR   Used for:  Pure hyperglyceridemia, Hyperlipidemia LDL goal <130        Dose:  80 mg   Take 1 tablet (80 mg) by mouth daily   Quantity:  91 tablet   Refills:  3       BENADRYL PO        Dose:  25-50 mg   Take 25-50 mg by mouth daily as needed   Refills:  0       calcium carbonate 500 MG chewable tablet   Commonly known as:  TUMS        Dose:  1 chew tab   Take 1 chew tab by mouth daily as needed   Refills:  0       ezetimibe 10 MG tablet   Commonly known as:  ZETIA   Used for:  Statin intolerance, Hyperlipidemia LDL goal <70, Atherosclerosis of coronary artery of native heart without angina pectoris, unspecified vessel or lesion type, S/P CABG (coronary artery bypass graft)        Dose:  10 mg   Take 1 tablet (10 mg) by mouth daily   Quantity:  90 tablet   Refills:  3       fenofibrate 160 MG tablet   Used for:  Hyperlipidemia LDL goal <130        Dose:  160 mg   Take 1 tablet (160 mg) by mouth daily   Quantity:  91 tablet   Refills:  3       lisinopril 2.5 MG tablet   Commonly known as:  PRINIVIL/Zestril   Used for:  Essential hypertension        Dose:  2.5 mg   Take 1 tablet (2.5 mg) by mouth daily , or as directed by Dr. Ocampo.   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 Ask your nurse if you have questions.     Bring a paper prescription for each of these medications     oxyCODONE IR 5 MG tablet                Protect others around you: Learn how to safely use, store and throw away your medicines at www.disposemymeds.org.        Information about OPIOIDS     PRESCRIPTION OPIOIDS: WHAT YOU NEED TO KNOW   We gave you an opioid (narcotic) pain medicine. It is important to manage your pain, but opioids are not always the best choice. You should first try all the other options your care team gave you. Take this medicine for as short a time (and as few doses) as possible.     These medicines have risks:    DO NOT drive when on new or higher doses of pain medicine. These medicines can affect your alertness and reaction times, and you could be arrested for driving under the influence (DUI). If you need to use opioids long-term, talk to your care team about driving.    DO NOT operate heave machinery    DO NOT do any other dangerous activities while taking these medicines.     DO NOT drink any alcohol while taking these medicines.      If the opioid prescribed includes acetaminophen, DO NOT take with any other medicines that contain acetaminophen. Read all labels carefully. Look for the word  acetaminophen  or  Tylenol.  Ask your pharmacist if you have questions or are unsure.    You can get addicted to pain medicines, especially if you have a history of addiction (chemical, alcohol or substance dependence). Talk to your care team about ways to reduce this risk.    Store your pills in a secure place, locked if possible. We will not replace any lost or stolen medicine. If you don t finish your medicine, please throw away (dispose) as directed by your pharmacist. The [place] [employer]  [employer]ency has more information about safe disposal: [email or web address]/living-[email or web address]/[email or web address]-unwanted-medications.     All opioids tend to cause constipation. Drink plenty of water and eat foods that have a lot of fiber, such as fruits, vegetables, prune juice, apple juice and high-fiber cereal. Take a laxative (Miralax, milk of magnesia, Colace, Senna) if you don t move your bowels at least every other day.              Medication List: This is a list of all your medications and when to take them. Check marks below indicate your daily home schedule. Keep this list as a reference.      Medications           Morning Afternoon Evening Bedtime As Needed    albuterol 108 (90 Base) MCG/ACT Inhaler   Commonly known as:  PROAIR HFA/PROVENTIL HFA/VENTOLIN HFA   Inhale 2 puffs into the lungs every 6 hours as needed for shortness of breath / dyspnea or wheezing                                alirocumab 75 MG/ML injectable pen   Commonly known as:  PRALUENT   Inject 1 mL (75 mg) Subcutaneous every 14 days                                aspirin 81 MG EC tablet   Take 1 tablet (81 mg) by mouth daily                                atorvastatin 80 MG tablet   Commonly known as:  LIPITOR   Take 1 tablet (80 mg) by mouth daily                                BENADRYL PO   Take 25-50 mg by mouth daily as needed                                calcium carbonate 500 MG chewable tablet   Commonly known as:  TUMS   Take 1 chew tab by mouth daily as needed                                ezetimibe 10 MG tablet   Commonly known as:  ZETIA   Take 1 tablet (10 mg) by mouth daily                                fenofibrate 160 MG tablet   Take 1 tablet (160 mg) by mouth daily                                lisinopril 2.5 MG tablet   Commonly known as:  PRINIVIL/Zestril   Take 1 tablet (2.5 mg) by mouth daily , or as directed by Dr. Ocampo.                                metoclopramide 10 MG/10ML Soln solution   Commonly known as:  REGLAN    Take 10 mLs (10 mg) by mouth 4 times daily as needed                                * metoprolol tartrate 25 MG tablet   Commonly known as:  LOPRESSOR   Take 0.5 tablets (12.5 mg) by mouth 2 times daily                                * metoprolol 10 mg/mL Susp   Commonly known as:  LOPRESSOR   Take 1.25 mLs (12.5 mg) by mouth 2 times daily                                oxyCODONE IR 5 MG tablet   Commonly known as:  ROXICODONE   Take 1-2 tablets (5-10 mg) by mouth every 3 hours as needed for pain or other (Moderate to Severe)   Last time this was given:  5 mg on 8/6/2018  2:27 PM                                vitamin D 2000 units Caps   Take 2,000 Units by mouth daily                                warfarin 5 MG tablet   Commonly known as:  COUMADIN   Take 1.5 tablets (7.5 mg) by mouth daily                                * Notice:  This list has 2 medication(s) that are the same as other medications prescribed for you. Read the directions carefully, and ask your doctor or other care provider to review them with you.              More Information        Gallstones with Biliary Colic    You have abdominal pain due to irritation and spasm of the gallbladder. This is called biliary colic. The gallbladder is a small sac under the liver, which stores and releases a fluid that aids in the digestion of fat. A collection of crystals may form stones inside the gallbladder (gallstones). Gallstones can cause the gallbladder to spasm. If they block the duct out of the gallbladder, they can cause pain and even an infection.   A number of factors increase the risk for having gallstones:    Being female    Being severely overweight (obese)    Older age    Losing or gaining weight quickly    Eating a high-calorie diet    Being pregnant    Taking hormone therapy    Having diabetes  Home care    Rest in bed.    Drink only clear liquids until you feel better.    You may have been prescribed medicine for pain or nausea. Take these  as directed.    Fat in your diet makes the gallbladder contract and may cause increased pain. Avoid foods that are high in fat (such as full-fat dairy, fried foods, and fatty meats) for at least two days.    If you are overweight, talk to your healthcare provider about losing weight.  Follow-up care  Follow up with your healthcare provider or as advised. There is a chance that you will have another episode of pain from your gallstones at some point. Removal of the gallbladder is an option to prevent this. Talk with your healthcare provider about your treatment options.  When to seek medical advice  Call your healthcare provider if any of the following occur:    Worsening pain or pain lasting for longer than 6 hours    Pain moving to the right lower abdomen    Repeated vomiting    Swollen abdomen    Fever of 100.4 F (38 C) or higher, or as directed by your healthcare provider    Very dark urine, light colored stools, or yellow color of the skin or eyes    Chest, arm, back, neck or jaw pain  Date Last Reviewed: 6/18/2015 2000-2017 The [employer]. [address] [place] [address]. All rights reserved. This information is not intended as a substitute for professional medical care. Always follow your healthcare professional's instructions.                Discharge Instructions: Caring for Your Indwelling Urinary Catheter  You have been discharged with an indwelling urinary catheter (also called a Daniel catheter). A catheter is a thin, flexible tube. An indwelling urinary catheter has two parts. The first part is a tube that drains urine from your bladder. The second part is a bag or other device that collects the urine.  The most important thing to remember is that you want to prevent infection. Always wash your hands before handling your catheter bag or tubing.  Draining the bedside bag    Wash your hands with soap and clean, running water or use an alcohol-based hand  that contains at  least 60% alcohol.    Hold the drainage tube over a toilet or measuring container.    Unclamp the tube and let the bag drain.    Don t touch the tip of the drainage tube or let it touch the toilet or container.  Cleaning the drainage tube    When the bag is empty, clean the tip of the drainage tube with an alcohol wipe.    Clamp the tube.    Reinsert the tube into the pocket on the drainage bag.  Cleaning your skin and tubing    Clean the skin near the catheter with soap and water.    Wash your genital area from front to back.    Wash the catheter tubing. Always wash the catheter in the direction away from your body.    You will be told when and how to change your bag and tubing.    Don t try to remove the catheter by yourself.    You may shower with the catheter in place.  Emptying a leg bag    Wash your hands.    Remove the stopper on the bag.    Drain the bag into the toilet or a measuring container. Don t let the tip of the drainage tube touch anything, including your fingers.    Clean the tip of the drainage tube with alcohol.    Replace the stopper.  Follow-up care  Make a follow-up appointment as directed by your healthcare provider  When to call your healthcare provider  Call your healthcare provider right away if you have any of the following:    Chills or fever above 100.4 F (38 C)    Leakage around the catheter insertion site    Increased spasms (uncontrollable twitching) in your legs, abdomen, or bladder. Occasional mild spasms are normal.    Burning in the urinary tract, penis, or genital area    Nausea and vomiting    Aching in the lower back    Cloudy or bloody (pink or red) urine, sediment or mucus in the urine, or foul-smelling urine   Date Last Reviewed: 1/1/2017 2000-2017 [employer]. [address] [place] [address]. All rights reserved. This information is not intended as a substitute for professional medical care. Always follow your healthcare professional's  instructions.                Daniel Catheter Care    A Daniel catheter is a rubber tube that is placed through the urethra (opening where urine comes out) and into the bladder. This helps drain urine from the bladder. There is a small balloon on the end of the tube that is inflated after insertion. This keeps the catheter from sliding out of the bladder.  A Daniel catheter is used to treat urinary retention (unable to pass urine). It is also used when there is incontinence (loss of bladder control).  Home care    Finish taking any prescribed antibiotic even if you are feeling better before then.    It is important to keep bacteria from getting into the collection bag. Do not disconnect the catheter from the collection bag.    Use a leg band to secure the drainage tube, so it does not pull on the catheter. Drain the collection bag when it becomes full using the drain spout at the bottom of the bag.    Do not try to pull or remove your catheter. This will injure your urethra. It must be removed by your healthcare provider or nurse.  Follow-up care  Follow up with your healthcare provider as advised for repeat urine testing and catheter removal or replacement.  When to seek medical advice  Call your healthcare provider right away if any of these occur:    Fever of 100.4 F (38 C) or higher, or as directed by your healthcare provider    Bladder pain or fullness    Abdominal swelling, nausea or vomiting, or back pain    Blood or urine leakage around the catheter    Bloody urine coming from the catheter (if a new symptom)    Catheter falls out    Catheter stops draining for 6 hours    Weakness, dizziness, or fainting  Date Last Reviewed: 10/1/2016    3045-9131 [employer]. [address] [place] [address], [place] [address]. All rights reserved. This information is not intended as a substitute for professional medical care. Always follow your healthcare professional's instructions.                Discharge Instructions:  Caring for Your Leg Bag  You are going home with a urinary catheter and collection device (drainage bag) in place. One type of collection device is called a leg bag. This is a smaller drainage bag that you can wear on your leg to collect urine during the day. The bag can fit under your clothing. You can move around with greater ease when using a leg bag instead of a larger collection bag.  You were shown how to care for your catheter in the hospital. This sheet will help you remember those steps when you are at home.  Home care    Wash your hands thoroughly before and after you care for your catheter or collection device.    Gather your supplies:  ? Alcohol wipes  ? Soap and water  ? Towel and washcloth  ? Leg strap and leg bag    Use soap and water to wash the area where your catheter enters your body. Rinse well.    Secure the bag yin to your leg:  ? Position the leg band high on your thigh with the product label pointing away from your leg.  ? Stretch the leg band in place and fasten.  ? Place the catheter tubing over the bag and secure it. You may secure it with a Velcro tab or other method, depending on the product you use. Be sure to leave enough loop in the catheter above the leg band to avoid pulling on the tube.  ? Every 4 to 6 hours, reposition the band to prevent pressure from the elastic on your leg. You can do this by changing the bag to the other leg or by raising or lowering the leg band.  ? Wash the band as frequently as needed. You can hand wash and dry the leg band.    Place the bag in the bag yin.    Clean the urine bag end of the catheter and your catheter port with an alcohol wipe.    Place a towel under the bag and port to keep urine from dripping onto your leg.    Before connecting the outlet valve at the bottom of the bag to the catheter, make sure that it is firmly closed. Flip the valve upward toward the bag; it needs to snap firmly in place. Be sure not to tug on the tubing. Be  gentle.    Attach the urine bag to the end of the catheter; insert the connector snugly into the catheter port. You can avoid dribbling urine by bending the catheter tubing just below the tip and holding it while you disconnect it from the catheter. Be careful to keep the tip clean while connecting the leg bag tubing to the catheter--this keeps germs from getting into the system.    Drain the bag when it is full. To drain the bag, flip the clamp downward. Direct the flexible outlet tube to control the flow of urine. You don t have to disconnect the leg bag from the catheter to empty it. Raise your leg up to the edge of the toilet to reach the leg bag. Then you can empty the bag directly into the toilet. This way, you won t need to bend over, which may be uncomfortable.    Keep the leg bag clean. Rinse daily with equal parts water and vinegar to reduce odor and keep the bag free of germs.    Remember to keep the drainage bag below the level of your bladder for proper drainage.  Follow-up  Make a follow-up appointment as directed by your healthcare provider.  When to call your healthcare provider  Call your healthcare provider right away if you have any of the following:    Redness, swelling, or warmth around the catheter entry site    Pus draining from your catheter entry site or into the catheter tubing and bag    Blood, clots, or floating debris in the urine    Nausea and vomiting    Shaking chills    Fever above 100.4 F (38 C)    Pain that is not relieved by medicine     Catheter that falls out or is dislodged   Date Last Reviewed: 1/1/2017 2000-2017 [employer]. [address] [place] [address], [place] [address]. All rights reserved. This information is not intended as a substitute for professional medical care. Always follow your healthcare professional's instructions.

## 2018-08-06 NOTE — DISCHARGE INSTRUCTIONS
HOME CARE FOLLOWING LAPAROSCOPIC CHOLECYSTECTOMY  THELMA Rhoades, RODRIGO Gr, THELMA Mejia. JANIS Lopez      INCISIONAL CARE:  Replace the bandage over your incisions until all drainage stops, or if more comfortable to have in place.  If present, leave the steri-strips (white paper tapes) in place until they fall off.  If Dermabond (a type of skin glue) is present, leave in place until it wears/flakes off.     BATHING:  Avoid baths for 1 week after surgery.  Showers are okay.  You may wash your hair at any time.  Gently pat your incisions dry after bathing.    ACTIVITY:  Light Activity -- you may immediately be up and about as tolerated.  Driving -- you may drive when comfortable and off narcotic pain medications.  Light Work -- resume when comfortable off pain medications.  (If you can drive, you probably can work.)  Strenuous Work/Activity -- limit lifting to 20 pounds for 1 week.  Progressively increase with time.  Active Sports (running, biking, etc.) -- cautiously resume after 2 weeks.    DISCOMFORT:  Use pain medications as prescribed by your surgeon.  Take the pain medication with some food, when possible, to minimize side effects.  Intermittent use of ice packs at the incision sites may help during the first 48 hours.  Expect gradual improvement.  You may experience shoulder pain, which is due to the air placed within your abdomen during the procedure.  This is temporary and usually passes within 2 days.    DIET:  Drink plenty of fluids.  While taking pain medications, increase dietary fiber or add a fiber supplementation like Metamucil or Citrucel to help prevent constipation - a possible side effect of pain medications.  If taking Metamucil or Citrucel, take with plenty of fluids as instructed.  It is not uncommon to experience some bowel changes (loose stools or constipation) after surgery.  Your body has to adapt to you no longer having a gall bladder.  To help  minimize this side effect, avoid fatty foods for the first week after surgery.  You may then slowly increase the amount of fatty foods in your diet.      NAUSEA:  If nauseated from the anesthetic/pain meds; rest in bed, get up cautiously with assistance, and drink clear liquids (juice, tea, broth).    RETURN APPOINTMENT:  Schedule a follow-up visit 1-3 weeks post-op (you may do this any time after surgery is scheduled).  Office Phone:  [phone]              CONTACT US IF THE FOLLOWING DEVELOPS:  1.  A fever that is above 101    2.  If there is a large amount of drainage, bleeding, or swelling.  3.  Severe pain that is not relieved by your prescription.  4.  Drainage that is thick, cloudy, yellow, green or white.  5.  Any other questions not answered by  Frequently Asked Questions  sheet.       GENERAL ANESTHESIA OR SEDATION ADULT DISCHARGE INSTRUCTIONS   SPECIAL PRECAUTIONS FOR 24 HOURS AFTER SURGERY    IT IS NOT UNUSUAL TO FEEL LIGHT-HEADED OR FAINT, UP TO 24 HOURS AFTER SURGERY OR WHILE TAKING PAIN MEDICATION.  IF YOU HAVE THESE SYMPTOMS; SIT FOR A FEW MINUTES BEFORE STANDING AND HAVE SOMEONE ASSIST YOU WHEN YOU GET UP TO WALK OR USE THE BATHROOM.    YOU SHOULD REST AND RELAX FOR THE NEXT 24 HOURS AND YOU MUST MAKE ARRANGEMENTS TO HAVE SOMEONE STAY WITH YOU FOR AT LEAST 24 HOURS AFTER YOUR DISCHARGE.  AVOID HAZARDOUS AND STRENUOUS ACTIVITIES.  DO NOT MAKE IMPORTANT DECISIONS FOR 24 HOURS.    DO NOT DRIVE ANY VEHICLE OR OPERATE MECHANICAL EQUIPMENT FOR 24 HOURS FOLLOWING THE END OF YOUR SURGERY.  EVEN THOUGH YOU MAY FEEL NORMAL, YOUR REACTIONS MAY BE AFFECTED BY THE MEDICATION YOU HAVE RECEIVED.    DO NOT DRINK ALCOHOLIC BEVERAGES FOR 24 HOURS FOLLOWING YOUR SURGERY.    DRINK CLEAR LIQUIDS (APPLE JUICE, GINGER ALE, 7-UP, BROTH, ETC.).  PROGRESS TO YOUR REGULAR DIET AS YOU FEEL ABLE.    YOU MAY HAVE A DRY MOUTH, A SORE THROAT, MUSCLES ACHES OR TROUBLE SLEEPING.  THESE SHOULD GO AWAY AFTER 24 HOURS.    CALL YOUR  DOCTOR FOR ANY OF THE FOLLOWING:  SIGNS OF INFECTION (FEVER, GROWING TENDERNESS AT THE SURGERY SITE, A LARGE AMOUNT OF DRAINAGE OR BLEEDING, SEVERE PAIN, FOUL-SMELLING DRAINAGE, REDNESS OR SWELLING.    IT HAS BEEN OVER 8 TO 10 HOURS SINCE SURGERY AND YOU ARE STILL NOT ABLE TO URINATE (PASS WATER).     You had  OXYCODONE at 2:30 pm and again at 1:30 AM.   You had a bullock catheter placed post procedure. You need to contact urology the next day for timing of removal.  You were straight cathed at 8:40 PM and the bullock was placed at 1:10. Bladder scat at 8:15 PM was 530 ml   and 12:45 AM was 325 ml    .

## 2018-08-06 NOTE — OP NOTE
General Surgery Operative Note    Pre-operative diagnosis:  Gallstones    Post-operative diagnosis: same   Procedure: Laparoscopic Cholecystectomy   Surgeon: José Miguel Stuart MD   Assistant(s): Ericka Echeverria PA-C - the physician assistant was medically necessary to assist in prepping, positioning, camera operation, retraction/exposure and closure of the port site.    Anesthesia: General    Estimated blood loss: 5 cc's   Drains placed: None   Complications:  None   Findings:   gallbladder with adhesions suggesting previous inflammation.  Excellent hemostasis was obtained throughout the case.       INDICATIONS FOR OPERATION: This is a patient with upper abdominal pain and gallstones.  Laparoscopic cholecystectomy was recommended.  The procedure along with its risks and complications was discussed in detail and the patient agreed to proceed.    DETAILS OF THE OPERATION: After informed consent the patient was taken to the operating room where he underwent satisfactory induction of general anesthesia.  The patient was then sterilely prepped and draped.  A supraumbilical skin incision was made using a skin knife.  The dissection was carried bluntly down to the fascia.  The fascia was opened using electrocautery and the Shipman trocar was then inserted.  Pneumoperitoneum was achieved using CO2 insufflation, and under direct visualization  three 5 mm upper abdominal ports were placed.  The gallbladder was visualized and was grasped.  Adhesions to the gallbladder were taken down using blunt dissection and judicious electrocautery.  The gallbladder was pulled up over the liver and the cystic duct was exposed.  The cystic duct was skeletonized, triple clipped and divided.  The cystic artery was likewise triple clipped and divided.  The gallbladder was then dissected away from the liver using electrocautery.  The gallbladder was then placed in an Endo Catch bag and removed through the supraumbilical incision.  The  gallbladder fossa was irrigated out.  There was excellent hemostasis and the clips were in good position.  The trocar sites were now infiltrated with half percent Marcaine with epinephrine.  The trochars were removed under direct visualization.  The supraumbilical fascia was then closed using 0 Vicryl suture.  Skin incisions were closed using 4-0 subcuticular Vicryl followed by Steri-Strips.    The patient was transferred to the recovery room in satisfactory condition.  Sponge and needle counts were correct at the close of the case.      Specimens:   ID Type Source Tests Collected by Time Destination   A : gallbladder Tissue Gallbladder and Contents SURGICAL PATHOLOGY EXAM José Miguel Stuart MD 8/6/2018  1:05 PM            José Miguel Stuart MD

## 2018-08-06 NOTE — ANESTHESIA CARE TRANSFER NOTE
Patient: Oswaldo Prabhakar    Procedure(s):  LAPAROSCOPIC CHOLECYSTECTOMY  - Wound Class: II-Clean Contaminated    Diagnosis: Gall Stones   Diagnosis Additional Information: No value filed.    Anesthesia Type:   General, ETT     Note:  Airway :Face Mask  Patient transferred to:PACU  Comments: VSS.  Spontaneously breathing O2 per open face mask.  Report given to RN.Handoff Report: Identifed the Patient, Identified the Reponsible Provider, Reviewed the pertinent medical history, Discussed the surgical course, Reviewed Intra-OP anesthesia mangement and issues during anesthesia, Set expectations for post-procedure period and Allowed opportunity for questions and acknowledgement of understanding      Vitals: (Last set prior to Anesthesia Care Transfer)    CRNA VITALS  8/6/2018 1257 - 8/6/2018 1332      8/6/2018             Pulse: 89    SpO2: 99 %    Resp Rate (observed): 20                Electronically Signed By: NOLBERTO Sheffield CRNA  August 6, 2018  1:32 PM

## 2018-08-07 ENCOUNTER — ANTICOAGULATION THERAPY VISIT (OUTPATIENT)
Dept: ANTICOAGULATION | Facility: CLINIC | Age: 58
End: 2018-08-07

## 2018-08-07 VITALS
OXYGEN SATURATION: 97 % | RESPIRATION RATE: 16 BRPM | TEMPERATURE: 98 F | HEIGHT: 71 IN | WEIGHT: 202 LBS | BODY MASS INDEX: 28.28 KG/M2 | DIASTOLIC BLOOD PRESSURE: 87 MMHG | SYSTOLIC BLOOD PRESSURE: 151 MMHG | HEART RATE: 52 BPM

## 2018-08-07 DIAGNOSIS — Z79.01 LONG-TERM (CURRENT) USE OF ANTICOAGULANTS: ICD-10-CM

## 2018-08-07 DIAGNOSIS — N99.89 POSTOPERATIVE URINARY RETENTION: Primary | ICD-10-CM

## 2018-08-07 DIAGNOSIS — R33.8 POSTOPERATIVE URINARY RETENTION: Primary | ICD-10-CM

## 2018-08-07 DIAGNOSIS — I26.99 PULMONARY EMBOLI (H): ICD-10-CM

## 2018-08-07 LAB — COPATH REPORT: NORMAL

## 2018-08-07 PROCEDURE — 25000125 ZZHC RX 250: Performed by: SURGERY

## 2018-08-07 PROCEDURE — 25000132 ZZH RX MED GY IP 250 OP 250 PS 637: Performed by: SURGERY

## 2018-08-07 RX ORDER — TAMSULOSIN HYDROCHLORIDE 0.4 MG/1
0.4 CAPSULE ORAL DAILY
Qty: 10 CAPSULE | Refills: 0 | Status: SHIPPED | OUTPATIENT
Start: 2018-08-07 | End: 2018-08-17

## 2018-08-07 RX ORDER — OXYCODONE HYDROCHLORIDE 5 MG/1
5 TABLET ORAL
Status: COMPLETED | OUTPATIENT
Start: 2018-08-07 | End: 2018-08-07

## 2018-08-07 RX ADMIN — LIDOCAINE HYDROCHLORIDE 10 ML: 20 JELLY TOPICAL at 01:10

## 2018-08-07 RX ADMIN — OXYCODONE HYDROCHLORIDE 5 MG: 5 TABLET ORAL at 01:48

## 2018-08-07 NOTE — TELEPHONE ENCOUNTER
[facility] POST-OP URINARY RETENTION CUETO CATHETER PROTOCOLS  Cueto placed for urinary retention, office follow up:  A)  IF pre-cueto placement scan amounts < 800 ml AND female any age, Male age <50 years : call surgery office for removal of catheter in 3-5 days.  B)  IF pre cueto placement scan amounts < 800 ml AND male age > 50 years: Call surgery office for removal of catheter in 7 days: take Flomax for 7 days (call surgery office tomorrow for Rx to be called to patient pharmacy).  C) If pre-cueto placement scan amounts >800 ml or history of Urologic care / surgery: Call UROLOGY office for removal of catheter; timing per UROLOGY; take Flomax for 14 days (call surgery office tomorrow for RX to be called to patient pharmacy).    GENERAL SURGERY NURSE PHONE TRIAGE   Oswaldo Prabhakar    MRN# [id number]  AGE:  57 year old  YOB: 1960  [phone] (home)   Surgeon: Dr. Stuart  Surgical Assist:  Ericka Echeverria PA-C     Surgery type: Laparoscopic Cholecystectomy     Surgery Date: August / 06 / 2018     POD: 1     CHIEF CONCERN:  Cueto Catheter     HISTORY OF PRESENT ILLNESS:     Oswaldo Prabhakar is calling to ask who he should call to schedule an appointment for Cueto catheter removal.  He underwent Laparoscopic Cholecystectomy by Dr. Stuart on  August / 06 / 2018 He is now 1 day postop.   Discharge AVS:  You had a cueto catheter placed post procedure. You need to contact urology the next day for timing of removal.  You were straight cathed at 8:40 PM and the cueto was placed at 1:10. Bladder scat at 8:15 PM was 530 ml   and 12:45 AM was 325 ml    Patient reports he was discharged last night at 2 am with cueto.  Has been seen by urology at the [facility] but does not wish to travel there.    Patient was given [facility] number, recommended patient also inform them he was not started on Flomax- I will also notify our clinic MARVIN.  If urology has any questions, encouraged patient to have them call  "me.        Post surgery pre bullock catheter scan amounts were \"You were straight cathed at 8:40 PM and the bullock was placed at 1:10. Bladder scat at 8:15 PM was 530 ml   and 12:45 AM was 325 ml  Patient's sex Male and age is 57 years  Patient does have a history of urologic care / surgery.    PLAN:   Per above protocols:  Patient will call urology for appointment- also notify them he is not on Flomax   Pt is in agreement with this plan.  Will notify MARVIN Harris RN on 8/7/2018 at 9:45 AM  ADDENDUM  Discussed with MARVIN, Verbal order for Flomax 0.4 mg once a day x10 days.  Patient notified, will escribe to [address].  Patient reports he left message with urology for appointment.  Nery Harris RN on 8/7/2018 at 9:59 AM    ADDENDUM:  Patient called back, verbalizing frustration- urology won't see him until Friday.  I explained to patient, this is okay- routine proticol is to have Bullock in for one week.    Patient was discharged last night at 2 am, has not slept as he has been trying to make appts, awaiting call backs and arranging for RX .  I explained to patient that I would Bullock, so that he can relax- but that I do not expect him to retain information and will be happy to go over again with him.    Patient has not taken any pain medication since discharge, I encouraged him to take his Oxycodone with some crackers.  Sleep.  Patient agrees with plan-  Nery Harris RN on 8/7/2018 at 11:28 AM  "

## 2018-08-07 NOTE — PROGRESS NOTES
Spoke with Oswaldo today to help him restart his Lovenox and warfarin.  Patient will restart his warfarin today at 10mgs daily.  He will bridge with Lovenox 120mgs subcutaneous every 24 hours until his INR is therapeutic. Next INR will be on 8/10/18.  Oswaldo will call us if he needs more Lovenox syringes.

## 2018-08-07 NOTE — OR NURSING
Patient unable to void, was straight cathed at 2040. Healing Touch was explained and offered and patient consented to Healing Touch session. Patient relaxed but still unable to void bladder scanned for 325 at 0045 bullock placed per protocol. Discharge instructions updated and wife reviewed bullock care, and they are to contact urology later today. Contact the surgeons clinic for referral.

## 2018-08-07 NOTE — MR AVS SNAPSHOT
Oswaldo Prabhakar   8/7/2018   Anticoagulation Therapy Visit    Description:  57 year old male   Provider:  Rigoberto Harmon, [facility]   Department:  [facility]           INR as of 8/7/2018     Today's INR 0.98! (8/6/2018)      Anticoagulation Summary as of 8/7/2018     INR goal 2.0-3.0   Today's INR 0.98! (8/6/2018)   Full warfarin instructions 8/7: 10 mg; 8/8: 10 mg; 8/9: 10 mg; Otherwise 5 mg on Mon, Fri; 7.5 mg all other days   Next INR check 8/10/2018    Indications   Pulmonary emboli (H) [I26.99]  Long-term (current) use of anticoagulants [Z79.01] [Z79.01]         August 2018 Details    Sun Mon Tue Wed Thu Fri Sat        1               2               3               4                 5               6               7      10 mg   See details      8      10 mg         9      10 mg         10            11                 12               13               14               15               16               17               18                 19               20               21               22               23               24               25                 26               27               28               29               30               31                 Date Details   08/07 This INR check       Date of next INR:  8/10/2018         How to take your warfarin dose     To take:  5 mg Take 1 of the 5 mg tablets.    To take:  10 mg Take 2 of the 5 mg tablets.

## 2018-08-10 ENCOUNTER — OFFICE VISIT (OUTPATIENT)
Dept: UROLOGY | Facility: CLINIC | Age: 58
End: 2018-08-10
Payer: COMMERCIAL

## 2018-08-10 VITALS
SYSTOLIC BLOOD PRESSURE: 118 MMHG | DIASTOLIC BLOOD PRESSURE: 78 MMHG | OXYGEN SATURATION: 94 % | BODY MASS INDEX: 28.84 KG/M2 | WEIGHT: 206 LBS | HEART RATE: 69 BPM | HEIGHT: 71 IN

## 2018-08-10 DIAGNOSIS — R33.9 URINARY RETENTION: Primary | ICD-10-CM

## 2018-08-10 PROCEDURE — 99203 OFFICE O/P NEW LOW 30 MIN: CPT | Mod: 25 | Performed by: PHYSICIAN ASSISTANT

## 2018-08-10 PROCEDURE — 51700 IRRIGATION OF BLADDER: CPT | Mod: 52 | Performed by: PHYSICIAN ASSISTANT

## 2018-08-10 RX ORDER — SULFAMETHOXAZOLE/TRIMETHOPRIM 800-160 MG
1 TABLET ORAL 2 TIMES DAILY
Qty: 6 TABLET | Refills: 0 | Status: SHIPPED | OUTPATIENT
Start: 2018-08-10 | End: 2018-08-13

## 2018-08-10 ASSESSMENT — PAIN SCALES - GENERAL: PAINLEVEL: MILD PAIN (2)

## 2018-08-10 NOTE — PROGRESS NOTES
CC: Urinary retention.    HPI: It is a pleasure to see . Oswaldo Prabhakar, a 57 year old male seen today in the urology clinic in consultation from Dr. Stuart for evaluation of urinary retention. This developed acutely requiring Daniel catheter placement on 8/6/18 following cholecystectomy. This has been draining well with pale, yellow urine. The patient is tolerating the catheter without issue. No clots of hematuria noted. Has been on Flomax.     The patient has no prior history of AUR or similar symptoms. At baseline, patient does not have BPH symptoms. Currently denies fevers, chills, N/V, abdominal/back pain. Coumadin restarted yesterday.    Last PSA in 2011, normal.     Past Medical History:   Diagnosis Date     Antiplatelet or antithrombotic long-term use      Coronary artery disease      Diaphragmatic hernia without mention of obstruction or gangrene      Heart disease      History of blood transfusion      Hypertension      Nephrolithiasis 4/2010     Other and unspecified hyperlipidemia      Pulmonary embolus and infarction (H)     s/p hemothorax and filter s/p filter removal (2011), now on long term anti-coagulation     Statin intolerance 2/1/2017     Stented coronary artery      Unspecified retinal detachment     Childhood trauma, unrepaired     Past Surgical History:   Procedure Laterality Date     BYPASS GRAFT ARTERY CORONARY  4/4/2014    Procedure: BYPASS GRAFT ARTERY CORONARY;  Median Sternotomy, Coronary Artery Bypass Bypass x 4 using Left Internal Mammary, Left Saphenous Vein, on pump oxygenation;  Surgeon: Sherry Armando MD;  Location: [facility] OR     C NONSPECIFIC PROCEDURE      Spermatocele resection     CLOSED REDUCTION, PERCUTANEOUS PINNING  HAND, COMBINED Left 11/5/2015    Procedure: COMBINED CLOSED REDUCTION, PERCUTANEOUS PINNING HAND;  Surgeon: Carmella Love MD;  Location: [facility]     COLONOSCOPY  3/15/2013    Procedure: COLONOSCOPY;;  Surgeon: Racheal Shipman MD;  Location:  [facility]     LAPAROSCOPIC CHOLECYSTECTOMY N/A 8/6/2018    Procedure: LAPAROSCOPIC CHOLECYSTECTOMY;  LAPAROSCOPIC CHOLECYSTECTOMY ;  Surgeon: José Miguel Stuart MD;  Location: [facility] OR     LITHOTRIPSY  4/2010     THORACIC SURGERY      lung surgery, thoracotomy, lung adhesion     Current Outpatient Prescriptions   Medication Sig Dispense Refill     albuterol (PROAIR HFA/PROVENTIL HFA/VENTOLIN HFA) 108 (90 BASE) MCG/ACT Inhaler Inhale 2 puffs into the lungs every 6 hours as needed for shortness of breath / dyspnea or wheezing 1 Inhaler 1     alirocumab (PRALUENT) 75 MG/ML injectable pen Inject 1 mL (75 mg) Subcutaneous every 14 days 2 mL 12     aspirin EC 81 MG tablet Take 1 tablet (81 mg) by mouth daily 91 tablet 3     atorvastatin (LIPITOR) 80 MG tablet Take 1 tablet (80 mg) by mouth daily 91 tablet 3     calcium carbonate (TUMS) 500 MG chewable tablet Take 1 chew tab by mouth daily as needed       Cholecalciferol (VITAMIN D) 2000 UNITS CAPS Take 2,000 Units by mouth daily       DiphenhydrAMINE HCl (BENADRYL PO) Take 25-50 mg by mouth daily as needed       ezetimibe (ZETIA) 10 MG tablet Take 1 tablet (10 mg) by mouth daily 90 tablet 3     fenofibrate 160 MG tablet Take 1 tablet (160 mg) by mouth daily 91 tablet 3     lisinopril (PRINIVIL/ZESTRIL) 2.5 MG tablet Take 1 tablet (2.5 mg) by mouth daily , or as directed by Dr. Ocampo. 90 tablet 2     metoclopramide (REGLAN) 10 MG/10ML SOLN solution Take 10 mLs (10 mg) by mouth 4 times daily as needed 473 mL 5     metoprolol (LOPRESSOR) 10 mg/mL SUSP Take 1.25 mLs (12.5 mg) by mouth 2 times daily 100 mL 5     metoprolol (LOPRESSOR) 25 MG tablet Take 0.5 tablets (12.5 mg) by mouth 2 times daily (Patient taking differently: Take 12.5 mg by mouth 2 times daily ON HOLD DUE TO NAUSEA (7/26/18).) 90 tablet 3     oxyCODONE IR (ROXICODONE) 5 MG tablet Take 1-2 tablets (5-10 mg) by mouth every 3 hours as needed for pain or other (Moderate to Severe) 20 tablet 0     tamsulosin (FLOMAX) 0.4  "MG capsule Take 1 capsule (0.4 mg) by mouth daily for 10 days 10 capsule 0     warfarin (COUMADIN) 5 MG tablet Take 1.5 tablets (7.5 mg) by mouth daily (Patient taking differently: Take 7.5 mg by mouth daily ON HOLD DUE TO NAUSEA (7/26/18).) 135 tablet 2     Allergies   Allergen Reactions     Cats      Hay Fever & [A.R.M.]      Heparin Other (See Comments)     Heparin resistance per cardio-thoracic surgeon Dr. Armando     Hydrocodone-Acetaminophen Nausea and Vomiting     Acetaminophen is ok     Family History: There is no h/o  malignancy.  There is no h/o urolithiasis.     Social History: The patient does not smoke cigarettes.  Denies EtOH and illicit drug use.      ROS: A comprehensive 14 point ROS was obtained and was  otherwise negative except for that outlined above in the HPI.    PHYSICAL EXAM:   /78 (BP Location: Left arm, Patient Position: Sitting, Cuff Size: Adult Regular)  Pulse 69  Ht 1.803 m (5' 11\")  Wt 93.4 kg (206 lb)  SpO2 94%  BMI 28.73 kg/m2    GENERAL: Well groomed/well developed/well nourished male in NAD.  HEENT: EOMI, AT, NC.  SKIN: Warm to touch, dry.  No visible rashes or lesions.  RESP: No increased respiratory effort.  LYMPH: No LE edema.  ABD: Soft, NT, ND.  No CVAT.  MS: Full ROM in extremities.  : Daniel catheter indwelling draining yellow urine, light pink urine in tubing.  NEURO: Alert and oriented x 3.  PSYCH: Normal mood and affect, pleasant and agreeable during interview and exam.    PROCEDURE: A trial of void was performed by nursing staff today in the clinic.  The patient's Daniel leg bag was disconnected and 250 cc of sterile water were infused into the patient's bladder via gravity drainage, which the patient tolerated fine.  The Daniel balloon was decompressed and the catheter was then removed.  After a few minutes Mr. Prabhakar voided 350 cc.  The patient did pass today's trial of void and the catheter did not need to be replaced.      REVIEW OF OUTSIDE RECORDS: 5 " minutes spent reviewing previous/outside records.      ASSESSMENT/PLAN:  57 year old male who developed acute urinary retention requiring Daniel catheter placement. Has been taking tamsulosin daily.   -Needs updated PSA once 6-8 weeks from retention episode.   -Continue Flomax next few days  -Bactrim DS x 3 days. May increase INR.     Should the patient continue to have voiding difficulty, the next appropriate studies would be cystoscopy and/or videourodynamics to better assess bladder function.      I have enjoyed participating in the medical care of this patient.  Please do not hesitate to contact me with any questions or concerns.      Pamella Kearns PA-C  [facility]    30 min spent with the patient, >50% of this time was spent in a face-to-face manner and on coordination of care of urinary retention.

## 2018-08-10 NOTE — LETTER
8/10/2018       RE: Oswaldo Prabhakar  [address]  [place] [address]     Dear Colleague,    Thank you for referring your patient, Oswaldo Prabhakar, to the [facility] UROLOGY CLINIC [place] at [facility]. Please see a copy of my visit note below.    CC: Urinary retention.    HPI: It is a pleasure to see . Oswaldo Prabhakar, a 57 year old male seen today in the urology clinic in consultation from Dr. Stuart for evaluation of urinary retention. This developed acutely requiring Daniel catheter placement on 8/6/18 following cholecystectomy. This has been draining well with pale, yellow urine. The patient is tolerating the catheter without issue. No clots of hematuria noted. Has been on Flomax.     The patient has no prior history of AUR or similar symptoms. At baseline, patient does not have BPH symptoms. Currently denies fevers, chills, N/V, abdominal/back pain. Coumadin restarted yesterday.    Last PSA in 2011, normal.     Past Medical History:   Diagnosis Date     Antiplatelet or antithrombotic long-term use      Coronary artery disease      Diaphragmatic hernia without mention of obstruction or gangrene      Heart disease      History of blood transfusion      Hypertension      Nephrolithiasis 4/2010     Other and unspecified hyperlipidemia      Pulmonary embolus and infarction (H)     s/p hemothorax and filter s/p filter removal (2011), now on long term anti-coagulation     Statin intolerance 2/1/2017     Stented coronary artery      Unspecified retinal detachment     Childhood trauma, unrepaired     Past Surgical History:   Procedure Laterality Date     BYPASS GRAFT ARTERY CORONARY  4/4/2014    Procedure: BYPASS GRAFT ARTERY CORONARY;  Median Sternotomy, Coronary Artery Bypass Bypass x 4 using Left Internal Mammary, Left Saphenous Vein, on pump oxygenation;  Surgeon: Sherry Armando MD;  Location: [facility] OR     C NONSPECIFIC PROCEDURE      Spermatocele  resection     CLOSED REDUCTION, PERCUTANEOUS PINNING  HAND, COMBINED Left 11/5/2015    Procedure: COMBINED CLOSED REDUCTION, PERCUTANEOUS PINNING HAND;  Surgeon: Carmella Love MD;  Location: [facility] OR     COLONOSCOPY  3/15/2013    Procedure: COLONOSCOPY;;  Surgeon: Racheal Shipman MD;  Location: [facility] GI     LAPAROSCOPIC CHOLECYSTECTOMY N/A 8/6/2018    Procedure: LAPAROSCOPIC CHOLECYSTECTOMY;  LAPAROSCOPIC CHOLECYSTECTOMY ;  Surgeon: José Miguel Stuart MD;  Location: [facility] OR     LITHOTRIPSY  4/2010     THORACIC SURGERY      lung surgery, thoracotomy, lung adhesion     Current Outpatient Prescriptions   Medication Sig Dispense Refill     albuterol (PROAIR HFA/PROVENTIL HFA/VENTOLIN HFA) 108 (90 BASE) MCG/ACT Inhaler Inhale 2 puffs into the lungs every 6 hours as needed for shortness of breath / dyspnea or wheezing 1 Inhaler 1     alirocumab (PRALUENT) 75 MG/ML injectable pen Inject 1 mL (75 mg) Subcutaneous every 14 days 2 mL 12     aspirin EC 81 MG tablet Take 1 tablet (81 mg) by mouth daily 91 tablet 3     atorvastatin (LIPITOR) 80 MG tablet Take 1 tablet (80 mg) by mouth daily 91 tablet 3     calcium carbonate (TUMS) 500 MG chewable tablet Take 1 chew tab by mouth daily as needed       Cholecalciferol (VITAMIN D) 2000 UNITS CAPS Take 2,000 Units by mouth daily       DiphenhydrAMINE HCl (BENADRYL PO) Take 25-50 mg by mouth daily as needed       ezetimibe (ZETIA) 10 MG tablet Take 1 tablet (10 mg) by mouth daily 90 tablet 3     fenofibrate 160 MG tablet Take 1 tablet (160 mg) by mouth daily 91 tablet 3     lisinopril (PRINIVIL/ZESTRIL) 2.5 MG tablet Take 1 tablet (2.5 mg) by mouth daily , or as directed by Dr. Ocampo. 90 tablet 2     metoclopramide (REGLAN) 10 MG/10ML SOLN solution Take 10 mLs (10 mg) by mouth 4 times daily as needed 473 mL 5     metoprolol (LOPRESSOR) 10 mg/mL SUSP Take 1.25 mLs (12.5 mg) by mouth 2 times daily 100 mL 5     metoprolol (LOPRESSOR) 25 MG tablet Take 0.5 tablets (12.5 mg)  "by mouth 2 times daily (Patient taking differently: Take 12.5 mg by mouth 2 times daily ON HOLD DUE TO NAUSEA (7/26/18).) 90 tablet 3     oxyCODONE IR (ROXICODONE) 5 MG tablet Take 1-2 tablets (5-10 mg) by mouth every 3 hours as needed for pain or other (Moderate to Severe) 20 tablet 0     tamsulosin (FLOMAX) 0.4 MG capsule Take 1 capsule (0.4 mg) by mouth daily for 10 days 10 capsule 0     warfarin (COUMADIN) 5 MG tablet Take 1.5 tablets (7.5 mg) by mouth daily (Patient taking differently: Take 7.5 mg by mouth daily ON HOLD DUE TO NAUSEA (7/26/18).) 135 tablet 2     Allergies   Allergen Reactions     Cats      Hay Fever & [A.R.M.]      Heparin Other (See Comments)     Heparin resistance per cardio-thoracic surgeon Dr. Armando     Hydrocodone-Acetaminophen Nausea and Vomiting     Acetaminophen is ok     Family History: There is no h/o  malignancy.  There is no h/o urolithiasis.     Social History: The patient does not smoke cigarettes.  Denies EtOH and illicit drug use.      ROS: A comprehensive 14 point ROS was obtained and was  otherwise negative except for that outlined above in the HPI.    PHYSICAL EXAM:   /78 (BP Location: Left arm, Patient Position: Sitting, Cuff Size: Adult Regular)  Pulse 69  Ht 1.803 m (5' 11\")  Wt 93.4 kg (206 lb)  SpO2 94%  BMI 28.73 kg/m2    GENERAL: Well groomed/well developed/well nourished male in NAD.  HEENT: EOMI, AT, NC.  SKIN: Warm to touch, dry.  No visible rashes or lesions.  RESP: No increased respiratory effort.  LYMPH: No LE edema.  ABD: Soft, NT, ND.  No CVAT.  MS: Full ROM in extremities.  : Daniel catheter indwelling draining yellow urine, light pink urine in tubing.  NEURO: Alert and oriented x 3.  PSYCH: Normal mood and affect, pleasant and agreeable during interview and exam.    PROCEDURE: A trial of void was performed by nursing staff today in the clinic.  The patient's Daniel leg bag was disconnected and 250 cc of sterile water were infused into the " patient's bladder via gravity drainage, which the patient tolerated fine.  The Daniel balloon was decompressed and the catheter was then removed.  After a few minutes Mr. Prabhakar voided 350 cc.  The patient did pass today's trial of void and the catheter did not need to be replaced.      REVIEW OF OUTSIDE RECORDS: 5 minutes spent reviewing previous/outside records.      ASSESSMENT/PLAN:  57 year old male who developed acute urinary retention requiring Daniel catheter placement. Has been taking tamsulosin daily.   -Needs updated PSA once 6-8 weeks from retention episode.   -Continue Flomax next few days  -Bactrim DS x 3 days. May increase INR.     Should the patient continue to have voiding difficulty, the next appropriate studies would be cystoscopy and/or videourodynamics to better assess bladder function.      I have enjoyed participating in the medical care of this patient.  Please do not hesitate to contact me with any questions or concerns.      Pamella Kearns PA-C  [facility] Urology    30 min spent with the patient, >50% of this time was spent in a face-to-face manner and on coordination of care of urinary retention.

## 2018-08-10 NOTE — NURSING NOTE
Chief Complaint   Patient presents with     Clinic Care Coordination - Follow-up     Pt here to follow up on catheter   Comes into clinic today at the request of Pamella Kearns provider found for TOV / catheter removal.    This service provided today was under the direct supervision of Pamella Kearns, who was available if needed.    presented today for a trial of void.  Approximately 250mL mL of normal saline instilled into bladder via catheter.  Patient stated he had urge to urinate and catheter was removed without difficulty.  Patient was given a graduated cylinder to measure urine output.  Patient voided approximately 325mL mL of clear urine.  PVR 0 mL.    Cipro 500 given per protocol: Yes    Patient did tolerate procedure well.    Teaching done with patient verbally as where to call or go if pain, fever, or unable to urinate post catheter removal.        Sandra Rogel, CMA

## 2018-08-10 NOTE — MR AVS SNAPSHOT
After Visit Summary   8/10/2018    Oswaldo Prabhakar    MRN: [id number]           Patient Information     Date Of Birth          1960        Visit Information        Provider Department      8/10/2018 1:30 PM Pamella Kearns PA-C [facility]        Today's Diagnoses     Urinary retention    -  1       Follow-ups after your visit        Follow-up notes from your care team     Return in about 4 weeks (around 9/7/2018).      Your next 10 appointments already scheduled     Sep 10, 2018  1:30 PM CDT   Return Visit with Pamella Kearns PA-C   [facility] ([facility])    [address]  [address]  [facility] [address] [phone]              Who to contact     If you have questions or need follow up information about today's clinic visit or your schedule please contact [facility] UROLOGY [facility] directly at [phone].  Normal or non-critical lab and imaging results will be communicated to you by [email or web address], letter or phone within 4 business days after the clinic has received the results. If you do not hear from us within 7 days, please contact the clinic through Dercetot or phone. If you have a critical or abnormal lab result, we will notify you by phone as soon as possible.  Submit refill requests through MobileTag or call your pharmacy and they will forward the refill request to us. Please allow 3 business days for your refill to be completed.          Additional Information About Your Visit        MyChart Information     MobileTag gives you secure access to your electronic health record. If you see a primary care provider, you can also send messages to your care team and make appointments. If you have questions, please call your primary care clinic.  If you do not have a primary care provider, please call [phone] and they will assist you.        Care EveryWhere ID     This is  "your Care EveryWhere ID. This could be used by other organizations to access your [place] medical records  [id number]        Your Vitals Were     Pulse Height Pulse Oximetry BMI (Body Mass Index)          69 1.803 m (5' 11\") 94% 28.73 kg/m2         Blood Pressure from Last 3 Encounters:   08/10/18 118/78   08/07/18 151/87   07/30/18 132/80    Weight from Last 3 Encounters:   08/10/18 93.4 kg (206 lb)   08/06/18 91.6 kg (202 lb)   07/30/18 92.1 kg (203 lb)              Today, you had the following     No orders found for display         Today's Medication Changes          These changes are accurate as of 8/10/18  2:09 PM.  If you have any questions, ask your nurse or doctor.               Start taking these medicines.        Dose/Directions    sulfamethoxazole-trimethoprim 800-160 MG per tablet   Commonly known as:  BACTRIM DS   Used for:  Urinary retention   Started by:  Pamella Kearns PA-C        Dose:  1 tablet   Take 1 tablet by mouth 2 times daily for 3 days   Quantity:  6 tablet   Refills:  0         These medicines have changed or have updated prescriptions.        Dose/Directions    * metoprolol tartrate 25 MG tablet   Commonly known as:  LOPRESSOR   This may have changed:  additional instructions   Used for:  S/P CABG x 4        Dose:  12.5 mg   Take 0.5 tablets (12.5 mg) by mouth 2 times daily   Quantity:  90 tablet   Refills:  3       * metoprolol 10 mg/mL Susp   Commonly known as:  LOPRESSOR   This may have changed:  Another medication with the same name was changed. Make sure you understand how and when to take each.   Used for:  Essential hypertension        Dose:  12.5 mg   Take 1.25 mLs (12.5 mg) by mouth 2 times daily   Quantity:  100 mL   Refills:  5       warfarin 5 MG tablet   Commonly known as:  COUMADIN   Indication:  Obstructive Blood Clot in a Blood Vessel of the Lung   This may have changed:  additional instructions   Used for:  History of pulmonary embolism, Chronic anticoagulation    "     Dose:  7.5 mg   Take 1.5 tablets (7.5 mg) by mouth daily   Quantity:  135 tablet   Refills:  2       * Notice:  This list has 2 medication(s) that are the same as other medications prescribed for you. Read the directions carefully, and ask your doctor or other care provider to review them with you.         Where to get your medicines      These medications were sent to [facility] [address] [facility] - [place], [place] - [address]  [address] [facility] [address] [facility], [facility] [address]     Phone:  [phone]     sulfamethoxazole-trimethoprim 800-160 MG per tablet                Primary Care Provider Office Phone # Fax #    Samm Vazquez [phone]       [address] [place] [address]  [employer] [address]        Equal Access to Services     SURESH ZURITA : Hadii aad ku hadasho Soshaanali, waaxda luqadaha, qaybta kaalmada adeegyada, waxaj flores. So [facility] [phone].    ATENCIÓN: Si habla español, tiene a krueger disposición servicios gratuitos de asistencia lingüística. Ll[facility] [phone].    We comply with applicable federal civil rights laws and [place] laws. We do not discriminate on the basis of race, color, national origin, age, disability, sex, sexual orientation, or gender identity.            Thank you!     Thank you for choosing [facility] [place]  for your care. Our goal is always to provide you with excellent care. Hearing back from our patients is one way we can continue to improve our services. Please take a few minutes to complete the written survey that you may receive in the mail after your visit with us. Thank you!             Your Updated Medication List - Protect others around you: Learn how to safely use, store and throw away your medicines at www.disposemymeds.org.          This list is accurate as of 8/10/18  2:09 PM.  Always use your most recent med list.                   Brand Name Dispense Instructions for use Diagnosis    albuterol 108 (90  Base) MCG/ACT inhaler    PROAIR HFA/PROVENTIL HFA/VENTOLIN HFA    1 Inhaler    Inhale 2 puffs into the lungs every 6 hours as needed for shortness of breath / dyspnea or wheezing    Pneumonia of both lungs due to infectious organism, unspecified part of lung       alirocumab 75 MG/ML injectable pen    PRALUENT    2 mL    Inject 1 mL (75 mg) Subcutaneous every 14 days    CAD (coronary artery disease), S/P CABG x 4, Hyperlipidemia LDL goal <130, Statin intolerance       aspirin 81 MG EC tablet     91 tablet    Take 1 tablet (81 mg) by mouth daily    S/P CABG x 4       atorvastatin 80 MG tablet    LIPITOR    91 tablet    Take 1 tablet (80 mg) by mouth daily    Pure hyperglyceridemia, Hyperlipidemia LDL goal <130       BENADRYL PO      Take 25-50 mg by mouth daily as needed        calcium carbonate 500 MG chewable tablet    TUMS     Take 1 chew tab by mouth daily as needed        ezetimibe 10 MG tablet    ZETIA    90 tablet    Take 1 tablet (10 mg) by mouth daily    Statin intolerance, Hyperlipidemia LDL goal <70, Atherosclerosis of coronary artery of native heart without angina pectoris, unspecified vessel or lesion type, S/P CABG (coronary artery bypass graft)       fenofibrate 160 MG tablet     91 tablet    Take 1 tablet (160 mg) by mouth daily    Hyperlipidemia LDL goal <130       lisinopril 2.5 MG tablet    PRINIVIL/Zestril    90 tablet    Take 1 tablet (2.5 mg) by mouth daily , or as directed by Dr. Ocampo.    Essential hypertension       metoclopramide 10 MG/10ML Soln solution    REGLAN    473 mL    Take 10 mLs (10 mg) by mouth 4 times daily as needed    Nausea       * metoprolol tartrate 25 MG tablet    LOPRESSOR    90 tablet    Take 0.5 tablets (12.5 mg) by mouth 2 times daily    S/P CABG x 4       * metoprolol 10 mg/mL Susp    LOPRESSOR    100 mL    Take 1.25 mLs (12.5 mg) by mouth 2 times daily    Essential hypertension       oxyCODONE IR 5 MG tablet    ROXICODONE    20 tablet    Take 1-2 tablets (5-10 mg) by  mouth every 3 hours as needed for pain or other (Moderate to Severe)    Gallstones       sulfamethoxazole-trimethoprim 800-160 MG per tablet    BACTRIM DS    6 tablet    Take 1 tablet by mouth 2 times daily for 3 days    Urinary retention       tamsulosin 0.4 MG capsule    FLOMAX    10 capsule    Take 1 capsule (0.4 mg) by mouth daily for 10 days    Postoperative urinary retention       vitamin D 2000 units Caps      Take 2,000 Units by mouth daily    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       warfarin 5 MG tablet    COUMADIN    135 tablet    Take 1.5 tablets (7.5 mg) by mouth daily    History of pulmonary embolism, Chronic anticoagulation       * Notice:  This list has 2 medication(s) that are the same as other medications prescribed for you. Read the directions carefully, and ask your doctor or other care provider to review them with you.

## 2018-08-14 DIAGNOSIS — Z79.01 LONG-TERM (CURRENT) USE OF ANTICOAGULANTS: ICD-10-CM

## 2018-08-14 DIAGNOSIS — I26.99 PULMONARY EMBOLI (H): ICD-10-CM

## 2018-08-14 LAB — INR PPP: 3.31 (ref 0.86–1.14)

## 2018-08-15 ENCOUNTER — ANTICOAGULATION THERAPY VISIT (OUTPATIENT)
Dept: ANTICOAGULATION | Facility: CLINIC | Age: 58
End: 2018-08-15

## 2018-08-15 DIAGNOSIS — Z79.01 LONG-TERM (CURRENT) USE OF ANTICOAGULANTS: ICD-10-CM

## 2018-08-15 DIAGNOSIS — I26.99 PULMONARY EMBOLI (H): ICD-10-CM

## 2018-08-15 NOTE — MR AVS SNAPSHOT
Oswaldo Prabhakar   8/15/2018   Anticoagulation Therapy Visit    Description:  57 year old male   Provider:  Liliam Hyman, RN   Department:  [facility]           INR as of 8/15/2018     Today's INR 3.31! (8/14/2018)      Anticoagulation Summary as of 8/15/2018     INR goal 2.0-3.0   Today's INR 3.31! (8/14/2018)   Full warfarin instructions 8/15: 5 mg; Otherwise 5 mg on Mon, Fri; 7.5 mg all other days   Next INR check 8/22/2018    Indications   Pulmonary emboli (H) [I26.99]  Long-term (current) use of anticoagulants [Z79.01] [Z79.01]         August 2018 Details    Sun Mon Tue Wed Thu Fri Sat        1               2               3               4                 5               6               7               8               9               10               11                 12               13               14               15      5 mg   See details      16      7.5 mg         17      5 mg         18      7.5 mg           19      7.5 mg         20      5 mg         21      7.5 mg         22            23               24               25                 26               27               28               29               30               31                 Date Details   08/15 This INR check       Date of next INR:  8/22/2018         How to take your warfarin dose     To take:  5 mg Take 1 of the 5 mg tablets.    To take:  7.5 mg Take 1.5 of the 5 mg tablets.

## 2018-08-15 NOTE — PROGRESS NOTES
ANTICOAGULATION FOLLOW-UP CLINIC VISIT    Patient Name:  Oswaldo Prabhakar  Date:  8/15/2018  Contact Type:  Telephone    SUBJECTIVE:     Patient Findings     Positives Antibiotic use or infection    Comments Instructed pt to discontinue Lovenox 120mg Daily due to rise in INR. Per EPIC note on 8/10 pt started Bactrim DC X3 days and this was not communicated to us.            OBJECTIVE    INR   Date Value Ref Range Status   08/14/2018 3.31 (H) 0.86 - 1.14 Final     Chromogenic Factor 10   Date Value Ref Range Status   06/18/2011 81 60 - 140 % Final     Comment:     Therapeutic Range: A Chromogenic Factor 10 level of 20-40% inversely   correlates   with an INR of 2-3 for patients receiving Warfarin. Chromogenic Factor 10   levels below 20% indicate an INR greater than 3, and levels above 40%   indicate   an INR less than 2.     Factor 2 Assay   Date Value Ref Range Status   04/04/2012  60 - 140 % Final    (Note)  CANCELLED AND CREDITED PER [facility] IN MED. MONITORING,4/4/12,       ASSESSMENT / PLAN  INR assessment SUPRA    Recheck INR In: 1 WEEK    INR Location Clinic      Anticoagulation Summary as of 8/15/2018     INR goal 2.0-3.0   Today's INR 3.31! (8/14/2018)   Warfarin maintenance plan 5 mg (5 mg x 1) on Mon, Fri; 7.5 mg (5 mg x 1.5) all other days   Full warfarin instructions 8/15: 5 mg; Otherwise 5 mg on Mon, Fri; 7.5 mg all other days   Weekly warfarin total 47.5 mg   Plan last modified Nano Greer RN (5/2/2018)   Next INR check 8/22/2018   Priority INR   Target end date Indefinite    Indications   Pulmonary emboli (H) [I26.99]  Long-term (current) use of anticoagulants [Z79.01] [Z79.01]         Anticoagulation Episode Summary     INR check location     Preferred lab     Send INR reminders to [facility]    Comments HIPPA INFO OK to speak with wife Josselyn OK to leave messages on Home.work and cell phones  use cell phone #[phone]      Anticoagulation Care Providers     Provider Role  Specialty Phone number    Samm Vazquez MD [facility] Internal Medicine [phone]            See the Encounter Report to view Anticoagulation Flowsheet and Dosing Calendar (Go to Encounters tab in chart review, and find the Anticoagulation Therapy Visit)    Left message for patient with results and dosing recommendations. Asked patient to call back to report any missed doses, falls, signs and symptoms of bleeding or clotting, any changes in health, medication, or diet. Asked patient to call back with any questions or concerns.     Liliam Hyman RN

## 2018-08-21 DIAGNOSIS — Z79.01 LONG-TERM (CURRENT) USE OF ANTICOAGULANTS: ICD-10-CM

## 2018-08-21 DIAGNOSIS — I26.99 PULMONARY EMBOLI (H): ICD-10-CM

## 2018-08-21 LAB — INR PPP: 4.24 (ref 0.86–1.14)

## 2018-08-22 ENCOUNTER — ANTICOAGULATION THERAPY VISIT (OUTPATIENT)
Dept: ANTICOAGULATION | Facility: CLINIC | Age: 58
End: 2018-08-22

## 2018-08-22 DIAGNOSIS — Z79.01 LONG-TERM (CURRENT) USE OF ANTICOAGULANTS: ICD-10-CM

## 2018-08-22 DIAGNOSIS — I26.99 PULMONARY EMBOLI (H): ICD-10-CM

## 2018-08-22 NOTE — MR AVS SNAPSHOT
Oswaldo Prabhakar   8/22/2018   Anticoagulation Therapy Visit    Description:  57 year old male   Provider:  Marielena Chacon, RN   Department:  [facility]           INR as of 8/22/2018     Today's INR 4.24! (8/21/2018)      Anticoagulation Summary as of 8/22/2018     INR goal 2.0-3.0   Today's INR 4.24! (8/21/2018)   Full warfarin instructions 8/23: Hold; Otherwise 5 mg on Mon, Fri; 7.5 mg all other days   Next INR check 8/24/2018    Indications   Pulmonary emboli (H) [I26.99]  Long-term (current) use of anticoagulants [Z79.01] [Z79.01]         August 2018 Details    Sun Mon Tue Wed Thu Fri Sat        1               2               3               4                 5               6               7               8               9               10               11                 12               13               14               15               16               17               18                 19               20               21               22      7.5 mg   See details      23      Hold         24            25                 26               27               28               29               30               31                 Date Details   08/22 This INR check       Date of next INR:  8/24/2018         How to take your warfarin dose     To take:  5 mg Take 1 of the 5 mg tablets.    To take:  7.5 mg Take 1.5 of the 5 mg tablets.    Hold Do not take your warfarin dose. See the Details table to the right for additional instructions.

## 2018-08-22 NOTE — PROGRESS NOTES
ANTICOAGULATION FOLLOW-UP CLINIC VISIT    Patient Name:  Oswaldo Prabhakar  Date:  8/22/2018  Contact Type:  Telephone    SUBJECTIVE:     Patient Findings     Comments Oswaldo is not eating as much as he did before his gallbladder surgery, he is nauseated in the morning.  He is staying hydrated.  He has already taken today's dose of warfarin.  He will hold warfarin tomorrow, 8/23/18, and he will eat some dark greens today and tomorrow.  He will not take warfarin on 8/24/18 until after he speaks with someone in the [facility] and we know what his INR is.  Reviewed signs of bleeding with Oswaldo; he will be seen urgently if he develops any signs or if he falls.  The [facility] got Oswaldo's result on 8/22/18, was drawn 8/21/18.             OBJECTIVE    INR   Date Value Ref Range Status   08/21/2018 4.24 (H) 0.86 - 1.14 Final     Chromogenic Factor 10   Date Value Ref Range Status   06/18/2011 81 60 - 140 % Final     Comment:     Therapeutic Range: A Chromogenic Factor 10 level of 20-40% inversely   correlates   with an INR of 2-3 for patients receiving Warfarin. Chromogenic Factor 10   levels below 20% indicate an INR greater than 3, and levels above 40%   indicate   an INR less than 2.     Factor 2 Assay   Date Value Ref Range Status   04/04/2012  60 - 140 % Final    (Note)  CANCELLED AND CREDITED PER [facility] IN MED. MONITORING,4/4/12,       ASSESSMENT / PLAN  INR assessment SUPRA    Recheck INR In: 2 DAYS    INR Location Clinic      Anticoagulation Summary as of 8/22/2018     INR goal 2.0-3.0   Today's INR 4.24! (8/21/2018)   Warfarin maintenance plan 5 mg (5 mg x 1) on Mon, Fri; 7.5 mg (5 mg x 1.5) all other days   Full warfarin instructions 8/23: Hold; Otherwise 5 mg on Mon, Fri; 7.5 mg all other days   Weekly warfarin total 47.5 mg   Plan last modified Nano Greer RN (5/2/2018)   Next INR check 8/24/2018   Priority INR   Target end date Indefinite    Indications   Pulmonary emboli (H) [I26.99]  Long-term (current) use of  anticoagulants [Z79.01] [Z79.01]         Anticoagulation Episode Summary     INR check location     Preferred lab     Send INR reminders to [facility]    Comments HIPPA INFO OK to speak with wife Josselyn MICHELLE to leave messages on Home.work and cell phones  use cell phone #[phone]      Anticoagulation Care Providers     Provider Role Specialty Phone number    Samm Vazquez MD [facility] Internal Medicine [phone]            See the Encounter Report to view Anticoagulation Flowsheet and Dosing Calendar (Go to Encounters tab in chart review, and find the Anticoagulation Therapy Visit)    Spoke with Oswaldo. He is not eating as much as he did before his gallbladder surgery, he is nauseated in the morning.  He is staying hydrated.  He has already taken today's dose of warfarin.  He will hold warfarin tomorrow, 8/23/18, and he will eat some dark greens today and tomorrow.  He will not take warfarin on 8/24/18 until after he speaks with someone in the [facility] and we know what his INR is.  Reviewed signs of bleeding with Oswaldo; he will be seen urgently if he develops any signs or if he falls.  The [facility] received Oswaldo's INR result on 8/22/18, was drawn on 8/21/18.      Marielena Chacon RN

## 2018-08-24 ENCOUNTER — ANTICOAGULATION THERAPY VISIT (OUTPATIENT)
Dept: ANTICOAGULATION | Facility: CLINIC | Age: 58
End: 2018-08-24

## 2018-08-24 DIAGNOSIS — I26.99 PULMONARY EMBOLI (H): ICD-10-CM

## 2018-08-24 DIAGNOSIS — Z79.01 LONG-TERM (CURRENT) USE OF ANTICOAGULANTS: ICD-10-CM

## 2018-08-24 LAB — INR PPP: 2.83 (ref 0.86–1.14)

## 2018-08-24 NOTE — MR AVS SNAPSHOT
Oswaldo Prabhakar   8/24/2018   Anticoagulation Therapy Visit    Description:  57 year old male   Provider:  Nano Greer RN   Department:  [facility]           INR as of 8/24/2018     Today's INR 2.83      Anticoagulation Summary as of 8/24/2018     INR goal 2.0-3.0   Today's INR 2.83   Full warfarin instructions 8/26: 5 mg; 8/27: 7.5 mg; 8/28: 5 mg; 8/30: 5 mg; Otherwise 5 mg on Mon, Fri; 7.5 mg all other days   Next INR check 8/31/2018    Indications   Pulmonary emboli (H) [I26.99]  Long-term (current) use of anticoagulants [Z79.01] [Z79.01]         August 2018 Details    Sun Mon Tue Wed Thu Fri Sat        1               2               3               4                 5               6               7               8               9               10               11                 12               13               14               15               16               17               18                 19               20               21               22               23               24      5 mg   See details      25      7.5 mg           26      5 mg         27      7.5 mg         28      5 mg         29      7.5 mg         30      5 mg         31              Date Details   08/24 This INR check       Date of next INR:  8/31/2018         How to take your warfarin dose     To take:  5 mg Take 1 of the 5 mg tablets.    To take:  7.5 mg Take 1.5 of the 5 mg tablets.

## 2018-08-24 NOTE — PROGRESS NOTES
ANTICOAGULATION FOLLOW-UP CLINIC VISIT    Patient Name:  Oswaldo Prabhakar  Date:  8/24/2018  Contact Type:  Telephone    SUBJECTIVE:        OBJECTIVE    INR   Date Value Ref Range Status   08/24/2018 2.83 (H) 0.86 - 1.14 Final     Chromogenic Factor 10   Date Value Ref Range Status   06/18/2011 81 60 - 140 % Final     Comment:     Therapeutic Range: A Chromogenic Factor 10 level of 20-40% inversely   correlates   with an INR of 2-3 for patients receiving Warfarin. Chromogenic Factor 10   levels below 20% indicate an INR greater than 3, and levels above 40%   indicate   an INR less than 2.     Factor 2 Assay   Date Value Ref Range Status   04/04/2012  60 - 140 % Final    (Note)  CANCELLED AND CREDITED PER [facility] IN MED. MONITORING,4/4/12,       ASSESSMENT / PLAN  INR assessment THER    Recheck INR In: 1 WEEK    INR Location Clinic      Anticoagulation Summary as of 8/24/2018     INR goal 2.0-3.0   Today's INR 2.83   Warfarin maintenance plan 5 mg (5 mg x 1) on Mon, Fri; 7.5 mg (5 mg x 1.5) all other days   Full warfarin instructions 8/26: 5 mg; 8/27: 7.5 mg; 8/28: 5 mg; 8/30: 5 mg; Otherwise 5 mg on Mon, Fri; 7.5 mg all other days   Weekly warfarin total 47.5 mg   Plan last modified Nano Greer, RN (5/2/2018)   Next INR check 8/31/2018   Priority INR   Target end date Indefinite    Indications   Pulmonary emboli (H) [I26.99]  Long-term (current) use of anticoagulants [Z79.01] [Z79.01]         Anticoagulation Episode Summary     INR check location     Preferred lab     Send INR reminders to [facility]    Comments HIPPA INFO OK to speak with wife Josselyn OK to leave messages on Home.work and cell phones  use cell phone #[phone]      Anticoagulation Care Providers     Provider Role Specialty Phone number    Samm Vazquez MD [facility] Internal Medicine [phone]            See the Encounter Report to view Anticoagulation Flowsheet and Dosing Calendar (Go to Encounters tab in chart review, and  find the Anticoagulation Therapy Visit)    Left message for patient with results and dosing recommendations. Asked patient to call back to report any missed doses, falls, signs and symptoms of bleeding or clotting, any changes in health, medication, or diet. Asked patient to call back with any questions or concerns.      Nano Greer RN

## 2018-08-30 ENCOUNTER — DOCUMENTATION ONLY (OUTPATIENT)
Dept: OTHER | Facility: CLINIC | Age: 58
End: 2018-08-30

## 2018-09-06 ENCOUNTER — ANTICOAGULATION THERAPY VISIT (OUTPATIENT)
Dept: ANTICOAGULATION | Facility: CLINIC | Age: 58
End: 2018-09-06

## 2018-09-06 DIAGNOSIS — I26.99 PULMONARY EMBOLI (H): ICD-10-CM

## 2018-09-06 DIAGNOSIS — Z79.01 LONG-TERM (CURRENT) USE OF ANTICOAGULANTS: ICD-10-CM

## 2018-09-06 DIAGNOSIS — E78.5 HYPERLIPIDEMIA LDL GOAL <70: ICD-10-CM

## 2018-09-06 LAB — INR PPP: 3.79 (ref 0.86–1.14)

## 2018-09-06 NOTE — MR AVS SNAPSHOT
Oswaldo Prabhakar   9/6/2018   Anticoagulation Therapy Visit    Description:  58 year old male   Provider:  Rigoberto Harmon, [facility]   Department:  [facility]           INR as of 9/6/2018     Today's INR 3.79!      Anticoagulation Summary as of 9/6/2018     INR goal 2.0-3.0   Today's INR 3.79!   Full warfarin instructions 9/6: 2.5 mg; 9/7: 5 mg; 9/8: 7.5 mg; 9/9: 5 mg   Next INR check 9/10/2018    Indications   Pulmonary emboli (H) [I26.99]  Long-term (current) use of anticoagulants [Z79.01] [Z79.01]         September 2018 Details    Sun Mon Tue Wed Thu Fri Sat           1                 2               3               4               5               6      2.5 mg   See details      7      5 mg         8      7.5 mg           9      5 mg         10            11               12               13               14               15                 16               17               18               19               20               21               22                 23               24               25               26               27               28               29                 30                      Date Details   09/06 This INR check       Date of next INR:  9/10/2018         How to take your warfarin dose     To take:  2.5 mg Take 0.5 of a 5 mg tablet.    To take:  5 mg Take 1 of the 5 mg tablets.    To take:  7.5 mg Take 1.5 of the 5 mg tablets.

## 2018-09-06 NOTE — PROGRESS NOTES
ANTICOAGULATION FOLLOW-UP CLINIC VISIT    Patient Name:  Oswaldo Prabhakar  Date:  9/6/2018  Contact Type:  Telephone    SUBJECTIVE:        OBJECTIVE    INR   Date Value Ref Range Status   09/06/2018 3.79 (H) 0.86 - 1.14 Final     Chromogenic Factor 10   Date Value Ref Range Status   06/18/2011 81 60 - 140 % Final     Comment:     Therapeutic Range: A Chromogenic Factor 10 level of 20-40% inversely   correlates   with an INR of 2-3 for patients receiving Warfarin. Chromogenic Factor 10   levels below 20% indicate an INR greater than 3, and levels above 40%   indicate   an INR less than 2.     Factor 2 Assay   Date Value Ref Range Status   04/04/2012  60 - 140 % Final    (Note)  CANCELLED AND CREDITED PER [facility] MED. MONITORING,4/4/12,       ASSESSMENT / PLAN  INR assessment SUPRA    Recheck INR In: 4 DAYS    INR Location Clinic      Anticoagulation Summary as of 9/6/2018     INR goal 2.0-3.0   Today's INR 3.79!   Warfarin maintenance plan No maintenance plan   Full warfarin instructions 9/6: 2.5 mg; 9/7: 5 mg; 9/8: 7.5 mg; 9/9: 5 mg   Weekly warfarin total 47.5 mg   Plan last modified Rigoberto Harmon, [facility] (9/6/2018)   Next INR check 9/10/2018   Priority INR   Target end date Indefinite    Indications   Pulmonary emboli (H) [I26.99]  Long-term (current) use of anticoagulants [Z79.01] [Z79.01]         Anticoagulation Episode Summary     INR check location     Preferred lab     Send INR reminders to [facility]    Comments HIPPA INFO OK to speak with wife Josselyn OK to leave messages on Home.work and cell phones  use cell phone #[phone]      Anticoagulation Care Providers     Provider Role Specialty Phone number    Samm Vazquez MD [facility] Internal Medicine [phone]            See the Encounter Report to view Anticoagulation Flowsheet and Dosing Calendar (Go to Encounters tab in chart review, and find the Anticoagulation Therapy Visit)    Left message for patient with results and dosing  recommendations. Asked patient to call back to report any missed doses, falls, signs and symptoms of bleeding or clotting, any changes in health, medication, or diet. Asked patient to call back with any questions or concerns.      Rigoberto Harmon RP

## 2018-09-10 ENCOUNTER — ANTICOAGULATION THERAPY VISIT (OUTPATIENT)
Dept: ANTICOAGULATION | Facility: CLINIC | Age: 58
End: 2018-09-10

## 2018-09-10 DIAGNOSIS — Z79.01 LONG-TERM (CURRENT) USE OF ANTICOAGULANTS: ICD-10-CM

## 2018-09-10 DIAGNOSIS — I26.99 PULMONARY EMBOLI (H): ICD-10-CM

## 2018-09-10 LAB — INR PPP: 2.4 (ref 0.86–1.14)

## 2018-09-10 PROCEDURE — 85610 PROTHROMBIN TIME: CPT | Performed by: INTERNAL MEDICINE

## 2018-09-10 PROCEDURE — 36416 COLLJ CAPILLARY BLOOD SPEC: CPT | Performed by: INTERNAL MEDICINE

## 2018-09-10 NOTE — PROGRESS NOTES
ANTICOAGULATION FOLLOW-UP CLINIC VISIT    Patient Name:  Oswaldo Prabhakar  Date:  9/10/2018  Contact Type:  Telephone    SUBJECTIVE:        OBJECTIVE    INR   Date Value Ref Range Status   09/10/2018 2.40 (H) 0.86 - 1.14 Final     Chromogenic Factor 10   Date Value Ref Range Status   06/18/2011 81 60 - 140 % Final     Comment:     Therapeutic Range: A Chromogenic Factor 10 level of 20-40% inversely   correlates   with an INR of 2-3 for patients receiving Warfarin. Chromogenic Factor 10   levels below 20% indicate an INR greater than 3, and levels above 40%   indicate   an INR less than 2.     Factor 2 Assay   Date Value Ref Range Status   04/04/2012  60 - 140 % Final    (Note)  CANCELLED AND CREDITED PER [facility] IN MED. MONITORING,4/4/12,       ASSESSMENT / PLAN  INR assessment THER    Recheck INR In: 1 WEEK    INR Location Clinic      Anticoagulation Summary as of 9/10/2018     INR goal 2.0-3.0   Today's INR 2.40   Warfarin maintenance plan No maintenance plan   Full warfarin instructions 9/10: 5 mg; 9/11: 7.5 mg; 9/12: 5 mg; 9/13: 7.5 mg; 9/14: 5 mg; 9/15: 7.5 mg; 9/16: 7.5 mg   Weekly warfarin total 47.5 mg   Plan last modified Rigoberto Harmon, [facility] (9/6/2018)   Next INR check 9/17/2018   Priority INR   Target end date Indefinite    Indications   Pulmonary emboli (H) [I26.99]  Long-term (current) use of anticoagulants [Z79.01] [Z79.01]         Anticoagulation Episode Summary     INR check location     Preferred lab     Send INR reminders to [facility]    Comments HIPPA INFO OK to speak with wife Josselyn OK to leave messages on Home.work and cell phones  use cell phone #[phone]      Anticoagulation Care Providers     Provider Role Specialty Phone number    Samm Vazquez MD [facility] Internal Medicine [phone]            See the Encounter Report to view Anticoagulation Flowsheet and Dosing Calendar (Go to Encounters tab in chart review, and find the Anticoagulation Therapy Visit)    Left  message for patient with results and dosing recommendations. Asked patient to call back to report any missed doses, falls, signs and symptoms of bleeding or clotting, any changes in health, medication, or diet. Asked patient to call back with any questions or concerns.     Pt might benefit from 5mg three or four times a week with 7.5mg ROW. Will see if next Monday if pt should add another 5mg tablet to his maintenance dose     Liliam Hyman RN

## 2018-09-10 NOTE — MR AVS SNAPSHOT
Oswaldo Prabhakar   9/10/2018   Anticoagulation Therapy Visit    Description:  58 year old male   Provider:  Liliam Hyman, RN   Department:  [facility]           INR as of 9/10/2018     Today's INR 2.40      Anticoagulation Summary as of 9/10/2018     INR goal 2.0-3.0   Today's INR 2.40   Full warfarin instructions 9/10: 5 mg; 9/11: 7.5 mg; 9/12: 5 mg; 9/13: 7.5 mg; 9/14: 5 mg; 9/15: 7.5 mg; 9/16: 7.5 mg   Next INR check 9/17/2018    Indications   Pulmonary emboli (H) [I26.99]  Long-term (current) use of anticoagulants [Z79.01] [Z79.01]         September 2018 Details    Sun Mon Tue Wed Thu Fri Sat           1                 2               3               4               5               6               7               8                 9               10      5 mg   See details      11      7.5 mg         12      5 mg         13      7.5 mg         14      5 mg         15      7.5 mg           16      7.5 mg         17            18               19               20               21               22                 23               24               25               26               27               28               29                 30                      Date Details   09/10 This INR check       Date of next INR:  9/17/2018         How to take your warfarin dose     To take:  5 mg Take 1 of the 5 mg tablets.    To take:  7.5 mg Take 1.5 of the 5 mg tablets.

## 2018-09-14 ENCOUNTER — OFFICE VISIT (OUTPATIENT)
Dept: INTERNAL MEDICINE | Facility: CLINIC | Age: 58
End: 2018-09-14
Payer: COMMERCIAL

## 2018-09-14 ENCOUNTER — RADIANT APPOINTMENT (OUTPATIENT)
Dept: GENERAL RADIOLOGY | Facility: CLINIC | Age: 58
End: 2018-09-14
Attending: INTERNAL MEDICINE
Payer: COMMERCIAL

## 2018-09-14 VITALS
SYSTOLIC BLOOD PRESSURE: 112 MMHG | HEART RATE: 57 BPM | DIASTOLIC BLOOD PRESSURE: 69 MMHG | WEIGHT: 208.6 LBS | BODY MASS INDEX: 29.09 KG/M2 | RESPIRATION RATE: 18 BRPM

## 2018-09-14 DIAGNOSIS — M47.812 NECK ARTHRITIS: ICD-10-CM

## 2018-09-14 DIAGNOSIS — I26.99 OTHER PULMONARY EMBOLISM WITHOUT ACUTE COR PULMONALE, UNSPECIFIED CHRONICITY (H): ICD-10-CM

## 2018-09-14 DIAGNOSIS — M47.812 NECK ARTHRITIS: Primary | ICD-10-CM

## 2018-09-14 ASSESSMENT — PAIN SCALES - GENERAL: PAINLEVEL: EXTREME PAIN (8)

## 2018-09-14 NOTE — NURSING NOTE
Chief Complaint   Patient presents with     Recheck Medication     Patient is here for follow up; warfarin       Jos Gregory CMA ([facility]) at 2:50 PM on 9/14/2018

## 2018-09-14 NOTE — PROGRESS NOTES
ASSESSMENT/PLAN:  Patient here for general follow up.    Neck arthritis (H) - he has cracking of the spine which either is bony or ligament.  I will get an x-ray to see which it may be.  -     XR Cervical Spine G/E 4 Views; Future    Chronic anticoagulation - he is frustrated by the variability of the INR but there does not seem to be anything we can do about it given he needs to be coumadin rather than heparin      Samm Vazquez MD, FACP      SUBJECTIVE:  Here for a follow up    He was having a lot of burping and central abdominal pain.  He then had his gallbladder removed.  Then he had a bullock x 1 week from the anesthesia.    For his lipids he is on atorvastatin, fenofibrate, zetia and praluent.  No cardiac pain.      He is doing some bike riding 10-15 miles with a max heart rate with good return to resting heart.    His INR is all over the place.  He is a bit frustrated with this.  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    Gen: no fevers, night sweats or weight change  Eyes: no vision change, diplopia or red eyes  Ears, Noses, Mouth, Throat: pulsatile tinnitus, no epistaxis or nasal discharge, no oral lesions, throat clear  Cardiac: no chest pain, palpitations, or pain with walking  Lungs: no dyspnea, cough, or shortness of breath  GI: no nausea, vomiting, diarrhea or constipation, no abdominal pain  : no change in urine, hematuria, or sexual dysfunction  Musculoskeletal: he has neck pain on the right lateral side of his neck with clicking for a number of weeks, he was put on muscle relaxers x few weeks.  He uses a massager which helps temporarily.  Turning to the right hurts.  No injury or trauma that started this.      Patient Active Problem List   Diagnosis     Retinal detachment     HYPERLIPIDEMIA LDL GOAL <130     Pulmonary emboli (H)     Pure hyperglyceridemia     Calculus of kidney     Warfarin anticoagulation     Atypical chest pain     S/P CABG x 4     CAD (coronary artery disease)     Finger  stiffness, left     Fracture of phalanx of finger     Long-term (current) use of anticoagulants [Z79.01]     Statin intolerance       Past Medical History:   Diagnosis Date     Antiplatelet or antithrombotic long-term use      Coronary artery disease      Diaphragmatic hernia without mention of obstruction or gangrene      Heart disease      Hernia, abdominal      History of blood transfusion      History of thrombophlebitis      Hypertension      Nephrolithiasis 4/2010     Other and unspecified hyperlipidemia      Pulmonary embolus and infarction (H)     s/p hemothorax and filter s/p filter removal (2011), now on long term anti-coagulation     Statin intolerance 2/1/2017     Stented coronary artery      Unspecified retinal detachment     Childhood trauma, unrepaired       Past Surgical History:   Procedure Laterality Date     BYPASS GRAFT ARTERY CORONARY  4/4/2014    Procedure: BYPASS GRAFT ARTERY CORONARY;  Median Sternotomy, Coronary Artery Bypass Bypass x 4 using Left Internal Mammary, Left Saphenous Vein, on pump oxygenation;  Surgeon: Sherry Armando MD;  Location: [facility] OR     C NONSPECIFIC PROCEDURE      Spermatocele resection     CLOSED REDUCTION, PERCUTANEOUS PINNING  HAND, COMBINED Left 11/5/2015    Procedure: COMBINED CLOSED REDUCTION, PERCUTANEOUS PINNING HAND;  Surgeon: Carmella Love MD;  Location:  OR     COLONOSCOPY  3/15/2013    Procedure: COLONOSCOPY;;  Surgeon: Racheal Shipman MD;  Location: [facility]     CYSTOSCOPY       LAPAROSCOPIC CHOLECYSTECTOMY N/A 8/6/2018    Procedure: LAPAROSCOPIC CHOLECYSTECTOMY;  LAPAROSCOPIC CHOLECYSTECTOMY ;  Surgeon: José Miguel Stuart MD;  Location:  OR     LITHOTRIPSY  4/2010     THORACIC SURGERY      lung surgery, thoracotomy, lung adhesion       Family History   Problem Relation Age of Onset     HEART DISEASE Mother      C.A.D. Father      3 vessel CABG, age 70     Cancer Father      bladder cancer     C.A.D. Paternal Grandmother       Hypertension Paternal Grandmother      Alzheimer Disease Paternal Grandmother      Diabetes No family hx of      Thyroid Disease No family hx of      Cancer - colorectal No family hx of        Social History     Social History     Marital status:      Spouse name: N/A     Number of children: N/A     Years of education: N/A     Occupational History     Not on file.     Social History Main Topics     Smoking status: Never Smoker     Smokeless tobacco: Never Used     Alcohol use No      Comment: very rare     Drug use: No     Sexual activity: Not on file     Other Topics Concern     Not on file     Social History Narrative       Health Maintenance   Topic Date Due     OP ANNUAL INR REFERRAL  04/21/2017     INFLUENZA VACCINE (1) 09/01/2018     PHQ-2 Q1 YR  07/26/2019     COLONOSCOPY Q10 YR  03/15/2023     LIPID MONITORING Q5 YEARS  03/23/2023     TETANUS Q10 YR  07/05/2023     ADVANCE DIRECTIVE PLANNING Q5 YRS  08/30/2023     HIV SCREEN (SYSTEM ASSIGNED)  Completed     HEPATITIS C SCREENING  Completed       OBJECTIVE:  Physical Exam:  /69 (BP Location: Right arm, Patient Position: Sitting, Cuff Size: Adult Regular)  Pulse 57  Resp 18  Wt 94.6 kg (208 lb 9.6 oz)  BMI 29.09 kg/m2  Constitutional: no distress, comfortable, pleasant   Ears, Nose and Throat: there is an audible and palpable clicking with head rotation left to right  Cardiovascular: regular rate and rhythm, normal S1 and S2, no murmurs, rubs or gallops, peripheral pulses full and symmetric   Respiratory: clear to auscultation, no wheezes or crackles, normal breath sounds   Gastrointestinal: positive bowel sounds, nontender, no hepatosplenomegaly, no masses   Skin: no concerning lesions  Psychological: appropriate mood   Lymphatic: no cervical  lymphadenopathy

## 2018-09-14 NOTE — MR AVS SNAPSHOT
After Visit Summary   9/14/2018    Oswaldo Prabhakar    MRN: [id number]           Patient Information     Date Of Birth          1960        Visit Information        Provider Department      9/14/2018 3:00 PM Samm Vazquez MD [facility]        Today's Diagnoses     Neck arthritis (H)    -  1       Follow-ups after your visit        Your next 10 appointments already scheduled     Sep 17, 2018  4:00 PM CDT   Return Visit with Pamella Kearns PA-C   [facility] ([facility])    [address]  [address]  [facility] [address] [phone]              Future tests that were ordered for you today     Open Future Orders        Priority Expected Expires Ordered    XR Cervical Spine G/E 4 Views Routine 9/14/2018 9/14/2019 9/14/2018            Who to contact     Please call your clinic at [phone] to:    Ask questions about your health    Make or cancel appointments    Discuss your medicines    Learn about your test results    Speak to your doctor            Additional Information About Your Visit        Worke[employer] Information     [employer] gives you secure access to your electronic health record. If you see a primary care provider, you can also send messages to your care team and make appointments. If you have questions, please call your primary care clinic.  If you do not have a primary care provider, please call [phone] and they will assist you.      [employer] is an electronic gateway that provides easy, online access to your medical records. With [employer], you can request a clinic appointment, read your test results, renew a prescription or communicate with your care team.     To access your existing account, please contact your [facility] or call [phone] for assistance.        Care EveryWhere ID     This is your Care EveryWhere ID. This could be used by other organizations  to access your [place] medical records  [id number]        Your Vitals Were     Pulse Respirations BMI (Body Mass Index)             57 18 29.09 kg/m2          Blood Pressure from Last 3 Encounters:   09/14/18 112/69   08/10/18 118/78   08/07/18 151/87    Weight from Last 3 Encounters:   09/14/18 94.6 kg (208 lb 9.6 oz)   08/10/18 93.4 kg (206 lb)   08/06/18 91.6 kg (202 lb)                 Today's Medication Changes          These changes are accurate as of 9/14/18  3:48 PM.  If you have any questions, ask your nurse or doctor.               These medicines have changed or have updated prescriptions.        Dose/Directions    * metoprolol tartrate 25 MG tablet   Commonly known as:  LOPRESSOR   This may have changed:  additional instructions   Used for:  S/P CABG x 4        Dose:  12.5 mg   Take 0.5 tablets (12.5 mg) by mouth 2 times daily   Quantity:  90 tablet   Refills:  3       * metoprolol 10 mg/mL Susp   Commonly known as:  LOPRESSOR   This may have changed:  Another medication with the same name was changed. Make sure you understand how and when to take each.   Used for:  Essential hypertension        Dose:  12.5 mg   Take 1.25 mLs (12.5 mg) by mouth 2 times daily   Quantity:  100 mL   Refills:  5       warfarin 5 MG tablet   Commonly known as:  COUMADIN   Indication:  Obstructive Blood Clot in a Blood Vessel of the Lung   This may have changed:  additional instructions   Used for:  History of pulmonary embolism, Chronic anticoagulation        Dose:  7.5 mg   Take 1.5 tablets (7.5 mg) by mouth daily   Quantity:  135 tablet   Refills:  2       * Notice:  This list has 2 medication(s) that are the same as other medications prescribed for you. Read the directions carefully, and ask your doctor or other care provider to review them with you.      Stop taking these medicines if you haven't already. Please contact your care team if you have questions.     albuterol 108 (90 Base) MCG/ACT inhaler   Commonly known  as:  PROAIR HFA/PROVENTIL HFA/VENTOLIN HFA   Stopped by:  Samm Vazquez MD           metoclopramide 10 MG/10ML Soln solution   Commonly known as:  REGLAN   Stopped by:  Samm Vazquez MD           oxyCODONE IR 5 MG tablet   Commonly known as:  ROXICODONE   Stopped by:  Samm Vazquez MD                    Primary Care Provider Office Phone # Fax #    Samm Vazquez [phone]       [address] [place] [address] [address] [place] [address]        Equal Access to Services     [facility]: Hadii aad ku hadasho Soomaali, waaxda luqadaha, qaybta kaalmada adeegyada, waxay idiin hayaan adechristiano penaloza . So [facility] [phone].    ATENCIÓN: Si habla español, tiene a krueger disposición servicios gratuitos de asistencia lingüística. [facility] [phone].    We comply with applicable federal civil rights laws and [place] laws. We do not discriminate on the basis of race, color, national origin, age, disability, sex, sexual orientation, or gender identity.            Thank you!     Thank you for choosing [facility]  for your care. Our goal is always to provide you with excellent care. Hearing back from our patients is one way we can continue to improve our services. Please take a few minutes to complete the written survey that you may receive in the mail after your visit with us. Thank you!             Your Updated Medication List - Protect others around you: Learn how to safely use, store and throw away your medicines at www.disposemymeds.org.          This list is accurate as of 9/14/18  3:48 PM.  Always use your most recent med list.                   Brand Name Dispense Instructions for use Diagnosis    alirocumab 75 MG/ML injectable pen    PRALUENT    2 mL    Inject 1 mL (75 mg) Subcutaneous every 14 days    CAD (coronary artery disease), S/P CABG x 4, Hyperlipidemia LDL goal <130, Statin intolerance       aspirin 81 MG EC tablet     91 tablet    Take 1 tablet (81 mg) by mouth daily    S/P CABG  x 4       atorvastatin 80 MG tablet    LIPITOR    91 tablet    Take 1 tablet (80 mg) by mouth daily    Pure hyperglyceridemia, Hyperlipidemia LDL goal <130       BENADRYL PO      Take 25-50 mg by mouth daily as needed        calcium carbonate 500 MG chewable tablet    TUMS     Take 1 chew tab by mouth daily as needed        ezetimibe 10 MG tablet    ZETIA    90 tablet    Take 1 tablet (10 mg) by mouth daily    Statin intolerance, Hyperlipidemia LDL goal <70, Atherosclerosis of coronary artery of native heart without angina pectoris, unspecified vessel or lesion type, S/P CABG (coronary artery bypass graft)       fenofibrate 160 MG tablet     91 tablet    Take 1 tablet (160 mg) by mouth daily    Hyperlipidemia LDL goal <130       lisinopril 2.5 MG tablet    PRINIVIL/Zestril    90 tablet    Take 1 tablet (2.5 mg) by mouth daily , or as directed by Dr. Ocampo.    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 Read the directions carefully, and ask your doctor or other care provider to review them with you.

## 2018-09-17 ENCOUNTER — OFFICE VISIT (OUTPATIENT)
Dept: UROLOGY | Facility: CLINIC | Age: 58
End: 2018-09-17
Payer: COMMERCIAL

## 2018-09-17 VITALS
WEIGHT: 208 LBS | SYSTOLIC BLOOD PRESSURE: 132 MMHG | DIASTOLIC BLOOD PRESSURE: 66 MMHG | BODY MASS INDEX: 29.12 KG/M2 | HEIGHT: 71 IN | HEART RATE: 60 BPM

## 2018-09-17 DIAGNOSIS — R33.9 URINARY RETENTION: Primary | ICD-10-CM

## 2018-09-17 DIAGNOSIS — Z12.5 SCREENING FOR PROSTATE CANCER: ICD-10-CM

## 2018-09-17 LAB
ALBUMIN UR-MCNC: NEGATIVE MG/DL
APPEARANCE UR: CLEAR
BILIRUB UR QL STRIP: NEGATIVE
COLOR UR AUTO: YELLOW
GLUCOSE UR STRIP-MCNC: NEGATIVE MG/DL
HGB UR QL STRIP: NEGATIVE
KETONES UR STRIP-MCNC: NEGATIVE MG/DL
LEUKOCYTE ESTERASE UR QL STRIP: NEGATIVE
NITRATE UR QL: NEGATIVE
PH UR STRIP: 6.5 PH (ref 5–7)
PR INTERVAL - MUSE: 45
SOURCE: NORMAL
SP GR UR STRIP: 1.02 (ref 1–1.03)
UROBILINOGEN UR STRIP-ACNC: 0.2 EU/DL (ref 0.2–1)

## 2018-09-17 PROCEDURE — 81003 URINALYSIS AUTO W/O SCOPE: CPT | Mod: QW | Performed by: PHYSICIAN ASSISTANT

## 2018-09-17 PROCEDURE — 99213 OFFICE O/P EST LOW 20 MIN: CPT | Mod: 25 | Performed by: PHYSICIAN ASSISTANT

## 2018-09-17 PROCEDURE — 51700 IRRIGATION OF BLADDER: CPT | Performed by: PHYSICIAN ASSISTANT

## 2018-09-17 ASSESSMENT — PAIN SCALES - GENERAL: PAINLEVEL: NO PAIN (0)

## 2018-09-17 NOTE — PROGRESS NOTES
CC: Urinary retention.    HPI: It is a pleasure to see . Oswaldo Prabhakar, a 57 year old male seen today in the urology clinic in follow up of urinary retention. This developed acutely requiring Daniel catheter placement on 8/6/18 following cholecystectomy. He passed TOV last month.     The patient has no prior history of AUR or similar symptoms. At baseline, patient does not have BPH symptoms and today is feeling well. Currently denies fevers, chills, N/V, abdominal/back pain.   Last PSA in 2011, normal.         Past Medical History:   Diagnosis Date     Antiplatelet or antithrombotic long-term use      Coronary artery disease      Diaphragmatic hernia without mention of obstruction or gangrene      Heart disease      Hernia, abdominal      History of blood transfusion      History of thrombophlebitis      Hypertension      Nephrolithiasis 4/2010     Other and unspecified hyperlipidemia      Pulmonary embolus and infarction (H)     s/p hemothorax and filter s/p filter removal (2011), now on long term anti-coagulation     Statin intolerance 2/1/2017     Stented coronary artery      Unspecified retinal detachment     Childhood trauma, unrepaired     Past Surgical History:   Procedure Laterality Date     BYPASS GRAFT ARTERY CORONARY  4/4/2014    Procedure: BYPASS GRAFT ARTERY CORONARY;  Median Sternotomy, Coronary Artery Bypass Bypass x 4 using Left Internal Mammary, Left Saphenous Vein, on pump oxygenation;  Surgeon: Sherry Armando MD;  Location: [facility] OR     C NONSPECIFIC PROCEDURE      Spermatocele resection     CLOSED REDUCTION, PERCUTANEOUS PINNING  HAND, COMBINED Left 11/5/2015    Procedure: COMBINED CLOSED REDUCTION, PERCUTANEOUS PINNING HAND;  Surgeon: Carmella Love MD;  Location: [facility]     COLONOSCOPY  3/15/2013    Procedure: COLONOSCOPY;;  Surgeon: Racheal Shipman MD;  Location: [facility] GI     CYSTOSCOPY       LAPAROSCOPIC CHOLECYSTECTOMY N/A 8/6/2018    Procedure: LAPAROSCOPIC  CHOLECYSTECTOMY;  LAPAROSCOPIC CHOLECYSTECTOMY ;  Surgeon: José Miguel Stuart MD;  Location: [facility] OR     LITHOTRIPSY  4/2010     THORACIC SURGERY      lung surgery, thoracotomy, lung adhesion     Current Outpatient Prescriptions   Medication Sig Dispense Refill     alirocumab (PRALUENT) 75 MG/ML injectable pen Inject 1 mL (75 mg) Subcutaneous every 14 days 2 mL 12     aspirin EC 81 MG tablet Take 1 tablet (81 mg) by mouth daily 91 tablet 3     atorvastatin (LIPITOR) 80 MG tablet Take 1 tablet (80 mg) by mouth daily 91 tablet 3     calcium carbonate (TUMS) 500 MG chewable tablet Take 1 chew tab by mouth daily as needed       Cholecalciferol (VITAMIN D) 2000 UNITS CAPS Take 2,000 Units by mouth daily       DiphenhydrAMINE HCl (BENADRYL PO) Take 25-50 mg by mouth daily as needed       ezetimibe (ZETIA) 10 MG tablet Take 1 tablet (10 mg) by mouth daily 90 tablet 3     fenofibrate 160 MG tablet Take 1 tablet (160 mg) by mouth daily 91 tablet 3     lisinopril (PRINIVIL/ZESTRIL) 2.5 MG tablet Take 1 tablet (2.5 mg) by mouth daily , or as directed by Dr. Ocampo. 90 tablet 2     metoprolol (LOPRESSOR) 10 mg/mL SUSP Take 1.25 mLs (12.5 mg) by mouth 2 times daily 100 mL 5     metoprolol (LOPRESSOR) 25 MG tablet Take 0.5 tablets (12.5 mg) by mouth 2 times daily (Patient taking differently: Take 12.5 mg by mouth 2 times daily ON HOLD DUE TO NAUSEA (7/26/18).) 90 tablet 3     warfarin (COUMADIN) 5 MG tablet Take 1.5 tablets (7.5 mg) by mouth daily (Patient taking differently: Take 7.5 mg by mouth daily ON HOLD DUE TO NAUSEA (7/26/18).) 135 tablet 2     Allergies   Allergen Reactions     Cats      Hay Fever & [A.R.M.]      Heparin Other (See Comments)     Heparin resistance per cardio-thoracic surgeon Dr. Armando     Hydrocodone-Acetaminophen Nausea and Vomiting     Acetaminophen is ok     Family History: There is no h/o  malignancy.  There is no h/o urolithiasis.     Social History: The patient does not smoke cigarettes.  Denies  "EtOH and illicit drug use.      ROS: A comprehensive 14 point ROS was obtained and was  otherwise negative except for that outlined above in the HPI.    PHYSICAL EXAM:   Ht 1.803 m (5' 11\")  Wt 94.3 kg (208 lb)  BMI 29.01 kg/m2    GENERAL: Well groomed/well developed/well nourished male in NAD.  HEENT: EOMI, AT, NC.  SKIN: Warm to touch, dry.  No visible rashes or lesions.  RESP: No increased respiratory effort.  LYMPH: No LE edema.  ABD: Soft, NT, ND.  No CVAT.  MS: Full ROM in extremities.  NEURO: Alert and oriented x 3.  PSYCH: Normal mood and affect, pleasant and agreeable during interview and exam.    PROCEDURE: A trial of void was performed by nursing staff today in the clinic.  The patient's Daniel leg bag was disconnected and 250 cc of sterile water were infused into the patient's bladder via gravity drainage, which the patient tolerated fine.  The Daniel balloon was decompressed and the catheter was then removed.  After a few minutes Mr. Prabhakar voided 350 cc.  The patient did pass today's trial of void and the catheter did not need to be replaced.      REVIEW OF OUTSIDE RECORDS: 5 minutes spent reviewing previous/outside records.    ASSESSMENT/PLAN:  58 year old male who developed acute urinary retention requiring Daniel catheter placement post op.     -Needs updated PSA in 3-4 weeks; will notify of results.   -RTO PRN    I have enjoyed participating in the medical care of this patient.  Please do not hesitate to contact me with any questions or concerns.      Pamella Kearns PA-C  [facility]    10 min spent with the patient, >50% of this time was spent in a face-to-face manner and on coordination of care of urinary retention.           "

## 2018-09-17 NOTE — MR AVS SNAPSHOT
After Visit Summary   9/17/2018    Oswaldo Prabhakar    MRN: [id number]           Patient Information     Date Of Birth          1960        Visit Information        Provider Department      9/17/2018 4:00 PM Pamella Kearns PA-C [facility] [place]        Today's Diagnoses     Urinary retention    -  1    Screening for prostate cancer          Care Instructions    PSA screening blood test in 3 weeks (make an appointment at the [facility]).    Will notify you of those results. If normal, you may see your primary care yearly and our office as needed with any urologic concerns.             Follow-ups after your visit        Future tests that were ordered for you today     Open Future Orders        Priority Expected Expires Ordered    PSA tumor marker (Send Out) [[id number]] Routine 10/8/2018 9/17/2019 9/17/2018            Who to contact     If you have questions or need follow up information about today's clinic visit or your schedule please contact [facility] [place] directly at [phone].  Normal or non-critical lab and imaging results will be communicated to you by [employer], letter or phone within 4 business days after the clinic has received the results. If you do not hear from us within 7 days, please contact the clinic through [employer] or phone. If you have a critical or abnormal lab result, we will notify you by phone as soon as possible.  Submit refill requests through [employer] or call your pharmacy and they will forward the refill request to us. Please allow 3 business days for your refill to be completed.          Additional Information About Your Visit        [employer] Information     [employer] gives you secure access to your electronic health record. If you see a primary care provider, you can also send messages to your care team and make appointments. If you have questions, please call your primary care clinic.  If you do not  "have a primary care provider, please call [phone] and they will assist you.        Care EveryWhere ID     This is your Care EveryWhere ID. This could be used by other organizations to access your [place] medical records  [id number]        Your Vitals Were     Pulse Height BMI (Body Mass Index)             60 1.803 m (5' 11\") 29.01 kg/m2          Blood Pressure from Last 3 Encounters:   09/17/18 132/66   09/14/18 112/69   08/10/18 118/78    Weight from Last 3 Encounters:   09/17/18 94.3 kg (208 lb)   09/14/18 94.6 kg (208 lb 9.6 oz)   08/10/18 93.4 kg (206 lb)              We Performed the Following     MEASURE POST-VOID RESIDUAL URINE/BLADDER CAPACITY, US NON-IMAGING (85038)     UA without Microscopic          Today's Medication Changes          These changes are accurate as of 9/17/18  4:14 PM.  If you have any questions, ask your nurse or doctor.               These medicines have changed or have updated prescriptions.        Dose/Directions    * metoprolol tartrate 25 MG tablet   Commonly known as:  LOPRESSOR   This may have changed:  additional instructions   Used for:  S/P CABG x 4        Dose:  12.5 mg   Take 0.5 tablets (12.5 mg) by mouth 2 times daily   Quantity:  90 tablet   Refills:  3       * metoprolol 10 mg/mL Susp   Commonly known as:  LOPRESSOR   This may have changed:  Another medication with the same name was changed. Make sure you understand how and when to take each.   Used for:  Essential hypertension        Dose:  12.5 mg   Take 1.25 mLs (12.5 mg) by mouth 2 times daily   Quantity:  100 mL   Refills:  5       warfarin 5 MG tablet   Commonly known as:  COUMADIN   Indication:  Obstructive Blood Clot in a Blood Vessel of the Lung   This may have changed:  additional instructions   Used for:  History of pulmonary embolism, Chronic anticoagulation        Dose:  7.5 mg   Take 1.5 tablets (7.5 mg) by mouth daily   Quantity:  135 tablet   Refills:  2       * Notice:  This list has 2 " medication(s) that are the same as other medications prescribed for you. Read the directions carefully, and ask your doctor or other care provider to review them with you.             Primary Care Provider Office Phone # Fax #    Samm Vazquez [phone]       [address] [place] [address] [address] [place] [address]        Equal Access to Services     SURESH ZURITA : Hadii aad ku hadasho Soomaali, waaxda luqadaha, qaybta kaalmada adeegyada, waxay idiin hayaan adeeg kharash laguillremina . So [facility] [phone].    ATENCIÓN: Si habla español, tiene a krueger disposición servicios gratuitos de asistencia lingüística. [facility] [phone].    We comply with applicable federal civil rights laws and [place] laws. We do not discriminate on the basis of race, color, national origin, age, disability, sex, sexual orientation, or gender identity.            Thank you!     Thank you for choosing [facility] [place]  for your care. Our goal is always to provide you with excellent care. Hearing back from our patients is one way we can continue to improve our services. Please take a few minutes to complete the written survey that you may receive in the mail after your visit with us. Thank you!             Your Updated Medication List - Protect others around you: Learn how to safely use, store and throw away your medicines at www.disposemymeds.org.          This list is accurate as of 9/17/18  4:14 PM.  Always use your most recent med list.                   Brand Name Dispense Instructions for use Diagnosis    alirocumab 75 MG/ML injectable pen    PRALUENT    2 mL    Inject 1 mL (75 mg) Subcutaneous every 14 days    CAD (coronary artery disease), S/P CABG x 4, Hyperlipidemia LDL goal <130, Statin intolerance       aspirin 81 MG EC tablet     91 tablet    Take 1 tablet (81 mg) by mouth daily    S/P CABG x 4       atorvastatin 80 MG tablet    LIPITOR    91 tablet    Take 1 tablet (80 mg) by mouth  daily    Pure hyperglyceridemia, Hyperlipidemia LDL goal <130       BENADRYL PO      Take 25-50 mg by mouth daily as needed        calcium carbonate 500 MG chewable tablet    TUMS     Take 1 chew tab by mouth daily as needed        ezetimibe 10 MG tablet    ZETIA    90 tablet    Take 1 tablet (10 mg) by mouth daily    Statin intolerance, Hyperlipidemia LDL goal <70, Atherosclerosis of coronary artery of native heart without angina pectoris, unspecified vessel or lesion type, S/P CABG (coronary artery bypass graft)       fenofibrate 160 MG tablet     91 tablet    Take 1 tablet (160 mg) by mouth daily    Hyperlipidemia LDL goal <130       lisinopril 2.5 MG tablet    PRINIVIL/Zestril    90 tablet    Take 1 tablet (2.5 mg) by mouth daily , or as directed by Dr. Ocampo.    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 Read the directions carefully, and ask your doctor or other care provider to review them with you.

## 2018-09-17 NOTE — LETTER
9/17/2018       RE: Oswaldo Prabhakar  [address]  [place] [address]     Dear Colleague,    Thank you for referring your patient, Oswaldo Prabhakar, to the [facility] UROLOGY [facility]IN[facility] [place] [facility]. Please see a copy of my visit note below.    CC: Urinary retention.    HPI: It is a pleasure to see . Oswaldo Prabhakar, a 57 year old male seen today in the urology clinic in follow up of urinary retention. This developed acutely requiring Daniel catheter placement on 8/6/18 following cholecystectomy. He passed TOV last month.     The patient has no prior history of AUR or similar symptoms. At baseline, patient does not have BPH symptoms and today is feeling well. Currently denies fevers, chills, N/V, abdominal/back pain.   Last PSA in 2011, normal.         Past Medical History:   Diagnosis Date     Antiplatelet or antithrombotic long-term use      Coronary artery disease      Diaphragmatic hernia without mention of obstruction or gangrene      Heart disease      Hernia, abdominal      History of blood transfusion      History of thrombophlebitis      Hypertension      Nephrolithiasis 4/2010     Other and unspecified hyperlipidemia      Pulmonary embolus and infarction (H)     s/p hemothorax and filter s/p filter removal (2011), now on long term anti-coagulation     Statin intolerance 2/1/2017     Stented coronary artery      Unspecified retinal detachment     Childhood trauma, unrepaired     Past Surgical History:   Procedure Laterality Date     BYPASS GRAFT ARTERY CORONARY  4/4/2014    Procedure: BYPASS GRAFT ARTERY CORONARY;  Median Sternotomy, Coronary Artery Bypass Bypass x 4 using Left Internal Mammary, Left Saphenous Vein, on pump oxygenation;  Surgeon: Sherry Armando MD;  Location: [facility] OR     C NONSPECIFIC PROCEDURE      Spermatocele resection     CLOSED REDUCTION, PERCUTANEOUS PINNING  HAND, COMBINED Left 11/5/2015    Procedure: COMBINED  CLOSED REDUCTION, PERCUTANEOUS PINNING HAND;  Surgeon: Carmella Love MD;  Location: [facility] OR     COLONOSCOPY  3/15/2013    Procedure: COLONOSCOPY;;  Surgeon: Racheal Shipman MD;  Location: [facility] GI     CYSTOSCOPY       LAPAROSCOPIC CHOLECYSTECTOMY N/A 8/6/2018    Procedure: LAPAROSCOPIC CHOLECYSTECTOMY;  LAPAROSCOPIC CHOLECYSTECTOMY ;  Surgeon: José Miguel Stuart MD;  Location: [facility] OR     LITHOTRIPSY  4/2010     THORACIC SURGERY      lung surgery, thoracotomy, lung adhesion     Current Outpatient Prescriptions   Medication Sig Dispense Refill     alirocumab (PRALUENT) 75 MG/ML injectable pen Inject 1 mL (75 mg) Subcutaneous every 14 days 2 mL 12     aspirin EC 81 MG tablet Take 1 tablet (81 mg) by mouth daily 91 tablet 3     atorvastatin (LIPITOR) 80 MG tablet Take 1 tablet (80 mg) by mouth daily 91 tablet 3     calcium carbonate (TUMS) 500 MG chewable tablet Take 1 chew tab by mouth daily as needed       Cholecalciferol (VITAMIN D) 2000 UNITS CAPS Take 2,000 Units by mouth daily       DiphenhydrAMINE HCl (BENADRYL PO) Take 25-50 mg by mouth daily as needed       ezetimibe (ZETIA) 10 MG tablet Take 1 tablet (10 mg) by mouth daily 90 tablet 3     fenofibrate 160 MG tablet Take 1 tablet (160 mg) by mouth daily 91 tablet 3     lisinopril (PRINIVIL/ZESTRIL) 2.5 MG tablet Take 1 tablet (2.5 mg) by mouth daily , or as directed by Dr. Ocampo. 90 tablet 2     metoprolol (LOPRESSOR) 10 mg/mL SUSP Take 1.25 mLs (12.5 mg) by mouth 2 times daily 100 mL 5     metoprolol (LOPRESSOR) 25 MG tablet Take 0.5 tablets (12.5 mg) by mouth 2 times daily (Patient taking differently: Take 12.5 mg by mouth 2 times daily ON HOLD DUE TO NAUSEA (7/26/18).) 90 tablet 3     warfarin (COUMADIN) 5 MG tablet Take 1.5 tablets (7.5 mg) by mouth daily (Patient taking differently: Take 7.5 mg by mouth daily ON HOLD DUE TO NAUSEA (7/26/18).) 135 tablet 2     Allergies   Allergen Reactions     Cats      Hay Fever & [A.R.M.]      Heparin  "Other (See Comments)     Heparin resistance per cardio-thoracic surgeon Dr. Armando     Hydrocodone-Acetaminophen Nausea and Vomiting     Acetaminophen is ok     Family History: There is no h/o  malignancy.  There is no h/o urolithiasis.     Social History: The patient does not smoke cigarettes.  Denies EtOH and illicit drug use.      ROS: A comprehensive 14 point ROS was obtained and was  otherwise negative except for that outlined above in the HPI.    PHYSICAL EXAM:   Ht 1.803 m (5' 11\")  Wt 94.3 kg (208 lb)  BMI 29.01 kg/m2    GENERAL: Well groomed/well developed/well nourished male in NAD.  HEENT: EOMI, AT, NC.  SKIN: Warm to touch, dry.  No visible rashes or lesions.  RESP: No increased respiratory effort.  LYMPH: No LE edema.  ABD: Soft, NT, ND.  No CVAT.  MS: Full ROM in extremities.  NEURO: Alert and oriented x 3.  PSYCH: Normal mood and affect, pleasant and agreeable during interview and exam.    PROCEDURE: A trial of void was performed by nursing staff today in the clinic.  The patient's Daniel leg bag was disconnected and 250 cc of sterile water were infused into the patient's bladder via gravity drainage, which the patient tolerated fine.  The Daniel balloon was decompressed and the catheter was then removed.  After a few minutes Mr. Prabhakar voided 350 cc.  The patient did pass today's trial of void and the catheter did not need to be replaced.      REVIEW OF OUTSIDE RECORDS: 5 minutes spent reviewing previous/outside records.    ASSESSMENT/PLAN:  58 year old male who developed acute urinary retention requiring Daniel catheter placement post op.     -Needs updated PSA in 3-4 weeks; will notify of results.   -RTO PRN      10 min spent with the patient, >50% of this time was spent in a face-to-face manner and on coordination of care of urinary retention.       Pamella Kearns PA-C, PA-C      "

## 2018-09-17 NOTE — NURSING NOTE
Chief Complaint   Patient presents with     Clinic Care Coordination - Follow-up     Urinary Retention     Savana Osorio LPN

## 2018-09-17 NOTE — PATIENT INSTRUCTIONS
PSA screening blood test in 3 weeks (make an appointment at the [facility]).    Will notify you of those results. If normal, you may see your primary care yearly and our office as needed with any urologic concerns.

## 2018-09-25 ENCOUNTER — ANTICOAGULATION THERAPY VISIT (OUTPATIENT)
Dept: ANTICOAGULATION | Facility: CLINIC | Age: 58
End: 2018-09-25

## 2018-09-25 DIAGNOSIS — I26.99 OTHER PULMONARY EMBOLISM WITHOUT ACUTE COR PULMONALE, UNSPECIFIED CHRONICITY (H): ICD-10-CM

## 2018-09-25 DIAGNOSIS — Z79.01 LONG-TERM (CURRENT) USE OF ANTICOAGULANTS: ICD-10-CM

## 2018-09-25 DIAGNOSIS — E78.5 HYPERLIPIDEMIA LDL GOAL <130: ICD-10-CM

## 2018-09-25 DIAGNOSIS — I26.99 PULMONARY EMBOLI (H): ICD-10-CM

## 2018-09-25 LAB — INR PPP: 1.86 (ref 0.86–1.14)

## 2018-09-25 NOTE — PROGRESS NOTES
ANTICOAGULATION FOLLOW-UP CLINIC VISIT    Patient Name:  Oswaldo Prabhakar  Date:  9/25/2018  Contact Type:  Telephone    SUBJECTIVE:     Patient Findings     Comments Oswaldo reports he has been eating more salads and plans to continue.  Will recommend warfarin 5 mg MTh and 7.5 mg all other days of the week.  Recheck INR in one week.  No changes in health or medications.           OBJECTIVE    INR   Date Value Ref Range Status   09/25/2018 1.86 (H) 0.86 - 1.14 Final     Chromogenic Factor 10   Date Value Ref Range Status   06/18/2011 81 60 - 140 % Final     Comment:     Therapeutic Range: A Chromogenic Factor 10 level of 20-40% inversely   correlates   with an INR of 2-3 for patients receiving Warfarin. Chromogenic Factor 10   levels below 20% indicate an INR greater than 3, and levels above 40%   indicate   an INR less than 2.     Factor 2 Assay   Date Value Ref Range Status   04/04/2012  60 - 140 % Final    (Note)  CANCELLED AND CREDITED PER [facility] IN MED. MONITORING,4/4/12,       ASSESSMENT / PLAN  INR assessment SUB    Recheck INR In: 1 WEEK    INR Location Clinic      Anticoagulation Summary as of 9/25/2018     INR goal 2.0-3.0   Today's INR 1.86!   Warfarin maintenance plan No maintenance plan   Full warfarin instructions 9/25: 7.5 mg; 9/26: 7.5 mg; 9/27: 5 mg; 9/28: 7.5 mg; 9/29: 7.5 mg; 9/30: 7.5 mg; 10/1: 5 mg   Weekly warfarin total 47.5 mg   Plan last modified Rigoberto Harmon, [facility] (9/6/2018)   Next INR check 10/2/2018   Priority INR   Target end date Indefinite    Indications   Pulmonary emboli (H) [I26.99]  Long-term (current) use of anticoagulants [Z79.01] [Z79.01]         Anticoagulation Episode Summary     INR check location     Preferred lab     Send INR reminders to [facility] CLINIC    Comments HIPPA INFO OK to speak with wife Josselyn OK to leave messages on Home.work and cell phones  use cell phone #[phone]      Anticoagulation Care Providers     Provider Role Specialty Phone number     Samm Vazquez MD [facility] Internal Medicine [phone]            See the Encounter Report to view Anticoagulation Flowsheet and Dosing Calendar (Go to Encounters tab in chart review, and find the Anticoagulation Therapy Visit)    Spoke with Oswaldo.  He has been taking warfarin 5 mg MWF and 7.5 mg all other days of the week.  He reports he has been eating more salads and plans to continue.  Will recommend warfarin 5 mg MTh and 7.5 mg all other days of the week.  Recheck INR in one week.  No changes in health or medications.    Marielena Chacon RN

## 2018-09-25 NOTE — MR AVS SNAPSHOT
Oswaldo Prabhakar   9/25/2018   Anticoagulation Therapy Visit    Description:  58 year old male   Provider:  Marielena Chacon, RN   Department:  [facility]           INR as of 9/25/2018     Today's INR 1.86!      Anticoagulation Summary as of 9/25/2018     INR goal 2.0-3.0   Today's INR 1.86!   Full warfarin instructions 9/25: 7.5 mg; 9/26: 7.5 mg; 9/27: 5 mg; 9/28: 7.5 mg; 9/29: 7.5 mg; 9/30: 7.5 mg; 10/1: 5 mg   Next INR check 10/2/2018    Indications   Pulmonary emboli (H) [I26.99]  Long-term (current) use of anticoagulants [Z79.01] [Z79.01]         September 2018 Details    Sun Mon Tue Wed Thu Fri Sat           1                 2               3               4               5               6               7               8                 9               10               11               12               13               14               15                 16               17               18               19               20               21               22                 23               24               25      7.5 mg   See details      26      7.5 mg         27      5 mg         28      7.5 mg         29      7.5 mg           30      7.5 mg                Date Details   09/25 This INR check               How to take your warfarin dose     To take:  5 mg Take 1 of the 5 mg tablets.    To take:  7.5 mg Take 1.5 of the 5 mg tablets.           October 2018 Details    Sun Mon Tue Wed Thu Fri Sat      1      5 mg         2            3               4               5               6                 7               8               9               10               11               12               13                 14               15               16               17               18               19               20                 21               22               23               24               25               26               27                 28               29               30                31                   Date Details   No additional details    Date of next INR:  10/2/2018         How to take your warfarin dose     To take:  5 mg Take 1 of the 5 mg tablets.

## 2018-09-26 RX ORDER — FENOFIBRATE 160 MG/1
160 TABLET ORAL DAILY
Qty: 90 TABLET | Refills: 1 | Status: SHIPPED | OUTPATIENT
Start: 2018-09-26 | End: 2019-03-19

## 2018-10-02 ENCOUNTER — ANTICOAGULATION THERAPY VISIT (OUTPATIENT)
Dept: ANTICOAGULATION | Facility: CLINIC | Age: 58
End: 2018-10-02

## 2018-10-02 ENCOUNTER — MYC MEDICAL ADVICE (OUTPATIENT)
Dept: INTERNAL MEDICINE | Facility: CLINIC | Age: 58
End: 2018-10-02

## 2018-10-02 DIAGNOSIS — I26.99 PULMONARY EMBOLI (H): ICD-10-CM

## 2018-10-02 DIAGNOSIS — Z79.01 LONG TERM CURRENT USE OF ANTICOAGULANT THERAPY: ICD-10-CM

## 2018-10-02 DIAGNOSIS — E78.5 HYPERLIPIDEMIA LDL GOAL <70: ICD-10-CM

## 2018-10-02 DIAGNOSIS — I25.10 CAD (CORONARY ARTERY DISEASE): ICD-10-CM

## 2018-10-02 DIAGNOSIS — Z78.9 STATIN INTOLERANCE: ICD-10-CM

## 2018-10-02 DIAGNOSIS — Z12.5 SCREENING FOR PROSTATE CANCER: ICD-10-CM

## 2018-10-02 DIAGNOSIS — E78.5 HYPERLIPIDEMIA LDL GOAL <130: ICD-10-CM

## 2018-10-02 DIAGNOSIS — Z95.1 S/P CABG X 4: ICD-10-CM

## 2018-10-02 DIAGNOSIS — I26.99 OTHER PULMONARY EMBOLISM WITHOUT ACUTE COR PULMONALE, UNSPECIFIED CHRONICITY (H): ICD-10-CM

## 2018-10-02 LAB
CHOLEST SERPL-MCNC: 88 MG/DL
HDLC SERPL-MCNC: 44 MG/DL
INR PPP: 2.72 (ref 0.86–1.14)
LDLC SERPL CALC-MCNC: 22 MG/DL
NONHDLC SERPL-MCNC: 44 MG/DL
TRIGL SERPL-MCNC: 108 MG/DL

## 2018-10-02 PROCEDURE — 80061 LIPID PANEL: CPT | Performed by: INTERNAL MEDICINE

## 2018-10-02 PROCEDURE — 85610 PROTHROMBIN TIME: CPT | Performed by: INTERNAL MEDICINE

## 2018-10-02 PROCEDURE — 36415 COLL VENOUS BLD VENIPUNCTURE: CPT | Performed by: INTERNAL MEDICINE

## 2018-10-02 NOTE — PROGRESS NOTES
ANTICOAGULATION FOLLOW-UP CLINIC VISIT    Patient Name:  Oswaldo Prabhakar  Date:  10/2/2018  Contact Type:  Telephone    SUBJECTIVE:     Patient Findings     Comments Left message for Oswaldo.  Recommend he continue same warfarin dose as last week.  Asked that he call the [facility] if his health, diet or meds have changed.  Last week he stated he was eating more salads.             OBJECTIVE    INR   Date Value Ref Range Status   10/02/2018 2.72 (H) 0.86 - 1.14 Final     Chromogenic Factor 10   Date Value Ref Range Status   06/18/2011 81 60 - 140 % Final     Comment:     Therapeutic Range: A Chromogenic Factor 10 level of 20-40% inversely   correlates   with an INR of 2-3 for patients receiving Warfarin. Chromogenic Factor 10   levels below 20% indicate an INR greater than 3, and levels above 40%   indicate   an INR less than 2.     Factor 2 Assay   Date Value Ref Range Status   04/04/2012  60 - 140 % Final    (Note)  CANCELLED AND CREDITED PER [facility] IN MED. MONITORING,4/4/12,       ASSESSMENT / PLAN  INR assessment THER    Recheck INR In: 1 WEEK    INR Location Clinic      Anticoagulation Summary as of 10/2/2018     INR goal 2.0-3.0   Today's INR 2.72   Warfarin maintenance plan No maintenance plan   Full warfarin instructions 10/2: 7.5 mg; 10/3: 7.5 mg; 10/4: 5 mg; 10/5: 7.5 mg; 10/6: 7.5 mg; 10/7: 7.5 mg; 10/8: 5 mg   Weekly warfarin total 47.5 mg   Plan last modified Rigoberto Harmon, [facility] (9/6/2018)   Next INR check 10/9/2018   Priority INR   Target end date Indefinite    Indications   Pulmonary emboli (H) [I26.99]  Long-term (current) use of anticoagulants [Z79.01] [Z79.01]         Anticoagulation Episode Summary     INR check location     Preferred lab     Send INR reminders to [facility] CL[facility]    Comments HIPPA INFO OK to speak with wife Josselyn OK to leave messages on Home.work and cell phones  use cell phone #[phone]      Anticoagulation Care Providers     Provider Role Specialty Phone number    Sick,  Samm BHARDWAJ MD [facility] Internal Medicine [phone]            See the Encounter Report to view Anticoagulation Flowsheet and Dosing Calendar (Go to Encounters tab in chart review, and find the Anticoagulation Therapy Visit)    Left message for patient with results and dosing recommendations. Asked patient to call back to report any missed doses, falls, signs and symptoms of bleeding or clotting, any changes in health, medication, or diet. Asked patient to call back with any questions or concerns.  Left message for Oswaldo.  Recommend he continue same warfarin dose as last week.  Asked that he call the [facility] if his health, diet or meds have changed.  Last week he stated he was eating more salads.   Warfarin dose:  5 mg MTh; 7.5 mg all other days of the week.    Marielena Chacon RN

## 2018-10-02 NOTE — MR AVS SNAPSHOT
Oswaldo Prabhakar   10/2/2018   Anticoagulation Therapy Visit    Description:  58 year old male   Provider:  Marielena Chacon, RN   Department:  [facility]           INR as of 10/2/2018     Today's INR 2.72      Anticoagulation Summary as of 10/2/2018     INR goal 2.0-3.0   Today's INR 2.72   Full warfarin instructions 10/2: 7.5 mg; 10/3: 7.5 mg; 10/4: 5 mg; 10/5: 7.5 mg; 10/6: 7.5 mg; 10/7: 7.5 mg; 10/8: 5 mg   Next INR check 10/9/2018    Indications   Pulmonary emboli (H) [I26.99]  Long-term (current) use of anticoagulants [Z79.01] [Z79.01]         October 2018 Details    Sun Mon Tue Wed Thu Fri Sat      1               2      7.5 mg   See details      3      7.5 mg         4      5 mg         5      7.5 mg         6      7.5 mg           7      7.5 mg         8      5 mg         9            10               11               12               13                 14               15               16               17               18               19               20                 21               22               23               24               25               26               27                 28               29               30               31                   Date Details   10/02 This INR check       Date of next INR:  10/9/2018         How to take your warfarin dose     To take:  5 mg Take 1 of the 5 mg tablets.    To take:  7.5 mg Take 1.5 of the 5 mg tablets.

## 2018-10-08 DIAGNOSIS — Z95.1 S/P CABG X 4: ICD-10-CM

## 2018-10-09 RX ORDER — METOPROLOL TARTRATE 25 MG/1
12.5 TABLET, FILM COATED ORAL 2 TIMES DAILY
Qty: 90 TABLET | Refills: 3 | Status: SHIPPED | OUTPATIENT
Start: 2018-10-09 | End: 2019-10-10

## 2018-10-11 ENCOUNTER — APPOINTMENT (OUTPATIENT)
Dept: LAB | Facility: CLINIC | Age: 58
End: 2018-10-11
Payer: COMMERCIAL

## 2018-10-16 DIAGNOSIS — Z79.01 LONG TERM CURRENT USE OF ANTICOAGULANT THERAPY: ICD-10-CM

## 2018-10-16 DIAGNOSIS — I26.99 PULMONARY EMBOLI (H): ICD-10-CM

## 2018-10-16 DIAGNOSIS — N40.0 BPH (BENIGN PROSTATIC HYPERPLASIA): ICD-10-CM

## 2018-10-16 LAB — INR PPP: 2.2 (ref 0.86–1.14)

## 2018-10-17 ENCOUNTER — ANTICOAGULATION THERAPY VISIT (OUTPATIENT)
Dept: ANTICOAGULATION | Facility: CLINIC | Age: 58
End: 2018-10-17

## 2018-10-17 DIAGNOSIS — I26.99 OTHER PULMONARY EMBOLISM WITHOUT ACUTE COR PULMONALE, UNSPECIFIED CHRONICITY (H): ICD-10-CM

## 2018-10-17 DIAGNOSIS — N40.0 BPH (BENIGN PROSTATIC HYPERPLASIA): Primary | ICD-10-CM

## 2018-10-17 LAB — PSA SERPL-MCNC: 1.13 UG/L (ref 0–4)

## 2018-10-17 NOTE — MR AVS SNAPSHOT
Oswaldo Prabhakar   10/17/2018   Anticoagulation Therapy Visit    Description:  58 year old male   Provider:  Nano Greer RN   Department:  [facility]           INR as of 10/17/2018     Today's INR 2.20 (10/16/2018)      Anticoagulation Summary as of 10/17/2018     INR goal 2.0-3.0   Today's INR 2.20 (10/16/2018)   Full warfarin instructions 10/17: 7.5 mg; 10/18: 7.5 mg; 10/19: 5 mg; 10/20: 7.5 mg; 10/21: 7.5 mg; 10/22: 7.5 mg; 10/23: 7.5 mg   Next INR check 10/24/2018    Indications   Pulmonary emboli (H) [I26.99]  Long-term (current) use of anticoagulants [Z79.01] [Z79.01]         October 2018 Details    Sun Mon Tue Wed Thu Fri Sat      1               2               3               4               5               6                 7               8               9               10               11               12               13                 14               15               16               17      7.5 mg   See details      18      7.5 mg         19      5 mg         20      7.5 mg           21      7.5 mg         22      7.5 mg         23      7.5 mg         24            25               26               27                 28               29               30               31                   Date Details   10/17 This INR check       Date of next INR:  10/24/2018         How to take your warfarin dose     To take:  5 mg Take 1 of the 5 mg tablets.    To take:  7.5 mg Take 1.5 of the 5 mg tablets.

## 2018-10-17 NOTE — PROGRESS NOTES
ANTICOAGULATION FOLLOW-UP CLINIC VISIT    Patient Name:  Oswaldo Prabhakar  Date:  10/17/2018  Contact Type:  Telephone    SUBJECTIVE:     Patient Findings     Positives Change in diet/appetite (eating more salads,and plans to continue this.)           OBJECTIVE    INR   Date Value Ref Range Status   10/16/2018 2.20 (H) 0.86 - 1.14 Final     Chromogenic Factor 10   Date Value Ref Range Status   06/18/2011 81 60 - 140 % Final     Comment:     Therapeutic Range: A Chromogenic Factor 10 level of 20-40% inversely   correlates   with an INR of 2-3 for patients receiving Warfarin. Chromogenic Factor 10   levels below 20% indicate an INR greater than 3, and levels above 40%   indicate   an INR less than 2.     Factor 2 Assay   Date Value Ref Range Status   04/04/2012  60 - 140 % Final    (Note)  CANCELLED AND CREDITED PER [facility] IN MED. MONITORING,4/4/12,       ASSESSMENT / PLAN  INR assessment THER    Recheck INR In: 1 WEEK    INR Location Clinic      Anticoagulation Summary as of 10/17/2018     INR goal 2.0-3.0   Today's INR 2.20 (10/16/2018)   Warfarin maintenance plan No maintenance plan   Full warfarin instructions 10/17: 7.5 mg; 10/18: 7.5 mg; 10/19: 5 mg; 10/20: 7.5 mg; 10/21: 7.5 mg; 10/22: 7.5 mg; 10/23: 7.5 mg   Weekly warfarin total 47.5 mg   Plan last modified Rigoberto Harmon, [facility] (9/6/2018)   Next INR check 10/24/2018   Priority INR   Target end date Indefinite    Indications   Pulmonary emboli (H) [I26.99]  Long-term (current) use of anticoagulants [Z79.01] [Z79.01]         Anticoagulation Episode Summary     INR check location     Preferred lab     Send INR reminders to [facility]    Comments HIPPA INFO OK to speak with wife Josselyn OK to leave messages on Home.work and cell phones  use cell phone #[phone]      Anticoagulation Care Providers     Provider Role Specialty Phone number    Samm Vazquez MD [facility] Internal Medicine [phone]            See the Encounter Report to view  Anticoagulation Flowsheet and Dosing Calendar (Go to Encounters tab in chart review, and find the Anticoagulation Therapy Visit)    Spoke with patient.    Nano Greer RN

## 2018-10-26 ENCOUNTER — ANTICOAGULATION THERAPY VISIT (OUTPATIENT)
Dept: ANTICOAGULATION | Facility: CLINIC | Age: 58
End: 2018-10-26

## 2018-10-26 DIAGNOSIS — Z79.01 LONG TERM CURRENT USE OF ANTICOAGULANT THERAPY: ICD-10-CM

## 2018-10-26 DIAGNOSIS — I26.99 PULMONARY EMBOLI (H): ICD-10-CM

## 2018-10-26 LAB — INR PPP: 1.86 (ref 0.86–1.14)

## 2018-10-26 NOTE — PROGRESS NOTES
ANTICOAGULATION FOLLOW-UP CLINIC VISIT    Patient Name:  Oswaldo Prabhakar  Date:  10/26/2018  Contact Type:  Telephone    SUBJECTIVE:        OBJECTIVE    INR   Date Value Ref Range Status   10/26/2018 1.86 (H) 0.86 - 1.14 Final     Chromogenic Factor 10   Date Value Ref Range Status   06/18/2011 81 60 - 140 % Final     Comment:     Therapeutic Range: A Chromogenic Factor 10 level of 20-40% inversely   correlates   with an INR of 2-3 for patients receiving Warfarin. Chromogenic Factor 10   levels below 20% indicate an INR greater than 3, and levels above 40%   indicate   an INR less than 2.     Factor 2 Assay   Date Value Ref Range Status   04/04/2012  60 - 140 % Final    (Note)  CANCELLED AND CREDITED PER [facility] IN MED. MONITORING,4/4/12,       ASSESSMENT / PLAN  INR assessment SUB    Recheck INR In: 4 DAYS    INR Location Clinic      Anticoagulation Summary as of 10/26/2018     INR goal 2.0-3.0   Today's INR 1.86!   Warfarin maintenance plan No maintenance plan   Full warfarin instructions 10/26: 10 mg; 10/27: 7.5 mg; 10/28: 7.5 mg; 10/29: 7.5 mg   Weekly warfarin total 47.5 mg   Plan last modified Rigoberto Harmon, [facility] (9/6/2018)   Next INR check 10/30/2018   Priority INR   Target end date Indefinite    Indications   Pulmonary emboli (H) [I26.99]  Long-term (current) use of anticoagulants [Z79.01] [Z79.01]         Anticoagulation Episode Summary     INR check location     Preferred lab     Send INR reminders to [facility] CL[facility]    Comments HIPPA INFO OK to speak with wife Josselyn OK to leave messages on Home.work and cell phones  use cell phone #[phone]      Anticoagulation Care Providers     Provider Role Specialty Phone number    Samm Vazquez MD [facility] Internal Medicine [phone]            See the Encounter Report to view Anticoagulation Flowsheet and Dosing Calendar (Go to Encounters tab in chart review, and find the Anticoagulation Therapy Visit)    Left message for patient with results  and dosing recommendations. Asked patient to call back to report any missed doses, falls, signs and symptoms of bleeding or clotting, any changes in health, medication, or diet. Asked patient to call back with any questions or concerns.    Rigoberto Harmon [facility]

## 2018-10-26 NOTE — MR AVS SNAPSHOT
Oswaldo Prabhakar   10/26/2018   Anticoagulation Therapy Visit    Description:  58 year old male   Provider:  Rigoberto Harmon [facility]   Department:  [facility]           INR as of 10/26/2018     Today's INR 1.86!      Anticoagulation Summary as of 10/26/2018     INR goal 2.0-3.0   Today's INR 1.86!   Full warfarin instructions 10/26: 10 mg; 10/27: 7.5 mg; 10/28: 7.5 mg; 10/29: 7.5 mg   Next INR check 10/30/2018    Indications   Pulmonary emboli (H) [I26.99]  Long-term (current) use of anticoagulants [Z79.01] [Z79.01]         October 2018 Details    Sun Mon Tue Wed Thu Fri Sat      1               2               3               4               5               6                 7               8               9               10               11               12               13                 14               15               16               17               18               19               20                 21               22               23               24               25               26      10 mg   See details      27      7.5 mg           28      7.5 mg         29      7.5 mg         30            31                   Date Details   10/26 This INR check       Date of next INR:  10/30/2018         How to take your warfarin dose     To take:  7.5 mg Take 1.5 of the 5 mg tablets.    To take:  10 mg Take 2 of the 5 mg tablets.

## 2018-11-05 DIAGNOSIS — Z79.01 LONG TERM CURRENT USE OF ANTICOAGULANT THERAPY: ICD-10-CM

## 2018-11-05 DIAGNOSIS — I26.99 PULMONARY EMBOLI (H): ICD-10-CM

## 2018-11-05 LAB — INR PPP: 1.54 (ref 0.86–1.14)

## 2018-11-06 ENCOUNTER — ANTICOAGULATION THERAPY VISIT (OUTPATIENT)
Dept: ANTICOAGULATION | Facility: CLINIC | Age: 58
End: 2018-11-06

## 2018-11-06 DIAGNOSIS — I26.99 PULMONARY EMBOLI (H): ICD-10-CM

## 2018-11-06 NOTE — MR AVS SNAPSHOT
Oswaldo Prabhakar   11/6/2018   Anticoagulation Therapy Visit    Description:  58 year old male   Provider:  Kristina Wright, RN   Department:  [facility]           INR as of 11/6/2018     Today's INR 1.54! (11/5/2018)      Anticoagulation Summary as of 11/6/2018     INR goal 2.0-3.0   Today's INR 1.54! (11/5/2018)   Full warfarin instructions 11/6: 10 mg; 11/7: 10 mg; 11/8: 7.5 mg; 11/9: 7.5 mg; 11/10: 7.5 mg; 11/11: 7.5 mg; 11/12: 7.5 mg   Next INR check 11/13/2018    Indications   Pulmonary emboli (H) [I26.99]  Long-term (current) use of anticoagulants [Z79.01] [Z79.01]         November 2018 Details    Sun Mon Tue Wed Thu Fri Sat         1               2               3                 4               5               6      10 mg   See details      7      10 mg         8      7.5 mg         9      7.5 mg         10      7.5 mg           11      7.5 mg         12      7.5 mg         13            14               15               16               17                 18               19               20               21               22               23               24                 25               26               27               28               29               30                 Date Details   11/06 This INR check       Date of next INR:  11/13/2018         How to take your warfarin dose     To take:  7.5 mg Take 1.5 of the 5 mg tablets.    To take:  10 mg Take 2 of the 5 mg tablets.

## 2018-11-06 NOTE — PROGRESS NOTES
ANTICOAGULATION FOLLOW-UP CLINIC VISIT    Patient Name:  Oswaldo Prabhakar  Date:  11/6/2018  Contact Type:  Telephone    SUBJECTIVE:     Patient Findings     Comments Patient thinks he might have missed a dose.  Oswaldo also reports that he is eating an increased amount of salads.Oswaldo is taking warfarin 7.5mg daily.            OBJECTIVE    INR   Date Value Ref Range Status   11/05/2018 1.54 (H) 0.86 - 1.14 Final     Chromogenic Factor 10   Date Value Ref Range Status   06/18/2011 81 60 - 140 % Final     Comment:     Therapeutic Range: A Chromogenic Factor 10 level of 20-40% inversely   correlates   with an INR of 2-3 for patients receiving Warfarin. Chromogenic Factor 10   levels below 20% indicate an INR greater than 3, and levels above 40%   indicate   an INR less than 2.     Factor 2 Assay   Date Value Ref Range Status   04/04/2012  60 - 140 % Final    (Note)  CANCELLED AND CREDITED PER APPLE IN MED. MONITORING,4/4/12,       ASSESSMENT / PLAN  No question data found.  Anticoagulation Summary as of 11/6/2018     INR goal 2.0-3.0   Today's INR 1.54! (11/5/2018)   Warfarin maintenance plan No maintenance plan   Full warfarin instructions 11/6: 10 mg; 11/7: 10 mg; 11/8: 7.5 mg; 11/9: 7.5 mg; 11/10: 7.5 mg; 11/11: 7.5 mg; 11/12: 7.5 mg   Weekly warfarin total 47.5 mg   Plan last modified Rigoberto Harmon, [facility] (9/6/2018)   Next INR check 11/13/2018   Priority INR   Target end date Indefinite    Indications   Pulmonary emboli (H) [I26.99]  Long-term (current) use of anticoagulants [Z79.01] [Z79.01]         Anticoagulation Episode Summary     INR check location     Preferred lab     Send INR reminders to [facility]    Comments HIPPA INFO OK to speak with wife Josselyn OK to leave messages on Home.work and cell phones  use cell phone #[phone]      Anticoagulation Care Providers     Provider Role Specialty Phone number    Samm Vazquez MD [facility] Internal Medicine [phone]            See the  Encounter Report to view Anticoagulation Flowsheet and Dosing Calendar (Go to Encounters tab in chart review, and find the Anticoagulation Therapy Visit)    Spoke with Jairo Wright RN

## 2018-11-08 DIAGNOSIS — E78.5 HYPERLIPIDEMIA LDL GOAL <130: ICD-10-CM

## 2018-11-08 DIAGNOSIS — I25.10 CAD (CORONARY ARTERY DISEASE): ICD-10-CM

## 2018-11-08 DIAGNOSIS — Z95.1 S/P CABG X 4: ICD-10-CM

## 2018-11-08 DIAGNOSIS — Z78.9 STATIN INTOLERANCE: ICD-10-CM

## 2018-11-19 DIAGNOSIS — I26.99 PULMONARY EMBOLI (H): ICD-10-CM

## 2018-11-19 DIAGNOSIS — Z79.01 LONG TERM CURRENT USE OF ANTICOAGULANT THERAPY: ICD-10-CM

## 2018-11-19 LAB — INR PPP: 1.9 (ref 0.86–1.14)

## 2018-11-20 ENCOUNTER — ANTICOAGULATION THERAPY VISIT (OUTPATIENT)
Dept: ANTICOAGULATION | Facility: CLINIC | Age: 58
End: 2018-11-20

## 2018-11-20 DIAGNOSIS — I26.99 PULMONARY EMBOLI (H): ICD-10-CM

## 2018-11-20 NOTE — MR AVS SNAPSHOT
Oswaldo Prabhakar   11/20/2018   Anticoagulation Therapy Visit    Description:  58 year old male   Provider:  Kwesi Garcia RN   Department:  [facility]           INR as of 11/20/2018     Today's INR 1.90! (11/19/2018)      Anticoagulation Summary as of 11/20/2018     INR goal 2.0-3.0   Today's INR 1.90! (11/19/2018)   Full warfarin instructions 11/20: 10 mg; 11/21: 7.5 mg; 11/22: 7.5 mg; Otherwise 7.5 mg every day   Next INR check 12/3/2018    Indications   Pulmonary emboli (H) [I26.99]  Long-term (current) use of anticoagulants [Z79.01] [Z79.01]         November 2018 Details    Sun Mon Tue Wed Thu Fri Sat         1               2               3                 4               5               6               7               8               9               10                 11               12               13               14               15               16               17                 18               19               20      10 mg   See details      21      7.5 mg         22      7.5 mg         23      7.5 mg         24      7.5 mg           25      7.5 mg         26      7.5 mg         27      7.5 mg         28      7.5 mg         29      7.5 mg         30      7.5 mg           Date Details   11/20 This INR check               How to take your warfarin dose     To take:  7.5 mg Take 1.5 of the 5 mg tablets.    To take:  10 mg Take 2 of the 5 mg tablets.           December 2018 Details    Sun Mon Tue Wed Thu Fri Sat           1      7.5 mg           2      7.5 mg         3            4               5               6               7               8                 9               10               11               12               13               14               15                 16               17               18               19               20               21               22                 23               24               25               26               27               28                29                 30               31                     Date Details   No additional details    Date of next INR:  12/3/2018         How to take your warfarin dose     To take:  7.5 mg Take 1.5 of the 5 mg tablets.

## 2018-11-20 NOTE — PROGRESS NOTES
ANTICOAGULATION FOLLOW-UP CLINIC VISIT    Patient Name:  Oswaldo Prabhakar  Date:  11/20/2018  Contact Type:  Telephone    SUBJECTIVE:     Patient Findings     Positives No Problem Findings    Comments Spoke to Oswaldo.  Updated calendar.  Recommended a one time increase to 10mg of Coumadin today, then 7.5mg daily.  Will check in two weeks.           OBJECTIVE    INR   Date Value Ref Range Status   11/19/2018 1.90 (H) 0.86 - 1.14 Final     Chromogenic Factor 10   Date Value Ref Range Status   06/18/2011 81 60 - 140 % Final     Comment:     Therapeutic Range: A Chromogenic Factor 10 level of 20-40% inversely   correlates   with an INR of 2-3 for patients receiving Warfarin. Chromogenic Factor 10   levels below 20% indicate an INR greater than 3, and levels above 40%   indicate   an INR less than 2.     Factor 2 Assay   Date Value Ref Range Status   04/04/2012  60 - 140 % Final    (Note)  CANCELLED AND CREDITED PER [facility]PLE IN MED. MONITORING,4/4/12,       ASSESSMENT / PLAN  INR assessment THER    Recheck INR In: 2 WEEKS    INR Location Clinic      Anticoagulation Summary as of 11/20/2018     INR goal 2.0-3.0   Today's INR 1.90! (11/19/2018)   Warfarin maintenance plan 7.5 mg (5 mg x 1.5) every day   Full warfarin instructions 11/20: 10 mg; 11/21: 7.5 mg; 11/22: 7.5 mg; Otherwise 7.5 mg every day   Weekly warfarin total 52.5 mg   Plan last modified Kwesi Garcia RN (11/20/2018)   Next INR check 12/3/2018   Priority INR   Target end date Indefinite    Indications   Pulmonary emboli (H) [I26.99]  Long-term (current) use of anticoagulants [Z79.01] [Z79.01]         Anticoagulation Episode Summary     INR check location     Preferred lab     Send INR reminders to [facility]    Comments HIPPA INFO OK to speak with wife Josselyn OK to leave messages on Home.work and cell phones  use cell phone #[phone]      Anticoagulation Care Providers     Provider Role Specialty Phone number    Samm Vazquez MD Responsible  Internal Medicine [phone]            See the Encounter Report to view Anticoagulation Flowsheet and Dosing Calendar (Go to Encounters tab in chart review, and find the Anticoagulation Therapy Visit)    Spoke with patient. Gave them their lab results and new warfarin recommendation.  No changes in health, medication, or diet. No missed doses, no falls. No signs or symptoms of bleed or clotting.    Kwesi Garcia, RN

## 2018-12-06 ENCOUNTER — OFFICE VISIT (OUTPATIENT)
Dept: URGENT CARE | Facility: URGENT CARE | Age: 58
End: 2018-12-06
Payer: COMMERCIAL

## 2018-12-06 ENCOUNTER — RADIANT APPOINTMENT (OUTPATIENT)
Dept: GENERAL RADIOLOGY | Facility: CLINIC | Age: 58
End: 2018-12-06
Attending: FAMILY MEDICINE
Payer: COMMERCIAL

## 2018-12-06 VITALS
SYSTOLIC BLOOD PRESSURE: 120 MMHG | TEMPERATURE: 98.5 F | WEIGHT: 215.5 LBS | DIASTOLIC BLOOD PRESSURE: 74 MMHG | RESPIRATION RATE: 14 BRPM | HEART RATE: 52 BPM | OXYGEN SATURATION: 97 % | BODY MASS INDEX: 30.06 KG/M2

## 2018-12-06 DIAGNOSIS — R07.89 LEFT-SIDED CHEST WALL PAIN: Primary | ICD-10-CM

## 2018-12-06 DIAGNOSIS — S20.20XA TRAUMATIC ECCHYMOSIS OF CHEST, INITIAL ENCOUNTER: ICD-10-CM

## 2018-12-06 DIAGNOSIS — R07.89 LEFT-SIDED CHEST WALL PAIN: ICD-10-CM

## 2018-12-06 LAB
ERYTHROCYTE [DISTWIDTH] IN BLOOD BY AUTOMATED COUNT: 13.1 % (ref 10–15)
HCT VFR BLD AUTO: 41 % (ref 40–53)
HGB BLD-MCNC: 13.3 G/DL (ref 13.3–17.7)
MCH RBC QN AUTO: 29.2 PG (ref 26.5–33)
MCHC RBC AUTO-ENTMCNC: 32.4 G/DL (ref 31.5–36.5)
MCV RBC AUTO: 90 FL (ref 78–100)
PLATELET # BLD AUTO: 214 10E9/L (ref 150–450)
RBC # BLD AUTO: 4.56 10E12/L (ref 4.4–5.9)
WBC # BLD AUTO: 6.9 10E9/L (ref 4–11)

## 2018-12-06 PROCEDURE — 36415 COLL VENOUS BLD VENIPUNCTURE: CPT | Performed by: FAMILY MEDICINE

## 2018-12-06 PROCEDURE — 71101 X-RAY EXAM UNILAT RIBS/CHEST: CPT | Mod: LT

## 2018-12-06 PROCEDURE — 85027 COMPLETE CBC AUTOMATED: CPT | Performed by: FAMILY MEDICINE

## 2018-12-06 PROCEDURE — 99214 OFFICE O/P EST MOD 30 MIN: CPT | Performed by: FAMILY MEDICINE

## 2018-12-06 RX ORDER — OXYCODONE AND ACETAMINOPHEN 5; 325 MG/1; MG/1
1 TABLET ORAL EVERY 6 HOURS PRN
Qty: 12 TABLET | Refills: 0 | Status: SHIPPED | OUTPATIENT
Start: 2018-12-06 | End: 2020-01-06

## 2018-12-06 NOTE — MR AVS SNAPSHOT
After Visit Summary   12/6/2018    Oswaldo Prabhakar    MRN: [id number]           Patient Information     Date Of Birth          1960        Visit Information        Provider Department      12/6/2018 12:20 PM Jose Acharya MD [facility]        Today's Diagnoses     Left-sided chest wall pain    -  1    Traumatic ecchymosis of chest, initial encounter           Follow-ups after your visit        Follow-up notes from your care team     Return in about 1 week (around 12/13/2018), or if symptoms worsen or fail to improve.      Your next 10 appointments already scheduled     Dec 13, 2018  5:30 PM CST   (Arrive by 5:15 PM)   RETURN LIPID VISIT with Reji Ocampo MD   [facility] ([facility])    [address] [place] [address]  [address] [place] [id number] [phone]              Who to contact     If you have questions or need follow up information about today's clinic visit or your schedule please contact [facility] directly at [phone].  Normal or non-critical lab and imaging results will be communicated to you by [email or web address], letter or phone within 4 business days after the clinic has received the results. If you do not hear from us within 7 days, please contact the clinic through [email or web address] or phone. If you have a critical or abnormal lab result, we will notify you by phone as soon as possible.  Submit refill requests through IntelligentMDx or call your pharmacy and they will forward the refill request to us. Please allow 3 business days for your refill to be completed.          Additional Information About Your Visit        MyChart Information     [employer] gives you secure access to your electronic health record. If you see a primary care provider, you can also send messages to your care team and make appointments. If you have questions, please call your primary care clinic.  If you do not have a primary care provider, please call [phone]  and they will assist you.        Care EveryWhere ID     This is your Care EveryWhere ID. This could be used by other organizations to access your [facility] medical records  [id number]        Your Vitals Were     Pulse Temperature Respirations Pulse Oximetry BMI (Body Mass Index)       52 98.5  F (36.9  C) (Oral) 14 97% 30.06 kg/m2        Blood Pressure from Last 3 Encounters:   12/06/18 120/74   09/17/18 132/66   09/14/18 112/69    Weight from Last 3 Encounters:   12/06/18 215 lb 8 oz (97.8 kg)   09/17/18 208 lb (94.3 kg)   09/14/18 208 lb 9.6 oz (94.6 kg)              We Performed the Following     CBC with platelets          Today's Medication Changes          These changes are accurate as of 12/6/18  4:57 PM.  If you have any questions, ask your nurse or doctor.               Start taking these medicines.        Dose/Directions    oxyCODONE-acetaminophen 5-325 MG tablet   Commonly known as:  PERCOCET   Used for:  Left-sided chest wall pain, Traumatic ecchymosis of chest, initial encounter   Started by:  Jose Acharya MD        Dose:  1 tablet   Take 1 tablet by mouth every 6 hours as needed for pain   Quantity:  12 tablet   Refills:  0         These medicines have changed or have updated prescriptions.        Dose/Directions    warfarin 5 MG tablet   Commonly known as:  COUMADIN   Indication:  Obstructive Blood Clot in a Blood Vessel of the Lung   This may have changed:  additional instructions   Used for:  History of pulmonary embolism, Chronic anticoagulation        Dose:  7.5 mg   Take 1.5 tablets (7.5 mg) by mouth daily   Quantity:  135 tablet   Refills:  2            Where to get your medicines      Some of these will need a paper prescription and others can be bought over the counter.  Ask your nurse if you have questions.     Bring a paper prescription for each of these medications     oxyCODONE-acetaminophen 5-325 MG tablet               Information about OPIOIDS     PRESCRIPTION OPIOIDS: WHAT YOU NEED TO  KNOW   We gave you an opioid (narcotic) pain medicine. It is important to manage your pain, but opioids are not always the best choice. You should first try all the other options your care team gave you. Take this medicine for as short a time (and as few doses) as possible.    Some activities can increase your pain, such as bandage changes or therapy sessions. It may help to take your pain medicine 30 to 60 minutes before these activities. Reduce your stress by getting enough sleep, working on hobbies you enjoy and practicing relaxation or meditation. Talk to your care team about ways to manage your pain beyond prescription opioids.    These medicines have risks:    DO NOT drive when on new or higher doses of pain medicine. These medicines can affect your alertness and reaction times, and you could be arrested for driving under the influence (DUI). If you need to use opioids long-term, talk to your care team about driving.    DO NOT operate heavy machinery    DO NOT do any other dangerous activities while taking these medicines.    DO NOT drink any alcohol while taking these medicines.     If the opioid prescribed includes acetaminophen, DO NOT take with any other medicines that contain acetaminophen. Read all labels carefully. Look for the word  acetaminophen  or  Tylenol.  Ask your pharmacist if you have questions or are unsure.    You can get addicted to pain medicines, especially if you have a history of addiction (chemical, alcohol or substance dependence). Talk to your care team about ways to reduce this risk.    All opioids tend to cause constipation. Drink plenty of water and eat foods that have a lot of fiber, such as fruits, vegetables, prune juice, apple juice and high-fiber cereal. Take a laxative (Miralax, milk of magnesia, Colace, Senna) if you don t move your bowels at least every other day. Other side effects include upset stomach, sleepiness, dizziness, throwing up, tolerance (needing more of the  medicine to have the same effect), physical dependence and slowed breathing.    Store your pills in a secure place, locked if possible. We will not replace any lost or stolen medicine. If you don t finish your medicine, please throw away (dispose) as directed by your pharmacist. The [place] Pollution Control Agency has more information about safe disposal: [email or web address]/living-green/[email or web address]-[email or web address]-medications         Primary Care Provider Office Phone # Fax #    Samm Vazquez [phone]       [address] [place] [phone]  [facility] [address]        Equal Access to Services     [facility] CenterMERCED : Hadii peggy ku hadasho Soomaali, waaxda luqadaha, qaybta kaalmada adechristianoyaisaías, franklin penaloza . So [facility] [phone].    ATENCIÓN: Si habla español, tiene a krueger disposición servicios gratuitos de asistencia lingüística. [facility] [phone].    We comply with applicable federal civil rights laws and [place] laws. We do not discriminate on the basis of race, color, national origin, age, disability, sex, sexual orientation, or gender identity.            Thank you!     Thank you for choosing [facility]  for your care. Our goal is always to provide you with excellent care. Hearing back from our patients is one way we can continue to improve our services. Please take a few minutes to complete the written survey that you may receive in the mail after your visit with us. Thank you!             Your Updated Medication List - Protect others around you: Learn how to safely use, store and throw away your medicines at www.disposemymeds.org.          This list is accurate as of 12/6/18  4:57 PM.  Always use your most recent med list.                   Brand Name Dispense Instructions for use Diagnosis    alirocumab 75 MG/ML injectable pen    PRALUENT    6 mL    Inject 1 mL (75 mg) Subcutaneous every 14 days    CAD (coronary artery disease), S/P CABG x 4, Hyperlipidemia  LDL goal <130, Statin intolerance       aspirin 81 MG EC tablet     91 tablet    Take 1 tablet (81 mg) by mouth daily    S/P CABG x 4       atorvastatin 80 MG tablet    LIPITOR    91 tablet    Take 1 tablet (80 mg) by mouth daily    Pure hyperglyceridemia, Hyperlipidemia LDL goal <130       BENADRYL PO      Take 25-50 mg by mouth daily as needed        calcium carbonate 500 MG chewable tablet    TUMS     Take 1 chew tab by mouth daily as needed        ezetimibe 10 MG tablet    ZETIA    90 tablet    Take 1 tablet (10 mg) by mouth daily    Statin intolerance, Hyperlipidemia LDL goal <70, Atherosclerosis of coronary artery of native heart without angina pectoris, unspecified vessel or lesion type, S/P CABG (coronary artery bypass graft)       fenofibrate 160 MG tablet    TRIGLIDE/LOFIBRA    90 tablet    Take 1 tablet (160 mg) by mouth daily    Hyperlipidemia LDL goal <130       lisinopril 2.5 MG tablet    PRINIVIL/Zestril    90 tablet    Take 1 tablet (2.5 mg) by mouth daily , or as directed by Dr. Ocampo.    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 Read the directions carefully, and ask your doctor  or other care provider to review them with you.

## 2018-12-06 NOTE — PROGRESS NOTES
SUBJECTIVE:  Chief Complaint   Patient presents with     Urgent Care     Fall     fell and landed on truck hitch , bruised left side of his back and he is on blood thinners      Oswaldo Prabhakar is a 58 year old male presents with a chief complaint of left sided chest pain.  The injury occurred 1 day(s) ago.   The injury happened while oustide. How: fall, slipped and fell backwards, hit the truck hitch.  No LOC.  The patient complained of moderate pain  and has had decreased ROM.  Pain exacerbated by movement and deep breaths.  Relieved by rest.  This is the first time this type of injury has occurred to this patient.    Patient is on coumadin for pulmonary embolus, had thoracotomy on left side due to hemothorax.  Sustained bruising in similar location.  Denies any SOB but has pain with taking breaths and with movement.  Patient did not take his coumadin dose last night.    Past Medical History:   Diagnosis Date     Antiplatelet or antithrombotic long-term use      Coronary artery disease      Diaphragmatic hernia without mention of obstruction or gangrene      Heart disease      Hernia, abdominal      History of blood transfusion      History of thrombophlebitis      Hypertension      Nephrolithiasis 4/2010     Other and unspecified hyperlipidemia      Pulmonary embolus and infarction (H)     s/p hemothorax and filter s/p filter removal (2011), now on long term anti-coagulation     Statin intolerance 2/1/2017     Stented coronary artery      Unspecified retinal detachment     Childhood trauma, unrepaired     Current Outpatient Prescriptions   Medication Sig Dispense Refill     alirocumab (PRALUENT) 75 MG/ML injectable pen Inject 1 mL (75 mg) Subcutaneous every 14 days 6 mL 1     aspirin EC 81 MG tablet Take 1 tablet (81 mg) by mouth daily 91 tablet 3     atorvastatin (LIPITOR) 80 MG tablet Take 1 tablet (80 mg) by mouth daily 91 tablet 3     ezetimibe (ZETIA) 10 MG tablet Take 1 tablet (10 mg) by mouth daily 90  tablet 3     fenofibrate 160 MG tablet Take 1 tablet (160 mg) by mouth daily 90 tablet 1     lisinopril (PRINIVIL/ZESTRIL) 2.5 MG tablet Take 1 tablet (2.5 mg) by mouth daily , or as directed by Dr. Ocampo. 90 tablet 2     metoprolol (LOPRESSOR) 10 mg/mL SUSP Take 1.25 mLs (12.5 mg) by mouth 2 times daily 100 mL 5     metoprolol tartrate (LOPRESSOR) 25 MG tablet Take 0.5 tablets (12.5 mg) by mouth 2 times daily 90 tablet 3     warfarin (COUMADIN) 5 MG tablet Take 1.5 tablets (7.5 mg) by mouth daily (Patient taking differently: Take 7.5 mg by mouth daily ON HOLD DUE TO NAUSEA (7/26/18).) 135 tablet 2     calcium carbonate (TUMS) 500 MG chewable tablet Take 1 chew tab by mouth daily as needed       Cholecalciferol (VITAMIN D) 2000 UNITS CAPS Take 2,000 Units by mouth daily       DiphenhydrAMINE HCl (BENADRYL PO) Take 25-50 mg by mouth daily as needed       Social History   Substance Use Topics     Smoking status: Never Smoker     Smokeless tobacco: Never Used     Alcohol use No      Comment: very rare       ROS:  Review of systems otherwise negative except as stated above.    EXAM:   /74  Pulse 52  Temp 98.5  F (36.9  C) (Oral)  Resp 14  Wt 215 lb 8 oz (97.8 kg)  SpO2 97%  BMI 30.06 kg/m2  Gen: healthy,alert,no distress  CHEST: clear to auscultation, no crackles or wheezes.  Tenderness on left posterior rib  CV: regular rate and rhythm  EXTREMITIES: peripheral pulses normal  SKIN: ecchymosis patch on left back/rib area, mild swelling    X-RAY was done - left rib/chest - no acute fracture, no pleural effusion, no pneumothorax per my read    Results for orders placed or performed in visit on 12/06/18   CBC with platelets   Result Value Ref Range    WBC 6.9 4.0 - 11.0 10e9/L    RBC Count 4.56 4.4 - 5.9 10e12/L    Hemoglobin 13.3 13.3 - 17.7 g/dL    Hematocrit 41.0 40.0 - 53.0 %    MCV 90 78 - 100 fl    MCH 29.2 26.5 - 33.0 pg    MCHC 32.4 31.5 - 36.5 g/dL    RDW 13.1 10.0 - 15.0 %    Platelet Count 214 150 -  450 10e9/L         ASSESSMENT/PLAN:   (R07.89) Left-sided chest wall pain  (primary encounter diagnosis)  Comment: s/p fall  Plan: XR Ribs & Chest Left G/E 3 Views,         oxyCODONE-acetaminophen (PERCOCET) 5-325 MG         tablet            (S20.20XA) Traumatic ecchymosis of chest, initial encounter  Comment: s/p fall  Plan: CBC with platelets, oxyCODONE-acetaminophen         (PERCOCET) 5-325 MG tablet            Reassurance given, discussed rib pain is most likely rib bruising, reviewed symptomatic treatment with heat or ice to area.  Patient unable to take NSAIDs due to coumadin use, okay for tylenol for discomfort, RX percocet 5/325 mg #12 given to use sparingly for worse pain.  Will follow up on Xray report and notify if any abnormalities.  Okay to restart coumadin today.    Follow up with primary provider if no resolution of symptoms.    Jose Acharya MD  December 6, 2018 4:56 PM

## 2018-12-12 ENCOUNTER — ANTICOAGULATION THERAPY VISIT (OUTPATIENT)
Dept: ANTICOAGULATION | Facility: CLINIC | Age: 58
End: 2018-12-12

## 2018-12-12 ENCOUNTER — HOSPITAL ENCOUNTER (OUTPATIENT)
Facility: CLINIC | Age: 58
Setting detail: SPECIMEN
Discharge: HOME | End: 2018-12-12
Admitting: INTERNAL MEDICINE
Payer: COMMERCIAL

## 2018-12-12 DIAGNOSIS — E78.5 HYPERLIPIDEMIA LDL GOAL <70: ICD-10-CM

## 2018-12-12 DIAGNOSIS — Z78.9 STATIN INTOLERANCE: ICD-10-CM

## 2018-12-12 DIAGNOSIS — I25.10 CAD (CORONARY ARTERY DISEASE): ICD-10-CM

## 2018-12-12 DIAGNOSIS — Z95.1 S/P CABG (CORONARY ARTERY BYPASS GRAFT): ICD-10-CM

## 2018-12-12 DIAGNOSIS — Z79.01 LONG TERM CURRENT USE OF ANTICOAGULANT THERAPY: ICD-10-CM

## 2018-12-12 DIAGNOSIS — I26.99 PULMONARY EMBOLI (H): ICD-10-CM

## 2018-12-12 LAB
ALBUMIN SERPL-MCNC: 3.8 G/DL (ref 3.4–5)
ALP SERPL-CCNC: 43 U/L (ref 40–150)
ALT SERPL W P-5'-P-CCNC: 35 U/L (ref 0–70)
ANION GAP SERPL CALCULATED.3IONS-SCNC: 7 MMOL/L (ref 3–14)
AST SERPL W P-5'-P-CCNC: 21 U/L (ref 0–45)
BILIRUB SERPL-MCNC: 0.6 MG/DL (ref 0.2–1.3)
BUN SERPL-MCNC: 14 MG/DL (ref 7–30)
CALCIUM SERPL-MCNC: 9.4 MG/DL (ref 8.5–10.1)
CHLORIDE SERPL-SCNC: 107 MMOL/L (ref 94–109)
CHOLEST SERPL-MCNC: 92 MG/DL
CO2 SERPL-SCNC: 26 MMOL/L (ref 20–32)
CREAT SERPL-MCNC: 1.02 MG/DL (ref 0.66–1.25)
GFR SERPL CREATININE-BSD FRML MDRD: 75 ML/MIN/1.7M2
GLUCOSE SERPL-MCNC: 92 MG/DL (ref 70–99)
HDLC SERPL-MCNC: 44 MG/DL
INR PPP: 3.54 (ref 0.86–1.14)
LDLC SERPL CALC-MCNC: 32 MG/DL
NONHDLC SERPL-MCNC: 48 MG/DL
POTASSIUM SERPL-SCNC: 4.1 MMOL/L (ref 3.4–5.3)
PROT SERPL-MCNC: 7.5 G/DL (ref 6.8–8.8)
SODIUM SERPL-SCNC: 140 MMOL/L (ref 133–144)
TRIGL SERPL-MCNC: 77 MG/DL

## 2018-12-12 PROCEDURE — 80061 LIPID PANEL: CPT | Performed by: INTERNAL MEDICINE

## 2018-12-12 PROCEDURE — 36415 COLL VENOUS BLD VENIPUNCTURE: CPT | Performed by: INTERNAL MEDICINE

## 2018-12-12 PROCEDURE — 80053 COMPREHEN METABOLIC PANEL: CPT | Performed by: INTERNAL MEDICINE

## 2018-12-12 PROCEDURE — 85610 PROTHROMBIN TIME: CPT | Performed by: INTERNAL MEDICINE

## 2018-12-12 NOTE — PROGRESS NOTES
ANTICOAGULATION FOLLOW-UP CLINIC VISIT    Patient Name:  Oswaldo Prabhakar  Date:  12/12/2018  Contact Type:  Telephone    SUBJECTIVE:     Patient Findings     Positives:   Bruising, Bleed (ER/Hosp visit), Activity level change    Comments:   Pt reports that he fell and landed on truck hitch and bruised his back. Pt reports he is bleeding too. Pt was prescribed Percocet PRN, but pt reports he is not taking this anymore cause it made him sick. Pt does report he is taking OTC Tylenol PRN. Pt is getting frustrated with his fluctuating INR's and has an appointment with Cardiology tomorrow to talk about DOACS and to see if he should take one of these. Pt will let us know if he will start one of these           OBJECTIVE    INR   Date Value Ref Range Status   12/12/2018 3.54 (H) 0.86 - 1.14 Final     Chromogenic Factor 10   Date Value Ref Range Status   06/18/2011 81 60 - 140 % Final     Comment:     Therapeutic Range: A Chromogenic Factor 10 level of 20-40% inversely   correlates   with an INR of 2-3 for patients receiving Warfarin. Chromogenic Factor 10   levels below 20% indicate an INR greater than 3, and levels above 40%   indicate   an INR less than 2.     Factor 2 Assay   Date Value Ref Range Status   04/04/2012  60 - 140 % Final    (Note)  CANCELLED AND CREDITED PER [facility]. MONITORING,4/4/12,       ASSESSMENT / PLAN  INR assessment SUPRA    Recheck INR In: 1 WEEK    INR Location Clinic      Anticoagulation Summary  As of 12/12/2018    INR goal:   2.0-3.0   TTR:   44.5 % (2.5 y)   INR used for dosing:   3.54! (12/12/2018)   Warfarin maintenance plan:   7.5 mg (5 mg x 1.5) every day   Full warfarin instructions:   12/12: Hold; Otherwise 7.5 mg every day   Weekly warfarin total:   52.5 mg   Plan last modified:   Kwesi Garcia RN (11/20/2018)   Next INR check:   12/19/2018   Priority:   INR   Target end date:   Indefinite    Indications    Pulmonary emboli (H) [I26.99]  Long-term (current) use of  anticoagulants [Z79.01] [Z79.01]             Anticoagulation Episode Summary     INR check location:       Preferred lab:       Send INR reminders to:   [facility]    Comments:   HIPPA INFO OK to speak with wife Josselyn MICHELLE to leave messages on Home.work and cell phones  use cell phone #[phone]      Anticoagulation Care Providers     Provider Role Specialty Phone number    Samm Vazquez MD [facility] Internal Medicine [phone]            See the Encounter Report to view Anticoagulation Flowsheet and Dosing Calendar (Go to Encounters tab in chart review, and find the Anticoagulation Therapy Visit)    Spoke with patient. Gave them their lab results and new warfarin recommendation.  No changes in health, medication, or diet. No missed doses, no falls. No signs or symptoms of bleed or clotting.     Lliiam Hyman RN

## 2018-12-13 ENCOUNTER — OFFICE VISIT (OUTPATIENT)
Dept: CARDIOLOGY | Facility: CLINIC | Age: 58
End: 2018-12-13
Attending: INTERNAL MEDICINE
Payer: COMMERCIAL

## 2018-12-13 VITALS
WEIGHT: 214 LBS | HEIGHT: 71 IN | HEART RATE: 69 BPM | SYSTOLIC BLOOD PRESSURE: 126 MMHG | BODY MASS INDEX: 29.96 KG/M2 | OXYGEN SATURATION: 97 % | DIASTOLIC BLOOD PRESSURE: 86 MMHG

## 2018-12-13 DIAGNOSIS — E78.5 HYPERLIPIDEMIA LDL GOAL <70: ICD-10-CM

## 2018-12-13 PROCEDURE — 99214 OFFICE O/P EST MOD 30 MIN: CPT | Mod: ZP | Performed by: INTERNAL MEDICINE

## 2018-12-13 PROCEDURE — G0463 HOSPITAL OUTPT CLINIC VISIT: HCPCS | Mod: ZF

## 2018-12-13 ASSESSMENT — PAIN SCALES - GENERAL: PAINLEVEL: NO PAIN (0)

## 2018-12-13 ASSESSMENT — MIFFLIN-ST. JEOR: SCORE: 1822.83

## 2018-12-13 NOTE — NURSING NOTE
Labs: Patient was given results of the laboratory testing obtained today. Patient was instructed to return for the next laboratory testing in one year. Patient demonstrated understanding of this information and agreed to call with further questions or concerns.   Med Reconcile: Reviewed and verified all current medications with the patient. The updated medication list was printed and given to the patient.  Return Appointment: Patient given instructions regarding scheduling next clinic visit. Patient demonstrated understanding of this information and agreed to call with further questions or concerns.  Patient stated he understood all health information given and agreed to call with further questions or concerns.    Nicole Blanc LPN

## 2018-12-13 NOTE — PATIENT INSTRUCTIONS
Patient Instructions:  It was a pleasure to see you in the cardiology clinic today.      If you have any questions, you can reach my nurse, Nicole Blanc LPN, at [phone].  Press Option #1 for the [facility], and then press Option #3 for nursing.    We are encouraging the use of Deluuxt to communicate with your HealthCare Provider    Medication Changes: None.    Studies Ordered: None.    The results from today include: Labs.    Please follow up: With Dr. Ocampo in one year with fasting labs prior.    Sincerely,    Reji Ocampo MD     If you have an urgent need after hours (8:00 am to 4:30 pm) please call [phone] and ask for the cardiology fellow on call.

## 2018-12-13 NOTE — PROGRESS NOTES
HPI:     Mr. 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 He also has history of statin intolerance, however is currently tolerating Atorvastatin. He is being treated with PCSK9 inhibitor.  He returns to clinic for routine follow up.      From a cardiovascular standpoint he feels he is doing well. Has been spending a lot of time with his son's boyscout troop (camping, hiking, skiing). Also does a significant amount of bicycling himself, up to 30 miles per stint. No symptoms with this.     He's been taking his praluent 75 mg/ml every other Tuesday without difficulty. He states he religiously takes his other medications as well (still tolerating atorvastatin and ezetimibe). Denies any symptoms of angina. No weight change, orthopnea, PND, palpitations, claudication.     PAST MEDICAL HISTORY:  Past Medical History:   Diagnosis Date     Antiplatelet or antithrombotic long-term use      Coronary artery disease      Diaphragmatic hernia without mention of obstruction or gangrene      Heart disease      Hernia, abdominal      History of blood transfusion      History of thrombophlebitis      Hypertension      Nephrolithiasis 4/2010     Other and unspecified hyperlipidemia      Pulmonary embolus and infarction (H)     s/p hemothorax and filter s/p filter removal (2011), now on long term anti-coagulation     Statin intolerance 2/1/2017     Stented coronary artery      Unspecified retinal detachment     Childhood trauma, unrepaired       CURRENT MEDICATIONS:  Current Outpatient Medications   Medication Sig Dispense Refill     alirocumab (PRALUENT) 75 MG/ML injectable pen Inject 1 mL (75 mg) Subcutaneous every 14 days 6 mL 1     aspirin EC 81 MG tablet Take 1 tablet (81 mg) by mouth daily 91 tablet 3     atorvastatin (LIPITOR) 80 MG tablet Take 1 tablet (80 mg) by mouth daily 91 tablet 3     Cholecalciferol (VITAMIN D) 2000 UNITS CAPS Take 2,000 Units by mouth daily        DiphenhydrAMINE HCl (BENADRYL PO) Take 25-50 mg by mouth daily as needed       ezetimibe (ZETIA) 10 MG tablet Take 1 tablet (10 mg) by mouth daily 90 tablet 3     fenofibrate 160 MG tablet Take 1 tablet (160 mg) by mouth daily 90 tablet 1     lisinopril (PRINIVIL/ZESTRIL) 2.5 MG tablet Take 1 tablet (2.5 mg) by mouth daily , or as directed by Dr. Ocampo. 90 tablet 2     metoprolol (LOPRESSOR) 10 mg/mL SUSP Take 1.25 mLs (12.5 mg) by mouth 2 times daily 100 mL 5     metoprolol tartrate (LOPRESSOR) 25 MG tablet Take 0.5 tablets (12.5 mg) by mouth 2 times daily 90 tablet 3     warfarin (COUMADIN) 5 MG tablet Take 1.5 tablets (7.5 mg) by mouth daily (Patient taking differently: Take 7.5 mg by mouth daily ON HOLD DUE TO NAUSEA (7/26/18).) 135 tablet 2     calcium carbonate (TUMS) 500 MG chewable tablet Take 1 chew tab by mouth daily as needed       oxyCODONE-acetaminophen (PERCOCET) 5-325 MG tablet Take 1 tablet by mouth every 6 hours as needed for pain (Patient not taking: Reported on 12/13/2018) 12 tablet 0       PAST SURGICAL HISTORY:  Past Surgical History:   Procedure Laterality Date     BYPASS GRAFT ARTERY CORONARY  4/4/2014    Procedure: BYPASS GRAFT ARTERY CORONARY;  Median Sternotomy, Coronary Artery Bypass Bypass x 4 using Left Internal Mammary, Left Saphenous Vein, on pump oxygenation;  Surgeon: Sherry Armando MD;  Location: [facility] OR     C NONSPECIFIC PROCEDURE      Spermatocele resection     CLOSED REDUCTION, PERCUTANEOUS PINNING  HAND, COMBINED Left 11/5/2015    Procedure: COMBINED CLOSED REDUCTION, PERCUTANEOUS PINNING HAND;  Surgeon: Carmella Love MD;  [facility]     COLONOSCOPY  3/15/2013    Procedure: COLONOSCOPY;;  Surgeon: Racheal Shipman MD;  Location: [facility]     CYSTOSCOPY       LAPAROSCOPIC CHOLECYSTECTOMY N/A 8/6/2018    Procedure: LAPAROSCOPIC CHOLECYSTECTOMY;  LAPAROSCOPIC CHOLECYSTECTOMY ;  Surgeon: José Miguel Stuart MD;  Location:  OR     LITHOTRIPSY  4/2010      THORACIC SURGERY      lung surgery, thoracotomy, lung adhesion       ALLERGIES     Allergies   Allergen Reactions     Cats      Hay Fever & [A.R.M.]      Heparin Other (See Comments)     Heparin resistance per cardio-thoracic surgeon Dr. Armando     Hydrocodone-Acetaminophen Nausea and Vomiting     Acetaminophen is ok       FAMILY HISTORY:  Family History   Problem Relation Age of Onset     Heart Disease Mother      C.A.D. Father         3 vessel CABG, age 70     Cancer Father         bladder cancer     C.A.D. Paternal Grandmother      Hypertension Paternal Grandmother      Alzheimer Disease Paternal Grandmother      Diabetes No family hx of      Thyroid Disease No family hx of      Cancer - colorectal No family hx of        SOCIAL HISTORY:  Social History     Socioeconomic History     Marital status:      Spouse name: None     Number of children: None     Years of education: None     Highest education level: None   Social Needs     Financial resource strain: None     Food insecurity - worry: None     Food insecurity - inability: None     Transportation needs - medical: None     Transportation needs - non-medical: None   Occupational History     None   Tobacco Use     Smoking status: Never Smoker     Smokeless tobacco: Never Used   Substance and Sexual Activity     Alcohol use: No     Comment: very rare     Drug use: No     Sexual activity: None   Other Topics Concern     Parent/sibling w/ CABG, MI or angioplasty before 65F 55M? Not Asked   Social History Narrative     None       ROS:   Constitutional: No fever, chills, or sweats. No weight gain/loss   ENT: No visual disturbance, ear ache, epistaxis, sore throat  Allergies/Immunologic: Negative.   Respiratory: No cough, hemoptysia  Cardiovascular: As per HPI  GI: No nausea, vomiting, hematemesis, melena, or hematochezia  : No urinary frequency, dysuria, or hematuria  Integument: Negative  Psychiatric: Negative  Neuro: Negative  Endocrinology: Negative  "  Musculoskeletal: Negative    EXAM:  /86 (BP Location: Right arm, Patient Position: Chair, Cuff Size: Adult Large)   Pulse 69   Ht 1.803 m (5' 11\")   Wt 97.1 kg (214 lb)   SpO2 97%   BMI 29.85 kg/m    In general, the patient is a pleasant male in no apparent distress.    HEENT: NC/AT.  PERRLA.  EOMI.  Sclerae white, not injected.  Nares clear.  Pharynx without erythema or exudate.  Dentition intact.    Neck: No adenopathy.  No thyromegaly. Carotids +4/4 bilaterally without bruits.  No jugular venous distension.   Heart: RRR. Normal S1, S2 splits physiologically. No murmur, rub, click, or gallop. The PMI is in the 5th ICS in the midclavicular line. There is no heave.    Lungs: CTA.  No ronchi, wheezes, rales.  No dullness to percussion.   Abdomen: Soft, nontender, nondistended. No organomegaly.  No bruits.   Extremities: No clubbing, cyanosis, or edema.  The pulses are +4/4 at the radial, brachial, femoral, popliteal, DP, and PT sites bilaterally.  No bruits are noted.  Neurologic: Alert and oriented to person/place/time, normal speech, gait and affect  Skin: No petechiae, purpura or rash.    Labs:  LIPID RESULTS:  Lab Results   Component Value Date    CHOL 92 12/12/2018    HDL 44 12/12/2018    LDL 32 12/12/2018    TRIG 77 12/12/2018    CHOLHDLRATIO 7.0 (H) 09/16/2015    NHDL 48 12/12/2018       LIVER ENZYME RESULTS:  Lab Results   Component Value Date    AST 21 12/12/2018    ALT 35 12/12/2018       CBC RESULTS:  Lab Results   Component Value Date    WBC 6.9 12/06/2018    RBC 4.56 12/06/2018    HGB 13.3 12/06/2018    HCT 41.0 12/06/2018    MCV 90 12/06/2018    MCH 29.2 12/06/2018    MCHC 32.4 12/06/2018    RDW 13.1 12/06/2018     12/06/2018       BMP RESULTS:  Lab Results   Component Value Date     12/12/2018    POTASSIUM 4.1 12/12/2018    CHLORIDE 107 12/12/2018    CO2 26 12/12/2018    ANIONGAP 7 12/12/2018    GLC 92 12/12/2018    BUN 14 12/12/2018    CR 1.02 12/12/2018    GFRESTIMATED 75 " 12/12/2018    GFRESTBLACK >90 12/12/2018    GONZALES 9.4 12/12/2018        A1C RESULTS:  Lab Results   Component Value Date    A1C 5.9 03/01/2016       INR RESULTS:  Lab Results   Component Value Date    INR 3.54 (H) 12/12/2018    INR 1.90 (H) 11/19/2018       Procedures:    Reviewed dobutamine stress echo from 4/2017 that was negative with a normal rest echo.    Assessment and Plan:     Mr. 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 He has a history of statin intolerance but is currently tolerating 80mg of atorvastatin and 10mg of ezetimibe daily. His lipids have significantly improved with Alirocumab (Praluent). His current dose is 75 mg/ml (every other Tuesday). He has maintained lipids at goal with this dose.     We discussed the results with patient:  We re-emphasized the importance of a heart healthy diet.     Follow-up in 1 year    Reji Ocampo MD, PhD  Professor of Medicine  Division of Cardiology      CC  Patient Care Team:  Simi Guevara MD as PCP - General (Internal Medicine)  Indu Osorio, RN as Nurse Coordinator (Cardiology)  Reji Ocampo MD as MD (Cardiology)  Shiv Vizcarra MD as MD (Family Practice)  Carmella Love MD as MD (Orthopedics)  Lavelle Solis MD as MD (Family Practice)  SIMI GUEVARA

## 2018-12-13 NOTE — NURSING NOTE
Chief Complaint   Patient presents with     Follow Up     1 Yr F/U     Vitals were taken and medications were reconciled.     Eli Vizcarra RMA  4:02 PM

## 2018-12-13 NOTE — LETTER
12/13/2018      RE: Oswaldo Prabhakar  [address]  [place] [address]       Dear Colleague,    Thank you for the opportunity to participate in the care of your patient, Oswaldo Prabhakar, at the [facility]ART [facility]. Please see a copy of my visit note below.    HPI:     Mr. 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 He also has history of statin intolerance, however is currently tolerating Atorvastatin. He is being treated with PCSK9 inhibitor.  He returns to clinic for routine follow up.      From a cardiovascular standpoint he feels he is doing well. Has been spending a lot of time with his son's boyscout troop (camping, hiking, skiing). Also does a significant amount of bicycling himself, up to 30 miles per stint. No symptoms with this.     He's been taking his praluent 75 mg/ml every other Tuesday without difficulty. He states he religiously takes his other medications as well (still tolerating atorvastatin and ezetimibe). Denies any symptoms of angina. No weight change, orthopnea, PND, palpitations, claudication.     PAST MEDICAL HISTORY:  Past Medical History:   Diagnosis Date     Antiplatelet or antithrombotic long-term use      Coronary artery disease      Diaphragmatic hernia without mention of obstruction or gangrene      Heart disease      Hernia, abdominal      History of blood transfusion      History of thrombophlebitis      Hypertension      Nephrolithiasis 4/2010     Other and unspecified hyperlipidemia      Pulmonary embolus and infarction (H)     s/p hemothorax and filter s/p filter removal (2011), now on long term anti-coagulation     Statin intolerance 2/1/2017     Stented coronary artery      Unspecified retinal detachment     Childhood trauma, unrepaired       CURRENT MEDICATIONS:  Current Outpatient Medications   Medication Sig Dispense Refill     alirocumab (PRALUENT) 75 MG/ML  injectable pen Inject 1 mL (75 mg) Subcutaneous every 14 days 6 mL 1     aspirin EC 81 MG tablet Take 1 tablet (81 mg) by mouth daily 91 tablet 3     atorvastatin (LIPITOR) 80 MG tablet Take 1 tablet (80 mg) by mouth daily 91 tablet 3     Cholecalciferol (VITAMIN D) 2000 UNITS CAPS Take 2,000 Units by mouth daily       DiphenhydrAMINE HCl (BENADRYL PO) Take 25-50 mg by mouth daily as needed       ezetimibe (ZETIA) 10 MG tablet Take 1 tablet (10 mg) by mouth daily 90 tablet 3     fenofibrate 160 MG tablet Take 1 tablet (160 mg) by mouth daily 90 tablet 1     lisinopril (PRINIVIL/ZESTRIL) 2.5 MG tablet Take 1 tablet (2.5 mg) by mouth daily , or as directed by Dr. Ocampo. 90 tablet 2     metoprolol (LOPRESSOR) 10 mg/mL SUSP Take 1.25 mLs (12.5 mg) by mouth 2 times daily 100 mL 5     metoprolol tartrate (LOPRESSOR) 25 MG tablet Take 0.5 tablets (12.5 mg) by mouth 2 times daily 90 tablet 3     warfarin (COUMADIN) 5 MG tablet Take 1.5 tablets (7.5 mg) by mouth daily (Patient taking differently: Take 7.5 mg by mouth daily ON HOLD DUE TO NAUSEA (7/26/18).) 135 tablet 2     calcium carbonate (TUMS) 500 MG chewable tablet Take 1 chew tab by mouth daily as needed       oxyCODONE-acetaminophen (PERCOCET) 5-325 MG tablet Take 1 tablet by mouth every 6 hours as needed for pain (Patient not taking: Reported on 12/13/2018) 12 tablet 0       PAST SURGICAL HISTORY:  Past Surgical History:   Procedure Laterality Date     BYPASS GRAFT ARTERY CORONARY  4/4/2014    Procedure: BYPASS GRAFT ARTERY CORONARY;  Median Sternotomy, Coronary Artery Bypass Bypass x 4 using Left Internal Mammary, Left Saphenous Vein, on pump oxygenation;  Surgeon: Sherry Armando MD;  Location: [facility] OR     C NONSPECIFIC PROCEDURE      Spermatocele resection     CLOSED REDUCTION, PERCUTANEOUS PINNING  HAND, COMBINED Left 11/5/2015    Procedure: COMBINED CLOSED REDUCTION, PERCUTANEOUS PINNING HAND;  Surgeon: Carmella Love MD;  [facility]      COLONOSCOPY  3/15/2013    Procedure: COLONOSCOPY;;  Surgeon: Racheal Shipman MD;  Location: [facility]     CYSTOSCOPY       LAPAROSCOPIC CHOLECYSTECTOMY N/A 8/6/2018    Procedure: LAPAROSCOPIC CHOLECYSTECTOMY;  LAPAROSCOPIC CHOLECYSTECTOMY ;  Surgeon: José Miguel Stuart MD;  Location: [facility]     LITHOTRIPSY  4/2010     THORACIC SURGERY      lung surgery, thoracotomy, lung adhesion       ALLERGIES     Allergies   Allergen Reactions     Cats      Hay Fever & [A.R.M.]      Heparin Other (See Comments)     Heparin resistance per cardio-thoracic surgeon Dr. Armando     Hydrocodone-Acetaminophen Nausea and Vomiting     Acetaminophen is ok       FAMILY HISTORY:  Family History   Problem Relation Age of Onset     Heart Disease Mother      C.A.D. Father         3 vessel CABG, age 70     Cancer Father         bladder cancer     C.A.D. Paternal Grandmother      Hypertension Paternal Grandmother      Alzheimer Disease Paternal Grandmother      Diabetes No family hx of      Thyroid Disease No family hx of      Cancer - colorectal No family hx of        SOCIAL HISTORY:  Social History     Socioeconomic History     Marital status:      Spouse name: None     Number of children: None     Years of education: None     Highest education level: None   Social Needs     Financial resource strain: None     Food insecurity - worry: None     Food insecurity - inability: None     Transportation needs - medical: None     Transportation needs - non-medical: None   Occupational History     None   Tobacco Use     Smoking status: Never Smoker     Smokeless tobacco: Never Used   Substance and Sexual Activity     Alcohol use: No     Comment: very rare     Drug use: No     Sexual activity: None   Other Topics Concern     Parent/sibling w/ CABG, MI or angioplasty before 65F 55M? Not Asked   Social History Narrative     None     EXAM:  /86 (BP Location: Right arm, Patient Position: Chair, Cuff Size: Adult Large)   Pulse 69   Ht  "1.803 m (5' 11\")   Wt 97.1 kg (214 lb)   SpO2 97%   BMI 29.85 kg/m     In general, the patient is a pleasant male in no apparent distress.    HEENT: NC/AT.  PERRLA.  EOMI.  Sclerae white, not injected.  Nares clear.  Pharynx without erythema or exudate.  Dentition intact.    Neck: No adenopathy.  No thyromegaly. Carotids +4/4 bilaterally without bruits.  No jugular venous distension.   Heart: RRR. Normal S1, S2 splits physiologically. No murmur, rub, click, or gallop. The PMI is in the 5th ICS in the midclavicular line. There is no heave.    Lungs: CTA.  No ronchi, wheezes, rales.  No dullness to percussion.   Abdomen: Soft, nontender, nondistended. No organomegaly.  No bruits.   Extremities: No clubbing, cyanosis, or edema.  The pulses are +4/4 at the radial, brachial, femoral, popliteal, DP, and PT sites bilaterally.  No bruits are noted.  Neurologic: Alert and oriented to person/place/time, normal speech, gait and affect  Skin: No petechiae, purpura or rash.    Labs:  LIPID RESULTS:  Lab Results   Component Value Date    CHOL 92 12/12/2018    HDL 44 12/12/2018    LDL 32 12/12/2018    TRIG 77 12/12/2018    CHOLHDLRATIO 7.0 (H) 09/16/2015    NHDL 48 12/12/2018       LIVER ENZYME RESULTS:  Lab Results   Component Value Date    AST 21 12/12/2018    ALT 35 12/12/2018       CBC RESULTS:  Lab Results   Component Value Date    WBC 6.9 12/06/2018    RBC 4.56 12/06/2018    HGB 13.3 12/06/2018    HCT 41.0 12/06/2018    MCV 90 12/06/2018    MCH 29.2 12/06/2018    MCHC 32.4 12/06/2018    RDW 13.1 12/06/2018     12/06/2018       BMP RESULTS:  Lab Results   Component Value Date     12/12/2018    POTASSIUM 4.1 12/12/2018    CHLORIDE 107 12/12/2018    CO2 26 12/12/2018    ANIONGAP 7 12/12/2018    GLC 92 12/12/2018    BUN 14 12/12/2018    CR 1.02 12/12/2018    GFRESTIMATED 75 12/12/2018    GFRESTBLACK >90 12/12/2018    GONZALES 9.4 12/12/2018        A1C RESULTS:  Lab Results   Component Value Date    A1C 5.9 03/01/2016 "       INR RESULTS:  Lab Results   Component Value Date    INR 3.54 (H) 12/12/2018    INR 1.90 (H) 11/19/2018       Procedures:    Reviewed dobutamine stress echo from 4/2017 that was negative with a normal rest echo.    Assessment and Plan:     Mr. 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 He has a history of statin intolerance but is currently tolerating 80mg of atorvastatin and 10mg of ezetimibe daily. His lipids have significantly improved with Alirocumab (Praluent). His current dose is 75 mg/ml (every other Tuesday). He has maintained lipids at goal with this dose.     We discussed the results with patient:  We re-emphasized the importance of a heart healthy diet.     Follow-up in 1 year    Reji Ocampo MD, PhD  Professor of Medicine  Division of Cardiology    CC  Patient Care Team:  Simi Guevara MD as PCP - General (Internal Medicine)  Indu Osorio RN as Nurse Coordinator (Cardiology)  Reji Ocampo MD as MD (Cardiology)  Shiv Vizcarra MD as MD (Family Practice)  Carmella Love MD as MD (Orthopedics)  Lavelle Solis MD as MD (Family Practice)  SIMI GUEVARA

## 2019-01-03 ENCOUNTER — TELEPHONE (OUTPATIENT)
Dept: CARDIOLOGY | Facility: CLINIC | Age: 59
End: 2019-01-03

## 2019-01-03 NOTE — TELEPHONE ENCOUNTER
PA Initiation    Medication: Praluent 75MG/ML Pen Injector (PENDING)  Insurance Company: [employer] - Phone [phone] Fax [phone]  [facility] - [place], [place] - [facility] [address]  Filling Pharmacy Phone: [phone]  Filling Pharmacy Fax: [phone]  Start Date: 1/3/2019

## 2019-01-04 ENCOUNTER — TELEPHONE (OUTPATIENT)
Dept: CARDIOLOGY | Facility: CLINIC | Age: 59
End: 2019-01-04

## 2019-01-04 DIAGNOSIS — Z95.1 S/P CABG X 4: ICD-10-CM

## 2019-01-04 DIAGNOSIS — I25.10 CAD (CORONARY ARTERY DISEASE): ICD-10-CM

## 2019-01-04 DIAGNOSIS — E78.5 HYPERLIPIDEMIA LDL GOAL <130: Primary | ICD-10-CM

## 2019-01-04 DIAGNOSIS — Z78.9 STATIN INTOLERANCE: ICD-10-CM

## 2019-01-04 NOTE — TELEPHONE ENCOUNTER
PRIOR AUTHORIZATION DENIED    Medication: Praluent 75MG/ML Pen Injector (DENIED)    Denial Date: 1/3/2019    Denial Rational:       Appeal Information:

## 2019-01-04 NOTE — TELEPHONE ENCOUNTER
Spoke with [facility] [employer] representative concerning Praluent denial. She stated it was a Plan Exclusion therefore it would not be covered by any means. She stated [employer] test claim rejects with a PA Required (not plan exclusion). Initiated PA for Rep[employer] - if approved patient may be required to switch to Repatha to continue PCSK9 therapy.

## 2019-01-04 NOTE — TELEPHONE ENCOUNTER
Prior Authorization Approval    Authorization Effective Date: 1/4/2019  Authorization Expiration Date: 1/4/2020  Medication: Repatha SureClick 140MG/ML (APPROVED)  Approved Dose/Quantity: 2ML  Reference #: CMM KEY: [id number]   Insurance Company: [employer] - Phone [phone] Fax [phone]  Expected CoPay: $75 w/o copay assistance     CoPay Card Available: Yes    Foundation Assistance Needed: Vanesa Ready  Which Pharmacy is filling the prescription (Not needed for infusion/clinic administered): [facility] - [place], [place] - [facility] [address]  Pharmacy Notified: Yes  Patient Notified: Yes

## 2019-01-04 NOTE — TELEPHONE ENCOUNTER
PA Initiation    Medication: Repatha SureClick 140MG/ML (PENDING)  Insurance Company: [employer] - Phone [phone] Fax [phone]  [facility] - [place], [place] - [facility] [address]  Filling Pharmacy Phone: [phone]  Filling Pharmacy Fax: [phone]  Start Date: 1/4/2019

## 2019-01-04 NOTE — TELEPHONE ENCOUNTER
Date: 1/4/2019    Time of Call: 1:23 PM     Diagnosis:  HLD, S/P CABG, CAD, Statin Intolerence     [ TORB ] Ordering provider: Dr Reji Ocampo  Order:   - Stop Praluent  - Start Repatha 140 mg q14d  - Lipid panel and CMP one week after your second injection     Order received by: Nicole Blanc LPN     Follow-up/additional notes: Orders placed. Pt notified.

## 2019-01-11 DIAGNOSIS — I26.99 PULMONARY EMBOLI (H): ICD-10-CM

## 2019-01-11 DIAGNOSIS — Z79.01 LONG TERM CURRENT USE OF ANTICOAGULANT THERAPY: ICD-10-CM

## 2019-01-11 LAB — INR PPP: 2.77 (ref 0.86–1.14)

## 2019-01-14 ENCOUNTER — ANTICOAGULATION THERAPY VISIT (OUTPATIENT)
Dept: ANTICOAGULATION | Facility: CLINIC | Age: 59
End: 2019-01-14

## 2019-01-14 DIAGNOSIS — I26.99 PULMONARY EMBOLI (H): ICD-10-CM

## 2019-01-14 NOTE — PROGRESS NOTES
ANTICOAGULATION FOLLOW-UP CLINIC VISIT    Patient Name:  Oswaldo Prabhakar  Date:  2019  Contact Type:  Telephone    SUBJECTIVE:     Patient Findings     Positives:   No Problem Findings    Comments:   Spoke to Oswaldo.  He will be switching from Praulant to Repatha injections in 4 weeks.  Will start Repatha on .  Updated calendar.  Therapeutic INR result, will check in three weeks.           OBJECTIVE    INR   Date Value Ref Range Status   2019 2.77 (H) 0.86 - 1.14 Final     Chromogenic Factor 10   Date Value Ref Range Status   2011 81 60 - 140 % Final     Comment:     Therapeutic Range: A Chromogenic Factor 10 level of 20-40% inversely   correlates   with an INR of 2-3 for patients receiving Warfarin. Chromogenic Factor 10   levels below 20% indicate an INR greater than 3, and levels above 40%   indicate   an INR less than 2.     Factor 2 Assay   Date Value Ref Range Status   2012  60 - 140 % Final    (Note)  CANCELLED AND CREDITED PER [facility] IN MED. MONITORING,12,       ASSESSMENT / PLAN  INR assessment THER    Recheck INR In: 3 WEEKS    INR Location Clinic      Anticoagulation Summary  As of 2019    INR goal:   2.0-3.0   TTR:   44.1 % (2.5 y)   INR used for dosin.77 (2019)   Warfarin maintenance plan:   7.5 mg (5 mg x 1.5) every day   Full warfarin instructions:   7.5 mg every day   Weekly warfarin total:   52.5 mg   No change documented:   Kwesi Garcia RN   Plan last modified:   Kwesi Garcia RN (2018)   Next INR check:   2019   Priority:   INR   Target end date:   Indefinite    Indications    Pulmonary emboli (H) [I26.99]  Long-term (current) use of anticoagulants [Z79.01] [Z79.01]             Anticoagulation Episode Summary     INR check location:       Preferred lab:       Send INR reminders to:   [facility]    Comments:   HIPPA INFO OK to speak with wife Josselyn OK to leave messages on Home.work and cell phones  use cell phone  #[phone]      Anticoagulation Care Providers     Provider Role Specialty Phone number    Samm Vazquez MD [facility] Internal Medicine [phone]            See the Encounter Report to view Anticoagulation Flowsheet and Dosing Calendar (Go to Encounters tab in chart review, and find the Anticoagulation Therapy Visit)    Spoke with patient. Gave them their lab results and new warfarin recommendation.  No changes in health, medication, or diet. No missed doses, no falls. No signs or symptoms of bleed or clotting.    Kwesi Garcia, RN

## 2019-01-23 ENCOUNTER — OFFICE VISIT (OUTPATIENT)
Dept: ORTHOPEDICS | Facility: CLINIC | Age: 59
End: 2019-01-23
Payer: COMMERCIAL

## 2019-01-23 ENCOUNTER — ANCILLARY PROCEDURE (OUTPATIENT)
Dept: GENERAL RADIOLOGY | Facility: CLINIC | Age: 59
End: 2019-01-23
Payer: COMMERCIAL

## 2019-01-23 VITALS
SYSTOLIC BLOOD PRESSURE: 139 MMHG | HEIGHT: 71 IN | BODY MASS INDEX: 29.96 KG/M2 | DIASTOLIC BLOOD PRESSURE: 82 MMHG | WEIGHT: 214 LBS | HEART RATE: 72 BPM

## 2019-01-23 DIAGNOSIS — M25.511 ACUTE PAIN OF RIGHT SHOULDER: ICD-10-CM

## 2019-01-23 DIAGNOSIS — M75.81 ROTATOR CUFF TENDINITIS, RIGHT: Primary | ICD-10-CM

## 2019-01-23 ASSESSMENT — MIFFLIN-ST. JEOR: SCORE: 1822.83

## 2019-01-23 NOTE — LETTER
2019       RE: Oswaldo Prabhakar  [address]  [place] [address]     Dear Colleague,    Thank you for referring your patient, Oswaldo Prabhakar, to the [facility] at [facility]. Please see a copy of my visit note below.          SPORTS & ORTHOPEDIC WALK-IN VISIT 2019    Primary Care Physician: Dr. Taylor BLACKWOOD shoulder pain that started a few months ago. Increasing pain. Denies any radiating s/s or trauma. Is RHD. Most pain with abduction. Does report hx of cervical pain. Has had imaging done with PCP.   Reason for visit:     What part of your body is injured / painful?  right shoulder    What caused the injury /pain? No inciting event     How long ago did your injury occur or pain begin? several months ago    What are your most bothersome symptoms? Pain    How would you characterize your symptom?  aching and sharp    What makes your symptoms better? Rest    What makes your symptoms worse? Movement, reaching    Have you been previously seen for this problem? No    Medical History:    Any recent changes to your medical history? No    Any new medication prescribed since last visit? No    Have you had surgery on this body part before? 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 No    Do you have any vision problems? No    Do you have any chest pain or edema? No    Do you have any shortness of breath or wheezing?  No    Do you have stomach problems? No    Do you have any numbness or focal weakness? No    Do you have diabetes? No    Do you have problems with bleeding or clotting? Yes,  Hx of DVT    Do you have an rashes or other skin lesions? No         [facility]  Orthopedics  Sav Schmitt, DO  2019     Name: Oswaldo Prabhakar  MRN: [id number]  Age: 58 year old  : 1960  Referring provider: Referred Self     Chief  Complaint: Pain of the Right Shoulder    History of Present Illness:   Oswaldo Prabhakar is a 58 year old, right handed male who presents today for evaluation of acute right shoulder pain without injury or trauma. Began about 2 months ago. Located lateral shoulder and arm. Reports that his pain is exacerbated when lifting his right arm above 90 degrees. Improved completely with rest. Localizes pain on the top of his shoulder without radiating pain. Notes that his pain is alleviated when placing weight on top of his shoulder. Has a history of cervical spondylosis.    Review of Systems:   A 10-point review of systems was obtained and is negative except for as noted in the HPI.     Medications:   Current Outpatient Medications:      aspirin EC 81 MG tablet, Take 1 tablet (81 mg) by mouth daily, Disp: 91 tablet, Rfl: 3     atorvastatin (LIPITOR) 80 MG tablet, Take 1 tablet (80 mg) by mouth daily, Disp: 91 tablet, Rfl: 3     calcium carbonate (TUMS) 500 MG chewable tablet, Take 1 chew tab by mouth daily as needed, Disp: , Rfl:      Cholecalciferol (VITAMIN D) 2000 UNITS CAPS, Take 2,000 Units by mouth daily, Disp: , Rfl:      DiphenhydrAMINE HCl (BENADRYL PO), Take 25-50 mg by mouth daily as needed, Disp: , Rfl:      evolocumab (REPATHA SURECLICK) 140 MG/ML prefilled autoinjector, Inject 1 mL (140 mg) Subcutaneous every 14 days, Disp: 6 mL, Rfl: 3     ezetimibe (ZETIA) 10 MG tablet, Take 1 tablet (10 mg) by mouth daily, Disp: 90 tablet, Rfl: 3     fenofibrate 160 MG tablet, Take 1 tablet (160 mg) by mouth daily, Disp: 90 tablet, Rfl: 1     lisinopril (PRINIVIL/ZESTRIL) 2.5 MG tablet, Take 1 tablet (2.5 mg) by mouth daily , or as directed by Dr. Ocampo., Disp: 90 tablet, Rfl: 2     metoprolol (LOPRESSOR) 10 mg/mL SUSP, Take 1.25 mLs (12.5 mg) by mouth 2 times daily, Disp: 100 mL, Rfl: 5     metoprolol tartrate (LOPRESSOR) 25 MG tablet, Take 0.5 tablets (12.5 mg) by mouth 2 times daily, Disp: 90 tablet, Rfl: 3      "oxyCODONE-acetaminophen (PERCOCET) 5-325 MG tablet, Take 1 tablet by mouth every 6 hours as needed for pain (Patient not taking: Reported on 12/13/2018), Disp: 12 tablet, Rfl: 0     warfarin (COUMADIN) 5 MG tablet, Take 1.5 tablets (7.5 mg) by mouth daily (Patient taking differently: Take 7.5 mg by mouth daily ON HOLD DUE TO NAUSEA (7/26/18).), Disp: 135 tablet, Rfl: 2  No current facility-administered medications for this visit.     Facility-Administered Medications Ordered in Other Visits:      aminocaproic acid (AMICAR) drip, , , Continuous PRN, Aram Mcarthur MD, Stopped at 04/04/14 0932     heparin (porcine) injection, , , PRN, Aram Mcarthur MD, 30,000 Units at 04/04/14 0945    Allergies:  Cats  Hay fever  Heparin  Hydrocodone-acetaminophen    Past Medical History:  Antiplatelet or antithrombotic long-term use   Coronary artery disease   Diaphragmatic hernia without mention of obstruction or gangrene   Heart disease   Hernia, abdominal   History of blood transfusion   History of thrombophlebitis   Hypertension   Nephrolithiasis   Other and unspecified hyperlipidemia   Pulmonary embolus and infarction   Statin intolerance   Stented coronary artery   Unspecified retinal detachment     Past Surgical History:  Bypass graft artery coronary; 4/4/14  Closed reduction, percutaneous pinning combined, left; 11/5/15  Colonoscopy; 3/15/13  Laparoscopic cholecystectomy; 8/6/18  Lithotripsy  Thoracic surgery; 4/2010    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   General  - normal appearance, in no obvious distress  HEENT  -Pupils equal, round, no conjunctival injection.  No lid lag  CV  - normal radial pulse  Pulm  - normal respiratory pattern, non-labored  Musculoskeletal - shoulder  - " inspection: normal bone and joint alignment, no obvious deformity, no scapular winging, no AC step-off  - palpation: no bony or soft tissue tenderness, normal clavicle, non-tender AC. Tenderness at greater tuberosity.  - ROM:  Pain when lifting greater than 90 degrees on right. Abduction pain 60 degrees to about 130 degrees. Full external rotation with mild pain terminally.  - strength: 5/5  strength, 5/5 in all shoulder planes  - special tests:  (-) Speed  (-) Neer  (+) Hawkin's  (+) Oly with pain  (-) Mansfield's  (-) apprehension  (-) subscap lift-off  Neuro  - no sensory or motor deficit, grossly normal coordination, normal muscle tone  Skin  - no ecchymosis, erythema, warmth, or induration, no obvious rash  Psych  - interactive, appropriate, normal mood and affect    Imaging:   Radiographs of the right shoulder - 3 views (01/23/2019)  No osseus abnormalities.     I have independently reviewed the above imaging studies; the results were discussed with the patient.     Assessment:   58 year old, right handed male with a history of cervical spondylosis who presents today with acute right shoulder pain without trauma. Exam is consistent with rotator cuff tendinopathy.    Diagnosis: Right rotator cuff tendinopathy.     Plan:   - I will provide patient with a rotator cuff HEP  - In the mean time, he should avoid overhead lifting/modify activity.   - Ice, tylenol - cannot use NSAIDs.  - Follow-up in 1 month to discuss progress. Consider CSI + PT if persists.    Sav Schmitt DO [facility]  Primary Care Sports Medicine    TAMMIE, Sav Schmitt DO, have reviewed the above note and agree with the scribe's notation as written.    Scribe Disclosure:   IKvng, am serving as a scribe to document services personally performed by Sav Schmitt DO at this visit, based upon the provider's statements to me. All documentation has been reviewed by the aforementioned provider prior to being entered into the official medical  record.

## 2019-01-23 NOTE — PROGRESS NOTES
SPORTS & ORTHOPEDIC WALK-IN VISIT 1/23/2019    Primary Care Physician: Dr. Taylor BLACKWOOD shoulder pain that started a few months ago. Increasing pain. Denies any radiating s/s or trauma. Is RHD. Most pain with abduction. Does report hx of cervical pain. Has had imaging done with PCP.   Reason for visit:     What part of your body is injured / painful?  right shoulder    What caused the injury /pain? No inciting event     How long ago did your injury occur or pain begin? several months ago    What are your most bothersome symptoms? Pain    How would you characterize your symptom?  aching and sharp    What makes your symptoms better? Rest    What makes your symptoms worse? Movement, reaching    Have you been previously seen for this problem? No    Medical History:    Any recent changes to your medical history? No    Any new medication prescribed since last visit? No    Have you had surgery on this body part before? 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 No    Do you have any vision problems? No    Do you have any chest pain or edema? No    Do you have any shortness of breath or wheezing?  No    Do you have stomach problems? No    Do you have any numbness or focal weakness? No    Do you have diabetes? No    Do you have problems with bleeding or clotting? Yes,  Hx of DVT    Do you have an rashes or other skin lesions? No

## 2019-01-23 NOTE — PROGRESS NOTES
[facility]  Orthopedics  Sav Schmitt, DO  2019     Name: Oswaldo Prabhakar  MRN: [id number]  Age: 58 year old  : 1960  Referring provider: Referred Self     Chief Complaint: Pain of the Right Shoulder    History of Present Illness:   Oswaldo Prabhakar is a 58 year old, right handed male who presents today for evaluation of acute right shoulder pain without injury or trauma. Began about 2 months ago. Located lateral shoulder and arm. Reports that his pain is exacerbated when lifting his right arm above 90 degrees. Improved completely with rest. Localizes pain on the top of his shoulder without radiating pain. Notes that his pain is alleviated when placing weight on top of his shoulder. Has a history of cervical spondylosis.      Review of Systems:   A 10-point review of systems was obtained and is negative except for as noted in the HPI.     Medications:   Current Outpatient Medications:      aspirin EC 81 MG tablet, Take 1 tablet (81 mg) by mouth daily, Disp: 91 tablet, Rfl: 3     atorvastatin (LIPITOR) 80 MG tablet, Take 1 tablet (80 mg) by mouth daily, Disp: 91 tablet, Rfl: 3     calcium carbonate (TUMS) 500 MG chewable tablet, Take 1 chew tab by mouth daily as needed, Disp: , Rfl:      Cholecalciferol (VITAMIN D) 2000 UNITS CAPS, Take 2,000 Units by mouth daily, Disp: , Rfl:      DiphenhydrAMINE HCl (BENADRYL PO), Take 25-50 mg by mouth daily as needed, Disp: , Rfl:      evolocumab (REPATHA SURECLICK) 140 MG/ML prefilled autoinjector, Inject 1 mL (140 mg) Subcutaneous every 14 days, Disp: 6 mL, Rfl: 3     ezetimibe (ZETIA) 10 MG tablet, Take 1 tablet (10 mg) by mouth daily, Disp: 90 tablet, Rfl: 3     fenofibrate 160 MG tablet, Take 1 tablet (160 mg) by mouth daily, Disp: 90 tablet, Rfl: 1     lisinopril (PRINIVIL/ZESTRIL) 2.5 MG tablet, Take 1 tablet (2.5 mg) by mouth daily , or as directed by Dr. Ocampo., Disp: 90 tablet, Rfl: 2     metoprolol (LOPRESSOR) 10 mg/mL SUSP, Take 1.25 mLs (12.5 mg) by mouth  "2 times daily, Disp: 100 mL, Rfl: 5     metoprolol tartrate (LOPRESSOR) 25 MG tablet, Take 0.5 tablets (12.5 mg) by mouth 2 times daily, Disp: 90 tablet, Rfl: 3     oxyCODONE-acetaminophen (PERCOCET) 5-325 MG tablet, Take 1 tablet by mouth every 6 hours as needed for pain (Patient not taking: Reported on 12/13/2018), Disp: 12 tablet, Rfl: 0     warfarin (COUMADIN) 5 MG tablet, Take 1.5 tablets (7.5 mg) by mouth daily (Patient taking differently: Take 7.5 mg by mouth daily ON HOLD DUE TO NAUSEA (7/26/18).), Disp: 135 tablet, Rfl: 2  No current facility-administered medications for this visit.     Facility-Administered Medications Ordered in Other Visits:      aminocaproic acid (AMICAR) drip, , , Continuous PRN, Aram Mcarthur MD, Stopped at 04/04/14 0932     heparin (porcine) injection, , , PRN, Aram Mcarthur MD, 30,000 Units at 04/04/14 0945    Allergies:  Cats  Hay fever  Heparin  Hydrocodone-acetaminophen    Past Medical History:  Antiplatelet or antithrombotic long-term use   Coronary artery disease   Diaphragmatic hernia without mention of obstruction or gangrene   Heart disease   Hernia, abdominal   History of blood transfusion   History of thrombophlebitis   Hypertension   Nephrolithiasis   Other and unspecified hyperlipidemia   Pulmonary embolus and infarction   Statin intolerance   Stented coronary artery   Unspecified retinal detachment     Past Surgical History:  Bypass graft artery coronary; 4/4/14  Closed reduction, percutaneous pinning combined, left; 11/5/15  Colonoscopy; 3/15/13  Laparoscopic cholecystectomy; 8/6/18  Lithotripsy  Thoracic surgery; 4/2010    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   General  - normal appearance, in no obvious " distress  HEENT  -Pupils equal, round, no conjunctival injection.  No lid lag  CV  - normal radial pulse  Pulm  - normal respiratory pattern, non-labored  Musculoskeletal - shoulder  - inspection: normal bone and joint alignment, no obvious deformity, no scapular winging, no AC step-off  - palpation: no bony or soft tissue tenderness, normal clavicle, non-tender AC. Tenderness at greater tuberosity.  - ROM:  Pain when lifting greater than 90 degrees on right. Abduction pain 60 degrees to about 130 degrees. Full external rotation with mild pain terminally.  - strength: 5/5  strength, 5/5 in all shoulder planes  - special tests:  (-) Speed  (-) Neer  (+) Hawkin's  (+) Oly with pain  (-) Nobles's  (-) apprehension  (-) subscap lift-off  Neuro  - no sensory or motor deficit, grossly normal coordination, normal muscle tone  Skin  - no ecchymosis, erythema, warmth, or induration, no obvious rash  Psych  - interactive, appropriate, normal mood and affect    Imaging:   Radiographs of the right shoulder - 3 views (01/23/2019)  No osseus abnormalities.     I have independently reviewed the above imaging studies; the results were discussed with the patient.       Assessment:   58 year old, right handed male with a history of cervical spondylosis who presents today with acute right shoulder pain without trauma. Exam is consistent with rotator cuff tendinopathy.    Diagnosis: Right rotator cuff tendinopathy.     Plan:   - I will provide patient with a rotator cuff HEP  - In the mean time, he should avoid overhead lifting/modify activity.   - Ice, tylenol - cannot use NSAIDs.  - Follow-up in 1 month to discuss progress. Consider CSI + PT if persists.    [facility] DO [facility]  [facility]    TAMMIE, Sav Schmitt DO, have reviewed the above note and agree with the scribe's notation as written.    Scribe Disclosure:   I, Kvng Israel, am serving as a scribe to document services personally performed by Sav MEDEROS  DO Keshia at this visit, based upon the provider's statements to me. All documentation has been reviewed by the aforementioned provider prior to being entered into the official medical record.

## 2019-01-23 NOTE — PATIENT INSTRUCTIONS
Rotator Cuff Injury     WHAT IS A ROTATOR CUFF INJURY?    A rotator cuff injury is irritation of or damage to the group of tendons and muscles that hold your shoulder joint together. Tendons are strong bands of tissue that attach muscle to bone. You use the muscles and tendons in your shoulder joint to move your shoulder and raise your arm over your head.        WHAT IS THE CAUSE?    A rotator cuff injury can be caused by:    Overuse of your shoulder in a sport or work activity that involves repetitive overhead movement of your shoulder, like swimming, baseball (mainly pitching), football, tennis, painting, plastering, or housework.  A sudden activity that twists your shoulder or tears your tendon, such as using your arm to break a fall, falling onto your arm, or lifting a heavy object  You may be at higher risk for a rotator cuff injury if you have poor head and shoulder posture, especially if you are older.    WHAT ARE THE SYMPTOMS?    Symptoms may include:    Pain and weakness in your arm and shoulder  Loss of shoulder movement, especially when you try to raise your arm overhead    HOW IS IT DIAGNOSED?    Your healthcare provider will ask about your symptoms, activities, and medical history and examine you. You may have X-rays or other scans or procedures, such as:    An ultrasound, which uses sound waves to show pictures of your shoulder joint  An MRI, which uses a strong magnetic field and radio waves to show detailed pictures of your shoulder joint  An arthrogram, which is an X-ray or MRI taken after a dye is injected into your joint to outline its shape  Arthroscopy, which is a type of surgery done with a small scope inserted into your joint so your provider can look directly at your joint    HOW IS IT TREATED?    You will need to change or stop doing the activities that cause pain until your injury has healed. For example, avoid strenuous activity or any overhead motion that causes pain. Also, try to make  sure that you are practicing good posture and are not slouching forward.    Your healthcare provider may recommend stretching and strengthening exercises to help you heal.    If you have a bad tear, you may need to have it repaired with surgery. After surgery, your treatment plan will include physical therapy to strengthen your shoulder as it heals.    The pain often gets better within a few weeks with self-care, but some injuries may take several months or longer to heal. It s important to follow all of your healthcare provider s instructions.    HOW CAN I TAKE CARE OF MYSELF?    To keep swelling down and help relieve pain:    Put an ice pack, gel pack, or package of frozen vegetables wrapped in a cloth on the area every 3 to 4 hours for up to 20 minutes at a time.  Take nonprescription pain medicine, such as acetaminophen.    Moist heat may help relieve pain, relax your muscles, and make it easier to move your arm and shoulder. Put moist heat on the injured area for 10 to 15 minutes before you do warm-up and stretching exercises. Moist heat includes heat patches or moist heating pads that you can buy at most drugstores, a warm wet washcloth, or a hot shower. To prevent burns to your skin, follow directions on the package and do not lie on any type of hot pad. Don t use heat if you have swelling.    Follow your healthcare provider's instructions, including any exercises recommended by your provider. Ask your provider:    How and when you will hear your test results  How long it will take to recover  What activities you should avoid, including how much you can lift, and when you can return to your normal activities  How to take care of yourself at home  What symptoms or problems you should watch for and what to do if you have them  Make sure you know when you should come back for a checkup.    HOW CAN I HELP PREVENT A ROTATOR CUFF INJURY?    Warm-up exercises and stretching before activities can help prevent  injuries. For example, do exercises that strengthen your shoulder muscles. If your arm or shoulder hurts after exercise, putting ice on it may help keep it from getting injured.    Follow safety rules and use any protective equipment recommended for your work or sport.    Avoid activities that cause pain.        Developed by [employer].  Published by [employer].  Copyright  2014 [employer] and/or one of its subsidiaries. All rights reserved.    Rotator Cuff Exercises    Isometric shoulder external rotation:  a doorway with your elbow bent 90 degrees and the back of the wrist on your injured side pressed against the door frame. Try to press your hand outward into the door frame. Hold for 5 seconds. Do 2 sets of 15.    Isometric shoulder internal rotation:  a doorway with your elbow bent 90 degrees and the front of the wrist on your injured side pressed against the door frame. Try to press your palm into the door frame. Hold for 5 seconds. Do 2 sets of 15.  Wand exercise, flexion: Stand upright and hold a stick in both hands, palms down. Stretch your arms by lifting them over your head, keeping your arms straight. Hold for 5 seconds and return to the starting position. Repeat 10 times.    Wand exercise, extension: Stand upright and hold a stick in both hands behind your back. Move the stick away from your back. Hold this position for 5 seconds. Relax and return to the starting position. Repeat 10 times.  Wand exercise, external rotation: Lie on your back and hold a stick in both hands, palms up. Your upper arms should be resting on the floor with your elbows at your sides and bent 90 degrees. Use your uninjured arm to push your injured arm out away from your body. Keep the elbow of your injured arm at your side while it is being pushed. Hold the stretch for 5 seconds. Repeat 10 times.    Wand exercise, shoulder abduction and adduction: Stand and hold a stick with both hands, palms facing  away from your body. Rest the stick against the front of your thighs. Use your uninjured arm to push your injured arm out to the side and up as high as possible. Keep your arms straight. Hold for 5 seconds. Repeat 10 times.    Resisted shoulder external rotation: Stand sideways next to a door with your injured arm farther from the door. Tie a knot in the end of the tubing and shut the knot in the door at waist level (or use cable weight machine at gym). Hold the other end of the tubing with the hand of your injured arm. Rest the hand of your injured arm across your stomach. Keeping your elbow in at your side, rotate your arm outward and away from your waist. Slowly return your arm to the starting position. Make sure you keep your elbow bent 90 degrees and your forearm parallel to the floor. Repeat 10 times. Build up to 2 sets of 15.    Resisted shoulder internal rotation: Stand sideways next to a door with your injured arm closest to the door. Tie a knot in the end of the tubing and shut the knot in the door at waist level (or use cable weight machine at gym). Hold the other end of the tubing with the hand of your injured arm. Bend the elbow of your injured arm 90 degrees. Keeping your elbow in at your side, rotate your forearm across your body and then slowly back to the starting position. Make sure you keep your forearm parallel to the floor. Do 2 sets of 8 to 12.    Scaption: Stand with your arms at your sides and with your elbows straight. Slowly raise your arms to eye level. As you raise your arms, spread them apart so that they are only slightly in front of your body (at about a 30-degree angle to the front of your body). Point your thumbs toward the ceiling. Hold for 2 seconds and lower your arms slowly. Do 2 sets of 15. Progress to holding a soup can or light weight when you are doing the exercise and increase the weight as the exercise gets easier.    Side-lying external rotation: Lie on your uninjured side  "with your injured arm at your side and your elbow bent 90 degrees. Keeping your elbow against your side, raise your forearm toward the ceiling and hold for 2 seconds. Slowly lower your arm. Do 2 sets of 15. You can start doing this exercise holding a soup can or light weight and gradually increase the weight as long as there is no pain.    Horizontal abduction: Lie on your stomach on a table or the edge of a bed with the arm on your injured side hanging down over the edge. Raise your arm out to the side, with your thumb pointed toward the ceiling, until your arm is parallel to the floor. Hold for 2 seconds and then lower it slowly. Start this exercise with no weight. As you get stronger, add a light weight or hold a soup can. Do 2 sets of 15.    Push-up with a plus: Begin on the floor on your hands and knees. Keep your hands a shoulder width apart and lift your feet off the floor. Arch your back as high as possible and round your shoulders (this is the \"plus\" part or the exercise). Bend your elbows and lower your body to the floor. Return to the starting position and arch your back again. Do 2 sets of 15.          "

## 2019-02-14 DIAGNOSIS — E78.5 HYPERLIPIDEMIA LDL GOAL <70: ICD-10-CM

## 2019-02-14 DIAGNOSIS — Z95.1 S/P CABG (CORONARY ARTERY BYPASS GRAFT): ICD-10-CM

## 2019-02-14 DIAGNOSIS — I25.10 ATHEROSCLEROSIS OF CORONARY ARTERY OF NATIVE HEART WITHOUT ANGINA PECTORIS, UNSPECIFIED VESSEL OR LESION TYPE: ICD-10-CM

## 2019-02-14 DIAGNOSIS — Z78.9 STATIN INTOLERANCE: ICD-10-CM

## 2019-02-15 RX ORDER — EZETIMIBE 10 MG/1
10 TABLET ORAL DAILY
Qty: 90 TABLET | Refills: 3 | Status: SHIPPED | OUTPATIENT
Start: 2019-02-15 | End: 2020-02-10

## 2019-02-20 ENCOUNTER — ANTICOAGULATION THERAPY VISIT (OUTPATIENT)
Dept: ANTICOAGULATION | Facility: CLINIC | Age: 59
End: 2019-02-20

## 2019-02-20 DIAGNOSIS — I26.99 PULMONARY EMBOLI (H): ICD-10-CM

## 2019-02-20 DIAGNOSIS — Z78.9 STATIN INTOLERANCE: ICD-10-CM

## 2019-02-20 DIAGNOSIS — Z95.1 S/P CABG X 4: ICD-10-CM

## 2019-02-20 DIAGNOSIS — Z79.01 LONG TERM CURRENT USE OF ANTICOAGULANT THERAPY: ICD-10-CM

## 2019-02-20 DIAGNOSIS — E78.5 HYPERLIPIDEMIA LDL GOAL <130: ICD-10-CM

## 2019-02-20 DIAGNOSIS — I25.10 CAD (CORONARY ARTERY DISEASE): ICD-10-CM

## 2019-02-20 LAB
ALBUMIN SERPL-MCNC: 3.8 G/DL (ref 3.4–5)
ALP SERPL-CCNC: 77 U/L (ref 40–150)
ALT SERPL W P-5'-P-CCNC: 37 U/L (ref 0–70)
ANION GAP SERPL CALCULATED.3IONS-SCNC: 6 MMOL/L (ref 3–14)
AST SERPL W P-5'-P-CCNC: 30 U/L (ref 0–45)
BILIRUB SERPL-MCNC: 0.7 MG/DL (ref 0.2–1.3)
BUN SERPL-MCNC: 17 MG/DL (ref 7–30)
CALCIUM SERPL-MCNC: 9.4 MG/DL (ref 8.5–10.1)
CHLORIDE SERPL-SCNC: 108 MMOL/L (ref 94–109)
CHOLEST SERPL-MCNC: 86 MG/DL
CO2 SERPL-SCNC: 27 MMOL/L (ref 20–32)
CREAT SERPL-MCNC: 1.02 MG/DL (ref 0.66–1.25)
GFR SERPL CREATININE-BSD FRML MDRD: 82 ML/MIN/{1.73_M2}
GLUCOSE SERPL-MCNC: 86 MG/DL (ref 70–99)
HDLC SERPL-MCNC: 44 MG/DL
INR PPP: 2.22 (ref 0.86–1.14)
LDLC SERPL CALC-MCNC: 23 MG/DL
NONHDLC SERPL-MCNC: 42 MG/DL
POTASSIUM SERPL-SCNC: 4 MMOL/L (ref 3.4–5.3)
PROT SERPL-MCNC: 7.3 G/DL (ref 6.8–8.8)
SODIUM SERPL-SCNC: 141 MMOL/L (ref 133–144)
TRIGL SERPL-MCNC: 95 MG/DL

## 2019-02-20 NOTE — PROGRESS NOTES
ANTICOAGULATION FOLLOW-UP CLINIC VISIT    Patient Name:  Oswaldo Prabhakar  Date:  2019  Contact Type:  Telephone    SUBJECTIVE:     Patient Findings     Positives:   No Problem Findings    Comments:   Oswaldo continues to be on Repatha every 2 weeks.  Will contact the Coumadin clinic if this changes.  Will check an INR in two weeks.           OBJECTIVE    INR   Date Value Ref Range Status   2019 2.22 (H) 0.86 - 1.14 Final     Chromogenic Factor 10   Date Value Ref Range Status   2011 81 60 - 140 % Final     Comment:     Therapeutic Range: A Chromogenic Factor 10 level of 20-40% inversely   correlates   with an INR of 2-3 for patients receiving Warfarin. Chromogenic Factor 10   levels below 20% indicate an INR greater than 3, and levels above 40%   indicate   an INR less than 2.     Factor 2 Assay   Date Value Ref Range Status   2012  60 - 140 % Final    (Note)  CANCELLED AND CREDITED PER [facility] MONITORING,12,       ASSESSMENT / PLAN  INR assessment THER    Recheck INR In: 2 WEEKS    INR Location Clinic      Anticoagulation Summary  As of 2019    INR goal:   2.0-3.0   TTR:   46.4 % (2.6 y)   INR used for dosin.22 (2019)   Warfarin maintenance plan:   7.5 mg (5 mg x 1.5) every day   Full warfarin instructions:   7.5 mg every day   Weekly warfarin total:   52.5 mg   No change documented:   Kwesi Garcia RN   Plan last modified:   Kwesi Garcia RN (2018)   Next INR check:   3/6/2019   Priority:   INR   Target end date:   Indefinite    Indications    Pulmonary emboli (H) [I26.99]  Long-term (current) use of anticoagulants [Z79.01] [Z79.01]             Anticoagulation Episode Summary     INR check location:       Preferred lab:       Send INR reminders to:   [facility]    Comments:   HIPPA INFO OK to speak with wife Josselyn OK to leave messages on Home.work and cell phones  use cell phone #[phone]      Anticoagulation Care Providers     Provider  Role Specialty Phone number    Samm Vazquez MD Responsible Internal Medicine [phone]            See the Encounter Report to view Anticoagulation Flowsheet and Dosing Calendar (Go to Encounters tab in chart review, and find the Anticoagulation Therapy Visit)    Spoke with patient. Gave them their lab results and new warfarin recommendation.  No changes in health, medication, or diet. No missed doses, no falls. No signs or symptoms of bleed or clotting.    Kwesi Garcia, RN

## 2019-02-26 DIAGNOSIS — Z86.711 HISTORY OF PULMONARY EMBOLISM: ICD-10-CM

## 2019-02-26 DIAGNOSIS — Z79.01 CHRONIC ANTICOAGULATION: ICD-10-CM

## 2019-02-26 RX ORDER — WARFARIN SODIUM 5 MG/1
TABLET ORAL
Qty: 135 TABLET | Refills: 2 | Status: ON HOLD | OUTPATIENT
Start: 2019-02-26 | End: 2020-01-07

## 2019-03-04 DIAGNOSIS — E78.1 PURE HYPERGLYCERIDEMIA: ICD-10-CM

## 2019-03-04 DIAGNOSIS — E78.5 HYPERLIPIDEMIA LDL GOAL <130: ICD-10-CM

## 2019-03-05 ENCOUNTER — OFFICE VISIT (OUTPATIENT)
Dept: ORTHOPEDICS | Facility: CLINIC | Age: 59
End: 2019-03-05
Payer: COMMERCIAL

## 2019-03-05 VITALS — BODY MASS INDEX: 29.96 KG/M2 | RESPIRATION RATE: 16 BRPM | WEIGHT: 214 LBS | HEIGHT: 71 IN

## 2019-03-05 DIAGNOSIS — M25.511 ACUTE PAIN OF RIGHT SHOULDER: ICD-10-CM

## 2019-03-05 DIAGNOSIS — M75.81 ROTATOR CUFF TENDONITIS, RIGHT: Primary | ICD-10-CM

## 2019-03-05 RX ADMIN — LIDOCAINE HYDROCHLORIDE 4 ML: 10 INJECTION, SOLUTION EPIDURAL; INFILTRATION; INTRACAUDAL; PERINEURAL at 13:52

## 2019-03-05 RX ADMIN — TRIAMCINOLONE ACETONIDE 40 MG: 40 INJECTION, SUSPENSION INTRA-ARTICULAR; INTRAMUSCULAR at 13:52

## 2019-03-05 ASSESSMENT — MIFFLIN-ST. JEOR: SCORE: 1822.83

## 2019-03-05 NOTE — PROGRESS NOTES
SPORTS & ORTHOPEDIC WALK-IN FOLLOW-UP VISIT 3/5/2019    Interval History:     Follow up reason: Recheck shoulder    Date of injury: NA    Date last seen: 1/23/19    Following Therapeutic Plan: Yes     Pain: Worsening    Function: Worsening    Interval History: Slowly worsening. Still has pain in posterior shoulder with lifting things, especially when arm is at 90 degrees. Nerve pain down arm to about the mid forearm with the motion, not always there. Exercises didn't really help.     Medical History:    Any recent changes to your medical history? No    Any new medication prescribed since last visit? No    Review of Systems:    Do you have fever, chills, weight loss? No    Do you have any vision problems? No    Do you have any chest pain or edema? No    Do you have any shortness of breath or wheezing?  No    Do you have stomach problems? No    Do you have any numbness or focal weakness? No    Do you have diabetes? No    Do you have problems with bleeding or clotting? Yes, Hx of DVT    Do you have an rashes or other skin lesions? No       Large Joint Injection: R subacromial bursa  Date/Time: 3/5/2019 1:52 PM  Performed by: Sav Schmitt DO  Authorized by: Sav Schmitt DO     Indications:  Pain  Needle Size:  22 G  Guidance: ultrasound    Location:  Shoulder  Site:  R subacromial bursa  Medications:  40 mg triamcinolone 40 MG/ML; 4 mL lidocaine (PF) 1 %  Outcome:  Tolerated well, no immediate complications  Procedure discussed: discussed risks, benefits, and alternatives    Consent Given by:  Patient  Timeout: timeout called immediately prior to procedure    Prep: patient was prepped and draped in usual sterile fashion

## 2019-03-05 NOTE — LETTER
3/5/2019       RE: Oswaldo Prabhakar  [address]  [place] [address]     Dear Colleague,    Thank you for referring your patient, Oswaldo Prabhakar, to the [facility]THOPAEDIC WALK IN CLINIC at [facility]. Please see a copy of my visit note below.    [facility]  Orthopedics  Sav Schmitt, DO  2019     Name: Oswaldo Prabhakar  MRN: [id number]  Age: 58 year old  : 1960  Referring provider: Referred Self     Chief Complaint: Pain of the Right Shoulder     Date of Injury: 2019    History of Present Illness:   Oswaldo Prabhakar is a 58 year old, right handed male with a history of cervical spondylosis who presents today for follow-up regarding right shoulder pain. I last evaluated the patient on 2019, at which time the patient reported right should pain for two months prior to the visit without any incident or trauma. We elected to proceed with rotator cuff HEP, conservative management and ice therapy. Today, the patient reports he is not doing well. He still has achy shoulder pain. He now has nerve pain with abduction past 90 degrees that radiates down the lateral aspect of the forearm. His pain occasionally keeps him up at night. He has been doing his exercises that have given relief. Of note, his arthritic symptoms in the neck has improved. He voices no further concerns at this time.     Review of Systems:   (3/5/2019)  CONSTITUTIONAL: Denies fever and weight loss  EYES: Denies acute vision changes  ENT: Denies hearing changes or difficulty swallowing  CARDIAC: Denies chest pain or edema  RESPIRATORY: Denies dyspnea, cough or wheeze  GASTROINTESTINAL: Denies abdominal pain, nausea, vomiting  MUSCULOSKELETAL: See HPI  SKIN: Denies any recent rash or lesion  NEUROLOGICAL: Denies numbness or focal weakness  PSYCHIATRIC: No history of psychiatric symptoms or problems  ENDOCRINE: Diagnosis of diabetes: no  HEMATOLOGY: Denies episodes of easy bleeding  "    Physical Examination:  Resp. rate 16, height 1.803 m (5' 11\"), weight 97.1 kg (214 lb).  General  - normal appearance, in no obvious distress  CV  - normal radial pulse  Pulm  - normal respiratory pattern, non-labored  Musculoskeletal - right shoulder  - inspection: normal bone and joint alignment, no obvious deformity, no scapular winging, no AC step-off  - palpation: normal clavicle, non-tender AC, mild tenderness at the posterior aspect of the greater tuberosity  - ROM:  Full flexion, abduction, IR and ER, but does have pain with arc of motion with flexion and abduction   - strength: 5/5  strength, 5/5 in all shoulder planes  - special tests:  (-) Speed's  (+) Neer  (+) Hawkin's  (+) Oly for pain but no significant weakness.   (-) Seward's  (-) apprehension  (-) subscap lift-off  Neuro  - no sensory or motor deficit, grossly normal coordination, normal muscle tone  Skin  - no ecchymosis, erythema, warmth, or induration, no obvious rash  Psych  - interactive, appropriate, normal mood and affect    Assessment:   58 year old, right handed male presenting for a four week follow-up regarding right shoulder pain. Clinical examination consistent with rotator cuff tendinopathy, likely underlying intermittent radicular pain which is coming from his cervical region. We will address his shoulder pain at this time with a CSI and if he continues to have radicular pain we will consider a cervical workup. Patient in agreement.    Diagnosis: Right rotator cuff tendinopathy     Plan:   - US-guided CSI into the subacromial injection into the right shoulder  - Referred to physical therapy for rotator cuff tendinopathy  - Follow up with me in 6 weeks for re-evaluation.     Procedure:   Shoulder ultrasound guided Injection - subacromial space   The patient was informed of the risks and the benefits of the procedure and a written consent was signed.  The patient s right shoulder was prepped with Betadine in sterile fashion. "   40 mg of triamcinolone suspension was drawn up into a 5 mL syringe with 4 mL of 1% lidocaine   Injection was performed with sub-sterile technique.  A 1.5-inch 22-gauge needle was used to enter the subacromial space at the posterior glenohumeral joint.  There were no complications. The patient tolerated the procedure well. There was negligible bleeding.   The patient was instructed to ice the shoulder upon leaving clinic and refrain from overuse over the next 3 days.   The patient was instructed to call or go to the emergency room with any unusual pain, swelling, redness, or if otherwise concerned.  A follow up appointment will be scheduled to evaluate response to the injection, and to assess range of motion and pain.  It was a pleasure seeing Oswaldo today.    Sav Schmitt DO, [facility]  Primary Care Sports Medicine    ISav DO, have reviewed the above note and agree with the scribe's notation as written.    Scribe Disclosure:   Baljeet CHO, am serving as a scribe to document services personally performed by Sav Schmitt DO at this visit, based upon the provider's statements to me. All documentation has been reviewed by the aforementioned provider prior to being entered into the official medical record.           SPORTS & ORTHOPEDIC WALK-IN FOLLOW-UP VISIT 3/5/2019    Interval History:     Follow up reason: Recheck shoulder    Date of injury: NA    Date last seen: 1/23/19    Following Therapeutic Plan: Yes     Pain: Worsening    Function: Worsening    Interval History: Slowly worsening. Still has pain in posterior shoulder with lifting things, especially when arm is at 90 degrees. Nerve pain down arm to about the mid forearm with the motion, not always there. Exercises didn't really help.     Medical History:    Any recent changes to your medical history? No    Any new medication prescribed since last visit? No    Review of Systems:    Do you have fever, chills, weight loss? No    Do you have any  vision problems? No    Do you have any chest pain or edema? No    Do you have any shortness of breath or wheezing?  No    Do you have stomach problems? No    Do you have any numbness or focal weakness? No    Do you have diabetes? No    Do you have problems with bleeding or clotting? Yes, Hx of DVT    Do you have an rashes or other skin lesions? No       Large Joint Injection: R subacromial bursa  Date/Time: 3/5/2019 1:52 PM  Performed by: Sav Schmitt DO  Authorized by: Sav Schmitt DO     Indications:  Pain  Needle Size:  22 G  Guidance: ultrasound    Location:  Shoulder  Site:  R subacromial bursa  Medications:  40 mg triamcinolone 40 MG/ML; 4 mL lidocaine (PF) 1 %  Outcome:  Tolerated well, no immediate complications  Procedure discussed: discussed risks, benefits, and alternatives    Consent Given by:  Patient  Timeout: timeout called immediately prior to procedure    Prep: patient was prepped and draped in usual sterile fashion

## 2019-03-05 NOTE — PROGRESS NOTES
"[facility]  Orthopedics  Sav Schmitt, DO  2019     Name: Oswaldo Prabhakar  MRN: [id number]  Age: 58 year old  : 1960  Referring provider: Referred Self     Chief Complaint: Pain of the Right Shoulder     Date of Injury: 2019    History of Present Illness:   Oswaldo Prabhakar is a 58 year old, right handed male with a history of cervical spondylosis who presents today for follow-up regarding right shoulder pain. I last evaluated the patient on 2019, at which time the patient reported right should pain for two months prior to the visit without any incident or trauma. We elected to proceed with rotator cuff HEP, conservative management and ice therapy. Today, the patient reports he is not doing well. He still has achy shoulder pain. He now has nerve pain with abduction past 90 degrees that radiates down the lateral aspect of the forearm. His pain occasionally keeps him up at night. He has been doing his exercises that have given relief. Of note, his arthritic symptoms in the neck has improved. He voices no further concerns at this time.     Review of Systems:   (3/5/2019)  CONSTITUTIONAL: Denies fever and weight loss  EYES: Denies acute vision changes  ENT: Denies hearing changes or difficulty swallowing  CARDIAC: Denies chest pain or edema  RESPIRATORY: Denies dyspnea, cough or wheeze  GASTROINTESTINAL: Denies abdominal pain, nausea, vomiting  MUSCULOSKELETAL: See HPI  SKIN: Denies any recent rash or lesion  NEUROLOGICAL: Denies numbness or focal weakness  PSYCHIATRIC: No history of psychiatric symptoms or problems  ENDOCRINE: Diagnosis of diabetes: no  HEMATOLOGY: Denies episodes of easy bleeding     Physical Examination:  Resp. rate 16, height 1.803 m (5' 11\"), weight 97.1 kg (214 lb).  General  - normal appearance, in no obvious distress  CV  - normal radial pulse  Pulm  - normal respiratory pattern, non-labored  Musculoskeletal - right shoulder  - inspection: normal bone and joint alignment, no " obvious deformity, no scapular winging, no AC step-off  - palpation: normal clavicle, non-tender AC, mild tenderness at the posterior aspect of the greater tuberosity  - ROM:  Full flexion, abduction, IR and ER, but does have pain with arc of motion with flexion and abduction   - strength: 5/5  strength, 5/5 in all shoulder planes  - special tests:  (-) Speed's  (+) Neer  (+) Hawkin's  (+) Oly for pain but no significant weakness.   (-) [place]'s  (-) apprehension  (-) subscap lift-off  Neuro  - no sensory or motor deficit, grossly normal coordination, normal muscle tone  Skin  - no ecchymosis, erythema, warmth, or induration, no obvious rash  Psych  - interactive, appropriate, normal mood and affect    Assessment:   58 year old, right handed male presenting for a four week follow-up regarding right shoulder pain. Clinical examination consistent with rotator cuff tendinopathy, likely underlying intermittent radicular pain which is coming from his cervical region. We will address his shoulder pain at this time with a CSI and if he continues to have radicular pain we will consider a cervical workup. Patient in agreement.    Diagnosis: Right rotator cuff tendinopathy     Plan:   - US-guided CSI into the subacromial injection into the right shoulder  - Referred to physical therapy for rotator cuff tendinopathy  - Follow up with me in 6 weeks for re-evaluation.     Procedure:   Shoulder ultrasound guided Injection - subacromial space   The patient was informed of the risks and the benefits of the procedure and a written consent was signed.  The patient s right shoulder was prepped with Betadine in sterile fashion.   40 mg of triamcinolone suspension was drawn up into a 5 mL syringe with 4 mL of 1% lidocaine   Injection was performed with sub-sterile technique.  A 1.5-inch 22-gauge needle was used to enter the subacromial space at the posterior glenohumeral joint.  There were no complications. The patient tolerated  the procedure well. There was negligible bleeding.   The patient was instructed to ice the shoulder upon leaving clinic and refrain from overuse over the next 3 days.   The patient was instructed to call or go to the emergency room with any unusual pain, swelling, redness, or if otherwise concerned.  A follow up appointment will be scheduled to evaluate response to the injection, and to assess range of motion and pain.  It was a pleasure seeing Oswaldo today.    Sav Schmitt DO, [facility]  Primary Care Sports Medicine    I, Sav Schmitt DO, have reviewed the above note and agree with the scribe's notation as written.    Scribe Disclosure:   IBaljeet, am serving as a scribe to document services personally performed by Sav Schmitt DO at this visit, based upon the provider's statements to me. All documentation has been reviewed by the aforementioned provider prior to being entered into the official medical record.

## 2019-03-05 NOTE — NURSING NOTE
[address] [address]  Dept: [phone]  ______________________________________________________________________________    Patient: Oswaldo Prabhakar   : 1960   MRN: [id number]   2019    INVASIVE PROCEDURE SAFETY CHECKLIST    Date: 3/5/19   Procedure: Right shoulder subacromial kenalog injection  Patient Name: Oswaldo Prabhakar  MRN: [id number]  YOB: 1960    Action: Complete sections as appropriate. Any discrepancy results in a HARD COPY until resolved.     PRE PROCEDURE:  Patient ID verified with 2 identifiers (name and  or MRN): Yes  Procedure and site verified with patient/designee (when able): Yes  Accurate consent documentation in medical record: Yes  H&P (or appropriate assessment) documented in medical record: Yes  H&P must be up to 20 days prior to procedure and updates within 24 hours of procedure as applicable: NA  Relevant diagnostic and radiology test results appropriately labeled and displayed as applicable: Yes  Procedure site(s) marked with provider initials: NA    TIMEOUT:  Time-Out performed immediately prior to starting procedure, including verbal and active participation of all team members addressing the following:Yes  * Correct patient identify  * Confirmed that the correct side and site are marked  * An accurate procedure consent form  * Agreement on the procedure to be done  * Correct patient position  * Relevant images and results are properly labeled and appropriately displayed  * The need to administer antibiotics or fluids for irrigation purposes during the procedure as applicable   * Safety precautions based on patient history or medication use    DURING PROCEDURE: Verification of correct person, site, and procedures any time the responsibility for care of the patient is transferred to another member of the care team.     The following medication was given:     MEDICATION:  Lidocaine without  epinephrine  ROUTE: intra-articular  SITE: right Shoulder  DOSE: 4 mL  LOT #: [id number]  : [employer]  EXPIRATION DATE: 09/22  NDC#: 30258-139-32   Was there drug waste? Yes  Amount of drug waste (mL): 1.  Reason for waste:  Single use vial    MEDICATION:  Kenalog 40 mg  ROUTE: intra-articular  SITE: right Shoulder  DOSE: 1 mL  LOT #: [id number]  : [employer]  EXPIRATION DATE: 09/2020  NDC#: [address]-1   Was there drug waste? No    Sherry Ashley ATC  March 5, 2019

## 2019-03-05 NOTE — PATIENT INSTRUCTIONS
Steroid Injection Information:    A corticosteroid injection was administered at your visit today.  The area of injection may be sore, slightly swollen for 1-2 days afterward.  Immediately after injection, you may have an area of numbness, which is caused by the local anesthetic, lidocaine (similar to novacaine) in the shot.  This medicine will wear off in about 4 hours.  In addition to the lidocaine, a steroid medication was injected, called triamcinolone acetate.  This medication is intended to provide long-acting antiinflammatory / pain relief.  It may take 2-5 days for this medication to provide noticeable relief.      After an injection, Dr. Schmitt recommends:      Protecting the area wearing a bandage or gauze pad for at least 24 hours.      Using ice; ice bag or frozen vegetables over the injection site every one to two hours when able (for 15 minutes at a time).        Avoid any strenuous activity even if the knee is already feeling better immediately afterward. Light stretching is encouraged but refrain from home exercise program and increasing activity level for about 48 hours.      Avoid soaking in a hot tub, bath, or pool for 48 hours after injection. Showering is fine!      Watch for signs of infection, including increasing pain, redness and swelling that last more than 48 hours after injection.

## 2019-03-06 RX ORDER — ATORVASTATIN CALCIUM 80 MG/1
80 TABLET, FILM COATED ORAL DAILY
Qty: 90 TABLET | Refills: 2 | Status: SHIPPED | OUTPATIENT
Start: 2019-03-06 | End: 2020-04-09

## 2019-03-14 RX ORDER — LIDOCAINE HYDROCHLORIDE 10 MG/ML
4 INJECTION, SOLUTION EPIDURAL; INFILTRATION; INTRACAUDAL; PERINEURAL
Status: DISCONTINUED | OUTPATIENT
Start: 2019-03-05 | End: 2020-01-06

## 2019-03-14 RX ORDER — TRIAMCINOLONE ACETONIDE 40 MG/ML
40 INJECTION, SUSPENSION INTRA-ARTICULAR; INTRAMUSCULAR
Status: DISCONTINUED | OUTPATIENT
Start: 2019-03-05 | End: 2020-01-06

## 2019-03-19 DIAGNOSIS — E78.5 HYPERLIPIDEMIA LDL GOAL <130: ICD-10-CM

## 2019-03-19 NOTE — TELEPHONE ENCOUNTER
fenofibrate 160 MG tablet      Last Written Prescription Date:  9/26/18  Last Fill Quantity: 90,   # refills: 1  Last Office Visit : 12/13/18  Future Office visit:  none    Routing refill request to provider for review/approval because:  Medication not on the Cardiology refill protocol.

## 2019-03-22 ENCOUNTER — THERAPY VISIT (OUTPATIENT)
Dept: PHYSICAL THERAPY | Facility: CLINIC | Age: 59
End: 2019-03-22
Payer: COMMERCIAL

## 2019-03-22 DIAGNOSIS — M75.81 ROTATOR CUFF TENDONITIS, RIGHT: Primary | ICD-10-CM

## 2019-03-22 PROCEDURE — 97112 NEUROMUSCULAR REEDUCATION: CPT | Mod: GP | Performed by: PHYSICAL THERAPIST

## 2019-03-22 PROCEDURE — 97110 THERAPEUTIC EXERCISES: CPT | Mod: GP | Performed by: PHYSICAL THERAPIST

## 2019-03-22 RX ORDER — FENOFIBRATE 160 MG/1
160 TABLET ORAL DAILY
Qty: 90 TABLET | Refills: 2 | Status: SHIPPED | OUTPATIENT
Start: 2019-03-22 | End: 2020-04-30

## 2019-03-22 NOTE — PROGRESS NOTES
[facility] Initial Evaluation  Subjective:    Oswaldo Prabhakar is a 58 year old male with a right shoulder condition.  Occurance: insidious, at night while sleeping 3 months ago.    This is a chronic condition     Patient reports pain:  In the joint.  Radiates to:  Shoulder.   and is intermittent and reported as 1/10.     Symptoms are exacerbated by lifting and carrying (lifting overhead)   Since onset symptoms are gradually improving.        General health as reported by patient is good.  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    Other surgeries include:  Heart surgery.  Medication history: coumadin.    Patient is currently not working due to present treatment problem (research trials with rheumatology and GI, computer work).                                  Objective:  Standing Alignment:    Cervical/Thoracic:  Thoracic kyphosis increased                                    Cervical/Thoracic Evaluation  Arom wnl cervical: Negative Spurling's BL.     AROM:  AROM Cervical:    Flexion:            Chin to chest no pain  Extension:       Limited 50% no pain  Rotation:         Left: 55 degrees     Right: 55 degrees  Side Bend:      Left: Limited 20 degrees no pain     Right:  Limited 20 degrees with discomfort in UT                               Shoulder Evaluation:  ROM:  AROM:  : Forward shoulder posture, limited thoracic extensibility and chest expansion possibly exacerbated by prior cardiac surgical history.  Flexion:  Left:  180    Right:  180    Abduction:  Left: 180   Right:  180                Flexion/External Rotation:  Left:  T3    Right:  T3  Extension/Internal Rotation:  Left:  T12    Right:  T12    PROM:            Internal Rotation:  Left:  65    Right:  55  External Rotation:  Left:  85    Right:  90                    Strength:    Flexion: Left:5/5    Pain:    Right: 5/5     Pain:     Abduction:  Left: 5/5  Pain:    Right: 5/5     Pain:      External Rotation:   Left:5/5   Strong/pain free    Pain:   Right:5/5   Strong/pain free    Pain:              Special Tests:  Special tests assessed shoulder: + Hawkin's Thompson, negative other impingement special tests.  Left shoulder positive for the following special tests:  Rotator cuff tear  Left shoulder negative for the following special tests:  Labral and Impingement    Palpation:  normal                                         General     ROS    Assessment/Plan:    Patient is a 58 year old male with right side shoulder complaints.    Patient has the following significant findings with corresponding treatment plan.                Diagnosis 1:  R shoulder pain  Pain -  hot/cold therapy  Decreased ROM/flexibility - manual therapy and therapeutic exercise  Decreased joint mobility - manual therapy and therapeutic exercise  Impaired muscle performance - neuro re-education  Decreased function - therapeutic activities  Impaired posture - neuro re-education    Therapy Evaluation Codes:   1) History comprised of:   Personal factors that impact the plan of care:      None.   Comorbidity factors that impact the plan of care are:      Heart problems.     Medications impacting care: Cardiac and Coumadin.  2) Examination of Body Systems comprised of:   Body structures and functions that impact the plan of care:      Cervical spine and Prior cardiac surgery.   Activity limitations that impact the plan of care are:      Grasping, Lifting and Reaching and working overhead.  3) Clinical presentation characteristics are:   Stable/Uncomplicated.  4) Decision-Making    Low complexity using standardized patient assessment instrument and/or measureable assessment of functional outcome.  Cumulative Therapy Evaluation is: Low complexity.    Previous and current functional limitations:  (See Goal Flow Sheet for this information)    Short  term and Long term goals: (See Goal Flow Sheet for this information)     Communication ability:  Patient appears to be able to clearly communicate and understand verbal and written communication and follow directions correctly.  Treatment Explanation - The following has been discussed with the patient:   RX ordered/plan of care  Anticipated outcomes  Possible risks and side effects  This patient would benefit from PT intervention to resume normal activities.   Rehab potential is good.    Frequency:  1 X week, once daily  Duration:  for 12 weeks  Discharge Plan:  Achieve all LTG.  Independent in home treatment program.  Reach maximal therapeutic benefit.    Please refer to the daily flowsheet for treatment today, total treatment time and time spent performing 1:1 timed codes.

## 2019-03-29 ENCOUNTER — THERAPY VISIT (OUTPATIENT)
Dept: PHYSICAL THERAPY | Facility: CLINIC | Age: 59
End: 2019-03-29
Payer: COMMERCIAL

## 2019-03-29 DIAGNOSIS — M25.511 SHOULDER PAIN, RIGHT: Primary | ICD-10-CM

## 2019-03-29 PROCEDURE — 97110 THERAPEUTIC EXERCISES: CPT | Mod: GP | Performed by: PHYSICAL THERAPIST

## 2019-04-10 ENCOUNTER — ANTICOAGULATION THERAPY VISIT (OUTPATIENT)
Dept: ANTICOAGULATION | Facility: CLINIC | Age: 59
End: 2019-04-10

## 2019-04-10 DIAGNOSIS — I26.99 PULMONARY EMBOLI (H): ICD-10-CM

## 2019-04-10 NOTE — PROGRESS NOTES
Left message for patient that they have not been compliant with having the necessary lab work for their anticoagulation therapy. Patient  has been sent two letters stating that they have missed their lab appointments and that it is very important to have labs done to make sure that they are in their therapeutic range to minimize the risks of bleeding and clotting. Patient's Physician has been notified of their non compliance.I asked them to call us or come in for their lab work as soon as possible.

## 2019-04-11 ENCOUNTER — ANTICOAGULATION THERAPY VISIT (OUTPATIENT)
Dept: ANTICOAGULATION | Facility: CLINIC | Age: 59
End: 2019-04-11

## 2019-04-11 ENCOUNTER — HOSPITAL ENCOUNTER (OUTPATIENT)
Facility: CLINIC | Age: 59
Setting detail: SPECIMEN
Discharge: HOME | End: 2019-04-11
Admitting: INTERNAL MEDICINE
Payer: COMMERCIAL

## 2019-04-11 DIAGNOSIS — Z79.01 LONG TERM CURRENT USE OF ANTICOAGULANT THERAPY: ICD-10-CM

## 2019-04-11 DIAGNOSIS — I26.99 PULMONARY EMBOLI (H): ICD-10-CM

## 2019-04-11 LAB — INR PPP: 1.38 (ref 0.86–1.14)

## 2019-04-11 PROCEDURE — 85610 PROTHROMBIN TIME: CPT | Performed by: INTERNAL MEDICINE

## 2019-04-11 PROCEDURE — 36415 COLL VENOUS BLD VENIPUNCTURE: CPT | Performed by: INTERNAL MEDICINE

## 2019-04-11 NOTE — PROGRESS NOTES
ANTICOAGULATION FOLLOW-UP CLINIC VISIT    Patient Name:  Oswaldo Prabhakar  Date:  2019  Contact Type:  Telephone    SUBJECTIVE:        OBJECTIVE    INR   Date Value Ref Range Status   2019 1.38 (H) 0.86 - 1.14 Final     Chromogenic Factor 10   Date Value Ref Range Status   2011 81 60 - 140 % Final     Comment:     Therapeutic Range: A Chromogenic Factor 10 level of 20-40% inversely   correlates   with an INR of 2-3 for patients receiving Warfarin. Chromogenic Factor 10   levels below 20% indicate an INR greater than 3, and levels above 40%   indicate   an INR less than 2.     Factor 2 Assay   Date Value Ref Range Status   2012  60 - 140 % Final    (Note)  CANCELLED AND CREDITED PER APPLE IN MED. MONITORING,12,       ASSESSMENT / PLAN  INR assessment SUB    Recheck INR In: 6 DAYS    INR Location Clinic      Anticoagulation Summary  As of 2019    INR goal:   2.0-3.0   TTR:   45.4 % (2.8 y)   INR used for dosin.38! (2019)   Warfarin maintenance plan:   7.5 mg (5 mg x 1.5) every day   Full warfarin instructions:   : 10 mg; : 10 mg; Otherwise 7.5 mg every day   Weekly warfarin total:   52.5 mg   Plan last modified:   Kwesi Garcia RN (2018)   Next INR check:   2019   Priority:   INR   Target end date:   Indefinite    Indications    Pulmonary emboli (H) [I26.99]  Long-term (current) use of anticoagulants [Z79.01] [Z79.01]             Anticoagulation Episode Summary     INR check location:       Preferred lab:       Send INR reminders to:   [facility]    Comments:   HIPPA INFO OK to speak with wife Josselyn OK to leave messages on Home.work and cell phones  use cell phone #[phone]      Anticoagulation Care Providers     Provider Role Specialty Phone number    Samm Vazquez MD [facility] Internal Medicine [phone]            See the Encounter Report to view Anticoagulation Flowsheet and Dosing Calendar (Go to Encounters tab in chart  review, and find the Anticoagulation Therapy Visit)    Left message for patient with results and dosing recommendations. Asked patient to call back to report any missed doses, falls, signs and symptoms of bleeding or clotting, any changes in health, medication, or diet. Asked patient to call back with any questions or concerns.      Laurita Cespedes RN

## 2019-04-20 DIAGNOSIS — Z79.01 LONG TERM CURRENT USE OF ANTICOAGULANT THERAPY: ICD-10-CM

## 2019-04-20 DIAGNOSIS — I26.99 PULMONARY EMBOLI (H): ICD-10-CM

## 2019-04-20 LAB — INR PPP: 3.6 (ref 0.86–1.14)

## 2019-04-20 PROCEDURE — 85610 PROTHROMBIN TIME: CPT | Performed by: INTERNAL MEDICINE

## 2019-04-20 PROCEDURE — 36416 COLLJ CAPILLARY BLOOD SPEC: CPT | Performed by: INTERNAL MEDICINE

## 2019-04-22 ENCOUNTER — ANTICOAGULATION THERAPY VISIT (OUTPATIENT)
Dept: ANTICOAGULATION | Facility: CLINIC | Age: 59
End: 2019-04-22

## 2019-04-22 DIAGNOSIS — I26.99 PULMONARY EMBOLI (H): ICD-10-CM

## 2019-04-22 NOTE — PROGRESS NOTES
ANTICOAGULATION FOLLOW-UP CLINIC VISIT    Patient Name:  Oswaldo Prabhakar  Date:  4/22/2019  Contact Type:  Telephone    SUBJECTIVE:        OBJECTIVE    INR   Date Value Ref Range Status   04/20/2019 3.60 (H) 0.86 - 1.14 Final     Comment:     This test is intended for monitoring Coumadin therapy.  Results are not   accurate in patients with prolonged INR due to factor deficiency.       Chromogenic Factor 10   Date Value Ref Range Status   06/18/2011 81 60 - 140 % Final     Comment:     Therapeutic Range: A Chromogenic Factor 10 level of 20-40% inversely   correlates   with an INR of 2-3 for patients receiving Warfarin. Chromogenic Factor 10   levels below 20% indicate an INR greater than 3, and levels above 40%   indicate   an INR less than 2.     Factor 2 Assay   Date Value Ref Range Status   04/04/2012  60 - 140 % Final    (Note)  CANCELLED AND CREDITED PER APPLE IN MED. MONITORING,4/4/12,       ASSESSMENT / PLAN  No question data found.  Anticoagulation Summary  As of 4/22/2019    INR goal:   2.0-3.0   TTR:   45.4 % (2.8 y)   INR used for dosing:   3.60! (4/20/2019)   Warfarin maintenance plan:   7.5 mg (5 mg x 1.5) every day   Full warfarin instructions:   4/22: 5 mg; Otherwise 7.5 mg every day   Weekly warfarin total:   52.5 mg   Plan last modified:   Kwesi Garcia RN (11/20/2018)   Next INR check:   4/29/2019   Priority:   INR   Target end date:   Indefinite    Indications    Pulmonary emboli (H) [I26.99]  Long-term (current) use of anticoagulants [Z79.01] [Z79.01]             Anticoagulation Episode Summary     INR check location:       Preferred lab:       Send INR reminders to:   [facility]    Comments:   HIPPA INFO OK to speak with wife Josselyn OK to leave messages on Home.work and cell phones  use cell phone #[phone]      Anticoagulation Care Providers     Provider Role Specialty Phone number    Samm Vazquez MD [facility] Internal Medicine [phone]            See the Encounter  Report to view Anticoagulation Flowsheet and Dosing Calendar (Go to Encounters tab in chart review, and find the Anticoagulation Therapy Visit)    Left message for patient with results and dosing recommendations. Asked patient to call back to report any missed doses, falls, signs and symptoms of bleeding or clotting, any changes in health, medication, or diet. Asked patient to call back with any questions or concerns.     Kristina Wright RN

## 2019-04-25 DIAGNOSIS — I10 ESSENTIAL HYPERTENSION: ICD-10-CM

## 2019-04-25 RX ORDER — LISINOPRIL 2.5 MG/1
2.5 TABLET ORAL DAILY
Qty: 90 TABLET | Refills: 2 | Status: SHIPPED | OUTPATIENT
Start: 2019-04-25 | End: 2020-01-23

## 2019-05-24 ENCOUNTER — ANTICOAGULATION THERAPY VISIT (OUTPATIENT)
Dept: ANTICOAGULATION | Facility: CLINIC | Age: 59
End: 2019-05-24

## 2019-05-24 DIAGNOSIS — I26.99 PULMONARY EMBOLI (H): ICD-10-CM

## 2019-05-24 DIAGNOSIS — Z79.01 LONG TERM CURRENT USE OF ANTICOAGULANT THERAPY: ICD-10-CM

## 2019-05-24 LAB — INR PPP: 2.7 (ref 0.86–1.14)

## 2019-05-24 PROCEDURE — 85610 PROTHROMBIN TIME: CPT | Performed by: INTERNAL MEDICINE

## 2019-05-24 PROCEDURE — 36416 COLLJ CAPILLARY BLOOD SPEC: CPT | Performed by: INTERNAL MEDICINE

## 2019-05-24 NOTE — PROGRESS NOTES
ANTICOAGULATION FOLLOW-UP CLINIC VISIT    Patient Name:  Oswaldo Prabhakar  Date:  2019  Contact Type:  Telephone    SUBJECTIVE:         OBJECTIVE    INR   Date Value Ref Range Status   2019 2.70 (H) 0.86 - 1.14 Final     Comment:     This test is intended for monitoring Coumadin therapy.  Results are not   accurate in patients with prolonged INR due to factor deficiency.       Chromogenic Factor 10   Date Value Ref Range Status   2011 81 60 - 140 % Final     Comment:     Therapeutic Range: A Chromogenic Factor 10 level of 20-40% inversely   correlates   with an INR of 2-3 for patients receiving Warfarin. Chromogenic Factor 10   levels below 20% indicate an INR greater than 3, and levels above 40%   indicate   an INR less than 2.     Factor 2 Assay   Date Value Ref Range Status   2012  60 - 140 % Final    (Note)  CANCELLED AND CREDITED PER APPLE IN MED. MONITORING,12,       ASSESSMENT / PLAN  INR assessment THER    Recheck INR In: 3 WEEKS    INR Location Clinic      Anticoagulation Summary  As of 2019    INR goal:   2.0-3.0   TTR:   45.0 % (2.9 y)   INR used for dosin.70 (2019)   Warfarin maintenance plan:   7.5 mg (5 mg x 1.5) every day   Full warfarin instructions:   7.5 mg every day   Weekly warfarin total:   52.5 mg   No change documented:   Laurita Cespedes, RN   Plan last modified:   Kwesi Garcia RN (2018)   Next INR check:   2019   Priority:   INR   Target end date:   Indefinite    Indications    Pulmonary emboli (H) [I26.99]  Long-term (current) use of anticoagulants [Z79.01] [Z79.01]             Anticoagulation Episode Summary     INR check location:       Preferred lab:       Send INR reminders to:   [facility]    Comments:   HIPPA INFO OK to speak with wife Josselyn OK to leave messages on Home.work and cell phones  use cell phone #[phone]      Anticoagulation Care Providers     Provider Role Specialty Phone number    Samm Vazquez MD  [facility] Internal Medicine [phone]            See the Encounter Report to view Anticoagulation Flowsheet and Dosing Calendar (Go to Encounters tab in chart review, and find the Anticoagulation Therapy Visit)    Left message for patient with results and dosing recommendations. Asked patient to call back to report any missed doses, falls, signs and symptoms of bleeding or clotting, any changes in health, medication, or diet. Asked patient to call back with any questions or concerns.      Laurita Cespedes RN

## 2019-06-25 ENCOUNTER — ANTICOAGULATION THERAPY VISIT (OUTPATIENT)
Dept: ANTICOAGULATION | Facility: CLINIC | Age: 59
End: 2019-06-25

## 2019-06-25 ENCOUNTER — OFFICE VISIT (OUTPATIENT)
Dept: URGENT CARE | Facility: URGENT CARE | Age: 59
End: 2019-06-25
Payer: COMMERCIAL

## 2019-06-25 ENCOUNTER — ANCILLARY PROCEDURE (OUTPATIENT)
Dept: GENERAL RADIOLOGY | Facility: CLINIC | Age: 59
End: 2019-06-25
Attending: PHYSICIAN ASSISTANT
Payer: COMMERCIAL

## 2019-06-25 VITALS
SYSTOLIC BLOOD PRESSURE: 132 MMHG | OXYGEN SATURATION: 97 % | WEIGHT: 214 LBS | DIASTOLIC BLOOD PRESSURE: 84 MMHG | BODY MASS INDEX: 29.85 KG/M2 | RESPIRATION RATE: 20 BRPM | HEART RATE: 52 BPM | TEMPERATURE: 97.4 F

## 2019-06-25 DIAGNOSIS — K92.1 BLOOD IN STOOL: ICD-10-CM

## 2019-06-25 DIAGNOSIS — Z79.01 LONG TERM CURRENT USE OF ANTICOAGULANT THERAPY: ICD-10-CM

## 2019-06-25 DIAGNOSIS — R07.81 RIB PAIN ON RIGHT SIDE: Primary | ICD-10-CM

## 2019-06-25 DIAGNOSIS — R07.81 RIB PAIN ON RIGHT SIDE: ICD-10-CM

## 2019-06-25 DIAGNOSIS — I26.99 PULMONARY EMBOLI (H): ICD-10-CM

## 2019-06-25 LAB
ALBUMIN UR-MCNC: NEGATIVE MG/DL
ANION GAP SERPL CALCULATED.3IONS-SCNC: 9 MMOL/L (ref 3–14)
APPEARANCE UR: CLEAR
BASOPHILS # BLD AUTO: 0.1 10E9/L (ref 0–0.2)
BASOPHILS NFR BLD AUTO: 1.3 %
BILIRUB UR QL STRIP: NEGATIVE
BUN SERPL-MCNC: 11 MG/DL (ref 7–30)
CALCIUM SERPL-MCNC: 9.5 MG/DL (ref 8.5–10.1)
CHLORIDE SERPL-SCNC: 105 MMOL/L (ref 94–109)
CO2 SERPL-SCNC: 27 MMOL/L (ref 20–32)
COLOR UR AUTO: YELLOW
CREAT SERPL-MCNC: 0.9 MG/DL (ref 0.66–1.25)
D DIMER PPP FEU-MCNC: <0.3 UG/ML FEU (ref 0–0.5)
DIFFERENTIAL METHOD BLD: NORMAL
EOSINOPHIL # BLD AUTO: 0.7 10E9/L (ref 0–0.7)
EOSINOPHIL NFR BLD AUTO: 10.5 %
ERYTHROCYTE [DISTWIDTH] IN BLOOD BY AUTOMATED COUNT: 13.1 % (ref 10–15)
GFR SERPL CREATININE-BSD FRML MDRD: >90 ML/MIN/{1.73_M2}
GLUCOSE SERPL-MCNC: 99 MG/DL (ref 70–99)
GLUCOSE UR STRIP-MCNC: NEGATIVE MG/DL
HCT VFR BLD AUTO: 42.7 % (ref 40–53)
HGB BLD-MCNC: 14 G/DL (ref 13.3–17.7)
HGB UR QL STRIP: NEGATIVE
INR PPP: 1.96 (ref 0.86–1.14)
KETONES UR STRIP-MCNC: NEGATIVE MG/DL
LEUKOCYTE ESTERASE UR QL STRIP: NEGATIVE
LYMPHOCYTES # BLD AUTO: 1.6 10E9/L (ref 0.8–5.3)
LYMPHOCYTES NFR BLD AUTO: 25.4 %
MCH RBC QN AUTO: 29 PG (ref 26.5–33)
MCHC RBC AUTO-ENTMCNC: 32.8 G/DL (ref 31.5–36.5)
MCV RBC AUTO: 89 FL (ref 78–100)
MONOCYTES # BLD AUTO: 0.5 10E9/L (ref 0–1.3)
MONOCYTES NFR BLD AUTO: 8.1 %
NEUTROPHILS # BLD AUTO: 3.4 10E9/L (ref 1.6–8.3)
NEUTROPHILS NFR BLD AUTO: 54.7 %
NITRATE UR QL: NEGATIVE
PH UR STRIP: 6 PH (ref 5–7)
PLATELET # BLD AUTO: 248 10E9/L (ref 150–450)
POTASSIUM SERPL-SCNC: 3.9 MMOL/L (ref 3.4–5.3)
RBC # BLD AUTO: 4.82 10E12/L (ref 4.4–5.9)
SODIUM SERPL-SCNC: 141 MMOL/L (ref 133–144)
SOURCE: NORMAL
SP GR UR STRIP: 1.02 (ref 1–1.03)
UROBILINOGEN UR STRIP-ACNC: 0.2 EU/DL (ref 0.2–1)
WBC # BLD AUTO: 6.2 10E9/L (ref 4–11)

## 2019-06-25 PROCEDURE — 81003 URINALYSIS AUTO W/O SCOPE: CPT | Performed by: PHYSICIAN ASSISTANT

## 2019-06-25 PROCEDURE — 85379 FIBRIN DEGRADATION QUANT: CPT | Performed by: PHYSICIAN ASSISTANT

## 2019-06-25 PROCEDURE — 99214 OFFICE O/P EST MOD 30 MIN: CPT | Performed by: PHYSICIAN ASSISTANT

## 2019-06-25 PROCEDURE — 36415 COLL VENOUS BLD VENIPUNCTURE: CPT | Performed by: PHYSICIAN ASSISTANT

## 2019-06-25 PROCEDURE — 71046 X-RAY EXAM CHEST 2 VIEWS: CPT

## 2019-06-25 PROCEDURE — 85025 COMPLETE CBC W/AUTO DIFF WBC: CPT | Performed by: PHYSICIAN ASSISTANT

## 2019-06-25 PROCEDURE — 80048 BASIC METABOLIC PNL TOTAL CA: CPT | Performed by: PHYSICIAN ASSISTANT

## 2019-06-25 PROCEDURE — 85610 PROTHROMBIN TIME: CPT | Performed by: PHYSICIAN ASSISTANT

## 2019-06-25 NOTE — PROGRESS NOTES
SUBJECTIVE:  Chief Complaint   Patient presents with     Urgent Care     Fall     fell X1 wk pt if now having rectal bleeding/ headache/dizzy      Oswaldo YAQUELIN Prabhakar is a 58 year old male presents with a chief complaint of right lower rib pain at lateral aspect  10 days ago.  Pain with deep inhalation.  Pain with pressure.  BRBPR this morning following stool this am possibly as much as 1/4 cup.  Water was red, not pink.  Feels a little weak.  On warfarin for years.  Last INR 2-3 weeks ago was therapeutic at 2.4.  Held today.      No blood in urine    History of kidney stones, PE.  Had colonoscopy at 52, no cause for early follow up.     No rectal pain.  No recent constipation or diarrhea.          Past Medical History:   Diagnosis Date     Antiplatelet or antithrombotic long-term use      Coronary artery disease      Diaphragmatic hernia without mention of obstruction or gangrene      Heart disease      Hernia, abdominal      History of blood transfusion      History of thrombophlebitis      Hypertension      Nephrolithiasis 4/2010     Other and unspecified hyperlipidemia      Pulmonary embolus and infarction (H)     s/p hemothorax and filter s/p filter removal (2011), now on long term anti-coagulation     Statin intolerance 2/1/2017     Stented coronary artery      Unspecified retinal detachment     Childhood trauma, unrepaired     Current Outpatient Medications   Medication Sig Dispense Refill     aspirin EC 81 MG tablet Take 1 tablet (81 mg) by mouth daily 91 tablet 3     atorvastatin (LIPITOR) 80 MG tablet Take 1 tablet (80 mg) by mouth daily 90 tablet 2     calcium carbonate (TUMS) 500 MG chewable tablet Take 1 chew tab by mouth daily as needed       Cholecalciferol (VITAMIN D) 2000 UNITS CAPS Take 2,000 Units by mouth daily       DiphenhydrAMINE HCl (BENADRYL PO) Take 25-50 mg by mouth daily as needed       evolocumab (REPATHA SURECLICK) 140 MG/ML prefilled autoinjector Inject 1 mL (140 mg) Subcutaneous every 14  days 6 mL 3     ezetimibe (ZETIA) 10 MG tablet Take 1 tablet (10 mg) by mouth daily 90 tablet 3     fenofibrate (TRIGLIDE/LOFIBRA) 160 MG tablet Take 1 tablet (160 mg) by mouth daily 90 tablet 2     lisinopril (PRINIVIL/ZESTRIL) 2.5 MG tablet Take 1 tablet (2.5 mg) by mouth daily , or as directed by Dr. Ocampo. 90 tablet 2     metoprolol (LOPRESSOR) 10 mg/mL SUSP Take 1.25 mLs (12.5 mg) by mouth 2 times daily 100 mL 5     metoprolol tartrate (LOPRESSOR) 25 MG tablet Take 0.5 tablets (12.5 mg) by mouth 2 times daily 90 tablet 3     warfarin (COUMADIN) 5 MG tablet Take 1.5 tablets daily or as directed by coumadin clinic. 135 tablet 2     oxyCODONE-acetaminophen (PERCOCET) 5-325 MG tablet Take 1 tablet by mouth every 6 hours as needed for pain (Patient not taking: Reported on 12/13/2018) 12 tablet 0     Social History     Tobacco Use     Smoking status: Never Smoker     Smokeless tobacco: Never Used   Substance Use Topics     Alcohol use: No     Comment: very rare       ROS:  10 point ROS negative except as listed above      EXAM:   /84   Pulse 52   Temp 97.4  F (36.3  C)   Resp 20   Wt 97.1 kg (214 lb)   SpO2 97%   BMI 29.85 kg/m    GENERAL APPEARANCE: healthy, alert and no distress  NECK: supple, non-tender to palpation, FROM   CHEST: clear to auscultation  CV: regular rate and rhythm  EXTREMITIES: peripheral pulses normal  MS: no gross deformities noted, no evidence of inflammation in joints, FROM in all extremities.  SKIN: no suspicious lesions or rashes  NEURO: Normal strength and tone, sensory exam grossly normal, mentation intact and speech normal  Rectal: no masses, fissures, WNL    Results for orders placed or performed in visit on 06/25/19   Basic metabolic panel   Result Value Ref [facility]    Sodium 141 133 - 144 mmol/L    Potassium 3.9 3.4 - 5.3 mmol/L    Chloride 105 94 - 109 mmol/L    Carbon Dioxide 27 20 - 32 mmol/L    Anion Gap 9 3 - 14 mmol/L    Glucose 99 70 - 99 mg/dL    Urea Nitrogen 11 7 -  30 mg/dL    Creatinine 0.90 0.66 - 1.25 mg/dL    GFR Estimate >90 >60 mL/min/[1.73_m2]    GFR Estimate If Black >90 >60 mL/min/[1.73_m2]    Calcium 9.5 8.5 - 10.1 mg/dL   D dimer, quantitative   Result Value Ref [facility]    D Dimer <0.3 0.0 - 0.50 ug/ml FEU   INR   Result Value Ref [facility]    INR 1.96 (H) 0.86 - 1.14   *UA reflex to Microscopic and Culture ([facility] (except [place] and [place])   Result Value Ref [facility]    Color Urine Yellow     Appearance Urine Clear     Glucose Urine Negative NEG^Negative mg/dL    Bilirubin Urine Negative NEG^Negative    Ketones Urine Negative NEG^Negative mg/dL    Specific Gravity Urine 1.020 1.003 - 1.035    Blood Urine Negative NEG^Negative    pH Urine 6.0 5.0 - 7.0 pH    Protein Albumin Urine Negative NEG^Negative mg/dL    Urobilinogen Urine 0.2 0.2 - 1.0 EU/dL    Nitrite Urine Negative NEG^Negative    Leukocyte Esterase Urine Negative NEG^Negative    Source Midstream Urine    CBC with platelets and differential   Result Value Ref [facility]    WBC 6.2 4.0 - 11.0 10e9/L    RBC Count 4.82 4.4 - 5.9 10e12/L    Hemoglobin 14.0 13.3 - 17.7 g/dL    Hematocrit 42.7 40.0 - 53.0 %    MCV 89 78 - 100 fl    MCH 29.0 26.5 - 33.0 pg    MCHC 32.8 31.5 - 36.5 g/dL    RDW 13.1 10.0 - 15.0 %    Platelet Count 248 150 - 450 10e9/L    % Neutrophils 54.7 %    % Lymphocytes 25.4 %    % Monocytes 8.1 %    % Eosinophils 10.5 %    % Basophils 1.3 %    Absolute Neutrophil 3.4 1.6 - 8.3 10e9/L    Absolute Lymphocytes 1.6 0.8 - 5.3 10e9/L    Absolute Monocytes 0.5 0.0 - 1.3 10e9/L    Absolute Eosinophils 0.7 0.0 - 0.7 10e9/L    Absolute Basophils 0.1 0.0 - 0.2 10e9/L    Diff Method Automated Method      X-ray image initially interpreted in clinic by me in order to rule out pneumonia, fracture, hemothorax.  No evidence appreciated.      ASSESSMENT:   (R07.81) Rib pain on right side  (primary encounter diagnosis)  Comment:  Does not appear to be acute PE, pneumothorax, fracture,  pnuemonia  Plan: Basic metabolic panel, D dimer, quantitative,         XR Chest 2 Views, *UA reflex to Microscopic and        Culture ([facility] (except         [place] and [place]), CBC with platelets         and differential  Red flags and emergent follow up discussed, and understood by patient  Follow up with PCP this week      (K92.1) Blood in stool  Comment: No evidence of external hemorrhoid. No change in stool. Hemodynamically stable, no pain, no anemia.  Unclear source recommending close follow up and monitoring for changes in symptoms  Plan: Basic metabolic panel, INR, *UA reflex to         Microscopic and Culture ([facility] (except [place] and [place]), CBC         with platelets and differential  Red flags and emergent follow up discussed, and understood by patient  Follow up with PCP this week

## 2019-06-25 NOTE — PATIENT INSTRUCTIONS
Contact Primary Team today to request colonoscopy referral due to Lower intestinal Bleed.            Patient Education     Lower Gastrointestinal (GI) Bleeding (Stable)  You have signs of blood in your stool. This is called rectal bleeding. The bleeding may have begun in another part of your gastrointestinal (GI) tract. If the blood is bright red, it is likely coming from the lower part of the GI tract. If the blood is black or dark, it might be coming from higher up in the GI tract. Very small amounts of GI bleeding may not be visible and can only be discovered during a test on your stool. Possible causes of lower GI bleeding include:    Swollen inflamed veins in the rectum (hemorrhoids)    Tear in the lining of the anus (anal fissures)    Inflammation of a small pouch in the intestine (diverticulitis)    Inflammatory bowel disease (Crohn's disease or ulcerative colitis)    Polyps (growths) in the intestine    Swelling and irritation of the colon (infectious colitis)    Colon cancer  Note: Iron supplements and medicines for diarrhea or upset stomach can cause black stools. Foods such as licorice and red beets can also discolor the stool and be mistaken for bleeding. These are not bleeding and are not a cause for alarm.  Home care  You have not lost a large amount of blood and your condition appears stable at this time. You may resume normal activity as long as you feel well.  Don't take NSAIDs, such as aspirin, ibuprofen, or naproxen. They can irritate the stomach and cause further bleeding. If you are taking these medicines for other medical reasons, talk to your healthcare provider before you stop them.   Follow-up care  Follow up with your healthcare provider, or as advised. Further tests may be needed to find the cause of your bleeding.  When to seek medical advice  Call your healthcare provider right away for any of the following:    Large amount of rectal bleeding     Increasing abdominal pain    Weakness,  dizziness  Call 911  Call 911 if any of the following occur:    Loss of consciousness    Vomiting blood  Date Last Reviewed: 3/1/2018    [id number] [employer]. [address] [place] [address], [place] [address]. All rights reserved. This information is not intended as a substitute for professional medical care. Always follow your healthcare professional's instructions.

## 2019-06-25 NOTE — PROGRESS NOTES
ANTICOAGULATION FOLLOW-UP CLINIC VISIT    Patient Name:  Oswaldo Prabhakar  Date:  2019  Contact Type:  Telephone    SUBJECTIVE:         OBJECTIVE    INR   Date Value Ref [facility] Status   2019 1.96 (H) 0.86 - 1.14 Final     Chromogenic Factor 10   Date Value Ref [facility] Status   2011 81 60 - 140 % Final     Comment:     Therapeutic Range: A Chromogenic Factor 10 level of 20-40% inversely   correlates   with an INR of 2-3 for patients receiving Warfarin. Chromogenic Factor 10   levels below 20% indicate an INR greater than 3, and levels above 40%   indicate   an INR less than 2.     Factor 2 Assay   Date Value Ref [facility] Status   2012  60 - 140 % Final    (Note)  CANCELLED AND CREDITED PER APPLE IN MED. MONITORING,12,       ASSESSMENT / PLAN  INR assessment SUB    Recheck INR In: 2 WEEKS    INR Location Clinic      Anticoagulation Summary  As of 2019    INR goal:   2.0-3.0   TTR:   46.4 % (3 y)   INR used for dosin.96! (2019)   Warfarin maintenance plan:   7.5 mg (5 mg x 1.5) every day   Full warfarin instructions:   : 10 mg; Otherwise 7.5 mg every day   Weekly warfarin total:   52.5 mg   Plan last modified:   Kwesi Garcia RN (2018)   Next INR check:   2019   Priority:   INR   Target end date:   Indefinite    Indications    Pulmonary emboli (H) [I26.99]  Long-term (current) use of anticoagulants [Z79.01] [Z79.01]             Anticoagulation Episode Summary     INR check location:       Preferred lab:       Send INR reminders to:   [facility]    Comments:   HIPPA INFO OK to speak with wife Josselyn OK to leave messages on Home.work and cell phones  use cell phone #[phone]      Anticoagulation Care Providers     Provider Role Specialty Phone number    Samm Vazquez MD [facility] Internal Medicine [phone]            See the Encounter Report to view Anticoagulation Flowsheet and Dosing Calendar (Go to Encounters tab in chart review, and find the  Anticoagulation Therapy Visit)    Left message for patient with results and dosing recommendations. Asked patient to call back to report any missed doses, falls, signs and symptoms of bleeding or clotting, any changes in health, medication, or diet. Asked patient to call back with any questions or concerns.      Fabby Olson

## 2019-07-24 ENCOUNTER — ANTICOAGULATION THERAPY VISIT (OUTPATIENT)
Dept: ANTICOAGULATION | Facility: CLINIC | Age: 59
End: 2019-07-24

## 2019-07-24 DIAGNOSIS — Z79.01 LONG TERM CURRENT USE OF ANTICOAGULANT THERAPY: ICD-10-CM

## 2019-07-24 DIAGNOSIS — I26.99 PULMONARY EMBOLI (H): ICD-10-CM

## 2019-07-24 NOTE — LETTER
[facility], [facility], [place], ANTICOAGULATION CLINIC  [address] [address] [phone]              Mr. Oswaldo Prabhakar  [address] [address]      July 24, 2019      Dear Mr. Prabhakar,    Our records indicate that you have missed your recent appointment for necessary Laboratory work for your Warfarin Therapy.    We want to remind you that there are potentially serious risks involved in taking warfarin without careful monitoring.    Please contact our office at [phone] as soon as possible to schedule another appointment.      Sincerely,    Kristina Wright RN

## 2019-08-15 NOTE — PROGRESS NOTES
Addendum 8/15/19    Left message for patient that they have not been compliant with having the necessary lab work for their anticoagulation therapy. Patient  has been sent two letters stating that they have missed their lab appointments and that it is very important to have labs done to make sure that they are in their therapeutic range to minimize the risks of bleeding and clotting. Patient's Physician has been notified of their non compliance.I asked them to call us or come in for their lab work as soon as possible.       William Harmon [facility]

## 2019-08-16 ENCOUNTER — ANTICOAGULATION THERAPY VISIT (OUTPATIENT)
Dept: ANTICOAGULATION | Facility: CLINIC | Age: 59
End: 2019-08-16

## 2019-08-16 DIAGNOSIS — I26.99 PULMONARY EMBOLI (H): ICD-10-CM

## 2019-08-16 DIAGNOSIS — E78.5 HYPERLIPIDEMIA LDL GOAL <70: ICD-10-CM

## 2019-08-16 DIAGNOSIS — Z79.01 LONG TERM CURRENT USE OF ANTICOAGULANT THERAPY: ICD-10-CM

## 2019-08-16 LAB
ALBUMIN SERPL-MCNC: 4.2 G/DL (ref 3.4–5)
ALP SERPL-CCNC: 58 U/L (ref 40–150)
ALT SERPL W P-5'-P-CCNC: 39 U/L (ref 0–70)
ANION GAP SERPL CALCULATED.3IONS-SCNC: 3 MMOL/L (ref 3–14)
AST SERPL W P-5'-P-CCNC: 27 U/L (ref 0–45)
BILIRUB SERPL-MCNC: 0.6 MG/DL (ref 0.2–1.3)
BUN SERPL-MCNC: 18 MG/DL (ref 7–30)
CALCIUM SERPL-MCNC: 10 MG/DL (ref 8.5–10.1)
CHLORIDE SERPL-SCNC: 106 MMOL/L (ref 94–109)
CHOLEST SERPL-MCNC: 109 MG/DL
CO2 SERPL-SCNC: 29 MMOL/L (ref 20–32)
CREAT SERPL-MCNC: 1.49 MG/DL (ref 0.66–1.25)
GFR SERPL CREATININE-BSD FRML MDRD: 51 ML/MIN/{1.73_M2}
GLUCOSE SERPL-MCNC: 90 MG/DL (ref 70–99)
HDLC SERPL-MCNC: 49 MG/DL
INR PPP: 2.35 (ref 0.86–1.14)
LDLC SERPL CALC-MCNC: 27 MG/DL
NONHDLC SERPL-MCNC: 60 MG/DL
POTASSIUM SERPL-SCNC: 4.3 MMOL/L (ref 3.4–5.3)
PROT SERPL-MCNC: 8 G/DL (ref 6.8–8.8)
SODIUM SERPL-SCNC: 138 MMOL/L (ref 133–144)
TRIGL SERPL-MCNC: 166 MG/DL

## 2019-08-16 NOTE — PROGRESS NOTES
ANTICOAGULATION FOLLOW-UP CLINIC VISIT    Patient Name:  Oswaldo Prabhakar  Date:  2019  Contact Type:  Telephone    SUBJECTIVE:         OBJECTIVE    INR   Date Value Ref [facility] Status   2019 2.35 (H) 0.86 - 1.14 Final     Chromogenic Factor 10   Date Value Ref [facility] Status   2011 81 60 - 140 % Final     Comment:     Therapeutic Range: A Chromogenic Factor 10 level of 20-40% inversely   correlates   with an INR of 2-3 for patients receiving Warfarin. Chromogenic Factor 10   levels below 20% indicate an INR greater than 3, and levels above 40%   indicate   an INR less than 2.     Factor 2 Assay   Date Value Ref [facility] Status   2012  60 - 140 % Final    (Note)  CANCELLED AND CREDITED PER APPLE IN MED. MONITORING,12,       ASSESSMENT / PLAN  INR assessment THER    Recheck INR In: 4 WEEKS    INR Location Clinic      Anticoagulation Summary  As of 2019    INR goal:   2.0-3.0   TTR:   48.4 % (3.1 y)   INR used for dosin.35 (2019)   Warfarin maintenance plan:   7.5 mg (5 mg x 1.5) every day   Full warfarin instructions:   7.5 mg every day   Weekly warfarin total:   52.5 mg   Plan last modified:   Kwesi Garcia RN (2018)   Next INR check:   2019   Priority:   INR   Target end date:   Indefinite    Indications    Pulmonary emboli (H) [I26.99]  Long-term (current) use of anticoagulants [Z79.01] [Z79.01]             Anticoagulation Episode Summary     INR check location:       Preferred lab:       Send INR reminders to:   [facility]    Comments:   HIPPA INFO OK to speak with wife Josselyn MICHELLE to leave messages on Home.work and cell phones  use cell phone #[phone]      Anticoagulation Care Providers     Provider Role Specialty Phone number    Samm Vazquez MD [facility] Internal Medicine [phone]            See the Encounter Report to view Anticoagulation Flowsheet and Dosing Calendar (Go to Encounters tab in chart review, and find the Anticoagulation  Therapy Visit)    Left message for patient with results and dosing recommendations. Asked patient to call back to report any missed doses, falls, signs and symptoms of bleeding or clotting, any changes in health, medication, or diet. Asked patient to call back with any questions or concerns.      Rigoberto Harmon [facility]

## 2019-09-04 ENCOUNTER — DOCUMENTATION ONLY (OUTPATIENT)
Dept: CARE COORDINATION | Facility: CLINIC | Age: 59
End: 2019-09-04

## 2019-10-04 ENCOUNTER — HEALTH MAINTENANCE LETTER (OUTPATIENT)
Age: 59
End: 2019-10-04

## 2019-10-10 DIAGNOSIS — Z95.1 S/P CABG X 4: Primary | ICD-10-CM

## 2019-10-15 RX ORDER — METOPROLOL TARTRATE 25 MG/1
12.5 TABLET, FILM COATED ORAL 2 TIMES DAILY
Qty: 90 TABLET | Refills: 1 | Status: SHIPPED | OUTPATIENT
Start: 2019-10-15 | End: 2020-01-21

## 2019-10-17 ENCOUNTER — ANTICOAGULATION THERAPY VISIT (OUTPATIENT)
Dept: ONCOLOGY | Facility: CLINIC | Age: 59
End: 2019-10-17

## 2019-10-17 DIAGNOSIS — Z79.01 LONG TERM CURRENT USE OF ANTICOAGULANT THERAPY: ICD-10-CM

## 2019-10-17 DIAGNOSIS — I26.99 PULMONARY EMBOLI (H): ICD-10-CM

## 2019-10-17 NOTE — PROGRESS NOTES
Spoke with Oswaldo today to remind him that he is overdue for an INR. He has an appointment on 10/21 with his cardiologist he will get one then  Verified that he is still taking warfarin 7.5mgs day.    William Harmon [facility]

## 2019-10-21 ENCOUNTER — OFFICE VISIT (OUTPATIENT)
Dept: CARDIOLOGY | Facility: CLINIC | Age: 59
End: 2019-10-21
Attending: INTERNAL MEDICINE
Payer: COMMERCIAL

## 2019-10-21 VITALS
HEIGHT: 72 IN | DIASTOLIC BLOOD PRESSURE: 73 MMHG | WEIGHT: 217.6 LBS | SYSTOLIC BLOOD PRESSURE: 119 MMHG | HEART RATE: 56 BPM | OXYGEN SATURATION: 96 % | BODY MASS INDEX: 29.47 KG/M2

## 2019-10-21 DIAGNOSIS — E78.5 HYPERLIPIDEMIA LDL GOAL <130: Primary | ICD-10-CM

## 2019-10-21 DIAGNOSIS — E78.5 HYPERLIPIDEMIA LDL GOAL <130: ICD-10-CM

## 2019-10-21 DIAGNOSIS — I26.99 PULMONARY EMBOLI (H): ICD-10-CM

## 2019-10-21 DIAGNOSIS — Z95.1 S/P CABG X 4: ICD-10-CM

## 2019-10-21 DIAGNOSIS — Z78.9 STATIN INTOLERANCE: ICD-10-CM

## 2019-10-21 DIAGNOSIS — I25.739 CORONARY ARTERY DISEASE INVOLVING NONAUTOLOGOUS BIOLOGICAL CORONARY BYPASS GRAFT WITH ANGINA PECTORIS (H): ICD-10-CM

## 2019-10-21 DIAGNOSIS — Z79.01 LONG TERM CURRENT USE OF ANTICOAGULANT THERAPY: ICD-10-CM

## 2019-10-21 DIAGNOSIS — I25.10 CAD (CORONARY ARTERY DISEASE): ICD-10-CM

## 2019-10-21 LAB — INR PPP: 2.11 (ref 0.86–1.14)

## 2019-10-21 PROCEDURE — 99213 OFFICE O/P EST LOW 20 MIN: CPT | Mod: ZP | Performed by: INTERNAL MEDICINE

## 2019-10-21 PROCEDURE — G0463 HOSPITAL OUTPT CLINIC VISIT: HCPCS | Mod: ZF

## 2019-10-21 ASSESSMENT — ENCOUNTER SYMPTOMS
HYPOTENSION: 0
HYPERTENSION: 0
EXERCISE INTOLERANCE: 0
LIGHT-HEADEDNESS: 0
SLEEP DISTURBANCES DUE TO BREATHING: 0
SYNCOPE: 0
PALPITATIONS: 0
LEG PAIN: 0
ORTHOPNEA: 0

## 2019-10-21 ASSESSMENT — MIFFLIN-ST. JEOR: SCORE: 1850.03

## 2019-10-21 ASSESSMENT — PAIN SCALES - GENERAL: PAINLEVEL: NO PAIN (0)

## 2019-10-21 NOTE — PROGRESS NOTES
HPI:     Mr. 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 He also has history of statin intolerance. He returns to clinic for routine follow up.      From a clinical standpoint, he is feeling well. 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 No weight change, orthopnea, PND, palpitations, claudication.      Patient is tolerating his lipid-lowering medication well inclusive Repatha 140 mg subcut.       PAST MEDICAL HISTORY:  Past Medical History:   Diagnosis Date     Antiplatelet or antithrombotic long-term use      Coronary artery disease      Diaphragmatic hernia without mention of obstruction or gangrene      Heart disease      Hernia, abdominal      History of blood transfusion      History of thrombophlebitis      Hypertension      Nephrolithiasis 4/2010     Other and unspecified hyperlipidemia      Pulmonary embolus and infarction (H)     s/p hemothorax and filter s/p filter removal (2011), now on long term anti-coagulation     Statin intolerance 2/1/2017     Stented coronary artery      Unspecified retinal detachment     Childhood trauma, unrepaired       CURRENT MEDICATIONS:  Current Outpatient Medications   Medication Sig Dispense Refill     aspirin EC 81 MG tablet Take 1 tablet (81 mg) by mouth daily 91 tablet 3     atorvastatin (LIPITOR) 80 MG tablet Take 1 tablet (80 mg) by mouth daily 90 tablet 2     calcium carbonate (TUMS) 500 MG chewable tablet Take 1 chew tab by mouth daily as needed       Cholecalciferol (VITAMIN D) 2000 UNITS CAPS Take 2,000 Units by mouth daily       evolocumab (REPATHA SURECLICK) 140 MG/ML prefilled autoinjector Inject 1 mL (140 mg) Subcutaneous every 14 days 6 mL 3     ezetimibe (ZETIA) 10 MG tablet Take 1 tablet (10 mg) by mouth daily 90 tablet 3     fenofibrate (TRIGLIDE/LOFIBRA) 160 MG tablet Take 1 tablet (160 mg) by mouth daily 90 tablet 2     lisinopril (PRINIVIL/ZESTRIL) 2.5 MG  tablet Take 1 tablet (2.5 mg) by mouth daily , or as directed by Dr. Ocampo. 90 tablet 2     metoprolol tartrate (LOPRESSOR) 25 MG tablet Take 0.5 tablets (12.5 mg) by mouth 2 times daily 90 tablet 1     warfarin (COUMADIN) 5 MG tablet Take 1.5 tablets daily or as directed by coumadin clinic. 135 tablet 2     DiphenhydrAMINE HCl (BENADRYL PO) Take 25-50 mg by mouth daily as needed       metoprolol (LOPRESSOR) 10 mg/mL SUSP Take 1.25 mLs (12.5 mg) by mouth 2 times daily 100 mL 5     oxyCODONE-acetaminophen (PERCOCET) 5-325 MG tablet Take 1 tablet by mouth every 6 hours as needed for pain (Patient not taking: Reported on 12/13/2018) 12 tablet 0       PAST SURGICAL HISTORY:  Past Surgical History:   Procedure Laterality Date     BYPASS GRAFT ARTERY CORONARY  4/4/2014    Procedure: BYPASS GRAFT ARTERY CORONARY;  Median Sternotomy, Coronary Artery Bypass Bypass x 4 using Left Internal Mammary, Left Saphenous Vein, on pump oxygenation;  Surgeon: Sherry Armando MD;  Location: [facility]     C NONSPECIFIC PROCEDURE      Spermatocele resection     CLOSED REDUCTION, PERCUTANEOUS PINNING  HAND, COMBINED Left 11/5/2015    Procedure: COMBINED CLOSED REDUCTION, PERCUTANEOUS PINNING HAND;  Surgeon: Carmella Love MD;  [facility]     COLONOSCOPY  3/15/2013    Procedure: COLONOSCOPY;;  Surgeon: Racheal Shipman MD;  [facility]     CYSTOSCOPY       LAPAROSCOPIC CHOLECYSTECTOMY N/A 8/6/2018    Procedure: LAPAROSCOPIC CHOLECYSTECTOMY;  LAPAROSCOPIC CHOLECYSTECTOMY ;  Surgeon: José Miguel Stuart MD;  Location:  OR     LITHOTRIPSY  4/2010     THORACIC SURGERY      lung surgery, thoracotomy, lung adhesion       ALLERGIES     Allergies   Allergen Reactions     Cats      Hay Fever & [A.R.M.]      Heparin Other (See Comments)     Heparin resistance per cardio-thoracic surgeon Dr. Armando     Hydrocodone-Acetaminophen Nausea and Vomiting     Acetaminophen is ok       FAMILY HISTORY:  Family History   Problem  Relation Age of Onset     Heart Disease Mother      C.A.D. Father         3 vessel CABG, age 70     Cancer Father         bladder cancer     C.A.D. Paternal Grandmother      Hypertension Paternal Grandmother      Alzheimer Disease Paternal Grandmother      Diabetes No family hx of      Thyroid Disease No family hx of      Cancer - colorectal No family hx of        SOCIAL HISTORY:  Social History     Socioeconomic History     Marital status:      Spouse name: None     Number of children: None     Years of education: None     Highest education level: None   Occupational History     None   Social Needs     Financial resource strain: None     Food insecurity:     Worry: None     Inability: None     Transportation needs:     Medical: None     Non-medical: None   Tobacco Use     Smoking status: Never Smoker     Smokeless tobacco: Never Used   Substance and Sexual Activity     Alcohol use: No     Comment: very rare     Drug use: No     Sexual activity: None   Lifestyle     Physical activity:     Days per week: None     Minutes per session: None     Stress: None   Relationships     Social connections:     Talks on phone: None     Gets together: None     Attends Sikhism service: None     Active member of club or organization: None     Attends meetings of clubs or organizations: None     Relationship status: None     Intimate partner violence:     Fear of current or ex partner: None     Emotionally abused: None     Physically abused: None     Forced sexual activity: None   Other Topics Concern     Parent/sibling w/ CABG, MI or angioplasty before 65F 55M? Not Asked   Social History Narrative     None       ROS:   Constitutional: No fever, chills, or sweats. No weight gain/loss   ENT: No visual disturbance, ear ache, epistaxis, sore throat  Allergies/Immunologic: Negative.   Respiratory: No cough, hemoptysia  Cardiovascular: As per HPI  GI: No nausea, vomiting, hematemesis, melena, or hematochezia  : No urinary  frequency, dysuria, or hematuria  Integument: Negative  Psychiatric: Negative  Neuro: Negative  Endocrinology: Negative   Musculoskeletal: Negative    EXAM:  /73 (BP Location: Left arm, Patient Position: Chair, Cuff Size: Adult Regular)   Pulse 56   Ht 1.829 m (6')   Wt 98.7 kg (217 lb 9.6 oz)   SpO2 96%   BMI 29.51 kg/m    In general, the patient is a pleasant male in no apparent distress.    HEENT: NC/AT.  PERRLA.  EOMI.  Sclerae white, not injected.  Nares clear.  Pharynx without erythema or exudate.  Dentition intact.    Neck: No adenopathy.  No thyromegaly. Carotids +4/4 bilaterally without bruits.  No jugular venous distension.   Heart: RRR. Normal S1, S2 splits physiologically. No murmur, rub, click, or gallop. The PMI is in the 5th ICS in the midclavicular line. There is no heave.    Lungs: CTA.  No ronchi, wheezes, rales.  No dullness to percussion.   Abdomen: Soft, nontender, nondistended. No organomegaly.  No bruits.   Extremities: No clubbing, cyanosis, or edema.  The pulses are +4/4 at the radial, brachial, femoral, popliteal, DP, and PT sites bilaterally.  No bruits are noted.  Neurologic: Alert and oriented to person/place/time, normal speech, gait and affect  Skin: No petechiae, purpura or rash.    Labs:  LIPID RESULTS:  Lab Results   Component Value Date    CHOL 109 08/16/2019    HDL 49 08/16/2019    LDL 27 08/16/2019    TRIG 166 (H) 08/16/2019    CHOLHDLRATIO 7.0 (H) 09/16/2015    NHDL 60 08/16/2019       LIVER ENZYME RESULTS:  Lab Results   Component Value Date    AST 27 08/16/2019    ALT 39 08/16/2019       CBC RESULTS:  Lab Results   Component Value Date    WBC 6.2 06/25/2019    RBC 4.82 06/25/2019    HGB 14.0 06/25/2019    HCT 42.7 06/25/2019    MCV 89 06/25/2019    MCH 29.0 06/25/2019    MCHC 32.8 06/25/2019    RDW 13.1 06/25/2019     06/25/2019       BMP RESULTS:  Lab Results   Component Value Date     08/16/2019    POTASSIUM 4.3 08/16/2019    CHLORIDE 106 08/16/2019     CO2 29 08/16/2019    ANIONGAP 3 08/16/2019    GLC 90 08/16/2019    BUN 18 08/16/2019    CR 1.49 (H) 08/16/2019    GFRESTIMATED 51 (L) 08/16/2019    GFRESTBLACK 60 (L) 08/16/2019    GONZALES 10.0 08/16/2019        A1C RESULTS:  Lab Results   Component Value Date    A1C 5.9 03/01/2016       INR RESULTS:  Lab Results   Component Value Date    INR 2.35 (H) 08/16/2019    INR 1.96 (H) 06/25/2019           Assessment and Plan:      I disucssed the results with patient.  We disucssed the importance of a heart healthy diet and lifestyle.  We continue with same medication.  Follow-up within 1 year.    Reji Oacmpo MD, PhD  Professor of Medicine  Division of Cardiology          CC  Patient Care Team:  Simi Guevara MD as PCP - General (Internal Medicine)  Reji Ocampo MD as MD (Cardiology)  Shiv Vizcarra MD as MD (Family Practice)  Carmella Love MD as MD (Orthopedics)  Lavelle Solis MD as MD (Family Practice)  Jose Martin Gabriel MD as Assigned PCP  Nicole Blanc LPN as Nurse Coordinator (Cardiology)  Sav Schmitt DO as MD (Family Practice)  SIMI GUEVARA  Answers for HPI/ROS submitted by the patient on 10/21/2019   General Symptoms: No  Skin Symptoms: No  HENT Symptoms: No  EYE SYMPTOMS: No  HEART SYMPTOMS: Yes  LUNG SYMPTOMS: No  INTESTINAL SYMPTOMS: No  URINARY SYMPTOMS: No  REPRODUCTIVE SYMPTOMS: No  SKELETAL SYMPTOMS: No  BLOOD SYMPTOMS: No  NERVOUS SYSTEM SYMPTOMS: No  MENTAL HEALTH SYMPTOMS: No  Chest pain or pressure: No  Fast or irregular heartbeat: No  Pain in legs with walking: No  Trouble breathing while lying down: No  Fingers or toes appear blue: No  High blood pressure: No  Low blood pressure: No  Fainting: No  Murmurs: No  Pacemaker: No  Varicose veins: No  Edema or swelling: Yes  Wake up at night with shortness of breath: No  Light-headedness: No  Exercise intolerance: No

## 2019-10-21 NOTE — LETTER
10/21/2019      RE: Oswaldo Prabhakar  [address]  [place] [address]       Dear Colleague,    Thank you for the opportunity to participate in the care of your patient, Oswaldo Prabhakar, at the [facility]. Please see a copy of my visit note below.    HPI:     Mr. 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 He also has history of statin intolerance. He returns to clinic for routine follow up.      From a clinical standpoint, he is feeling well. 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 No weight change, orthopnea, PND, palpitations, claudication.      Patient is tolerating his lipid-lowering medication well inclusive Repatha 140 mg subcut.       PAST MEDICAL HISTORY:  Past Medical History:   Diagnosis Date     Antiplatelet or antithrombotic long-term use      Coronary artery disease      Diaphragmatic hernia without mention of obstruction or gangrene      Heart disease      Hernia, abdominal      History of blood transfusion      History of thrombophlebitis      Hypertension      Nephrolithiasis 4/2010     Other and unspecified hyperlipidemia      Pulmonary embolus and infarction (H)     s/p hemothorax and filter s/p filter removal (2011), now on long term anti-coagulation     Statin intolerance 2/1/2017     Stented coronary artery      Unspecified retinal detachment     Childhood trauma, unrepaired       CURRENT MEDICATIONS:  Current Outpatient Medications   Medication Sig Dispense Refill     aspirin EC 81 MG tablet Take 1 tablet (81 mg) by mouth daily 91 tablet 3     atorvastatin (LIPITOR) 80 MG tablet Take 1 tablet (80 mg) by mouth daily 90 tablet 2     calcium carbonate (TUMS) 500 MG chewable tablet Take 1 chew tab by mouth daily as needed       Cholecalciferol (VITAMIN D) 2000 UNITS CAPS Take 2,000 Units by mouth daily       evolocumab (REPATHA SURECLICK)  140 MG/ML prefilled autoinjector Inject 1 mL (140 mg) Subcutaneous every 14 days 6 mL 3     ezetimibe (ZETIA) 10 MG tablet Take 1 tablet (10 mg) by mouth daily 90 tablet 3     fenofibrate (TRIGLIDE/LOFIBRA) 160 MG tablet Take 1 tablet (160 mg) by mouth daily 90 tablet 2     lisinopril (PRINIVIL/ZESTRIL) 2.5 MG tablet Take 1 tablet (2.5 mg) by mouth daily , or as directed by Dr. Ocampo. 90 tablet 2     metoprolol tartrate (LOPRESSOR) 25 MG tablet Take 0.5 tablets (12.5 mg) by mouth 2 times daily 90 tablet 1     warfarin (COUMADIN) 5 MG tablet Take 1.5 tablets daily or as directed by coumadin clinic. 135 tablet 2     DiphenhydrAMINE HCl (BENADRYL PO) Take 25-50 mg by mouth daily as needed       metoprolol (LOPRESSOR) 10 mg/mL SUSP Take 1.25 mLs (12.5 mg) by mouth 2 times daily 100 mL 5     oxyCODONE-acetaminophen (PERCOCET) 5-325 MG tablet Take 1 tablet by mouth every 6 hours as needed for pain (Patient not taking: Reported on 12/13/2018) 12 tablet 0       PAST SURGICAL HISTORY:  Past Surgical History:   Procedure Laterality Date     BYPASS GRAFT ARTERY CORONARY  4/4/2014    Procedure: BYPASS GRAFT ARTERY CORONARY;  Median Sternotomy, Coronary Artery Bypass Bypass x 4 using Left Internal Mammary, Left Saphenous Vein, on pump oxygenation;  Surgeon: Sherry Armando MD;  Location: [facility] OR     C NONSPECIFIC PROCEDURE      Spermatocele resection     CLOSED REDUCTION, PERCUTANEOUS PINNING  HAND, COMBINED Left 11/5/2015    Procedure: COMBINED CLOSED REDUCTION, PERCUTANEOUS PINNING HAND;  Surgeon: Carmella Love MD;  [facility]     COLONOSCOPY  3/15/2013    Procedure: COLONOSCOPY;;  Surgeon: Racheal Shipman MD;  Location: [facility]     CYSTOSCOPY       LAPAROSCOPIC CHOLECYSTECTOMY N/A 8/6/2018    Procedure: LAPAROSCOPIC CHOLECYSTECTOMY;  LAPAROSCOPIC CHOLECYSTECTOMY ;  Surgeon: José Miguel Stuart MD;  Location:  OR     LITHOTRIPSY  4/2010     THORACIC SURGERY      lung surgery, thoracotomy, lung  adhesion       ALLERGIES     Allergies   Allergen Reactions     Cats      Hay Fever & [A.R.M.]      Heparin Other (See Comments)     Heparin resistance per cardio-thoracic surgeon Dr. Armando     Hydrocodone-Acetaminophen Nausea and Vomiting     Acetaminophen is ok       FAMILY HISTORY:  Family History   Problem Relation Age of Onset     Heart Disease Mother      C.A.D. Father         3 vessel CABG, age 70     Cancer Father         bladder cancer     C.A.D. Paternal Grandmother      Hypertension Paternal Grandmother      Alzheimer Disease Paternal Grandmother      Diabetes No family hx of      Thyroid Disease No family hx of      Cancer - colorectal No family hx of        SOCIAL HISTORY:  Social History     Socioeconomic History     Marital status:      Spouse name: None     Number of children: None     Years of education: None     Highest education level: None   Occupational History     None   Social Needs     Financial resource strain: None     Food insecurity:     Worry: None     Inability: None     Transportation needs:     Medical: None     Non-medical: None   Tobacco Use     Smoking status: Never Smoker     Smokeless tobacco: Never Used   Substance and Sexual Activity     Alcohol use: No     Comment: very rare     Drug use: No     Sexual activity: None   Lifestyle     Physical activity:     Days per week: None     Minutes per session: None     Stress: None   Relationships     Social connections:     Talks on phone: None     Gets together: None     Attends Restorationism service: None     Active member of club or organization: None     Attends meetings of clubs or organizations: None     Relationship status: None     Intimate partner violence:     Fear of current or ex partner: None     Emotionally abused: None     Physically abused: None     Forced sexual activity: None   Other Topics Concern     Parent/sibling w/ CABG, MI or angioplasty before 65F 55M? Not Asked   Social History Narrative     None        ROS:   Constitutional: No fever, chills, or sweats. No weight gain/loss   ENT: No visual disturbance, ear ache, epistaxis, sore throat  Allergies/Immunologic: Negative.   Respiratory: No cough, hemoptysia  Cardiovascular: As per HPI  GI: No nausea, vomiting, hematemesis, melena, or hematochezia  : No urinary frequency, dysuria, or hematuria  Integument: Negative  Psychiatric: Negative  Neuro: Negative  Endocrinology: Negative   Musculoskeletal: Negative    EXAM:  /73 (BP Location: Left arm, Patient Position: Chair, Cuff Size: Adult Regular)   Pulse 56   Ht 1.829 m (6')   Wt 98.7 kg (217 lb 9.6 oz)   SpO2 96%   BMI 29.51 kg/m     In general, the patient is a pleasant male in no apparent distress.    HEENT: NC/AT.  PERRLA.  EOMI.  Sclerae white, not injected.  Nares clear.  Pharynx without erythema or exudate.  Dentition intact.    Neck: No adenopathy.  No thyromegaly. Carotids +4/4 bilaterally without bruits.  No jugular venous distension.   Heart: RRR. Normal S1, S2 splits physiologically. No murmur, rub, click, or gallop. The PMI is in the 5th ICS in the midclavicular line. There is no heave.    Lungs: CTA.  No ronchi, wheezes, rales.  No dullness to percussion.   Abdomen: Soft, nontender, nondistended. No organomegaly.  No bruits.   Extremities: No clubbing, cyanosis, or edema.  The pulses are +4/4 at the radial, brachial, femoral, popliteal, DP, and PT sites bilaterally.  No bruits are noted.  Neurologic: Alert and oriented to person/place/time, normal speech, gait and affect  Skin: No petechiae, purpura or rash.    Labs:  LIPID RESULTS:  Lab Results   Component Value Date    CHOL 109 08/16/2019    HDL 49 08/16/2019    LDL 27 08/16/2019    TRIG 166 (H) 08/16/2019    CHOLHDLRATIO 7.0 (H) 09/16/2015    NHDL 60 08/16/2019     LIVER ENZYME RESULTS:  Lab Results   Component Value Date    AST 27 08/16/2019    ALT 39 08/16/2019     CBC RESULTS:  Lab Results   Component Value Date    WBC 6.2 06/25/2019     RBC 4.82 06/25/2019    HGB 14.0 06/25/2019    HCT 42.7 06/25/2019    MCV 89 06/25/2019    MCH 29.0 06/25/2019    MCHC 32.8 06/25/2019    RDW 13.1 06/25/2019     06/25/2019     BMP RESULTS:  Lab Results   Component Value Date     08/16/2019    POTASSIUM 4.3 08/16/2019    CHLORIDE 106 08/16/2019    CO2 29 08/16/2019    ANIONGAP 3 08/16/2019    GLC 90 08/16/2019    BUN 18 08/16/2019    CR 1.49 (H) 08/16/2019    GFRESTIMATED 51 (L) 08/16/2019    GFRESTBLACK 60 (L) 08/16/2019    GONZALES 10.0 08/16/2019      A1C RESULTS:  Lab Results   Component Value Date    A1C 5.9 03/01/2016     INR RESULTS:  Lab Results   Component Value Date    INR 2.35 (H) 08/16/2019    INR 1.96 (H) 06/25/2019     Assessment and Plan:  I disucssed the results with patient.  We disucssed the importance of a heart healthy diet and lifestyle.  We continue with same medication.  Follow-up within 1 year.    Reji Ocampo MD, PhD  Professor of Medicine  Division of Cardiology    CC  Patient Care Team:  Simi Guevara MD as PCP - General (Internal Medicine)  Reji Ocampo MD as MD (Cardiology)  Shiv Vizcarra MD as MD (Family Practice)  Carmella Love MD as MD (Orthopedics)  Lavelle oSlis MD as MD (Family Practice)  Jose Martin Gabriel MD as Assigned PCP  Nicole Blanc LPN as Nurse Coordinator (Cardiology)  Sav Schmitt DO as MD (Family Practice)  SIMI GUEVARA    Answers for HPI/ROS submitted by the patient on 10/21/2019   General Symptoms: No  Skin Symptoms: No  HENT Symptoms: No  EYE SYMPTOMS: No  HEART SYMPTOMS: Yes  LUNG SYMPTOMS: No  INTESTINAL SYMPTOMS: No  URINARY SYMPTOMS: No  REPRODUCTIVE SYMPTOMS: No  SKELETAL SYMPTOMS: No  BLOOD SYMPTOMS: No  NERVOUS SYSTEM SYMPTOMS: No  MENTAL HEALTH SYMPTOMS: No  Chest pain or pressure: No  Fast or irregular heartbeat: No  Pain in legs with walking: No  Trouble breathing while lying down: No  Fingers or toes appear blue: No  High blood pressure: No  Low blood  pressure: No  Fainting: No  Murmurs: No  Pacemaker: No  Varicose veins: No  Edema or swelling: Yes  Wake up at night with shortness of breath: No  Light-headedness: No  Exercise intolerance: No

## 2019-10-21 NOTE — PATIENT INSTRUCTIONS
Patient Instructions:  It was a pleasure to see you in the cardiology clinic today.      If you have any questions, you can reach my nurse, Nicole SALAZAR LPN, at [phone].  Press Option #1 for the [facility], and then press Option #4 for nursing.    We are encouraging the use of VarVeet to communicate with your HealthCare Provider    Medication Changes: None.    Recommendations: Labs (non-fasting) in 1-2 weeks.    Studies Ordered: None.    The results from today include: Labs.    Please follow up: With Dr. Ocampo in one year with fasting labs prior.    Sincerely,    Reji Ocampo MD     If you have an urgent need after hours (8:00 am to 4:30 pm) please call [phone] and ask for the cardiology fellow on call.

## 2019-10-21 NOTE — NURSING NOTE
Chief Complaint   Patient presents with     Follow Up     1  year follow up      Vitals were taken and medications were reconciled.   Yue Zhang  5:11 PM

## 2019-10-22 ENCOUNTER — ANTICOAGULATION THERAPY VISIT (OUTPATIENT)
Dept: ANTICOAGULATION | Facility: CLINIC | Age: 59
End: 2019-10-22

## 2019-10-22 DIAGNOSIS — I26.99 PULMONARY EMBOLI (H): ICD-10-CM

## 2019-10-22 DIAGNOSIS — Z79.01 LONG TERM CURRENT USE OF ANTICOAGULANT THERAPY: ICD-10-CM

## 2019-10-22 NOTE — PROGRESS NOTES
ANTICOAGULATION FOLLOW-UP CLINIC VISIT    Patient Name:  Oswaldo Prabhakar  Date:  10/22/2019  Contact Type:  Telephone    SUBJECTIVE:         OBJECTIVE    INR   Date Value Ref Range Status   10/21/2019 2.11 (H) 0.86 - 1.14 Final     Chromogenic Factor 10   Date Value Ref Range Status   2011 81 60 - 140 % Final     Comment:     Therapeutic Range: A Chromogenic Factor 10 level of 20-40% inversely   correlates   with an INR of 2-3 for patients receiving Warfarin. Chromogenic Factor 10   levels below 20% indicate an INR greater than 3, and levels above 40%   indicate   an INR less than 2.     Factor 2 Assay   Date Value Ref Range Status   2012  60 - 140 % Final    (Note)  CANCELLED AND CREDITED PER APPLE IN MED. MONITORING,12,       ASSESSMENT / PLAN  INR assessment THER    Recheck INR In: 4 WEEKS    INR Location Clinic      Anticoagulation Summary  As of 10/22/2019    INR goal:   2.0-3.0   TTR:   51.2 % (3.3 y)   INR used for dosin.11 (10/21/2019)   Warfarin maintenance plan:   7.5 mg (5 mg x 1.5) every day   Full warfarin instructions:   7.5 mg every day   Weekly warfarin total:   52.5 mg   Plan last modified:   Kwesi Garcia RN (2018)   Next INR check:   2019   Priority:   INR   Target end date:   Indefinite    Indications    Pulmonary emboli (H) [I26.99]  Long-term (current) use of anticoagulants [Z79.01] [Z79.01]             Anticoagulation Episode Summary     INR check location:       Preferred lab:       Send INR reminders to:   [facility]    Comments:   HIPPA INFO OK to speak with wife Josselyn MICHELLE to leave messages on Home.work and cell phones  use cell phone #[phone]      Anticoagulation Care Providers     Provider Role Specialty Phone number    Samm Vazquez MD [facility] Internal Medicine [phone]            See the Encounter Report to view Anticoagulation Flowsheet and Dosing Calendar (Go to Encounters tab in chart review, and find the Anticoagulation  Therapy Visit)  Left message for patient with results and dosing recommendations. Asked patient to call back to report any missed doses, falls, signs and symptoms of bleeding or clotting, any changes in health, medication, or diet. Asked patient to call back with any questions or concerns.      Nano Greer RN

## 2019-11-15 DIAGNOSIS — Z79.01 LONG TERM CURRENT USE OF ANTICOAGULANT THERAPY: Primary | ICD-10-CM

## 2019-11-15 RX ORDER — WARFARIN SODIUM 5 MG/1
TABLET ORAL
Qty: 45 TABLET | Refills: 3 | Status: SHIPPED | OUTPATIENT
Start: 2019-11-15 | End: 2020-01-06

## 2019-11-22 ENCOUNTER — TELEPHONE (OUTPATIENT)
Dept: CARDIOLOGY | Facility: CLINIC | Age: 59
End: 2019-11-22

## 2019-11-22 NOTE — TELEPHONE ENCOUNTER
PA Initiation    Medication: Repatha- Renewal- PENDING   Insurance Company: [employer] - Phone [phone] Fax [phone]  Pharmacy Filling the Rx: [facility] - [place], [place] - [address]  Filling Pharmacy Phone:    Filling Pharmacy Fax:    Start Date: 11/22/2019

## 2019-11-22 NOTE — TELEPHONE ENCOUNTER
Prior Authorization Approval    Authorization Effective Date: 11/11/2019  Authorization Expiration Date: 11/11/2020  Medication: Repatha- Renewal- Approved  Approved Dose/Quantity: 140MG  Reference #: Cert# [id number]   Insurance Company: [employer] - Phone [phone] Fax [phone]  Expected CoPay: $0     CoPay Card Available: Yes    Foundation Assistance Needed: N/A  Which Pharmacy is filling the prescription (Not needed for infusion/clinic administered): [facility] - [place], [place] - [address]  Pharmacy Notified: No  Patient Notified: Yes

## 2019-11-23 NOTE — PROGRESS NOTES
ANTICOAGULATION FOLLOW-UP CLINIC VISIT    Patient Name:  Oswaldo rPabhakar  Date:  1/31/2018  Contact Type:  Telephone    SUBJECTIVE:     Patient Findings     Comments Pt requested to call us back to verify his dosing & if he missed a dose of coumadin.  He is advised to take 3 days in a row of the 7.5 mg tabs if he typically takes 2 days in a row of the 7.5 mg dose           OBJECTIVE    INR   Date Value Ref Range Status   01/30/2018 1.86 (H) 0.86 - 1.14 Final     Chromogenic Factor 10   Date Value Ref Range Status   06/18/2011 81 60 - 140 % Final     Comment:     Therapeutic Range: A Chromogenic Factor 10 level of 20-40% inversely   correlates   with an INR of 2-3 for patients receiving Warfarin. Chromogenic Factor 10   levels below 20% indicate an INR greater than 3, and levels above 40%   indicate   an INR less than 2.     Factor 2 Assay   Date Value Ref Range Status   04/04/2012  60 - 140 % Final    (Note)  CANCELLED AND CREDITED PER APPLE IN MED. MONITORING,4/4/12,       ASSESSMENT / PLAN  INR assessment SUB    Recheck INR In: 2 WEEKS    INR Location Clinic      Anticoagulation Summary as of 1/31/2018     INR goal 2.0-3.0   Today's INR 1.86! (1/30/2018)   Maintenance plan 5 mg (5 mg x 1), then 7.5 mg (5 mg x 1.5) repeating every 2 days   Full instructions 5 mg, then 7.5 mg repeating every 2 days   Average weekly total 43.75 mg   No change documented Laurita Cespedes, DEMETRA   Plan last modified Kwesi Garcia RN (1/12/2018)   Next INR check 2/14/2018   Priority INR   Target end date Indefinite    Indications   Pulmonary emboli (H) [I26.99]  Long-term (current) use of anticoagulants [Z79.01] [Z79.01]         Anticoagulation Episode Summary     INR check location     Preferred lab     Send INR reminders to [facility]    Comments HIPPA INFO OK to speak with wife Josselyn OK to leave messages on Home.work and cell phones  use cell phone #[phone]      Anticoagulation Care Providers     Provider Role  Sophy Valdivia([place]) Specialty Phone number    TaylorSamm MD [facility] Internal Medicine [phone]            See the Encounter Report to view Anticoagulation Flowsheet and Dosing Calendar (Go to Encounters tab in chart review, and find the Anticoagulation Therapy Visit)    Left message for patient with results and dosing recommendations. Asked patient to call back to report any missed doses, falls, signs and symptoms of bleeding or clotting, any changes in health, medication, or diet. Asked patient to call back with any questions or concerns.  See above.    Laurita Cespedes RN

## 2019-11-25 DIAGNOSIS — I25.10 CAD (CORONARY ARTERY DISEASE): ICD-10-CM

## 2019-11-25 DIAGNOSIS — I26.99 PULMONARY EMBOLI (H): ICD-10-CM

## 2019-11-25 DIAGNOSIS — I25.739 CORONARY ARTERY DISEASE INVOLVING NONAUTOLOGOUS BIOLOGICAL CORONARY BYPASS GRAFT WITH ANGINA PECTORIS (H): ICD-10-CM

## 2019-11-25 DIAGNOSIS — Z78.9 STATIN INTOLERANCE: ICD-10-CM

## 2019-11-25 DIAGNOSIS — Z79.01 LONG TERM CURRENT USE OF ANTICOAGULANT THERAPY: ICD-10-CM

## 2019-11-25 DIAGNOSIS — Z95.1 S/P CABG X 4: ICD-10-CM

## 2019-11-25 DIAGNOSIS — E78.5 HYPERLIPIDEMIA LDL GOAL <130: ICD-10-CM

## 2019-11-25 LAB — INR PPP: 3.26 (ref 0.86–1.14)

## 2019-11-26 ENCOUNTER — ANTICOAGULATION THERAPY VISIT (OUTPATIENT)
Dept: ANTICOAGULATION | Facility: CLINIC | Age: 59
End: 2019-11-26

## 2019-11-26 DIAGNOSIS — Z79.01 LONG TERM CURRENT USE OF ANTICOAGULANT THERAPY: ICD-10-CM

## 2019-11-26 DIAGNOSIS — I26.99 PULMONARY EMBOLI (H): ICD-10-CM

## 2019-11-26 NOTE — PROGRESS NOTES
ANTICOAGULATION FOLLOW-UP CLINIC VISIT    Patient Name:  Oswaldo Prabhakar  Date:  11/26/2019  Contact Type:  Telephone    SUBJECTIVE:  Patient Findings     Comments:   Oswaldo has had cold symptoms--cough, congestion and fever x 5-6 days.  He is starting to feel better and his appetite is improving.  He is taking tylenol 2 tablets in the evening (he is not sure of dose).  Recommend decreasing today's dose of warfarin, then back to maintenance dose.        Clinical Outcomes     Comments:   Oswaldo has had cold symptoms--cough, congestion and fever x 5-6 days.  He is starting to feel better and his appetite is improving.  He is taking tylenol 2 tablets in the evening (he is not sure of dose).  Recommend decreasing today's dose of warfarin, then back to maintenance dose.           OBJECTIVE    INR   Date Value Ref Range Status   11/25/2019 3.26 (H) 0.86 - 1.14 Final     Chromogenic Factor 10   Date Value Ref Range Status   06/18/2011 81 60 - 140 % Final     Comment:     Therapeutic Range: A Chromogenic Factor 10 level of 20-40% inversely   correlates   with an INR of 2-3 for patients receiving Warfarin. Chromogenic Factor 10   levels below 20% indicate an INR greater than 3, and levels above 40%   indicate   an INR less than 2.     Factor 2 Assay   Date Value Ref Range Status   04/04/2012  60 - 140 % Final    (Note)  CANCELLED AND CREDITED PER APPLE IN MED. MONITORING,4/4/12,       ASSESSMENT / PLAN  INR assessment SUPRA    Recheck INR In: 2 WEEKS    INR Location Clinic      Anticoagulation Summary  As of 11/26/2019    INR goal:   2.0-3.0   TTR:   70.3 % (1 y)   INR used for dosing:   3.26! (11/25/2019)   Warfarin maintenance plan:   7.5 mg (5 mg x 1.5) every day   Full warfarin instructions:   11/26: 5 mg; Otherwise 7.5 mg every day   Weekly warfarin total:   52.5 mg   Plan last modified:   Kwesi Garcia RN (11/20/2018)   Next INR check:   12/9/2019   Priority:   Maintenance   Target end date:   Indefinite     Indications    Pulmonary emboli (H) [I26.99]  Long-term (current) use of anticoagulants [Z79.01] [Z79.01]             Anticoagulation Episode Summary     INR check location:       Preferred lab:       Send INR reminders to:   [facility]    Comments:   HIPPA INFO OK to speak with wife Josselyn MICHELLE to leave messages on Home.work and cell phones  use cell phone #[phone]      Anticoagulation Care Providers     Provider Role Specialty Phone number    Samm Vazquez MD [facility] Internal Medicine [phone]            See the Encounter Report to view Anticoagulation Flowsheet and Dosing Calendar (Go to Encounters tab in chart review, and find the Anticoagulation Therapy Visit)    Spoke with Oswaldo.  See patient findings.    Marielena Chacon RN

## 2020-01-06 ENCOUNTER — APPOINTMENT (OUTPATIENT)
Dept: GENERAL RADIOLOGY | Facility: CLINIC | Age: 60
DRG: 247 | End: 2020-01-06
Payer: COMMERCIAL

## 2020-01-06 ENCOUNTER — HOSPITAL ENCOUNTER (INPATIENT)
Facility: CLINIC | Age: 60
LOS: 1 days | Discharge: HOME | DRG: 247 | End: 2020-01-07
Attending: EMERGENCY MEDICINE | Admitting: INTERNAL MEDICINE
Payer: COMMERCIAL

## 2020-01-06 DIAGNOSIS — I26.99 PULMONARY EMBOLISM, UNSPECIFIED CHRONICITY, UNSPECIFIED PULMONARY EMBOLISM TYPE, UNSPECIFIED WHETHER ACUTE COR PULMONALE PRESENT (H): ICD-10-CM

## 2020-01-06 DIAGNOSIS — I10 ESSENTIAL HYPERTENSION, MALIGNANT: ICD-10-CM

## 2020-01-06 DIAGNOSIS — Z95.1 S/P CABG X 4: ICD-10-CM

## 2020-01-06 DIAGNOSIS — I20.0 UNSTABLE ANGINA (H): Primary | ICD-10-CM

## 2020-01-06 DIAGNOSIS — I25.110 CORONARY ARTERY DISEASE WITH UNSTABLE ANGINA PECTORIS, UNSPECIFIED VESSEL OR LESION TYPE, UNSPECIFIED WHETHER NATIVE OR TRANSPLANTED HEART (H): ICD-10-CM

## 2020-01-06 DIAGNOSIS — Z95.1 HX OF CABG: ICD-10-CM

## 2020-01-06 DIAGNOSIS — I20.0 UNSTABLE ANGINA (H): ICD-10-CM

## 2020-01-06 DIAGNOSIS — Z95.820 S/P ANGIOPLASTY WITH STENT: ICD-10-CM

## 2020-01-06 LAB
ALBUMIN SERPL-MCNC: 3.9 G/DL (ref 3.4–5)
ALP SERPL-CCNC: 50 U/L (ref 40–150)
ALT SERPL W P-5'-P-CCNC: 38 U/L (ref 0–70)
ANION GAP SERPL CALCULATED.3IONS-SCNC: 4 MMOL/L (ref 3–14)
AST SERPL W P-5'-P-CCNC: 20 U/L (ref 0–45)
BASOPHILS # BLD AUTO: 0.1 10E9/L (ref 0–0.2)
BASOPHILS NFR BLD AUTO: 1.2 %
BILIRUB DIRECT SERPL-MCNC: 0.2 MG/DL (ref 0–0.2)
BILIRUB SERPL-MCNC: 0.6 MG/DL (ref 0.2–1.3)
BUN SERPL-MCNC: 10 MG/DL (ref 7–30)
CALCIUM SERPL-MCNC: 9.1 MG/DL (ref 8.5–10.1)
CHLORIDE SERPL-SCNC: 106 MMOL/L (ref 94–109)
CO2 SERPL-SCNC: 30 MMOL/L (ref 20–32)
CREAT SERPL-MCNC: 0.97 MG/DL (ref 0.66–1.25)
D DIMER PPP FEU-MCNC: 0.5 UG/ML FEU (ref 0–0.5)
DIFFERENTIAL METHOD BLD: ABNORMAL
EOSINOPHIL # BLD AUTO: 0.8 10E9/L (ref 0–0.7)
EOSINOPHIL NFR BLD AUTO: 14.2 %
ERYTHROCYTE [DISTWIDTH] IN BLOOD BY AUTOMATED COUNT: 12.6 % (ref 10–15)
ERYTHROCYTE [DISTWIDTH] IN BLOOD BY AUTOMATED COUNT: 12.8 % (ref 10–15)
GFR SERPL CREATININE-BSD FRML MDRD: 87 ML/MIN/{1.73_M2}
GLUCOSE SERPL-MCNC: 92 MG/DL (ref 70–99)
HCT VFR BLD AUTO: 42.2 % (ref 40–53)
HCT VFR BLD AUTO: 44.6 % (ref 40–53)
HGB BLD-MCNC: 13.8 G/DL (ref 13.3–17.7)
HGB BLD-MCNC: 14.3 G/DL (ref 13.3–17.7)
IMM GRANULOCYTES # BLD: 0 10E9/L (ref 0–0.4)
IMM GRANULOCYTES NFR BLD: 0.2 %
INR PPP: 1.3 (ref 0.86–1.14)
INTERPRETATION ECG - MUSE: NORMAL
LYMPHOCYTES # BLD AUTO: 1.4 10E9/L (ref 0.8–5.3)
LYMPHOCYTES NFR BLD AUTO: 24.9 %
MAGNESIUM SERPL-MCNC: 1.8 MG/DL (ref 1.6–2.3)
MCH RBC QN AUTO: 28.5 PG (ref 26.5–33)
MCH RBC QN AUTO: 28.8 PG (ref 26.5–33)
MCHC RBC AUTO-ENTMCNC: 32.1 G/DL (ref 31.5–36.5)
MCHC RBC AUTO-ENTMCNC: 32.7 G/DL (ref 31.5–36.5)
MCV RBC AUTO: 88 FL (ref 78–100)
MCV RBC AUTO: 89 FL (ref 78–100)
MONOCYTES # BLD AUTO: 0.5 10E9/L (ref 0–1.3)
MONOCYTES NFR BLD AUTO: 8.6 %
NEUTROPHILS # BLD AUTO: 2.9 10E9/L (ref 1.6–8.3)
NEUTROPHILS NFR BLD AUTO: 50.9 %
NRBC # BLD AUTO: 0 10*3/UL
NRBC BLD AUTO-RTO: 0 /100
PLATELET # BLD AUTO: 217 10E9/L (ref 150–450)
PLATELET # BLD AUTO: 219 10E9/L (ref 150–450)
POTASSIUM SERPL-SCNC: 3.2 MMOL/L (ref 3.4–5.3)
POTASSIUM SERPL-SCNC: 3.8 MMOL/L (ref 3.4–5.3)
PROT SERPL-MCNC: 7.5 G/DL (ref 6.8–8.8)
RBC # BLD AUTO: 4.79 10E12/L (ref 4.4–5.9)
RBC # BLD AUTO: 5.01 10E12/L (ref 4.4–5.9)
SODIUM SERPL-SCNC: 139 MMOL/L (ref 133–144)
TROPONIN I BLD-MCNC: 0 UG/L (ref 0–0.08)
TROPONIN I SERPL-MCNC: <0.015 UG/L (ref 0–0.04)
TROPONIN I SERPL-MCNC: <0.015 UG/L (ref 0–0.04)
WBC # BLD AUTO: 5.7 10E9/L (ref 4–11)
WBC # BLD AUTO: 7.3 10E9/L (ref 4–11)

## 2020-01-06 PROCEDURE — 36415 COLL VENOUS BLD VENIPUNCTURE: CPT | Performed by: PHYSICIAN ASSISTANT

## 2020-01-06 PROCEDURE — 85025 COMPLETE CBC W/AUTO DIFF WBC: CPT | Performed by: EMERGENCY MEDICINE

## 2020-01-06 PROCEDURE — 36415 COLL VENOUS BLD VENIPUNCTURE: CPT

## 2020-01-06 PROCEDURE — 96366 THER/PROPH/DIAG IV INF ADDON: CPT | Performed by: EMERGENCY MEDICINE

## 2020-01-06 PROCEDURE — 84484 ASSAY OF TROPONIN QUANT: CPT | Performed by: EMERGENCY MEDICINE

## 2020-01-06 PROCEDURE — 96365 THER/PROPH/DIAG IV INF INIT: CPT | Performed by: EMERGENCY MEDICINE

## 2020-01-06 PROCEDURE — 96375 TX/PRO/DX INJ NEW DRUG ADDON: CPT | Performed by: EMERGENCY MEDICINE

## 2020-01-06 PROCEDURE — 93010 ELECTROCARDIOGRAM REPORT: CPT | Mod: Z6 | Performed by: EMERGENCY MEDICINE

## 2020-01-06 PROCEDURE — 99291 CRITICAL CARE FIRST HOUR: CPT | Mod: 25 | Performed by: EMERGENCY MEDICINE

## 2020-01-06 PROCEDURE — 93005 ELECTROCARDIOGRAM TRACING: CPT | Performed by: EMERGENCY MEDICINE

## 2020-01-06 PROCEDURE — 84132 ASSAY OF SERUM POTASSIUM: CPT

## 2020-01-06 PROCEDURE — 25000128 H RX IP 250 OP 636: Performed by: PHYSICIAN ASSISTANT

## 2020-01-06 PROCEDURE — 21400000 ZZH R&B CCU UMMC

## 2020-01-06 PROCEDURE — 85379 FIBRIN DEGRADATION QUANT: CPT | Performed by: EMERGENCY MEDICINE

## 2020-01-06 PROCEDURE — 96376 TX/PRO/DX INJ SAME DRUG ADON: CPT | Performed by: EMERGENCY MEDICINE

## 2020-01-06 PROCEDURE — 93005 ELECTROCARDIOGRAM TRACING: CPT | Mod: 76 | Performed by: EMERGENCY MEDICINE

## 2020-01-06 PROCEDURE — 83735 ASSAY OF MAGNESIUM: CPT | Performed by: EMERGENCY MEDICINE

## 2020-01-06 PROCEDURE — 80076 HEPATIC FUNCTION PANEL: CPT | Performed by: EMERGENCY MEDICINE

## 2020-01-06 PROCEDURE — 25000132 ZZH RX MED GY IP 250 OP 250 PS 637: Performed by: EMERGENCY MEDICINE

## 2020-01-06 PROCEDURE — 71045 X-RAY EXAM CHEST 1 VIEW: CPT

## 2020-01-06 PROCEDURE — 80048 BASIC METABOLIC PNL TOTAL CA: CPT | Performed by: EMERGENCY MEDICINE

## 2020-01-06 PROCEDURE — 85610 PROTHROMBIN TIME: CPT | Performed by: EMERGENCY MEDICINE

## 2020-01-06 PROCEDURE — 25000132 ZZH RX MED GY IP 250 OP 250 PS 637: Performed by: PHYSICIAN ASSISTANT

## 2020-01-06 PROCEDURE — 96368 THER/DIAG CONCURRENT INF: CPT | Performed by: EMERGENCY MEDICINE

## 2020-01-06 PROCEDURE — 84484 ASSAY OF TROPONIN QUANT: CPT

## 2020-01-06 PROCEDURE — 99285 EMERGENCY DEPT VISIT HI MDM: CPT | Mod: 25 | Performed by: EMERGENCY MEDICINE

## 2020-01-06 PROCEDURE — 85027 COMPLETE CBC AUTOMATED: CPT | Performed by: PHYSICIAN ASSISTANT

## 2020-01-06 PROCEDURE — 25000128 H RX IP 250 OP 636: Performed by: EMERGENCY MEDICINE

## 2020-01-06 PROCEDURE — 93010 ELECTROCARDIOGRAM REPORT: CPT | Mod: 76 | Performed by: EMERGENCY MEDICINE

## 2020-01-06 RX ORDER — NITROGLYCERIN 0.4 MG/1
0.4 TABLET SUBLINGUAL EVERY 5 MIN PRN
Status: DISCONTINUED | OUTPATIENT
Start: 2020-01-06 | End: 2020-01-07 | Stop reason: HOSPADM

## 2020-01-06 RX ORDER — POTASSIUM CL/LIDO/0.9 % NACL 10MEQ/0.1L
10 INTRAVENOUS SOLUTION, PIGGYBACK (ML) INTRAVENOUS
Status: DISCONTINUED | OUTPATIENT
Start: 2020-01-06 | End: 2020-01-07 | Stop reason: HOSPADM

## 2020-01-06 RX ORDER — FENOFIBRATE 160 MG/1
160 TABLET ORAL DAILY
Status: DISCONTINUED | OUTPATIENT
Start: 2020-01-07 | End: 2020-01-07 | Stop reason: HOSPADM

## 2020-01-06 RX ORDER — EZETIMIBE 10 MG/1
10 TABLET ORAL DAILY
Status: DISCONTINUED | OUTPATIENT
Start: 2020-01-07 | End: 2020-01-07 | Stop reason: HOSPADM

## 2020-01-06 RX ORDER — POTASSIUM CHLORIDE 750 MG/1
20-40 TABLET, EXTENDED RELEASE ORAL
Status: DISCONTINUED | OUTPATIENT
Start: 2020-01-06 | End: 2020-01-07 | Stop reason: HOSPADM

## 2020-01-06 RX ORDER — POTASSIUM CHLORIDE 29.8 MG/ML
20 INJECTION INTRAVENOUS
Status: DISCONTINUED | OUTPATIENT
Start: 2020-01-06 | End: 2020-01-07 | Stop reason: HOSPADM

## 2020-01-06 RX ORDER — WARFARIN SODIUM 10 MG/1
10 TABLET ORAL
Status: DISCONTINUED | OUTPATIENT
Start: 2020-01-06 | End: 2020-01-06

## 2020-01-06 RX ORDER — ASPIRIN 81 MG/1
243 TABLET, CHEWABLE ORAL ONCE
Status: COMPLETED | OUTPATIENT
Start: 2020-01-06 | End: 2020-01-06

## 2020-01-06 RX ORDER — LISINOPRIL 2.5 MG/1
2.5 TABLET ORAL DAILY
Status: DISCONTINUED | OUTPATIENT
Start: 2020-01-07 | End: 2020-01-07

## 2020-01-06 RX ORDER — FENTANYL CITRATE 50 UG/ML
50 INJECTION, SOLUTION INTRAMUSCULAR; INTRAVENOUS ONCE
Status: COMPLETED | OUTPATIENT
Start: 2020-01-06 | End: 2020-01-06

## 2020-01-06 RX ORDER — HYDRALAZINE HYDROCHLORIDE 20 MG/ML
5 INJECTION INTRAMUSCULAR; INTRAVENOUS EVERY 6 HOURS PRN
Status: DISCONTINUED | OUTPATIENT
Start: 2020-01-06 | End: 2020-01-07 | Stop reason: HOSPADM

## 2020-01-06 RX ORDER — ASPIRIN 81 MG/1
81 TABLET ORAL DAILY
Status: DISCONTINUED | OUTPATIENT
Start: 2020-01-07 | End: 2020-01-07 | Stop reason: HOSPADM

## 2020-01-06 RX ORDER — NITROGLYCERIN 20 MG/100ML
0.07-2 INJECTION INTRAVENOUS CONTINUOUS
Status: DISCONTINUED | OUTPATIENT
Start: 2020-01-06 | End: 2020-01-07

## 2020-01-06 RX ORDER — HEPARIN SODIUM 10000 [USP'U]/100ML
0-3500 INJECTION, SOLUTION INTRAVENOUS CONTINUOUS
Status: DISCONTINUED | OUTPATIENT
Start: 2020-01-06 | End: 2020-01-07

## 2020-01-06 RX ORDER — MAGNESIUM SULFATE HEPTAHYDRATE 40 MG/ML
4 INJECTION, SOLUTION INTRAVENOUS EVERY 4 HOURS PRN
Status: DISCONTINUED | OUTPATIENT
Start: 2020-01-06 | End: 2020-01-07 | Stop reason: HOSPADM

## 2020-01-06 RX ORDER — POTASSIUM CHLORIDE 7.45 MG/ML
10 INJECTION INTRAVENOUS
Status: DISCONTINUED | OUTPATIENT
Start: 2020-01-06 | End: 2020-01-07 | Stop reason: HOSPADM

## 2020-01-06 RX ORDER — ATORVASTATIN CALCIUM 80 MG/1
80 TABLET, FILM COATED ORAL DAILY
Status: DISCONTINUED | OUTPATIENT
Start: 2020-01-07 | End: 2020-01-07 | Stop reason: HOSPADM

## 2020-01-06 RX ORDER — POTASSIUM CHLORIDE 1.5 G/1.58G
20-40 POWDER, FOR SOLUTION ORAL
Status: DISCONTINUED | OUTPATIENT
Start: 2020-01-06 | End: 2020-01-07 | Stop reason: HOSPADM

## 2020-01-06 RX ADMIN — HEPARIN SODIUM 1200 UNITS/HR: 10000 INJECTION, SOLUTION INTRAVENOUS at 14:08

## 2020-01-06 RX ADMIN — Medication 12.5 MG: at 19:32

## 2020-01-06 RX ADMIN — Medication 10 MEQ: at 14:09

## 2020-01-06 RX ADMIN — Medication 10 MEQ: at 20:03

## 2020-01-06 RX ADMIN — ASPIRIN 81 MG CHEWABLE TABLET 243 MG: 81 TABLET CHEWABLE at 13:08

## 2020-01-06 RX ADMIN — NITROGLYCERIN 0.07 MCG/KG/MIN: 20 INJECTION INTRAVENOUS at 15:53

## 2020-01-06 RX ADMIN — NITROGLYCERIN 0.4 MG: 0.4 TABLET SUBLINGUAL at 11:52

## 2020-01-06 RX ADMIN — Medication 10 MEQ: at 15:21

## 2020-01-06 RX ADMIN — NITROGLYCERIN 0.4 MG: 0.4 TABLET SUBLINGUAL at 13:40

## 2020-01-06 RX ADMIN — FENTANYL CITRATE 50 MCG: 50 INJECTION, SOLUTION INTRAMUSCULAR; INTRAVENOUS at 11:18

## 2020-01-06 RX ADMIN — Medication 10 MEQ: at 16:26

## 2020-01-06 ASSESSMENT — ENCOUNTER SYMPTOMS
FREQUENCY: 0
NAUSEA: 0
HEMATURIA: 0
BACK PAIN: 0
DIARRHEA: 0
COLOR CHANGE: 0
COUGH: 0
WEAKNESS: 0
VOMITING: 0
PALPITATIONS: 0
DYSURIA: 0
DIAPHORESIS: 1
LIGHT-HEADEDNESS: 0
FEVER: 0
NUMBNESS: 0
CHILLS: 0
HEADACHES: 0
ABDOMINAL PAIN: 0
NECK PAIN: 0
FATIGUE: 0
SHORTNESS OF BREATH: 0

## 2020-01-06 ASSESSMENT — ACTIVITIES OF DAILY LIVING (ADL)
BATHING: 0-->INDEPENDENT
TOILETING: 0-->INDEPENDENT
TRANSFERRING: 0-->INDEPENDENT
AMBULATION: 0-->INDEPENDENT
SWALLOWING: 0-->SWALLOWS FOODS/LIQUIDS WITHOUT DIFFICULTY
COGNITION: 0 - NO COGNITION ISSUES REPORTED
RETIRED_COMMUNICATION: 0-->UNDERSTANDS/COMMUNICATES WITHOUT DIFFICULTY
FALL_HISTORY_WITHIN_LAST_SIX_MONTHS: NO
RETIRED_EATING: 0-->INDEPENDENT
DRESS: 0-->INDEPENDENT

## 2020-01-06 NOTE — PHARMACY-ANTICOAGULATION SERVICE
Clinical Pharmacy - Warfarin Dosing Consult     Pharmacy has been consulted to manage this patient s warfarin therapy.  Indication: DVT/PE Prophylaxis  Therapy Goal: INR 2-3  OP Anticoag Clinic: [facility]  Warfarin Prior to Admission: Yes  Warfarin PTA Regimen: 7.5 mg daily  Significant drug interactions: aspirin, fenofibrate, heparin drip  Recent documented change in oral intake/nutrition: Unknown    INR   Date Value Ref Range Status   01/06/2020 1.30 (H) 0.86 - 1.14 Final   11/25/2019 3.26 (H) 0.86 - 1.14 Final     Chromogenic Factor 10   Date Value Ref Range Status   06/18/2011 81 60 - 140 % Final     Comment:     Therapeutic Range: A Chromogenic Factor 10 level of 20-40% inversely   correlates   with an INR of 2-3 for patients receiving Warfarin. Chromogenic Factor 10   levels below 20% indicate an INR greater than 3, and levels above 40%   indicate   an INR less than 2.     Factor 2 Assay   Date Value Ref Range Status   04/04/2012  60 - 140 % Final    (Note)  CANCELLED AND CREDITED PER APPLE IN MED. MONITORING,4/4/12,       Warfarin dose (10 mg) ordered for evening of 1/6 discontinued by admitting team.  Pharmacy will monitor Oswaldo Prabhakar daily and order warfarin doses to achieve specified goal.      Please contact pharmacy as soon as possible if the warfarin needs to be held for a procedure or if the warfarin goals change.      Issac Plasencia, Pharm.D.    =============================================  Addendum:  I spoke with ARASELI greene who does not want pt to receive a dose of warfarin ramona.  Margo Mas, QuintonD

## 2020-01-06 NOTE — LETTER
Transition Communication Hand-off for Care Transitions to Next Level of Care Provider    Name: Oswaldo Prabhakar  : 1960  MRN #: [id number]  Primary Care Provider: Samm Vazquez   Primary Clinic: [address] [place] [address] [address]  [facility] [id number]     Reason for Hospitalization:  Unstable angina (H) [I20.0]  Admit Date/Time: 2020 10:24 AM  Discharge Date: 2020  Payor Source: Payor: MEDICA / Plan: MEDICA VANTAGE PLUS SELF INSURED / Product Type: Indemnity   Readmission Assessment Measure ([place]) Risk Score/category: average.   Reason for Communication Hand-off Referral: Multiple providers/specialties  Discharge Plan:  Discharge Needs Assessment:  Needs      Most Recent Value          Follow-up specialty is recommended: Yes    Follow-up plan:  INR lab to be drawn on 1/10/2020.  Any outstanding tests or procedures:        Referrals     Future Labs/Procedures    Follow-Up with Cardiac Advanced Practice Provider     Comments:    Follow up appointment should be made in 2 weeks after discharge    CARDIAC REHAB REFERRAL     Process Instructions:    Advance to Wellness and Exercise for Life (WEL) Program or to another maintenance exercise program upon completion of phase 2 cardiac rehab.    Weight mgt program is only offered at [facility].    *This therapy referral will be filtered to a centralized scheduling office at [facility] and the patient will receive a call to schedule an appointment at a [facility] location most convenient for them. *        Comments:    Patient may choose their preference of the site for Cardiac Rehab.          Key Recommendations:  Post hospitalization

## 2020-01-06 NOTE — ED TRIAGE NOTES
Per patinet rept left anterior chest pain non radiating with generalized weakness at rest. Started this morning while sitting at desk. Patient was Wasihingtion for the last week.

## 2020-01-06 NOTE — LETTER
Transition Communication Hand-off for Care Transitions to Next Level of Care Provider    Name: Oswaldo Prabhakar  : 1960  MRN #: [id number]  Primary Care Provider: Samm Vazquez   Primary Clinic: [address] [facility] [address]  [facility] [address]   Reason for Hospitalization:  Unstable angina (H) [I20.0]  Admit Date/Time: 2020 10:24 AM  Discharge Date: 2020  Payor Source: Payor: MEDICA / Plan: MEDICA VANTAGE PLUS SELF INSURED / Product Type: Indemnity   Readmission Assessment Measure ([place]) Risk Score/category: average.   Reason for Communication Hand-off Referral: Multiple providers/specialties  Discharge Plan:  Discharge Needs Assessment:  Needs      Most Recent Value       Other Resources  Other (see comment)   Other  -- [[facility]: [phone]]      Follow-up specialty is recommended: Yes    Follow-up plan:  INR lab draw due on 1/10/2020.  Any outstanding tests or procedures:        Referrals     Future Labs/Procedures    Follow-Up with Cardiac Advanced Practice Provider     Comments:    Follow up appointment should be made in 2 weeks after discharge    CARDIAC REHAB REFERRAL     Process Instructions:    Advance to Wellness and Exercise for Life (WEL) Program or to another maintenance exercise program upon completion of phase 2 cardiac rehab.    Weight mgt program is only offered at [facility].    *This therapy referral will be filtered to a centralized scheduling office at [facility] and the patient will receive a call to schedule an appointment at a [facility] location most convenient for them. *        Comments:    Patient may choose their preference of the site for Cardiac Rehab.          Key Recommendations:  Post hospitalization follow up.   SAEED BERRY RN BSN  Care Coordinator Unit [address] [place] [address]  Phone: [phone]

## 2020-01-06 NOTE — ED NOTES
[facility], [place]   ED Nurse to Floor Handoff     Oswaldo Prabhakar is a 59 year old male who speaks English and lives with a spouse,  in a home  They arrived in the ED by car from work.    ED Chief Complaint: Chest Pain    ED Dx;   Final diagnoses:   None         Needed?: No    Allergies:   Allergies   Allergen Reactions     Cats      Hay Fever & [A.R.M.]      Heparin Other (See Comments)     Heparin resistance per cardio-thoracic surgeon Dr. Armando     Hydrocodone-Acetaminophen Nausea and Vomiting     Acetaminophen is ok   .  Past Medical Hx:   Past Medical History:   Diagnosis Date     Antiplatelet or antithrombotic long-term use      Coronary artery disease      Diaphragmatic hernia without mention of obstruction or gangrene      Heart disease      Hernia, abdominal      History of blood transfusion      History of thrombophlebitis      Hypertension      Nephrolithiasis 4/2010     Other and unspecified hyperlipidemia      Pulmonary embolus and infarction (H)     s/p hemothorax and filter s/p filter removal (2011), now on long term anti-coagulation     Statin intolerance 2/1/2017     Stented coronary artery      Unspecified retinal detachment     Childhood trauma, unrepaired      Baseline Mental status: WDL  Current Mental Status changes: at basesline    Infection present or suspected this encounter: no  Sepsis suspected: No  Isolation type: No active isolations     Activity level - Baseline/Home:  Independent  Activity Level - Current:   Independent    Bariatric equipment needed?: No    In the ED these meds were given:   Medications   nitroGLYcerin (NITROSTAT) sublingual tablet 0.4 mg (0.4 mg Sublingual Given 1/6/20 1152)   fentaNYL (PF) (SUBLIMAZE) injection 50 mcg (50 mcg Intravenous Given 1/6/20 1118)       Drips running?  No    Home pump  No    Current LDAs  Peripheral IV 01/06/20 Left Upper arm (Active)   Site Assessment WDL 1/6/2020 10:42 AM   Line Status Saline  locked 1/6/2020 10:42 AM   Number of days: 0       Urethral Catheter Coude (Active)   Number of days: 517       Incision/Surgical Site 04/04/14 Left;Other (Comment) Leg (Active)   Number of days: 2103       Incision/Surgical Site 04/04/14 Midline Chest (Active)   Number of days: 2103       Incision/Surgical Site 11/05/15 Left Finger (Comment which one) (Active)   Number of days: 1523       Incision/Surgical Site 08/06/18 Abdomen (Active)   Number of days: 518       Labs results:   Labs Ordered and Resulted from Time of ED Arrival Up to the Time of Departure from the ED   CBC WITH PLATELETS DIFFERENTIAL - Abnormal; Notable for the following components:       Result Value    Absolute Eosinophils 0.8 (*)     All other components within normal limits   BASIC METABOLIC PANEL - Abnormal; Notable for the following components:    Potassium 3.2 (*)     All other components within normal limits   INR - Abnormal; Notable for the following components:    INR 1.30 (*)     All other components within normal limits   TROPONIN I   HEPATIC PANEL   D DIMER QUANTITATIVE   MAGNESIUM   ISTAT TROPONIN NURSING POCT   TROPONIN POCT       Imaging Studies: No results found for this or any previous visit (from the past 24 hour(s)).    Recent vital signs:   BP (!) 149/94   Pulse 68   Temp 97.9  F (36.6  C) (Oral)   Resp 9   Wt 100.3 kg (221 lb 1.9 oz)   SpO2 97%   BMI 29.99 kg/m      Daron Coma Scale Score: 15 (01/06/20 1031)       Cardiac Rhythm: Normal Sinus  Pt needs tele? Yes  Skin/wound Issues: None    Code Status: Full Code    Pain control: good    Nausea control: pt had none    Abnormal labs/tests/findings requiring intervention: EKG abnormal    Family present during ED course? Yes   Family Comments/Social Situation comments: Wife will be at bedside    Tasks needing completion: F/u EKG with any chest pain    Ruth Reed, RN  [phone] ED

## 2020-01-06 NOTE — H&P
Cardiology H&P  January 6, 2020      Addendum: Patient still having intermittent left chest pressure, need additional SL ntg. Will start ntg gtt    HPI:   Oswaldo Prabhakar is a 59 year old male with a history of HTN, HLD with statin intolerance, premature CAD s/p 4V CABG in 2014 and Hx of bilateral PE on warfarin who presents to the ED for evaluation of chest pain.     Patient was in his normal state health until this morning at around 8:00 when he developed left upper chest pain while sitting at his work desk. Patient describes the pain as a dull ache in his left upper chest. The pain is nonradiating. No associated diaphoresis, nausea, vomiting, shortness of breath or palpitations. The pain has been waxing and waning the past 3-4 hours. He denies a history of chest pain. His CAD was noted on coronary CTA in 2014 that was performed due to strong family history of CAD.    His initial EKG at 10:29 shows normal sinus rhythm without ischemic changes. Repeat EKG at 11:15 shows nonspecific T wave changes in leads V3-V4. Initial troponin negative. CBC and BMP unremarkable. INR subtherapeutic at 1.3. Patient received 1 SL nitroglycerin in the ED and chest pain resolved. Chest pain and ECG changes concerning for unstable angina. Patient admitted to [facility] for heparin and possible angiogram for unstable angina.    Of note, INR was subtherapeutic on admission. States he has been compliant but only gets it checked every 4-6 weeks and is overdue. Recently traveled to [facility] for the holidays.     Review of Systems:    Complete review of systems was performed and negative except per HPI.    PMH:  Past Medical History:   Diagnosis Date     Antiplatelet or antithrombotic long-term use      Coronary artery disease      Diaphragmatic hernia without mention of obstruction or gangrene      Heart disease      Hernia, abdominal      History of blood transfusion      History of thrombophlebitis      Hypertension       Nephrolithiasis 4/2010     Other and unspecified hyperlipidemia      Pulmonary embolus and infarction (H)     s/p hemothorax and filter s/p filter removal (2011), now on long term anti-coagulation     Statin intolerance 2/1/2017     Stented coronary artery      Unspecified retinal detachment     Childhood trauma, unrepaired     Active Problems:  Patient Active Problem List    Diagnosis Date Noted     Statin intolerance 02/01/2017     Priority: Medium     Long-term (current) use of anticoagulants [Z79.01] 06/20/2016     Priority: Medium     CAD (coronary artery disease) 05/13/2014     Priority: Medium     S/P CABG x 4 04/04/2014     Priority: Medium     Atypical chest pain 03/13/2014     Priority: Medium     Warfarin anticoagulation 07/05/2012     Priority: Medium     S/P Pulmonary embolus with infarction and hemothorax in 2011; No identified thrombophilia; had transient but not persistent lupus inhibitor.       Pure hyperglyceridemia 07/21/2011     Priority: Medium     Calculus of kidney 07/21/2011     Priority: Medium     Pulmonary emboli (H) 06/08/2011     Priority: Medium     multiple       HYPERLIPIDEMIA LDL GOAL <130 02/10/2010     Priority: Medium     Retinal detachment 04/05/2006     Priority: Medium     Problem list name updated by automated process. Provider to review       Social History:  Social History     Tobacco Use     Smoking status: Never Smoker     Smokeless tobacco: Never Used   Substance Use Topics     Alcohol use: No     Comment: very rare     Drug use: No     Family History:  Family History   Problem Relation Age of Onset     Heart Disease Mother      C.A.D. Father         3 vessel CABG, age 70     Cancer Father         bladder cancer     C.A.D. Paternal Grandmother      Hypertension Paternal Grandmother      Alzheimer Disease Paternal Grandmother      Diabetes No family hx of      Thyroid Disease No family hx of      Cancer - colorectal No family hx of        Medications:    lidocaine (PF)  4  mL       triamcinolone  40 mg           Physical Exam:  Temp:  [97.9  F (36.6  C)] 97.9  F (36.6  C)  Pulse:  [68-76] 68  Heart Rate:  [71-84] 73  Resp:  [8-22] 9  BP: (148-177)/() 149/94  SpO2:  [97 %-99 %] 97 %  No intake or output data in the 24 hours ending 01/06/20 1200  GEN: pleasant, no acute distress  HEENT: no icterus  CV: RRR, normal s1/s2, no murmurs/rubs/s3/s4, no heave. JVP not appreciably elevated.   CHEST: clear to ausculation bilaterally, no rales or wheezing  ABD: soft, non-tender, normal active bowel sounds  EXTR: pulses palpable. No clubbing, cyanosis or appreciable edema.   NEURO: alert oriented, speech fluent/appropriate, motor grossly nonfocal    Diagnostics:  All labs and imaging were reviewed, of note:    Lab Results   Component Value Date    TROPI <0.015 01/06/2020    TROPI <0.015 07/25/2018    TROPI  04/28/2017     <0.015  The 99th percentile for upper reference range is 0.045 ug/L.  Troponin values in   the range of 0.045 - 0.120 ug/L may be associated with risks of adverse   clinical events.      TROPI  04/28/2017     <0.015  The 99th percentile for upper reference range is 0.045 ug/L.  Troponin values in   the range of 0.045 - 0.120 ug/L may be associated with risks of adverse   clinical events.      TROPI  04/27/2017     <0.015  The 99th percentile for upper reference range is 0.045 ug/L.  Troponin values in   the range of 0.045 - 0.120 ug/L may be associated with risks of adverse   clinical events.      TROPONIN 0.00 01/06/2020    TROPONIN 0.00 04/27/2017       EKG today 11:15: NSR HR 71, nonspecific T wave changes V3-V4      EKG today 10:29: NSR HR 80, no acute ischemic changes         Dobutamine stress echocardiogram 2017:      Interpretation Summary  Normal dobutamine stress echocardiogram at target heartrate.  No symptoms during stress.  Normal stress EKG.  Normal BP response to stress.  No significant valvular abnormality.    Assessment and Recommendation:  Oswaldo Prabhakar is  a 59 year old male with a history of HTN, HLD with statin intolerance, premature CAD s/p 4V CABG in 2014 and Hx of bilateral PE on warfarin who presents to the ED for evaluation of chest pain    #Unstable angina  ##History of premature CAD s/p 4V CABG (LIMA-LAD, SVg-Diag, SVG-OM1, SVG-rPDA) 2014  1 day of intermittent left sided chest pain relieved with nitroglycerin. Serial EKG with new TWI in anterior leads. Troponin negative x1. Would have concern for PE given history of PE and subtherapeutic INR today and recent travel, although more concerned for angina given lack of tachycardia, adequate O2 sats on RA.   - monitor on tele  - Heparin gtt  - NPO, angiogram today  - continue PTA metoprolol tartrate 25mg  - continue PTA atorvastatin 80mg, zetia 10mg, fenofibrate 162mg every day    #History of unprovoked PE s/p thoracotomy with lung resection 2011  INR subtherapeutic today. Gets INR checked q4-6 weeks.   - Resume warfarin, pharmacy to dose. INR goal 2-3    #HTN  Hypertensive on admission. On minimal medications and is followed closely by cardiology.  - continue lisinopril 2.5mg daily  - metoprolol as above  - hydralazine q6h PRN for SBP >160, SBP >90    #HLD  Follows with Dr. Ocampo. Is on a number of medications including repatha  - continue repatha q14 days  - zetia, atorvastatin, fenofibrate as above    I have discussed the above with Dr. Odonnell.    Thank you for consulting the cardiovascular services at the [facility]. Please do not hesitate to call us with any questions.       Reji Osorio PA-C  [facility] Cardiology Team

## 2020-01-06 NOTE — ED PROVIDER NOTES
[facility] EMERGENCY DEPARTMENT ([facility])  1/06/2020  11:03 AM   History   No chief complaint on file.    The history is provided by the patient and medical records.     Oswaldo Prabhakar is a 59 year old male w/PMHx notable for CAD (CABG x 4), HTN, HLP, bilateral PE s/p VATS (chronically anticoagulated w/ warfarin, overdue to get INR checked per his report), as well as asignificant family history of CAD/MI/cardiac bypasses,  who presents with chest pain that started this morning.     Today patient was sitting at his desk when he started having chest pain and some diaphoresis at 8-9AM. He noticed this chest pain is waxing and waning, going between 1 to 3/10 in intensity. Nothing seems to make better or worse. Just aching in that area. No pleuritic component.  He reports has never had chest pain before and so this chest pain is concerning for him. No pleuritic or exertional component. Associated with some clamminess sensation intermittently.   He states he was told to come in for evaluation if he ever had chest pain and so presents to the Emergency Department. He has had known CAD w/ previous CABG, but reports it was found on coronary CT, and that he's never had pain before.     Currently, he continues to have waxing chest pain here at 3/10 intensity.He states this pain is not similar to prior kidney stones. He was wearing an Apple watch today and has a trend of his heart rate on his phone, states heart rate went up to 115 at around 7:30 AM while walking in to work, but hasn't been above that. He did take one baby aspirin prior to arrival and he did take his normal warfarin this morning. His target INR range is reportedly between 2-3. He did have some recent travel. He states his blood pressure is 110/70 typically (though review of EMR shows pressures a bit above this).. No fevers, rashes. No abdominal pain, nausea, vomiting. No pain in neck or back. No vision or hearing changes. He has diffuse body  weakness. He has chronic ongoing tinnitus. No changes in elimination or stooling. No cough or sputum production. No palpiations. No LH, dizziness, syncope or near syncope. No N/V. No leg pain or swelling.  No other new symptoms or complaints at this time. Please see ROS for further details.      Took a baby aspirin, warfarin this morning and did take his Repatha last Tuesday.     Per [facility] records, patient's last coronary angiogram was on 3/31/14 showing:       I have reviewed the Medications, Allergies, Past Medical and Surgical History, and Social History in the [employer] system.  PAST MEDICAL HISTORY:   Past Medical History:   Diagnosis Date     Antiplatelet or antithrombotic long-term use      Coronary artery disease      Diaphragmatic hernia without mention of obstruction or gangrene      Heart disease      Hernia, abdominal      History of blood transfusion      History of thrombophlebitis      Hypertension      Nephrolithiasis 4/2010     Other and unspecified hyperlipidemia      Pulmonary embolus and infarction (H)     s/p hemothorax and filter s/p filter removal (2011), now on long term anti-coagulation     Statin intolerance 2/1/2017     Stented coronary artery      Unspecified retinal detachment     Childhood trauma, unrepaired       PAST SURGICAL HISTORY:   Past Surgical History:   Procedure Laterality Date     BYPASS GRAFT ARTERY CORONARY  4/4/2014    Procedure: BYPASS GRAFT ARTERY CORONARY;  Median Sternotomy, Coronary Artery Bypass Bypass x 4 using Left Internal Mammary, Left Saphenous Vein, on pump oxygenation;  Surgeon: Sherry Armando MD;  Location: [facility] OR     C NONSPECIFIC PROCEDURE      Spermatocele resection     CLOSED REDUCTION, PERCUTANEOUS PINNING  HAND, COMBINED Left 11/5/2015    Procedure: COMBINED CLOSED REDUCTION, PERCUTANEOUS PINNING HAND;  Surgeon: Carmella Love MD;  Location: [facility]     COLONOSCOPY  3/15/2013    Procedure: COLONOSCOPY;;  Surgeon: Racheal Shipman MD;   Location: [facility] GI     CYSTOSCOPY       LAPAROSCOPIC CHOLECYSTECTOMY N/A 8/6/2018    Procedure: LAPAROSCOPIC CHOLECYSTECTOMY;  LAPAROSCOPIC CHOLECYSTECTOMY ;  Surgeon: José Miguel Stuart MD;  Location: [facility] OR     LITHOTRIPSY  4/2010     THORACIC SURGERY      lung surgery, thoracotomy, lung adhesion       FAMILY HISTORY:   Family History   Problem Relation Age of Onset     Heart Disease Mother      C.A.D. Father         3 vessel CABG, age 70     Cancer Father         bladder cancer     C.A.D. Paternal Grandmother      Hypertension Paternal Grandmother      Alzheimer Disease Paternal Grandmother      Diabetes No family hx of      Thyroid Disease No family hx of      Cancer - colorectal No family hx of        SOCIAL HISTORY:   Social History     Tobacco Use     Smoking status: Never Smoker     Smokeless tobacco: Never Used   Substance Use Topics     Alcohol use: No     Comment: very rare       Patient's Medications   New Prescriptions    No medications on file   Previous Medications    ASPIRIN EC 81 MG TABLET    Take 1 tablet (81 mg) by mouth daily    ATORVASTATIN (LIPITOR) 80 MG TABLET    Take 1 tablet (80 mg) by mouth daily    CALCIUM CARBONATE (TUMS) 500 MG CHEWABLE TABLET    Take 1 chew tab by mouth daily as needed    CHOLECALCIFEROL (VITAMIN D) 2000 UNITS CAPS    Take 2,000 Units by mouth daily    DIPHENHYDRAMINE HCL (BENADRYL PO)    Take 25-50 mg by mouth daily as needed    EVOLOCUMAB (REPATHA SURECLICK) 140 MG/ML PREFILLED AUTOINJECTOR    Inject 1 mL (140 mg) Subcutaneous every 14 days    EZETIMIBE (ZETIA) 10 MG TABLET    Take 1 tablet (10 mg) by mouth daily    FENOFIBRATE (TRIGLIDE/LOFIBRA) 160 MG TABLET    Take 1 tablet (160 mg) by mouth daily    LISINOPRIL (PRINIVIL/ZESTRIL) 2.5 MG TABLET    Take 1 tablet (2.5 mg) by mouth daily , or as directed by Dr. Ocampo.    METOPROLOL TARTRATE (LOPRESSOR) 25 MG TABLET    Take 0.5 tablets (12.5 mg) by mouth 2 times daily    WARFARIN (COUMADIN) 5 MG TABLET    Take 1.5 tablets  daily or as directed by coumadin clinic.   Modified Medications    No medications on file   Discontinued Medications    METOPROLOL (LOPRESSOR) 10 MG/ML SUSP    Take 1.25 mLs (12.5 mg) by mouth 2 times daily    OXYCODONE-ACETAMINOPHEN (PERCOCET) 5-325 MG TABLET    Take 1 tablet by mouth every 6 hours as needed for pain    WARFARIN ANTICOAGULANT (COUMADIN) 5 MG TABLET    Take 1.5 tablets or as directed by coumadin clinic.          Allergies   Allergen Reactions     Cats      Hay Fever & [A.R.M.]      Heparin Other (See Comments)     Heparin resistance per cardio-thoracic surgeon Dr. Armando     Hydrocodone-Acetaminophen Nausea and Vomiting     Acetaminophen is ok      Review of Systems   Constitutional: Positive for diaphoresis. Negative for chills, fatigue and fever.   Eyes: Negative for visual disturbance.   Respiratory: Negative for cough and shortness of breath.    Cardiovascular: Positive for chest pain. Negative for palpitations and leg swelling.   Gastrointestinal: Negative for abdominal pain, diarrhea, nausea and vomiting.   Genitourinary: Negative for dysuria, frequency, hematuria and urgency.   Musculoskeletal: Negative for back pain and neck pain.   Skin: Negative for color change and rash.   Neurological: Negative for syncope, weakness, light-headedness, numbness and headaches.   All other systems reviewed and are negative.      Physical Exam          Physical Exam  CONSTITUTIONAL: Well-developed and well-nourished. Awake and alert. Non-toxic appearance. No acute distress.   HENT:   - Head: Normocephalic and atraumatic.   - Ears: Hearing and external ear grossly normal.   - Nose: Nose normal. No rhinorrhea. No epistaxis.   - Mouth/Throat: MMM  EYES: Conjunctivae and lids are normal. No scleral icterus.   NECK: Normal range of motion and phonation normal. Neck supple.  No tracheal deviation, no stridor. No edema or erythema noted.  CARDIOVASCULAR: Normal rate, regular rhythm and no appreciable abnormal  heart sounds.  PULMONARY/CHEST: Normal work of breathing. No accessory muscle usage or stridor. No respiratory distress.  No appreciable abnormal breath sounds.  ABDOMEN: Soft, non-distended. No tenderness. No rigidity, rebound or guarding.   MUSCULOSKELETAL: Extremities warm and seemingly well perfused. No edema or calf tenderness. No clear findings for DVT.  NEUROLOGIC: Awake, alert. Not disoriented. He displays no atrophy and no tremor. Normal tone. No seizure activity. GCS 15  SKIN: Skin is warm and dry. No rash noted. No diaphoresis. No pallor.   PSYCHIATRIC: Speech and behavior normal. Thought processes linear. Cognition and memory are normal.    ED Course     ED Course as of Jan 06 1901   Mon Jan 06, 2020   1123 Repeat ECG when pain returns is perhaps showing some subtle t-wave changes V2-V4 (looking a bit biphasic, but not significant change)      1134 Discussed w/ Cardiology. They are reviewing and will call us back.       1134 Troponin I ES: <0.015   1134 Magnesium: 1.8   1135 Subtherapeutic   INR(!): 1.30   1135 Potassium(!): 3.2   1135 WBC: 5.7   1135 Hemoglobin: 14.3   1135 Borderline. Given anterior ventricular ECG changes since in ED think less likely and not pleuritic. Holding off on ordering additional imaging in case declares self as cardiac or ends up getting CTA for coronarys in which case could probably look for PE at same time.    D-Dimer: 0.5   1137 Cardiology recommends CTA coronaries/PE, serial troponins and obs (ED Obs if imaging/labs remain negative or no worsening change in ECGs). THey will continue to follow.       1227 Temporarily holding off on CT scan as Cards meeting with patient now and may decide to recommend going straight to cath lab vs. CT. They will let us know after finishing discussion w/ patient and then their team.       1241 CSI will admit. Had us order aspirin, they will order all of the other meds and likely take to the cath lab today.       1647 Pain resolved again  after the 2nd dose of nitroglycerin. Cardiology agrees that if no acute findings on his catheterization (though they think most likely related to ACS/UA), that they'll evaluate for other causes w/ CT Chest (like for PE, et al.)               EKG Interpretation: 10:29 AM     Interpreted by Sherry Valenzuela MD   Time reviewed: 10:32 AM  Symptoms at time of EKG: chest pain   Rhythm: normal sinus   Rate: normal  Conduction: normal  ST Segments/ T Waves: Nonspecific ST-T wave changes  Comparison to prior: vs 25 Jun 2018  - no longer has inverted T waves in ventricular leads   Clinical Impression: normal EKG           EKG Interpretation: 11:15 AM     Interpreted by Sherry Valenzuela MD   Time reviewed: 11:24 AM; recurrent CP  Comparison to prior: vs EKG done today at 10:29 AM  - now sightly biphasic T waves V2-V4  Clinical Impression: Sinus, but now with concern for ischemia given changes w/ biphasic/inverted twaves in V2-V4 (vs. Earlier today)    Another serial ECG ~ 1 hr later (asymptomatic at time) is unchanged from the 2nd ECG.     Critical Care time:  was 30 minutes for this patient excluding procedures for ACS/US w/ plan to go to cath lab, heparin drip, et al.     Assessments & Plan (with Medical Decision Making)     IMPRESSION: 59 year old male w/ PMH notable for cardiac disease status post CABG x 4 in 2014 on lifelong warfarin anticoagulation, hyperlipidemia, hypertension, bilateral PE status post VATS as well as significant family history of coronary artery disease/MI/cardiac bypasses who presents with chest pain that started today.     Clinically, patient appears nontoxic, NAD. Vitals some HTN. Otherwise on examination, no acute findings. No findings for DVT, no clear findings for pericardial effusion, focal lung findings, etc.     Ddx includes, but not limited to, ACS/UA (which I think to be most likely given hx and presentaiton), other things to consider would be PE (will check INR to ensure therapeutic, perhaps get  CTA chest/Double-rule-out for CAD and PE), does not sound consistent w/ dissection, less likely PTX, pericardial effusion/pericarditis, PNA, or boerhaave syndrome. Could also condsider GI causes like esophageal spasm, GERD/Gastritis/PUD, MSK related discomfort, et al.    PLAN: Labs, discussion w/ Cardiology regarding imaging/cath     RESULTS:  - Labs: Negative troponin x2, K 3.2, D-dimer 0.5, INR 1.3    INTERVENTIONS:   - Aspirin  - SL nitro (x2), improvement CP after each interval dose.   - Heparin drip    RE-EVALUATION:  - The patient's symptoms were improved slightly after fentanyl, and resolved completely after SL nitroglycerin. Did recur, but resolved again after a 2nd dose of nitroglycerin and then did not recur.   - Pt otherwise continues to do well here in the ED, no acute issues or apparent concerning changes in vitals or clinical appearance.    DISCUSSIONS:  - w/ Cardiology: Discussed w/ Cards and CSI teams. They have seen/evaluated the patient in the ED.   --- They will order heparin/other meds as needed.   --- They report will likely take for cath later today (have requested we keep patient NPO).   --- They will assume care and placed additional testing/management orders as needed.   - w/ Patient: I have reviewed the available findings, plan with the patient and his loved one. They expressed understanding and agreement with this plan. All questions answered to the best of our ability at this time.     DISPOSITION/PLANNING:  - IMPRESSION: CP, subtle ECG changes consistent w/ ischemia  - DISPOSITION: Admit to [facility]SI (like catheterization later today), w/ plan for further workup of CP if cath negative/as needed      ______________________________________________________________________       Danae CHO, am serving as a trained medical scribe to document services personally performed by Sherry Valenzuela MD based on the provider's statements to me on January 6, 2020.  This document has been checked and  approved by the attending provider.    I, Sherry Valenzuela MD, was physically present and have reviewed and verified the accuracy of this note documented by Danae Reina, medical scribe.      1/6/2020   [facility], [place], EMERGENCY DEPARTMENT     Sherry Valenzuela MD  01/06/20 [id number]

## 2020-01-06 NOTE — Clinical Note
The first balloon was inserted into the left anterior descending and proximal left anterior descending.Max pressure = 12 sharmin.

## 2020-01-06 NOTE — PROGRESS NOTES
D/w patient, his pain is now controlled on nitrogycerin and heparin gtt. Will defer angiogram until tomorrow.  - discontinued this evenings warfarin dose. D/w 6C pharmacist  - diet ordered.   - NPO at MN      Reji Osorio PA-C  [facility] Cardiology Team

## 2020-01-06 NOTE — PRE-PROCEDURE
GENERAL PRE-PROCEDURE:   Procedure:  Coronary angiogram  Date/Time:  1/6/2020 1:56 PM    Verbal consent obtained?: Yes    Written consent obtained?: Yes    Risks and benefits: Risks, benefits and alternatives were discussed    Consent given by:  Patient  Patient states understanding of procedure being performed: Yes    Patient's understanding of procedure matches consent: Yes    Procedure consent matches procedure scheduled: Yes    Expected level of sedation:  Moderate  Appropriately NPO:  Yes  ASA Class:  Class 1- healthy patient  Mallampati  :  Grade 1- soft palate, uvula, tonsillar pillars, and posterior pharyngeal wall visible  Lungs:  Lungs clear with good breath sounds bilaterally  Heart:  Normal heart sounds and rate  History & Physical reviewed:  History and physical reviewed and no updates needed  Statement of review:  I have reviewed the lab findings, diagnostic data, medications, and the plan for sedation        Reji Osorio PA-C  [facility] Cardiology Team

## 2020-01-06 NOTE — PHARMACY-ADMISSION MEDICATION HISTORY
Admission medication history interview status for the 1/6/2020 admission is complete. See Epic admission navigator for allergy information, pharmacy, prior to admission medications and immunization status.     Medication history interview sources:  Patient, [employer], [facility]    Changes made to PTA medication list (reason)  Added: None  Deleted: Lidocaine, triamcinolone (pt not taking, from previous admission), metoprolol suspension, percocet (Pt not taking)  Changed: None    Additional medication history information (including reliability of information, actions taken by pharmacist):  Warfarin Information:  -Current regimen: 7.5mg daily  -Indication: Bilateral PE  -INR Goal: 2-3  -Managed by: [facility]  -Tablet size: 5 mg  -Schedule: Every morning  -Last dose: 1/6/20  -Time of day: Morning          Prior to Admission medications    Medication Sig Last Dose Taking? Auth Provider   aspirin EC 81 MG tablet Take 1 tablet (81 mg) by mouth daily 1/6/2020 at AM Yes Reji Ocampo MD   atorvastatin (LIPITOR) 80 MG tablet Take 1 tablet (80 mg) by mouth daily 1/6/2020 at AM Yes Reji Ocampo MD   calcium carbonate (TUMS) 500 MG chewable tablet Take 1 chew tab by mouth daily as needed Past Week at Unknown time Yes Reported, Patient   Cholecalciferol (VITAMIN D) 2000 UNITS CAPS Take 2,000 Units by mouth daily 1/6/2020 at AM Yes Reported, Patient   DiphenhydrAMINE HCl (BENADRYL PO) Take 25-50 mg by mouth daily as needed 1/5/2020 at Unknown time Yes Reported, Patient   evolocumab (REPATHA SURECLICK) 140 MG/ML prefilled autoinjector Inject 1 mL (140 mg) Subcutaneous every 14 days 1/1/2020 Yes Reji Ocampo MD   ezetimibe (ZETIA) 10 MG tablet Take 1 tablet (10 mg) by mouth daily 1/6/2020 at AM Yes Reji Ocampo MD   fenofibrate (TRIGLIDE/LOFIBRA) 160 MG tablet Take 1 tablet (160 mg) by mouth daily 1/6/2020 at AM Yes Reji Ocampo MD   lisinopril (PRINIVIL/ZESTRIL) 2.5 MG tablet Take 1 tablet  (2.5 mg) by mouth daily , or as directed by Dr. Ocampo. 1/6/2020 at Unknown time Yes Reji Ocampo MD   metoprolol tartrate (LOPRESSOR) 25 MG tablet Take 0.5 tablets (12.5 mg) by mouth 2 times daily 1/5/2020 at Unknown time Yes Samm Vazquez MD   warfarin (COUMADIN) 5 MG tablet Take 1.5 tablets daily or as directed by coumadin clinic. 1/6/2020 at AM Yes Reji Ocampo MD         Medication history completed by:   Ned Vaca, PharmD  Pharmacy Practice Resident

## 2020-01-07 ENCOUNTER — APPOINTMENT (OUTPATIENT)
Dept: CARDIOLOGY | Facility: CLINIC | Age: 60
DRG: 247 | End: 2020-01-07
Attending: PHYSICIAN ASSISTANT
Payer: COMMERCIAL

## 2020-01-07 ENCOUNTER — PATIENT OUTREACH (OUTPATIENT)
Dept: CARE COORDINATION | Facility: CLINIC | Age: 60
End: 2020-01-07

## 2020-01-07 VITALS
HEART RATE: 65 BPM | TEMPERATURE: 97.8 F | OXYGEN SATURATION: 97 % | DIASTOLIC BLOOD PRESSURE: 80 MMHG | WEIGHT: 221.12 LBS | RESPIRATION RATE: 18 BRPM | BODY MASS INDEX: 29.99 KG/M2 | SYSTOLIC BLOOD PRESSURE: 122 MMHG

## 2020-01-07 LAB
ANION GAP SERPL CALCULATED.3IONS-SCNC: 5 MMOL/L (ref 3–14)
BUN SERPL-MCNC: 14 MG/DL (ref 7–30)
CALCIUM SERPL-MCNC: 9 MG/DL (ref 8.5–10.1)
CHLORIDE SERPL-SCNC: 108 MMOL/L (ref 94–109)
CHOLEST SERPL-MCNC: 69 MG/DL
CO2 SERPL-SCNC: 26 MMOL/L (ref 20–32)
CREAT SERPL-MCNC: 0.87 MG/DL (ref 0.66–1.25)
GFR SERPL CREATININE-BSD FRML MDRD: >90 ML/MIN/{1.73_M2}
GLUCOSE SERPL-MCNC: 102 MG/DL (ref 70–99)
HDLC SERPL-MCNC: 41 MG/DL
INR PPP: 1.66 (ref 0.86–1.14)
KCT BLD-ACNC: 308 SEC (ref 75–150)
LDLC SERPL CALC-MCNC: 10 MG/DL
LMWH PPP CHRO-ACNC: 0.44 IU/ML
LMWH PPP CHRO-ACNC: 0.7 IU/ML
LMWH PPP CHRO-ACNC: 1.87 IU/ML
NONHDLC SERPL-MCNC: 29 MG/DL
POTASSIUM SERPL-SCNC: 3.8 MMOL/L (ref 3.4–5.3)
SODIUM SERPL-SCNC: 139 MMOL/L (ref 133–144)
TRIGL SERPL-MCNC: 94 MG/DL

## 2020-01-07 PROCEDURE — B2111ZZ FLUOROSCOPY OF MULTIPLE CORONARY ARTERIES USING LOW OSMOLAR CONTRAST: ICD-10-PCS | Performed by: INTERNAL MEDICINE

## 2020-01-07 PROCEDURE — 25000132 ZZH RX MED GY IP 250 OP 250 PS 637: Performed by: INTERNAL MEDICINE

## 2020-01-07 PROCEDURE — 36415 COLL VENOUS BLD VENIPUNCTURE: CPT | Performed by: INTERNAL MEDICINE

## 2020-01-07 PROCEDURE — C1874 STENT, COATED/COV W/DEL SYS: HCPCS | Performed by: INTERNAL MEDICINE

## 2020-01-07 PROCEDURE — 40000264 ECHOCARDIOGRAM COMPLETE

## 2020-01-07 PROCEDURE — 92928 PRQ TCAT PLMT NTRAC ST 1 LES: CPT | Mod: LD | Performed by: INTERNAL MEDICINE

## 2020-01-07 PROCEDURE — C1887 CATHETER, GUIDING: HCPCS | Performed by: INTERNAL MEDICINE

## 2020-01-07 PROCEDURE — 80061 LIPID PANEL: CPT | Performed by: PHYSICIAN ASSISTANT

## 2020-01-07 PROCEDURE — 80048 BASIC METABOLIC PNL TOTAL CA: CPT

## 2020-01-07 PROCEDURE — 93005 ELECTROCARDIOGRAM TRACING: CPT

## 2020-01-07 PROCEDURE — C1769 GUIDE WIRE: HCPCS | Performed by: INTERNAL MEDICINE

## 2020-01-07 PROCEDURE — 93455 CORONARY ART/GRFT ANGIO S&I: CPT | Performed by: INTERNAL MEDICINE

## 2020-01-07 PROCEDURE — 99153 MOD SED SAME PHYS/QHP EA: CPT | Performed by: INTERNAL MEDICINE

## 2020-01-07 PROCEDURE — 85520 HEPARIN ASSAY: CPT | Performed by: INTERNAL MEDICINE

## 2020-01-07 PROCEDURE — 99232 SBSQ HOSP IP/OBS MODERATE 35: CPT | Mod: 25 | Performed by: PHYSICIAN ASSISTANT

## 2020-01-07 PROCEDURE — 25000128 H RX IP 250 OP 636: Performed by: PHYSICIAN ASSISTANT

## 2020-01-07 PROCEDURE — 99152 MOD SED SAME PHYS/QHP 5/>YRS: CPT | Performed by: INTERNAL MEDICINE

## 2020-01-07 PROCEDURE — 027035Z DILATION OF CORONARY ARTERY, ONE ARTERY WITH TWO DRUG-ELUTING INTRALUMINAL DEVICES, PERCUTANEOUS APPROACH: ICD-10-PCS | Performed by: INTERNAL MEDICINE

## 2020-01-07 PROCEDURE — 36415 COLL VENOUS BLD VENIPUNCTURE: CPT | Performed by: PHYSICIAN ASSISTANT

## 2020-01-07 PROCEDURE — 85610 PROTHROMBIN TIME: CPT

## 2020-01-07 PROCEDURE — 27210762 ZZH DEVICE SUTURELESS SECUREMENT EA CR2: Performed by: INTERNAL MEDICINE

## 2020-01-07 PROCEDURE — C1894 INTRO/SHEATH, NON-LASER: HCPCS | Performed by: INTERNAL MEDICINE

## 2020-01-07 PROCEDURE — 25000128 H RX IP 250 OP 636: Performed by: INTERNAL MEDICINE

## 2020-01-07 PROCEDURE — 93455 CORONARY ART/GRFT ANGIO S&I: CPT | Mod: 26 | Performed by: INTERNAL MEDICINE

## 2020-01-07 PROCEDURE — 93010 ELECTROCARDIOGRAM REPORT: CPT | Performed by: INTERNAL MEDICINE

## 2020-01-07 PROCEDURE — 85610 PROTHROMBIN TIME: CPT | Performed by: PHYSICIAN ASSISTANT

## 2020-01-07 PROCEDURE — 25000132 ZZH RX MED GY IP 250 OP 250 PS 637: Performed by: PHYSICIAN ASSISTANT

## 2020-01-07 PROCEDURE — 85347 COAGULATION TIME ACTIVATED: CPT

## 2020-01-07 PROCEDURE — 36415 COLL VENOUS BLD VENIPUNCTURE: CPT

## 2020-01-07 PROCEDURE — 25000128 H RX IP 250 OP 636

## 2020-01-07 PROCEDURE — 85520 HEPARIN ASSAY: CPT | Performed by: PHYSICIAN ASSISTANT

## 2020-01-07 PROCEDURE — 85520 HEPARIN ASSAY: CPT

## 2020-01-07 PROCEDURE — 25000125 ZZHC RX 250: Performed by: INTERNAL MEDICINE

## 2020-01-07 PROCEDURE — 93306 TTE W/DOPPLER COMPLETE: CPT | Mod: 26 | Performed by: INTERNAL MEDICINE

## 2020-01-07 PROCEDURE — 27210794 ZZH OR GENERAL SUPPLY STERILE: Performed by: INTERNAL MEDICINE

## 2020-01-07 PROCEDURE — C9600 PERC DRUG-EL COR STENT SING: HCPCS | Performed by: INTERNAL MEDICINE

## 2020-01-07 PROCEDURE — B2131ZZ FLUOROSCOPY OF MULTIPLE CORONARY ARTERY BYPASS GRAFTS USING LOW OSMOLAR CONTRAST: ICD-10-PCS | Performed by: INTERNAL MEDICINE

## 2020-01-07 PROCEDURE — C1725 CATH, TRANSLUMIN NON-LASER: HCPCS | Performed by: INTERNAL MEDICINE

## 2020-01-07 PROCEDURE — 25500064 ZZH RX 255 OP 636: Performed by: INTERNAL MEDICINE

## 2020-01-07 DEVICE — STENT SYNERGY DRUG ELUTING 3.50X32MM  H7493926032350: Type: IMPLANTABLE DEVICE | Status: FUNCTIONAL

## 2020-01-07 RX ORDER — NITROGLYCERIN 0.4 MG/1
0.4 TABLET SUBLINGUAL EVERY 5 MIN PRN
Status: DISCONTINUED | OUTPATIENT
Start: 2020-01-07 | End: 2020-01-07

## 2020-01-07 RX ORDER — NALOXONE HYDROCHLORIDE 0.4 MG/ML
.1-.4 INJECTION, SOLUTION INTRAMUSCULAR; INTRAVENOUS; SUBCUTANEOUS
Status: DISCONTINUED | OUTPATIENT
Start: 2020-01-07 | End: 2020-01-07 | Stop reason: HOSPADM

## 2020-01-07 RX ORDER — ACETAMINOPHEN 325 MG/1
650 TABLET ORAL EVERY 4 HOURS PRN
Status: DISCONTINUED | OUTPATIENT
Start: 2020-01-07 | End: 2020-01-07 | Stop reason: HOSPADM

## 2020-01-07 RX ORDER — ASPIRIN 81 MG/1
81 TABLET ORAL DAILY
Qty: 91 TABLET | Refills: 3 | Status: ON HOLD | OUTPATIENT
Start: 2020-01-07 | End: 2020-01-15

## 2020-01-07 RX ORDER — ARGATROBAN 1 MG/ML
150 INJECTION, SOLUTION INTRAVENOUS
Status: DISCONTINUED | OUTPATIENT
Start: 2020-01-07 | End: 2020-01-07 | Stop reason: HOSPADM

## 2020-01-07 RX ORDER — NITROGLYCERIN 20 MG/100ML
.07-2 INJECTION INTRAVENOUS CONTINUOUS PRN
Status: DISCONTINUED | OUTPATIENT
Start: 2020-01-07 | End: 2020-01-07 | Stop reason: HOSPADM

## 2020-01-07 RX ORDER — ASPIRIN 81 MG/1
81 TABLET ORAL DAILY
Status: DISCONTINUED | OUTPATIENT
Start: 2020-01-07 | End: 2020-01-07

## 2020-01-07 RX ORDER — ACETAMINOPHEN 650 MG/1
650 SUPPOSITORY RECTAL EVERY 4 HOURS PRN
Status: DISCONTINUED | OUTPATIENT
Start: 2020-01-07 | End: 2020-01-07 | Stop reason: HOSPADM

## 2020-01-07 RX ORDER — LISINOPRIL 5 MG/1
5 TABLET ORAL DAILY
Status: DISCONTINUED | OUTPATIENT
Start: 2020-01-07 | End: 2020-01-07 | Stop reason: HOSPADM

## 2020-01-07 RX ORDER — ONDANSETRON 2 MG/ML
INJECTION INTRAMUSCULAR; INTRAVENOUS
Status: DISCONTINUED | OUTPATIENT
Start: 2020-01-07 | End: 2020-01-07 | Stop reason: HOSPADM

## 2020-01-07 RX ORDER — NITROGLYCERIN 0.4 MG/1
0.4 TABLET SUBLINGUAL EVERY 5 MIN PRN
Status: DISCONTINUED | OUTPATIENT
Start: 2020-01-07 | End: 2020-01-07 | Stop reason: HOSPADM

## 2020-01-07 RX ORDER — ATROPINE SULFATE 0.1 MG/ML
0.5 INJECTION INTRAVENOUS EVERY 5 MIN PRN
Status: DISCONTINUED | OUTPATIENT
Start: 2020-01-07 | End: 2020-01-07 | Stop reason: HOSPADM

## 2020-01-07 RX ORDER — ASPIRIN 81 MG/1
243 TABLET, CHEWABLE ORAL ONCE
Status: COMPLETED | OUTPATIENT
Start: 2020-01-07 | End: 2020-01-07

## 2020-01-07 RX ORDER — LIDOCAINE 40 MG/G
CREAM TOPICAL
Status: DISCONTINUED | OUTPATIENT
Start: 2020-01-07 | End: 2020-01-07 | Stop reason: HOSPADM

## 2020-01-07 RX ORDER — WARFARIN SODIUM 10 MG/1
10 TABLET ORAL
Status: COMPLETED | OUTPATIENT
Start: 2020-01-07 | End: 2020-01-07

## 2020-01-07 RX ORDER — FENTANYL CITRATE 50 UG/ML
INJECTION, SOLUTION INTRAMUSCULAR; INTRAVENOUS
Status: DISCONTINUED | OUTPATIENT
Start: 2020-01-07 | End: 2020-01-07 | Stop reason: HOSPADM

## 2020-01-07 RX ORDER — NOREPINEPHRINE BITARTRATE 0.06 MG/ML
.03-.4 INJECTION, SOLUTION INTRAVENOUS CONTINUOUS PRN
Status: DISCONTINUED | OUTPATIENT
Start: 2020-01-07 | End: 2020-01-07 | Stop reason: HOSPADM

## 2020-01-07 RX ORDER — FENTANYL CITRATE 50 UG/ML
25-50 INJECTION, SOLUTION INTRAMUSCULAR; INTRAVENOUS
Status: DISCONTINUED | OUTPATIENT
Start: 2020-01-07 | End: 2020-01-07 | Stop reason: HOSPADM

## 2020-01-07 RX ORDER — IOPAMIDOL 755 MG/ML
INJECTION, SOLUTION INTRAVASCULAR
Status: DISCONTINUED | OUTPATIENT
Start: 2020-01-07 | End: 2020-01-07 | Stop reason: HOSPADM

## 2020-01-07 RX ORDER — NICARDIPINE HYDROCHLORIDE 2.5 MG/ML
INJECTION INTRAVENOUS
Status: DISCONTINUED | OUTPATIENT
Start: 2020-01-07 | End: 2020-01-07 | Stop reason: HOSPADM

## 2020-01-07 RX ORDER — DOBUTAMINE HYDROCHLORIDE 200 MG/100ML
2-20 INJECTION INTRAVENOUS CONTINUOUS PRN
Status: DISCONTINUED | OUTPATIENT
Start: 2020-01-07 | End: 2020-01-07 | Stop reason: HOSPADM

## 2020-01-07 RX ORDER — EPTIFIBATIDE 2 MG/ML
2 INJECTION, SOLUTION INTRAVENOUS CONTINUOUS PRN
Status: DISCONTINUED | OUTPATIENT
Start: 2020-01-07 | End: 2020-01-07 | Stop reason: HOSPADM

## 2020-01-07 RX ORDER — HEPARIN SODIUM 10000 [USP'U]/100ML
100-1000 INJECTION, SOLUTION INTRAVENOUS CONTINUOUS PRN
Status: DISCONTINUED | OUTPATIENT
Start: 2020-01-07 | End: 2020-01-07 | Stop reason: HOSPADM

## 2020-01-07 RX ORDER — NALOXONE HYDROCHLORIDE 0.4 MG/ML
.2-.4 INJECTION, SOLUTION INTRAMUSCULAR; INTRAVENOUS; SUBCUTANEOUS
Status: DISCONTINUED | OUTPATIENT
Start: 2020-01-07 | End: 2020-01-07 | Stop reason: HOSPADM

## 2020-01-07 RX ORDER — WARFARIN SODIUM 1 MG/1
10 TABLET ORAL DAILY
Qty: 20 TABLET | Refills: 0 | Status: ON HOLD | OUTPATIENT
Start: 2020-01-07 | End: 2020-01-10

## 2020-01-07 RX ORDER — NITROGLYCERIN 5 MG/ML
VIAL (ML) INTRAVENOUS
Status: DISCONTINUED | OUTPATIENT
Start: 2020-01-07 | End: 2020-01-07 | Stop reason: HOSPADM

## 2020-01-07 RX ORDER — ARGATROBAN 1 MG/ML
350 INJECTION, SOLUTION INTRAVENOUS
Status: DISCONTINUED | OUTPATIENT
Start: 2020-01-07 | End: 2020-01-07 | Stop reason: HOSPADM

## 2020-01-07 RX ORDER — SODIUM CHLORIDE 9 MG/ML
INJECTION, SOLUTION INTRAVENOUS CONTINUOUS
Status: DISCONTINUED | OUTPATIENT
Start: 2020-01-07 | End: 2020-01-07 | Stop reason: HOSPADM

## 2020-01-07 RX ORDER — EPTIFIBATIDE 2 MG/ML
180 INJECTION, SOLUTION INTRAVENOUS EVERY 10 MIN PRN
Status: DISCONTINUED | OUTPATIENT
Start: 2020-01-07 | End: 2020-01-07 | Stop reason: HOSPADM

## 2020-01-07 RX ORDER — DOPAMINE HYDROCHLORIDE 160 MG/100ML
2-20 INJECTION, SOLUTION INTRAVENOUS CONTINUOUS PRN
Status: DISCONTINUED | OUTPATIENT
Start: 2020-01-07 | End: 2020-01-07 | Stop reason: HOSPADM

## 2020-01-07 RX ORDER — HEPARIN SODIUM 1000 [USP'U]/ML
INJECTION, SOLUTION INTRAVENOUS; SUBCUTANEOUS
Status: DISCONTINUED | OUTPATIENT
Start: 2020-01-07 | End: 2020-01-07 | Stop reason: HOSPADM

## 2020-01-07 RX ORDER — FLUMAZENIL 0.1 MG/ML
0.2 INJECTION, SOLUTION INTRAVENOUS
Status: DISCONTINUED | OUTPATIENT
Start: 2020-01-07 | End: 2020-01-07 | Stop reason: HOSPADM

## 2020-01-07 RX ORDER — ALUMINA, MAGNESIA, AND SIMETHICONE 2400; 2400; 240 MG/30ML; MG/30ML; MG/30ML
30 SUSPENSION ORAL EVERY 4 HOURS PRN
Status: DISCONTINUED | OUTPATIENT
Start: 2020-01-07 | End: 2020-01-07 | Stop reason: HOSPADM

## 2020-01-07 RX ADMIN — HEPARIN SODIUM 1200 UNITS/HR: 10000 INJECTION, SOLUTION INTRAVENOUS at 03:53

## 2020-01-07 RX ADMIN — ASPIRIN 81 MG: 81 TABLET, COATED ORAL at 08:33

## 2020-01-07 RX ADMIN — ATORVASTATIN CALCIUM 80 MG: 80 TABLET, FILM COATED ORAL at 08:34

## 2020-01-07 RX ADMIN — HYDRALAZINE HYDROCHLORIDE 5 MG: 20 INJECTION INTRAMUSCULAR; INTRAVENOUS at 03:41

## 2020-01-07 RX ADMIN — HUMAN ALBUMIN MICROSPHERES AND PERFLUTREN 5 ML: 10; .22 INJECTION, SOLUTION INTRAVENOUS at 09:00

## 2020-01-07 RX ADMIN — LISINOPRIL 5 MG: 5 TABLET ORAL at 08:34

## 2020-01-07 RX ADMIN — WARFARIN SODIUM 10 MG: 10 TABLET ORAL at 18:00

## 2020-01-07 RX ADMIN — ASPIRIN 81 MG CHEWABLE TABLET 243 MG: 81 TABLET CHEWABLE at 10:56

## 2020-01-07 RX ADMIN — FENOFIBRATE 160 MG: 160 TABLET ORAL at 08:33

## 2020-01-07 RX ADMIN — HYDRALAZINE HYDROCHLORIDE 5 MG: 20 INJECTION INTRAMUSCULAR; INTRAVENOUS at 09:31

## 2020-01-07 RX ADMIN — Medication 12.5 MG: at 08:33

## 2020-01-07 RX ADMIN — EZETIMIBE 10 MG: 10 TABLET ORAL at 08:33

## 2020-01-07 ASSESSMENT — ACTIVITIES OF DAILY LIVING (ADL)
ADLS_ACUITY_SCORE: 13
ADLS_ACUITY_SCORE: 13

## 2020-01-07 NOTE — PROGRESS NOTES
Care Coordinator - Discharge Planning    Admission Date/Time:  1/6/2020  Attending MD:  Chato Odonnell   Data  Chart reviewed, discussed with interdisciplinary team.   Patient was admitted for:   1. Unstable angina (H)    2. Unstable angina (H)    3. Essential hypertension, malignant    4. Coronary artery disease with unstable angina pectoris, unspecified vessel or lesion type, unspecified whether native or transplanted heart (H)    5. Hx of CABG    6. S/P CABG x 4    7. S/P angioplasty with stent    8. Pulmonary embolism, unspecified chronicity, unspecified pulmonary embolism type, unspecified whether acute cor pulmonale present (H)    Assessment   Concerns with insurance coverage for discharge needs: None stated by pt.  Current Living Situation: Patient lives with spouse. Pt said that he is independent with his own care.   Support System: Supportive and Involved wife.   Services Involved: [facility].  Transportation at Discharge: Family or friend will provide  Transportation to Medical Appointments:    - Name of caregiver: self.     Coordination of Care and Referrals: No home care needs per pt.     Plan  Anticipated Discharge Date:  1/7/2020  Anticipated Discharge Plan:  Discharge to home.     CTS Handoff completed:  YES    SAEED BERRY, RN BSN  Care Coordinator Unit   [phone]

## 2020-01-07 NOTE — PROGRESS NOTES
Research Consent Note:     Study Title: Adenosine Contrast Correlations in Evaluating Revascularization ACCELERATION Study  IRB # [id number]    PI pager: Dr. Napoleon Oconnell  # [phone]   pager: Barbara Wakefield RN  #  [phone]                       Estimated dates of participation: One year     Consent form was reviewed with the patient.  The purpose, risks, and benefits of study participation were discussed.  The study was reviewed with expected duration of participation, procedures, along with any foreseeable risks or discomforts. It was discussed that study participation is voluntary and that refusal to participate will involve no penalty or decrease benefits to which the subject is otherwise entitled, and the subject may discontinue participation at any time without penalty or loss of benefits. Patient questions were answered. Patient was able to state what study participation involved.  Patient signed the consent and HIPAA forms prior to study participation and was given signed copies of both.

## 2020-01-07 NOTE — DISCHARGE SUMMARY
[address] [address]  p: [phone]    Discharge Summary: Cardiology Service    Oswaldo Prabhakar MRN# [id number]   YOB: 1960 Age: 59 year old       Admission Date: 1/6/2020  Discharge Date: 01/07/20      Discharge Diagnoses:  1. Unstable angina s/p NIR to LIMA  2. HTN  3. HLD  4. History of unprovoked PE on coumadin      Pertinent Procedures:  1. Coronary angiogram    Consults:  Pharmacy    Imaging with results:  Echocardiogram 1/7/2020:  Interpretation Summary  Left ventricular size is normal. The Ejection Fraction is estimated at 55-60%.  The mid to distal anteroseptum and the apex appear hypokinetic  Right ventricular function, chamber size, wall motion, and thickness are  normal.  The inferior vena cava was normal in size with preserved respiratory  variability. No pericardial effusion is present.     Wall motion abnormality appears new compared to prior study.  _________________________________________________________  Left Ventricle  Left ventricular wall thickness is normal. Left ventricular size is normal.  Grade I or early diastolic dysfunction. The Ejection Fraction is estimated at  55-60%. The mid to distal anteroseptum and the apex appear hypokinetic.     Right Ventricle  Right ventricular function, chamber size, wall motion, and thickness are  normal.     Atria  Both atria appear normal.     Mitral Valve  The mitral valve is normal.        Aortic Valve  Aortic valve is normal in structure and function. The aortic valve is  tricuspid. No aortic regurgitation is present.     Tricuspid Valve  The tricuspid valve is normal. Trace tricuspid insufficiency is present. The  peak velocity of the tricuspid regurgitant jet is not obtainable.     Pulmonic Valve  The pulmonic valve is normal.     Vessels  The aorta root is normal. The inferior vena cava was normal in size with  preserved respiratory variability.     Pericardium  No  pericardial effusion is present.     Compared to Previous Study  Wall motion abnormality appears new compared to prior study.    Coronary Angiogram 1/7/2020:  Diagnostic   Dominance: Right   Left Main   The left main has mild luminal irregularities and gives rise to the LCx and LAD.   Left Anterior Descending   The LAD has a tapering proximal stenosis to 75%. The mid LAD has diffuse 20% stenosis and the distal LAD has a focal area of stenosis of 80% (question if this is where the touchdown of the LIMA is, although there is no evidence of an retrograde flow to confirm this). There is a large D1 vessel that has mild luminal irregularities with competitive flow from a vein graft. There is a medium sized D2 which has mild luminal irregularities and a small D3 vessel.   Prox LAD to Mid LAD lesion is 80% stenosed. Culprit lesion. The stenosis was measured by a visual reading. The strut appears well apposed.   Left Circumflex   The LCx has 20% proximal disease, gives rise to a small OM1 which has mild disease. The mid LCx has luminal irregularities. The OM2 vessel has a proximal segment of 80% stenosis and distal to that stenosis is evidence of collateral flow from a vein graft. The distal LCx has mild luminal irregularities.   Right Coronary Artery   The vessel was visualized by selective angiography. There was 100% vessel disease. The ostium was not able to be engaged. Appears there is 100% ostial stenosis of the RCA. There is a vein graft supplying flow to the rPDA which is diffusely diseased. There is limited retrograde flow stopping at the distal RCA. ( from the ostium to near the distal RCA)   Vein Graft to RPDA   The graft was visualized by non-selective angiography. A pigtail catheter was placed in the right coronary cusp. There is visualization of the vein graft to the rPDA. There are no significant areas of stenosis within the graft itself.   Vein Graft to 2nd Mrg   The graft was visualized by angiography and  is moderate in size. The graft exhibits minimal luminal irregularities.   Vein Graft to 1st Diag   The graft is moderate in size. The graft exhibits minimal luminal irregularities. The graft was not able to be engaged but retrograde flow from the native LAD system demonstrated competitive flow and no significant lesions in the vein graft.   LIMA Graft to Dist LAD   The graft was visualized by angiography. The graft is severely ectatic. LIMA engaged and is ectatic throughout the course of the vessel providing no significant flow to the distal LAD.   Intervention     Prox LAD to Mid LAD lesion   Stent   A stent was successfully placed.   Stent   guide catheter was successful. The pre-interventional distal flow is normal (UDAY 3). A STENT SYNERGY DRUG ELUTING 3.66Y10BC [id number] drug eluting stent was successfully placed. Pre-stent angioplasty was performed using a CATH BALLOON EMERGE 2.5X20MM [id number] supply. . The strut is apposed. The post-interventional distal flow is normal (UDAY 3). The intervention was successful. No complications occurred at this lesion.   There is a 0% residual stenosis post intervention.         EKG today 11:15: NSR HR 71, nonspecific T wave changes V3-V4       EKG today 10:29: NSR HR 80, no acute ischemic changes       Brief HPI:  Oswaldo Prabhakar is a 59 year old male with a history of HTN, HLD with statin intolerance, premature CAD s/p 4V CABG in 2014 and Hx of bilateral PE on warfarin who presents to the ED for evaluation of chest pain  Hospital Course by Diagnosis:  #Unstable angina  ##History of premature CAD s/p 4V CABG (LIMA-LAD, SVg-Diag, SVG-OM1, SVG-rPDA) 2014  1 day of intermittent left sided chest pain relieved with nitroglycerin. Serial EKG with new TWI in anterior leads as above. Troponin negative x2. Started heparin and ntg gtt in the ED. Intermittent chest pressure overnight that resolved with ntg. Echo completed last morning with new WMA with mid and distal  anteroseptum hypokinesis. Underwent angiogram on HOD 1 with significant disease of the LIMA requiring NIR x1. Will discharge on triple therapy, with intention to stop aspirin once his INR is >2.0  - continue PTA metoprolol tartrate 12.5mg bid  - continue PTA atorvastatin 80mg, zetia 10mg, fenofibrate 162mg every day  - Start ticagrelor 90mg bid. Continue aspirin 81mg and warfarin. Stop aspirin once INR >2.0  - follow up in clinic in 2-3 weeks     #History of unprovoked PE s/p thoracotomy with lung resection 2011  INR subtherapeutic today. Gets INR checked q4-6 weeks.   - D/w Pharmacy. Recommending he take 10mg qday until his INR check on 1/10/2020     #HTN  Hypertensive on admission. On minimal medications and is followed closely by cardiology.  - continue lisinopril 2.5mg daily  - metoprolol as above     #HLD  Follows with Dr. Ocampo. Is on a number of medications including repatha  - continue repatha q14 days  - zetia, atorvastatin, fenofibrate as above      Condition on discharge  Temp:  [97.8  F (36.6  C)-98.6  F (37  C)] 97.8  F (36.6  C)  Pulse:  [61-69] 64  Heart Rate:  [61-76] 65  Resp:  [9-25] 18  BP: (122-156)/(82-98) 131/84  SpO2:  [95 %-98 %] 97 %  General: Alert, interactive, NAD  Eyes: sclera anicteric, EOMI  Neck: JVP WNL  Cardiovascular: regular rate and rhythm, normal S1 and S2, no murmurs, gallops, or rubs  Resp: clear to auscultation bilaterally, no rales, wheezes, or rhonchi  GI: Soft, nontender, nondistended. +BS  Extremities: left radial incision c/d/i, no edema, no cyanosis or clubbing, dorsalis pedis and posterior tibialis pulses 2+ bilaterally  Skin: Warm and dry, no jaundice or rash  Neuro: CN 2-12 intact, moves all extremities equally  Psych: Alert & oriented x 3      Medication Changes:  START Ticagrelor 90mg bid  Once INR is >2.0, STOP aspirin 81mg    Discharge medications:   Current Discharge Medication List      START taking these medications    Details   ticagrelor (BRILINTA) 90 MG  tablet Take 1 tablet (90 mg) by mouth every 12 hours  Qty: 60 tablet, Refills: 11    Associated Diagnoses: Unstable angina (H); S/P angioplasty with stent         CONTINUE these medications which have CHANGED    Details   aspirin 81 MG EC tablet Take 1 tablet (81 mg) by mouth daily Stop once INR >2.0  Qty: 91 tablet, Refills: 3    Associated Diagnoses: S/P CABG x 4      warfarin ANTICOAGULANT (COUMADIN) 1 MG tablet Take 10 tablets (10 mg) by mouth daily for 2 days Get INR check 1/10/2020  Qty: 20 tablet, Refills: 0    Associated Diagnoses: Pulmonary embolism, unspecified chronicity, unspecified pulmonary embolism type, unspecified whether acute cor pulmonale present (H)         CONTINUE these medications which have NOT CHANGED    Details   atorvastatin (LIPITOR) 80 MG tablet Take 1 tablet (80 mg) by mouth daily  Qty: 90 tablet, Refills: 2    Associated Diagnoses: Pure hyperglyceridemia; Hyperlipidemia LDL goal <130      calcium carbonate (TUMS) 500 MG chewable tablet Take 1 chew tab by mouth daily as needed      Cholecalciferol (VITAMIN D) 2000 UNITS CAPS Take 2,000 Units by mouth daily    Associated Diagnoses: 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      DiphenhydrAMINE HCl (BENADRYL PO) Take 25-50 mg by mouth daily as needed      evolocumab (REPATHA SURECLICK) 140 MG/ML prefilled autoinjector Inject 1 mL (140 mg) Subcutaneous every 14 days  Qty: 6 mL, Refills: 3    Comments: Please fill at Pt request.  Associated Diagnoses: Hyperlipidemia LDL goal <130; S/P CABG x 4; Coronary artery disease involving nonautologous biological coronary bypass graft with angina pectoris (H); Statin intolerance      ezetimibe (ZETIA) 10 MG tablet Take 1 tablet (10 mg) by mouth daily  Qty: 90 tablet, Refills: 3    Associated Diagnoses: Statin intolerance; Hyperlipidemia LDL goal <70; Atherosclerosis of coronary artery of native heart without angina pectoris, unspecified vessel or lesion type; S/P CABG (coronary artery bypass graft)       fenofibrate (TRIGLIDE/LOFIBRA) 160 MG tablet Take 1 tablet (160 mg) by mouth daily  Qty: 90 tablet, Refills: 2    Associated Diagnoses: Hyperlipidemia LDL goal <130      lisinopril (PRINIVIL/ZESTRIL) 2.5 MG tablet Take 1 tablet (2.5 mg) by mouth daily , or as directed by Dr. Ocampo.  Qty: 90 tablet, Refills: 2    Associated Diagnoses: Essential hypertension      metoprolol tartrate (LOPRESSOR) 25 MG tablet Take 0.5 tablets (12.5 mg) by mouth 2 times daily  Qty: 90 tablet, Refills: 1    Associated Diagnoses: S/P CABG x 4           Labs or imaging requiring follow-up after discharge:  INR check no 1/9 or 1/10/2020      Follow-up:  Cardiology Clinic in 2-3 weeks    Code status:  FULL    Reji Osorio PA-C  [facility] Cardiology Team      I have seen and examined the patient and agree with the finding and plan.       Napoleon Oconnell MD  Cardiology[facility]  [phone]

## 2020-01-07 NOTE — PLAN OF CARE
BP (!) 142/92 (BP Location: Right arm)   Pulse 64   Temp 98.6  F (37  C) (Oral)   Resp 14   Wt 100.3 kg (221 lb 1.9 oz)   SpO2 95%   BMI 29.99 kg/m        D: Patient arrived to the 6C from ED for chest pain.  Troponin were negative and EKG with inverted T-waves and he is here for evaluation on unstable angina.  I/A: Patient was started on nitroglycerin gtt with chest pain titration and heparin at 900units/hr, next 10a level is at 1300.  Patient will be NPO for angiogram.  P: Kept patient NPO for Angiogram since Midnight.

## 2020-01-07 NOTE — PROGRESS NOTES
[facility]   Cardiology Consults  Progress Note     Interval History:  - intermittent chest pressure resolved with ntg. Subsequent headache after initiation  - this AM denies chest pain, dyspnea, fever, chills, nausea    Physical Exam:  Temp:  [97.8  F (36.6  C)-98.6  F (37  C)] 97.8  F (36.6  C)  Pulse:  [61-86] 64  Heart Rate:  [61-84] 70  Resp:  [9-26] 16  BP: (122-156)/(82-98) 146/95  SpO2:  [94 %-98 %] 97 %      Intake/Output Summary (Last 24 hours) at 1/7/2020 1155  Last data filed at 1/7/2020 1000  Gross per 24 hour   Intake 222.45 ml   Output 1375 ml   Net -1152.55 ml       Wt:   Wt Readings from Last 5 Encounters:   01/06/20 100.3 kg (221 lb 1.9 oz)   10/21/19 98.7 kg (217 lb 9.6 oz)   06/25/19 97.1 kg (214 lb)   03/05/19 97.1 kg (214 lb)   01/23/19 97.1 kg (214 lb)       GEN: NAD, awake, alert, pleasant  Pulm: CTAB, no wheeze, no crackles  Cardiac: JVP not appreciably elevated, no murmurs  Vascular: no lower extremity edema and palpable pulses  GI: soft, non distended  Neuro: CN II-XII grossly intact    Medications:    aspirin  81 mg Oral Daily     atorvastatin  80 mg Oral Daily     cangrelor  30 mcg/kg Intravenous Once     ezetimibe  10 mg Oral Daily     fenofibrate  160 mg Oral Daily     lisinopril  5 mg Oral Daily     metoprolol tartrate  12.5 mg Oral BID     sodium chloride (PF)  3 mL Intracatheter Q8H     warfarin ANTICOAGULANT  9 mg Oral ONCE at 18:00       argatroban       cangrelor (KENGREAL) infusion ADULT       DOBUTamine       DOPamine       EPINEPHrine IV infusion ADULT       eptifibatide       HEParin 900 Units/hr (01/07/20 0837)     HEParin       - MEDICATION INSTRUCTIONS -       - MEDICATION INSTRUCTIONS -       nitroGLYcerin       nitroGLYcerin Stopped (01/07/20 0743)     nitroPRUsside (NIPRIDE) IV infusion ADULT/PEDS GREATER than or EQUAL to 45 kg std conc       norepinephrine       - MEDICATION INSTRUCTIONS -       phenylephrine       vasopressin  (PITRESSIN) infusion ADULT (40 mL)       Warfarin Therapy Reminder         Labs:   CMP  Recent Labs   Lab 01/07/20  0431 01/06/20  2131 01/06/20  1042     --  139   POTASSIUM 3.8 3.8 3.2*   CHLORIDE 108  --  106   CO2 26  --  30   ANIONGAP 5  --  4   *  --  92   BUN 14  --  10   CR 0.87  --  0.97   GFRESTIMATED >90  --  87   GFRESTBLACK >90  --  >90   GONZALES 9.0  --  9.1   MAG  --   --  1.8   PROTTOTAL  --   --  7.5   ALBUMIN  --   --  3.9   BILITOTAL  --   --  0.6   ALKPHOS  --   --  50   AST  --   --  20   ALT  --   --  38     CBC  Recent Labs   Lab 01/06/20  1911 01/06/20  1042   WBC 7.3 5.7   RBC 4.79 5.01   HGB 13.8 14.3   HCT 42.2 44.6   MCV 88 89   MCH 28.8 28.5   MCHC 32.7 32.1   RDW 12.8 12.6    219     INR  Recent Labs   Lab 01/07/20  0431 01/06/20  1042   INR 1.66* 1.30*     Arterial Blood GasNo lab results found in last 7 days.    Diagnostics:    EKG today 11:15: NSR HR 71, nonspecific T wave changes V3-V4       EKG today 10:29: NSR HR 80, no acute ischemic changes           Dobutamine stress echocardiogram 2017:      Interpretation Summary  Normal dobutamine stress echocardiogram at target heartrate.  No symptoms during stress.  Normal stress EKG.  Normal BP response to stress.  No significant valvular abnormality.    TTE 1/7/2019  Interpretation Summary  Left ventricular size is normal. The Ejection Fraction is estimated at 55-60%.  The mid to distal anteroseptum and the apex appear hypokinetic  Right ventricular function, chamber size, wall motion, and thickness are  normal.  The inferior vena cava was normal in size with preserved respiratory  variability. No pericardial effusion is present.     Wall motion abnormality appears new compared to prior study.  _____________________________________________________________________________  __        Left Ventricle  Left ventricular wall thickness is normal. Left ventricular size is normal.  Grade I or early diastolic dysfunction. The  Ejection Fraction is estimated at  55-60%. The mid to distal anteroseptum and the apex appear hypokinetic.     Right Ventricle  Right ventricular function, chamber size, wall motion, and thickness are  normal.     Atria  Both atria appear normal.     Mitral Valve  The mitral valve is normal.        Aortic Valve  Aortic valve is normal in structure and function. The aortic valve is  tricuspid. No aortic regurgitation is present.     Tricuspid Valve  The tricuspid valve is normal. Trace tricuspid insufficiency is present. The  peak velocity of the tricuspid regurgitant jet is not obtainable.     Pulmonic Valve  The pulmonic valve is normal.     Vessels  The aorta root is normal. The inferior vena cava was normal in size with  preserved respiratory variability.     Pericardium  No pericardial effusion is present.        Compared to Previous Study  Wall motion abnormality appears new compared to prior study.    ASSESSMENT/PLAN:  Oswaldo Prabhakar is a 59 year old male with a history of HTN, HLD with statin intolerance, premature CAD s/p 4V CABG in 2014 and Hx of bilateral PE on warfarin who presents to the ED for evaluation of chest pain     #Unstable angina  ##History of premature CAD s/p 4V CABG (LIMA-LAD, SVg-Diag, SVG-OM1, SVG-rPDA) 2014  1 day of intermittent left sided chest pain relieved with nitroglycerin. Serial EKG with new TWI in anterior leads. Troponin negative x2. Would have concern for PE given history of PE and subtherapeutic INR today and recent travel, although more concerned for angina given lack of tachycardia, adequate O2 sats on RA. Intermittent chest pressure overnight that resolved with ntg. No chest pain this AM. Echo completed last morning with new WMA with mid and distal anteroseptum hypokinesis.   - monitor on tele  - Heparin gtt  - NPO, angiogram today  - continue PTA metoprolol tartrate 25mg  - continue PTA atorvastatin 80mg, zetia 10mg, fenofibrate 162mg every day     #History of unprovoked  PE s/p thoracotomy with lung resection 2011  INR subtherapeutic today. Gets INR checked q4-6 weeks.   - Resume warfarin, pharmacy to dose. INR goal 2-3     #HTN  Hypertensive on admission. On minimal medications and is followed closely by cardiology.  - continue lisinopril 2.5mg daily  - metoprolol as above  - hydralazine q6h PRN for SBP >160, SBP >90     #HLD  Follows with Dr. Ocampo. Is on a number of medications including repatha  - continue repatha q14 days  - zetia, atorvastatin, fenofibrate as above    Patient discussed with Dr. Oconnell, who agrees with above plan.      Reji Osorio PA-C  [facility] Cardiology Team

## 2020-01-08 ENCOUNTER — ANTICOAGULATION THERAPY VISIT (OUTPATIENT)
Dept: ANTICOAGULATION | Facility: CLINIC | Age: 60
End: 2020-01-08

## 2020-01-08 DIAGNOSIS — Z79.01 LONG TERM CURRENT USE OF ANTICOAGULANT THERAPY: ICD-10-CM

## 2020-01-08 DIAGNOSIS — I26.99 PULMONARY EMBOLISM, UNSPECIFIED CHRONICITY, UNSPECIFIED PULMONARY EMBOLISM TYPE, UNSPECIFIED WHETHER ACUTE COR PULMONALE PRESENT (H): ICD-10-CM

## 2020-01-08 LAB — INTERPRETATION ECG - MUSE: NORMAL

## 2020-01-08 NOTE — PROGRESS NOTES
ANTICOAGULATION  MANAGEMENT: Discharge Review    Oswaldo Prabhakar chart reviewed for anticoagulation continuity of care    Hospital Admission on 01/06 to 01/07 for Evaluated and treated for chest pain.    Discharge disposition: Home    INR Results:    Recent Labs   Lab Test 01/07/20  0431 01/06/20  1042 11/25/19  1653   INR 1.66* 1.30* 3.26*       Warfarin inpatient management: more warfarin administered than maintenance regimen     Warfarin discharge instructions: increase dose to 10mg daily     Medication Changes Affecting Anticoagulation: No    Additional Factors Affecting Anticoagulation: No    Plan     Agree with dosing adjustment on discharge    Patient not contacted    Anticoagulation Calendar updated     Pt is also on Brilinta 90mg Q 12 Hours and is taking ASA 81mg Daily and can discontinue this once INR is >2.0    Liliam Hyman RN

## 2020-01-09 NOTE — TELEPHONE ENCOUNTER
Patient was called three times and no answer so post 24 hr DC follow up calls will be closed out    Lesly Michel CMA   Post Hospital Discharge Team

## 2020-01-10 ENCOUNTER — ANESTHESIA (OUTPATIENT)
Dept: SURGERY | Facility: CLINIC | Age: 60
End: 2020-01-10
Payer: COMMERCIAL

## 2020-01-10 ENCOUNTER — APPOINTMENT (OUTPATIENT)
Dept: CT IMAGING | Facility: CLINIC | Age: 60
End: 2020-01-10
Attending: EMERGENCY MEDICINE
Payer: COMMERCIAL

## 2020-01-10 ENCOUNTER — APPOINTMENT (OUTPATIENT)
Dept: GENERAL RADIOLOGY | Facility: CLINIC | Age: 60
End: 2020-01-10
Attending: INTERNAL MEDICINE
Payer: COMMERCIAL

## 2020-01-10 ENCOUNTER — HOSPITAL ENCOUNTER (OUTPATIENT)
Facility: CLINIC | Age: 60
Setting detail: OBSERVATION
Discharge: HOME | End: 2020-01-10
Attending: EMERGENCY MEDICINE | Admitting: INTERNAL MEDICINE
Payer: COMMERCIAL

## 2020-01-10 ENCOUNTER — ANESTHESIA EVENT (OUTPATIENT)
Dept: SURGERY | Facility: CLINIC | Age: 60
End: 2020-01-10
Payer: COMMERCIAL

## 2020-01-10 VITALS
RESPIRATION RATE: 18 BRPM | OXYGEN SATURATION: 97 % | SYSTOLIC BLOOD PRESSURE: 137 MMHG | BODY MASS INDEX: 28.44 KG/M2 | HEART RATE: 53 BPM | TEMPERATURE: 96.5 F | HEIGHT: 72 IN | WEIGHT: 210 LBS | DIASTOLIC BLOOD PRESSURE: 83 MMHG

## 2020-01-10 DIAGNOSIS — N20.1 URETERAL STONE: Primary | ICD-10-CM

## 2020-01-10 DIAGNOSIS — N23 RENAL COLIC ON RIGHT SIDE: Primary | ICD-10-CM

## 2020-01-10 DIAGNOSIS — N20.0 NEPHROLITHIASIS: ICD-10-CM

## 2020-01-10 LAB
ALBUMIN UR-MCNC: 50 MG/DL
ANION GAP SERPL CALCULATED.3IONS-SCNC: 7 MMOL/L (ref 3–14)
APPEARANCE UR: ABNORMAL
BASOPHILS # BLD AUTO: 0.1 10E9/L (ref 0–0.2)
BASOPHILS NFR BLD AUTO: 0.8 %
BILIRUB UR QL STRIP: NEGATIVE
BUN SERPL-MCNC: 28 MG/DL (ref 7–30)
CALCIUM SERPL-MCNC: 10.4 MG/DL (ref 8.5–10.1)
CHLORIDE SERPL-SCNC: 107 MMOL/L (ref 94–109)
CO2 SERPL-SCNC: 24 MMOL/L (ref 20–32)
COLOR UR AUTO: ABNORMAL
CREAT SERPL-MCNC: 1.16 MG/DL (ref 0.66–1.25)
DIFFERENTIAL METHOD BLD: NORMAL
EOSINOPHIL # BLD AUTO: 0.7 10E9/L (ref 0–0.7)
EOSINOPHIL NFR BLD AUTO: 8.8 %
ERYTHROCYTE [DISTWIDTH] IN BLOOD BY AUTOMATED COUNT: 13.2 % (ref 10–15)
GFR SERPL CREATININE-BSD FRML MDRD: 69 ML/MIN/{1.73_M2}
GLUCOSE SERPL-MCNC: 120 MG/DL (ref 70–99)
GLUCOSE UR STRIP-MCNC: NEGATIVE MG/DL
HCT VFR BLD AUTO: 43.4 % (ref 40–53)
HGB BLD-MCNC: 14.2 G/DL (ref 13.3–17.7)
HGB UR QL STRIP: ABNORMAL
IMM GRANULOCYTES # BLD: 0 10E9/L (ref 0–0.4)
IMM GRANULOCYTES NFR BLD: 0.4 %
INR PPP: 1.86 (ref 0.86–1.14)
INTERPRETATION ECG - MUSE: NORMAL
KETONES UR STRIP-MCNC: NEGATIVE MG/DL
LEUKOCYTE ESTERASE UR QL STRIP: NEGATIVE
LYMPHOCYTES # BLD AUTO: 2 10E9/L (ref 0.8–5.3)
LYMPHOCYTES NFR BLD AUTO: 25.5 %
MCH RBC QN AUTO: 28.6 PG (ref 26.5–33)
MCHC RBC AUTO-ENTMCNC: 32.7 G/DL (ref 31.5–36.5)
MCV RBC AUTO: 87 FL (ref 78–100)
MONOCYTES # BLD AUTO: 0.7 10E9/L (ref 0–1.3)
MONOCYTES NFR BLD AUTO: 8.6 %
MUCOUS THREADS #/AREA URNS LPF: PRESENT /LPF
NEUTROPHILS # BLD AUTO: 4.3 10E9/L (ref 1.6–8.3)
NEUTROPHILS NFR BLD AUTO: 55.9 %
NITRATE UR QL: NEGATIVE
NRBC # BLD AUTO: 0 10*3/UL
NRBC BLD AUTO-RTO: 0 /100
PH UR STRIP: 6 PH (ref 5–7)
PLATELET # BLD AUTO: 249 10E9/L (ref 150–450)
POTASSIUM SERPL-SCNC: 3.5 MMOL/L (ref 3.4–5.3)
RBC # BLD AUTO: 4.97 10E12/L (ref 4.4–5.9)
RBC #/AREA URNS AUTO: >182 /HPF (ref 0–2)
SODIUM SERPL-SCNC: 138 MMOL/L (ref 133–144)
SOURCE: ABNORMAL
SP GR UR STRIP: 1.02 (ref 1–1.03)
UROBILINOGEN UR STRIP-MCNC: NORMAL MG/DL (ref 0–2)
WBC # BLD AUTO: 7.7 10E9/L (ref 4–11)
WBC #/AREA URNS AUTO: 68 /HPF (ref 0–5)

## 2020-01-10 PROCEDURE — 74176 CT ABD & PELVIS W/O CONTRAST: CPT

## 2020-01-10 PROCEDURE — 96376 TX/PRO/DX INJ SAME DRUG ADON: CPT

## 2020-01-10 PROCEDURE — 40000277 XR SURGERY CARM FLUORO LESS THAN 5 MIN W STILLS: Mod: TC

## 2020-01-10 PROCEDURE — 85025 COMPLETE CBC W/AUTO DIFF WBC: CPT | Performed by: EMERGENCY MEDICINE

## 2020-01-10 PROCEDURE — 25000125 ZZHC RX 250: Performed by: UROLOGY

## 2020-01-10 PROCEDURE — 96374 THER/PROPH/DIAG INJ IV PUSH: CPT | Mod: 59

## 2020-01-10 PROCEDURE — 52332 CYSTOSCOPY AND TREATMENT: CPT | Mod: RT | Performed by: UROLOGY

## 2020-01-10 PROCEDURE — 25000128 H RX IP 250 OP 636: Performed by: INTERNAL MEDICINE

## 2020-01-10 PROCEDURE — 93005 ELECTROCARDIOGRAM TRACING: CPT

## 2020-01-10 PROCEDURE — 25800030 ZZH RX IP 258 OP 636: Performed by: ANESTHESIOLOGY

## 2020-01-10 PROCEDURE — 37000009 ZZH ANESTHESIA TECHNICAL FEE, EACH ADDTL 15 MIN: Performed by: UROLOGY

## 2020-01-10 PROCEDURE — 96375 TX/PRO/DX INJ NEW DRUG ADDON: CPT

## 2020-01-10 PROCEDURE — 87086 URINE CULTURE/COLONY COUNT: CPT | Performed by: UROLOGY

## 2020-01-10 PROCEDURE — 80048 BASIC METABOLIC PNL TOTAL CA: CPT | Performed by: EMERGENCY MEDICINE

## 2020-01-10 PROCEDURE — 25000128 H RX IP 250 OP 636: Performed by: NURSE ANESTHETIST, CERTIFIED REGISTERED

## 2020-01-10 PROCEDURE — 40000306 ZZH STATISTIC PRE PROC ASSESS II: Performed by: UROLOGY

## 2020-01-10 PROCEDURE — 25800025 ZZH RX 258: Performed by: UROLOGY

## 2020-01-10 PROCEDURE — 37000008 ZZH ANESTHESIA TECHNICAL FEE, 1ST 30 MIN: Performed by: UROLOGY

## 2020-01-10 PROCEDURE — 96361 HYDRATE IV INFUSION ADD-ON: CPT

## 2020-01-10 PROCEDURE — 25800030 ZZH RX IP 258 OP 636: Performed by: EMERGENCY MEDICINE

## 2020-01-10 PROCEDURE — 99207 ZZC APP CREDIT; MD BILLING SHARED VISIT: CPT | Performed by: PHYSICIAN ASSISTANT

## 2020-01-10 PROCEDURE — 25000128 H RX IP 250 OP 636: Performed by: EMERGENCY MEDICINE

## 2020-01-10 PROCEDURE — 25500064 ZZH RX 255 OP 636: Performed by: UROLOGY

## 2020-01-10 PROCEDURE — G0378 HOSPITAL OBSERVATION PER HR: HCPCS

## 2020-01-10 PROCEDURE — 25000128 H RX IP 250 OP 636: Performed by: UROLOGY

## 2020-01-10 PROCEDURE — 25000125 ZZHC RX 250: Performed by: INTERNAL MEDICINE

## 2020-01-10 PROCEDURE — 99207 ZZC CDG-CODE CATEGORY CHANGED: CPT | Performed by: INTERNAL MEDICINE

## 2020-01-10 PROCEDURE — 71000012 ZZH RECOVERY PHASE 1 LEVEL 1 FIRST HR: Performed by: UROLOGY

## 2020-01-10 PROCEDURE — C1769 GUIDE WIRE: HCPCS | Performed by: UROLOGY

## 2020-01-10 PROCEDURE — 25800030 ZZH RX IP 258 OP 636: Performed by: INTERNAL MEDICINE

## 2020-01-10 PROCEDURE — 36000058 ZZH SURGERY LEVEL 3 EA 15 ADDTL MIN: Performed by: UROLOGY

## 2020-01-10 PROCEDURE — 27210794 ZZH OR GENERAL SUPPLY STERILE: Performed by: UROLOGY

## 2020-01-10 PROCEDURE — 36000060 ZZH SURGERY LEVEL 3 W FLUORO 1ST 30 MIN: Performed by: UROLOGY

## 2020-01-10 PROCEDURE — 99236 HOSP IP/OBS SAME DATE HI 85: CPT | Performed by: INTERNAL MEDICINE

## 2020-01-10 PROCEDURE — 96375 TX/PRO/DX INJ NEW DRUG ADDON: CPT | Mod: 59

## 2020-01-10 PROCEDURE — 99285 EMERGENCY DEPT VISIT HI MDM: CPT | Mod: 25

## 2020-01-10 PROCEDURE — 81001 URINALYSIS AUTO W/SCOPE: CPT | Performed by: EMERGENCY MEDICINE

## 2020-01-10 PROCEDURE — C2617 STENT, NON-COR, TEM W/O DEL: HCPCS | Performed by: UROLOGY

## 2020-01-10 PROCEDURE — 99213 OFFICE O/P EST LOW 20 MIN: CPT | Mod: 25 | Performed by: UROLOGY

## 2020-01-10 PROCEDURE — 85610 PROTHROMBIN TIME: CPT | Performed by: EMERGENCY MEDICINE

## 2020-01-10 PROCEDURE — 74420 UROGRAPHY RTRGR +-KUB: CPT | Mod: 26 | Performed by: UROLOGY

## 2020-01-10 DEVICE — STENT URETERAL POLARIS ULTRA 6FRX26CM M0061921330
Type: IMPLANTABLE DEVICE | Site: URETER | Status: NON-FUNCTIONAL
Removed: 2020-02-05

## 2020-01-10 RX ORDER — PHENYLEPHRINE HYDROCHLORIDE 10 MG/ML
INJECTION INTRAVENOUS PRN
Status: DISCONTINUED | OUTPATIENT
Start: 2020-01-10 | End: 2020-01-10

## 2020-01-10 RX ORDER — NALOXONE HYDROCHLORIDE 0.4 MG/ML
.1-.4 INJECTION, SOLUTION INTRAMUSCULAR; INTRAVENOUS; SUBCUTANEOUS
Status: DISCONTINUED | OUTPATIENT
Start: 2020-01-10 | End: 2020-01-10 | Stop reason: HOSPADM

## 2020-01-10 RX ORDER — HYDROMORPHONE HYDROCHLORIDE 1 MG/ML
0.5 INJECTION, SOLUTION INTRAMUSCULAR; INTRAVENOUS; SUBCUTANEOUS
Status: COMPLETED | OUTPATIENT
Start: 2020-01-10 | End: 2020-01-10

## 2020-01-10 RX ORDER — LISINOPRIL 2.5 MG/1
2.5 TABLET ORAL DAILY
Status: DISCONTINUED | OUTPATIENT
Start: 2020-01-10 | End: 2020-01-10 | Stop reason: HOSPADM

## 2020-01-10 RX ORDER — CEFAZOLIN SODIUM 1 G/50ML
1 INJECTION, SOLUTION INTRAVENOUS SEE ADMIN INSTRUCTIONS
Status: DISCONTINUED | OUTPATIENT
Start: 2020-01-10 | End: 2020-01-10 | Stop reason: HOSPADM

## 2020-01-10 RX ORDER — SODIUM CHLORIDE, SODIUM LACTATE, POTASSIUM CHLORIDE, CALCIUM CHLORIDE 600; 310; 30; 20 MG/100ML; MG/100ML; MG/100ML; MG/100ML
INJECTION, SOLUTION INTRAVENOUS CONTINUOUS
Status: DISCONTINUED | OUTPATIENT
Start: 2020-01-10 | End: 2020-01-10 | Stop reason: HOSPADM

## 2020-01-10 RX ORDER — FENTANYL CITRATE 50 UG/ML
INJECTION, SOLUTION INTRAMUSCULAR; INTRAVENOUS PRN
Status: DISCONTINUED | OUTPATIENT
Start: 2020-01-10 | End: 2020-01-10

## 2020-01-10 RX ORDER — ONDANSETRON 2 MG/ML
4 INJECTION INTRAMUSCULAR; INTRAVENOUS EVERY 6 HOURS PRN
Status: DISCONTINUED | OUTPATIENT
Start: 2020-01-10 | End: 2020-01-10

## 2020-01-10 RX ORDER — PROPOFOL 10 MG/ML
INJECTION, EMULSION INTRAVENOUS PRN
Status: DISCONTINUED | OUTPATIENT
Start: 2020-01-10 | End: 2020-01-10

## 2020-01-10 RX ORDER — EZETIMIBE 10 MG/1
10 TABLET ORAL DAILY
Status: DISCONTINUED | OUTPATIENT
Start: 2020-01-10 | End: 2020-01-10 | Stop reason: HOSPADM

## 2020-01-10 RX ORDER — TAMSULOSIN HYDROCHLORIDE 0.4 MG/1
0.4 CAPSULE ORAL DAILY
Status: DISCONTINUED | OUTPATIENT
Start: 2020-01-10 | End: 2020-01-10 | Stop reason: HOSPADM

## 2020-01-10 RX ORDER — CEFAZOLIN SODIUM 1 G
1 VIAL (EA) INJECTION SEE ADMIN INSTRUCTIONS
Status: CANCELLED | OUTPATIENT
Start: 2020-01-10

## 2020-01-10 RX ORDER — CEFAZOLIN SODIUM 2 G/100ML
2 INJECTION, SOLUTION INTRAVENOUS
Status: DISCONTINUED | OUTPATIENT
Start: 2020-01-10 | End: 2020-01-10 | Stop reason: HOSPADM

## 2020-01-10 RX ORDER — ALBUTEROL SULFATE 0.83 MG/ML
2.5 SOLUTION RESPIRATORY (INHALATION) EVERY 4 HOURS PRN
Status: DISCONTINUED | OUTPATIENT
Start: 2020-01-10 | End: 2020-01-10 | Stop reason: HOSPADM

## 2020-01-10 RX ORDER — HYDROMORPHONE HYDROCHLORIDE 1 MG/ML
.3-.5 INJECTION, SOLUTION INTRAMUSCULAR; INTRAVENOUS; SUBCUTANEOUS EVERY 10 MIN PRN
Status: DISCONTINUED | OUTPATIENT
Start: 2020-01-10 | End: 2020-01-10 | Stop reason: HOSPADM

## 2020-01-10 RX ORDER — ONDANSETRON 4 MG/1
4 TABLET, ORALLY DISINTEGRATING ORAL EVERY 30 MIN PRN
Status: DISCONTINUED | OUTPATIENT
Start: 2020-01-10 | End: 2020-01-10 | Stop reason: HOSPADM

## 2020-01-10 RX ORDER — MEPERIDINE HYDROCHLORIDE 50 MG/ML
12.5 INJECTION INTRAMUSCULAR; INTRAVENOUS; SUBCUTANEOUS
Status: DISCONTINUED | OUTPATIENT
Start: 2020-01-10 | End: 2020-01-10 | Stop reason: HOSPADM

## 2020-01-10 RX ORDER — HYDROMORPHONE HYDROCHLORIDE 1 MG/ML
.5-1 INJECTION, SOLUTION INTRAMUSCULAR; INTRAVENOUS; SUBCUTANEOUS
Status: DISCONTINUED | OUTPATIENT
Start: 2020-01-10 | End: 2020-01-10 | Stop reason: HOSPADM

## 2020-01-10 RX ORDER — ATROPA BELLADONNA AND OPIUM 16.2; 3 MG/1; MG/1
SUPPOSITORY RECTAL PRN
Status: DISCONTINUED | OUTPATIENT
Start: 2020-01-10 | End: 2020-01-10 | Stop reason: HOSPADM

## 2020-01-10 RX ORDER — ONDANSETRON 2 MG/ML
4 INJECTION INTRAMUSCULAR; INTRAVENOUS EVERY 30 MIN PRN
Status: DISCONTINUED | OUTPATIENT
Start: 2020-01-10 | End: 2020-01-10 | Stop reason: HOSPADM

## 2020-01-10 RX ORDER — LIDOCAINE 40 MG/G
CREAM TOPICAL
Status: DISCONTINUED | OUTPATIENT
Start: 2020-01-10 | End: 2020-01-10 | Stop reason: HOSPADM

## 2020-01-10 RX ORDER — ONDANSETRON 4 MG/1
4 TABLET, ORALLY DISINTEGRATING ORAL EVERY 6 HOURS PRN
Status: DISCONTINUED | OUTPATIENT
Start: 2020-01-10 | End: 2020-01-10

## 2020-01-10 RX ORDER — ACETAMINOPHEN 650 MG/1
650 SUPPOSITORY RECTAL EVERY 4 HOURS PRN
Status: DISCONTINUED | OUTPATIENT
Start: 2020-01-10 | End: 2020-01-10 | Stop reason: HOSPADM

## 2020-01-10 RX ORDER — SODIUM CHLORIDE 9 MG/ML
INJECTION, SOLUTION INTRAVENOUS CONTINUOUS
Status: DISCONTINUED | OUTPATIENT
Start: 2020-01-10 | End: 2020-01-10 | Stop reason: HOSPADM

## 2020-01-10 RX ORDER — ASPIRIN 81 MG/1
81 TABLET ORAL DAILY
Status: DISCONTINUED | OUTPATIENT
Start: 2020-01-10 | End: 2020-01-10 | Stop reason: HOSPADM

## 2020-01-10 RX ORDER — ONDANSETRON 4 MG/1
4 TABLET, ORALLY DISINTEGRATING ORAL EVERY 6 HOURS PRN
Status: DISCONTINUED | OUTPATIENT
Start: 2020-01-10 | End: 2020-01-10 | Stop reason: HOSPADM

## 2020-01-10 RX ORDER — CEFAZOLIN SODIUM 2 G/100ML
2 INJECTION, SOLUTION INTRAVENOUS
Status: COMPLETED | OUTPATIENT
Start: 2020-01-10 | End: 2020-01-10

## 2020-01-10 RX ORDER — CEFAZOLIN SODIUM 1 G/3ML
1 INJECTION, POWDER, FOR SOLUTION INTRAMUSCULAR; INTRAVENOUS SEE ADMIN INSTRUCTIONS
Status: DISCONTINUED | OUTPATIENT
Start: 2020-01-10 | End: 2020-01-10 | Stop reason: HOSPADM

## 2020-01-10 RX ORDER — ATORVASTATIN CALCIUM 40 MG/1
80 TABLET, FILM COATED ORAL DAILY
Status: DISCONTINUED | OUTPATIENT
Start: 2020-01-10 | End: 2020-01-10 | Stop reason: HOSPADM

## 2020-01-10 RX ORDER — KETOROLAC TROMETHAMINE 30 MG/ML
30 INJECTION, SOLUTION INTRAMUSCULAR; INTRAVENOUS ONCE
Status: DISCONTINUED | OUTPATIENT
Start: 2020-01-10 | End: 2020-01-10

## 2020-01-10 RX ORDER — DEXAMETHASONE SODIUM PHOSPHATE 4 MG/ML
INJECTION, SOLUTION INTRA-ARTICULAR; INTRALESIONAL; INTRAMUSCULAR; INTRAVENOUS; SOFT TISSUE PRN
Status: DISCONTINUED | OUTPATIENT
Start: 2020-01-10 | End: 2020-01-10

## 2020-01-10 RX ORDER — CEPHALEXIN 500 MG/1
500 CAPSULE ORAL 3 TIMES DAILY
Qty: 9 CAPSULE | Refills: 0 | Status: ON HOLD | OUTPATIENT
Start: 2020-01-10 | End: 2020-01-15

## 2020-01-10 RX ORDER — FENTANYL CITRATE 50 UG/ML
25-50 INJECTION, SOLUTION INTRAMUSCULAR; INTRAVENOUS EVERY 5 MIN PRN
Status: DISCONTINUED | OUTPATIENT
Start: 2020-01-10 | End: 2020-01-10 | Stop reason: HOSPADM

## 2020-01-10 RX ORDER — ONDANSETRON 2 MG/ML
4 INJECTION INTRAMUSCULAR; INTRAVENOUS EVERY 30 MIN PRN
Status: COMPLETED | OUTPATIENT
Start: 2020-01-10 | End: 2020-01-10

## 2020-01-10 RX ORDER — WARFARIN SODIUM 5 MG/1
7.5 TABLET ORAL
COMMUNITY
End: 2020-02-13

## 2020-01-10 RX ORDER — ONDANSETRON 2 MG/ML
4 INJECTION INTRAMUSCULAR; INTRAVENOUS EVERY 6 HOURS PRN
Status: DISCONTINUED | OUTPATIENT
Start: 2020-01-10 | End: 2020-01-10 | Stop reason: HOSPADM

## 2020-01-10 RX ORDER — ACETAMINOPHEN 325 MG/1
650 TABLET ORAL EVERY 4 HOURS PRN
Status: DISCONTINUED | OUTPATIENT
Start: 2020-01-10 | End: 2020-01-10 | Stop reason: HOSPADM

## 2020-01-10 RX ORDER — FENTANYL CITRATE 50 UG/ML
25-50 INJECTION, SOLUTION INTRAMUSCULAR; INTRAVENOUS
Status: DISCONTINUED | OUTPATIENT
Start: 2020-01-10 | End: 2020-01-10 | Stop reason: HOSPADM

## 2020-01-10 RX ORDER — FENOFIBRATE 160 MG/1
160 TABLET ORAL DAILY
Status: DISCONTINUED | OUTPATIENT
Start: 2020-01-10 | End: 2020-01-10 | Stop reason: HOSPADM

## 2020-01-10 RX ADMIN — ONDANSETRON HYDROCHLORIDE 4 MG: 2 INJECTION, SOLUTION INTRAMUSCULAR; INTRAVENOUS at 03:20

## 2020-01-10 RX ADMIN — HYDROMORPHONE HYDROCHLORIDE 0.5 MG: 1 INJECTION, SOLUTION INTRAMUSCULAR; INTRAVENOUS; SUBCUTANEOUS at 03:47

## 2020-01-10 RX ADMIN — SODIUM CHLORIDE 1000 ML: 9 INJECTION, SOLUTION INTRAVENOUS at 03:47

## 2020-01-10 RX ADMIN — ONDANSETRON 4 MG: 2 INJECTION INTRAMUSCULAR; INTRAVENOUS at 12:07

## 2020-01-10 RX ADMIN — ONDANSETRON HYDROCHLORIDE 4 MG: 2 INJECTION, SOLUTION INTRAMUSCULAR; INTRAVENOUS at 04:53

## 2020-01-10 RX ADMIN — SODIUM CHLORIDE: 9 INJECTION, SOLUTION INTRAVENOUS at 07:52

## 2020-01-10 RX ADMIN — PROCHLORPERAZINE EDISYLATE 10 MG: 5 INJECTION INTRAMUSCULAR; INTRAVENOUS at 07:49

## 2020-01-10 RX ADMIN — PHENYLEPHRINE HYDROCHLORIDE 200 MCG: 10 INJECTION INTRAVENOUS at 12:07

## 2020-01-10 RX ADMIN — PROPOFOL 150 MG: 10 INJECTION, EMULSION INTRAVENOUS at 12:07

## 2020-01-10 RX ADMIN — LIDOCAINE HYDROCHLORIDE 50 MG: 10 INJECTION, SOLUTION INFILTRATION; PERINEURAL at 12:07

## 2020-01-10 RX ADMIN — ONDANSETRON 4 MG: 2 INJECTION INTRAMUSCULAR; INTRAVENOUS at 11:12

## 2020-01-10 RX ADMIN — PHENYLEPHRINE HYDROCHLORIDE 100 MCG: 10 INJECTION INTRAVENOUS at 12:15

## 2020-01-10 RX ADMIN — HYDROMORPHONE HYDROCHLORIDE 0.5 MG: 1 INJECTION, SOLUTION INTRAMUSCULAR; INTRAVENOUS; SUBCUTANEOUS at 07:51

## 2020-01-10 RX ADMIN — FENTANYL CITRATE 100 MCG: 50 INJECTION, SOLUTION INTRAMUSCULAR; INTRAVENOUS at 12:07

## 2020-01-10 RX ADMIN — HYDROMORPHONE HYDROCHLORIDE 0.5 MG: 1 INJECTION, SOLUTION INTRAMUSCULAR; INTRAVENOUS; SUBCUTANEOUS at 06:40

## 2020-01-10 RX ADMIN — HYDROMORPHONE HYDROCHLORIDE 0.5 MG: 1 INJECTION, SOLUTION INTRAMUSCULAR; INTRAVENOUS; SUBCUTANEOUS at 04:50

## 2020-01-10 RX ADMIN — SODIUM CHLORIDE, POTASSIUM CHLORIDE, SODIUM LACTATE AND CALCIUM CHLORIDE: 600; 310; 30; 20 INJECTION, SOLUTION INTRAVENOUS at 11:59

## 2020-01-10 RX ADMIN — DEXAMETHASONE SODIUM PHOSPHATE 4 MG: 4 INJECTION, SOLUTION INTRA-ARTICULAR; INTRALESIONAL; INTRAMUSCULAR; INTRAVENOUS; SOFT TISSUE at 12:07

## 2020-01-10 RX ADMIN — ONDANSETRON HYDROCHLORIDE 4 MG: 2 INJECTION, SOLUTION INTRAMUSCULAR; INTRAVENOUS at 06:43

## 2020-01-10 RX ADMIN — CEFAZOLIN SODIUM 2 G: 2 INJECTION, SOLUTION INTRAVENOUS at 11:59

## 2020-01-10 RX ADMIN — MIDAZOLAM 2 MG: 1 INJECTION INTRAMUSCULAR; INTRAVENOUS at 11:59

## 2020-01-10 RX ADMIN — HYDROMORPHONE HYDROCHLORIDE 0.5 MG: 1 INJECTION, SOLUTION INTRAMUSCULAR; INTRAVENOUS; SUBCUTANEOUS at 03:21

## 2020-01-10 ASSESSMENT — ENCOUNTER SYMPTOMS
SHORTNESS OF BREATH: 0
FEVER: 0
FLANK PAIN: 1
NECK PAIN: 0
BACK PAIN: 1
HEMATURIA: 1
NAUSEA: 1
VOMITING: 0

## 2020-01-10 ASSESSMENT — MIFFLIN-ST. JEOR: SCORE: 1815.55

## 2020-01-10 NOTE — PHARMACY-ADMISSION MEDICATION HISTORY
Admission medication history interview status for this patient is complete. See [facility] admission navigator for allergy information, prior to admission medications and immunization status.     Medication history interview source(s):Patient  Medication history resources (including written lists, pill bottles, clinic record): Narendra vasqueze MARIOLA michael 01/07/20  Primary pharmacy: [facility] [id number] [place] [address]    Changes made to PTA medication list:  Added: None  Deleted: None  Changed: Warfarin    Actions taken by pharmacist (provider contacted, etc):None     Additional medication history information: Reinforced education on stopping aspirin once INR > 2.0.     Medication reconciliation/reorder completed by provider prior to medication history?  Yes     Do you take OTC medications (eg tylenol, ibuprofen, fish oil, eye/ear drops, etc)? Yes         Prior to Admission medications    Medication Sig Last Dose Taking? Auth Provider   aspirin 81 MG EC tablet Take 1 tablet (81 mg) by mouth daily Stop once INR >2.0 1/9/2020 at AM Yes Reji Osorio PA-C   atorvastatin (LIPITOR) 80 MG tablet Take 1 tablet (80 mg) by mouth daily 1/9/2020 at AM Yes Reji Ocampo MD   calcium carbonate (TUMS) 500 MG chewable tablet Take 1 chew tab by mouth daily as needed  Yes Reported, Patient   Cholecalciferol (VITAMIN D) 2000 UNITS CAPS Take 2,000 Units by mouth daily 1/9/2020 at AM Yes Reported, Patient   DiphenhydrAMINE HCl (BENADRYL PO) Take 25-50 mg by mouth daily as needed  Yes Reported, Patient   evolocumab (REPATHA SURECLICK) 140 MG/ML prefilled autoinjector Inject 1 mL (140 mg) Subcutaneous every 14 days 1/1/2020 Yes Reji Ocampo MD   ezetimibe (ZETIA) 10 MG tablet Take 1 tablet (10 mg) by mouth daily 1/9/2020 at AM Yes Reji Ocampo MD   fenofibrate (TRIGLIDE/LOFIBRA) 160 MG tablet Take 1 tablet (160 mg) by mouth daily 1/9/2020 at AM Yes Reji Ocampo MD   lisinopril (PRINIVIL/ZESTRIL)  2.5 MG tablet Take 1 tablet (2.5 mg) by mouth daily , or as directed by Dr. Ocampo. 1/9/2020 at AM Yes Reji Ocampo MD   metoprolol tartrate (LOPRESSOR) 25 MG tablet Take 0.5 tablets (12.5 mg) by mouth 2 times daily 1/9/2020 at x2 Yes Samm Vazquez MD   ticagrelor (BRILINTA) 90 MG tablet Take 1 tablet (90 mg) by mouth every 12 hours 1/9/2020 at x2 Yes Reji Osorio PA-C   warfarin ANTICOAGULANT (COUMADIN) 5 MG tablet Warfarin 7.5mg daily - pre cardiology take 10mg on 01/08 and 01/09 1/9/2020 at 10mg Yes Unknown, Entered By History

## 2020-01-10 NOTE — CONSULTS
[facility]    Cardiology Consultation     Date of Admission:  1/10/2020    Primary Care Physician   Samm Vazquez     Consult Date:  01/10/2020      REASON FOR CONSULTATION:  Preoperative clearance.      REFERRING PHYSICIAN:  Hospitalist Service      IMPRESSION:   1.  Unstable angina recently, status post drug-eluting stent placement to the proximal and mid LAD 3 days ago.   2.  Chronic coronary artery disease with a history of coronary artery bypass surgery in 2014.   3.  History of pulmonary embolism, chronically on warfarin.   4.  Familial hyperlipidemia.   5.  Current presentation with right renal colic.      This gentleman just had stent placement into his LAD was 3 days ago.  He would obviously need to continue with dual antiplatelet therapy uninterrupted.      I see that he has already discussed the situation with Dr. Nguyễn Woodard, his urologist.  I believe Dr. Woodard will be happy to perform urethral stenting in the presence of dual antiplatelet therapy.  I informed the patient that his risk of bleeding will be higher, especially as he has been on warfarin as well.  He understands and would like to proceed with the urologic procedure.  As for his warfarin, I think this could be withheld for the time being.  It should be restarted as soon as possible after his procedure.  It will be our pleasure to follow this gentleman's in-hospital course with you as necessary.      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  His fields are Immunology, Rheumatology and Nephrology.  He has a strong family history of premature atherosclerotic disease and he has a familial hypercholesterolemia.  In 2014, he had a CT coronary angiography for screening.  Coronary artery disease was found and he went on to have coronary angiography which demonstrated a total occlusion of his right coronary artery with a 75% LAD stenosis and severe disease  in his OM.  It was decided that he should have bypass surgery.  A LIMA graft was placed to the LAD with SVGs placed to the right PDA, OM and diagonal arteries.      He describes some arm discomfort several days ago which led to hospital admission.  I do note that his troponins were completely negative.  He then went on to have coronary angiography which demonstrated patency of the vein grafts; however, the LIMA is atretic.  It was decided to place stents in his proximal and mid LAD.  He has not had further recurrence of discomfort since then; however, he was admitted with right flank pain and hematuria.  Renal stone was found on the right.      He has also been on warfarin chronically as he has a history of bilateral PE.  He also described an episode of what sounded like pulmonary hemorrhage years ago.        Past Medical History   I have reviewed this patient's medical history and updated it with pertinent information if needed.   Past Medical History:   Diagnosis Date     Antiplatelet or antithrombotic long-term use      Coronary artery disease      Diaphragmatic hernia without mention of obstruction or gangrene      Heart disease      Hernia, abdominal      History of blood transfusion      History of thrombophlebitis      Hypertension      Nephrolithiasis 4/2010     Other and unspecified hyperlipidemia      Pulmonary embolus and infarction (H)     s/p hemothorax and filter s/p filter removal (2011), now on long term anti-coagulation     Statin intolerance 2/1/2017     Stented coronary artery      Unspecified retinal detachment     Childhood trauma, unrepaired       Past Surgical History   I have reviewed this patient's surgical history and updated it with pertinent information if needed.  Past Surgical History:   Procedure Laterality Date     BYPASS GRAFT ARTERY CORONARY  4/4/2014    Procedure: BYPASS GRAFT ARTERY CORONARY;  Median Sternotomy, Coronary Artery Bypass Bypass x 4 using Left Internal Mammary, Left  Saphenous Vein, on pump oxygenation;  Surgeon: Sherry Armando MD;  Location: [facility] OR     C NONSPECIFIC PROCEDURE      Spermatocele resection     CLOSED REDUCTION, PERCUTANEOUS PINNING  HAND, COMBINED Left 11/5/2015    Procedure: COMBINED CLOSED REDUCTION, PERCUTANEOUS PINNING HAND;  Surgeon: Carmella Love MD;  Location: [facility]     COLONOSCOPY  3/15/2013    Procedure: COLONOSCOPY;;  Surgeon: Racheal Shipman MD;  Location: [facility] GI     CV ANGIOGRAM CORONARY GRAFT N/A 1/7/2020    Procedure: Angiogram Coronary Graft;  Surgeon: Chato Odonnell MD;  Location:  [facility] CARDI[facility] CATH LAB     CV CORONARY ANGIOGRAM  1/7/2020    Procedure: CV CORONARY ANGIOGRAM;  Surgeon: Chato Odonnell MD;  [facility]     CV PCI STENT DRUG ELUTING N/A 1/7/2020    Procedure: PCI Stent Drug Eluting;  Surgeon: Chato Odonnell MD;  Location:  HEART CARD[facility] CATH LAB     CYSTOSCOPY       LAPAROSCOPIC CHOLECYSTECTOMY N/A 8/6/2018    Procedure: LAPAROSCOPIC CHOLECYSTECTOMY;  LAPAROSCOPIC CHOLECYSTECTOMY ;  Surgeon: José Miguel Stuart MD;  Location: [facility] OR     LITHOTRIPSY  4/2010     THORACIC SURGERY      lung surgery, thoracotomy, lung adhesion       Prior to Admission Medications   Prior to Admission Medications   Prescriptions Last Dose Informant Patient Reported? Taking?   Cholecalciferol (VITAMIN D) 2000 UNITS CAPS 1/9/2020 at AM  Yes Yes   Sig: Take 2,000 Units by mouth daily   DiphenhydrAMINE HCl (BENADRYL PO)   Yes Yes   Sig: Take 25-50 mg by mouth daily as needed   aspirin 81 MG EC tablet 1/9/2020 at AM  No Yes   Sig: Take 1 tablet (81 mg) by mouth daily Stop once INR >2.0   atorvastatin (LIPITOR) 80 MG tablet 1/9/2020 at AM  No Yes   Sig: Take 1 tablet (80 mg) by mouth daily   calcium carbonate (TUMS) 500 MG chewable tablet  Self Yes Yes   Sig: Take 1 chew tab by mouth daily as needed   evolocumab (REPATHA SURECLICK) 140 MG/ML prefilled autoinjector  1/1/2020  No Yes   Sig: Inject 1 mL (140 mg) Subcutaneous every 14 days   ezetimibe (ZETIA) 10 MG tablet 1/9/2020 at AM  No Yes   Sig: Take 1 tablet (10 mg) by mouth daily   fenofibrate (TRIGLIDE/LOFIBRA) 160 MG tablet 1/9/2020 at AM  No Yes   Sig: Take 1 tablet (160 mg) by mouth daily   lisinopril (PRINIVIL/ZESTRIL) 2.5 MG tablet 1/9/2020 at AM  No Yes   Sig: Take 1 tablet (2.5 mg) by mouth daily , or as directed by Dr. Ocampo.   metoprolol tartrate (LOPRESSOR) 25 MG tablet 1/9/2020 at x2  No Yes   Sig: Take 0.5 tablets (12.5 mg) by mouth 2 times daily   ticagrelor (BRILINTA) 90 MG tablet 1/9/2020 at x2  No Yes   Sig: Take 1 tablet (90 mg) by mouth every 12 hours   warfarin ANTICOAGULANT (COUMADIN) 5 MG tablet 1/9/2020 at 10mg  Yes Yes   Sig: Warfarin 7.5mg daily - pre cardiology take 10mg on 01/08 and 01/09      Facility-Administered Medications: None     Allergies   Allergies   Allergen Reactions     Cats      Hay Fever & [A.R.M.]      Heparin Other (See Comments)     Heparin resistance per cardio-thoracic surgeon Dr. Armando     Hydrocodone-Acetaminophen Nausea and Vomiting     Acetaminophen is ok       Social History   I have reviewed this patient's social history and updated it with pertinent information if needed. Oswaldo Prabhakar  reports that he has never smoked. He has never used smokeless tobacco. He reports that he does not drink alcohol or use drugs.    Family History   I have reviewed this patient's family history and updated it with pertinent information if needed.   Family History   Problem Relation Age of Onset     Heart Disease Mother      C.A.D. Father         3 vessel CABG, age 70     Cancer Father         bladder cancer     C.A.D. Paternal Grandmother      Hypertension Paternal Grandmother      Alzheimer Disease Paternal Grandmother      Diabetes No family hx of      Thyroid Disease No family hx of      Cancer - colorectal No family hx of        Review of Systems   The 10 point Review of Systems  is negative other than noted in the HPI or here.     Physical Exam   Temp: 96.5  F (35.8  C) Temp src: Oral BP: 137/83 Pulse: 53 Heart Rate: 55 Resp: 18 SpO2: 97 % O2 Device: Nasal cannula Oxygen Delivery: 2 LPM  Vital Signs with Ranges  Temp:  [96.2  F (35.7  C)-97.5  F (36.4  C)] 96.5  F (35.8  C)  Pulse:  [53-73] 53  Heart Rate:  [50-77] 55  Resp:  [13-20] 18  BP: (121-161)/(75-93) 137/83  FiO2 (%):  [100 %] 100 %  SpO2:  [97 %-100 %] 97 %  210 lbs 0 oz     GENERAL:  A pleasant gentleman in no acute distress.  Surgical scars are well-healed.   VITAL SIGNS:  Blood pressure 148/88.  Heart rate is in the 60s, normal sinus rhythm.  He is afebrile.   HEENT:  Examination is unremarkable.  Mucous membranes appear normal.  He has no clubbing, no peripheral central cyanosis.   NECK:  No raised JVP and no thyromegaly.   CARDIAC:  Cardiac apex not palpable.  Heart sounds are normal.   CHEST:  Symmetrical expansion without the use of accessory muscles.  Breath sounds are normal.   ABDOMEN:  Soft and nontender.  No hepatosplenomegaly.  The abdominal aorta is not palpable.   EXTREMITIES:  No significant peripheral edema.  Preserved leg pulses.   CENTRAL NERVOUS SYSTEM:  Grossly intact.   PSYCHIATRIC:  Mood appears normal.      LABORATORY INVESTIGATIONS:  Calcium 10.4.  Basic metabolic panel within normal limits.  LDL is 10.  CBC is within normal limits.  INR is 1.86.  CT scan of the abdomen and pelvis shows a 0.5 cm proximal right ureteric calculus resulting in mild obstruction.        Data   Results for orders placed or performed during the hospital encounter of 01/10/20 (from the past 24 hour(s))   Urine Culture Aerobic Bacterial   Result Value Ref Range    Specimen Description       Catheterized Urine CYSTOSCOPY WITH URETHRAL STENT INSERTION    Special Requests Specimen received in preservative     Culture Micro PENDING    XR Surgery TOBIAS L/T 5 Min Fluoro w Stills    Narrative    This exam was marked as non-reportable  because it will not be read by a   radiologist or a [facility] non-radiologist provider.                        SIMI SEQUEIRA MD, [facility]             D: 01/10/2020   T: 01/10/2020   MT: [id number]      Name:     RUFINO NICHOLS   MRN:      [id number]        Account:       [id number]   :      1960           Consult Date:  01/10/2020      Document: [id number]

## 2020-01-10 NOTE — ANESTHESIA POSTPROCEDURE EVALUATION
Patient: Oswaldo Prabhakar    Procedure(s):  CYSTOSCOPY, WITH URETERAL STENT INSERTION    Diagnosis:* No pre-op diagnosis entered *  Diagnosis Additional Information: No value filed.    Anesthesia Type:  General, LMA    Note:  Anesthesia Post Evaluation    Patient location during evaluation: PACU  Patient participation: Able to fully participate in evaluation  Level of consciousness: awake and alert  Pain management: adequate  Airway patency: patent  Cardiovascular status: acceptable  Respiratory status: acceptable  Hydration status: acceptable  PONV: controlled             Last vitals:  Vitals:    01/10/20 1255 01/10/20 1300 01/10/20 1315   BP: (!) 125/91 135/82 138/85   Pulse: 73 67 53   Resp: 14 14 13   Temp:   97.3  F (36.3  C)   SpO2: 100% 99% 99%         Electronically Signed By: Panda Osorio MD  January 10, 2020  1:26 PM

## 2020-01-10 NOTE — PROGRESS NOTES
Urology    Stent placed in OR today  OK to discharge later today from urology standpoint if discharge OK with IM and Cardiology  I wrote Rx for 3 days keflex to be taken

## 2020-01-10 NOTE — CONSULTS
Consult Date:  01/10/2020      UROLOGY INPATIENT CONSULTATION      REASON FOR CONSULTATION:  Right ureteral stone.      HISTORY OF PRESENT ILLNESS:  Oswaldo Prabhakar is a 59-year-old gentleman with a prior history of kidney stones.  He has had kidney stones pass previously and he has had kidney stones removed by Dr. Abreu at the [facility] previously.  He was recently admitted here just for angina and had a new cardiac stent placed just 3 days ago.  He was discharged on 01/07/2020.  Unfortunately, he woke up at 2:00 this morning with right-sided flank pain consistent with a kidney stone.  He came to the Emergency Department and had a CT scan performed that showed a 5 mm proximal right ureteral stone.  He has had no fevers, chills, nausea or vomiting.  He was admitted for pain control.  He has continued to require pain medication since being admitted to the hospital.      PAST MEDICAL HISTORY:  Coronary artery disease with recent stent placement, prior history of stones as above, hypertension.      PAST SURGICAL HISTORY:  Prior ureteroscopy with laser lithotripsy, recent coronary artery stenting just 3 days ago.  He has also had coronary artery bypass grafting performed.      HOME MEDICATIONS:  He takes Brilinta and Coumadin for dual anticoagulation therapy.  Lopressor, lisinopril, Zetia, Lipitor, aspirin.      ALLERGIES:  HEPARIN AND HYDROCODONE.      SOCIAL HISTORY:  He is a nonsmoker.      FAMILY HISTORY:  No family history of urologic malignancy or stones.      REVIEW OF SYSTEMS:  Positive for right-sided flank pain and some nausea.  No vomiting.  No fevers.  No chest pain.      PHYSICAL EXAMINATION:   VITAL SIGNS:  Currently afebrile and vital signs stable.   GENERAL:  Alert and oriented, in no acute distress.   HEENT:  Normocephalic and atraumatic.   RESPIRATORY:  Normal nonlabored breathing.   ABDOMEN:  Soft, nontender, nondistended.      IMAGING STUDIES:  I reviewed his CT scan images from earlier  this morning.  He has a 5 mm proximal ureteral stone essentially at the right UPJ.      LABORATORY STUDIES:  Urinalysis shows hematuria with no evidence of infection.  Serum white blood count is 7.7.  Creatinine is 1.16.      IMPRESSION AND PLAN:  A 69-year-old gentleman with a 5 mm proximal ureteral stone.  He had recent coronary artery stenting and is on dual anticoagulation therapy, which cannot be held.  I discussed the situation with the patient.  I discussed a trial passage of the stone versus intervening.  He would like intervention since the stone is high up and he does not wish to try to pass the stone.  Since he is on dual anticoagulation, I recommended that we place a right-sided stent today.  I discussed cystoscopy with right-sided stent placement.  I would then like to allow at least 2 weeks for the ureter to passively dilate and then we can come back and perform stone extraction, even on anticoagulation in the future.  I discussed this plan with him and he agrees to it.  He will remain n.p.o. in anticipation of his stent placement later today.         BABATUNDE ESCALONA MD             D: 01/10/2020   T: 01/10/2020   MT: [id number]      Name:     RUFINO NICHOLS   MRN:      [id number]        Account:       [id number]   :      1960           Consult Date:  01/10/2020      Document: [id number]

## 2020-01-10 NOTE — PLAN OF CARE
PRIMARY DIAGNOSIS: Right ureterolithiasis with mild hydro    OUTPATIENT/OBSERVATION GOALS TO BE MET BEFORE DISCHARGE  1. Pain Status: Improved but still requiring IV narcotics.    2. Tolerating adequate PO diet: NPO, ice chips     3. Surgical Intervention planned: Urology to consult.     4. Cleared by consultants (if involved): No    5. Return to near baseline physical activity: Yes    Discharge Planner Nurse   Safe discharge environment identified: Yes  Barriers to discharge: Yes       Entered by: Mishel Huffman 01/10/2020 8:00 AM     BP (!) 148/88 (BP Location: Left arm)   Pulse 61   Temp 96.8  F (36  C) (Oral)   Resp 18   Ht 1.829 m (6')   Wt 95.3 kg (210 lb)   SpO2 95%   BMI 28.48 kg/m      A&Ox4. Baseline numbness/tingling to L rib cage, CMS otherwise intact. VSS. RA. Up w/ SBA. Rates R-sided flank pain 4/10, given IV Dilaudid. Emesis occurrence x1, given IV Compazine. BS active x4, NPO except ice chips. Straining urine. IVF infusing. Urology to consult. Will continue to monitor.     Please review provider order for any additional goals.   Nurse to notify provider when observation goals have been met and patient is ready for discharge.

## 2020-01-10 NOTE — PLAN OF CARE
"PRIMARY DIAGNOSIS: (POST-PROCEDURE) CYSTOSCOPY, WITH URETERAL STENT INSERTION  OUTPATIENT/OBSERVATION GOALS TO BE MET BEFORE DISCHARGE:   1. Stable vital signs Yes  2. Tolerating diet:No  3. Pain controlled with oral pain medications:  Yes  4. Positive bowel sounds:  Yes  5. Voiding without difficulty:  No  6. Able to ambulate:  No  7. Provider specific discharge goals met:  Yes    Discharge Planner Nurse   Safe discharge environment identified: Yes  Barriers to discharge: Yes       Entered by: Mishel Huffman 01/10/2020 4:00 PM    /83 (BP Location: Left arm)   Pulse 53   Temp 96.5  F (35.8  C) (Oral)   Resp 18   Ht 1.829 m (6' 0.01\")   Wt 95.3 kg (210 lb)   SpO2 97%   BMI 28.47 kg/m      Alert, sleeping between cares. CMS intact. Capnography monitoring. 2L of O2 via nasal canula. Denies pain. BS active x4, denies passing flatus since surgery, regular diet. Patient is DTV. Patient has taken sips of clear liquids. Denies nausea. IVF infusing. Will continue to monitor.     Please review provider order for any additional goals.   Nurse to notify provider when observation goals have been met and patient is ready for discharge.  "

## 2020-01-10 NOTE — PHARMACY-ANTICOAGULATION SERVICE
Clinical Pharmacy- Warfarin Discharge Note  This patient is currently on warfarin for the treatment of DVT/PE prophylaxis.  INR Goal= 2-3  Expected length of therapy lifetime.    Warfarin PTA Regimen: 10 mg last 2 days, then 7.5 mg daily      Anticoagulation Dose History     Recent Dosing and Labs Latest Ref Rng & Units 6/25/2019 8/16/2019 10/21/2019 11/25/2019 1/6/2020 1/7/2020 1/10/2020    Warfarin 10 mg - - - - - - 10 mg -    INR 0.86 - 1.14 1.96(H) 2.35(H) 2.11(H) 3.26(H) 1.30(H) 1.66(H) 1.86(H)    INR Point of Care 0.86 - 1.14 - - - - - - -    Chromogenic Factor 10 60 - 140 % - - - - - - -    Factor 2 60 - 140 % - - - - - - -          Vitamin K doses administered during the last 7 days:   FFP administered during the last 7 days:     Cardiology is following patient due to recent stent placement.  He is to take warfarin 7.5 mg this evening and have his INR checked tomorrow.  He has been instructed to continue taking aspirin until his INR is > 2.

## 2020-01-10 NOTE — PLAN OF CARE
PRIMARY DIAGNOSIS: Right ureterolithiasis with mild hydro     OUTPATIENT/OBSERVATION GOALS TO BE MET BEFORE DISCHARGE    1. Pain Status: Improved but still requiring IV narcotics.     2. Tolerating adequate PO diet: NPO, ice chips      3. Surgical Intervention planned: In process.      4. Cleared by consultants (if involved): Yes     5. Return to near baseline physical activity: Yes     Discharge Planner Nurse   Safe discharge environment identified: Yes  Barriers to discharge: Yes       Entered by: Mishel Huffman 01/10/2020 12:00 PM    Patient is off the floor for surgical procedure.      Please review provider order for any additional goals.   Nurse to notify provider when observation goals have been met and patient is ready for discharge.

## 2020-01-10 NOTE — ANESTHESIA CARE TRANSFER NOTE
Patient: Oswaldo Prabhakar    Procedure(s):  CYSTOSCOPY, WITH URETERAL STENT INSERTION    Diagnosis: * No pre-op diagnosis entered *  Diagnosis Additional Information: No value filed.    Anesthesia Type:   General, LMA     Note:  Airway :Face Mask  Patient transferred to:PACU  Handoff Report: Identifed the Patient, Identified the Reponsible Provider, Reviewed the pertinent medical history, Discussed the surgical course, Reviewed Intra-OP anesthesia mangement and issues during anesthesia, Set expectations for post-procedure period and Allowed opportunity for questions and acknowledgement of understanding      Vitals: (Last set prior to Anesthesia Care Transfer)    CRNA VITALS  1/10/2020 1204 - 1/10/2020 1242      1/10/2020             Pulse:  75    SpO2:  99 %                Electronically Signed By: NOLBERTO Romero CRNA  January 10, 2020  12:42 PM

## 2020-01-10 NOTE — ED PROVIDER NOTES
History     Chief Complaint:    Flank Pain    The history is provided by the patient.      Oswaldo Prabhakar is a 59 year old male with a history of PE, hypertension, and kidney stones s/p cardiac stent placement 2 days ago at [facility] on coumadin who presents with right-sided flank pain. The patient woke up with intense right side pain an hour ago. He noticed hematuria soon after. He also reports nausea and low back pain. The patient denies vomiting, fevers, chest pain, neck pain, shortness of breath, trouble breathing, or pain radiating down his arms or legs. He has never had any issues with his aorta. He denies any falls or trauma. Of note: of the 2 kidney stones he has had, only 1 was able to pass on its own.     Allergies:  Cats  Hay Fever & [A.R.M.]  Heparin  Hydrocodone-Acetaminophen     Medications:    Aspirin 81 mg  Lipitor  Tums  Benadryl  Repatha sureclick  Zetia  Lisinopril  Lopressor  Brilinta   Coumadin    Past Medical History:    Unstable angina  Pure hyperglyceridemia  Calculus of kidney  Coronary artery disease  Diaphragmatic hernia  History of blood transfusion  History of thrombophlebitis  Hypertension  Nephrolithiasis  Hyperlipidemia  PE and infarction  Statin intolerance  Stented coronary artery  Unspecified retinal detachment     Past Surgical History:    Bypass graft artery coronary  Spermatocele resection  Closed reduction, percutaneous pinning hand, combined, left  Colonoscopy  CV angiogram coronary graft  CV coronary angiogram  CV PCI stent drug eluting  Cystoscopy  Laparoscopic cholecystectomy  Lithotripsy  Lung surgery  For lung adhesion  Thoracotomy     Family History:    Coronary artery disease - mother  Coronary artery disease - father  Bladder cancer - father    Social History:  Negative for drug use.  Negative for alcohol use.  Negative for drug use.  Patient accompanied to the ED by his wife.  Marital Status:   [2]     Review of Systems   Constitutional: Negative for fever.    Respiratory: Negative for shortness of breath.         Denies trouble breathing   Cardiovascular: Negative for chest pain.   Gastrointestinal: Positive for nausea. Negative for vomiting.   Genitourinary: Positive for flank pain (right-sided) and hematuria.   Musculoskeletal: Positive for back pain. Negative for neck pain.   All other systems reviewed and are negative.    Physical Exam     Patient Vitals for the past 24 hrs:   BP Temp Temp src Pulse Resp SpO2   01/10/20 0400 (!) 155/93 -- -- 68 -- 94 %   01/10/20 0330 -- -- -- -- -- 95 %   01/10/20 0247 (!) 165/105 97.6  F (36.4  C) Temporal 75 16 100 %     Physical Exam  General: Patient is awake, alert and interactive when I enter the room. Appears uncomfortable   Head: The scalp, face, and head appear normal  Eyes: The pupils are equal, round, and reactive to light. Conjunctivae and sclerae are normal  Neck: Normal range of motion. No anterior cervical lymphadenopathy noted  CV: Regular rate. S1/S2. No murmurs.   Resp: Lungs are clear without wheezes or rales. No respiratory distress.   GI: Abdomen is soft, no rigidity. No evidence of pulsatile mass. No fluid waves or evidence of ascites. No distension. No abdominal pain. There is no rebound or guarding. Bowel sounds are normal. No hernias or bruising are noted in detailed exam. No CVA tenderness.     MS: Normal tone. Joints grossly normal without effusions. No asymmetric leg swelling, calf or thigh tenderness.    Skin: No rash or lesions noted. Normal capillary refill noted  Neuro: speech is normal and fluent. Face is symmetric. Moving all extremities.   Psych:  Normal affect.  Appropriate interactions.    Emergency Department Course     ECG (2:56:27):  Rate 68 bpm. SC interval 144. QRS duration 88. QT/QTc 398/423. P-R-T axes -6 20 67. Normal sinus rhythm. ST & T wave abnormality, consider anterior ischemia. Abnormal ECG. No significant change compared to EKG dated 1/7/2020. Interpreted at 0319 by Adenike  MD Collin.    Imaging:  Radiology findings were communicated with the patient and family who voiced understanding of the findings.    CT Abdomen Pelvis w/o IV contrast:   1. 0.5 cm proximal right ureteral calculus, resulting in mild obstruction.  2. No additional renal or ureteral calculi, as per radiology.    Laboratory:  Laboratory findings were communicated with the patient and family who voiced understanding of the findings.    CBC: AWNL (WBC 7.7, HGB 14.2, )  BMP: Glucose 120 H, calcium 10.4 H o/w WNL (Creatinine 1.16)  INR: 1.86 H    UA: cloudy, red urine, blood large, protein albumin 50, WBC 68 H, RB C >182 H, mucous present o/w negative    Interventions:  0320: Zofran 4 mg IV  0321: Dilaudid 0.5 mg IV  0347: 0.9% sodium chloride BOLUS 1 L IV  0347: Dilaudid 0.5 mg IV  0450: Dilaudid 0.5 mg IV  0453: Zofran 4 mg IV    Emergency Department Course:  Past medical records, nursing notes, and vitals reviewed.    0308: I performed an exam of the patient as documented above.     EKG obtained in the ED, see results above.     IV was inserted and blood was drawn for laboratory testing, results above.    The patient provided a urine sample here in the emergency department. This was sent for laboratory testing, findings above.    The patient was sent for an abdomen pelvis CT while in the emergency department, results above.     0401: I rechecked the patient and discussed the results of his workup thus far.     0552: Patient rechecked and updated. Patient is still in a lot of pain and does not feel comfortable going home.    I personally reviewed the laboratory, EKG, and imaging results with the Patient and spouse and answered all related questions prior to admission.     Findings and plan explained to the Patient and spouse who consents to admission.     0604: Discussed the patient with Dr. Strickland, who will admit the patient to an observation bed for further monitoring, evaluation, and treatment.      Impression & Plan     Medical Decision Making:  Oswaldo Prabhakar is a 59 year old male who presented with unilateral right flank consistent with renal colic. CT confirms a 5 mm ureteral stone at the proximal ureter.  Renal function is normal/baseline.  CT and lab workup show no other alternative etiology that could be causing his symptoms (e.g., AAA, appendicitis, pyelonephritis). There is no fever or convincing evidence of a urinary tract infection as the UA is reassuring.  Unfortunately despite multiple doses of Dilaudid in the emergency department patient still having significant pain and nausea and so will require admission for pain control and possible urology consultation for further treatment.    Diagnosis:    ICD-10-CM   1. Nephrolithiasis N20.0     Disposition:  Admitted to obs.    Scribe Disclosure:  Greer CHO, am serving as a scribe at 3:10 AM on 1/10/2020 to document services personally performed by Collin Jeong MD based on my observations and the provider's statements to me.     Greer oKng  1/10/2020   [facility] EMERGENCY DEPARTMENT       Collin Jeong MD  01/10/20 0628

## 2020-01-10 NOTE — ED NOTES
[facility]  ED Nurse Handoff Report    sOwaldo Prabhakar is a 59 year old male   ED Chief complaint: Flank Pain  . ED Diagnosis:   Final diagnoses:   Nephrolithiasis     Allergies:   Allergies   Allergen Reactions     Cats      Hay Fever & [A.R.M.]      Heparin Other (See Comments)     Heparin resistance per cardio-thoracic surgeon Dr. Armando     Hydrocodone-Acetaminophen Nausea and Vomiting     Acetaminophen is ok       Code Status: Full Code  Activity level - Baseline/Home:  Independent. Activity Level - Current:   Stand by Assist. Lift room needed: No. Bariatric: No   Needed: No   Isolation: No. Infection: Not Applicable.     Vital Signs:   Vitals:    01/10/20 0247 01/10/20 0330 01/10/20 0400   BP: (!) 165/105  (!) 155/93   Pulse: 75  68   Resp: 16     Temp: 97.6  F (36.4  C)     TempSrc: Temporal     SpO2: 100% 95% 94%       Cardiac Rhythm:  ,      Pain level: 0-10 Pain Scale: 10  Patient confused: No. Patient Falls Risk: Yes.   Elimination Status: Has voided   Patient Report - Initial Complaint: Pt to ER with c/o right flank pain. Focused Assessment: Pt with c/o right flank pain with n/v pt had recent bypass surg at the [facility]   Tests Performed: Labs UA CT of abd. Abnormal Results: Ct shows 5mm stone UA has 68 WBCs.   Treatments provided: meds and IVFs  Family Comments: wife at bedside  OBS brochure/video discussed/provided to patient:  Yes  ED Medications:   Medications   ondansetron (ZOFRAN) injection 4 mg (4 mg Intravenous Given 1/10/20 4243)   HYDROmorphone (PF) (DILAUDID) injection 0.5 mg (0.5 mg Intravenous Given 1/10/20 0450)   0.9% sodium chloride BOLUS (1,000 mLs Intravenous New Bag 1/10/20 9187)   HYDROmorphone (PF) (DILAUDID) injection 0.5 mg (0.5 mg Intravenous Given 1/10/20 4291)     Drips infusing:  No  For the majority of the shift, the patient's behavior Green. Interventions performed wereN/A.     Severe Sepsis OR Septic Shock Diagnosis Present: No      ED Nurse Name/Phone  Number: Celeste Osorio RN,   6:06 AM      RECEIVING UNIT ED HANDOFF REVIEW    Above ED Nurse Handoff Report was reviewed: Yes  Reviewed by: Courtney Owen RN on January 10, 2020 at 6:27 AM

## 2020-01-10 NOTE — ANESTHESIA PREPROCEDURE EVALUATION
Anesthesia Pre-Procedure Evaluation    Patient: Oswaldo Prabhakar   MRN: [id number] : 1960          Preoperative Diagnosis: * No pre-op diagnosis entered *    Procedure(s):  CYSTOSCOPY, WITH URETERAL STENT INSERTION    Past Medical History:   Diagnosis Date     Antiplatelet or antithrombotic long-term use      Coronary artery disease      Diaphragmatic hernia without mention of obstruction or gangrene      Heart disease      Hernia, abdominal      History of blood transfusion      History of thrombophlebitis      Hypertension      Nephrolithiasis 2010     Other and unspecified hyperlipidemia      Pulmonary embolus and infarction (H)     s/p hemothorax and filter s/p filter removal (), now on long term anti-coagulation     Statin intolerance 2017     Stented coronary artery      Unspecified retinal detachment     Childhood trauma, unrepaired     Past Surgical History:   Procedure Laterality Date     BYPASS GRAFT ARTERY CORONARY  2014    Procedure: BYPASS GRAFT ARTERY CORONARY;  Median Sternotomy, Coronary Artery Bypass Bypass x 4 using Left Internal Mammary, Left Saphenous Vein, on pump oxygenation;  Surgeon: Sherry Armando MD;  Location: [facility] OR     C NONSPECIFIC PROCEDURE      Spermatocele resection     CLOSED REDUCTION, PERCUTANEOUS PINNING  HAND, COMBINED Left 2015    Procedure: COMBINED CLOSED REDUCTION, PERCUTANEOUS PINNING HAND;  Surgeon: Carmella Love MD;  Location: [facility]     COLONOSCOPY  3/15/2013    Procedure: COLONOSCOPY;;  Surgeon: Racheal Shipman MD;  Location: [facility]     CV ANGIOGRAM CORONARY GRAFT N/A 2020    Procedure: Angiogram Coronary Graft;  Surgeon: Chato Odonnell MD;  Location:  HE[facility] CARDI[facility] CATH LAB     CV CORONARY ANGIOGRAM  2020    Procedure: CV CORONARY ANGIOGRAM;  Surgeon: Chato Odonnell MD;  Location:  HEART CARDIAC CATH LAB     CV PCI STENT DRUG ELUTING N/A 2020    Procedure: PCI Stent Drug  Eluting;  Surgeon: Chato Odonnell MD;  Location:  HEART CARDIAC CATH LAB     CYSTOSCOPY       LAPAROSCOPIC CHOLECYSTECTOMY N/A 8/6/2018    Procedure: LAPAROSCOPIC CHOLECYSTECTOMY;  LAPAROSCOPIC CHOLECYSTECTOMY ;  Surgeon: José Miguel Stuart MD;  Location: [facility] OR     LITHOTRIPSY  4/2010     THORACIC SURGERY      lung surgery, thoracotomy, lung adhesion     Anesthesia Evaluation     .             ROS/MED HX    ENT/Pulmonary:     (+), . Other pulmonary disease h/o PEs, on warfarin.    Neurologic:  - neg neurologic ROS    (-) Neuropathy   Cardiovascular:     (+) hypertension--CAD, -past MI,-stent,1/7/20  1 Drug Eluting Stent .. Taking blood thinners : . . . :. .      (-) angina, DUGAN, irregular heartbeat/palpitations and angina   METS/Exercise Tolerance:     Hematologic:     (+) History of blood clots pt is anticoagulated, History of Transfusion no previous transfusion reaction -      Musculoskeletal:  - neg musculoskeletal ROS       GI/Hepatic:  - neg GI/hepatic ROS      (-) GERD   Renal/Genitourinary:     (+) Nephrolithiasis ,    (-) renal disease   Endo:  - neg endo ROS       Psychiatric:  - neg psychiatric ROS       Infectious Disease:  - neg infectious disease ROS       Malignancy:         Other:                          Physical Exam  Normal systems: cardiovascular, pulmonary and dental    Airway   Mallampati: II  TM distance: >3 FB  Neck ROM: full    Dental     Cardiovascular       Pulmonary             Lab Results   Component Value Date    WBC 7.7 01/10/2020    HGB 14.2 01/10/2020    HCT 43.4 01/10/2020     01/10/2020     01/10/2020    POTASSIUM 3.5 01/10/2020    CHLORIDE 107 01/10/2020    CO2 24 01/10/2020    BUN 28 01/10/2020    CR 1.16 01/10/2020     (H) 01/10/2020    GONZALES 10.4 (H) 01/10/2020    PHOS 3.6 06/18/2015    MAG 1.8 01/06/2020    ALBUMIN 3.9 01/06/2020    PROTTOTAL 7.5 01/06/2020    ALT 38 01/06/2020    AST 20 01/06/2020    ALKPHOS 50 01/06/2020    BILITOTAL 0.6  "01/06/2020    LIPASE 188 07/25/2018    PTT 27 04/04/2014    INR 1.86 (H) 01/10/2020    FIBR 286 04/04/2014       Preop Vitals  BP Readings from Last 3 Encounters:   01/10/20 (!) 161/93   01/07/20 122/80   10/21/19 119/73    Pulse Readings from Last 3 Encounters:   01/10/20 70   01/07/20 65   10/21/19 56      Resp Readings from Last 3 Encounters:   01/10/20 20   01/07/20 18   06/25/19 20    SpO2 Readings from Last 3 Encounters:   01/10/20 97%   01/07/20 97%   10/21/19 96%      Temp Readings from Last 1 Encounters:   01/10/20 97.5  F (36.4  C) (Temporal)    Ht Readings from Last 1 Encounters:   01/10/20 1.829 m (6' 0.01\")      Wt Readings from Last 1 Encounters:   01/10/20 95.3 kg (210 lb)    Estimated body mass index is 28.47 kg/m  as calculated from the following:    Height as of this encounter: 1.829 m (6' 0.01\").    Weight as of this encounter: 95.3 kg (210 lb).       Anesthesia Plan      History & Physical Review  History and physical reviewed and following examination; no interval change.    ASA Status:  4 .    NPO Status:  > 8 hours    Plan for General and LMA with Intravenous induction. Maintenance will be Inhalation.    PONV prophylaxis:  Dexamethasone or Solumedrol and Ondansetron (or other 5HT-3)  Discussed high risk of stent complications given recent NIR placement.  Patient understands risks and wishes to proceed      Postoperative Care  Postoperative pain management:  IV analgesics.      Consents  Anesthetic plan, risks, benefits and alternatives discussed with:  Patient..                 Panda Osorio MD                    .  "

## 2020-01-10 NOTE — PROGRESS NOTES
Internal Medicine Brief Progress Note - patient admitted earlier today with right sided flank pain and hematuria and found to have a 5mm proximal right ureteral stone.  Urology consulted and recommend a right sided stent placement today.  Patient is currently on anticoagulation due to cardiac stent placed earlier this week when he presented with unstable angina.  Patient is currently denying chest pain.  Will consult Cardiology for pre-op assessment.  Cardiology notified of the consult.    Cele Choudhary MS, PA-C  Hospitalist Service  Pager [phone]

## 2020-01-10 NOTE — H&P
"[facility] History and Physical    Oswaldo Prabhakar MRN# [id number]   Age: 59 year old YOB: 1960     Date of Admission:  1/10/2020    Home clinic: [facility]  Primary care provider: Samm Vazquez          Assessment and Plan:   Assessment:   Oswaldo Prabhakar is a 60 yo who underwent stenting of his LAD on 1/7/2020 after presenting with chest pain.  He came to attention today with right-sided flank pain and vomiting and is found now to have a 5 mm right proximal ureteral stone with mild dilation of the right intrarenal collecting system.      Prior medical history is significant for coronary artery bypass grafting in 2014 and just 4 days ago, on January 6, he presented with chest discomfort and was found to have a severely stenosed proximal to mid LAD lesion.  This was considered the culprit and NIR was placed.  In addition, the patient has a more remote history of pulmonary emboli with infarction in 2011 that was complicated by hemothorax and for which the patient underwent VATS.  The patient is on lifelong anticoagulation due to history of lupus inhibitor.      In the emergency department, the patient was noted to be hemodynamically stable.  He did not endorse any fever or sepsis symptoms.  CT scan of the abdomen did confirm the presence of a proximal right ureteral stone measuring approximately 5 mm.  INR was 1.86, the patient reported that he was passing clots of blood in his urine.    Dx:  1.  Right ureterolithiasis with mild hydro, mild jose francisco-nephric haziness and hematuria.  2.  Anticoagulated for hx PEs.  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  CAD, remote CABG, and s/p stenting of a mid-LAD lesion on 1/7/2020 at the [facility].  4.  Preop evaluation: Mr. Prabhakar has tolerated multiple surgeries with general anesthetic in the past.  " "Although he has known coronary disease, he does not have heart failure.  He has no known lung disease and kidney function is normal.  He is anticoagulated and was recognized as being \"heparin resistant\" when he underwent bypass in 2014.  Urology will need to give an opinion as to whether they need the patient's anticoagulation reversed.  He probably should be bridged on heparin if that is the case.      Plan:   1.  Admit to observation.  2.  Urology consultation is requested.  3.  Empirically will put the patient on generous normal saline infusion and tamsulosin.  4.  I believe the case should be discussed with cardiology.  I think ultimately patient needs to remain on the Brilinta and aspirin but otherwise could be reversed while bridging with heparin.  However the question of whether he should be exposed to any anesthetic at this time so near to the placement of the stent is a judgment.             Chief Complaint:   Flank pain     History is obtained from the patient, EMR and emergency room physician.    Mr. Prabhakar felt well when he left the [facility] on 1/7/2020.  However, he awakened shortly after midnight on 1/10/2020 with right-sided flank pain.  He did not have any nausea or vomiting but when he went to urinate, he found that he was passing bright red blood with some clots.    In review of the history of the recent hospitalization, the patient had presented with chest discomfort that came on abruptly in the morning without apparent trigger.  It was relatively mild and he initially thought that maybe he would not be too concerned about it.  He apparently became mildly diaphoretic and for that reason he came in for evaluation to the emergency department.  Due to the history of the patient's coronary artery disease, despite the fact that the troponins did not reveal any measurable rise, he was taken to angiography and found to have a significant stenosis in the left anterior descending.  That was " stented and he was discharged home.        Past Medical History:     Past Medical History:   Diagnosis Date     Antiplatelet or antithrombotic long-term use      Coronary artery disease      Diaphragmatic hernia without mention of obstruction or gangrene      Heart disease      Hernia, abdominal      History of blood transfusion      History of thrombophlebitis      Hypertension      Nephrolithiasis 4/2010     Other and unspecified hyperlipidemia      Pulmonary embolus and infarction (H)     s/p hemothorax and filter s/p filter removal (2011), now on long term anti-coagulation     Statin intolerance 2/1/2017     Stented coronary artery      Unspecified retinal detachment     Childhood trauma, unrepaired             Past Surgical History:      Past Surgical History:   Procedure Laterality Date     BYPASS GRAFT ARTERY CORONARY  4/4/2014    Procedure: BYPASS GRAFT ARTERY CORONARY;  Median Sternotomy, Coronary Artery Bypass Bypass x 4 using Left Internal Mammary, Left Saphenous Vein, on pump oxygenation;  Surgeon: Sherry Armando MD;  Location: [facility] OR     C NONSPECIFIC PROCEDURE      Spermatocele resection     CLOSED REDUCTION, PERCUTANEOUS PINNING  HAND, COMBINED Left 11/5/2015    Procedure: COMBINED CLOSED REDUCTION, PERCUTANEOUS PINNING HAND;  Surgeon: Carmella Love MD;  Location: [facility] OR     COLONOSCOPY  3/15/2013    Procedure: COLONOSCOPY;;  Surgeon: Racheal Shipman MD;  Location: [facility]     CV ANGIOGRAM CORONARY GRAFT N/A 1/7/2020    Procedure: Angiogram Coronary Graft;  Surgeon: Chato Odonnell MD;  Location: [facility] CARDIAC CATH LAB     CV CORONARY ANGIOGRAM  1/7/2020    Procedure: CV CORONARY ANGIOGRAM;  Surgeon: Chato Odonnell MD;  Location: [facility] CARDIAC CATH LAB     CV PCI STENT DRUG ELUTING N/A 1/7/2020    Procedure: PCI Stent Drug Eluting;  Surgeon: Chato Odonnell MD;  Location: [facility] CARDIAC CATH LAB     CYSTOSCOPY        LAPAROSCOPIC CHOLECYSTECTOMY N/A 8/6/2018    Procedure: LAPAROSCOPIC CHOLECYSTECTOMY;  LAPAROSCOPIC CHOLECYSTECTOMY ;  Surgeon: José Miguel Stuart MD;  Location: [facility] OR     LITHOTRIPSY  4/2010     THORACIC SURGERY      lung surgery, thoracotomy, lung adhesion             Social History:     Social History     Tobacco Use     Smoking status: Never Smoker     Smokeless tobacco: Never Used   Substance Use Topics     Alcohol use: No     Comment: very rare             Family History:     Family History   Problem Relation Age of Onset     Heart Disease Mother      C.A.D. Father         3 vessel CABG, age 70     Cancer Father         bladder cancer     C.A.D. Paternal Grandmother      Hypertension Paternal Grandmother      Alzheimer Disease Paternal Grandmother      Diabetes No family hx of      Thyroid Disease No family hx of      Cancer - colorectal No family hx of      Family history reviewed and considered noncontributory.         Allergies:     Allergies   Allergen Reactions     Cats      Hay Fever & [A.R.M.]      Heparin Other (See Comments)     Heparin resistance per cardio-thoracic surgeon Dr. Armando     Hydrocodone-Acetaminophen Nausea and Vomiting     Acetaminophen is ok             Medications:     Medications Prior to Admission   Medication Sig Dispense Refill Last Dose     aspirin 81 MG EC tablet Take 1 tablet (81 mg) by mouth daily Stop once INR >2.0 91 tablet 3      atorvastatin (LIPITOR) 80 MG tablet Take 1 tablet (80 mg) by mouth daily 90 tablet 2 1/6/2020 at AM     calcium carbonate (TUMS) 500 MG chewable tablet Take 1 chew tab by mouth daily as needed   Past Week at Unknown time     Cholecalciferol (VITAMIN D) 2000 UNITS CAPS Take 2,000 Units by mouth daily   1/6/2020 at AM     DiphenhydrAMINE HCl (BENADRYL PO) Take 25-50 mg by mouth daily as needed   1/5/2020 at Unknown time     evolocumab (REPATHA SURECLICK) 140 MG/ML prefilled autoinjector Inject 1 mL (140 mg) Subcutaneous every 14 days 6 mL 3  2020     ezetimibe (ZETIA) 10 MG tablet Take 1 tablet (10 mg) by mouth daily 90 tablet 3 2020 at AM     fenofibrate (TRIGLIDE/LOFIBRA) 160 MG tablet Take 1 tablet (160 mg) by mouth daily 90 tablet 2 2020 at AM     lisinopril (PRINIVIL/ZESTRIL) 2.5 MG tablet Take 1 tablet (2.5 mg) by mouth daily , or as directed by Dr. Ocampo. 90 tablet 2 2020 at Unknown time     metoprolol tartrate (LOPRESSOR) 25 MG tablet Take 0.5 tablets (12.5 mg) by mouth 2 times daily 90 tablet 1 2020 at Unknown time     ticagrelor (BRILINTA) 90 MG tablet Take 1 tablet (90 mg) by mouth every 12 hours 60 tablet 11      [] warfarin ANTICOAGULANT (COUMADIN) 1 MG tablet Take 10 tablets (10 mg) by mouth daily for 2 days Get INR check 1/10/2020 20 tablet 0              Review of Systems:   A comprehensive review of systems was performed and found to be negative except as described in this note           Physical Exam:   Vitals were reviewed  Temp: 96.8  F (36  C) Temp src: Oral BP: (!) 148/88 Pulse: 61   Resp: 18 SpO2: 95 % O2 Device: None (Room air)    Constitutional: Awake, alert, cooperative, no apparent distress, and appears stated age.  Eyes: Lids and lashes normal, pupils equal, the right eye does not seem to focus, sclera clear, conjunctiva normal.  Patient reports that he has a vision in the lower field of the right eye but without foveal input.  ENT: Normocephalic, without obvious abnormality, atraumatic.  Oropharynx is free of obvious lesions.  There is no obvious posterior crowding.  Neck: Supple, symmetrical, trachea midline, no adenopathy, thyroid symmetric, not enlarged and no tenderness, skin normal.  Hematologic / Lymphatic: No cervical lymphadenopathy and no supraclavicular lymphadenopathy.  Back: Symmetric, no curvature, spinous processes are non-tender on palpation, paraspinous muscles are non-tender on palpation, no costal vertebral tenderness.  Lungs: No increased work of breathing, good air exchange,  clear to auscultation bilaterally, no crackles or wheezing.  Cardiovascular: Regular rate and rhythm, normal S1 and S2, no S3 or S4, and no murmur noted.  Abdomen: No scars, normal bowel sounds, soft, non-distended, non-tender, no masses palpated, no hepatosplenomegally.  Musculoskeletal: No redness, warmth, or swelling of the joints. Tone is normal.  Neurologic: Awake, alert, oriented to name, place and time.  Cranial nerves II-XII are grossly intact.   Neuropsychiatric: Normal affect, mood, orientation, memory and insight.  Skin: No rashes, erythema, pallor, petechia or purpura.          Data:     Results for orders placed or performed during the hospital encounter of 01/10/20 (from the past 24 hour(s))   EKG 12 lead   Result Value Ref Range    Interpretation ECG Click View Image link to view waveform and result    CBC with platelets differential   Result Value Ref Range    WBC 7.7 4.0 - 11.0 10e9/L    RBC Count 4.97 4.4 - 5.9 10e12/L    Hemoglobin 14.2 13.3 - 17.7 g/dL    Hematocrit 43.4 40.0 - 53.0 %    MCV 87 78 - 100 fl    MCH 28.6 26.5 - 33.0 pg    MCHC 32.7 31.5 - 36.5 g/dL    RDW 13.2 10.0 - 15.0 %    Platelet Count 249 150 - 450 10e9/L    Diff Method Automated Method     % Neutrophils 55.9 %    % Lymphocytes 25.5 %    % Monocytes 8.6 %    % Eosinophils 8.8 %    % Basophils 0.8 %    % Immature Granulocytes 0.4 %    Nucleated RBCs 0 0 /100    Absolute Neutrophil 4.3 1.6 - 8.3 10e9/L    Absolute Lymphocytes 2.0 0.8 - 5.3 10e9/L    Absolute Monocytes 0.7 0.0 - 1.3 10e9/L    Absolute Eosinophils 0.7 0.0 - 0.7 10e9/L    Absolute Basophils 0.1 0.0 - 0.2 10e9/L    Abs Immature Granulocytes 0.0 0 - 0.4 10e9/L    Absolute Nucleated RBC 0.0    Basic metabolic panel   Result Value Ref Range    Sodium 138 133 - 144 mmol/L    Potassium 3.5 3.4 - 5.3 mmol/L    Chloride 107 94 - 109 mmol/L    Carbon Dioxide 24 20 - 32 mmol/L    Anion Gap 7 3 - 14 mmol/L    Glucose 120 (H) 70 - 99 mg/dL    Urea Nitrogen 28 7 - 30 mg/dL     Creatinine 1.16 0.66 - 1.25 mg/dL    GFR Estimate 69 >60 mL/min/[1.73_m2]    GFR Estimate If Black 80 >60 mL/min/[1.73_m2]    Calcium 10.4 (H) 8.5 - 10.1 mg/dL   INR   Result Value Ref Range    INR 1.86 (H) 0.86 - 1.14   CT Abdomen Pelvis w/o Contrast    Narrative    EXAM: CT ABDOMEN AND PELVIS WITHOUT CONTRAST - RENAL STONE PROTOCOL  LOCATION: [facility]  DATE/TIME: 1/10/2020 3:34 AM    INDICATION: Right flank pain and hematuria.    COMPARISON: 6/17/2011.    TECHNIQUE: CT scan of the abdomen and pelvis was performed without oral or intravenous contrast. Multiplanar reformats were obtained. Dose reduction techniques were used.    CONTRAST: None.    FINDINGS:  LOWER CHEST: Coronary artery calcification.    HEPATOBILIARY: Prior cholecystectomy.    SPLEEN: Unremarkable.    PANCREAS: Unremarkable.    ADRENAL GLANDS: Unremarkable.    KIDNEYS/BLADDER: 0.5 cm calculus in the proximal right ureter. Mild dilatation of the right intrarenal collecting system. Slight right perinephric haziness. No additional renal or ureteral calculi. No visualized bladder calculi.    BOWEL: Small hiatal hernia. Normal appendix.    LYMPH NODES: Unremarkable.    VASCULATURE: Atherosclerotic calcification in the abdominal aorta.    OTHER: Tiny paraumbilical hernia containing fat.      Impression    IMPRESSION:  1. 0.5 cm proximal right ureteral calculus, resulting in mild obstruction.  2. No additional renal or ureteral calculi.     UA with Microscopic   Result Value Ref Range    Color Urine Cloudy     Appearance Urine Red     Glucose Urine Negative NEG^Negative mg/dL    Bilirubin Urine Negative NEG^Negative    Ketones Urine Negative NEG^Negative mg/dL    Specific Gravity Urine 1.022 1.003 - 1.035    Blood Urine Large (A) NEG^Negative    pH Urine 6.0 5.0 - 7.0 pH    Protein Albumin Urine 50 (A) NEG^Negative mg/dL    Urobilinogen mg/dL Normal 0.0 - 2.0 mg/dL    Nitrite Urine Negative NEG^Negative    Leukocyte Esterase Urine Negative  NEG^Negative    Source Midstream Urine     WBC Urine 68 (H) 0 - 5 /HPF    RBC Urine >182 (H) 0 - 2 /HPF    Mucous Urine Present (A) NEG^Negative /LPF      EKG results:   Performed on admission        Normal sinus rhythm, normal axis.  ST segment and T wave changes in the inferior and anterior leads are stable since a couple of days ago.      All imaging studies reviewed by me.      Attestation:  I have reviewed today's vital signs, notes, medications, labs and imaging.  Total time: 35 minutes     Simón Strickland MD

## 2020-01-10 NOTE — PLAN OF CARE
ROOM # 202-2    Living Situation (if not independent, order SW consult): Home with family  Facility name: NA  : WifeAminah Arcos    Activity level at baseline: Ind.   Activity level on admit: SBA      Patient registered to observation; given Patient Bill of Rights; given the opportunity to ask questions about observation status and their plan of care.  Patient has been oriented to the observation room, bathroom and call light is in place.    Discussed discharge goals and expectations with patient/family.

## 2020-01-10 NOTE — ED TRIAGE NOTES
Pt in with C/O R sided flank pain and hemeturia. Pt reports he had a cardiac catheretization on Tuesday at [facility] with stent placement and was placed on berlinta. Pt denies chest pain. Pt does have a hx of kidney stones in the past. Pt A&Ox4 ABCD's intact

## 2020-01-10 NOTE — OP NOTE
Procedure Date: 01/10/2020      SURGEON:  Nguyễn Woodard MD.      PREOPERATIVE DIAGNOSIS:  Right proximal ureteral.      POSTOPERATIVE DIAGNOSIS:  Right proximal ureteral.      PROCEDURE PERFORMED:  Cystoscopy, right retrograde pyelogram, interpretation of fluoroscopic images, placement of 6 x 26 double-J ureteral stent.      ANESTHESIA:  General.      COMPLICATIONS:  None.      INDICATIONS FOR PROCEDURE:  Oswaldo Prabhakar is a 59-year-old gentleman who presents with a proximal right ureteral stone.  He now presents for stent placement.      DETAILS OF THE PROCEDURE:  The risks and benefits of the procedure were explained in detail to the patient and informed consent was obtained.  The patient was brought to the operating room and placed supine on the operating table where he underwent general endotracheal anesthetic.  He was then moved down to the dorsal lithotomy position where he was prepped and draped in standard sterile fashion.      The procedure was begun by introducing the 22-Wolof rigid cystoscope through the urethra into the bladder to perform cystoscopy.  There were no urothelial abnormalities identified.  I cannulated the right orifice with the ureteral catheter and performed retrograde pyelogram.  The retrograde pyelogram was found to be normal with no dilatations or filling defects throughout.  The patient had a very narrow renal pelvis.  I passed a Glidewire into the right kidney and backed off the ureteral catheter.  I then passed a 6 x 26 double-J ureteral stent over the wire.  The wire was pulled back.  Due to the small size of the renal pelvis, I could not get the stent to curl inside the renal pelvis; therefore, I had the stent curled inside the upper pole alexa.  I visualized the stent and there was a good curl seen in the bladder.  I continued to visualize the stent and potentially infected urine appeared to drain through the stent.  I collected a sample from this urine and sent it for culture.   The procedure was concluded at this point.      The patient tolerated the procedure without complications.  He went to the post-anesthetic care in good condition.  He will go home from there.  After at least 2 weeks of stenting, he will return to the operating room for stone extraction.         BABATUNDE ESCALONA MD             D: 01/10/2020   T: 01/10/2020   MT: [facility]      Name:     RUFINO NICHOLS   MRN:      [id number]        Account:        [id number]   :      1960           Procedure Date: 01/10/2020      Document: [id number]

## 2020-01-10 NOTE — DISCHARGE SUMMARY
[facility] Observation Unit Discharge Summary          Oswaldo Prabhakar MRN# [id number]   Age: 59 year old YOB: 1960     Date of Admission:  1/10/2020  Date of Discharge::  1/10/2020  Admitting Physician:  Simón Strickland MD  Discharge Physician:  Cele Choudhary PA-C  Primary Physician: Samm Vazquez     Primary Discharge Diagnoses:   Right Ureterolithiasis s/p cystoscopy, right retrograde pyelogram, interpretation of fluoroscopic images, placement of 6 x 26 double-J ureteral stent.   Hematuria     Secondary Discharge Diagnoses:   CAD, remote CABG, and s/p stenting of a mid-LAD lesion on 1/7/2020 at the [facility] Course:   For detail history, please refer to H & P from 1/10/2020. In brief, this is a 59 year old male who underwent stenting of his LAD on 1/7/2020 after presenting with chest pain.  He presented to the ED with right-sided flank pain and vomiting and was found to have a 5 mm right proximal ureteral stone with mild dilation of the right intrarenal collecting system.       Prior medical history is significant for coronary artery bypass grafting in 2014 and on January 6, he presented with chest discomfort and was found to have a severely stenosed proximal to mid LAD lesion.  This was considered the culprit and NIR was placed.  In addition, the patient has a more remote history of pulmonary emboli with infarction in 2011 that was complicated by hemothorax and for which the patient underwent VATS.  The patient is on lifelong anticoagulation due to history of lupus inhibitor.       In the emergency department, the patient was noted to be hemodynamically stable, afebrile on room air.  CT scan of the abdomen did confirm the presence of a proximal right ureteral stone measuring approximately 5 mm.  Creatinine was 1.16. hgb 14.2, INR 1.86, wbc 7.7 and the patient reported that he was passing clots of blood in his urine.    Patient was admitted to the  observation unit and started on IVF and Flomax.  Urology was consulted and recommended cystoscopy with stent placement.  Given the recent cardiac stent placement and on anticoagulation, Cardiology was consulted for pre-op assessment.  Cardiology discussed the higher risk of bleeding, hold warfarin prior to the procedure and resume warfarin following the procedure.  Patient remained hemodynamically stable and afebrile.  He underwent a Cystoscopy, right retrograde pyelogram, interpretation of fluoroscopic images, placement of 6 x 26 double-J ureteral stent.  Urology recommended discharge with 3 days of Keflex and ok to discharge from their standpoint.  Patient met recovery goals and was discharged home with Urology and Cardiology follow up.  INR check was recommended on 1/11/2020.      Procedures/Imaging:     Results for orders placed or performed during the hospital encounter of 01/10/20   CT Abdomen Pelvis w/o Contrast    Narrative    EXAM: CT ABDOMEN AND PELVIS WITHOUT CONTRAST - RENAL STONE PROTOCOL  LOCATION: [facility]  DATE/TIME: 1/10/2020 3:34 AM    INDICATION: Right flank pain and hematuria.    COMPARISON: 6/17/2011.    TECHNIQUE: CT scan of the abdomen and pelvis was performed without oral or intravenous contrast. Multiplanar reformats were obtained. Dose reduction techniques were used.    CONTRAST: None.    FINDINGS:  LOWER CHEST: Coronary artery calcification.    HEPATOBILIARY: Prior cholecystectomy.    SPLEEN: Unremarkable.    PANCREAS: Unremarkable.    ADRENAL GLANDS: Unremarkable.    KIDNEYS/BLADDER: 0.5 cm calculus in the proximal right ureter. Mild dilatation of the right intrarenal collecting system. Slight right perinephric haziness. No additional renal or ureteral calculi. No visualized bladder calculi.    BOWEL: Small hiatal hernia. Normal appendix.    LYMPH NODES: Unremarkable.    VASCULATURE: Atherosclerotic calcification in the abdominal aorta.    OTHER: Tiny paraumbilical hernia  "containing fat.      Impression    IMPRESSION:  1. 0.5 cm proximal right ureteral calculus, resulting in mild obstruction.  2. No additional renal or ureteral calculi.     XR Surgery TOBIAS L/T 5 Min Fluoro w Stills    Narrative    This exam was marked as non-reportable because it will not be read by a   radiologist or a [place] non-radiologist provider.               Consultations:   Urology  Cardiology    Code Status:   Full    Allergies:     Allergies   Allergen Reactions     Cats      Hay Fever & [A.R.M.]      Heparin Other (See Comments)     Heparin resistance per cardio-thoracic surgeon Dr. Armando     Hydrocodone-Acetaminophen Nausea and Vomiting     Acetaminophen is ok        Subjective:   Patient reports right flank pain is controlled.  He denies chest pain, shortness of breath.    Physical Exam:   Blood pressure 137/83, pulse 53, temperature 96.5  F (35.8  C), temperature source Oral, resp. rate 18, height 1.829 m (6' 0.01\"), weight 95.3 kg (210 lb), SpO2 97 %.  General: Alert, interactive, NAD  HEENT: AT/NC, sclera anicteric, PERRL, EOMI  Resp: clear to auscultation bilaterally, no crackles or wheezes  Cardiac: regular rate and rhythm, no murmur  Abdomen: Soft, mild right flank tenderness, nondistended. +BS.  No rebound or guarding.  Extremities: No LE edema  Skin: Warm and dry  Neuro: Alert & oriented x 3, no focal deficits, moves all extremities equally     Discharge Medicatios:        Discharge Medication List as of 1/10/2020  6:24 PM      START taking these medications    Details   cephALEXin (KEFLEX) 500 MG capsule Take 1 capsule (500 mg) by mouth 3 times daily, Disp-9 capsule, R-0, E-Prescribe         CONTINUE these medications which have NOT CHANGED    Details   atorvastatin (LIPITOR) 80 MG tablet Take 1 tablet (80 mg) by mouth daily, Disp-90 tablet, R-2, E-Prescribe      calcium carbonate (TUMS) 500 MG chewable tablet Take 1 chew tab by mouth daily as needed, Historical      Cholecalciferol " (VITAMIN D) 2000 UNITS CAPS Take 2,000 Units by mouth daily, Historical      DiphenhydrAMINE HCl (BENADRYL PO) Take 25-50 mg by mouth daily as needed, Historical      evolocumab (REPATHA SURECLICK) 140 MG/ML prefilled autoinjector Inject 1 mL (140 mg) Subcutaneous every 14 days, Disp-6 mL, R-3, E-PrescribePlease fill at Pt request.      ezetimibe (ZETIA) 10 MG tablet Take 1 tablet (10 mg) by mouth daily, Disp-90 tablet, R-3, E-Prescribe      fenofibrate (TRIGLIDE/LOFIBRA) 160 MG tablet Take 1 tablet (160 mg) by mouth daily, Disp-90 tablet, R-2, E-Prescribe      lisinopril (PRINIVIL/ZESTRIL) 2.5 MG tablet Take 1 tablet (2.5 mg) by mouth daily , or as directed by Dr. Ocampo., Disp-90 tablet, R-2, E-Prescribe      metoprolol tartrate (LOPRESSOR) 25 MG tablet Take 0.5 tablets (12.5 mg) by mouth 2 times daily, Disp-90 tablet, R-1, E-Prescribe      ticagrelor (BRILINTA) 90 MG tablet Take 1 tablet (90 mg) by mouth every 12 hours, Disp-60 tablet, R-11, E-Prescribe      warfarin ANTICOAGULANT (COUMADIN) 5 MG tablet Warfarin 7.5mg daily - pre cardiology take 10mg on 01/08 and 01/09, Historical      aspirin 81 MG EC tablet Take 1 tablet (81 mg) by mouth daily Stop once INR >2.0, Disp-91 tablet, R-3, E-Prescribe             Instructions Given to Patient as Discharge:     Discharge Procedure Orders   Reason for your hospital stay   Order Comments: You were admitted due to a kidney stone.  Urology was consulted for stent placement.  You were seen by Cardiology prior to your surgery given your recent stent placement.     Follow-up and recommended labs and tests    Order Comments: Please follow up with Urology as recommended.  An INR check is recommended on 1/11/2020.  Please follow up with Cardiology in clinic as recommended.     Activity   Order Comments: Your activity upon discharge: activity as tolerated     Order Specific Question Answer Comments   Is discharge order? Yes      When to contact your care team   Order Comments:  Notify for fever >101.5; black or bloody stools; severe, persistent, or worsening nausea, vomiting, abdominal pain or distention, diarrhea, constipation; chest pain, difficulty breathing, swelling; dizziness, weakness, lightheadedness, fainting.  Proceed to the nearest emergency room for any urgent concerns.     Diet   Order Comments: Follow this diet upon discharge: regular     Order Specific Question Answer Comments   Is discharge order? Yes        Pending Tests at Discharge:   Urine culture    Discharge Disposition:   Discharged to home     Cele Choudhary MS, PA-C  Hospitalist Service  Pager [phone]    >30 minutes was spent in discharge planning, care coordination, physical examination and medication reconciliation on the date of discharge, 1/10/2020

## 2020-01-11 LAB
BACTERIA SPEC CULT: NO GROWTH
Lab: NORMAL
SPECIMEN SOURCE: NORMAL

## 2020-01-11 NOTE — PLAN OF CARE
Patient's After Visit Summary was reviewed with patient and/or spouse.   Patient verbalized understanding of After Visit Summary, recommended follow up and was given an opportunity to ask questions.   Discharge medications sent home with patient/family: YES   Discharged with significant other.     Patient declined taking ordered medications here, stated he will take these at home. IV access removed. All personal belongings returned. Escorted to exit via wheelchair with staff with spouse providing transportation.       OBSERVATION patient END time: 6:32 PM

## 2020-01-12 ENCOUNTER — APPOINTMENT (OUTPATIENT)
Dept: ULTRASOUND IMAGING | Facility: CLINIC | Age: 60
DRG: 872 | End: 2020-01-12
Attending: EMERGENCY MEDICINE
Payer: COMMERCIAL

## 2020-01-12 ENCOUNTER — HOSPITAL ENCOUNTER (INPATIENT)
Facility: CLINIC | Age: 60
LOS: 2 days | Discharge: HOME | DRG: 872 | End: 2020-01-15
Attending: EMERGENCY MEDICINE | Admitting: INTERNAL MEDICINE
Payer: COMMERCIAL

## 2020-01-12 ENCOUNTER — APPOINTMENT (OUTPATIENT)
Dept: GENERAL RADIOLOGY | Facility: CLINIC | Age: 60
DRG: 872 | End: 2020-01-12
Attending: EMERGENCY MEDICINE
Payer: COMMERCIAL

## 2020-01-12 DIAGNOSIS — N30.01 ACUTE CYSTITIS WITH HEMATURIA: Primary | ICD-10-CM

## 2020-01-12 DIAGNOSIS — R78.81 BACTEREMIA: ICD-10-CM

## 2020-01-12 DIAGNOSIS — R11.2 NON-INTRACTABLE VOMITING WITH NAUSEA, UNSPECIFIED VOMITING TYPE: ICD-10-CM

## 2020-01-12 DIAGNOSIS — A41.9 SEPSIS, DUE TO UNSPECIFIED ORGANISM, UNSPECIFIED WHETHER ACUTE ORGAN DYSFUNCTION PRESENT (H): ICD-10-CM

## 2020-01-12 LAB
ALBUMIN SERPL-MCNC: 3.6 G/DL (ref 3.4–5)
ALBUMIN UR-MCNC: 100 MG/DL
ALP SERPL-CCNC: 53 U/L (ref 40–150)
ALT SERPL W P-5'-P-CCNC: 37 U/L (ref 0–70)
ANION GAP SERPL CALCULATED.3IONS-SCNC: 8 MMOL/L (ref 3–14)
APPEARANCE UR: ABNORMAL
AST SERPL W P-5'-P-CCNC: 12 U/L (ref 0–45)
BASOPHILS # BLD AUTO: 0.1 10E9/L (ref 0–0.2)
BASOPHILS NFR BLD AUTO: 0.3 %
BILIRUB SERPL-MCNC: 1.1 MG/DL (ref 0.2–1.3)
BILIRUB UR QL STRIP: NEGATIVE
BUN SERPL-MCNC: 15 MG/DL (ref 7–30)
CALCIUM SERPL-MCNC: 9.7 MG/DL (ref 8.5–10.1)
CHLORIDE SERPL-SCNC: 103 MMOL/L (ref 94–109)
CO2 BLDCOV-SCNC: 23 MMOL/L (ref 21–28)
CO2 SERPL-SCNC: 24 MMOL/L (ref 20–32)
COLOR UR AUTO: ABNORMAL
CREAT SERPL-MCNC: 1.18 MG/DL (ref 0.66–1.25)
DIFFERENTIAL METHOD BLD: ABNORMAL
EOSINOPHIL # BLD AUTO: 0 10E9/L (ref 0–0.7)
EOSINOPHIL NFR BLD AUTO: 0.1 %
ERYTHROCYTE [DISTWIDTH] IN BLOOD BY AUTOMATED COUNT: 13.2 % (ref 10–15)
FLUAV+FLUBV AG SPEC QL: NEGATIVE
FLUAV+FLUBV AG SPEC QL: NEGATIVE
GFR SERPL CREATININE-BSD FRML MDRD: 68 ML/MIN/{1.73_M2}
GLUCOSE SERPL-MCNC: 112 MG/DL (ref 70–99)
GLUCOSE UR STRIP-MCNC: NEGATIVE MG/DL
HCT VFR BLD AUTO: 41.7 % (ref 40–53)
HGB BLD-MCNC: 13.6 G/DL (ref 13.3–17.7)
HGB UR QL STRIP: ABNORMAL
IMM GRANULOCYTES # BLD: 0.1 10E9/L (ref 0–0.4)
IMM GRANULOCYTES NFR BLD: 0.6 %
INR PPP: 2.16 (ref 0.86–1.14)
KETONES UR STRIP-MCNC: NEGATIVE MG/DL
LACTATE BLD-SCNC: 0.9 MMOL/L (ref 0.7–2.1)
LEUKOCYTE ESTERASE UR QL STRIP: ABNORMAL
LYMPHOCYTES # BLD AUTO: 0.9 10E9/L (ref 0.8–5.3)
LYMPHOCYTES NFR BLD AUTO: 5.6 %
MCH RBC QN AUTO: 28.8 PG (ref 26.5–33)
MCHC RBC AUTO-ENTMCNC: 32.6 G/DL (ref 31.5–36.5)
MCV RBC AUTO: 88 FL (ref 78–100)
MONOCYTES # BLD AUTO: 1.4 10E9/L (ref 0–1.3)
MONOCYTES NFR BLD AUTO: 9.3 %
NEUTROPHILS # BLD AUTO: 13 10E9/L (ref 1.6–8.3)
NEUTROPHILS NFR BLD AUTO: 84.1 %
NITRATE UR QL: NEGATIVE
NRBC # BLD AUTO: 0 10*3/UL
NRBC BLD AUTO-RTO: 0 /100
PCO2 BLDV: 31 MM HG (ref 40–50)
PH BLDV: 7.48 PH (ref 7.32–7.43)
PH UR STRIP: 6 PH (ref 5–7)
PLATELET # BLD AUTO: 255 10E9/L (ref 150–450)
PO2 BLDV: 53 MM HG (ref 25–47)
POTASSIUM SERPL-SCNC: 3.5 MMOL/L (ref 3.4–5.3)
PROT SERPL-MCNC: 7.9 G/DL (ref 6.8–8.8)
RBC # BLD AUTO: 4.72 10E12/L (ref 4.4–5.9)
RBC #/AREA URNS AUTO: >182 /HPF (ref 0–2)
SAO2 % BLDV FROM PO2: 90 %
SODIUM SERPL-SCNC: 136 MMOL/L (ref 133–144)
SOURCE: ABNORMAL
SP GR UR STRIP: ABNORMAL (ref 1–1.03)
SPECIMEN SOURCE: NORMAL
UROBILINOGEN UR STRIP-MCNC: NORMAL MG/DL (ref 0–2)
WBC # BLD AUTO: 15.5 10E9/L (ref 4–11)
WBC #/AREA URNS AUTO: >182 /HPF (ref 0–5)

## 2020-01-12 PROCEDURE — 99285 EMERGENCY DEPT VISIT HI MDM: CPT | Mod: Z6 | Performed by: EMERGENCY MEDICINE

## 2020-01-12 PROCEDURE — 99285 EMERGENCY DEPT VISIT HI MDM: CPT | Mod: 25 | Performed by: EMERGENCY MEDICINE

## 2020-01-12 PROCEDURE — 87800 DETECT AGNT MULT DNA DIREC: CPT | Performed by: EMERGENCY MEDICINE

## 2020-01-12 PROCEDURE — 74018 RADEX ABDOMEN 1 VIEW: CPT

## 2020-01-12 PROCEDURE — 87040 BLOOD CULTURE FOR BACTERIA: CPT | Performed by: EMERGENCY MEDICINE

## 2020-01-12 PROCEDURE — 76770 US EXAM ABDO BACK WALL COMP: CPT

## 2020-01-12 PROCEDURE — 85610 PROTHROMBIN TIME: CPT | Performed by: EMERGENCY MEDICINE

## 2020-01-12 PROCEDURE — 96375 TX/PRO/DX INJ NEW DRUG ADDON: CPT | Performed by: EMERGENCY MEDICINE

## 2020-01-12 PROCEDURE — 25000132 ZZH RX MED GY IP 250 OP 250 PS 637: Performed by: EMERGENCY MEDICINE

## 2020-01-12 PROCEDURE — 87086 URINE CULTURE/COLONY COUNT: CPT | Performed by: EMERGENCY MEDICINE

## 2020-01-12 PROCEDURE — 51798 US URINE CAPACITY MEASURE: CPT | Performed by: EMERGENCY MEDICINE

## 2020-01-12 PROCEDURE — 80053 COMPREHEN METABOLIC PANEL: CPT | Performed by: EMERGENCY MEDICINE

## 2020-01-12 PROCEDURE — 87186 SC STD MICRODIL/AGAR DIL: CPT | Performed by: EMERGENCY MEDICINE

## 2020-01-12 PROCEDURE — 71046 X-RAY EXAM CHEST 2 VIEWS: CPT

## 2020-01-12 PROCEDURE — 25800030 ZZH RX IP 258 OP 636: Performed by: EMERGENCY MEDICINE

## 2020-01-12 PROCEDURE — 96361 HYDRATE IV INFUSION ADD-ON: CPT | Performed by: EMERGENCY MEDICINE

## 2020-01-12 PROCEDURE — 82803 BLOOD GASES ANY COMBINATION: CPT

## 2020-01-12 PROCEDURE — 85025 COMPLETE CBC W/AUTO DIFF WBC: CPT | Performed by: EMERGENCY MEDICINE

## 2020-01-12 PROCEDURE — 87804 INFLUENZA ASSAY W/OPTIC: CPT | Performed by: EMERGENCY MEDICINE

## 2020-01-12 PROCEDURE — 25000128 H RX IP 250 OP 636: Performed by: EMERGENCY MEDICINE

## 2020-01-12 PROCEDURE — 81001 URINALYSIS AUTO W/SCOPE: CPT | Performed by: EMERGENCY MEDICINE

## 2020-01-12 PROCEDURE — 87077 CULTURE AEROBIC IDENTIFY: CPT | Performed by: EMERGENCY MEDICINE

## 2020-01-12 PROCEDURE — 96365 THER/PROPH/DIAG IV INF INIT: CPT | Performed by: EMERGENCY MEDICINE

## 2020-01-12 PROCEDURE — 83605 ASSAY OF LACTIC ACID: CPT

## 2020-01-12 RX ORDER — HYDROMORPHONE HYDROCHLORIDE 1 MG/ML
0.5 INJECTION, SOLUTION INTRAMUSCULAR; INTRAVENOUS; SUBCUTANEOUS ONCE
Status: COMPLETED | OUTPATIENT
Start: 2020-01-12 | End: 2020-01-12

## 2020-01-12 RX ORDER — WARFARIN SODIUM 7.5 MG/1
7.5 TABLET ORAL ONCE
Status: COMPLETED | OUTPATIENT
Start: 2020-01-12 | End: 2020-01-12

## 2020-01-12 RX ORDER — ONDANSETRON 2 MG/ML
4 INJECTION INTRAMUSCULAR; INTRAVENOUS ONCE
Status: COMPLETED | OUTPATIENT
Start: 2020-01-12 | End: 2020-01-12

## 2020-01-12 RX ORDER — CEFTRIAXONE 1 G/1
1 INJECTION, POWDER, FOR SOLUTION INTRAMUSCULAR; INTRAVENOUS ONCE
Status: COMPLETED | OUTPATIENT
Start: 2020-01-12 | End: 2020-01-12

## 2020-01-12 RX ADMIN — ONDANSETRON 4 MG: 2 INJECTION INTRAMUSCULAR; INTRAVENOUS at 20:05

## 2020-01-12 RX ADMIN — TICAGRELOR 90 MG: 90 TABLET ORAL at 23:46

## 2020-01-12 RX ADMIN — SODIUM CHLORIDE 1000 ML: 9 INJECTION, SOLUTION INTRAVENOUS at 19:09

## 2020-01-12 RX ADMIN — WARFARIN SODIUM 7.5 MG: 7.5 TABLET ORAL at 23:46

## 2020-01-12 RX ADMIN — HYDROMORPHONE HYDROCHLORIDE 0.5 MG: 1 INJECTION, SOLUTION INTRAMUSCULAR; INTRAVENOUS; SUBCUTANEOUS at 20:05

## 2020-01-12 RX ADMIN — CEFTRIAXONE 1 G: 1 INJECTION, POWDER, FOR SOLUTION INTRAMUSCULAR; INTRAVENOUS at 22:40

## 2020-01-12 ASSESSMENT — ENCOUNTER SYMPTOMS
FLANK PAIN: 1
VOMITING: 1
HEADACHES: 1
SHORTNESS OF BREATH: 0
COUGH: 1
DIARRHEA: 0
APPETITE CHANGE: 1
FEVER: 1
ABDOMINAL PAIN: 0
NAUSEA: 1

## 2020-01-13 PROBLEM — N39.0 UTI (URINARY TRACT INFECTION): Status: ACTIVE | Noted: 2020-01-13

## 2020-01-13 LAB
BACTERIA SPEC CULT: NO GROWTH
INR PPP: 2.6 (ref 0.86–1.14)
LACTATE BLD-SCNC: 0.6 MMOL/L (ref 0.7–2)
Lab: NORMAL
SPECIMEN SOURCE: NORMAL

## 2020-01-13 PROCEDURE — 36415 COLL VENOUS BLD VENIPUNCTURE: CPT

## 2020-01-13 PROCEDURE — 12000001 ZZH R&B MED SURG/OB UMMC

## 2020-01-13 PROCEDURE — 25000128 H RX IP 250 OP 636: Performed by: STUDENT IN AN ORGANIZED HEALTH CARE EDUCATION/TRAINING PROGRAM

## 2020-01-13 PROCEDURE — 96376 TX/PRO/DX INJ SAME DRUG ADON: CPT | Performed by: EMERGENCY MEDICINE

## 2020-01-13 PROCEDURE — 25000132 ZZH RX MED GY IP 250 OP 250 PS 637: Performed by: EMERGENCY MEDICINE

## 2020-01-13 PROCEDURE — 85610 PROTHROMBIN TIME: CPT | Performed by: EMERGENCY MEDICINE

## 2020-01-13 PROCEDURE — 99223 1ST HOSP IP/OBS HIGH 75: CPT | Performed by: INTERNAL MEDICINE

## 2020-01-13 PROCEDURE — 40000141 ZZH STATISTIC PERIPHERAL IV START W/O US GUIDANCE

## 2020-01-13 PROCEDURE — 25000128 H RX IP 250 OP 636: Performed by: INTERNAL MEDICINE

## 2020-01-13 PROCEDURE — 25800030 ZZH RX IP 258 OP 636: Performed by: INTERNAL MEDICINE

## 2020-01-13 PROCEDURE — 25000132 ZZH RX MED GY IP 250 OP 250 PS 637: Performed by: INTERNAL MEDICINE

## 2020-01-13 PROCEDURE — 83605 ASSAY OF LACTIC ACID: CPT

## 2020-01-13 PROCEDURE — 36415 COLL VENOUS BLD VENIPUNCTURE: CPT | Performed by: EMERGENCY MEDICINE

## 2020-01-13 PROCEDURE — 25000132 ZZH RX MED GY IP 250 OP 250 PS 637: Performed by: STUDENT IN AN ORGANIZED HEALTH CARE EDUCATION/TRAINING PROGRAM

## 2020-01-13 RX ORDER — PROCHLORPERAZINE MALEATE 5 MG
10 TABLET ORAL EVERY 6 HOURS PRN
Status: DISCONTINUED | OUTPATIENT
Start: 2020-01-13 | End: 2020-01-15 | Stop reason: HOSPADM

## 2020-01-13 RX ORDER — PROCHLORPERAZINE 25 MG
25 SUPPOSITORY, RECTAL RECTAL EVERY 12 HOURS PRN
Status: DISCONTINUED | OUTPATIENT
Start: 2020-01-13 | End: 2020-01-15 | Stop reason: HOSPADM

## 2020-01-13 RX ORDER — ONDANSETRON 4 MG/1
4 TABLET, ORALLY DISINTEGRATING ORAL EVERY 6 HOURS PRN
Status: DISCONTINUED | OUTPATIENT
Start: 2020-01-13 | End: 2020-01-15 | Stop reason: HOSPADM

## 2020-01-13 RX ORDER — HYDROMORPHONE HYDROCHLORIDE 1 MG/ML
.3-.5 INJECTION, SOLUTION INTRAMUSCULAR; INTRAVENOUS; SUBCUTANEOUS EVERY 6 HOURS PRN
Status: DISCONTINUED | OUTPATIENT
Start: 2020-01-13 | End: 2020-01-14

## 2020-01-13 RX ORDER — ACETAMINOPHEN 325 MG/1
325 TABLET ORAL ONCE
Status: COMPLETED | OUTPATIENT
Start: 2020-01-13 | End: 2020-01-13

## 2020-01-13 RX ORDER — AMOXICILLIN 250 MG
2 CAPSULE ORAL 2 TIMES DAILY
Status: DISCONTINUED | OUTPATIENT
Start: 2020-01-13 | End: 2020-01-15 | Stop reason: HOSPADM

## 2020-01-13 RX ORDER — ACETAMINOPHEN 325 MG/1
650 TABLET ORAL EVERY 4 HOURS PRN
Status: DISCONTINUED | OUTPATIENT
Start: 2020-01-13 | End: 2020-01-15 | Stop reason: HOSPADM

## 2020-01-13 RX ORDER — EZETIMIBE 10 MG/1
10 TABLET ORAL DAILY
Status: DISCONTINUED | OUTPATIENT
Start: 2020-01-13 | End: 2020-01-15 | Stop reason: HOSPADM

## 2020-01-13 RX ORDER — AMOXICILLIN 250 MG
1 CAPSULE ORAL 2 TIMES DAILY
Status: DISCONTINUED | OUTPATIENT
Start: 2020-01-13 | End: 2020-01-15 | Stop reason: HOSPADM

## 2020-01-13 RX ORDER — LISINOPRIL 2.5 MG/1
2.5 TABLET ORAL DAILY
Status: DISCONTINUED | OUTPATIENT
Start: 2020-01-13 | End: 2020-01-15 | Stop reason: HOSPADM

## 2020-01-13 RX ORDER — CEFTRIAXONE 1 G/1
1 INJECTION, POWDER, FOR SOLUTION INTRAMUSCULAR; INTRAVENOUS EVERY 24 HOURS
Status: DISCONTINUED | OUTPATIENT
Start: 2020-01-13 | End: 2020-01-13

## 2020-01-13 RX ORDER — NALOXONE HYDROCHLORIDE 0.4 MG/ML
.1-.4 INJECTION, SOLUTION INTRAMUSCULAR; INTRAVENOUS; SUBCUTANEOUS
Status: DISCONTINUED | OUTPATIENT
Start: 2020-01-13 | End: 2020-01-14

## 2020-01-13 RX ORDER — CALCIUM CARBONATE 500 MG/1
500 TABLET, CHEWABLE ORAL DAILY PRN
Status: DISCONTINUED | OUTPATIENT
Start: 2020-01-13 | End: 2020-01-15 | Stop reason: ALTCHOICE

## 2020-01-13 RX ORDER — LIDOCAINE 40 MG/G
CREAM TOPICAL
Status: DISCONTINUED | OUTPATIENT
Start: 2020-01-13 | End: 2020-01-15 | Stop reason: HOSPADM

## 2020-01-13 RX ORDER — HYDROMORPHONE HYDROCHLORIDE 1 MG/ML
0.5 INJECTION, SOLUTION INTRAMUSCULAR; INTRAVENOUS; SUBCUTANEOUS ONCE
Status: COMPLETED | OUTPATIENT
Start: 2020-01-13 | End: 2020-01-13

## 2020-01-13 RX ORDER — ONDANSETRON 2 MG/ML
4 INJECTION INTRAMUSCULAR; INTRAVENOUS ONCE
Status: COMPLETED | OUTPATIENT
Start: 2020-01-13 | End: 2020-01-13

## 2020-01-13 RX ORDER — ONDANSETRON 2 MG/ML
4 INJECTION INTRAMUSCULAR; INTRAVENOUS EVERY 6 HOURS PRN
Status: DISCONTINUED | OUTPATIENT
Start: 2020-01-13 | End: 2020-01-15 | Stop reason: HOSPADM

## 2020-01-13 RX ORDER — ATORVASTATIN CALCIUM 80 MG/1
80 TABLET, FILM COATED ORAL DAILY
Status: DISCONTINUED | OUTPATIENT
Start: 2020-01-13 | End: 2020-01-15 | Stop reason: HOSPADM

## 2020-01-13 RX ORDER — WARFARIN SODIUM 1 MG/1
1 TABLET ORAL
Status: COMPLETED | OUTPATIENT
Start: 2020-01-13 | End: 2020-01-13

## 2020-01-13 RX ORDER — FENOFIBRATE 160 MG/1
160 TABLET ORAL DAILY
Status: DISCONTINUED | OUTPATIENT
Start: 2020-01-13 | End: 2020-01-15 | Stop reason: HOSPADM

## 2020-01-13 RX ADMIN — LISINOPRIL 2.5 MG: 2.5 TABLET ORAL at 08:01

## 2020-01-13 RX ADMIN — SENNOSIDES AND DOCUSATE SODIUM 1 TABLET: 8.6; 5 TABLET ORAL at 08:01

## 2020-01-13 RX ADMIN — TICAGRELOR 90 MG: 90 TABLET ORAL at 10:59

## 2020-01-13 RX ADMIN — VANCOMYCIN HYDROCHLORIDE 2000 MG: 10 INJECTION, POWDER, LYOPHILIZED, FOR SOLUTION INTRAVENOUS at 23:53

## 2020-01-13 RX ADMIN — SENNOSIDES AND DOCUSATE SODIUM 1 TABLET: 8.6; 5 TABLET ORAL at 19:44

## 2020-01-13 RX ADMIN — Medication 12.5 MG: at 08:01

## 2020-01-13 RX ADMIN — Medication 12.5 MG: at 19:43

## 2020-01-13 RX ADMIN — HYDROMORPHONE HYDROCHLORIDE 0.5 MG: 1 INJECTION, SOLUTION INTRAMUSCULAR; INTRAVENOUS; SUBCUTANEOUS at 19:44

## 2020-01-13 RX ADMIN — ONDANSETRON 4 MG: 2 INJECTION INTRAMUSCULAR; INTRAVENOUS at 00:17

## 2020-01-13 RX ADMIN — VANCOMYCIN HYDROCHLORIDE 2000 MG: 10 INJECTION, POWDER, LYOPHILIZED, FOR SOLUTION INTRAVENOUS at 11:50

## 2020-01-13 RX ADMIN — WARFARIN SODIUM 1 MG: 1 TABLET ORAL at 18:07

## 2020-01-13 RX ADMIN — EZETIMIBE 10 MG: 10 TABLET ORAL at 08:01

## 2020-01-13 RX ADMIN — ATORVASTATIN CALCIUM 80 MG: 80 TABLET, FILM COATED ORAL at 08:01

## 2020-01-13 RX ADMIN — CEFEPIME HYDROCHLORIDE 2 G: 2 INJECTION, POWDER, FOR SOLUTION INTRAVENOUS at 18:03

## 2020-01-13 RX ADMIN — CEFEPIME HYDROCHLORIDE 2 G: 2 INJECTION, POWDER, FOR SOLUTION INTRAVENOUS at 10:15

## 2020-01-13 RX ADMIN — ACETAMINOPHEN 650 MG: 325 TABLET, FILM COATED ORAL at 13:29

## 2020-01-13 RX ADMIN — ONDANSETRON 4 MG: 2 INJECTION INTRAMUSCULAR; INTRAVENOUS at 04:57

## 2020-01-13 RX ADMIN — ACETAMINOPHEN 325 MG: 325 TABLET, FILM COATED ORAL at 19:44

## 2020-01-13 RX ADMIN — ACETAMINOPHEN 650 MG: 325 TABLET, FILM COATED ORAL at 08:01

## 2020-01-13 RX ADMIN — ONDANSETRON 4 MG: 2 INJECTION INTRAMUSCULAR; INTRAVENOUS at 17:58

## 2020-01-13 RX ADMIN — HYDROMORPHONE HYDROCHLORIDE 0.5 MG: 1 INJECTION, SOLUTION INTRAMUSCULAR; INTRAVENOUS; SUBCUTANEOUS at 00:17

## 2020-01-13 RX ADMIN — HYDROMORPHONE HYDROCHLORIDE 0.3 MG: 1 INJECTION, SOLUTION INTRAMUSCULAR; INTRAVENOUS; SUBCUTANEOUS at 07:44

## 2020-01-13 RX ADMIN — ACETAMINOPHEN 650 MG: 325 TABLET, FILM COATED ORAL at 18:07

## 2020-01-13 RX ADMIN — TICAGRELOR 90 MG: 90 TABLET ORAL at 23:57

## 2020-01-13 RX ADMIN — FENOFIBRATE 160 MG: 160 TABLET ORAL at 08:01

## 2020-01-13 ASSESSMENT — ACTIVITIES OF DAILY LIVING (ADL)
ADLS_ACUITY_SCORE: 13
ADLS_ACUITY_SCORE: 15
ADLS_ACUITY_SCORE: 13
ADLS_ACUITY_SCORE: 15
ADLS_ACUITY_SCORE: 13

## 2020-01-13 NOTE — PHARMACY-ANTICOAGULATION SERVICE
Clinical Pharmacy - Warfarin Dosing Consult     Pharmacy has been consulted to manage this patient s warfarin therapy.  Indication: DVT/ PE Treatment  Therapy Goal: INR 2-3  OP Anticoag Clinic: [facility]  Warfarin Prior to Admission: Yes  Warfarin PTA Regimen: 7.5 mg daily  Significant drug interactions: aspirin (instructed by cardiology to stop once INR >2), ticagrelor  Recent documented change in oral intake/nutrition: Unknown    INR   Date Value Ref Range Status   01/10/2020 1.86 (H) 0.86 - 1.14 Final   01/07/2020 1.66 (H) 0.86 - 1.14 Final     Chromogenic Factor 10   Date Value Ref Range Status   06/18/2011 81 60 - 140 % Final     Comment:     Therapeutic Range: A Chromogenic Factor 10 level of 20-40% inversely   correlates   with an INR of 2-3 for patients receiving Warfarin. Chromogenic Factor 10   levels below 20% indicate an INR greater than 3, and levels above 40%   indicate   an INR less than 2.     Factor 2 Assay   Date Value Ref Range Status   04/04/2012  60 - 140 % Final    (Note)  CANCELLED AND CREDITED PER [facility] IN [facility]. MONITORING,4/4/12,       Recommend warfarin 7.5 mg today.  Pharmacy will monitor Oswaldo Prabhakar daily and order warfarin doses to achieve specified goal.      Please contact pharmacy as soon as possible if the warfarin needs to be held for a procedure or if the warfarin goals change.      Issac Plasencia Pharm.D.

## 2020-01-13 NOTE — H&P
[facility], [place]    History and Physical - [facility] Service        Date of Admission:  1/12/2020    Assessment & Plan   Oswaldo Prabhakar is a 59 year old male with PMH of CAD 4v CABG (2014) and s/p recent NIR to LIMA (1/7/2020), previous PE on warfarin s/p thoracotomy with lung resection (2011), recent right proximal ureteral stone s/p cystoscopy with ureteral stent placement (1/10/2020), who presents with fevers and malaise found to have concern for complicated UTI.    Complicated UTI  Pyuria and hematuria   Recent ureteral stone s/p cystoscopy with ureteral stent placement  Patient presents with 2 days of progressive fevers, malaise, nausea, vomiting after recent cystoscopy found to have objective fever, leukocytosis, worsening pyuria on UA concerning for complicated UTI. Renal US w/o hydronephrosis or obstruction seen, X-ray abdomen shows stent in position. Discussed with on-call urology that reviewed images and given no concern for obstruction or stent malposition no indication for urologic intervention and would treat as standard UTI. Of note, given in-operative urine culture so far no growth, on-call urology did recommend evaluating for alternative sources of infection. On-call urology also noted that hematuria is to be suspected after recent cystoscopy and no indication for stent removal despite concern for UTI. Patient w/o dyspnea, cough, abdominal pain, diarrhea, new rashes/skin changes lower suspicion for alternative source of infection at this time. Influenza negative. Received 1 g ceftriaxone in the ED.  - continue 1 gram ceftriaxone daily, low threshold to switch to zosyn if fever persists or clinical decompensation (for empiric coverage of ESBL given recent health care exposure)  - follow blood, urine cultures  - did not place urology consult, if patient clinically worsens with concern for underlying worsening urologic process would then place formal urology consult  -  prn zofran, prn tylenol  - prn dilaudid q6 hr for now, consider discontinuing in morning     CAD s/p 4vCABG (2014) and recent NIR to [facility]A (1/7/2020)  Patient denies chest pain.   - cont pta ticagrelor  - cont pta statin  - cont pta zetia  - cont pta fenofibrate  - cont pta lisinopril  - cont pta metoprolol   - hold pta aspirin, patient no longer taking given INR > 2 on warfarin    PE on warfarin   - pharmacy to dose warfarin       Diet: ADAT   Fluids: none  DVT Prophylaxis: pta warfarin  Daniel Catheter: in place, indication:    Code Status: FULL     Disposition Plan   Expected discharge: 2 - 3 days, recommended to prior living arrangement once antibiotic plan established.  Entered: Samantha Holguin MD 01/13/2020, 12:49 AM       Patient will be formally staffed in the AM    Samantha Holguin MD  [facility], [place]  Pager: [phone]  Please see sticky note for cross cover information  ______________________________________________________________________    Chief Complaint   Fever, headache, nausea, vomiting    History is obtained from the patient    History of Present Illness   Oswaldo Prabhakar is a 59 year old male with PMH of CAD 4v CABG (2014) and s/p recent NIR to [facility]A (1/7/2020), previous PE on warfarin s/p thoracotomy with lung resection (2011), recent right proximal ureteral stone s/p cystoscopy with ureteral stent placement (1/10/2020), who presents with fevers and malaise.    Patient with recent unfortunate medical history this past week. Was hospitalized from 1/6-1/7 for L sided chest pain found to have unstable angina and had coronary angiogram s/p NIR to LIMA and discharged on tigacrelor and aspirin and told to continue his warfarin and stop aspirin once INR > 2. He stopped his aspirin today. He was subsequently hospitalized on 1/10 for right flank pain found to have CT abdomen that showed 5 mm proximal right ureteral stone and underwent cystoscopy per  "urology with placement of 6 x 26 double - J ureteral stent. Operative note commented on potentially infected urine draining through stent, a sample was collected with urine culture so far no growth to date. Patient was discharged on keflex 300 mg TID x 3 days.      Patient presents with 1-2 day of progressive fever, nausea / vomiting, headache, malaise / indigestion. His highest fever at home was > 102 F. He reports his urine has gone from pinkish to what looks like just gross bloody output. He denies chest pain currently, he said he had 2 \"twinges\" of chest pain that were somewhat similar to those he had prior to his NIR placed to LIMA but that has resolved and denies any current ongoing anginal pain.  He remembers he was told if he had at discharge from last hospitalization if he had a high fever he should come in and so he presented to the ED.  He otherwise denies cough / sore throat / chest pain / dyspnea / abdominal pain / dysuria / flank or back pain / diarrhea / constipation / blood or dark stools.    In the ED he was found to be febrile to 101.3, otherwise was hemodynamically stable. Labs notable for WBC 15.5, Cr mild elevated for patient at 1.18, lactate 0.9, INR 2.1.  UA showed moderate leuk esterase, negative nitrite, > 182 WBC and > 182 RBC.  He received 1 L NS, 1 g ceftriaxone in the ED and admitted to medicine.     Review of Systems    The 10 point Review of Systems is negative other than noted in the HPI or here.     Past Medical History    I have reviewed this patient's medical history and updated it with pertinent information if needed.   Past Medical History:   Diagnosis Date     Antiplatelet or antithrombotic long-term use      Coronary artery disease      Diaphragmatic hernia without mention of obstruction or gangrene      Heart disease      Hernia, abdominal      History of blood transfusion      History of thrombophlebitis      Hypertension      Nephrolithiasis 4/2010     Other and unspecified " hyperlipidemia      Pulmonary embolus and infarction (H)     s/p hemothorax and filter s/p filter removal (2011), now on long term anti-coagulation     Statin intolerance 2/1/2017     Stented coronary artery      Unspecified retinal detachment     Childhood trauma, unrepaired      Past Surgical History   I have reviewed this patient's surgical history and updated it with pertinent information if needed.  Past Surgical History:   Procedure Laterality Date     BYPASS GRAFT ARTERY CORONARY  4/4/2014    Procedure: BYPASS GRAFT ARTERY CORONARY;  Median Sternotomy, Coronary Artery Bypass Bypass x 4 using Left Internal Mammary, Left Saphenous Vein, on pump oxygenation;  Surgeon: Sherry Armando MD;  Location: [facility] OR     C NONSPECIFIC PROCEDURE      Spermatocele resection     CLOSED REDUCTION, PERCUTANEOUS PINNING  HAND, COMBINED Left 11/5/2015    Procedure: COMBINED CLOSED REDUCTION, PERCUTANEOUS PINNING HAND;  Surgeon: Carmella Love MD;  Location: [facility] OR     COLONOSCOPY  3/15/2013    Procedure: COLONOSCOPY;;  Surgeon: Racheal Shipman MD;  Location:  GI     CV ANGIOGRAM CORONARY GRAFT N/A 1/7/2020    Procedure: Angiogram Coronary Graft;  Surgeon: Chato Odonnell MD;  Location:  HEART CARDIAC CATH LAB     CV CORONARY ANGIOGRAM  1/7/2020    Procedure: CV CORONARY ANGIOGRAM;  Surgeon: Chato Odonnell MD;  Location: [facility] CARDIAC CATH LAB     CV PCI STENT DRUG ELUTING N/A 1/7/2020    Procedure: PCI Stent Drug Eluting;  Surgeon: Chato Odonnell MD;  Location: [facility] CARDIAC CATH LAB     CYSTOSCOPY       LAPAROSCOPIC CHOLECYSTECTOMY N/A 8/6/2018    Procedure: LAPAROSCOPIC CHOLECYSTECTOMY;  LAPAROSCOPIC CHOLECYSTECTOMY ;  Surgeon: José Miguel Stuart MD;  Location: [facility] OR     LITHOTRIPSY  4/2010     THORACIC SURGERY      lung surgery, thoracotomy, lung adhesion      Social History   Lives with wife and 2 kids. Denies smoking, alcohol use, illicit. 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     Family History   I have reviewed this patient's family history and updated it with pertinent information if needed.   Family History   Problem Relation Age of Onset     Heart Disease Mother      C.A.D. Father         3 vessel CABG, age 70     Cancer Father         bladder cancer     C.A.D. Paternal Grandmother      Hypertension Paternal Grandmother      Alzheimer Disease Paternal Grandmother      Diabetes No family hx of      Thyroid Disease No family hx of      Cancer - colorectal No family hx of      Prior to Admission Medications   Prior to Admission Medications   Prescriptions Last Dose Informant Patient Reported? Taking?   Cholecalciferol (VITAMIN D) 2000 UNITS CAPS   Yes No   Sig: Take 2,000 Units by mouth daily   DiphenhydrAMINE HCl (BENADRYL PO)   Yes No   Sig: Take 25-50 mg by mouth daily as needed   aspirin 81 MG EC tablet   No No   Sig: Take 1 tablet (81 mg) by mouth daily Stop once INR >2.0   atorvastatin (LIPITOR) 80 MG tablet   No No   Sig: Take 1 tablet (80 mg) by mouth daily   calcium carbonate (TUMS) 500 MG chewable tablet  Self Yes No   Sig: Take 1 chew tab by mouth daily as needed   cephALEXin (KEFLEX) 500 MG capsule   No No   Sig: Take 1 capsule (500 mg) by mouth 3 times daily   evolocumab (REPATHA SURECLICK) 140 MG/ML prefilled autoinjector   No No   Sig: Inject 1 mL (140 mg) Subcutaneous every 14 days   ezetimibe (ZETIA) 10 MG tablet   No No   Sig: Take 1 tablet (10 mg) by mouth daily   fenofibrate (TRIGLIDE/LOFIBRA) 160 MG tablet   No No   Sig: Take 1 tablet (160 mg) by mouth daily   lisinopril (PRINIVIL/ZESTRIL) 2.5 MG tablet   No No   Sig: Take 1 tablet (2.5 mg) by mouth daily , or as directed by Dr. Ocampo.   metoprolol tartrate (LOPRESSOR) 25 MG tablet   No No   Sig: Take 0.5 tablets (12.5 mg) by mouth 2 times daily   ticagrelor (BRILINTA) 90  MG tablet   No No   Sig: Take 1 tablet (90 mg) by mouth every 12 hours   warfarin ANTICOAGULANT (COUMADIN) 5 MG tablet   Yes No   Sig: Warfarin 7.5mg daily - pre cardiology take 10mg on 01/08 and 01/09      Facility-Administered Medications: None     Allergies   Allergies   Allergen Reactions     Cats      Hay Fever & [A.R.M.]      Heparin Other (See Comments)     Heparin resistance per cardio-thoracic surgeon Dr. Armando     Hydrocodone-Acetaminophen Nausea and Vomiting     Acetaminophen is ok       Physical Exam   Vital Signs: Temp: 100.6  F (38.1  C) Temp src: Oral BP: (!) 148/82 Pulse: 95 Heart Rate: 96 Resp: 16 SpO2: 97 % O2 Device: None (Room air)    Weight: 209 lbs 9.6 oz    Constitutional: awake, alert, cooperative, no apparent distress, and appears stated age  Eyes: Lids and lashes normal, pupils equal, round and reactive to light, extra ocular muscles intact, sclera clear, conjunctiva normal  ENT: Normocephalic, without obvious abnormality, atraumatic, external ears without lesions, oral pharynx with moist mucous membranes   Respiratory: No increased work of breathing, good air exchange, clear to auscultation bilaterally, no crackles or wheezing  Cardiovascular: Normal apical impulse, regular rate and rhythm, normal S1 and S2, no S3 or S4, and no murmur noted  GI:  normal bowel sounds, soft, non-distended, non-tender, no masses palpated, no hepatosplenomegaly, no CVA or flank tenderness   Skin: no rashes  Musculoskeletal: Moves all 4 extremities. No LE edema  Neurologic: Awake, alert, oriented to name, place and time.  Cranial nerves II-XII are grossly intact.  Nonfocal.     Data   Data reviewed today: I reviewed all medications, new labs and imaging results over the last 24 hours.     Recent Labs   Lab 01/12/20  1853 01/10/20  0301 01/07/20  0431  01/06/20  1911 01/06/20  1505 01/06/20  1042  01/06/20  1041   WBC 15.5* 7.7  --   --  7.3  --  5.7   < >  --    HGB 13.6 14.2  --   --  13.8  --  14.3   < >   --    MCV 88 87  --   --  88  --  89   < >  --     249  --   --  217  --  219   < >  --    INR 2.16* 1.86* 1.66*  --   --   --  1.30*   < >  --     138 139  --   --   --  139   < >  --    POTASSIUM 3.5 3.5 3.8   < >  --   --  3.2*  --   --    CHLORIDE 103 107 108  --   --   --  106   < >  --    CO2 24 24 26  --   --   --  30   < >  --    BUN 15 28 14  --   --   --  10   < >  --    CR 1.18 1.16 0.87  --   --   --  0.97   < >  --    ANIONGAP 8 7 5  --   --   --  4   < >  --    GONZALES 9.7 10.4* 9.0  --   --   --  9.1   < >  --    * 120* 102*  --   --   --  92   < >  --    ALBUMIN 3.6  --   --   --   --   --  3.9  --   --    PROTTOTAL 7.9  --   --   --   --   --  7.5  --   --    BILITOTAL 1.1  --   --   --   --   --  0.6  --   --    ALKPHOS 53  --   --   --   --   --  50  --   --    ALT 37  --   --   --   --   --  38  --   --    AST 12  --   --   --   --   --  20  --   --    TROPI  --   --   --   --   --  <0.015 <0.015  --   --    TROPONIN  --   --   --   --   --   --   --   --  0.00    < > = values in this interval not displayed.     Recent Results (from the past 24 hour(s))   XR Chest 2 Views    Narrative    PA and lateral chest    HISTORY: Fever    COMPARISON STUDY: 1/6/2020    FINDINGS: Cardiac silhouette is not enlarged. Median sternotomy wires.  Left basilar atelectasis. Cholecystectomy clips.      Impression    IMPRESSION: Minimal left basilar atelectasis. No acute airspace  disease    MONIE PUTNAM MD   XR Abdomen 1 View    Narrative    Exam: XR ABDOMEN 1 VW, 1/12/2020 10:24 PM    Indication: Check stent placement    Comparison: Plain film 1/10/2020. CT 1/10/2020.    Findings:   Single portable supine radiograph of the abdomen. Right double-J stent  with the proximal end with near the renal pelvis and the distal end  overlying the region of the urinary bladder. Pelvic phleboliths.  Nonobstructive bowel gas pattern. No pneumatosis. No acute osseous  evident mildly. Soft tissues unremarkable.       Impression    Impression: Right double-J stent with the proximal end near the region  of the renal pelvis/upper pole of the kidney and distal end overlying  the region of the bladder.   US Renal Complete    Narrative    EXAMINATION: US RENAL COMPLETE, 1/13/2020 12:07 AM     COMPARISON: Ultrasound 7/26/2018    HISTORY: Evaluate for residual hydronephrosis of the right kidney  after stent placement.    TECHNIQUE: The kidneys and bladder were scanned in the standard  fashion with specialized ultrasound transducer(s) using both gray  scale and limited color/spectral Doppler techniques.    FINDINGS:    Right kidney: Measures 13.3 cm in length. Parenchyma is of normal  thickness and echogenicity. No hydronephrosis. There is a 1.5 x 1.2 x  1.7 cm cyst within the lower pole of the right kidney. Presence of a  double-J stent with the proximal and coronal thin the upper pole of  the kidney and the distal and within the urinary bladder.    Left kidney: Measures 13.1 cm in length. Parenchyma is of normal  thickness and echogenicity. No focal mass. No hydronephrosis.     Bladder: Echogenic debris within the posterior dependent portion of  the bladder.    Partial visualization of the liver demonstrates echogenic liver  parenchyma.      Impression    IMPRESSION:  1.  No hydronephrosis bilaterally. Presence of right double-J ureteral  stent spanning from the upper pole of the right kidney to the urinary  bladder.  2.  1.7 cm right renal cyst.  3.  Echogenic liver parenchyma, can be seen in hepatic steatosis.

## 2020-01-13 NOTE — PLAN OF CARE
Admission/Transfer from: Home  2 RN skin assessment completed.Yes  Significant findings include: No  WOC Nurse Consult Ordered? No  Was NST/NA able to join during assessment? No

## 2020-01-13 NOTE — PLAN OF CARE
New admit: with fever, headache, nausea and vomiting. History of unstable angina s/p angiogram. Recent right ureteral stone s/p cystoscopy with ureteral stent placement.. PE on coumadin. S/p thoracotomy with lung resection. Patient alert and oriented x 4. Vitals stable on room air except for temp of 100.6. Up independently to the bathroom. Reports bloody urine. No BM this shift. Left PIV SL. On IV abx.  Zofrangiven for nausea. Emesis x1. Patient oriented to room and call light. Will continue to monitor and follow plan of care.  Patient triggered sepsis. Oral temp 101.8, , /82, Resp 16, Oxygen sats 95%. MD notified. Awaiting new orders.  Lactic acid 0.6.

## 2020-01-13 NOTE — PHARMACY-VANCOMYCIN DOSING SERVICE
Pharmacy Vancomycin Initial Note  Date of Service 2020  Patient's  1960  59 year old, male    Indication: Urinary Tract Infection    Current estimated CrCl = Estimated Creatinine Clearance: 82.6 mL/min (based on SCr of 1.18 mg/dL).    Creatinine for last 3 days  2020:  6:53 PM Creatinine 1.18 mg/dL    Recent Vancomycin Level(s) for last 3 days  No results found for requested labs within last 72 hours.      Vancomycin IV Administrations (past 72 hours)      No vancomycin orders with administrations in past 72 hours.                Nephrotoxins and other renal medications (From now, onward)    Start     Dose/Rate Route Frequency Ordered Stop    20 0945  vancomycin (VANCOCIN) 2,000 mg in sodium chloride 0.9 % 500 mL intermittent infusion      2,000 mg  over 2 Hours Intravenous EVERY 12 HOURS 20 0940      20 0800  lisinopril (PRINIVIL/Zestril) tablet 2.5 mg      2.5 mg Oral DAILY 20 0123            Contrast Orders - past 72 hours (72h ago, onward)    None                Plan:  1.  Start vancomycin  2000 mg IV q12h.   2.  Goal Trough Level: 15-20 mg/L to give coverage beyond the cefepime (MRSA coverage, enterococcus)  3.  Pharmacy will check trough levels as appropriate in 1-3 Days.    4. Serum creatinine levels will be ordered daily for the first week of therapy and at least twice weekly for subsequent weeks.    5. [place] method utilized to dose vancomycin therapy: Method 1    Hunter Ayon PharmD, [facility] [place]    [phone]  Pager [phone]

## 2020-01-13 NOTE — PROGRESS NOTES
[facility], [place]    Progress Note - [facility] Service        Date of Admission:  1/12/2020    Assessment & Plan      sOwaldo Prabhakar is a 59 year old male with PMH of CAD 4v CABG (2014) and s/p recent NIR to LIMA (1/7/2020), previous PE on warfarin s/p thoracotomy with lung resection (2011), recent right proximal ureteral stone s/p cystoscopy with ureteral stent placement (1/10/2020), who presents with fevers and malaise found to have concern for complicated UTI.    Complicated UTI with sepsis in the ED  Pyuria and hematuria   Recent ureteral stone s/p cystoscopy with ureteral stent placement  Patient presents with 2 days of progressive fevers, malaise, nausea, vomiting after recent cystoscopy found to have objective fever, leukocytosis, worsening pyuria on UA concerning for complicated UTI. Renal US w/o hydronephrosis or obstruction seen, X-ray abdomen shows stent in position. Discussed with on-call urology that reviewed images and given no concern for obstruction or stent malposition no indication for urologic intervention and would treat as standard UTI. Of note, given in-operative urine culture so far no growth, on-call urology did recommend evaluating for alternative sources of infection. On-call urology also noted that hematuria is to be suspected after recent cystoscopy and no indication for stent removal despite concern for UTI. Patient w/o dyspnea, cough, abdominal pain, diarrhea, new rashes/skin changes lower suspicion for alternative source of infection at this time. Influenza negative. Received 1 g ceftriaxone in the ED. Transitioned to cefepime/vanc given recent instrumentation  - continue cefepime/vanc until more culture data available   - follow blood, urine cultures  - did not place urology consult, if patient clinically worsens with concern for underlying worsening urologic process would then place formal urology consult  - prn zofran, prn tylenol  - prn dilaudid q6  hr for now, consider discontinuing in morning      CAD s/p 4[facility]ABG (2014) [facility]  Patient denies chest pain.   - cont pta ticagrelor  - cont pta statin  - cont pta zetia  - cont pta fenofibrate  - cont pta lisinopril  - cont pta metoprolol   - hold pta aspirin, patient no longer taking given INR > 2 on warfarin     PE on warfarin   - pharmacy to dose warfarin            Diet: Advance Diet as Tolerated: Clear Liquid Diet; Regular Diet Adult    Fluids: none  Lines: PIV  DVT Prophylaxis: on warfarin   Daniel Catheter: none   Code Status: Full Code      Disposition Plan   Expected discharge: 2 - 3 days, recommended to prior living arrangement once antibiotic plan established.a febrile   Entered: Liseth Mckee MD 01/13/2020, 1:16 PM       The patient's care was discussed with the Attending Physician, Dr. Rebekah Mckee MD  [address], [place]  Pager: [phone]  Please see sticky note for cross cover information  ______________________________________________________________________    Interval History   Continued to fever ON. Pain well controlled. No new concerns this morning.     Data reviewed today: I reviewed all medications, new labs and imaging results over the last 24 hours. I personally reviewed renal US and labs     Physical Exam   Vital Signs: Temp: 101.6  F (38.7  C) Temp src: Oral BP: 137/86 Pulse: 95 Heart Rate: 100 Resp: 16 SpO2: 96 % O2 Device: None (Room air)    Weight: 209 lbs 9.6 oz     Exam:  Constitutional: healthy, alert and no distress  Head: Normocephalic. No masses, lesions, tenderness or abnormalities  Neck: Neck supple. No adenopathy. Thyroid symmetric, normal size,  ENT: EOMI, clear oropharynx  Cardiovascular: RRR. No murmurs, clicks gallops or rub  Respiratory:  Lungs clear w/o wheezes or rhonchi  Gastrointestinal: Abdomen soft, non-tender. BS normal. No masses, organomegaly  Musculoskeletal: extremities  normal- no gross deformities noted and normal muscle tone  Skin: no suspicious lesions or rashes  Neurologic:non focal, AAO      Data   reviewed in epic

## 2020-01-13 NOTE — ED PROVIDER NOTES
[place] EMERGENCY DEPARTMENT ([facility])  1/12/20    History     Chief Complaint   Patient presents with     Fever     The history is provided by the patient, the spouse and medical records.     Oswaldo Prabhakar is a 59 year old male with a past medical history significant for unstable angina s/p CV Coronary Angiogram with NIR to LIMA (1/7/2020), HTN, CAD s/p 4v CABG (2014) HLD, previous PE (On Coumadin) s/p thoracotomy with lung resection (2011) and recent proximal right ureteral stone s/p cystoscopy with uretral stent placement (1/10/2020) who presents here to the Emergency Department due to fever, headache, nausea and vomiting amongst others.    Per chart review patient was recently hospitalized here from 1/6/2020-1/7/2020 due to a 1 day history of intermittent left sided chest pain. This chest pain was relieved with nitroglycerin. Serial EKGs showed a new TWI in anterior leads. Troponin x2 was negative. He was started on heparin and nitroglycerin in the ED. ECHO showed new WMA with mid and distal alma-septum hypokinesis. He underwent angiogram with significant disease to the LIMA recurring stent x1. Was discharged on triple therapy with intention to stop aspirin once his INR was >2. Was started on ticagrelor 90 mg bid, continued on aspiring 81 mg and warfarin (stoping the aspirin once INR>2).    Further chart review patient presented to the [facility] ED on 1/10/2020 due to right sided flank pain. Noted that he woke up to right sided flank pain 1 hour prior. Had hematuria after. Also reported nausea and lower back pain. CT was done that confirmed a 5 mm ureteral stone at the proximal ureter. No other etiology was found. No fever or UTI was found. Patient was then brought to the OR for cystoscopy with right sided stent placed.    Patient presents here to the ED today due to headache, fever, nausea and vomiting. Reports that his headache is diffuse. States that he has had a T-max of 103 F at  home and his current temperature here is 101.3 F in triage. Has had continued hematuria and has been passing some clots. Denies abdominal pain but has had some flank pain. Endorses nausea and vomiting that is brought on by his indigestion. Has had a cough but denies shortness of breath. States he has not been eating much. Had diarrhea yesterday but none today. Did receive his flu shot this year. Had a retinal detachment x10 years ago and states that his eye has been unchanged.    I have reviewed the Medications, Allergies, Past Medical and Surgical History, and Social History in the [employer] system.    Past Medical History:   Diagnosis Date     Antiplatelet or antithrombotic long-term use      Coronary artery disease      Diaphragmatic hernia without mention of obstruction or gangrene      Heart disease      Hernia, abdominal      History of blood transfusion      History of thrombophlebitis      Hypertension      Nephrolithiasis 4/2010     Other and unspecified hyperlipidemia      Pulmonary embolus and infarction (H)     s/p hemothorax and filter s/p filter removal (2011), now on long term anti-coagulation     Statin intolerance 2/1/2017     Stented coronary artery      Unspecified retinal detachment     Childhood trauma, unrepaired       Past Surgical History:   Procedure Laterality Date     BYPASS GRAFT ARTERY CORONARY  4/4/2014    Procedure: BYPASS GRAFT ARTERY CORONARY;  Median Sternotomy, Coronary Artery Bypass Bypass x 4 using Left Internal Mammary, Left Saphenous Vein, on pump oxygenation;  Surgeon: Sherry Armando MD;  Location: [facility] OR     C NONSPECIFIC PROCEDURE      Spermatocele resection     CLOSED REDUCTION, PERCUTANEOUS PINNING  HAND, COMBINED Left 11/5/2015    Procedure: COMBINED CLOSED REDUCTION, PERCUTANEOUS PINNING HAND;  Surgeon: Carmella Love MD;  Location: [facility]     COLONOSCOPY  3/15/2013    Procedure: COLONOSCOPY;;  Surgeon: Racheal Shipman MD;  Location: [facility] GI     CV  ANGIOGRAM CORONARY GRAFT N/A 1/7/2020    Procedure: Angiogram Coronary Graft;  Surgeon: Chato Odonnell MD;  Location: [facility] CARDIAC CATH LAB     CV CORONARY ANGIOGRAM  1/7/2020    Procedure: CV CORONARY ANGIOGRAM;  Surgeon: Chato Odonnell MD;  Location: [facility] CARDIAC CATH LAB     CV PCI STENT DRUG ELUTING N/A 1/7/2020    Procedure: PCI Stent Drug Eluting;  Surgeon: Chato Odonnell MD;  Location: [facility] CARDIAC CATH LAB     CYSTOSCOPY       LAPAROSCOPIC CHOLECYSTECTOMY N/A 8/6/2018    Procedure: LAPAROSCOPIC CHOLECYSTECTOMY;  LAPAROSCOPIC CHOLECYSTECTOMY ;  Surgeon: José Miguel Stuart MD;  Location: [facility] OR     LITHOTRIPSY  4/2010     THORACIC SURGERY      lung surgery, thoracotomy, lung adhesion       Family History   Problem Relation Age of Onset     Heart Disease Mother      C.A.D. Father         3 vessel CABG, age 70     Cancer Father         bladder cancer     C.A.D. Paternal Grandmother      Hypertension Paternal Grandmother      Alzheimer Disease Paternal Grandmother      Diabetes No family hx of      Thyroid Disease No family hx of      Cancer - colorectal No family hx of        Social History     Tobacco Use     Smoking status: Never Smoker     Smokeless tobacco: Never Used   Substance Use Topics     Alcohol use: No     Comment: very rare       Current Facility-Administered Medications   Medication     sodium chloride (PF) 0.9% PF flush 3 mL     sodium chloride (PF) 0.9% PF flush 3 mL     Current Outpatient Medications   Medication     aspirin 81 MG EC tablet     atorvastatin (LIPITOR) 80 MG tablet     calcium carbonate (TUMS) 500 MG chewable tablet     cephALEXin (KEFLEX) 500 MG capsule     Cholecalciferol (VITAMIN D) 2000 UNITS CAPS     DiphenhydrAMINE HCl (BENADRYL PO)     evolocumab (REPATHA SURECLICK) 140 MG/ML prefilled autoinjector     ezetimibe (ZETIA) 10 MG tablet     fenofibrate (TRIGLIDE/LOFIBRA) 160 MG tablet     lisinopril (PRINIVIL/ZESTRIL)  2.5 MG tablet     metoprolol tartrate (LOPRESSOR) 25 MG tablet     ticagrelor (BRILINTA) 90 MG tablet     warfarin ANTICOAGULANT (COUMADIN) 5 MG tablet        Allergies   Allergen Reactions     Cats      Hay Fever & [A.R.M.]      Heparin Other (See Comments)     Heparin resistance per cardio-thoracic surgeon Dr. Armando     Hydrocodone-Acetaminophen Nausea and Vomiting     Acetaminophen is ok         Review of Systems   Constitutional: Positive for appetite change and fever.   Respiratory: Positive for cough. Negative for shortness of breath.    Gastrointestinal: Positive for nausea and vomiting. Negative for abdominal pain and diarrhea.   Genitourinary: Positive for flank pain.   Neurological: Positive for headaches.   All other systems reviewed and are negative.      Physical Exam   BP: (!) 152/93  Heart Rate: 117  Temp: 101.3  F (38.5  C)  Resp: 16  Weight: 94.8 kg (209 lb)  SpO2: 97 %      Physical Exam  Vitals signs and nursing note reviewed.   Constitutional:       General: He is not in acute distress.     Appearance: He is well-developed. He is ill-appearing. He is not toxic-appearing or diaphoretic.      Comments: Patient is awake and alert, he appears washed out but is otherwise mentating normally and protecting his airway without difficulty.   HENT:      Head: Normocephalic and atraumatic.      Mouth/Throat:      Lips: Pink.      Mouth: Mucous membranes are moist.      Pharynx: Oropharynx is clear. No oropharyngeal exudate.   Eyes:      General: Lids are normal. No scleral icterus.     Extraocular Movements:      Right eye: No nystagmus.      Left eye: No nystagmus.      Conjunctiva/sclera: Conjunctivae normal.      Pupils: Pupils are equal, round, and reactive to light.      Comments: Discordant extraocular movements noted with right eye deviated laterally.  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   Neck:      Musculoskeletal: Normal range of motion and neck supple. No erythema or  neck rigidity.      Thyroid: No thyromegaly.      Vascular: No JVD.      Trachea: No tracheal deviation.   Cardiovascular:      Rate and Rhythm: Regular rhythm. Tachycardia present.      Pulses: Normal pulses.      Heart sounds: Normal heart sounds. No murmur. No friction rub. No gallop.    Pulmonary:      Effort: Pulmonary effort is normal. No respiratory distress.      Breath sounds: Normal breath sounds.   Abdominal:      General: Bowel sounds are normal. There is no distension.      Palpations: Abdomen is soft. There is no mass.      Tenderness: There is no abdominal tenderness. There is right CVA tenderness ( Mild discomfort with percussion). There is no guarding or rebound.   Musculoskeletal: Normal range of motion.         General: No tenderness.      Right lower leg: No edema.      Left lower leg: No edema.   Lymphadenopathy:      Cervical: No cervical adenopathy.   Skin:     General: Skin is warm and dry.      Capillary Refill: Capillary refill takes less than 2 seconds.      Coloration: Skin is not pale.      Findings: No erythema or rash.   Neurological:      Mental Status: He is alert and oriented to person, place, and time.      Cranial Nerves: No cranial nerve deficit.      Sensory: No sensory deficit.      Motor: Motor function is intact.   Psychiatric:         Mood and Affect: Mood and affect normal.         Speech: Speech normal.         Behavior: Behavior normal.         ED Course   6:58 PM  The patient was seen and examined by David Alonso MD in Room ED25.        Procedures        Results for orders placed or performed during the hospital encounter of 01/12/20 (from the past 24 hour(s))   UA with Microscopic reflex to Culture   Result Value Ref Range    Color Urine Dark Red     Appearance Urine Cloudy     Glucose Urine Negative NEG^Negative mg/dL    Bilirubin Urine Negative NEG^Negative    Ketones Urine Negative NEG^Negative mg/dL    Specific Gravity Urine Canceled, Test credited 1.003 - 1.035     Blood Urine Moderate (A) NEG^Negative    pH Urine 6.0 5.0 - 7.0 pH    Protein Albumin Urine 100 (A) NEG^Negative mg/dL    Urobilinogen mg/dL Normal 0.0 - 2.0 mg/dL    Nitrite Urine Negative NEG^Negative    Leukocyte Esterase Urine Moderate (A) NEG^Negative    Source Clean catch urine     WBC Urine >182 (H) 0 - 5 /HPF    RBC Urine >182 (H) 0 - 2 /HPF   Urine Culture Aerobic Bacterial   Result Value Ref Range    Specimen Description Midstream Urine     Special Requests Specimen received in preservative     Culture Micro PENDING    Comprehensive metabolic panel   Result Value Ref Range    Sodium 136 133 - 144 mmol/L    Potassium 3.5 3.4 - 5.3 mmol/L    Chloride 103 94 - 109 mmol/L    Carbon Dioxide 24 20 - 32 mmol/L    Anion Gap 8 3 - 14 mmol/L    Glucose 112 (H) 70 - 99 mg/dL    Urea Nitrogen 15 7 - 30 mg/dL    Creatinine 1.18 0.66 - 1.25 mg/dL    GFR Estimate 68 >60 mL/min/[1.73_m2]    GFR Estimate If Black 79 >60 mL/min/[1.73_m2]    Calcium 9.7 8.5 - 10.1 mg/dL    Bilirubin Total 1.1 0.2 - 1.3 mg/dL    Albumin 3.6 3.4 - 5.0 g/dL    Protein Total 7.9 6.8 - 8.8 g/dL    Alkaline Phosphatase 53 40 - 150 U/L    ALT 37 0 - 70 U/L    AST 12 0 - 45 U/L   CBC with platelets differential   Result Value Ref Range    WBC 15.5 (H) 4.0 - 11.0 10e9/L    RBC Count 4.72 4.4 - 5.9 10e12/L    Hemoglobin 13.6 13.3 - 17.7 g/dL    Hematocrit 41.7 40.0 - 53.0 %    MCV 88 78 - 100 fl    MCH 28.8 26.5 - 33.0 pg    MCHC 32.6 31.5 - 36.5 g/dL    RDW 13.2 10.0 - 15.0 %    Platelet Count 255 150 - 450 10e9/L    Diff Method Automated Method     % Neutrophils 84.1 %    % Lymphocytes 5.6 %    % Monocytes 9.3 %    % Eosinophils 0.1 %    % Basophils 0.3 %    % Immature Granulocytes 0.6 %    Nucleated RBCs 0 0 /100    Absolute Neutrophil 13.0 (H) 1.6 - 8.3 10e9/L    Absolute Lymphocytes 0.9 0.8 - 5.3 10e9/L    Absolute Monocytes 1.4 (H) 0.0 - 1.3 10e9/L    Absolute Eosinophils 0.0 0.0 - 0.7 10e9/L    Absolute Basophils 0.1 0.0 - 0.2 10e9/L    Abs  Immature Granulocytes 0.1 0 - 0.4 10e9/L    Absolute Nucleated RBC 0.0    INR   Result Value Ref Range    INR 2.16 (H) 0.86 - 1.14   ISTAT gases lactate lay POCT   Result Value Ref Range    Ph Venous 7.48 (H) 7.32 - 7.43 pH    PCO2 Venous 31 (L) 40 - 50 mm Hg    PO2 Venous 53 (H) 25 - 47 mm Hg    Bicarbonate Venous 23 21 - 28 mmol/L    O2 Sat Venous 90 %    Lactic Acid 0.9 0.7 - 2.1 mmol/L   Influenza A/B antigen   Result Value Ref Range    Influenza A/B Agn Specimen Nasopharyngeal     Influenza A Negative NEG^Negative    Influenza B Negative NEG^Negative   XR Chest 2 Views    Narrative    PA and lateral chest    HISTORY: Fever    COMPARISON STUDY: 1/6/2020    FINDINGS: Cardiac silhouette is not enlarged. Median sternotomy wires.  Left basilar atelectasis. Cholecystectomy clips.      Impression    IMPRESSION: Minimal left basilar atelectasis. No acute airspace  disease    MONIE PUTNAM MD   XR Abdomen 1 View    Narrative    Exam: XR ABDOMEN 1 VW, 1/12/2020 10:24 PM    Indication: Check stent placement    Comparison: Plain film 1/10/2020. CT 1/10/2020.    Findings:   Single portable supine radiograph of the abdomen. Right double-J stent  with the proximal end with near the renal pelvis and the distal end  overlying the region of the urinary bladder. Pelvic phleboliths.  Nonobstructive bowel gas pattern. No pneumatosis. No acute osseous  evident mildly. Soft tissues unremarkable.      Impression    Impression: Right double-J stent with the proximal end near the region  of the renal pelvis/upper pole of the kidney and distal end overlying  the region of the bladder.   Blood culture   Result Value Ref Range    Specimen Description Blood Left Arm     Special Requests Aerobic and anaerobic bottles received     Culture Micro No growth after 2 hours    US Renal Complete    Narrative    EXAMINATION: US RENAL COMPLETE, 1/13/2020 12:07 AM     COMPARISON: Ultrasound 7/26/2018    HISTORY: Evaluate for residual hydronephrosis of  the right kidney  after stent placement.    TECHNIQUE: The kidneys and bladder were scanned in the standard  fashion with specialized ultrasound transducer(s) using both gray  scale and limited color/spectral Doppler techniques.    FINDINGS:    Right kidney: Measures 13.3 cm in length. Parenchyma is of normal  thickness and echogenicity. No hydronephrosis. There is a 1.5 x 1.2 x  1.7 cm cyst within the lower pole of the right kidney. Presence of a  double-J stent with the proximal and coronal thin the upper pole of  the kidney and the distal and within the urinary bladder.    Left kidney: Measures 13.1 cm in length. Parenchyma is of normal  thickness and echogenicity. No focal mass. No hydronephrosis.     Bladder: Echogenic debris within the posterior dependent portion of  the bladder.    Partial visualization of the liver demonstrates echogenic liver  parenchyma.      Impression    IMPRESSION:  1.  No hydronephrosis bilaterally. Presence of right double-J ureteral  stent spanning from the upper pole of the right kidney to the urinary  bladder.  2.  1.7 cm right renal cyst.  3.  Echogenic liver parenchyma, can be seen in hepatic steatosis.            Assessments & Plan (with Medical Decision Making)   This patient presented to the Emergency Department complaining of a fever.  He did have a double J stent placed 2 days ago for obstructing kidney stone.  He is noted to have a leukocytosis and be febrile but does not have an elevated lactate and is not hypotensive decreasing suspicion for severe sepsis or septic shock.  He does meet criteria for sepsis however.  No evidence of pneumonia or influenza on work-up.  Urinalysis does appear somewhat more suspicious for infection than it had 2 days ago when he underwent stenting.  I did consult with urology and they recommended obtaining KUB to assess placement of the stent as well as renal ultrasound to evaluate for any urinary obstruction.  These were ordered.  KUB  demonstrated good placement of the stent.  Ultrasound is pending.  I did obtain a post void residual bladder scan which demonstrated no evidence of urinary retention.  He was treated with Rocephin as well as IV fluid and pain medication and antiemetics.  He will be admitted to the internal medicine service for treatment of sepsis. Suspected urinary source at this point but given the stent in place, positive culture will be needed to make a firm diagnosis of this.    This part of the medical record was transcribed by Valdemar Lozano Medical Scribe, from a dictation done by David Alonso MD.     I have reviewed the nursing notes.    I have reviewed the findings, diagnosis, plan and need for follow up with the patient.    New Prescriptions    No medications on file       Final diagnoses:   None     IBethel, am serving as a trained medical scribe to document services personally performed by David Alonso MD, based on the provider's statements to me.   David CHO MD, was physically present and have reviewed and verified the accuracy of this note documented by Bethel Pineda.    1/12/2020   [facility], [place], EMERGENCY DEPARTMENT     David Alonso MD  01/13/20 0203

## 2020-01-14 LAB
ANION GAP SERPL CALCULATED.3IONS-SCNC: 6 MMOL/L (ref 3–14)
BUN SERPL-MCNC: 16 MG/DL (ref 7–30)
CALCIUM SERPL-MCNC: 8.3 MG/DL (ref 8.5–10.1)
CHLORIDE SERPL-SCNC: 109 MMOL/L (ref 94–109)
CO2 SERPL-SCNC: 24 MMOL/L (ref 20–32)
CREAT SERPL-MCNC: 1.06 MG/DL (ref 0.66–1.25)
ERYTHROCYTE [DISTWIDTH] IN BLOOD BY AUTOMATED COUNT: 13 % (ref 10–15)
GFR SERPL CREATININE-BSD FRML MDRD: 77 ML/MIN/{1.73_M2}
GLUCOSE SERPL-MCNC: 105 MG/DL (ref 70–99)
HCT VFR BLD AUTO: 35.3 % (ref 40–53)
HGB BLD-MCNC: 11.6 G/DL (ref 13.3–17.7)
INR PPP: 2.55 (ref 0.86–1.14)
MCH RBC QN AUTO: 28.6 PG (ref 26.5–33)
MCHC RBC AUTO-ENTMCNC: 32.9 G/DL (ref 31.5–36.5)
MCV RBC AUTO: 87 FL (ref 78–100)
PLATELET # BLD AUTO: 232 10E9/L (ref 150–450)
POTASSIUM SERPL-SCNC: 3.4 MMOL/L (ref 3.4–5.3)
RBC # BLD AUTO: 4.05 10E12/L (ref 4.4–5.9)
SODIUM SERPL-SCNC: 138 MMOL/L (ref 133–144)
WBC # BLD AUTO: 7 10E9/L (ref 4–11)

## 2020-01-14 PROCEDURE — 40000556 ZZH STATISTIC PERIPHERAL IV START W US GUIDANCE

## 2020-01-14 PROCEDURE — 25000132 ZZH RX MED GY IP 250 OP 250 PS 637: Performed by: STUDENT IN AN ORGANIZED HEALTH CARE EDUCATION/TRAINING PROGRAM

## 2020-01-14 PROCEDURE — 99232 SBSQ HOSP IP/OBS MODERATE 35: CPT | Mod: GC | Performed by: INTERNAL MEDICINE

## 2020-01-14 PROCEDURE — 36415 COLL VENOUS BLD VENIPUNCTURE: CPT | Performed by: STUDENT IN AN ORGANIZED HEALTH CARE EDUCATION/TRAINING PROGRAM

## 2020-01-14 PROCEDURE — 87040 BLOOD CULTURE FOR BACTERIA: CPT | Performed by: STUDENT IN AN ORGANIZED HEALTH CARE EDUCATION/TRAINING PROGRAM

## 2020-01-14 PROCEDURE — 25000128 H RX IP 250 OP 636: Performed by: STUDENT IN AN ORGANIZED HEALTH CARE EDUCATION/TRAINING PROGRAM

## 2020-01-14 PROCEDURE — 25000132 ZZH RX MED GY IP 250 OP 250 PS 637: Performed by: EMERGENCY MEDICINE

## 2020-01-14 PROCEDURE — 12000001 ZZH R&B MED SURG/OB UMMC

## 2020-01-14 PROCEDURE — 85027 COMPLETE CBC AUTOMATED: CPT | Performed by: EMERGENCY MEDICINE

## 2020-01-14 PROCEDURE — 36415 COLL VENOUS BLD VENIPUNCTURE: CPT | Performed by: EMERGENCY MEDICINE

## 2020-01-14 PROCEDURE — 80048 BASIC METABOLIC PNL TOTAL CA: CPT | Performed by: EMERGENCY MEDICINE

## 2020-01-14 PROCEDURE — 25800030 ZZH RX IP 258 OP 636: Performed by: INTERNAL MEDICINE

## 2020-01-14 PROCEDURE — 25000132 ZZH RX MED GY IP 250 OP 250 PS 637: Performed by: INTERNAL MEDICINE

## 2020-01-14 PROCEDURE — 25000128 H RX IP 250 OP 636: Performed by: INTERNAL MEDICINE

## 2020-01-14 PROCEDURE — 93010 ELECTROCARDIOGRAM REPORT: CPT | Performed by: INTERNAL MEDICINE

## 2020-01-14 PROCEDURE — 85610 PROTHROMBIN TIME: CPT | Performed by: EMERGENCY MEDICINE

## 2020-01-14 RX ORDER — WARFARIN SODIUM 4 MG/1
4 TABLET ORAL
Status: COMPLETED | OUTPATIENT
Start: 2020-01-14 | End: 2020-01-14

## 2020-01-14 RX ADMIN — FENOFIBRATE 160 MG: 160 TABLET ORAL at 08:27

## 2020-01-14 RX ADMIN — TICAGRELOR 90 MG: 90 TABLET ORAL at 10:44

## 2020-01-14 RX ADMIN — ONDANSETRON 4 MG: 2 INJECTION INTRAMUSCULAR; INTRAVENOUS at 05:29

## 2020-01-14 RX ADMIN — ACETAMINOPHEN 650 MG: 325 TABLET, FILM COATED ORAL at 23:56

## 2020-01-14 RX ADMIN — ATORVASTATIN CALCIUM 80 MG: 80 TABLET, FILM COATED ORAL at 08:27

## 2020-01-14 RX ADMIN — SENNOSIDES AND DOCUSATE SODIUM 2 TABLET: 8.6; 5 TABLET ORAL at 08:27

## 2020-01-14 RX ADMIN — VANCOMYCIN HYDROCHLORIDE 2000 MG: 10 INJECTION, POWDER, LYOPHILIZED, FOR SOLUTION INTRAVENOUS at 11:53

## 2020-01-14 RX ADMIN — ACETAMINOPHEN 650 MG: 325 TABLET, FILM COATED ORAL at 05:37

## 2020-01-14 RX ADMIN — LISINOPRIL 2.5 MG: 2.5 TABLET ORAL at 08:28

## 2020-01-14 RX ADMIN — ACETAMINOPHEN 650 MG: 325 TABLET, FILM COATED ORAL at 15:09

## 2020-01-14 RX ADMIN — VANCOMYCIN HYDROCHLORIDE 2000 MG: 10 INJECTION, POWDER, LYOPHILIZED, FOR SOLUTION INTRAVENOUS at 23:56

## 2020-01-14 RX ADMIN — CEFEPIME HYDROCHLORIDE 2 G: 2 INJECTION, POWDER, FOR SOLUTION INTRAVENOUS at 17:50

## 2020-01-14 RX ADMIN — ACETAMINOPHEN 650 MG: 325 TABLET, FILM COATED ORAL at 19:48

## 2020-01-14 RX ADMIN — SENNOSIDES AND DOCUSATE SODIUM 1 TABLET: 8.6; 5 TABLET ORAL at 19:49

## 2020-01-14 RX ADMIN — CEFEPIME HYDROCHLORIDE 2 G: 2 INJECTION, POWDER, FOR SOLUTION INTRAVENOUS at 10:44

## 2020-01-14 RX ADMIN — Medication 12.5 MG: at 08:27

## 2020-01-14 RX ADMIN — EZETIMIBE 10 MG: 10 TABLET ORAL at 08:27

## 2020-01-14 RX ADMIN — WARFARIN SODIUM 4 MG: 4 TABLET ORAL at 17:50

## 2020-01-14 RX ADMIN — ONDANSETRON 4 MG: 2 INJECTION INTRAMUSCULAR; INTRAVENOUS at 12:02

## 2020-01-14 RX ADMIN — Medication 12.5 MG: at 19:48

## 2020-01-14 RX ADMIN — HYDROMORPHONE HYDROCHLORIDE 0.5 MG: 1 INJECTION, SOLUTION INTRAMUSCULAR; INTRAVENOUS; SUBCUTANEOUS at 08:24

## 2020-01-14 RX ADMIN — TICAGRELOR 90 MG: 90 TABLET ORAL at 21:42

## 2020-01-14 RX ADMIN — ONDANSETRON 4 MG: 2 INJECTION INTRAMUSCULAR; INTRAVENOUS at 17:50

## 2020-01-14 RX ADMIN — CEFEPIME HYDROCHLORIDE 2 G: 2 INJECTION, POWDER, FOR SOLUTION INTRAVENOUS at 02:42

## 2020-01-14 ASSESSMENT — ACTIVITIES OF DAILY LIVING (ADL)
ADLS_ACUITY_SCORE: 13

## 2020-01-14 NOTE — PROGRESS NOTES
[facility], [place]    Progress Note - [facility]        Date of Admission:  1/12/2020    Assessment & Plan      Osawldo Prabhakar is a 59 year old male with PMH of CAD 4v CABG (2014) and s/p recent NIR to LIMA (1/7/2020), previous PE on warfarin s/p thoracotomy with lung resection (2011), recent right proximal ureteral stone s/p cystoscopy with ureteral stent placement (1/10/2020), who presents with fevers and malaise found to have concern for complicated UTI.    Complicated UTI  Pyuria and hematuria   Recent ureteral stone s/p cystoscopy with ureteral stent placement  Patient presents with 2 days of progressive fevers, malaise, nausea, vomiting after recent cystoscopy found to have objective fever, leukocytosis, worsening pyuria on UA concerning for complicated UTI. Renal US w/o hydronephrosis or obstruction seen, X-ray abdomen shows stent in position. Discussed with on-call urology that reviewed images and given no concern for obstruction or stent malposition no indication for urologic intervention and would treat as standard UTI. Of note, given in-operative urine culture so far no growth, on-call urology did recommend evaluating for alternative sources of infection. On-call urology also noted that hematuria is to be suspected after recent cystoscopy and no indication for stent removal despite concern for UTI. Patient w/o dyspnea, cough, abdominal pain, diarrhea, new rashes/skin changes lower suspicion for alternative source of infection at this time. Influenza negative. Received 1 g ceftriaxone in the ED. Transitioned to cefepime/vanc given recent instrumentation  - continue cefepime/vanc, consider narrowing in AM  - follow blood, urine cultures  - did not place urology consult, if patient clinically worsens with concern for underlying worsening urologic process would then place formal urology consult  - prn zofran, prn tylenol     CAD s/p 4vCABG (2014) and recent NIR to LIMA  (1/7/2020)  Patient denies chest pain.   - cont pta ticagrelor  - cont pta statin  - cont pta zetia  - cont pta fenofibrate  - cont pta lisinopril  - cont pta metoprolol   - hold pta aspirin, patient no longer taking given INR > 2 on warfarin     PE on warfarin   - pharmacy to dose warfarin            Diet: Advance Diet as Tolerated: Full Liquid Diet; Regular Diet Adult    Fluids: none  Lines: PIV  DVT Prophylaxis: on warfarin   Daniel Catheter: none   Code Status: Full Code      Disposition Plan   Expected discharge: 2 - 3 days, recommended to prior living arrangement once antibiotic plan established.a febrile   Entered: Liseth Mckee MD 01/14/2020, 4:01 PM       The patient's care was discussed with the Attending Physician, Dr. Rebekah Mckee MD  [address], [place]  Pager: [phone]  Please see sticky note for cross cover information  ______________________________________________________________________    Interval History   Fevers more mild overnight, still intermittent headaches and more nausea this morning. Denies SOB or CP. Encouraged increase fluid intake     Data reviewed today: I reviewed all medications, new labs and imaging results over the last 24 hours.    Physical Exam   Vital Signs: Temp: 99.5  F (37.5  C) Temp src: Oral BP: (!) 141/78 Pulse: 62 Heart Rate: 71 Resp: 24 SpO2: 97 % O2 Device: None (Room air)    Weight: 207 lbs 14.4 oz     Exam:  Constitutional: healthy, alert and no distress  Head: Normocephalic. No masses, lesions, tenderness or abnormalities  Neck: Neck supple. No adenopathy. Thyroid symmetric, normal size,  ENT: EOMI, clear oropharynx  Cardiovascular: RRR. No murmurs, clicks gallops or rub  Respiratory:  Lungs clear w/o wheezes or rhonchi  Gastrointestinal: Abdomen soft, non-tender. BS normal. No masses, organomegaly  Musculoskeletal: extremities normal- no gross deformities noted and normal muscle tone  Skin: no  suspicious lesions or rashes  Neurologic:non focal, AAO      Data   reviewed in epic

## 2020-01-14 NOTE — PLAN OF CARE
"1900 - 0730:    VSS ex Temp on RA. T-max 100.0. PRN Tylenol given. A&Ox4. Independent. Pt complaining of severe headache. PRN IV Dilaudid given x 1. Pt had emesis x 1 this morning. PRN IV Zofran given. PIV removed due to infiltration. Vascular access order placed. IV antibiotics (Cefepime and Vancomycin) continued. Full liquid diet. Voiding WNL. Dark red urine. Urinal at bedside. Pt reports voiding feeling the \"best since admission\". No BM overnight. Possible urology consult if ongoing fevers. Will continue to monitor.  "

## 2020-01-14 NOTE — PLAN OF CARE
Afebrile, VSS on RA.  A&Ox4.  Pt complaining of severe headache. PRN IV Dilaudid given x 1 with relief. PRN IV Zofran given x1 with relief. IV antibiotics (Cefepime and Vancomycin) continued. Full liquid diet. Voiding WNL. Dark red urine.  Large BM x1.  Up independently in room.  Continue plan of care.

## 2020-01-15 ENCOUNTER — ANTICOAGULATION THERAPY VISIT (OUTPATIENT)
Dept: ANTICOAGULATION | Facility: CLINIC | Age: 60
End: 2020-01-15

## 2020-01-15 ENCOUNTER — PATIENT OUTREACH (OUTPATIENT)
Dept: CARE COORDINATION | Facility: CLINIC | Age: 60
End: 2020-01-15

## 2020-01-15 VITALS
RESPIRATION RATE: 18 BRPM | WEIGHT: 207.9 LBS | BODY MASS INDEX: 28.19 KG/M2 | SYSTOLIC BLOOD PRESSURE: 122 MMHG | HEART RATE: 62 BPM | TEMPERATURE: 98.1 F | DIASTOLIC BLOOD PRESSURE: 81 MMHG | OXYGEN SATURATION: 99 %

## 2020-01-15 DIAGNOSIS — Z79.01 LONG TERM CURRENT USE OF ANTICOAGULANT THERAPY: ICD-10-CM

## 2020-01-15 DIAGNOSIS — I26.99 PULMONARY EMBOLISM, UNSPECIFIED CHRONICITY, UNSPECIFIED PULMONARY EMBOLISM TYPE, UNSPECIFIED WHETHER ACUTE COR PULMONALE PRESENT (H): ICD-10-CM

## 2020-01-15 LAB
ANION GAP SERPL CALCULATED.3IONS-SCNC: 9 MMOL/L (ref 3–14)
BUN SERPL-MCNC: 12 MG/DL (ref 7–30)
CALCIUM SERPL-MCNC: 8.9 MG/DL (ref 8.5–10.1)
CHLORIDE SERPL-SCNC: 109 MMOL/L (ref 94–109)
CO2 SERPL-SCNC: 20 MMOL/L (ref 20–32)
CREAT SERPL-MCNC: 0.95 MG/DL (ref 0.66–1.25)
ERYTHROCYTE [DISTWIDTH] IN BLOOD BY AUTOMATED COUNT: 13 % (ref 10–15)
GFR SERPL CREATININE-BSD FRML MDRD: 89 ML/MIN/{1.73_M2}
GLUCOSE SERPL-MCNC: 113 MG/DL (ref 70–99)
HCT VFR BLD AUTO: 39.3 % (ref 40–53)
HGB BLD-MCNC: 12.9 G/DL (ref 13.3–17.7)
INR PPP: 2.51 (ref 0.86–1.14)
INTERPRETATION ECG - MUSE: NORMAL
MCH RBC QN AUTO: 28.7 PG (ref 26.5–33)
MCHC RBC AUTO-ENTMCNC: 32.8 G/DL (ref 31.5–36.5)
MCV RBC AUTO: 88 FL (ref 78–100)
PLATELET # BLD AUTO: 285 10E9/L (ref 150–450)
POTASSIUM SERPL-SCNC: 3.1 MMOL/L (ref 3.4–5.3)
RBC # BLD AUTO: 4.49 10E12/L (ref 4.4–5.9)
SODIUM SERPL-SCNC: 138 MMOL/L (ref 133–144)
WBC # BLD AUTO: 6.1 10E9/L (ref 4–11)

## 2020-01-15 PROCEDURE — 99239 HOSP IP/OBS DSCHRG MGMT >30: CPT | Mod: GC | Performed by: INTERNAL MEDICINE

## 2020-01-15 PROCEDURE — 25000128 H RX IP 250 OP 636: Performed by: STUDENT IN AN ORGANIZED HEALTH CARE EDUCATION/TRAINING PROGRAM

## 2020-01-15 PROCEDURE — 85610 PROTHROMBIN TIME: CPT | Performed by: EMERGENCY MEDICINE

## 2020-01-15 PROCEDURE — 25000132 ZZH RX MED GY IP 250 OP 250 PS 637: Performed by: STUDENT IN AN ORGANIZED HEALTH CARE EDUCATION/TRAINING PROGRAM

## 2020-01-15 PROCEDURE — 80048 BASIC METABOLIC PNL TOTAL CA: CPT | Performed by: EMERGENCY MEDICINE

## 2020-01-15 PROCEDURE — 85027 COMPLETE CBC AUTOMATED: CPT | Performed by: EMERGENCY MEDICINE

## 2020-01-15 PROCEDURE — 40000556 ZZH STATISTIC PERIPHERAL IV START W US GUIDANCE

## 2020-01-15 PROCEDURE — 36415 COLL VENOUS BLD VENIPUNCTURE: CPT | Performed by: EMERGENCY MEDICINE

## 2020-01-15 PROCEDURE — 25000132 ZZH RX MED GY IP 250 OP 250 PS 637: Performed by: EMERGENCY MEDICINE

## 2020-01-15 RX ORDER — CALCIUM CARBONATE 500 MG/1
500 TABLET, CHEWABLE ORAL 3 TIMES DAILY PRN
Status: DISCONTINUED | OUTPATIENT
Start: 2020-01-15 | End: 2020-01-15 | Stop reason: HOSPADM

## 2020-01-15 RX ORDER — ONDANSETRON 4 MG/1
4 TABLET, FILM COATED ORAL EVERY 8 HOURS PRN
Qty: 12 TABLET | Refills: 0 | Status: SHIPPED | OUTPATIENT
Start: 2020-01-15 | End: 2021-05-10

## 2020-01-15 RX ORDER — POTASSIUM CHLORIDE 1.5 G/1.58G
40 POWDER, FOR SOLUTION ORAL 2 TIMES DAILY
Status: DISCONTINUED | OUTPATIENT
Start: 2020-01-15 | End: 2020-01-15 | Stop reason: HOSPADM

## 2020-01-15 RX ORDER — POTASSIUM CHLORIDE 20MEQ/15ML
60 LIQUID (ML) ORAL ONCE
Status: DISCONTINUED | OUTPATIENT
Start: 2020-01-15 | End: 2020-01-15

## 2020-01-15 RX ORDER — CIPROFLOXACIN 500 MG/1
500 TABLET, FILM COATED ORAL 2 TIMES DAILY
Qty: 25 TABLET | Refills: 0 | Status: SHIPPED | OUTPATIENT
Start: 2020-01-15 | End: 2020-02-28

## 2020-01-15 RX ADMIN — AMOXICILLIN AND CLAVULANATE POTASSIUM 1 TABLET: 875; 125 TABLET, FILM COATED ORAL at 12:10

## 2020-01-15 RX ADMIN — CALCIUM CARBONATE (ANTACID) CHEW TAB 500 MG 500 MG: 500 CHEW TAB at 12:11

## 2020-01-15 RX ADMIN — POTASSIUM CHLORIDE 40 MEQ: 1.5 POWDER, FOR SOLUTION ORAL at 10:37

## 2020-01-15 RX ADMIN — LISINOPRIL 2.5 MG: 2.5 TABLET ORAL at 07:50

## 2020-01-15 RX ADMIN — ONDANSETRON 4 MG: 2 INJECTION INTRAMUSCULAR; INTRAVENOUS at 03:17

## 2020-01-15 RX ADMIN — EZETIMIBE 10 MG: 10 TABLET ORAL at 07:50

## 2020-01-15 RX ADMIN — CEFEPIME HYDROCHLORIDE 2 G: 2 INJECTION, POWDER, FOR SOLUTION INTRAVENOUS at 01:55

## 2020-01-15 RX ADMIN — ACETAMINOPHEN 650 MG: 325 TABLET, FILM COATED ORAL at 05:21

## 2020-01-15 RX ADMIN — CALCIUM CARBONATE (ANTACID) CHEW TAB 500 MG 500 MG: 500 CHEW TAB at 03:25

## 2020-01-15 RX ADMIN — TICAGRELOR 90 MG: 90 TABLET ORAL at 10:37

## 2020-01-15 RX ADMIN — ONDANSETRON 4 MG: 2 INJECTION INTRAMUSCULAR; INTRAVENOUS at 10:36

## 2020-01-15 RX ADMIN — ATORVASTATIN CALCIUM 80 MG: 80 TABLET, FILM COATED ORAL at 07:50

## 2020-01-15 RX ADMIN — PROCHLORPERAZINE MALEATE 10 MG: 5 TABLET ORAL at 12:16

## 2020-01-15 RX ADMIN — Medication 12.5 MG: at 07:50

## 2020-01-15 RX ADMIN — ACETAMINOPHEN 650 MG: 325 TABLET, FILM COATED ORAL at 10:37

## 2020-01-15 RX ADMIN — FENOFIBRATE 160 MG: 160 TABLET ORAL at 07:50

## 2020-01-15 ASSESSMENT — ACTIVITIES OF DAILY LIVING (ADL)
ADLS_ACUITY_SCORE: 13

## 2020-01-15 NOTE — PROVIDER NOTIFICATION
Notified the maroon cross cover that the patient is complaining of chest pain. MD assessed the patient and ordered an EKG.

## 2020-01-15 NOTE — DISCHARGE SUMMARY
[facility], [place]  Discharge Summary - Medicine & Pediatrics       Date of Admission:  1/12/2020  Date of Discharge:  1/15/2020  4:39 PM  Discharging Provider: Dr Maher  Discharge Service: Michael Palma    Discharge Diagnoses   Urinary tract infection  Sepsis due to Gram negative stacey bacteremia   Coronary artery disease     Follow-ups Needed After Discharge   Follow-up Appointments     Adult [facility] Follow-up and recommended labs and tests      Follow up with primary care provider, Samm Vazquez, within 7 days to   evaluate medication change.      Appointments on [place] and/or [facility] (with [facility] or [facility]   provider or service). Call [phone] if you haven't heard regarding   these appointments within 7 days of discharge.            Unresulted Labs Ordered in the Past 30 Days of this Admission     Date and Time Order Name Status Description    1/14/2020 0650 Blood culture Preliminary     1/14/2020 0650 Blood culture Preliminary     1/12/2020 2216 Blood culture Preliminary       Hospital team will review results through the weekend, PCP to follow at follow up     Discharge Disposition   Discharged to home  Condition at discharge: Stable    Hospital Course   Oswaldo Prabhakar is a 59 year old male with PMH of CAD 4v CABG (2014) and s/p recent NIR to LIMA (1/7/2020), previous PE on warfarin s/p thoracotomy with lung resection (2011), recent right proximal ureteral stone s/p cystoscopy with ureteral stent placement (1/10/2020), who presents with fevers and malaise found to have concern for complicated UTI.     See H&P with full presenting details      Course by problem:    1) Sepsis due to Gram negative stacey bacteremia; Complicated UTI; Pyuria and hematuria ;Recent ureteral stone s/p cystoscopy with ureteral stent placement  Patient presents with 2 days of progressive fevers, malaise, nausea, vomiting after recent cystoscopy found to have objective fever, leukocytosis, worsening  pyuria on UA concerning for complicated UTI. Renal US w/o hydronephrosis or obstruction seen, X-ray abdomen shows stent in position. Discussed with on-call urology that reviewed images and given no concern for obstruction or stent malposition no indication for urologic intervention and would treat as standard UTI. Of note, given in-operative urine culture so far no growth, on-call urology did recommend evaluating for alternative sources of infection. On-call urology also noted that hematuria is to be suspected after recent cystoscopy and no indication for stent removal despite concern for UTI. Patient w/o dyspnea, cough, abdominal pain, diarrhea, new rashes/skin changes lower suspicion for alternative source of infection at this time.received cefepime/vanc in hospital, transitioned to Augmentin given low suspicion for atypical bugs given no growth in any cultures. After discharge called that blood culture positive from admission with gram negative rods. Called patient who was doing well at home, given stability discussed oral ciprofloxacin which should cover gram negative stcaey bacteremia from urinary source with planned 14 day treatment course. Follow up blood cultures negative. Will follow up blood cultures and update patient. Advised to return to ED if symptoms recur or worsen.        2) CAD s/p 4vCABG (2014) and recent NIR to LIMA (1/7/2020)  Patient denies chest pain. Continued home mediations, discontinue aspirin given therapeutic on warfarin        3) PE on warfarin   Continue warfarin     Consultations This Hospital Stay   PHARMACY TO DOSE WARFARIN  PHARMACY TO DOSE VANCO  VASCULAR ACCESS CARE ADULT IP CONSULT  VASCULAR ACCESS CARE ADULT IP CONSULT  VASCULAR ACCESS CARE ADULT IP CONSULT    Code Status   Full Code       The patient was discussed with MD Michael Palacios Dr  Service  [facility] [place]  Pager:  [phone]  ______________________________________________________________________    Physical Exam   Vital Signs: Temp: 98.1  F (36.7  C) Temp src: Oral BP: 122/81   Heart Rate: 58 Resp: 18 SpO2: 99 % O2 Device: None (Room air)    Weight: 207 lbs 14.4 oz  General: healthy, alert and no distress  Head: Normocephalic. No masses, lesions, tenderness or abnormalities  Neck: Neck supple. No adenopathy. Thyroid symmetric, normal size,  ENT: EOMI, clear oropharynx  Cardiovascular: RRR. No murmurs, clicks gallops or rub  Respiratory:  Lungs clear w/o wheezes or rhonchi  Gastrointestinal: Abdomen soft, non-tender. BS normal. No masses, organomegaly  Musculoskeletal: extremities normal- no gross deformities noted and normal muscle tone  Skin: no suspicious lesions or rashes  Neurologic:non focal, AAO         Primary Care Physician   Samm Vazquez    Discharge Orders      Reason for your hospital stay    You were admitted with high fever and concern for urinary tract infection. You were treated with antibiotics and symptoms improved. You should continue a course of oral Augmentin. Take tylenol as needed for pain and stay well hydrated.     Activity    Your activity upon discharge: activity as tolerated     Adult [facility] Follow-up and recommended labs and tests    Follow up with primary care provider, Samm Vazquez, within 7 days to evaluate medication change.      Appointments on [place] and/or [facility] (with [facility] or [facility] provider or service). Call [phone] if you haven't heard regarding these appointments within 7 days of discharge.     When to contact your care team    High fevers, severe pain, sever nausea and vomiting     Discharge Instructions    Continue Augmentin twice daily until prescription is complete  Take over the counter omeprazole for acid reflux  Follow up with primary care in 1 week     Full Code     Diet    Follow this diet upon discharge: Orders Placed This Encounter      Advance Diet as Tolerated:  Regular Diet Adult; Regular Diet Adult       Significant Results and Procedures   Most Recent 6 Bacteria Isolates From Any Culture (See EPIC Reports for Culture Details):  Recent Labs   Lab Test 01/14/20  0717 01/12/20  2226 01/12/20  1840 01/10/20  1222 01/04/18  1336 12/14/17  1945   CULT No growth after 1 day  No growth after 1 day Cultured on the 3rd day of incubation:  Gram negative rods  *  Critical Value/Significant Value, preliminary result only, called to and read back by  Nikki Tanner RN, @1540 01/15/20.DH.   No growth No growth <10,000 colonies/mL  mixed urogenital francis   <10,000 colonies/mL  urogenital francis  Susceptibility testing not routinely done         Discharge Medications   Discharge Medication List as of 1/15/2020 12:36 PM      START taking these medications    Details   amoxicillin-clavulanate (AUGMENTIN) 875-125 MG tablet Take 1 tablet by mouth every 12 hours, Disp-15 tablet, R-0, E-Prescribe      ondansetron (ZOFRAN) 4 MG tablet Take 1 tablet (4 mg) by mouth every 8 hours as needed for nausea, Disp-12 tablet, R-0, E-Prescribe         CONTINUE these medications which have NOT CHANGED    Details   atorvastatin (LIPITOR) 80 MG tablet Take 1 tablet (80 mg) by mouth daily, Disp-90 tablet, R-2, E-Prescribe      calcium carbonate (TUMS) 500 MG chewable tablet Take 1 chew tab by mouth daily as needed, Historical      Cholecalciferol (VITAMIN D) 2000 UNITS CAPS Take 2,000 Units by mouth daily, Historical      DiphenhydrAMINE HCl (BENADRYL PO) Take 25-50 mg by mouth daily as needed, Historical      evolocumab (REPATHA SURECLICK) 140 MG/ML prefilled autoinjector Inject 1 mL (140 mg) Subcutaneous every 14 days, Disp-6 mL, R-3, E-PrescribePlease fill at Pt request.      ezetimibe (ZETIA) 10 MG tablet Take 1 tablet (10 mg) by mouth daily, Disp-90 tablet, R-3, E-Prescribe      fenofibrate (TRIGLIDE/LOFIBRA) 160 MG tablet Take 1 tablet (160 mg) by mouth daily, Disp-90 tablet, R-2, E-Prescribe       lisinopril (PRINIVIL/ZESTRIL) 2.5 MG tablet Take 1 tablet (2.5 mg) by mouth daily , or as directed by Dr. Ocampo., Disp-90 tablet, R-2, E-Prescribe      metoprolol tartrate (LOPRESSOR) 25 MG tablet Take 0.5 tablets (12.5 mg) by mouth 2 times daily, Disp-90 tablet, R-1, E-Prescribe      ticagrelor (BRILINTA) 90 MG tablet Take 1 tablet (90 mg) by mouth every 12 hours, Disp-60 tablet, R-11, E-Prescribe      warfarin ANTICOAGULANT (COUMADIN) 5 MG tablet Warfarin 7.5mg daily - pre cardiology take 10mg on 01/08 and 01/09, Historical         STOP taking these medications       aspirin 81 MG EC tablet Comments:   Reason for Stopping:         cephALEXin (KEFLEX) 500 MG capsule Comments:   Reason for Stopping:             Allergies   Allergies   Allergen Reactions     Cats      Hay Fever & [A.R.M.]      Heparin Other (See Comments)     Heparin resistance per cardio-thoracic surgeon Dr. Armando     Hydrocodone-Acetaminophen Nausea and Vomiting     Acetaminophen is ok

## 2020-01-15 NOTE — PLAN OF CARE
Pt alert and oriented, VSS on RA. Low grade fever overnight, PRN tylenol given. Up independently in room and to bathroom, voiding spontaneously and 1 loose BM overnight. Full liquid diet- tolerating. Right PIV saline locked with intermittent infusions of IV vanco and cefepime overnight. PRN tums given for heartburn and PRN zofran given for nausea. Continue with POC.

## 2020-01-15 NOTE — PLAN OF CARE
Pt admitted for UTI. A&Ox4, up ad rosa maria. Pt complained of right flank pain with urination. Pt's urine was red. Pt complained of nausea and received Zofran. Pt only tolerated pop and ice cream. IV antibiotics given. At 2115 pt complained of chest pain and MD assessed the patient. MD ordered an EKG. Pt able to make his needs known. Continue with plan of care.

## 2020-01-15 NOTE — PROGRESS NOTES
ANTICOAGULATION  MANAGEMENT: Discharge Review    Oswaldo Prabhakar chart reviewed for anticoagulation continuity of care    Hospital Admission on 1-13/1-15 for fever and possible UTI.    Discharge disposition: Home    INR Results:    Recent Labs   Lab Test 01/15/20  0610 01/14/20  0640 01/13/20  0649   INR 2.51* 2.55* 2.60*       Warfarin inpatient management: See calender    Warfarin discharge instructions: 7.5mg daily-please clarify     Medication Changes Affecting Anticoagulation: Yes: Patient is starting Augmentin and Zofran    Additional Factors Affecting Anticoagulation: Yes:     Plan     Patient to recheck an INR on 1/17.    Patient not contacted    Anticoagulation Calendar updated    Kristina Wright RN

## 2020-01-15 NOTE — PROVIDER NOTIFICATION
Provider notified that pt's BC turned positive, growing gram negative rods. Pt had already discharged.

## 2020-01-15 NOTE — PROGRESS NOTES
Pt adequate for discharge. PIV removed. VSS on RA; afebrile this shift. Up ind in room. Advanced from full liquid to regular diet. Prn tums, zofran, and compazine given. Transitioned from IV to PO abx. Belongings with pt at time of discharge. Family to transport. Pt to  meds from discharge pharmacy. Pt to take due evolocumab (REPATHA) injection when he gets home as hospital does not stock this medication.

## 2020-01-16 NOTE — PROGRESS NOTES
Patient has clinic visit within 24-48 hours of Discharge so no post DC follow up call is needed    1/15/2020  [facility], [place], Anticoagulation Clinic   Kristina Wright RN   Long term current use of anticoagulant therapy +1 more   Dx    Progress Notes

## 2020-01-19 LAB
BACTERIA SPEC CULT: ABNORMAL
Lab: ABNORMAL
SPECIMEN SOURCE: ABNORMAL

## 2020-01-20 LAB
BACTERIA SPEC CULT: NO GROWTH
BACTERIA SPEC CULT: NO GROWTH
Lab: NORMAL
Lab: NORMAL
SPECIMEN SOURCE: NORMAL
SPECIMEN SOURCE: NORMAL

## 2020-01-21 ENCOUNTER — ANTICOAGULATION THERAPY VISIT (OUTPATIENT)
Dept: ANTICOAGULATION | Facility: CLINIC | Age: 60
End: 2020-01-21

## 2020-01-21 ENCOUNTER — OFFICE VISIT (OUTPATIENT)
Dept: INTERNAL MEDICINE | Facility: CLINIC | Age: 60
End: 2020-01-21
Payer: COMMERCIAL

## 2020-01-21 VITALS
WEIGHT: 210 LBS | BODY MASS INDEX: 28.47 KG/M2 | SYSTOLIC BLOOD PRESSURE: 139 MMHG | HEART RATE: 98 BPM | DIASTOLIC BLOOD PRESSURE: 85 MMHG | OXYGEN SATURATION: 98 %

## 2020-01-21 DIAGNOSIS — I26.99 PULMONARY EMBOLISM, UNSPECIFIED CHRONICITY, UNSPECIFIED PULMONARY EMBOLISM TYPE, UNSPECIFIED WHETHER ACUTE COR PULMONALE PRESENT (H): ICD-10-CM

## 2020-01-21 DIAGNOSIS — I25.10 CAD (CORONARY ARTERY DISEASE): ICD-10-CM

## 2020-01-21 DIAGNOSIS — Z79.01 LONG TERM CURRENT USE OF ANTICOAGULANT THERAPY: ICD-10-CM

## 2020-01-21 DIAGNOSIS — I26.99 PULMONARY EMBOLI (H): ICD-10-CM

## 2020-01-21 DIAGNOSIS — Z95.1 S/P CABG X 4: ICD-10-CM

## 2020-01-21 DIAGNOSIS — Z78.9 STATIN INTOLERANCE: ICD-10-CM

## 2020-01-21 DIAGNOSIS — I25.739 CORONARY ARTERY DISEASE INVOLVING NONAUTOLOGOUS BIOLOGICAL CORONARY BYPASS GRAFT WITH ANGINA PECTORIS (H): ICD-10-CM

## 2020-01-21 DIAGNOSIS — E78.5 HYPERLIPIDEMIA LDL GOAL <130: ICD-10-CM

## 2020-01-21 DIAGNOSIS — E87.6 HYPOKALEMIA: ICD-10-CM

## 2020-01-21 DIAGNOSIS — E87.6 HYPOKALEMIA: Primary | ICD-10-CM

## 2020-01-21 LAB
ANION GAP SERPL CALCULATED.3IONS-SCNC: 6 MMOL/L (ref 3–14)
BUN SERPL-MCNC: 14 MG/DL (ref 7–30)
CALCIUM SERPL-MCNC: 9.7 MG/DL (ref 8.5–10.1)
CHLORIDE SERPL-SCNC: 108 MMOL/L (ref 94–109)
CO2 SERPL-SCNC: 26 MMOL/L (ref 20–32)
CREAT SERPL-MCNC: 0.83 MG/DL (ref 0.66–1.25)
GFR SERPL CREATININE-BSD FRML MDRD: >90 ML/MIN/{1.73_M2}
GLUCOSE SERPL-MCNC: 124 MG/DL (ref 70–99)
INR PPP: 2.79 (ref 0.86–1.14)
POTASSIUM SERPL-SCNC: 3.7 MMOL/L (ref 3.4–5.3)
SODIUM SERPL-SCNC: 140 MMOL/L (ref 133–144)

## 2020-01-21 RX ORDER — METOPROLOL TARTRATE 25 MG/1
12.5 TABLET, FILM COATED ORAL 2 TIMES DAILY
Qty: 90 TABLET | Refills: 3 | Status: SHIPPED | OUTPATIENT
Start: 2020-01-21 | End: 2021-01-11

## 2020-01-21 NOTE — NURSING NOTE
Chief Complaint   Patient presents with     Hospital F/U     pt was in ER 3x since 1/6/20     Kimberly Nissen, EMT at 8:47 AM on 1/21/2020

## 2020-01-21 NOTE — PROGRESS NOTES
PRIMARY CARE CENTER       SUBJECTIVE:  Oswaldo Prabhakar is a 59 year old male with a PMHx of CAD, PE on warfarin who comes in for post hospital follow-up.     Patient with multiple recent hospitalizations. Was hospitalized from 1/6-1/7 for L sided chest pain found to have unstable angina and had coronary angiogram s/p NIR to LIMA and discharged on tigacrelor and aspirin (since discontinued). He was subsequently hospitalized on 1/10 for right flank pain found to have CT abdomen that showed 5 mm proximal right ureteral stone and underwent cystoscopy per urology with placement of 6 x 26 double - J ureteral stent. He then was hospitalized from 1/12-1/15 for UTI and sepsis due to GNR bacteremia and discharged with 14 day course of ciprofloxacin.     Patient feels better.  Still having intermittent hematuria. No blood in bowel movements. Feels more urinary frequency and sensation of incomplete bladder emptying.  Has dull lower right ache but denies any pain with urination. Hasn't been getting full night sleep because having to get up to pee. Has planned repeat cystoscopy on 1/29/19 with right ureteral stent exchange and right uretoscopy with laser lithotripsy. No fevers/chills/headache/nausea. Appetite has been improving. Has been having 2 boosts a day and maintaining fluids.     For CAD still on brilanta. Takes coumadin so no longer on aspirin. Will have to call to follow-up with Dr. Ocampo.      Went back to work today.     Medications and allergies reviewed by me today.     ROS:   Constitutional, neuro, ENT, endocrine, pulmonary, cardiac, gastrointestinal, genitourinary, musculoskeletal, integument and psychiatric systems are negative, except as otherwise noted.    OBJECTIVE:    /85   Pulse 98   Wt 95.3 kg (210 lb)   SpO2 98%   BMI 28.47 kg/m     Wt Readings from Last 1 Encounters:   01/21/20 95.3 kg (210 lb)       GENERAL APPEARANCE: healthy, alert and no distress     EYES: EOMI      HENT:  external ear canals normal and nose and mouth without ulcers or lesions     RESP: lungs clear to auscultation - no rales, rhonchi or wheezes     CV: regular rates and rhythm, normal S1 S2, no S3 or S4 and no murmur, click or rub     ABDOMEN:  soft, nontender, no HSM or masses and bowel sounds normal     MS: extremities normal- no gross deformities noted, no evidence of inflammation in joints, FROM in all extremities.     SKIN: no suspicious lesions or rashes     NEURO: Normal strength and tone, sensory exam grossly normal, mentation intact and speech normal     PSYCH: mentation appears normal      ASSESSMENT/PLAN:    Oswaldo was seen today for hospital f/u.    Diagnoses and all orders for this visit:    CAD s/p distant CABG and recent NIR placement  Patient on aspirin / statin / ticagrelor / ACE / BB blocker.  Needs his BB refilled. Plans to call cardiology for follow-up appointment.   -     metoprolol tartrate (LOPRESSOR) 25 MG tablet; Take 0.5 tablets (12.5 mg) by mouth 2 times daily    Ureteral stone s/p ureteral stent placement  Lower urinary tract symptoms  GNR bacteremia likely urinary source   Patient with planned urology follow-up. Non toxic appearing. Symptoms have improved since hospital discharge.  Will complete his ciprofloxacin prescribed for GNR bacteremia.     Hypokalemia  -     Potassium; Future    Pt should return to clinic to see PCP in 4-6 months.     Luis Holguin MD PhD  Internal Medicine, PGY-3  [phone]   Jan 21, 2020    Pt plan of care discussed with Dr. Barry.   Pt was seen and examined with Dr. Holguin.  I agree with her documentation as noted above.    My additional comments: None    Angel Barry MD

## 2020-01-21 NOTE — PROGRESS NOTES
ANTICOAGULATION FOLLOW-UP CLINIC VISIT    Patient Name:  Oswaldo Prabhakar  Date:  2020  Contact Type:  Telephone    SUBJECTIVE:  Patient Findings     Comments:   Pt reports that his Augmentin is finished         Clinical Outcomes     Negatives:   Major bleeding event, Thromboembolic event, Anticoagulation-related hospital admission, Anticoagulation-related ED visit, Anticoagulation-related fatality    Comments:   Pt reports that his Augmentin is finished            OBJECTIVE    INR   Date Value Ref Range Status   2020 2.79 (H) 0.86 - 1.14 Final     Chromogenic Factor 10   Date Value Ref Range Status   2011 81 60 - 140 % Final     Comment:     Therapeutic Range: A Chromogenic Factor 10 level of 20-40% inversely   correlates   with an INR of 2-3 for patients receiving Warfarin. Chromogenic Factor 10   levels below 20% indicate an INR greater than 3, and levels above 40%   indicate   an INR less than 2.     Factor 2 Assay   Date Value Ref Range Status   2012  60 - 140 % Final    (Note)  CANCELLED AND CREDITED PER [facility]. MONITORING,12,       ASSESSMENT / PLAN  INR assessment THER    Recheck INR In: 4 WEEKS    INR Location Clinic      Anticoagulation Summary  As of 2020    INR goal:   2.0-3.0   TTR:   71.3 % (11.8 mo)   INR used for dosin.79 (2020)   Warfarin maintenance plan:   7.5 mg (5 mg x 1.5) every day   Full warfarin instructions:   7.5 mg every day   Weekly warfarin total:   52.5 mg   Plan last modified:   Kwesi Garcia RN (2018)   Next INR check:   2020   Priority:   Maintenance   Target end date:   Indefinite    Indications    Pulmonary emboli (H) [I26.99]  Long-term (current) use of anticoagulants [Z79.01] [Z79.01]             Anticoagulation Episode Summary     INR check location:       Preferred lab:       Send INR reminders to:   [facility]    Comments:   HIPPA INFO OK to speak with wife Josselyn OK to leave messages on Home.work and  cell phones  use cell phone #[phone] ++++1/8/2020 ASA 81mg DAILY AND STOP ONCE INR >2.0++++      Anticoagulation Care Providers     Provider Role Specialty Phone number    Samm Vazquez MD [facility] Internal Medicine [phone]            See the Encounter Report to view Anticoagulation Flowsheet and Dosing Calendar (Go to Encounters tab in chart review, and find the Anticoagulation Therapy Visit)    Spoke with patient. Gave them their lab results and new warfarin recommendation.  No changes in health, medication, or diet. No missed doses, no falls. No signs or symptoms of bleed or clotting.     Liliam Hyman RN

## 2020-01-22 DIAGNOSIS — I10 ESSENTIAL HYPERTENSION: ICD-10-CM

## 2020-01-23 RX ORDER — LISINOPRIL 2.5 MG/1
2.5 TABLET ORAL DAILY
Qty: 90 TABLET | Refills: 3 | Status: SHIPPED | OUTPATIENT
Start: 2020-01-23 | End: 2021-01-11

## 2020-01-23 ASSESSMENT — ENCOUNTER SYMPTOMS
SLEEP DISTURBANCES DUE TO BREATHING: 0
HEMATURIA: 1
INCREASED ENERGY: 0
POLYDIPSIA: 0
PALPITATIONS: 1
CHILLS: 1
WEIGHT LOSS: 1
WEIGHT GAIN: 0
ALTERED TEMPERATURE REGULATION: 0
HYPERTENSION: 1
HYPOTENSION: 0
POLYPHAGIA: 0
HALLUCINATIONS: 0
FLANK PAIN: 1
LIGHT-HEADEDNESS: 0
DYSURIA: 1
ORTHOPNEA: 0
FEVER: 1
FATIGUE: 1
LEG PAIN: 0
SYNCOPE: 0
DIFFICULTY URINATING: 1
NIGHT SWEATS: 1
EXERCISE INTOLERANCE: 0
DECREASED APPETITE: 1

## 2020-01-23 NOTE — TELEPHONE ENCOUNTER
LISINOPRIL 2.5 MG TABLET     Last Written Prescription Date:  4/25/2019  Last Fill Quantity: 90,   # refills: 2  Last Office Visit : 10/21/2019  Future Office visit:  1/27/2020  90 Tabs, 3 Refills sent to pharmacy for Pt care on 1/23/2020.    Ericka Cano RN  Central Triage Red Flags/Med Refills

## 2020-01-27 ENCOUNTER — OFFICE VISIT (OUTPATIENT)
Dept: CARDIOLOGY | Facility: CLINIC | Age: 60
End: 2020-01-27
Attending: INTERNAL MEDICINE
Payer: COMMERCIAL

## 2020-01-27 VITALS
OXYGEN SATURATION: 99 % | SYSTOLIC BLOOD PRESSURE: 122 MMHG | HEIGHT: 72 IN | HEART RATE: 64 BPM | BODY MASS INDEX: 28.31 KG/M2 | WEIGHT: 209 LBS | DIASTOLIC BLOOD PRESSURE: 76 MMHG

## 2020-01-27 DIAGNOSIS — Z01.818 PRE-OP EXAM: Primary | ICD-10-CM

## 2020-01-27 PROCEDURE — 99213 OFFICE O/P EST LOW 20 MIN: CPT | Mod: ZP | Performed by: INTERNAL MEDICINE

## 2020-01-27 PROCEDURE — 93005 ELECTROCARDIOGRAM TRACING: CPT | Mod: ZF

## 2020-01-27 PROCEDURE — G0463 HOSPITAL OUTPT CLINIC VISIT: HCPCS | Mod: 25,ZF

## 2020-01-27 PROCEDURE — 93010 ELECTROCARDIOGRAM REPORT: CPT | Mod: ZP | Performed by: INTERNAL MEDICINE

## 2020-01-27 ASSESSMENT — PAIN SCALES - GENERAL: PAINLEVEL: NO PAIN (0)

## 2020-01-27 ASSESSMENT — MIFFLIN-ST. JEOR: SCORE: 1811.02

## 2020-01-27 NOTE — LETTER
1/27/2020      RE: Oswaldo Parbhakar  [address]  [place] [address]       Dear Colleague,    Thank you for the opportunity to participate in the care of your patient, Oswaldo Prabhakar, at the [facility]. Please see a copy of my visit note below.    HPI:     Mr. Prabhakar is a 60 yo male with history of premature atherosclerosis and CABG in April 2014, HTN and HLD. He also has history of statin intolerance. He returns to clinic for routine follow up. After he was recently admitted because of    Unstable angina on 01-10-20  ##History of premature CAD s/p 4V CABG (LIMA-LAD, SVg-Diag, SVG-OM1, SVG-rPDA) 2014  1 day of intermittent left sided chest pain relieved with nitroglycerin. Serial EKG with new TWI in anterior leads as above. Troponin negative x2. Started heparin and ntg gtt in the ED. Intermittent chest pressure overnight that resolved with ntg. Echo completed last morning with new WMA with mid and distal anteroseptum hypokinesis. Underwent angiogram on HOD 1 with significant disease of the LIMA requiring NIR x1.  Three vessel CAD s/p CABG with patent SVG to rPDA, OM2, and D1, atretic LIMA with severe proximal and mid LAD lesion culprit in ACS.  PCI with one drug eluting stent to the proximal / mid LAD.     Will discharge on triple therapy, with intention to stop aspirin once his INR is >2.0  - continue PTA metoprolol tartrate 12.5mg bid  - continue PTA atorvastatin 80mg, zetia 10mg, fenofibrate 162mg every day  - Start ticagrelor 90mg bid. Continue aspirin 81mg and warfarin. Stop aspirin once INR >2.0  - follow up in clinic in 2-3 weeks    PAST MEDICAL HISTORY:  Past Medical History:   Diagnosis Date     Antiplatelet or antithrombotic long-term use      Coronary artery disease      Diaphragmatic hernia without mention of obstruction or gangrene      Heart disease      Hernia, abdominal      History of blood transfusion      History of thrombophlebitis       Hypertension      Nephrolithiasis 4/2010     Other and unspecified hyperlipidemia      Pulmonary embolus and infarction (H)     s/p hemothorax and filter s/p filter removal (2011), now on long term anti-coagulation     Statin intolerance 2/1/2017     Stented coronary artery      Unspecified retinal detachment     Childhood trauma, unrepaired       CURRENT MEDICATIONS:  Current Outpatient Medications   Medication Sig Dispense Refill     atorvastatin (LIPITOR) 80 MG tablet Take 1 tablet (80 mg) by mouth daily 90 tablet 2     calcium carbonate (TUMS) 500 MG chewable tablet Take 1 chew tab by mouth daily as needed       Cholecalciferol (VITAMIN D) 2000 UNITS CAPS Take 2,000 Units by mouth daily       ciprofloxacin (CIPRO) 500 MG tablet Take 1 tablet (500 mg) by mouth 2 times daily 25 tablet 0     DiphenhydrAMINE HCl (BENADRYL PO) Take 25-50 mg by mouth daily as needed       evolocumab (REPATHA SURECLICK) 140 MG/ML prefilled autoinjector Inject 1 mL (140 mg) Subcutaneous every 14 days 6 mL 3     ezetimibe (ZETIA) 10 MG tablet Take 1 tablet (10 mg) by mouth daily 90 tablet 3     fenofibrate (TRIGLIDE/LOFIBRA) 160 MG tablet Take 1 tablet (160 mg) by mouth daily 90 tablet 2     lisinopril (PRINIVIL/ZESTRIL) 2.5 MG tablet Take 1 tablet (2.5 mg) by mouth daily 90 tablet 3     metoprolol tartrate (LOPRESSOR) 25 MG tablet Take 0.5 tablets (12.5 mg) by mouth 2 times daily 90 tablet 3     ondansetron (ZOFRAN) 4 MG tablet Take 1 tablet (4 mg) by mouth every 8 hours as needed for nausea 12 tablet 0     ticagrelor (BRILINTA) 90 MG tablet Take 1 tablet (90 mg) by mouth every 12 hours 60 tablet 11     warfarin ANTICOAGULANT (COUMADIN) 5 MG tablet Take 7.5 mg by mouth Warfarin 7.5mg daily - pre cardiology take 10mg on 01/08 and 01/09          PAST SURGICAL HISTORY:  Past Surgical History:   Procedure Laterality Date     BYPASS GRAFT ARTERY CORONARY  4/4/2014    Procedure: BYPASS GRAFT ARTERY CORONARY;  Median Sternotomy, Coronary  Artery Bypass Bypass x 4 using Left Internal Mammary, Left Saphenous Vein, on pump oxygenation;  Surgeon: Sherry Armando MD;  Location: [facility] OR     C NONSPECIFIC PROCEDURE      Spermatocele resection     CLOSED REDUCTION, PERCUTANEOUS PINNING  HAND, COMBINED Left 11/5/2015    Procedure: COMBINED CLOSED REDUCTION, PERCUTANEOUS PINNING HAND;  Surgeon: Carmella Love MD;  Location: [facility]     COLONOSCOPY  3/15/2013    Procedure: COLONOSCOPY;;  Surgeon: Racheal Shipman MD;  Location: [facility] GI     COMBINED CYSTOSCOPY, INSERT STENT URETER(S) Right 1/10/2020    Procedure: Cystoscopy, right retrograde pyelogram, interpretation of fluoroscopic images, placement of 6 x 26 double-J ureteral stent;  Surgeon: Nguyễn Woodard MD;  Location: [facility] OR     CV ANGIOGRAM CORONARY GRAFT N/A 1/7/2020    Procedure: Angiogram Coronary Graft;  Surgeon: Chato Odonnell MD;  Location:  [facility]     CV CORONARY ANGIOGRAM  1/7/2020    Procedure: CV CORONARY ANGIOGRAM;  Surgeon: Chato Odonnell MD;  Location:  [facility]     CV PCI STENT DRUG ELUTING N/A 1/7/2020    Procedure: PCI Stent Drug Eluting;  Surgeon: Chato Odonnell MD;  Location:  [facility]     CYSTOSCOPY       LAPAROSCOPIC CHOLECYSTECTOMY N/A 8/6/2018    Procedure: LAPAROSCOPIC CHOLECYSTECTOMY;  LAPAROSCOPIC CHOLECYSTECTOMY ;  Surgeon: José Miguel Stuart MD;  Location: [facility] OR     LITHOTRIPSY  4/2010     THORACIC SURGERY      lung surgery, thoracotomy, lung adhesion       ALLERGIES     Allergies   Allergen Reactions     Cats      Hay Fever & [A.R.M.]      Heparin Other (See Comments)     Heparin resistance per cardio-thoracic surgeon Dr. Armando     Hydrocodone-Acetaminophen Nausea and Vomiting     Acetaminophen is ok       FAMILY HISTORY:  Family History   Problem Relation Age of Onset     Heart Disease Mother      C.A.D. Father         3 vessel CABG, age 70     Cancer  Father         bladder cancer     C.A.D. Paternal Grandmother      Hypertension Paternal Grandmother      Alzheimer Disease Paternal Grandmother      Diabetes No family hx of      Thyroid Disease No family hx of      Cancer - colorectal No family hx of        SOCIAL HISTORY:  Social History     Socioeconomic History     Marital status:      Spouse name: None     Number of children: None     Years of education: None     Highest education level: None   Occupational History     None   Social Needs     Financial resource strain: None     Food insecurity:     Worry: None     Inability: None     Transportation needs:     Medical: None     Non-medical: None   Tobacco Use     Smoking status: Never Smoker     Smokeless tobacco: Never Used   Substance and Sexual Activity     Alcohol use: No     Comment: very rare     Drug use: No     Sexual activity: None   Lifestyle     Physical activity:     Days per week: None     Minutes per session: None     Stress: None   Relationships     Social connections:     Talks on phone: None     Gets together: None     Attends Nondenominational service: None     Active member of club or organization: None     Attends meetings of clubs or organizations: None     Relationship status: None     Intimate partner violence:     Fear of current or ex partner: None     Emotionally abused: None     Physically abused: None     Forced sexual activity: None   Other Topics Concern     Parent/sibling w/ CABG, MI or angioplasty before 65F 55M? Not Asked   Social History Narrative     None       ROS:   Constitutional: No fever, chills, or sweats. No weight gain/loss   ENT: No visual disturbance, ear ache, epistaxis, sore throat  Allergies/Immunologic: Negative.   Respiratory: No cough, hemoptysia  Cardiovascular: As per HPI  GI: No nausea, vomiting, hematemesis, melena, or hematochezia  : No urinary frequency, dysuria, or hematuria  Integument: Negative  Psychiatric: Negative  Neuro: Negative  Endocrinology:  Negative   Musculoskeletal: Negative    EXAM:  /76 (BP Location: Left arm, Patient Position: Chair, Cuff Size: Adult Regular)   Pulse 64   Ht 1.829 m (6')   Wt 94.8 kg (209 lb)   SpO2 99%   BMI 28.35 kg/m     In general, the patient is a pleasant male in no apparent distress.    HEENT: NC/AT.  PERRLA.  EOMI.  Sclerae white, not injected.  Nares clear.  Pharynx without erythema or exudate.  Dentition intact.    Neck: No adenopathy.  No thyromegaly. Carotids +4/4 bilaterally without bruits.  No jugular venous distension.   Heart: RRR. Normal S1, S2 splits physiologically. No murmur, rub, click, or gallop. The PMI is in the 5th ICS in the midclavicular line. There is no heave.    Lungs: CTA.  No ronchi, wheezes, rales.  No dullness to percussion.   Abdomen: Soft, nontender, nondistended. No organomegaly.  No bruits.   Extremities: No clubbing, cyanosis, or edema.  The pulses are +4/4 at the radial, brachial, femoral, popliteal, DP, and PT sites bilaterally.  No bruits are noted.  Neurologic: Alert and oriented to person/place/time, normal speech, gait and affect  Skin: No petechiae, purpura or rash.    Labs:  LIPID RESULTS:  Lab Results   Component Value Date    CHOL 69 01/07/2020    HDL 41 01/07/2020    LDL 10 01/07/2020    TRIG 94 01/07/2020    CHOLHDLRATIO 7.0 (H) 09/16/2015    NHDL 29 01/07/2020       LIVER ENZYME RESULTS:  Lab Results   Component Value Date    AST 12 01/12/2020    ALT 37 01/12/2020       CBC RESULTS:  Lab Results   Component Value Date    WBC 6.1 01/15/2020    RBC 4.49 01/15/2020    HGB 12.9 (L) 01/15/2020    HCT 39.3 (L) 01/15/2020    MCV 88 01/15/2020    MCH 28.7 01/15/2020    MCHC 32.8 01/15/2020    RDW 13.0 01/15/2020     01/15/2020       BMP RESULTS:  Lab Results   Component Value Date     01/21/2020    POTASSIUM 3.7 01/21/2020    CHLORIDE 108 01/21/2020    CO2 26 01/21/2020    ANIONGAP 6 01/21/2020     (H) 01/21/2020    BUN 14 01/21/2020    CR 0.83 01/21/2020     GFRESTIMATED >90 01/21/2020    GFRESTBLACK >90 01/21/2020    GONZALES 9.7 01/21/2020        A1C RESULTS:  Lab Results   Component Value Date    A1C 5.9 03/01/2016       INR RESULTS:  Lab Results   Component Value Date    INR 2.79 (H) 01/21/2020    INR 2.51 (H) 01/15/2020       Procedures:  EKG 01-27-20:    Sin bradycardia, q in DIII and aVF, inverted T-wave in V1 and V2     Assessment and Plan:     We discussed the results with patient.  We disucssed te importance of a hearthealthy lifestye and diet.  We discussed to continue with same medical regimen.        Patient is cleared from cardiovascular point of view for the urological procedure planned 01-29-20.  If no contra-indication form urological surgical point of view - continue Brilinta and warfarin.      Please follow up: With Dr. Ocampo as previously discussed in 10/2020    Reji Ocampo MD, PhD  Professor of Medicine  Division of Cardiology    CC  Patient Care Team:  Simi Guevara MD as PCP - General (Internal Medicine)  Reji Ocampo MD as MD (Cardiology)  Shiv Vizcarra MD as MD (Family Practice)  Carmella Love MD as MD (Orthopedics)  Lavelle Solis MD as MD (Family Practice)  Jose Martin Gabriel MD as Assigned PCP  Nicole Blanc LPN as Nurse Coordinator (Cardiology)  Sav Schmitt DO as MD (Family Practice)  SIMI GUEVARA    Answers for HPI/ROS submitted by the patient on 1/23/2020   General Symptoms: Yes  Skin Symptoms: No  HENT Symptoms: No  EYE SYMPTOMS: No  HEART SYMPTOMS: Yes  LUNG SYMPTOMS: No  INTESTINAL SYMPTOMS: No  URINARY SYMPTOMS: Yes  REPRODUCTIVE SYMPTOMS: No  SKELETAL SYMPTOMS: No  BLOOD SYMPTOMS: No  NERVOUS SYSTEM SYMPTOMS: No  MENTAL HEALTH SYMPTOMS: No  Fever: Yes  Loss of appetite: Yes  Weight loss: Yes  Weight gain: No  Fatigue: Yes  Night sweats: Yes  Chills: Yes  Increased stress: No  Excessive hunger: No  Excessive thirst: No  Feeling hot or cold when others believe the temperature is normal: No  Loss of  height: No  Post-operative complications: Yes  Surgical site pain: No  Hallucinations: No  Change in or Loss of Energy: No  Hyperactivity: No  Confusion: No  Chest pain or pressure: Yes  Fast or irregular heartbeat: Yes  Pain in legs with walking: No  Trouble breathing while lying down: No  Fingers or toes appear blue: No  High blood pressure: Yes  Low blood pressure: No  Fainting: No  Murmurs: No  Pacemaker: No  Varicose veins: No  Edema or swelling: No  Wake up at night with shortness of breath: No  Light-headedness: No  Exercise intolerance: No  Trouble holding urine or incontinence: No  Pain or burning: Yes  Trouble starting or stopping: No  Increased frequency of urination: Yes  Blood in urine: Yes  Decreased frequency of urination: No  Frequent nighttime urination: Yes  Flank pain: Yes  Difficulty emptying bladder: Yes

## 2020-01-27 NOTE — NURSING NOTE
Chief Complaint   Patient presents with     Follow Up     Hospital F/U     Vitals were taken and medications were reconciled.     Nancy Vizcarra CMA    3:27 PM

## 2020-01-27 NOTE — PATIENT INSTRUCTIONS
Patient Instructions:  It was a pleasure to see you in the cardiology clinic today.      If you have any questions, you can reach my nurse, Nicole SALAZAR LPN, at [phone].  Press Option #1 for the [facility], and then press Option #4 for nursing.    We are encouraging the use of [employer] to communicate with your HealthCare Provider    Medication Changes: None.     Recommendations: None.    Studies Ordered: None.    The results from today include: EKG.    Please follow up: With Dr. Ocampo as previously discussed in 10/2020.    Sincerely,    Reji Ocampo MD     If you have an urgent need after hours (8:00 am to 4:30 pm) please call [phone] and ask for the cardiology fellow on call.

## 2020-01-27 NOTE — PROGRESS NOTES
HPI:     Mr. 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 He also has history of statin intolerance. He returns to clinic for routine follow up. After he was recently admitted because of    Unstable angina on 01-10-20  ##History of premature CAD s/p 4V CABG (LIMA-LAD, SVg-Diag, SVG-OM1, SVG-rPDA) 2014  1 day of intermittent left sided chest pain relieved with nitroglycerin. Serial EKG with new TWI in anterior leads as above. Troponin negative x2. Started heparin and ntg gtt in the ED. Intermittent chest pressure overnight that resolved with ntg. Echo completed last morning with new WMA with mid and distal anteroseptum hypokinesis. Underwent angiogram on HOD 1 with significant disease of the LIMA requiring NIR x1.  Three vessel CAD s/p CABG with patent SVG to rPDA, OM2, and D1, atretic LIMA with severe proximal and mid LAD lesion culprit in ACS.  PCI with one drug eluting stent to the proximal / mid LAD.     Will discharge on triple therapy, with intention to stop aspirin once his INR is >2.0  - continue PTA metoprolol tartrate 12.5mg bid  - continue PTA atorvastatin 80mg, zetia 10mg, fenofibrate 162mg every day  - Start ticagrelor 90mg bid. Continue aspirin 81mg and warfarin. Stop aspirin once INR >2.0  - follow up in clinic in 2-3 weeks    PAST MEDICAL HISTORY:  Past Medical History:   Diagnosis Date     Antiplatelet or antithrombotic long-term use      Coronary artery disease      Diaphragmatic hernia without mention of obstruction or gangrene      Heart disease      Hernia, abdominal      History of blood transfusion      History of thrombophlebitis      Hypertension      Nephrolithiasis 4/2010     Other and unspecified hyperlipidemia      Pulmonary embolus and infarction (H)     s/p hemothorax and filter s/p filter removal (2011), now on long term anti-coagulation     Statin intolerance 2/1/2017     Stented coronary artery      Unspecified retinal  detachment     Childhood trauma, unrepaired       CURRENT MEDICATIONS:  Current Outpatient Medications   Medication Sig Dispense Refill     atorvastatin (LIPITOR) 80 MG tablet Take 1 tablet (80 mg) by mouth daily 90 tablet 2     calcium carbonate (TUMS) 500 MG chewable tablet Take 1 chew tab by mouth daily as needed       Cholecalciferol (VITAMIN D) 2000 UNITS CAPS Take 2,000 Units by mouth daily       ciprofloxacin (CIPRO) 500 MG tablet Take 1 tablet (500 mg) by mouth 2 times daily 25 tablet 0     DiphenhydrAMINE HCl (BENADRYL PO) Take 25-50 mg by mouth daily as needed       evolocumab (REPATHA SURECLICK) 140 MG/ML prefilled autoinjector Inject 1 mL (140 mg) Subcutaneous every 14 days 6 mL 3     ezetimibe (ZETIA) 10 MG tablet Take 1 tablet (10 mg) by mouth daily 90 tablet 3     fenofibrate (TRIGLIDE/LOFIBRA) 160 MG tablet Take 1 tablet (160 mg) by mouth daily 90 tablet 2     lisinopril (PRINIVIL/ZESTRIL) 2.5 MG tablet Take 1 tablet (2.5 mg) by mouth daily 90 tablet 3     metoprolol tartrate (LOPRESSOR) 25 MG tablet Take 0.5 tablets (12.5 mg) by mouth 2 times daily 90 tablet 3     ondansetron (ZOFRAN) 4 MG tablet Take 1 tablet (4 mg) by mouth every 8 hours as needed for nausea 12 tablet 0     ticagrelor (BRILINTA) 90 MG tablet Take 1 tablet (90 mg) by mouth every 12 hours 60 tablet 11     warfarin ANTICOAGULANT (COUMADIN) 5 MG tablet Take 7.5 mg by mouth Warfarin 7.5mg daily - pre cardiology take 10mg on 01/08 and 01/09          PAST SURGICAL HISTORY:  Past Surgical History:   Procedure Laterality Date     BYPASS GRAFT ARTERY CORONARY  4/4/2014    Procedure: BYPASS GRAFT ARTERY CORONARY;  Median Sternotomy, Coronary Artery Bypass Bypass x 4 using Left Internal Mammary, Left Saphenous Vein, on pump oxygenation;  Surgeon: Sherry Armando MD;  Location: [facility] OR     C NONSPECIFIC PROCEDURE      Spermatocele resection     CLOSED REDUCTION, PERCUTANEOUS PINNING  HAND, COMBINED Left 11/5/2015    Procedure: COMBINED  CLOSED REDUCTION, PERCUTANEOUS PINNING HAND;  Surgeon: Carmella Love MD;  [facility]     COLONOSCOPY  3/15/2013    Procedure: COLONOSCOPY;;  Surgeon: Racheal Shipman MD;  Location: [facility]     COMBINED CYSTOSCOPY, INSERT STENT URETER(S) Right 1/10/2020    Procedure: Cystoscopy, right retrograde pyelogram, interpretation of fluoroscopic images, placement of 6 x 26 double-J ureteral stent;  Surgeon: Nguyễn Woodard MD;  Location: [facility] OR     CV ANGIOGRAM CORONARY GRAFT N/A 1/7/2020    Procedure: Angiogram Coronary Graft;  Surgeon: Chato Odonnell MD;  Location:  [facility] CARDIAC CATH LAB     CV CORONARY ANGIOGRAM  1/7/2020    Procedure: CV CORONARY ANGIOGRAM;  Surgeon: Chato Odonnell MD;  Location: [facility] CARDIAC CATH LAB     CV PCI STENT DRUG ELUTING N/A 1/7/2020    Procedure: PCI Stent Drug Eluting;  Surgeon: Chato Odonnell MD;  Location: [facility] CARDIAC CATH LAB     CYSTOSCOPY       LAPAROSCOPIC CHOLECYSTECTOMY N/A 8/6/2018    Procedure: LAPAROSCOPIC CHOLECYSTECTOMY;  LAPAROSCOPIC CHOLECYSTECTOMY ;  Surgeon: José Miguel Stuart MD;  Location:  OR     LITHOTRIPSY  4/2010     THORACIC SURGERY      lung surgery, thoracotomy, lung adhesion       ALLERGIES     Allergies   Allergen Reactions     Cats      Hay Fever & [A.R.M.]      Heparin Other (See Comments)     Heparin resistance per cardio-thoracic surgeon Dr. Armando     Hydrocodone-Acetaminophen Nausea and Vomiting     Acetaminophen is ok       FAMILY HISTORY:  Family History   Problem Relation Age of Onset     Heart Disease Mother      C.A.D. Father         3 vessel CABG, age 70     Cancer Father         bladder cancer     C.A.D. Paternal Grandmother      Hypertension Paternal Grandmother      Alzheimer Disease Paternal Grandmother      Diabetes No family hx of      Thyroid Disease No family hx of      Cancer - colorectal No family hx of        SOCIAL HISTORY:  Social History      Socioeconomic History     Marital status:      Spouse name: None     Number of children: None     Years of education: None     Highest education level: None   Occupational History     None   Social Needs     Financial resource strain: None     Food insecurity:     Worry: None     Inability: None     Transportation needs:     Medical: None     Non-medical: None   Tobacco Use     Smoking status: Never Smoker     Smokeless tobacco: Never Used   Substance and Sexual Activity     Alcohol use: No     Comment: very rare     Drug use: No     Sexual activity: None   Lifestyle     Physical activity:     Days per week: None     Minutes per session: None     Stress: None   Relationships     Social connections:     Talks on phone: None     Gets together: None     Attends [employer] service: None     Active member of club or organization: None     Attends meetings of clubs or organizations: None     Relationship status: None     Intimate partner violence:     Fear of current or ex partner: None     Emotionally abused: None     Physically abused: None     Forced sexual activity: None   Other Topics Concern     Parent/sibling w/ CABG, MI or angioplasty before 65F 55M? Not Asked   Social History Narrative     None       ROS:   Constitutional: No fever, chills, or sweats. No weight gain/loss   ENT: No visual disturbance, ear ache, epistaxis, sore throat  Allergies/Immunologic: Negative.   Respiratory: No cough, hemoptysia  Cardiovascular: As per HPI  GI: No nausea, vomiting, hematemesis, melena, or hematochezia  : No urinary frequency, dysuria, or hematuria  Integument: Negative  Psychiatric: Negative  Neuro: Negative  Endocrinology: Negative   Musculoskeletal: Negative    EXAM:  /76 (BP Location: Left arm, Patient Position: Chair, Cuff Size: Adult Regular)   Pulse 64   Ht 1.829 m (6')   Wt 94.8 kg (209 lb)   SpO2 99%   BMI 28.35 kg/m    In general, the patient is a pleasant male in no apparent distress.     HEENT: NC/AT.  PERRLA.  EOMI.  Sclerae white, not injected.  Nares clear.  Pharynx without erythema or exudate.  Dentition intact.    Neck: No adenopathy.  No thyromegaly. Carotids +4/4 bilaterally without bruits.  No jugular venous distension.   Heart: RRR. Normal S1, S2 splits physiologically. No murmur, rub, click, or gallop. The PMI is in the 5th ICS in the midclavicular line. There is no heave.    Lungs: CTA.  No ronchi, wheezes, rales.  No dullness to percussion.   Abdomen: Soft, nontender, nondistended. No organomegaly.  No bruits.   Extremities: No clubbing, cyanosis, or edema.  The pulses are +4/4 at the radial, brachial, femoral, popliteal, DP, and PT sites bilaterally.  No bruits are noted.  Neurologic: Alert and oriented to person/place/time, normal speech, gait and affect  Skin: No petechiae, purpura or rash.    Labs:  LIPID RESULTS:  Lab Results   Component Value Date    CHOL 69 01/07/2020    HDL 41 01/07/2020    LDL 10 01/07/2020    TRIG 94 01/07/2020    CHOLHDLRATIO 7.0 (H) 09/16/2015    NHDL 29 01/07/2020       LIVER ENZYME RESULTS:  Lab Results   Component Value Date    AST 12 01/12/2020    ALT 37 01/12/2020       CBC RESULTS:  Lab Results   Component Value Date    WBC 6.1 01/15/2020    RBC 4.49 01/15/2020    HGB 12.9 (L) 01/15/2020    HCT 39.3 (L) 01/15/2020    MCV 88 01/15/2020    MCH 28.7 01/15/2020    MCHC 32.8 01/15/2020    RDW 13.0 01/15/2020     01/15/2020       BMP RESULTS:  Lab Results   Component Value Date     01/21/2020    POTASSIUM 3.7 01/21/2020    CHLORIDE 108 01/21/2020    CO2 26 01/21/2020    ANIONGAP 6 01/21/2020     (H) 01/21/2020    BUN 14 01/21/2020    CR 0.83 01/21/2020    GFRESTIMATED >90 01/21/2020    GFRESTBLACK >90 01/21/2020    GONZALES 9.7 01/21/2020        A1C RESULTS:  Lab Results   Component Value Date    A1C 5.9 03/01/2016       INR RESULTS:  Lab Results   Component Value Date    INR 2.79 (H) 01/21/2020    INR 2.51 (H) 01/15/2020        Procedures:  EKG 01-27-20:    Sin bradycardia, q in DIII and aVF, inverted T-wave in V1 and V2     Assessment and Plan:     We discussed the results with patient.  We disucssed te importance of a hearthealthy lifestye and diet.  We discussed to continue with same medical regimen.        Patient is cleared from cardiovascular point of view for the urological procedure planned 01-29-20.  If no contra-indication form urological surgical point of view - continue Brilinta and warfarin.      Please follow up: With Dr. Ocampo as previously discussed in 10/2020    Reji Ocampo MD, PhD  Professor of Medicine  Division of Cardiology    CC  Patient Care Team:  Simi Guevara MD as PCP - General (Internal Medicine)  Reji Ocampo MD as MD (Cardiology)  Shiv Vizcarra MD as MD (Family Practice)  Carmella Love MD as MD (Orthopedics)  Lavelle Solis MD as MD (Family Practice)  Jose Martin Gabriel MD as Assigned PCP  Nicole Blanc LPN as Nurse Coordinator (Cardiology)  Sav Schmitt DO as MD (Family Practice)  SIMI GUEVARA  Answers for HPI/ROS submitted by the patient on 1/23/2020   General Symptoms: Yes  Skin Symptoms: No  HENT Symptoms: No  EYE SYMPTOMS: No  HEART SYMPTOMS: Yes  LUNG SYMPTOMS: No  INTESTINAL SYMPTOMS: No  URINARY SYMPTOMS: Yes  REPRODUCTIVE SYMPTOMS: No  SKELETAL SYMPTOMS: No  BLOOD SYMPTOMS: No  NERVOUS SYSTEM SYMPTOMS: No  MENTAL HEALTH SYMPTOMS: No  Fever: Yes  Loss of appetite: Yes  Weight loss: Yes  Weight gain: No  Fatigue: Yes  Night sweats: Yes  Chills: Yes  Increased stress: No  Excessive hunger: No  Excessive thirst: No  Feeling hot or cold when others believe the temperature is normal: No  Loss of height: No  Post-operative complications: Yes  Surgical site pain: No  Hallucinations: No  Change in or Loss of Energy: No  Hyperactivity: No  Confusion: No  Chest pain or pressure: Yes  Fast or irregular heartbeat: Yes  Pain in legs with walking: No  Trouble breathing  while lying down: No  Fingers or toes appear blue: No  High blood pressure: Yes  Low blood pressure: No  Fainting: No  Murmurs: No  Pacemaker: No  Varicose veins: No  Edema or swelling: No  Wake up at night with shortness of breath: No  Light-headedness: No  Exercise intolerance: No  Trouble holding urine or incontinence: No  Pain or burning: Yes  Trouble starting or stopping: No  Increased frequency of urination: Yes  Blood in urine: Yes  Decreased frequency of urination: No  Frequent nighttime urination: Yes  Flank pain: Yes  Difficulty emptying bladder: Yes

## 2020-01-27 NOTE — NURSING NOTE
Med Reconcile: Reviewed and verified all current medications with the patient. The updated medication list was printed and given to the patient.  Return Appointment: Patient given instructions regarding scheduling next clinic visit. Patient demonstrated understanding of this information and agreed to call with further questions or concerns.  Patient stated he understood all health information given and agreed to call with further questions or concerns.    Nicole Blanc LPN

## 2020-01-28 LAB — INTERPRETATION ECG - MUSE: NORMAL

## 2020-01-29 ENCOUNTER — ANTICOAGULATION THERAPY VISIT (OUTPATIENT)
Dept: ANTICOAGULATION | Facility: CLINIC | Age: 60
End: 2020-01-29

## 2020-01-29 ENCOUNTER — ANESTHESIA EVENT (OUTPATIENT)
Dept: SURGERY | Facility: CLINIC | Age: 60
End: 2020-01-29
Payer: COMMERCIAL

## 2020-01-29 ENCOUNTER — HOSPITAL ENCOUNTER (OUTPATIENT)
Facility: CLINIC | Age: 60
Discharge: HOME | End: 2020-01-29
Attending: UROLOGY | Admitting: UROLOGY
Payer: COMMERCIAL

## 2020-01-29 ENCOUNTER — ANESTHESIA (OUTPATIENT)
Dept: SURGERY | Facility: CLINIC | Age: 60
End: 2020-01-29
Payer: COMMERCIAL

## 2020-01-29 VITALS
OXYGEN SATURATION: 97 % | WEIGHT: 207.6 LBS | SYSTOLIC BLOOD PRESSURE: 115 MMHG | DIASTOLIC BLOOD PRESSURE: 77 MMHG | BODY MASS INDEX: 28.12 KG/M2 | TEMPERATURE: 97.9 F | HEIGHT: 72 IN

## 2020-01-29 DIAGNOSIS — I26.99 PULMONARY EMBOLISM, UNSPECIFIED CHRONICITY, UNSPECIFIED PULMONARY EMBOLISM TYPE, UNSPECIFIED WHETHER ACUTE COR PULMONALE PRESENT (H): ICD-10-CM

## 2020-01-29 DIAGNOSIS — Z79.01 LONG TERM CURRENT USE OF ANTICOAGULANT THERAPY: ICD-10-CM

## 2020-01-29 DIAGNOSIS — N20.1 URETERAL STONE: ICD-10-CM

## 2020-01-29 LAB — INR PPP: 4.2 (ref 0.86–1.14)

## 2020-01-29 PROCEDURE — 85610 PROTHROMBIN TIME: CPT | Performed by: ANESTHESIOLOGY

## 2020-01-29 PROCEDURE — 36415 COLL VENOUS BLD VENIPUNCTURE: CPT | Performed by: ANESTHESIOLOGY

## 2020-01-29 RX ORDER — FENTANYL CITRATE 50 UG/ML
25-50 INJECTION, SOLUTION INTRAMUSCULAR; INTRAVENOUS
Status: CANCELLED | OUTPATIENT
Start: 2020-01-29

## 2020-01-29 RX ORDER — SODIUM CHLORIDE, SODIUM LACTATE, POTASSIUM CHLORIDE, CALCIUM CHLORIDE 600; 310; 30; 20 MG/100ML; MG/100ML; MG/100ML; MG/100ML
INJECTION, SOLUTION INTRAVENOUS CONTINUOUS
Status: CANCELLED | OUTPATIENT
Start: 2020-01-29

## 2020-01-29 RX ORDER — HYDRALAZINE HYDROCHLORIDE 20 MG/ML
2.5-5 INJECTION INTRAMUSCULAR; INTRAVENOUS EVERY 10 MIN PRN
Status: CANCELLED | OUTPATIENT
Start: 2020-01-29

## 2020-01-29 RX ORDER — HYDROMORPHONE HYDROCHLORIDE 1 MG/ML
.3-.5 INJECTION, SOLUTION INTRAMUSCULAR; INTRAVENOUS; SUBCUTANEOUS EVERY 5 MIN PRN
Status: CANCELLED | OUTPATIENT
Start: 2020-01-29

## 2020-01-29 RX ORDER — NALOXONE HYDROCHLORIDE 0.4 MG/ML
.1-.4 INJECTION, SOLUTION INTRAMUSCULAR; INTRAVENOUS; SUBCUTANEOUS
Status: CANCELLED | OUTPATIENT
Start: 2020-01-29 | End: 2020-01-30

## 2020-01-29 RX ORDER — SODIUM CHLORIDE, SODIUM LACTATE, POTASSIUM CHLORIDE, CALCIUM CHLORIDE 600; 310; 30; 20 MG/100ML; MG/100ML; MG/100ML; MG/100ML
INJECTION, SOLUTION INTRAVENOUS CONTINUOUS
Status: DISCONTINUED | OUTPATIENT
Start: 2020-01-29 | End: 2020-01-29 | Stop reason: HOSPADM

## 2020-01-29 RX ORDER — LIDOCAINE 40 MG/G
CREAM TOPICAL
Status: DISCONTINUED | OUTPATIENT
Start: 2020-01-29 | End: 2020-01-29 | Stop reason: HOSPADM

## 2020-01-29 RX ORDER — DIAZEPAM 10 MG/2ML
2.5 INJECTION, SOLUTION INTRAMUSCULAR; INTRAVENOUS
Status: CANCELLED | OUTPATIENT
Start: 2020-01-29

## 2020-01-29 RX ORDER — ONDANSETRON 4 MG/1
4 TABLET, ORALLY DISINTEGRATING ORAL EVERY 30 MIN PRN
Status: CANCELLED | OUTPATIENT
Start: 2020-01-29

## 2020-01-29 RX ORDER — LABETALOL HYDROCHLORIDE 5 MG/ML
10 INJECTION, SOLUTION INTRAVENOUS
Status: CANCELLED | OUTPATIENT
Start: 2020-01-29

## 2020-01-29 RX ORDER — MEPERIDINE HYDROCHLORIDE 25 MG/ML
12.5 INJECTION INTRAMUSCULAR; INTRAVENOUS; SUBCUTANEOUS EVERY 5 MIN PRN
Status: CANCELLED | OUTPATIENT
Start: 2020-01-29

## 2020-01-29 RX ORDER — CEFAZOLIN SODIUM 2 G/100ML
2 INJECTION, SOLUTION INTRAVENOUS
Status: DISCONTINUED | OUTPATIENT
Start: 2020-01-29 | End: 2020-01-29 | Stop reason: HOSPADM

## 2020-01-29 RX ORDER — TAMSULOSIN HYDROCHLORIDE 0.4 MG/1
0.4 CAPSULE ORAL DAILY
Qty: 15 CAPSULE | Refills: 4 | Status: SHIPPED | OUTPATIENT
Start: 2020-01-29 | End: 2021-01-28

## 2020-01-29 RX ORDER — ONDANSETRON 2 MG/ML
4 INJECTION INTRAMUSCULAR; INTRAVENOUS EVERY 30 MIN PRN
Status: CANCELLED | OUTPATIENT
Start: 2020-01-29

## 2020-01-29 RX ORDER — CEFAZOLIN SODIUM 1 G/3ML
1 INJECTION, POWDER, FOR SOLUTION INTRAMUSCULAR; INTRAVENOUS SEE ADMIN INSTRUCTIONS
Status: DISCONTINUED | OUTPATIENT
Start: 2020-01-29 | End: 2020-01-29 | Stop reason: HOSPADM

## 2020-01-29 RX ORDER — DIMENHYDRINATE 50 MG/ML
25 INJECTION, SOLUTION INTRAMUSCULAR; INTRAVENOUS
Status: CANCELLED | OUTPATIENT
Start: 2020-01-29

## 2020-01-29 ASSESSMENT — MIFFLIN-ST. JEOR: SCORE: 1804.67

## 2020-01-29 NOTE — ANESTHESIA PREPROCEDURE EVALUATION
Anesthesia Pre-Procedure Evaluation    Patient: Oswaldo Prabhakar   MRN: [id number] : 1960          Preoperative Diagnosis: Ureteral stone [N20.1]    Procedure(s):  Cystoscopy, right ureteral stent exchange, right ureteroscopy with holmium laser lithotripsy and stone basketing    Past Medical History:   Diagnosis Date     Antiplatelet or antithrombotic long-term use      Coronary artery disease      Diaphragmatic hernia without mention of obstruction or gangrene      Heart disease      Hernia, abdominal      History of blood transfusion      History of thrombophlebitis      Hypertension      Nephrolithiasis 2010     Other and unspecified hyperlipidemia      Pulmonary embolus and infarction (H)     s/p hemothorax and filter s/p filter removal (), now on long term anti-coagulation     Statin intolerance 2017     Stented coronary artery      Unspecified retinal detachment     Childhood trauma, unrepaired     Past Surgical History:   Procedure Laterality Date     BYPASS GRAFT ARTERY CORONARY  2014    Procedure: BYPASS GRAFT ARTERY CORONARY;  Median Sternotomy, Coronary Artery Bypass Bypass x 4 using Left Internal Mammary, Left Saphenous Vein, on pump oxygenation;  Surgeon: Sherry Armando MD;  Location: [facility] OR     C NONSPECIFIC PROCEDURE      Spermatocele resection     CLOSED REDUCTION, PERCUTANEOUS PINNING  HAND, COMBINED Left 2015    Procedure: COMBINED CLOSED REDUCTION, PERCUTANEOUS PINNING HAND;  Surgeon: Carmella Love MD;  [facility]     COLONOSCOPY  3/15/2013    Procedure: COLONOSCOPY;;  Surgeon: Racheal Shipman MD;  Location: [facility]     COMBINED CYSTOSCOPY, INSERT STENT URETER(S) Right 1/10/2020    Procedure: Cystoscopy, right retrograde pyelogram, interpretation of fluoroscopic images, placement of 6 x 26 double-J ureteral stent;  Surgeon: Nguyễn Wooadrd MD;  Location: [facility] OR     CV ANGIOGRAM CORONARY GRAFT N/A 2020    Procedure: Angiogram Coronary  Graft;  Surgeon: Chato Odonnell MD;  Location:  [facility] CARD[facility] CATH LAB     CV CORONARY ANGIOGRAM  1/7/2020    Procedure: CV CORONARY ANGIOGRAM;  Surgeon: Chato Odonnell MD;  Location: [facility] CARDIAC CATH LAB     CV PCI STENT DRUG ELUTING N/A 1/7/2020    Procedure: PCI Stent Drug Eluting;  Surgeon: Chato Odonnell MD;  Location: [facility] CARDIAC CATH LAB     CYSTOSCOPY       LAPAROSCOPIC CHOLECYSTECTOMY N/A 8/6/2018    Procedure: LAPAROSCOPIC CHOLECYSTECTOMY;  LAPAROSCOPIC CHOLECYSTECTOMY ;  Surgeon: José Miguel Stuart MD;  Location: [facility]     LITHOTRIPSY  4/2010     THORACIC SURGERY      lung surgery, thoracotomy, lung adhesion     Anesthesia Evaluation     . Pt has had prior anesthetic. Type: General    No history of anesthetic complications          ROS/MED HX    ENT/Pulmonary:     (+), . Other pulmonary disease Hx if PE.    Neurologic:  - neg neurologic ROS     Cardiovascular:     (+) Dyslipidemia, hypertension--CAD, angina-with excertion, CABG-date: CABG x 4 2011, stent,2020  1 Drug Eluting Stent .. Taking blood thinners Pt has received instructions: Instructions Given to patient: continuing. . . :. .       METS/Exercise Tolerance:     Hematologic:  - neg hematologic  ROS       Musculoskeletal:  - neg musculoskeletal ROS       GI/Hepatic:  - neg GI/hepatic ROS       Renal/Genitourinary:     (+) Nephrolithiasis ,       Endo:  - neg endo ROS       Psychiatric:  - neg psychiatric ROS       Infectious Disease:  - neg infectious disease ROS       Malignancy:      - no malignancy   Other:    - neg other ROS                      Physical Exam  Normal systems: cardiovascular, pulmonary and dental    Airway   Mallampati: II  TM distance: >3 FB  Neck ROM: full    Dental     Cardiovascular   Rhythm and rate: regular and normal      Pulmonary    breath sounds clear to auscultation    Other findings: Lab Test        01/21/20     01/15/20     01/14/20      --           01/12/20                       0946          0610          0640           --           1853          WBC           --          6.1          7.0           --          15.5*         HGB           --          12.9*        11.6*         --          13.6          MCV           --          88           87            --          88            PLT           --          285          232           --          255           INR          2.79*        2.51*        2.55*          < >        2.16*          < > = values in this interval not displayed.                  Lab Test        01/21/20     01/15/20     01/14/20                       0946          0610          0640          NA           140          138          138           POTASSIUM    3.7          3.1*         3.4           CHLORIDE     108          109          109           CO2          26           20           24            BUN          14           12           16            CR           0.83         0.95         1.06          ANIONGAP     6            9            6             GONZALES          9.7          8.9          8.3*          GLC          124*         113*         105*                 ECG  Sinus bradycardia  Otherwise normal ECG  When compared with ECG of 14-JAN-2020 21:23,  T wave inversion less evident in Anterior leads        Lab Results   Component Value Date    WBC 6.1 01/15/2020    HGB 12.9 (L) 01/15/2020    HCT 39.3 (L) 01/15/2020     01/15/2020     01/21/2020    POTASSIUM 3.7 01/21/2020    CHLORIDE 108 01/21/2020    CO2 26 01/21/2020    BUN 14 01/21/2020    CR 0.83 01/21/2020     (H) 01/21/2020    GONZALES 9.7 01/21/2020    PHOS 3.6 06/18/2015    MAG 1.8 01/06/2020    ALBUMIN 3.6 01/12/2020    PROTTOTAL 7.9 01/12/2020    ALT 37 01/12/2020    AST 12 01/12/2020    ALKPHOS 53 01/12/2020    BILITOTAL 1.1 01/12/2020    LIPASE 188 07/25/2018    PTT 27 04/04/2014    INR 2.79 (H) 01/21/2020    FIBR 286 04/04/2014       Preop Vitals  BP Readings  from Last 3 Encounters:   01/29/20 115/77   01/27/20 122/76   01/21/20 139/85    Pulse Readings from Last 3 Encounters:   01/27/20 64   01/21/20 98   01/14/20 62      Resp Readings from Last 3 Encounters:   01/15/20 18   01/10/20 18   01/07/20 18    SpO2 Readings from Last 3 Encounters:   01/29/20 97%   01/27/20 99%   01/21/20 98%      Temp Readings from Last 1 Encounters:   01/29/20 97.9  F (36.6  C) (Temporal)    Ht Readings from Last 1 Encounters:   01/29/20 1.829 m (6')      Wt Readings from Last 1 Encounters:   01/29/20 94.2 kg (207 lb 9.6 oz)    Estimated body mass index is 28.16 kg/m  as calculated from the following:    Height as of this encounter: 1.829 m (6').    Weight as of this encounter: 94.2 kg (207 lb 9.6 oz).       Anesthesia Plan      History & Physical Review  History and physical reviewed and following examination; no interval change.    ASA Status:  3 .    NPO Status:  > 8 hours    Plan for General and LMA with Intravenous induction. Maintenance will be Balanced.    PONV prophylaxis:  Ondansetron (or other 5HT-3) and Dexamethasone or Solumedrol       Postoperative Care  Postoperative pain management:  IV analgesics, Oral pain medications and Multi-modal analgesia.      Consents  Anesthetic plan, risks, benefits and alternatives discussed with:  Patient.  Use of blood products discussed: No .   .                 Adiel Acuna MD                    .

## 2020-01-29 NOTE — PROVIDER NOTIFICATION
Dr. Woodard and  notified of pts last coumadin dose of 7.5mg lat evening at 2000, and that the INR was drawn and in process with no results yet,

## 2020-01-29 NOTE — PROGRESS NOTES
ANTICOAGULATION FOLLOW-UP CLINIC VISIT    Patient Name:  Oswaldo Prabhakar  Date:  2020  Contact Type:  Telephone    SUBJECTIVE:  Patient Findings     Comments:   Patient was scheduled for a cystoscopy today but it was unable to happen because INR today was 4.2.  Patient is rescheduled for 2/5.  Oswaldo reports INR should be within INR goal range of 2-3.        Clinical Outcomes     Comments:   Patient was scheduled for a cystoscopy today but it was unable to happen because INR today was 4.2.  Patient is rescheduled for 2/5.  Oswaldo reports INR should be within INR goal range of 2-3.           OBJECTIVE    INR   Date Value Ref Range Status   2020 4.20 (H) 0.86 - 1.14 Final     Chromogenic Factor 10   Date Value Ref Range Status   2011 81 60 - 140 % Final     Comment:     Therapeutic Range: A Chromogenic Factor 10 level of 20-40% inversely   correlates   with an INR of 2-3 for patients receiving Warfarin. Chromogenic Factor 10   levels below 20% indicate an INR greater than 3, and levels above 40%   indicate   an INR less than 2.     Factor 2 Assay   Date Value Ref Range Status   2012  60 - 140 % Final    (Note)  CANCELLED AND CREDITED PER [facility]. MONITORING,12,       ASSESSMENT / PLAN  No question data found.  Anticoagulation Summary  As of 2020    INR goal:   2.0-3.0   TTR:   69.4 % (11.8 mo)   INR used for dosin.20! (2020)   Warfarin maintenance plan:   7.5 mg (5 mg x 1.5) every day   Full warfarin instructions:   : Hold; Otherwise 7.5 mg every day   Weekly warfarin total:   52.5 mg   Plan last modified:   Kwesi Garcia RN (2018)   Next INR check:   2/3/2020   Priority:   Maintenance   Target end date:   Indefinite    Indications    Pulmonary emboli (H) [I26.99]  Long-term (current) use of anticoagulants [Z79.01] [Z79.01]             Anticoagulation Episode Summary     INR check location:       Preferred lab:       Send INR reminders to:   [facility]    Comments:   HIPPA INFO OK to speak with wife Josselyn OK to leave messages on Home.work and cell phones  use cell phone #[phone] ++++1/8/2020 ASA 81mg DAILY AND STOP ONCE INR >2.0++++      Anticoagulation Care Providers     Provider Role Specialty Phone number    Samm Vazquez MD [facility] [phone]            See the Encounter Report to view Anticoagulation Flowsheet and Dosing Calendar (Go to Encounters tab in chart review, and find the Anticoagulation Therapy Visit)    Spoke with patient.     Kristina Wright, RN

## 2020-02-03 ENCOUNTER — TELEPHONE (OUTPATIENT)
Dept: UROLOGY | Facility: CLINIC | Age: 60
End: 2020-02-03

## 2020-02-03 ENCOUNTER — ANTICOAGULATION THERAPY VISIT (OUTPATIENT)
Dept: ANTICOAGULATION | Facility: CLINIC | Age: 60
End: 2020-02-03

## 2020-02-03 DIAGNOSIS — Z79.01 LONG TERM CURRENT USE OF ANTICOAGULANT THERAPY: ICD-10-CM

## 2020-02-03 DIAGNOSIS — I26.99 PULMONARY EMBOLISM, UNSPECIFIED CHRONICITY, UNSPECIFIED PULMONARY EMBOLISM TYPE, UNSPECIFIED WHETHER ACUTE COR PULMONALE PRESENT (H): ICD-10-CM

## 2020-02-03 DIAGNOSIS — I26.99 PULMONARY EMBOLI (H): ICD-10-CM

## 2020-02-03 LAB — INR PPP: 1.57 (ref 0.86–1.14)

## 2020-02-03 NOTE — PROGRESS NOTES
ANTICOAGULATION FOLLOW-UP CLINIC VISIT    Patient Name:  Oswaldo Prabhakar  Date:  2/3/2020  Contact Type:  Telephone    SUBJECTIVE:  Patient Findings     Positives:   Change in diet/appetite    Comments:   Pt reports he has been taking his Warfarin and that he only held his dose on  and 7.5mg the other days. Pt reports he has increased his intakes of salads which contained lots of kale and spinach. Pt would like his INR a little bit higher. Pt brito reports urinating blood, but this is common occurrence.         Clinical Outcomes     Comments:   Pt reports he has been taking his Warfarin and that he only held his dose on  and 7.5mg the other days. Pt reports he has increased his intakes of salads which contained lots of kale and spinach. Pt would like his INR a little bit higher. Pt brito reports urinating blood, but this is common occurrence.            OBJECTIVE    INR   Date Value Ref Range Status   2020 1.57 (H) 0.86 - 1.14 Final     Chromogenic Factor 10   Date Value Ref Range Status   2011 81 60 - 140 % Final     Comment:     Therapeutic Range: A Chromogenic Factor 10 level of 20-40% inversely   correlates   with an INR of 2-3 for patients receiving Warfarin. Chromogenic Factor 10   levels below 20% indicate an INR greater than 3, and levels above 40%   indicate   an INR less than 2.     Factor 2 Assay   Date Value Ref Range Status   2012  60 - 140 % Final    (Note)  CANCELLED AND CREDITED PER APPLE IN MED. MONITORING,12,       ASSESSMENT / PLAN  INR assessment SUB    Recheck INR In: 2 DAYS    INR Location Clinic      Anticoagulation Summary  As of 2/3/2020    INR goal:   2.0-3.0   TTR:   68.5 % (11.8 mo)   INR used for dosin.57! (2/3/2020)   Warfarin maintenance plan:   7.5 mg (5 mg x 1.5) every day   Full warfarin instructions:   2/3: 10 mg; Otherwise 7.5 mg every day   Weekly warfarin total:   52.5 mg   Plan last modified:   Kwesi Garcia RN (2018)   Next INR  check:   2/5/2020   Priority:   Maintenance   Target end date:   Indefinite    Indications    Pulmonary emboli (H) [I26.99]  Long-term (current) use of anticoagulants [Z79.01] [Z79.01]             Anticoagulation Episode Summary     INR check location:       Preferred lab:       Send INR reminders to:   [facility]    Comments:   HIPPA INFO OK to speak with wife Josselyn MICHELLE to leave messages on Home.work and cell phones  use cell phone #[phone] ++++1/8/2020 ASA 81mg DAILY AND STOP ONCE INR >2.0++++      Anticoagulation Care Providers     Provider Role Specialty Phone number    Samm Vazquez MD [facility] Internal Medicine [phone]            See the Encounter Report to view Anticoagulation Flowsheet and Dosing Calendar (Go to Encounters tab in chart review, and find the Anticoagulation Therapy Visit)    Spoke with patient. Gave them their lab results and new warfarin recommendation.  No changes in health, medication, or diet. No missed doses, no falls. No signs or symptoms of bleed or clotting.     Liliam Hyman RN

## 2020-02-03 NOTE — TELEPHONE ENCOUNTER
Called and told patient's to discuss with INR clinic  As they monitor the INR and order the does the Coumadin..Funmi Shepard LPN

## 2020-02-03 NOTE — TELEPHONE ENCOUNTER
[facility] Call [facility]    Phone Message    May a detailed message be left on voicemail: yes     Reason for Call: Other: Oswaldo would like a call back from the care team to discuss his INR before his surgery on 2/5/20 with Dr. Woodard. Oswaldo was checked last week because there were concerns about his INR being too high. Oswaldo's INR today is 1.6. Oswaldo was told to take 10 mg of Warfarin today and 7.5 mg tomorrow (2/4/20). Oswaldo would like to ask the care time if that Warfarin amount should be changed. Oswaldo is concerned, because he would like to proceed with this surgery on 2/5/20. Please give Oswaldo a call back at your earliest convenience.     Action Taken: Message routed to:  Other: [facility]    Travel Screening: Not Applicable

## 2020-02-05 ENCOUNTER — HOSPITAL ENCOUNTER (OUTPATIENT)
Facility: CLINIC | Age: 60
Discharge: HOME | End: 2020-02-05
Attending: UROLOGY | Admitting: UROLOGY
Payer: COMMERCIAL

## 2020-02-05 ENCOUNTER — ANESTHESIA EVENT (OUTPATIENT)
Dept: SURGERY | Facility: CLINIC | Age: 60
End: 2020-02-05
Payer: COMMERCIAL

## 2020-02-05 ENCOUNTER — ANESTHESIA (OUTPATIENT)
Dept: SURGERY | Facility: CLINIC | Age: 60
End: 2020-02-05
Payer: COMMERCIAL

## 2020-02-05 ENCOUNTER — APPOINTMENT (OUTPATIENT)
Dept: GENERAL RADIOLOGY | Facility: CLINIC | Age: 60
End: 2020-02-05
Attending: UROLOGY
Payer: COMMERCIAL

## 2020-02-05 VITALS
HEIGHT: 71 IN | DIASTOLIC BLOOD PRESSURE: 86 MMHG | HEART RATE: 86 BPM | TEMPERATURE: 96.8 F | RESPIRATION RATE: 14 BRPM | SYSTOLIC BLOOD PRESSURE: 130 MMHG | WEIGHT: 208.7 LBS | OXYGEN SATURATION: 94 % | BODY MASS INDEX: 29.22 KG/M2

## 2020-02-05 LAB — INR PPP: 2.27 (ref 0.86–1.14)

## 2020-02-05 PROCEDURE — 25000128 H RX IP 250 OP 636: Performed by: ANESTHESIOLOGY

## 2020-02-05 PROCEDURE — 37000009 ZZH ANESTHESIA TECHNICAL FEE, EACH ADDTL 15 MIN: Performed by: UROLOGY

## 2020-02-05 PROCEDURE — 71000013 ZZH RECOVERY PHASE 1 LEVEL 1 EA ADDTL HR: Performed by: UROLOGY

## 2020-02-05 PROCEDURE — 71000027 ZZH RECOVERY PHASE 2 EACH 15 MINS: Performed by: UROLOGY

## 2020-02-05 PROCEDURE — 25000125 ZZHC RX 250: Performed by: UROLOGY

## 2020-02-05 PROCEDURE — 25000125 ZZHC RX 250: Performed by: NURSE ANESTHETIST, CERTIFIED REGISTERED

## 2020-02-05 PROCEDURE — 25000125 ZZHC RX 250: Performed by: ANESTHESIOLOGY

## 2020-02-05 PROCEDURE — 85610 PROTHROMBIN TIME: CPT | Performed by: ANESTHESIOLOGY

## 2020-02-05 PROCEDURE — 71000012 ZZH RECOVERY PHASE 1 LEVEL 1 FIRST HR: Performed by: UROLOGY

## 2020-02-05 PROCEDURE — 36000060 ZZH SURGERY LEVEL 3 W FLUORO 1ST 30 MIN: Performed by: UROLOGY

## 2020-02-05 PROCEDURE — 27210794 ZZH OR GENERAL SUPPLY STERILE: Performed by: UROLOGY

## 2020-02-05 PROCEDURE — 25500064 ZZH RX 255 OP 636: Performed by: UROLOGY

## 2020-02-05 PROCEDURE — 40000277 XR SURGERY CARM FLUORO LESS THAN 5 MIN W STILLS: Mod: TC

## 2020-02-05 PROCEDURE — C2617 STENT, NON-COR, TEM W/O DEL: HCPCS | Performed by: UROLOGY

## 2020-02-05 PROCEDURE — 25800030 ZZH RX IP 258 OP 636: Performed by: ANESTHESIOLOGY

## 2020-02-05 PROCEDURE — 40000306 ZZH STATISTIC PRE PROC ASSESS II: Performed by: UROLOGY

## 2020-02-05 PROCEDURE — C1769 GUIDE WIRE: HCPCS | Performed by: UROLOGY

## 2020-02-05 PROCEDURE — 37000008 ZZH ANESTHESIA TECHNICAL FEE, 1ST 30 MIN: Performed by: UROLOGY

## 2020-02-05 PROCEDURE — 25000128 H RX IP 250 OP 636: Performed by: NURSE ANESTHETIST, CERTIFIED REGISTERED

## 2020-02-05 PROCEDURE — 88300 SURGICAL PATH GROSS: CPT | Performed by: UROLOGY

## 2020-02-05 PROCEDURE — 27211024 ZZHC OR SUPPLY OTHER OPNP: Performed by: UROLOGY

## 2020-02-05 PROCEDURE — 36000058 ZZH SURGERY LEVEL 3 EA 15 ADDTL MIN: Performed by: UROLOGY

## 2020-02-05 PROCEDURE — 25000132 ZZH RX MED GY IP 250 OP 250 PS 637: Performed by: ANESTHESIOLOGY

## 2020-02-05 PROCEDURE — C1894 INTRO/SHEATH, NON-LASER: HCPCS | Performed by: UROLOGY

## 2020-02-05 PROCEDURE — 82365 CALCULUS SPECTROSCOPY: CPT | Performed by: UROLOGY

## 2020-02-05 PROCEDURE — 36415 COLL VENOUS BLD VENIPUNCTURE: CPT | Performed by: ANESTHESIOLOGY

## 2020-02-05 PROCEDURE — 25000128 H RX IP 250 OP 636: Performed by: UROLOGY

## 2020-02-05 PROCEDURE — 52356 CYSTO/URETERO W/LITHOTRIPSY: CPT | Mod: RT | Performed by: UROLOGY

## 2020-02-05 PROCEDURE — 74420 UROGRAPHY RTRGR +-KUB: CPT | Mod: 26 | Performed by: UROLOGY

## 2020-02-05 PROCEDURE — 88300 SURGICAL PATH GROSS: CPT | Mod: 26 | Performed by: UROLOGY

## 2020-02-05 DEVICE — STENT URETERAL POLARIS ULTRA 7FRX26CM M0061921430: Type: IMPLANTABLE DEVICE | Site: URETER | Status: FUNCTIONAL

## 2020-02-05 RX ORDER — NALOXONE HYDROCHLORIDE 0.4 MG/ML
.1-.4 INJECTION, SOLUTION INTRAMUSCULAR; INTRAVENOUS; SUBCUTANEOUS
Status: DISCONTINUED | OUTPATIENT
Start: 2020-02-05 | End: 2020-02-05 | Stop reason: HOSPADM

## 2020-02-05 RX ORDER — HYDROMORPHONE HYDROCHLORIDE 1 MG/ML
.3-.5 INJECTION, SOLUTION INTRAMUSCULAR; INTRAVENOUS; SUBCUTANEOUS EVERY 5 MIN PRN
Status: DISCONTINUED | OUTPATIENT
Start: 2020-02-05 | End: 2020-02-05 | Stop reason: HOSPADM

## 2020-02-05 RX ORDER — METOPROLOL TARTRATE 1 MG/ML
1-2 INJECTION, SOLUTION INTRAVENOUS EVERY 5 MIN PRN
Status: DISCONTINUED | OUTPATIENT
Start: 2020-02-05 | End: 2020-02-05 | Stop reason: HOSPADM

## 2020-02-05 RX ORDER — SODIUM CHLORIDE, SODIUM LACTATE, POTASSIUM CHLORIDE, CALCIUM CHLORIDE 600; 310; 30; 20 MG/100ML; MG/100ML; MG/100ML; MG/100ML
INJECTION, SOLUTION INTRAVENOUS CONTINUOUS
Status: DISCONTINUED | OUTPATIENT
Start: 2020-02-05 | End: 2020-02-05 | Stop reason: HOSPADM

## 2020-02-05 RX ORDER — PROPOFOL 10 MG/ML
INJECTION, EMULSION INTRAVENOUS PRN
Status: DISCONTINUED | OUTPATIENT
Start: 2020-02-05 | End: 2020-02-05

## 2020-02-05 RX ORDER — LIDOCAINE 40 MG/G
CREAM TOPICAL
Status: DISCONTINUED | OUTPATIENT
Start: 2020-02-05 | End: 2020-02-05 | Stop reason: HOSPADM

## 2020-02-05 RX ORDER — HYDRALAZINE HYDROCHLORIDE 20 MG/ML
2.5-5 INJECTION INTRAMUSCULAR; INTRAVENOUS EVERY 10 MIN PRN
Status: DISCONTINUED | OUTPATIENT
Start: 2020-02-05 | End: 2020-02-05 | Stop reason: HOSPADM

## 2020-02-05 RX ORDER — MEPERIDINE HYDROCHLORIDE 50 MG/ML
12.5 INJECTION INTRAMUSCULAR; INTRAVENOUS; SUBCUTANEOUS
Status: DISCONTINUED | OUTPATIENT
Start: 2020-02-05 | End: 2020-02-05 | Stop reason: HOSPADM

## 2020-02-05 RX ORDER — PHENYLEPHRINE HYDROCHLORIDE 10 MG/ML
INJECTION INTRAVENOUS PRN
Status: DISCONTINUED | OUTPATIENT
Start: 2020-02-05 | End: 2020-02-05

## 2020-02-05 RX ORDER — EPHEDRINE SULFATE 50 MG/ML
INJECTION, SOLUTION INTRAVENOUS PRN
Status: DISCONTINUED | OUTPATIENT
Start: 2020-02-05 | End: 2020-02-05

## 2020-02-05 RX ORDER — ONDANSETRON 2 MG/ML
4 INJECTION INTRAMUSCULAR; INTRAVENOUS EVERY 30 MIN PRN
Status: DISCONTINUED | OUTPATIENT
Start: 2020-02-05 | End: 2020-02-05 | Stop reason: HOSPADM

## 2020-02-05 RX ORDER — DIMENHYDRINATE 50 MG/ML
25 INJECTION, SOLUTION INTRAMUSCULAR; INTRAVENOUS
Status: DISCONTINUED | OUTPATIENT
Start: 2020-02-05 | End: 2020-02-05 | Stop reason: HOSPADM

## 2020-02-05 RX ORDER — LABETALOL 20 MG/4 ML (5 MG/ML) INTRAVENOUS SYRINGE
10
Status: DISCONTINUED | OUTPATIENT
Start: 2020-02-05 | End: 2020-02-05 | Stop reason: HOSPADM

## 2020-02-05 RX ORDER — OXYCODONE HYDROCHLORIDE 5 MG/1
5 TABLET ORAL EVERY 4 HOURS PRN
Status: DISCONTINUED | OUTPATIENT
Start: 2020-02-05 | End: 2020-02-05 | Stop reason: HOSPADM

## 2020-02-05 RX ORDER — FENTANYL CITRATE 50 UG/ML
25-50 INJECTION, SOLUTION INTRAMUSCULAR; INTRAVENOUS EVERY 5 MIN PRN
Status: DISCONTINUED | OUTPATIENT
Start: 2020-02-05 | End: 2020-02-05 | Stop reason: HOSPADM

## 2020-02-05 RX ORDER — DIAZEPAM 10 MG/2ML
2.5 INJECTION, SOLUTION INTRAMUSCULAR; INTRAVENOUS
Status: DISCONTINUED | OUTPATIENT
Start: 2020-02-05 | End: 2020-02-05 | Stop reason: HOSPADM

## 2020-02-05 RX ORDER — FENTANYL CITRATE 50 UG/ML
25-50 INJECTION, SOLUTION INTRAMUSCULAR; INTRAVENOUS
Status: DISCONTINUED | OUTPATIENT
Start: 2020-02-05 | End: 2020-02-05 | Stop reason: HOSPADM

## 2020-02-05 RX ORDER — MEPERIDINE HYDROCHLORIDE 50 MG/ML
12.5 INJECTION INTRAMUSCULAR; INTRAVENOUS; SUBCUTANEOUS EVERY 5 MIN PRN
Status: DISCONTINUED | OUTPATIENT
Start: 2020-02-05 | End: 2020-02-05 | Stop reason: HOSPADM

## 2020-02-05 RX ORDER — CEFAZOLIN SODIUM 2 G/100ML
2 INJECTION, SOLUTION INTRAVENOUS ONCE
Status: COMPLETED | OUTPATIENT
Start: 2020-02-05 | End: 2020-02-05

## 2020-02-05 RX ORDER — ONDANSETRON 4 MG/1
4 TABLET, ORALLY DISINTEGRATING ORAL EVERY 30 MIN PRN
Status: DISCONTINUED | OUTPATIENT
Start: 2020-02-05 | End: 2020-02-05 | Stop reason: HOSPADM

## 2020-02-05 RX ORDER — ATROPA BELLADONNA AND OPIUM 16.2; 3 MG/1; MG/1
SUPPOSITORY RECTAL PRN
Status: DISCONTINUED | OUTPATIENT
Start: 2020-02-05 | End: 2020-02-05 | Stop reason: HOSPADM

## 2020-02-05 RX ORDER — FENTANYL CITRATE 50 UG/ML
INJECTION, SOLUTION INTRAMUSCULAR; INTRAVENOUS PRN
Status: DISCONTINUED | OUTPATIENT
Start: 2020-02-05 | End: 2020-02-05

## 2020-02-05 RX ORDER — ONDANSETRON 2 MG/ML
INJECTION INTRAMUSCULAR; INTRAVENOUS PRN
Status: DISCONTINUED | OUTPATIENT
Start: 2020-02-05 | End: 2020-02-05

## 2020-02-05 RX ORDER — DEXAMETHASONE SODIUM PHOSPHATE 4 MG/ML
INJECTION, SOLUTION INTRA-ARTICULAR; INTRALESIONAL; INTRAMUSCULAR; INTRAVENOUS; SOFT TISSUE PRN
Status: DISCONTINUED | OUTPATIENT
Start: 2020-02-05 | End: 2020-02-05

## 2020-02-05 RX ORDER — HYDROMORPHONE HYDROCHLORIDE 1 MG/ML
.3-.5 INJECTION, SOLUTION INTRAMUSCULAR; INTRAVENOUS; SUBCUTANEOUS EVERY 10 MIN PRN
Status: DISCONTINUED | OUTPATIENT
Start: 2020-02-05 | End: 2020-02-05 | Stop reason: HOSPADM

## 2020-02-05 RX ADMIN — EPHEDRINE SULFATE 10 MG: 50 INJECTION, SOLUTION INTRAVENOUS at 15:14

## 2020-02-05 RX ADMIN — FENTANYL CITRATE 50 MCG: 50 INJECTION, SOLUTION INTRAMUSCULAR; INTRAVENOUS at 16:33

## 2020-02-05 RX ADMIN — SODIUM CHLORIDE, POTASSIUM CHLORIDE, SODIUM LACTATE AND CALCIUM CHLORIDE: 600; 310; 30; 20 INJECTION, SOLUTION INTRAVENOUS at 16:16

## 2020-02-05 RX ADMIN — PHENYLEPHRINE HYDROCHLORIDE 150 MCG: 10 INJECTION INTRAVENOUS at 15:11

## 2020-02-05 RX ADMIN — PHENYLEPHRINE HYDROCHLORIDE 100 MCG: 10 INJECTION INTRAVENOUS at 15:03

## 2020-02-05 RX ADMIN — DEXAMETHASONE SODIUM PHOSPHATE 4 MG: 4 INJECTION, SOLUTION INTRA-ARTICULAR; INTRALESIONAL; INTRAMUSCULAR; INTRAVENOUS; SOFT TISSUE at 15:03

## 2020-02-05 RX ADMIN — FENTANYL CITRATE 50 MCG: 50 INJECTION, SOLUTION INTRAMUSCULAR; INTRAVENOUS at 15:26

## 2020-02-05 RX ADMIN — LIDOCAINE HYDROCHLORIDE 50 MG: 10 INJECTION, SOLUTION EPIDURAL; INFILTRATION; INTRACAUDAL; PERINEURAL at 15:03

## 2020-02-05 RX ADMIN — PROCHLORPERAZINE EDISYLATE 10 MG: 5 INJECTION INTRAMUSCULAR; INTRAVENOUS at 17:32

## 2020-02-05 RX ADMIN — FENTANYL CITRATE 50 MCG: 50 INJECTION, SOLUTION INTRAMUSCULAR; INTRAVENOUS at 17:09

## 2020-02-05 RX ADMIN — PROPOFOL 30 MG: 10 INJECTION, EMULSION INTRAVENOUS at 16:05

## 2020-02-05 RX ADMIN — SODIUM CHLORIDE, POTASSIUM CHLORIDE, SODIUM LACTATE AND CALCIUM CHLORIDE: 600; 310; 30; 20 INJECTION, SOLUTION INTRAVENOUS at 14:57

## 2020-02-05 RX ADMIN — FENTANYL CITRATE 50 MCG: 50 INJECTION, SOLUTION INTRAMUSCULAR; INTRAVENOUS at 16:44

## 2020-02-05 RX ADMIN — OXYCODONE HYDROCHLORIDE 5 MG: 5 TABLET ORAL at 18:48

## 2020-02-05 RX ADMIN — ONDANSETRON HYDROCHLORIDE 4 MG: 2 INJECTION, SOLUTION INTRAMUSCULAR; INTRAVENOUS at 16:54

## 2020-02-05 RX ADMIN — FENTANYL CITRATE 100 MCG: 50 INJECTION, SOLUTION INTRAMUSCULAR; INTRAVENOUS at 15:03

## 2020-02-05 RX ADMIN — PROPOFOL 150 MG: 10 INJECTION, EMULSION INTRAVENOUS at 15:03

## 2020-02-05 RX ADMIN — MIDAZOLAM 2 MG: 1 INJECTION INTRAMUSCULAR; INTRAVENOUS at 14:56

## 2020-02-05 RX ADMIN — PHENYLEPHRINE HYDROCHLORIDE 150 MCG: 10 INJECTION INTRAVENOUS at 15:14

## 2020-02-05 RX ADMIN — CEFAZOLIN SODIUM 2 G: 2 INJECTION, SOLUTION INTRAVENOUS at 15:07

## 2020-02-05 RX ADMIN — FENTANYL CITRATE 50 MCG: 50 INJECTION, SOLUTION INTRAMUSCULAR; INTRAVENOUS at 17:24

## 2020-02-05 RX ADMIN — ONDANSETRON HYDROCHLORIDE 4 MG: 2 INJECTION, SOLUTION INTRAVENOUS at 16:18

## 2020-02-05 ASSESSMENT — MIFFLIN-ST. JEOR
SCORE: 1790.61
SCORE: 1793.79

## 2020-02-05 NOTE — ANESTHESIA POSTPROCEDURE EVALUATION
Patient: Oswaldo Prabhakar    Procedure(s):  Cystoscopy, right ureteral stent exchange, right ureteroscopy with holmium laser lithotripsy and stone basketing    Diagnosis:Ureteral stone [N20.1]  Diagnosis Additional Information: No value filed.    Anesthesia Type:  General, LMA    Note:  Anesthesia Post Evaluation    Patient location during evaluation: PACU  Patient participation: Able to fully participate in evaluation  Level of consciousness: awake and alert  Pain management: adequate  multimodal analgesia used between 6 hours prior to anesthesia start to PACU dischargeAirway patency: patent  Cardiovascular status: acceptable  Respiratory status: acceptable  two or more mitigation strategies used for obstructive sleep apneaHydration status: acceptable  PONV: none     Anesthetic complications: None          Last vitals:  Vitals:    02/05/20 1630 02/05/20 1635 02/05/20 1640   BP: 122/79 122/67    Pulse: 76 74    Resp: 15 13 12   Temp:      SpO2: 100% 100% 100%         Electronically Signed By: Jose Carlos Herrera MD  February 5, 2020  4:58 PM

## 2020-02-05 NOTE — DISCHARGE INSTRUCTIONS
CYSTOSCOPY DISCHARGE INSTRUCTIONS  [facility]  [address]  [phone]    YOU MAY GO BACK TO YOUR NORMAL DIET AND ACTIVITY, UNLESS YOUR DOCTOR TELLS YOU NOT TO.    FOR THE NEXT TWO DAYS, YOU MAY NOTICE:    SOME BLOOD IN YOUR URINE.  SOME BURNING WHEN YOU URINATE.  AN URGE TO URINATE MORE OFTEN.  BLADDER SPASMS.    THESE ARE NORMAL AFTER THE PROCEDURE.  THEY SHOULD GO AWAY AFTER A DAY OR TWO.  TO RELIEVE THESE PROBLEMS:     DRINK 6 TO 8 LARGE GLASSES OF WATER EACH DAY (INCLUDES DRINKS AT MEALS).  THIS WILL HELP CLEAR THE URINE.    TAKE WARM BATHS TO RELIEVE PAIN AND BLADDER SPASMS.  DO NOT ADD ANYTHING TO THE BATH WATER.    YOUR DOCTOR MAY PRESCRIBE PAIN MEDICINE.  YOU MAY ALSO TAKE TYLENOL (ACETAMINOPHEN) FOR PAIN.    CALL YOUR SURGEON IF YOU HAVE:    A FEVER OVER 101 DEGREES.  CHECK YOUR TEMPERATURE UNDER YOUR TONGUE.    CHILLS.    FAILURE TO URINATE (NO URINE COMES OUT WHEN YOU TRY TO USE THE TOILET).  TRY SOAKING IN A BATHTUB FULL OF WARM WATER.  IF STILL NO URINE, CALL YOUR DOCTOR.    A LOT OF BLOOD IN THE URINE, OR BLOOD CLOTS LARGER THAN A NICKEL.      PAIN IN THE BACK OR BELLY AREA (ABDOMEN).    PAIN OR SPASMS THAT ARE NOT RELIEVED BY WARM TUB BATHS AND PAIN MEDICINE.      SEVERE PAIN, BURNING OR OTHER PROBLEMS WHILE PASSING URINE.    PAIN THAT GETS WORSE AFTER TWO DAYS.      STENT INFORMATION/DISCHARGE INSTRUCTIONS  [facility]  [address]  [phone]    During surgery, a stent may be placed in the ureter.  The ureter is the tube that drains urine from the kidney to the bladder.  The stent is placed to dilate (open) the ureter so stone fragments can pass easily through the ureter or to decrease ureteral swelling after surgery or to relieve an obstruction.      The stent is made of silicone.  The upper end of the stent curls in the kidney while the lower end rests in the bladder.    While the stent is in place you may experience the following  symptoms:  Blood and/or small blood clots in the urine  Bladder spasms (frequency and urgency of urination)  Discomfort or aching in the back or side where the stent is  Burning or discomfort at the end of urine stream    To decrease these symptoms you should:  Take antispasmodic medication as prescribed (Detrol, Ditropan, etc.)  Drink plenty of fluids but avoid caffeine and citrus (include cranberry)  If you are having discomfort in back or side, decrease activity    Please call your physician or the physician on call if you experience:  Fever greater than 101 degrees  Severe pain not relieved by pain medication or rest    Please make an appointment for the removal of the stent according to your physician's instructions.        You can take your coumadin tonight as scheduled

## 2020-02-05 NOTE — ANESTHESIA PREPROCEDURE EVALUATION
Anesthesia Pre-Procedure Evaluation    Patient: Oswaldo Prabhakar   MRN: [id number] : 1960          Preoperative Diagnosis: Ureteral stone [N20.1]    Procedure(s):  Cystoscopy, right ureteral stent exchange, right ureteroscopy with holmium laser lithotripsy and stone basketing    Past Medical History:   Diagnosis Date     Antiplatelet or antithrombotic long-term use      Coronary artery disease      Diaphragmatic hernia without mention of obstruction or gangrene      Heart disease      Hernia, abdominal      History of blood transfusion      History of thrombophlebitis      Hypertension      Nephrolithiasis 2010     Other and unspecified hyperlipidemia      Pulmonary embolus and infarction (H)     s/p hemothorax and filter s/p filter removal (), now on long term anti-coagulation     Statin intolerance 2017     Stented coronary artery 2020    NIR mid LAD     Unspecified retinal detachment     Childhood trauma, unrepaired     Past Surgical History:   Procedure Laterality Date     BYPASS GRAFT ARTERY CORONARY  2014    Procedure: BYPASS GRAFT ARTERY CORONARY;  Median Sternotomy, Coronary Artery Bypass Bypass x 4 using Left Internal Mammary, Left Saphenous Vein, on pump oxygenation;  Surgeon: Sherry Armando MD;  Location: [facility] OR     C NONSPECIFIC PROCEDURE      Spermatocele resection     CLOSED REDUCTION, PERCUTANEOUS PINNING  HAND, COMBINED Left 2015    Procedure: COMBINED CLOSED REDUCTION, PERCUTANEOUS PINNING HAND;  Surgeon: Carmella Love MD;  Location: [facility]     COLONOSCOPY  3/15/2013    Procedure: COLONOSCOPY;;  Surgeon: Racheal Shipman MD;  Location: [facility] GI     COMBINED CYSTOSCOPY, INSERT STENT URETER(S) Right 1/10/2020    Procedure: Cystoscopy, right retrograde pyelogram, interpretation of fluoroscopic images, placement of 6 x 26 double-J ureteral stent;  Surgeon: Nguyễn Woodard MD;  Location: [facility] OR     CV ANGIOGRAM CORONARY GRAFT N/A 2020     Procedure: Angiogram Coronary Graft;  Surgeon: Chato Odonnell MD;  Location:  HEART CARDIAC CATH LAB     CV CORONARY ANGIOGRAM  1/7/2020    Procedure: CV CORONARY ANGIOGRAM;  Surgeon: Chato Odonnell MD;  Location: [facility] CARDIAC CATH LAB     CV PCI STENT DRUG ELUTING N/A 1/7/2020    Procedure: PCI Stent Drug Eluting;  Surgeon: Chato Odonnell MD;  Location: [facility] CARDIAC CATH LAB     CYSTOSCOPY       LAPAROSCOPIC CHOLECYSTECTOMY N/A 8/6/2018    Procedure: LAPAROSCOPIC CHOLECYSTECTOMY;  LAPAROSCOPIC CHOLECYSTECTOMY ;  Surgeon: José Miguel Stuart MD;  Location: [facility] OR     LITHOTRIPSY  4/2010     THORACIC SURGERY      lung surgery, thoracotomy, lung adhesion     Anesthesia Evaluation     . Pt has had prior anesthetic.            ROS/MED HX    ENT/Pulmonary:  - neg pulmonary ROS    (-) sleep apnea   Neurologic:       Cardiovascular:     (+) hypertension--CAD, -CABG-stent (1/20),Drug Eluting Stent .. Taking blood thinners Pt has received instructions: . . . :. .       METS/Exercise Tolerance:     Hematologic:     (+) History of blood clots pt is anticoagulated, -      Musculoskeletal:         GI/Hepatic:  - neg GI/hepatic ROS      (-) GERD   Renal/Genitourinary:     (+) Nephrolithiasis ,       Endo:         Psychiatric:         Infectious Disease:         Malignancy:         Other:                          Physical Exam      Airway   Mallampati: II  TM distance: >3 FB  Neck ROM: full    Dental     Cardiovascular   Rhythm and rate: regular and normal      Pulmonary    breath sounds clear to auscultation            Lab Results   Component Value Date    WBC 6.1 01/15/2020    HGB 12.9 (L) 01/15/2020    HCT 39.3 (L) 01/15/2020     01/15/2020     01/21/2020    POTASSIUM 3.7 01/21/2020    CHLORIDE 108 01/21/2020    CO2 26 01/21/2020    BUN 14 01/21/2020    CR 0.83 01/21/2020     (H) 01/21/2020    GONZALES 9.7 01/21/2020    PHOS 3.6 06/18/2015    MAG 1.8  "01/06/2020    ALBUMIN 3.6 01/12/2020    PROTTOTAL 7.9 01/12/2020    ALT 37 01/12/2020    AST 12 01/12/2020    ALKPHOS 53 01/12/2020    BILITOTAL 1.1 01/12/2020    LIPASE 188 07/25/2018    PTT 27 04/04/2014    INR 1.57 (H) 02/03/2020    FIBR 286 04/04/2014       Preop Vitals  BP Readings from Last 3 Encounters:   01/29/20 115/77   01/27/20 122/76   01/21/20 139/85    Pulse Readings from Last 3 Encounters:   01/27/20 64   01/21/20 98   01/14/20 62      Resp Readings from Last 3 Encounters:   01/15/20 18   01/10/20 18   01/07/20 18    SpO2 Readings from Last 3 Encounters:   01/29/20 97%   01/27/20 99%   01/21/20 98%      Temp Readings from Last 1 Encounters:   01/29/20 97.9  F (36.6  C) (Temporal)    Ht Readings from Last 1 Encounters:   02/05/20 1.803 m (5' 11\")      Wt Readings from Last 1 Encounters:   02/05/20 94.3 kg (208 lb)    Estimated body mass index is 29.01 kg/m  as calculated from the following:    Height as of this encounter: 1.803 m (5' 11\").    Weight as of this encounter: 94.3 kg (208 lb).       Anesthesia Plan      History & Physical Review  History and physical reviewed and following examination; no interval change.    ASA Status:  3 .    NPO Status:  > 8 hours    Plan for General and LMA with Intravenous and Propofol induction. Maintenance will be Balanced.    PONV prophylaxis:  Ondansetron (or other 5HT-3) and Dexamethasone or Solumedrol       Postoperative Care  Postoperative pain management:  IV analgesics, Oral pain medications and Multi-modal analgesia.      Consents  Anesthetic plan, risks, benefits and alternatives discussed with:  Patient..                 Roberto Vaca MD                    .  "

## 2020-02-05 NOTE — ANESTHESIA CARE TRANSFER NOTE
Patient: Oswaldo Prabhakar    Procedure(s):  Cystoscopy, right ureteral stent exchange, right ureteroscopy with holmium laser lithotripsy and stone basketing    Diagnosis: Ureteral stone [N20.1]  Diagnosis Additional Information: No value filed.    Anesthesia Type:   General, LMA     Note:  Airway :Face Mask  Patient transferred to:PACU  Comments: Spont Resps. Follows commands. LMA removed. Maintains Resps. Tx to PACU. Report to RNHandoff Report: Identifed the Patient, Identified the Reponsible Provider, Reviewed the pertinent medical history, Discussed the surgical course, Reviewed Intra-OP anesthesia mangement and issues during anesthesia, Set expectations for post-procedure period and Allowed opportunity for questions and acknowledgement of understanding      Vitals: (Last set prior to Anesthesia Care Transfer)    CRNA VITALS  2/5/2020 1549 - 2/5/2020 1624      2/5/2020             Pulse:  69    SpO2:  100 %    Resp Rate (observed):  21                Electronically Signed By: NOLBERTO Sweet CRNA  February 5, 2020  4:24 PM

## 2020-02-06 ENCOUNTER — ANTICOAGULATION THERAPY VISIT (OUTPATIENT)
Dept: ANTICOAGULATION | Facility: CLINIC | Age: 60
End: 2020-02-06

## 2020-02-06 DIAGNOSIS — N20.0 CALCULUS OF KIDNEY: Primary | ICD-10-CM

## 2020-02-06 DIAGNOSIS — I26.99 PULMONARY EMBOLI (H): ICD-10-CM

## 2020-02-06 DIAGNOSIS — I25.10 ATHEROSCLEROSIS OF CORONARY ARTERY OF NATIVE HEART WITHOUT ANGINA PECTORIS, UNSPECIFIED VESSEL OR LESION TYPE: ICD-10-CM

## 2020-02-06 DIAGNOSIS — E78.5 HYPERLIPIDEMIA LDL GOAL <70: ICD-10-CM

## 2020-02-06 DIAGNOSIS — Z95.1 S/P CABG (CORONARY ARTERY BYPASS GRAFT): ICD-10-CM

## 2020-02-06 DIAGNOSIS — Z78.9 STATIN INTOLERANCE: Primary | ICD-10-CM

## 2020-02-06 DIAGNOSIS — Z79.01 LONG TERM CURRENT USE OF ANTICOAGULANT THERAPY: ICD-10-CM

## 2020-02-06 LAB — COPATH REPORT: NORMAL

## 2020-02-06 NOTE — PROGRESS NOTES
ANTICOAGULATION FOLLOW-UP CLINIC VISIT    Patient Name:  Oswaldo Prabhakar  Date:  2020  Contact Type:  Telephone    SUBJECTIVE:  Patient Findings     Comments:   On 20 Oswaldo had a Cystoscopy, Stent Exchange and laser Lithotripsy  Notes are in [facility]        Clinical Outcomes     Comments:   On 20 Oswaldo had a Cystoscopy, Stent Exchange and laser Lithotripsy  Notes are in EPIC           OBJECTIVE    INR   Date Value Ref Range Status   2020 2.27 (H) 0.86 - 1.14 Final     Chromogenic Factor 10   Date Value Ref Range Status   2011 81 60 - 140 % Final     Comment:     Therapeutic Range: A Chromogenic Factor 10 level of 20-40% inversely   correlates   with an INR of 2-3 for patients receiving Warfarin. Chromogenic Factor 10   levels below 20% indicate an INR greater than 3, and levels above 40%   indicate   an INR less than 2.     Factor 2 Assay   Date Value Ref Range Status   2012  60 - 140 % Final    (Note)  CANCELLED AND CREDITED PER APPLE IN MED. MONITORING,12,       ASSESSMENT / PLAN  INR assessment THER    Recheck INR In: 2 WEEKS    INR Location Clinic      Anticoagulation Summary  As of 2020    INR goal:   2.0-3.0   TTR:   68.1 % (11.7 mo)   INR used for dosin.27 (2020)   Warfarin maintenance plan:   7.5 mg (5 mg x 1.5) every day   Full warfarin instructions:   7.5 mg every day   Weekly warfarin total:   52.5 mg   Plan last modified:   Kwesi Garcia RN (2018)   Next INR check:   2020   Priority:   Maintenance   Target end date:   Indefinite    Indications    Pulmonary emboli (H) [I26.99]  Long-term (current) use of anticoagulants [Z79.01] [Z79.01]             Anticoagulation Episode Summary     INR check location:       Preferred lab:       Send INR reminders to:   [facility]    Comments:   HIPPA INFO OK to speak with wife Josselyn OK to leave messages on Home.work and cell phones  use cell phone #[phone] ++++2020 ASA 81mg DAILY AND STOP  ONCE INR >2.0++++      Anticoagulation Care Providers     Provider Role Specialty Phone number    Samm Vazquez MD Responsible Internal Medicine [phone]            See the Encounter Report to view Anticoagulation Flowsheet and Dosing Calendar (Go to Encounters tab in chart review, and find the Anticoagulation Therapy Visit)      Left message for patient with results and dosing recommendations. Asked patient to call back with any questions or concerns.  Asked patient to call us to confirm date of stent removal and any plan for his anticoagulation before and after.        Rigoberto Harmon [facility]

## 2020-02-06 NOTE — OP NOTE
Procedure Date: 02/05/2020      SURGEON:  Nguyễn Woodard MD      PREOPERATIVE DIAGNOSIS:  Right ureteral stone.      POSTOPERATIVE DIAGNOSIS:  Right ureteral stone.      PROCEDURE PERFORMED:  Cystoscopy, right ureteral stent exchange, right ureteroscopy with holmium lithotripsy and stone basketing, right retrograde pyelogram, interpretation of fluoroscopic images.      ANESTHESIA:  General.      COMPLICATIONS:  None.      INDICATIONS FOR  PROCEDURE:  Oswaldo Prabhakar is a 59-year-old gentleman who presented with a proximal right ureteral stone.  He had a stent placed at that time.  He requires anticoagulation with Coumadin and Brilinta.  He now presents for stent exchange and removal of his stone.      DETAILS OF THE PROCEDURE:  The risks and benefits of the procedure were explained in detail to the patient and informed consent was obtained.  The patient was brought to the operating room, placed supine on the operating table, where he underwent general endotracheal anesthetic.  He was then moved down to the dorsal lithotomy position where he was prepped and draped in standard sterile fashion.      The procedure began by introducing the 22-Slovak rigid cystoscope through the urethra, into the bladder to perform cystoscopy.  There were no urothelial abnormalities identified.  I grasped the stent and pulled it to the level of the urethral meatus.  I then cannulated the stent with the Glidewire under fluoroscopic guidance and removed the stent.  Alongside the Glidewire, I passed the rigid ureteroscope through the urethra and into the bladder and up the right ureter.  I was not quite able to reach the stone with the rigid scope.  I could get to the point where I could see the stone, but I could not treat it with the laser.  Therefore, I passed a second Glidewire into the kidney and backed off the rigid scope.  I then passed the Olympus flexible ureteroscope over this Glidewire, leaving the safety wire in place.  I could  then reach the stone.  I used 200 micron holmium laser fiber to break up the stone into multiple fragments.  I essentially dusted the stone.  I then basketed out a single fragment and placed it in the bladder.  At this point I could not regain access to the right ureter just with the flexible scope alone, so I decided to place a ureteral access sheath temporarily so that I could look and make certain all the stone burden had been removed.  I removed the flexible ureteroscope.  Over the Glidewire I passed an 11/13 x 36 cm ureteral access sheath to the right mid ureter.  I temporarily removed the inner stylets so I could place a second Glidewire and then I backloaded off the access sheath, leaving the 2 Glidewires in place.  One Glidewire was clamped to the drape as a safety wire and I used the other as a working wire.  I then passed the access sheath over the working wire once again to the level of the right mid ureter and removed the inner wire and stylet.  I then passed the flexible ureteroscope through the access sheath.  I performed retrograde pyelogram through the scope and performed a complete renoscopy.  At this point, however, there was so much blood product in the right kidney that I simply could not see the stone fragments anymore.  I did an extensive and long search and even attempted to basket out some blood clots to see if they contained stones but I simply did not see any more stones, however, visualization was quite difficult.  I decided to place a stent at this time.  I observed the ureter with the flexible scope as I removed the access sheath.  Over the remaining Glidewire, I then passed the rigid cystoscope through the urethra into the bladder until I visualized the right ureteral orifice.  I then passed a 7 x 26 double-J ureteral stent over the wire.  The wire was pulled back and a good curl could be seen in the renal pelvis under fluoroscopy.  A good curl was seen in the bladder under direct  visualization.  The patient had some active bleeding from his prostatic urethra from the scope, so I passed a Glidewire into the bladder and then I removed the scope.  I then passed a 20-Sloven[place] Councill tip Daniel catheter over the Glidewire.  I inflated the balloon and I hand irrigated the catheter and output was clear.  The procedure was concluded at this point.      The patient tolerated the procedure without complications.  He went to the post-anesthetic care in good condition.  He will go home from there.  He will remove his own Daniel catheter at home tomorrow morning.  I will see him back in 1 week for stent removal and then he will have a CT scan prior to stent removal to make certain there were no clinically significant stone fragments remaining.         BABATUNDE ESCALONA MD             D: 2020   T: 2020   MT: THOM      Name:     RUFINO NICHOLS   MRN:      [id number]        Account:        [id number]   :      1960           Procedure Date: 2020      Document: [id number]

## 2020-02-10 LAB
APPEARANCE STONE: NORMAL
COMPN STONE: NORMAL
NUMBER STONE: 1
SIZE STONE: NORMAL MM
WT STONE: 3 MG

## 2020-02-10 RX ORDER — EZETIMIBE 10 MG/1
10 TABLET ORAL DAILY
Qty: 90 TABLET | Refills: 3 | Status: SHIPPED | OUTPATIENT
Start: 2020-02-10 | End: 2021-06-07

## 2020-02-13 ENCOUNTER — HOSPITAL ENCOUNTER (OUTPATIENT)
Dept: CT IMAGING | Facility: CLINIC | Age: 60
Discharge: HOME | End: 2020-02-13
Attending: UROLOGY | Admitting: UROLOGY
Payer: COMMERCIAL

## 2020-02-13 ENCOUNTER — OFFICE VISIT (OUTPATIENT)
Dept: UROLOGY | Facility: CLINIC | Age: 60
End: 2020-02-13
Payer: COMMERCIAL

## 2020-02-13 VITALS
DIASTOLIC BLOOD PRESSURE: 86 MMHG | HEART RATE: 58 BPM | SYSTOLIC BLOOD PRESSURE: 130 MMHG | HEIGHT: 72 IN | BODY MASS INDEX: 27.77 KG/M2 | WEIGHT: 205 LBS | OXYGEN SATURATION: 98 %

## 2020-02-13 DIAGNOSIS — Z79.2 PROPHYLACTIC ANTIBIOTIC: ICD-10-CM

## 2020-02-13 DIAGNOSIS — N20.0 KIDNEY STONE: ICD-10-CM

## 2020-02-13 DIAGNOSIS — N20.0 CALCULUS OF KIDNEY: ICD-10-CM

## 2020-02-13 DIAGNOSIS — I26.99 PULMONARY EMBOLISM (H): Primary | ICD-10-CM

## 2020-02-13 DIAGNOSIS — N20.1 URETERAL STONE: Primary | ICD-10-CM

## 2020-02-13 PROCEDURE — 52310 CYSTOSCOPY AND TREATMENT: CPT | Performed by: UROLOGY

## 2020-02-13 PROCEDURE — 74176 CT ABD & PELVIS W/O CONTRAST: CPT

## 2020-02-13 PROCEDURE — 99213 OFFICE O/P EST LOW 20 MIN: CPT | Mod: 25 | Performed by: UROLOGY

## 2020-02-13 RX ORDER — LIDOCAINE HYDROCHLORIDE 20 MG/ML
JELLY TOPICAL ONCE
Status: COMPLETED | OUTPATIENT
Start: 2020-02-13 | End: 2020-02-13

## 2020-02-13 RX ORDER — WARFARIN SODIUM 5 MG/1
TABLET ORAL
Qty: 135 TABLET | Refills: 1 | Status: SHIPPED | OUTPATIENT
Start: 2020-02-13 | End: 2020-08-03

## 2020-02-13 RX ORDER — CIPROFLOXACIN 500 MG/1
500 TABLET, FILM COATED ORAL ONCE
Qty: 1 TABLET | Refills: 0 | Status: SHIPPED | OUTPATIENT
Start: 2020-02-13 | End: 2020-02-28

## 2020-02-13 RX ADMIN — LIDOCAINE HYDROCHLORIDE: 20 JELLY TOPICAL at 16:45

## 2020-02-13 ASSESSMENT — PAIN SCALES - GENERAL: PAINLEVEL: MODERATE PAIN (4)

## 2020-02-13 ASSESSMENT — MIFFLIN-ST. JEOR: SCORE: 1792.87

## 2020-02-13 NOTE — NURSING NOTE
Chief Complaint   Patient presents with     Ureteral Stone     Patient is here today for a stent removal     Prior to the start of the procedure and with procedural staff participation, I verbally confirmed the patient s identity using two indicators, relevant allergies, that the procedure was appropriate and matched the consent or emergent situation, and that the correct equipment/implants were available. Immediately prior to starting the procedure I conducted the Time Out with the procedural staff and re-confirmed the patient s name, procedure, and site/side. I have wiped the patient off with the povidone-Iodine solution, draped them,  used Lidocaine hydrochloride jelly, and instilled sterile water into the bladder. (The Joint Commission universal protocol was followed.)  Yes    Sedation (Moderate or Deep): None    5mL 2% lidocaine hydrochloride Urojet instilled into urethra.    NDC# [address]-85  Lot #: [id number]  Expiration Date:  03/21    Hiral Parrish EMT

## 2020-02-13 NOTE — PATIENT INSTRUCTIONS
"AFTER YOUR CYSTOSCOPY AND STENT REMOVAL  ?  ?  You have just completed a cystoscopy, or \"cysto\", which allowed your physician to learn more about your bladder (or to remove a stent placed after surgery). We suggest that you continue to avoid caffeine, fruit juice, and alcohol for the next 24 hours, however, you are encouraged to return to your normal activities.  ?  ?  A few things that are considered normal after your cystoscopy:  ?  * small amount of bleeding (or spotting) that clears within the next 24 hours  ?  * slight burning sensation with urination  ?  * sensation of needing to void (urinate) more frequently  ?  * the feeling of \"air\" in your urine  ?  * mild discomfort that is relieved with Tylenol    * bladder spasms  ?  ?  ?  Please contact our office promptly if you:  ?  * develop a fever above 101 degrees  ?  * are unable to urinate  ?  * develop bright red blood that does not stop  ?  * experience severe pain or swelling  ?  ?  ?  And of course, please contact our office with any concerns or questions [phone]  ?  "

## 2020-02-13 NOTE — LETTER
2/13/2020       RE: Oswaldo Prabhakar  [address]  [place] [address]     Dear Colleague,    Thank you for referring your patient, Oswaldo Prabhakar, to the [facility]. Please see a copy of my visit note below.    Office Visit Note  [facility]  [phone]    UROLOGIC DIAGNOSES:   Right ureteral stone    CURRENT INTERVENTIONS:       HISTORY:   This is a 59-year-old gentleman who was seen in the hospital previously for a right proximal ureteral stone.  He takes both Coumadin and Brilinta.  He was initially stented followed by delayed stone extraction last week.  He is here today for stent removal.  He has felt well since his most recent procedure.  He had a CT scan performed today that shows a small 1 or 2 mm fragment in the lower pole of the right kidney but otherwise no ureteral stones present.  Stent in good position.  His stone was composed of calcium oxalate mixed with calcium phosphate.       PAST MEDICAL HISTORY:   Past Medical History:   Diagnosis Date     Antiplatelet or antithrombotic long-term use      Coronary artery disease      Diaphragmatic hernia without mention of obstruction or gangrene      Heart disease      Hernia, abdominal      History of blood transfusion      History of thrombophlebitis      Hypertension      Nephrolithiasis 4/2010     Other and unspecified hyperlipidemia      Pulmonary embolus and infarction (H)     s/p hemothorax and filter s/p filter removal (2011), now on long term anti-coagulation     Statin intolerance 2/1/2017     Stented coronary artery 01/07/2020    NIR mid LAD     Unspecified retinal detachment     Childhood trauma, unrepaired       PAST SURGICAL HISTORY:   Past Surgical History:   Procedure Laterality Date     BYPASS GRAFT ARTERY CORONARY  4/4/2014    Procedure: BYPASS GRAFT ARTERY CORONARY;  Median Sternotomy, Coronary Artery Bypass Bypass x 4 using Left  Internal Mammary, Left Saphenous Vein, on pump oxygenation;  Surgeon: Sherry Armando MD;  [facility]     C NONSPECIFIC PROCEDURE      Spermatocele resection     CLOSED REDUCTION, PERCUTANEOUS PINNING  HAND, COMBINED Left 11/5/2015    Procedure: COMBINED CLOSED REDUCTION, PERCUTANEOUS PINNING HAND;  Surgeon: Carmella Love MD;  [facility]     COLONOSCOPY  3/15/2013    Procedure: COLONOSCOPY;;  Surgeon: Racheal Shipman MD;  [facility]: [facility] GI     COMBINED CYSTOSCOPY, INSERT STENT URETER(S) Right 1/10/2020    Procedure: Cystoscopy, right retrograde pyelogram, interpretation of fluoroscopic images, placement of 6 x 26 double-J ureteral stent;  Surgeon: Nguyễn Woodard MD;  Location: [facility] OR     COMBINED CYSTOSCOPY, RETROGRADES, URETEROSCOPY, LASER HOLMIUM LITHOTRIPSY URETER(S), INSERT STENT Right 2/5/2020    Procedure: Cystoscopy, right ureteral stent exchange, right ureteroscopy with holmium lithotripsy and stone basketing, right retrograde pyelogram, interpretation of fluoroscopic images.;  Surgeon: Nguyễn Woodard MD;  Location: [facility] OR     CV ANGIOGRAM CORONARY GRAFT N/A 1/7/2020    Procedure: Angiogram Coronary Graft;  Surgeon: Chato Odonnell MD;  Location:  HE[facility] CARDIAC CATH LAB     CV CORONARY ANGIOGRAM  1/7/2020    Procedure: CV CORONARY ANGIOGRAM;  Surgeon: Chato Odonnell MD;  Location:  HEART CARDIAC CATH LAB     CV PCI STENT DRUG ELUTING N/A 1/7/2020    Procedure: PCI Stent Drug Eluting;  Surgeon: Chato Odonnell MD;  Location:  HEART CARDIAC CATH LAB     CYSTOSCOPY       LAPAROSCOPIC CHOLECYSTECTOMY N/A 8/6/2018    Procedure: LAPAROSCOPIC CHOLECYSTECTOMY;  LAPAROSCOPIC CHOLECYSTECTOMY ;  Surgeon: José Miguel tSuart MD;  Location: [facility] OR     LITHOTRIPSY  4/2010     THORACIC SURGERY      lung surgery, thoracotomy, lung adhesion       FAMILY HISTORY:   Family History   Problem Relation Age of Onset     Heart Disease  Mother      C.A.D. Father         3 vessel CABG, age 70     Cancer Father         bladder cancer     C.A.D. Paternal Grandmother      Hypertension Paternal Grandmother      Alzheimer Disease Paternal Grandmother      Diabetes No family hx of      Thyroid Disease No family hx of      Cancer - colorectal No family hx of        SOCIAL HISTORY:   Social History     Tobacco Use     Smoking status: Never Smoker     Smokeless tobacco: Never Used   Substance Use Topics     Alcohol use: No     Comment: very rare       Current Outpatient Medications   Medication     atorvastatin (LIPITOR) 80 MG tablet     calcium carbonate (TUMS) 500 MG chewable tablet     Cholecalciferol (VITAMIN D) 2000 UNITS CAPS     ciprofloxacin (CIPRO) 500 MG tablet     DiphenhydrAMINE HCl (BENADRYL PO)     evolocumab (REPATHA SURECLICK) 140 MG/ML prefilled autoinjector     ezetimibe (ZETIA) 10 MG tablet     fenofibrate (TRIGLIDE/LOFIBRA) 160 MG tablet     lisinopril (PRINIVIL/ZESTRIL) 2.5 MG tablet     metoprolol tartrate (LOPRESSOR) 25 MG tablet     ondansetron (ZOFRAN) 4 MG tablet     tamsulosin (FLOMAX) 0.4 MG capsule     ticagrelor (BRILINTA) 90 MG tablet     warfarin ANTICOAGULANT (COUMADIN) 5 MG tablet     No current facility-administered medications for this visit.          PHYSICAL EXAM:    /86   Pulse 58   Ht 1.829 m (6')   Wt 93 kg (205 lb)   SpO2 98%   BMI 27.80 kg/m       Constitutional: Well developed. Conversant and in no acute distress  Eyes: Anicteric sclera, conjunctiva clear, normal extraocular movements  ENT: Normocephalic and atraumatic,   Skin: Warm and dry. No rashes or lesions  Cardiac: No peripheral edema  Back/Flank: Not done  CNS/PNS: Normal musculature and movements, moves all extremities normally  Respiratory: Normal non-labored breathing  Abdomen: Soft nontender and nondistended  Peripheral Vascular: No peripheral edema  Mental Status/Psych: Alert and Oriented x 3. Normal mood and affect    Penis: Not  done  Scrotal Skin: Not done  Testicles: Not done  Epididymis: Not done  Digital Rectal Exam:     Cystoscopy: I performed flexible cystoscopy today and the stent was removed without difficulty.    Imaging: None    Urinalysis: [facility] RESULTS:  Recent Labs   Lab Test 01/12/20  1840  06/25/19  1013   COLOR Dark Red   < > Yellow   APPEARANCE Cloudy   < > Clear   URINEGLC Negative   < > Negative   URINEBILI Negative   < > Negative   URINEKETONE Negative   < > Negative   SG Canceled, Test credited   < > 1.020   UBLD Moderate*   < > Negative   URINEPH 6.0   < > 6.0   PROTEIN 100*   < > Negative   UROBILINOGEN  --   --  0.2   NITRITE Negative   < > Negative   LEUKEST Moderate*   < > Negative   RBCU >182*   < >  --    WBCU >182*   < >  --     < > = values in this interval not displayed.       PSA:     Post Void Residual:     Other labs: None today      IMPRESSION:  Doing well, stent removed      PLAN:  We discussed prevention methods for future stones. He was advised of the small fragment in the lower pole of the right kidney. I recommended he see me again in 6 months with a KUB to monitor him for stone formation    Total Time: 15 min                                      Total in Consultation: 10 min      Nguyễn Woodard M.D.

## 2020-02-13 NOTE — PROGRESS NOTES
Office Visit Note  [facility]  [phone]    UROLOGIC DIAGNOSES:   Right ureteral stone    CURRENT INTERVENTIONS:       HISTORY:   This is a 59-year-old gentleman who was seen in the hospital previously for a right proximal ureteral stone.  He takes both Coumadin and Brilinta.  He was initially stented followed by delayed stone extraction last week.  He is here today for stent removal.  He has felt well since his most recent procedure.  He had a CT scan performed today that shows a small 1 or 2 mm fragment in the lower pole of the right kidney but otherwise no ureteral stones present.  Stent in good position.  His stone was composed of calcium oxalate mixed with calcium phosphate.       PAST MEDICAL HISTORY:   Past Medical History:   Diagnosis Date     Antiplatelet or antithrombotic long-term use      Coronary artery disease      Diaphragmatic hernia without mention of obstruction or gangrene      Heart disease      Hernia, abdominal      History of blood transfusion      History of thrombophlebitis      Hypertension      Nephrolithiasis 4/2010     Other and unspecified hyperlipidemia      Pulmonary embolus and infarction (H)     s/p hemothorax and filter s/p filter removal (2011), now on long term anti-coagulation     Statin intolerance 2/1/2017     Stented coronary artery 01/07/2020    NIR mid LAD     Unspecified retinal detachment     Childhood trauma, unrepaired       PAST SURGICAL HISTORY:   Past Surgical History:   Procedure Laterality Date     BYPASS GRAFT ARTERY CORONARY  4/4/2014    Procedure: BYPASS GRAFT ARTERY CORONARY;  Median Sternotomy, Coronary Artery Bypass Bypass x 4 using Left Internal Mammary, Left Saphenous Vein, on pump oxygenation;  Surgeon: Sherry Armando MD;  [facility]     C NONSPECIFIC PROCEDURE      Spermatocele resection     CLOSED REDUCTION, PERCUTANEOUS PINNING  HAND, COMBINED Left 11/5/2015    Procedure: COMBINED CLOSED REDUCTION, PERCUTANEOUS PINNING HAND;   Surgeon: Carmella Love MD;  [facility]     COLONOSCOPY  3/15/2013    Procedure: COLONOSCOPY;;  Surgeon: Racheal Shipman MD;  [facility]     COMBINED CYSTOSCOPY, INSERT STENT URETER(S) Right 1/10/2020    Procedure: Cystoscopy, right retrograde pyelogram, interpretation of fluoroscopic images, placement of 6 x 26 double-J ureteral stent;  Surgeon: Nguyễn Woodard MD;  Location: [facility]     COMBINED CYSTOSCOPY, RETROGRADES, URETEROSCOPY, LASER HOLMIUM LITHOTRIPSY URETER(S), INSERT STENT Right 2/5/2020    Procedure: Cystoscopy, right ureteral stent exchange, right ureteroscopy with holmium lithotripsy and stone basketing, right retrograde pyelogram, interpretation of fluoroscopic images.;  Surgeon: Nguyễn Woodard MD;  Location: [facility]     CV ANGIOGRAM CORONARY GRAFT N/A 1/7/2020    Procedure: Angiogram Coronary Graft;  Surgeon: Chato Odonnell MD;  [facility]     CV CORONARY ANGIOGRAM  1/7/2020    Procedure: CV CORONARY ANGIOGRAM;  Surgeon: Chato Odonnell MD;  [facility]     CV PCI STENT DRUG ELUTING N/A 1/7/2020    Procedure: PCI Stent Drug Eluting;  Surgeon: Chato Odonnell MD;  [facility]     CYSTOSCOPY       LAPAROSCOPIC CHOLECYSTECTOMY N/A 8/6/2018    Procedure: LAPAROSCOPIC CHOLECYSTECTOMY;  LAPAROSCOPIC CHOLECYSTECTOMY ;  Surgeon: José Miguel Stuart MD;  [facility]: [facility]     LITHOTRIPSY  4/2010     THORACIC SURGERY      lung surgery, thoracotomy, lung adhesion       FAMILY HISTORY:   Family History   Problem Relation Age of Onset     Heart Disease Mother      C.A.D. Father         3 vessel CABG, age 70     Cancer Father         bladder cancer     C.A.D. Paternal Grandmother      Hypertension Paternal Grandmother      Alzheimer Disease Paternal Grandmother      Diabetes No family hx of      Thyroid Disease No family hx of      Cancer - colorectal No  family hx of        SOCIAL HISTORY:   Social History     Tobacco Use     Smoking status: Never Smoker     Smokeless tobacco: Never Used   Substance Use Topics     Alcohol use: No     Comment: very rare       Current Outpatient Medications   Medication     atorvastatin (LIPITOR) 80 MG tablet     calcium carbonate (TUMS) 500 MG chewable tablet     Cholecalciferol (VITAMIN D) 2000 UNITS CAPS     ciprofloxacin (CIPRO) 500 MG tablet     DiphenhydrAMINE HCl (BENADRYL PO)     evolocumab (REPATHA SURECLICK) 140 MG/ML prefilled autoinjector     ezetimibe (ZETIA) 10 MG tablet     fenofibrate (TRIGLIDE/LOFIBRA) 160 MG tablet     lisinopril (PRINIVIL/ZESTRIL) 2.5 MG tablet     metoprolol tartrate (LOPRESSOR) 25 MG tablet     ondansetron (ZOFRAN) 4 MG tablet     tamsulosin (FLOMAX) 0.4 MG capsule     ticagrelor (BRILINTA) 90 MG tablet     warfarin ANTICOAGULANT (COUMADIN) 5 MG tablet     No current facility-administered medications for this visit.          PHYSICAL EXAM:    /86   Pulse 58   Ht 1.829 m (6')   Wt 93 kg (205 lb)   SpO2 98%   BMI 27.80 kg/m      Constitutional: Well developed. Conversant and in no acute distress  Eyes: Anicteric sclera, conjunctiva clear, normal extraocular movements  ENT: Normocephalic and atraumatic,   Skin: Warm and dry. No rashes or lesions  Cardiac: No peripheral edema  Back/Flank: Not done  CNS/PNS: Normal musculature and movements, moves all extremities normally  Respiratory: Normal non-labored breathing  Abdomen: Soft nontender and nondistended  Peripheral Vascular: No peripheral edema  Mental Status/Psych: Alert and Oriented x 3. Normal mood and affect    Penis: Not done  Scrotal Skin: Not done  Testicles: Not done  Epididymis: Not done  Digital Rectal Exam:     Cystoscopy: I performed flexible cystoscopy today and the stent was removed without difficulty.    Imaging: None    Urinalysis: [facility] RESULTS:  Recent Labs   Lab Test 01/12/20  1840  06/25/19  1013   COLOR Dark Red   < >  Yellow   APPEARANCE Cloudy   < > Clear   URINEGLC Negative   < > Negative   URINEBILI Negative   < > Negative   URINEKETONE Negative   < > Negative   SG Canceled, Test credited   < > 1.020   UBLD Moderate*   < > Negative   URINEPH 6.0   < > 6.0   PROTEIN 100*   < > Negative   UROBILINOGEN  --   --  0.2   NITRITE Negative   < > Negative   LEUKEST Moderate*   < > Negative   RBCU >182*   < >  --    WBCU >182*   < >  --     < > = values in this interval not displayed.       PSA:     Post Void Residual:     Other labs: None today      IMPRESSION:  Doing well, stent removed      PLAN:  We discussed prevention methods for future stones. He was advised of the small fragment in the lower pole of the right kidney. I recommended he see me again in 6 months with a KUB to monitor him for stone formation    Total Time: 15 min                                      Total in Consultation: 10 min      Nguyễn Woodard M.D.

## 2020-02-28 ENCOUNTER — OFFICE VISIT (OUTPATIENT)
Dept: URGENT CARE | Facility: URGENT CARE | Age: 60
End: 2020-02-28
Payer: COMMERCIAL

## 2020-02-28 VITALS
TEMPERATURE: 100.2 F | OXYGEN SATURATION: 96 % | DIASTOLIC BLOOD PRESSURE: 68 MMHG | HEART RATE: 71 BPM | SYSTOLIC BLOOD PRESSURE: 132 MMHG

## 2020-02-28 DIAGNOSIS — J11.1 INFLUENZA-LIKE ILLNESS: Primary | ICD-10-CM

## 2020-02-28 LAB
FLUAV+FLUBV AG SPEC QL: NEGATIVE
FLUAV+FLUBV AG SPEC QL: NEGATIVE
SPECIMEN SOURCE: NORMAL

## 2020-02-28 PROCEDURE — 99213 OFFICE O/P EST LOW 20 MIN: CPT | Performed by: PHYSICIAN ASSISTANT

## 2020-02-28 PROCEDURE — 87804 INFLUENZA ASSAY W/OPTIC: CPT | Performed by: PHYSICIAN ASSISTANT

## 2020-02-28 RX ORDER — OSELTAMIVIR PHOSPHATE 75 MG/1
75 CAPSULE ORAL 2 TIMES DAILY
Qty: 10 CAPSULE | Refills: 0 | Status: SHIPPED | OUTPATIENT
Start: 2020-02-28 | End: 2020-03-04

## 2020-02-28 NOTE — PATIENT INSTRUCTIONS
Patient Education     Influenza (Adult)    Influenza is also called the flu. It is a viral illness that affects the air passages of your lungs. It is different from the common cold. The flu can easily be passed from one to person to another. It may be spread through the air by coughing and sneezing. Or it can be spread by touching the sick person and then touching your own eyes, nose, or mouth.  The flu starts 1 to 3 days after you are exposed to the flu virus. It may last for 1 to 2 weeks but many people feel tired or fatigued for many weeks afterward. You usually don t need to take antibiotics unless you have a complication. This might be an ear or sinus infection or pneumonia.  Symptoms of the flu may be mild or severe. They can include extreme tiredness (wanting to stay in bed all day), chills, fevers, muscle aches, soreness with eye movement, headache, and a dry, hacking cough.  Home care  Follow these guidelines when caring for yourself at home:    Avoid being around cigarette smoke, whether yours or other people s.    Acetaminophen or ibuprofen will help ease your fever, muscle aches, and headache. Don t give aspirin to anyone younger than 18 who has the flu. Aspirin can harm the liver.    Nausea and loss of appetite are common with the flu. Eat light meals. Drink 6 to 8 glasses of liquids every day. Good choices are water, sport drinks, soft drinks without caffeine, juices, tea, and soup. Extra fluids will also help loosen secretions in your nose and lungs.    Over-the-counter cold medicines will not make the flu go away faster. But the medicines may help with coughing, sore throat, and congestion in your nose and sinuses. Don t use a decongestant if you have high blood pressure.    Stay home until your fever has been gone for at least 24 hours without using medicine to reduce fever.  Follow-up care  Follow up with your healthcare provider, or as advised, if you are not getting better over the next  week.  If you are age 65 or older, talk with your provider about getting a pneumococcal vaccine every 5 years. You should also get this vaccine if you have chronic asthma or COPD. All adults should get a flu vaccine every fall. Ask your provider about this.  When to seek medical advice  Call your healthcare provider right away if any of these occur:    Cough with lots of colored mucus (sputum) or blood in your mucus    Chest pain, shortness of breath, wheezing, or trouble breathing    Severe headache, or face, neck, or ear pain    New rash with fever    Fever of 100.4 F (38 C) or higher, or as directed by your healthcare provider    Confusion, behavior change, or seizure    Severe weakness or dizziness    You get a new fever or cough after getting better for a few days  Date Last Reviewed: 1/1/2017 2000-2019 [employer]. [address] [place] [address], [place] [address]. All rights reserved. This information is not intended as a substitute for professional medical care. Always follow your healthcare professional's instructions.

## 2020-02-28 NOTE — PROGRESS NOTES
SUBJECTIVE:   Oswaldo Prabhakar is a 59 year old male presenting with a chief complaint of fever, chills, runny nose, stuffy nose, cough - non-productive, headache and body aches.  Onset of symptoms was 1 day(s) ago.  Course of illness is same.    Severity moderately severe  Current and Associated symptoms: as stated above  Treatment measures tried include Tylenol/Ibuprofen and OTC Cough med.  Predisposing factors include exposure to influenza from son.    Past Medical History:   Diagnosis Date     Antiplatelet or antithrombotic long-term use      Coronary artery disease      Diaphragmatic hernia without mention of obstruction or gangrene      Heart disease      Hernia, abdominal      History of blood transfusion      History of thrombophlebitis      Hypertension      Nephrolithiasis 4/2010     Other and unspecified hyperlipidemia      Pulmonary embolus and infarction (H)     s/p hemothorax and filter s/p filter removal (2011), now on long term anti-coagulation     Statin intolerance 2/1/2017     Stented coronary artery 01/07/2020    NIR mid LAD     Unspecified retinal detachment     Childhood trauma, unrepaired     Current Outpatient Medications   Medication Sig Dispense Refill     atorvastatin (LIPITOR) 80 MG tablet Take 1 tablet (80 mg) by mouth daily 90 tablet 2     calcium carbonate (TUMS) 500 MG chewable tablet Take 1 chew tab by mouth daily as needed       Cholecalciferol (VITAMIN D) 2000 UNITS CAPS Take 2,000 Units by mouth daily       evolocumab (REPATHA SURECLICK) 140 MG/ML prefilled autoinjector Inject 1 mL (140 mg) Subcutaneous every 14 days 6 mL 3     ezetimibe (ZETIA) 10 MG tablet Take 1 tablet (10 mg) by mouth daily 90 tablet 3     fenofibrate (TRIGLIDE/LOFIBRA) 160 MG tablet Take 1 tablet (160 mg) by mouth daily 90 tablet 2     lisinopril (PRINIVIL/ZESTRIL) 2.5 MG tablet Take 1 tablet (2.5 mg) by mouth daily 90 tablet 3     metoprolol tartrate (LOPRESSOR) 25 MG tablet Take 0.5 tablets (12.5 mg) by mouth  2 times daily 90 tablet 3     ondansetron (ZOFRAN) 4 MG tablet Take 1 tablet (4 mg) by mouth every 8 hours as needed for nausea 12 tablet 0     oseltamivir (TAMIFLU) 75 MG capsule Take 1 capsule (75 mg) by mouth 2 times daily for 5 days 10 capsule 0     tamsulosin (FLOMAX) 0.4 MG capsule Take 1 capsule (0.4 mg) by mouth daily 15 capsule 4     ticagrelor (BRILINTA) 90 MG tablet Take 1 tablet (90 mg) by mouth every 12 hours 180 tablet 3     warfarin ANTICOAGULANT (COUMADIN) 5 MG tablet TAKE 1.5 TABLETS OR AS DIRECTED 135 tablet 1     DiphenhydrAMINE HCl (BENADRYL PO) Take 25-50 mg by mouth daily as needed       Social History     Tobacco Use     Smoking status: Never Smoker     Smokeless tobacco: Never Used   Substance Use Topics     Alcohol use: No     Comment: very rare       ROS:  Review of systems negative except as stated above.    OBJECTIVE:  /68   Pulse 71   Temp 100.2  F (37.9  C) (Tympanic)   SpO2 96%   GENERAL APPEARANCE: healthy, alert and no distress  EYES: EOMI,  PERRL, conjunctiva clear  HENT: ear canals and TM's normal.  Nose and mouth without ulcers, erythema or lesions  NECK: supple, nontender, no lymphadenopathy  RESP: lungs clear to auscultation - no rales, rhonchi or wheezes  CV: regular rates and rhythm, normal S1 S2, no murmur noted  ABDOMEN:  soft, nontender, nondistended  NEURO: Normal strength and tone, sensory exam grossly normal,  normal speech and mentation  SKIN: no suspicious lesions or rashes    Results for orders placed or performed in visit on 02/28/20   Influenza A/B antigen     Status: None   Result Value Ref Range    Influenza A/B Agn Specimen Nasal     Influenza A Negative NEG^Negative    Influenza B Negative NEG^Negative       ASSESSMENT / PLAN:  1. Influenza-like illness  Lungs are CTAB, no sign of respiratory distress. Throat without PTA or RPA and TM clear B/L. No nuchal rigidity or abd tenderness. I do not think that symptoms are representative of pneumonia, AOM,  ARBS, UTI or other bacterial etiology.   Influenza is negative, although I highly suspect influenza.  Encouraged fluids, rest and humidified air at night  Can continue to take Tylenol for comfort and fever  If cough is severe, OK to take Mucinex DM  - oseltamivir (TAMIFLU) 75 MG capsule; Take 1 capsule (75 mg) by mouth 2 times daily for 5 days  Dispense: 10 capsule; Refill: 0    Diagnosis and treatment plan was reviewed with patient and/or family.   We went over any labs or imaging. Discussed worsening symptoms or little to no relief despite treatment plan to follow-up with PCP or return to clinic.  Patient verbalizes understanding. All questions were addressed and answered.   Cele Montoya PA-C

## 2020-03-31 ENCOUNTER — ANTICOAGULATION THERAPY VISIT (OUTPATIENT)
Dept: ANTICOAGULATION | Facility: CLINIC | Age: 60
End: 2020-03-31

## 2020-03-31 DIAGNOSIS — Z79.01 LONG TERM CURRENT USE OF ANTICOAGULANT THERAPY: ICD-10-CM

## 2020-03-31 DIAGNOSIS — I26.99 PULMONARY EMBOLI (H): ICD-10-CM

## 2020-03-31 LAB — INR PPP: 3.8 (ref 0.86–1.14)

## 2020-03-31 PROCEDURE — 36416 COLLJ CAPILLARY BLOOD SPEC: CPT | Performed by: INTERNAL MEDICINE

## 2020-03-31 PROCEDURE — 85610 PROTHROMBIN TIME: CPT | Performed by: INTERNAL MEDICINE

## 2020-03-31 NOTE — PROGRESS NOTES
ANTICOAGULATION FOLLOW-UP CLINIC VISIT    Patient Name:  Oswaldo Prabhakar  Date:  3/31/2020  Contact Type:  Telephone    SUBJECTIVE:  Patient Findings     Comments:   Spoke with Oswaldo.  He states he has been eating less greens and probably will until the Covid virus threat ends.  He has had slight cold.  No signs of bleeding.  He will be seen urgently in the ER if he develops any signs or if he falls.  Recommend decreasing warfarin dose and rechecking INR in two weeks.        Clinical Outcomes     Comments:   Spoke with Oswaldo.  He states he has been eating less greens and probably will until the Covid virus threat ends.  He has had slight cold.  No signs of bleeding.  He will be seen urgently in the ER if he develops any signs or if he falls.  Recommend decreasing warfarin dose and rechecking INR in two weeks.           OBJECTIVE    INR   Date Value Ref Range Status   03/31/2020 3.80 (H) 0.86 - 1.14 Final     Comment:     This test is intended for monitoring Coumadin therapy.  Results are not   accurate in patients with prolonged INR due to factor deficiency.       Chromogenic Factor 10   Date Value Ref Range Status   06/18/2011 81 60 - 140 % Final     Comment:     Therapeutic Range: A Chromogenic Factor 10 level of 20-40% inversely   correlates   with an INR of 2-3 for patients receiving Warfarin. Chromogenic Factor 10   levels below 20% indicate an INR greater than 3, and levels above 40%   indicate   an INR less than 2.     Factor 2 Assay   Date Value Ref Range Status   04/04/2012  60 - 140 % Final    (Note)  CANCELLED AND CREDITED PER [facility]. MONITORING,4/4/12,       ASSESSMENT / PLAN  INR assessment SUPRA    Recheck INR In: 2 WEEKS    INR Location Clinic      Anticoagulation Summary  As of 3/31/2020    INR goal:   2.0-3.0   TTR:   67.5 % (11.8 mo)   INR used for dosing:   3.80! (3/31/2020)   Warfarin maintenance plan:   7.5 mg (5 mg x 1.5) every day   Full warfarin instructions:   3/31: 2.5 mg; 4/3: 5  mg; 4/7: 5 mg; 4/10: 5 mg; Otherwise 7.5 mg every day   Weekly warfarin total:   52.5 mg   Plan last modified:   Kwesi Garcia RN (11/20/2018)   Next INR check:   4/14/2020   Priority:   Maintenance   Target end date:   Indefinite    Indications    Pulmonary emboli (H) [I26.99]  Long-term (current) use of anticoagulants [Z79.01] [Z79.01]             Anticoagulation Episode Summary     INR check location:       Preferred lab:       Send INR reminders to:   [facility]    Comments:   HIPPA INFO OK to speak with wife Josselyn MICHELLE to leave messages on Home.work and cell phones  use cell phone #[phone] ++++1/8/2020 ASA 81mg DAILY AND STOP ONCE INR >2.0++++      Anticoagulation Care Providers     Provider Role Specialty Phone number    Samm Vazquez MD [facility] Internal Medicine [phone]            See the Encounter Report to view Anticoagulation Flowsheet and Dosing Calendar (Go to Encounters tab in chart review, and find the Anticoagulation Therapy Visit)    Spoke with Oswaldo.  See patient findings.    Marielena Chacon, RN

## 2020-04-06 DIAGNOSIS — E78.5 HYPERLIPIDEMIA LDL GOAL <130: ICD-10-CM

## 2020-04-06 DIAGNOSIS — E78.1 PURE HYPERGLYCERIDEMIA: ICD-10-CM

## 2020-04-09 RX ORDER — ATORVASTATIN CALCIUM 80 MG/1
80 TABLET, FILM COATED ORAL DAILY
Qty: 90 TABLET | Refills: 2 | Status: SHIPPED | OUTPATIENT
Start: 2020-04-09 | End: 2020-12-18

## 2020-04-29 DIAGNOSIS — E78.5 HYPERLIPIDEMIA LDL GOAL <130: ICD-10-CM

## 2020-04-30 RX ORDER — FENOFIBRATE 160 MG/1
160 TABLET ORAL DAILY
Qty: 90 TABLET | Refills: 2 | Status: SHIPPED | OUTPATIENT
Start: 2020-04-30 | End: 2021-08-24

## 2020-04-30 NOTE — TELEPHONE ENCOUNTER
1/27/2020  [facility]   Reji Ocampo MD   Cardiology     High med alert    Warnings Override History for fenofibrate (TRIGLIDE/LOFIBRA) 160 MG tablet [[id number]]     Overridden by Liliam Hyman RN on Feb 13, 2020 8:18 AM    Drug-Drug    1. ANTICOAGULANTS / FIBRIC ACID DERIVATIVES [Level: Major]    Other Orders:  warfarin ANTICOAGULANT (COUMADIN) 5 MG tablet

## 2020-06-03 ENCOUNTER — TELEPHONE (OUTPATIENT)
Dept: ANTICOAGULATION | Facility: CLINIC | Age: 60
End: 2020-06-03

## 2020-06-03 DIAGNOSIS — I26.99 PULMONARY EMBOLI (H): ICD-10-CM

## 2020-06-03 DIAGNOSIS — Z79.01 LONG TERM CURRENT USE OF ANTICOAGULANT THERAPY: ICD-10-CM

## 2020-06-03 NOTE — TELEPHONE ENCOUNTER
Called Oswaldo today to ask him when he could get the next INR test done. He said he will come tomorrow.     He said that he has been taking warfarin 7.5mgs on Tues and Thurs and 5mgs all other days.   This is less than we had recommended.    He has no signs or symptoms of bleeding or clotting

## 2020-06-04 ENCOUNTER — ANTICOAGULATION THERAPY VISIT (OUTPATIENT)
Dept: ANTICOAGULATION | Facility: CLINIC | Age: 60
End: 2020-06-04

## 2020-06-04 DIAGNOSIS — I26.99 PULMONARY EMBOLI (H): ICD-10-CM

## 2020-06-04 DIAGNOSIS — Z79.01 LONG TERM CURRENT USE OF ANTICOAGULANT THERAPY: ICD-10-CM

## 2020-06-04 LAB — INR PPP: 3.66 (ref 0.86–1.14)

## 2020-06-04 NOTE — PROGRESS NOTES
ANTICOAGULATION FOLLOW-UP CLINIC VISIT    Patient Name:  Oswaldo Prabhakar  Date:  6/4/2020  Contact Type:  Telephone    SUBJECTIVE:  Patient Findings     Comments:   Spoke with Oswaldo.  He is eating less greens than he usually does.  He reports he has been taking warfarin 7.5 mg TuTh and 5 mg all other days of the week.  This is the second supra therapeutic INR in a row, will recommend decreasing warfarin dose and rechecking INR in one week.          Clinical Outcomes     Comments:   Spoke with Oswaldo.  He is eating less greens than he usually does.  He reports he has been taking warfarin 7.5 mg TuTh and 5 mg all other days of the week.  This is the second supra therapeutic INR in a row, will recommend decreasing warfarin dose and rechecking INR in one week.             OBJECTIVE    Recent labs: (last 7 days)     06/04/20  1043   INR 3.66*       ASSESSMENT / PLAN  INR assessment SUPRA    Recheck INR In: 1 WEEK    INR Location Clinic      Anticoagulation Summary  As of 6/4/2020    INR goal:   2.0-3.0   TTR:   59.9 % (11.8 mo)   INR used for dosing:   3.66! (6/4/2020)   Warfarin maintenance plan:   5 mg (5 mg x 1) every day   Full warfarin instructions:   5 mg every day   Weekly warfarin total:   35 mg   Plan last modified:   Marielena Chacon, RN (6/4/2020)   Next INR check:      Priority:   Maintenance   Target end date:   Indefinite    Indications    Pulmonary emboli (H) [I26.99]  Long-term (current) use of anticoagulants [Z79.01] [Z79.01]             Anticoagulation Episode Summary     INR check location:       Preferred lab:       Send INR reminders to:   [facility]    Comments:   HIPPA INFO OK to speak with wife Josselyn MICHELLE to leave messages on Home.work and cell phones  use cell phone #[phone] ++++1/8/2020 ASA 81mg DAILY AND STOP ONCE INR >2.0++++      Anticoagulation Care Providers     Provider Role Specialty Phone number    Samm Vazquez MD [facility] Internal Medicine [phone]            See the  Encounter Report to view Anticoagulation Flowsheet and Dosing Calendar (Go to Encounters tab in chart review, and find the Anticoagulation Therapy Visit)    Spoke with Oswaldo.  He reports the only change he has had is eating less greens.  No tylenol or alcohol use.    Marielena Chacon RN

## 2020-06-11 ENCOUNTER — ANTICOAGULATION THERAPY VISIT (OUTPATIENT)
Dept: ANTICOAGULATION | Facility: CLINIC | Age: 60
End: 2020-06-11

## 2020-06-11 DIAGNOSIS — I26.99 PULMONARY EMBOLI (H): ICD-10-CM

## 2020-06-11 DIAGNOSIS — Z79.01 LONG TERM CURRENT USE OF ANTICOAGULANT THERAPY: ICD-10-CM

## 2020-06-11 LAB — INR PPP: 2.32 (ref 0.86–1.14)

## 2020-06-11 NOTE — PROGRESS NOTES
ANTICOAGULATION FOLLOW-UP CLINIC VISIT    Patient Name:  Oswaldo Prabhakar  Date:  2020  Contact Type:  Telephone    SUBJECTIVE:  Patient Findings     Positives:   Change in diet/appetite    Comments:   Pt continues to eat less greens then before Covid-19. Will have pt recheck in a week to make sure his INR is not trending down. Pt is ok with this.         Clinical Outcomes     Comments:   Pt continues to eat less greens then before Covid-19. Will have pt recheck in a week to make sure his INR is not trending down. Pt is ok with this.            OBJECTIVE    Recent labs: (last 7 days)     20  1033   INR 2.32*       ASSESSMENT / PLAN  INR assessment THER    Recheck INR In: 1 WEEK    INR Location Clinic      Anticoagulation Summary  As of 2020    INR goal:   2.0-3.0   TTR:   58.9 % (11.8 mo)   INR used for dosin.32 (2020)   Warfarin maintenance plan:   5 mg (5 mg x 1) every day   Full warfarin instructions:   5 mg every day   Weekly warfarin total:   35 mg   No change documented:   Liliam Hyman, RN   Plan last modified:   Marielena Chacon RN (2020)   Next INR check:   2020   Priority:   Maintenance   Target end date:   Indefinite    Indications    Pulmonary emboli (H) [I26.99]  Long-term (current) use of anticoagulants [Z79.01] [Z79.01]             Anticoagulation Episode Summary     INR check location:       Preferred lab:       Send INR reminders to:   [facility]    Comments:   HIPPA INFO OK to speak with wife Josselyn OK to leave messages on Home.work and cell phones  use cell phone #[phone] ++++2020 ASA 81mg DAILY AND STOP ONCE INR >2.0++++      Anticoagulation Care Providers     Provider Role Specialty Phone number    Samm Vazquez MD [facility] Internal Medicine [phone]            See the Encounter Report to view Anticoagulation Flowsheet and Dosing Calendar (Go to Encounters tab in chart review, and find the Anticoagulation Therapy Visit)    Spoke with  patient. Gave them their lab results and new warfarin recommendation.  No changes in health, medication, or diet. No missed doses, no falls. No signs or symptoms of bleed or clotting.     Liliam Hyman RN

## 2020-07-03 ENCOUNTER — ANTICOAGULATION THERAPY VISIT (OUTPATIENT)
Dept: ANTICOAGULATION | Facility: CLINIC | Age: 60
End: 2020-07-03

## 2020-07-03 DIAGNOSIS — Z79.01 LONG TERM CURRENT USE OF ANTICOAGULANT THERAPY: ICD-10-CM

## 2020-07-03 DIAGNOSIS — I26.99 PULMONARY EMBOLI (H): ICD-10-CM

## 2020-07-03 LAB
CAPILLARY BLOOD COLLECTION: NORMAL
INR PPP: 2 (ref 0.86–1.14)

## 2020-07-03 PROCEDURE — 36416 COLLJ CAPILLARY BLOOD SPEC: CPT | Performed by: INTERNAL MEDICINE

## 2020-07-03 PROCEDURE — 85610 PROTHROMBIN TIME: CPT | Performed by: INTERNAL MEDICINE

## 2020-07-03 NOTE — PROGRESS NOTES
ANTICOAGULATION FOLLOW-UP CLINIC VISIT    Patient Name:  Oswaldo Prabhakar  Date:  7/3/2020  Contact Type:  Telephone    SUBJECTIVE:  Patient Findings     Comments:   Spoke to Oswaldo.  Denies missed doses.  Discussed with Pharmacist William Harmon [facility] for patient's new Anticoagulation recommendation.  New maintenance dosing pattern.  Next appt with Dr. Vazquez will be virtual.  Scheduled patient for  at [facility] lab.        Clinical Outcomes     Comments:   Spoke to Oswaldo.  Denies missed doses.  Discussed with Pharmacist William Harmon [facility] for patient's new Anticoagulation recommendation.  New maintenance dosing pattern.  Next appt with Dr. Vazquez will be virtual.  Scheduled patient for  at [facility] lab.           OBJECTIVE    Recent labs: (last 7 days)     20  1331   INR 2.00*       ASSESSMENT / PLAN  INR assessment THER    Recheck INR In: 2 WEEKS    INR Location Clinic      Anticoagulation Summary  As of 7/3/2020    INR goal:   2.0-3.0   TTR:   61.0 % (11.8 mo)   INR used for dosin.00 (7/3/2020)   Warfarin maintenance plan:   7.5 mg (5 mg x 1.5) every Mon, Fri; 5 mg (5 mg x 1) all other days   Full warfarin instructions:   7.5 mg every Mon, Fri; 5 mg all other days   Weekly warfarin total:   40 mg   Plan last modified:   Kwesi Garcia RN (7/3/2020)   Next INR check:   2020   Priority:   Maintenance   Target end date:   Indefinite    Indications    Pulmonary emboli (H) [I26.99]  Long-term (current) use of anticoagulants [Z79.01] [Z79.01]             Anticoagulation Episode Summary     INR check location:       Preferred lab:       Send INR reminders to:   [facility]    Comments:   HIPPA INFO OK to speak with wife Josselyn OK to leave messages on Home.work and cell phones  use cell phone #[phone] ++++2020 ASA 81mg DAILY AND STOP ONCE INR >2.0++++      Anticoagulation Care Providers     Provider Role Specialty Phone number    Samm Vazquez MD Referring Internal Medicine [phone]            See  the Encounter Report to view Anticoagulation Flowsheet and Dosing Calendar (Go to Encounters tab in chart review, and find the Anticoagulation Therapy Visit)    INR/CFX/F2 RESULT: INR result is 2.00    ASSESSMENT:No Problems found. No Changes in Health, Medications, or diet. No Signs of bruising, bleeding or clotting.    DOSING ADJUSTMENT: 7.5mg on Mon, Fri, 5mg all other days    NEXT INR/FACTOR X OR FACTOR II:7/17    PROTOCOL FOLLOWED:goal 2-3    Kwesi Garcia, RN

## 2020-07-17 ENCOUNTER — ANTICOAGULATION THERAPY VISIT (OUTPATIENT)
Dept: ANTICOAGULATION | Facility: CLINIC | Age: 60
End: 2020-07-17

## 2020-07-17 DIAGNOSIS — I26.99 PULMONARY EMBOLI (H): ICD-10-CM

## 2020-07-17 DIAGNOSIS — Z79.01 LONG TERM CURRENT USE OF ANTICOAGULANT THERAPY: ICD-10-CM

## 2020-07-17 LAB
CAPILLARY BLOOD COLLECTION: NORMAL
INR PPP: 1.9 (ref 0.86–1.14)

## 2020-07-17 PROCEDURE — 85610 PROTHROMBIN TIME: CPT | Performed by: INTERNAL MEDICINE

## 2020-07-17 PROCEDURE — 36416 COLLJ CAPILLARY BLOOD SPEC: CPT | Performed by: INTERNAL MEDICINE

## 2020-08-02 DIAGNOSIS — I26.99 PULMONARY EMBOLISM (H): ICD-10-CM

## 2020-08-03 RX ORDER — WARFARIN SODIUM 5 MG/1
TABLET ORAL
Qty: 135 TABLET | Refills: 1 | Status: SHIPPED | OUTPATIENT
Start: 2020-08-03 | End: 2021-11-01

## 2020-10-13 ENCOUNTER — TELEPHONE (OUTPATIENT)
Dept: ANTICOAGULATION | Facility: CLINIC | Age: 60
End: 2020-10-13

## 2020-10-13 DIAGNOSIS — Z79.01 LONG TERM CURRENT USE OF ANTICOAGULANT THERAPY: ICD-10-CM

## 2020-10-13 DIAGNOSIS — I26.99 PULMONARY EMBOLI (H): ICD-10-CM

## 2020-10-13 NOTE — TELEPHONE ENCOUNTER
"Called pt to follow-up about over due INR. Pt reports he will get an INR \"two to three days from now.\" Writer offered to schedule the lab appointment, but pt declined this service.   "

## 2020-11-07 ENCOUNTER — HEALTH MAINTENANCE LETTER (OUTPATIENT)
Age: 60
End: 2020-11-07

## 2020-11-30 ENCOUNTER — HOSPITAL ENCOUNTER (EMERGENCY)
Facility: CLINIC | Age: 60
Discharge: HOME | End: 2020-11-30
Attending: STUDENT IN AN ORGANIZED HEALTH CARE EDUCATION/TRAINING PROGRAM | Admitting: STUDENT IN AN ORGANIZED HEALTH CARE EDUCATION/TRAINING PROGRAM
Payer: COMMERCIAL

## 2020-11-30 ENCOUNTER — OFFICE VISIT (OUTPATIENT)
Dept: URGENT CARE | Facility: URGENT CARE | Age: 60
End: 2020-11-30
Payer: COMMERCIAL

## 2020-11-30 ENCOUNTER — TELEPHONE (OUTPATIENT)
Dept: ANTICOAGULATION | Facility: CLINIC | Age: 60
End: 2020-11-30

## 2020-11-30 ENCOUNTER — ANCILLARY PROCEDURE (OUTPATIENT)
Dept: ULTRASOUND IMAGING | Facility: CLINIC | Age: 60
End: 2020-11-30
Attending: STUDENT IN AN ORGANIZED HEALTH CARE EDUCATION/TRAINING PROGRAM
Payer: COMMERCIAL

## 2020-11-30 ENCOUNTER — APPOINTMENT (OUTPATIENT)
Dept: GENERAL RADIOLOGY | Facility: CLINIC | Age: 60
End: 2020-11-30
Attending: STUDENT IN AN ORGANIZED HEALTH CARE EDUCATION/TRAINING PROGRAM
Payer: COMMERCIAL

## 2020-11-30 ENCOUNTER — ANTICOAGULATION THERAPY VISIT (OUTPATIENT)
Dept: ANTICOAGULATION | Facility: CLINIC | Age: 60
End: 2020-11-30

## 2020-11-30 ENCOUNTER — APPOINTMENT (OUTPATIENT)
Dept: CT IMAGING | Facility: CLINIC | Age: 60
End: 2020-11-30
Attending: STUDENT IN AN ORGANIZED HEALTH CARE EDUCATION/TRAINING PROGRAM
Payer: COMMERCIAL

## 2020-11-30 VITALS
DIASTOLIC BLOOD PRESSURE: 82 MMHG | HEART RATE: 68 BPM | RESPIRATION RATE: 18 BRPM | TEMPERATURE: 101.1 F | OXYGEN SATURATION: 95 % | BODY MASS INDEX: 30.24 KG/M2 | WEIGHT: 223 LBS | SYSTOLIC BLOOD PRESSURE: 125 MMHG

## 2020-11-30 VITALS
WEIGHT: 210 LBS | TEMPERATURE: 99.6 F | OXYGEN SATURATION: 97 % | SYSTOLIC BLOOD PRESSURE: 131 MMHG | HEART RATE: 85 BPM | BODY MASS INDEX: 28.48 KG/M2 | DIASTOLIC BLOOD PRESSURE: 81 MMHG

## 2020-11-30 DIAGNOSIS — J18.9 PNEUMONIA OF LEFT LOWER LOBE DUE TO INFECTIOUS ORGANISM: ICD-10-CM

## 2020-11-30 DIAGNOSIS — Z20.822 SUSPECTED 2019 NOVEL CORONAVIRUS INFECTION: ICD-10-CM

## 2020-11-30 DIAGNOSIS — Z79.01 LONG TERM CURRENT USE OF ANTICOAGULANT THERAPY: ICD-10-CM

## 2020-11-30 DIAGNOSIS — R06.02 SHORTNESS OF BREATH: ICD-10-CM

## 2020-11-30 DIAGNOSIS — I26.99 PULMONARY EMBOLI (H): ICD-10-CM

## 2020-11-30 DIAGNOSIS — R07.9 ACUTE CHEST PAIN: Primary | ICD-10-CM

## 2020-11-30 LAB
ALBUMIN SERPL-MCNC: 3.4 G/DL (ref 3.4–5)
ALP SERPL-CCNC: 50 U/L (ref 40–150)
ALT SERPL W P-5'-P-CCNC: 37 U/L (ref 0–70)
ANION GAP SERPL CALCULATED.3IONS-SCNC: 4 MMOL/L (ref 3–14)
AST SERPL W P-5'-P-CCNC: 22 U/L (ref 0–45)
BASOPHILS # BLD AUTO: 0.1 10E9/L (ref 0–0.2)
BASOPHILS NFR BLD AUTO: 0.5 %
BILIRUB SERPL-MCNC: 0.7 MG/DL (ref 0.2–1.3)
BUN SERPL-MCNC: 13 MG/DL (ref 7–30)
CALCIUM SERPL-MCNC: 8.5 MG/DL (ref 8.5–10.1)
CHLORIDE SERPL-SCNC: 110 MMOL/L (ref 94–109)
CHOLEST SERPL-MCNC: 83 MG/DL
CO2 SERPL-SCNC: 26 MMOL/L (ref 20–32)
CREAT SERPL-MCNC: 0.94 MG/DL (ref 0.66–1.25)
DIFFERENTIAL METHOD BLD: NORMAL
EOSINOPHIL # BLD AUTO: 0.2 10E9/L (ref 0–0.7)
EOSINOPHIL NFR BLD AUTO: 2.3 %
ERYTHROCYTE [DISTWIDTH] IN BLOOD BY AUTOMATED COUNT: 13 % (ref 10–15)
GFR SERPL CREATININE-BSD FRML MDRD: 88 ML/MIN/{1.73_M2}
GLUCOSE SERPL-MCNC: 106 MG/DL (ref 70–99)
HCT VFR BLD AUTO: 41 % (ref 40–53)
HDLC SERPL-MCNC: 44 MG/DL
HGB BLD-MCNC: 13.5 G/DL (ref 13.3–17.7)
IMM GRANULOCYTES # BLD: 0 10E9/L (ref 0–0.4)
IMM GRANULOCYTES NFR BLD: 0.2 %
INR PPP: 1.08 (ref 0.86–1.14)
INTERPRETATION ECG - MUSE: NORMAL
LACTATE BLD-SCNC: 1.5 MMOL/L (ref 0.7–2)
LDLC SERPL CALC-MCNC: 19 MG/DL
LYMPHOCYTES # BLD AUTO: 0.8 10E9/L (ref 0.8–5.3)
LYMPHOCYTES NFR BLD AUTO: 8.2 %
MCH RBC QN AUTO: 29.1 PG (ref 26.5–33)
MCHC RBC AUTO-ENTMCNC: 32.9 G/DL (ref 31.5–36.5)
MCV RBC AUTO: 88 FL (ref 78–100)
MONOCYTES # BLD AUTO: 0.7 10E9/L (ref 0–1.3)
MONOCYTES NFR BLD AUTO: 6.9 %
NEUTROPHILS # BLD AUTO: 7.7 10E9/L (ref 1.6–8.3)
NEUTROPHILS NFR BLD AUTO: 81.9 %
NONHDLC SERPL-MCNC: 39 MG/DL
NRBC # BLD AUTO: 0 10*3/UL
NRBC BLD AUTO-RTO: 0 /100
PLATELET # BLD AUTO: 185 10E9/L (ref 150–450)
POTASSIUM SERPL-SCNC: 3.5 MMOL/L (ref 3.4–5.3)
PROT SERPL-MCNC: 6.8 G/DL (ref 6.8–8.8)
RBC # BLD AUTO: 4.64 10E12/L (ref 4.4–5.9)
SODIUM SERPL-SCNC: 139 MMOL/L (ref 133–144)
TRIGL SERPL-MCNC: 99 MG/DL
TROPONIN I SERPL-MCNC: <0.015 UG/L (ref 0–0.04)
WBC # BLD AUTO: 9.4 10E9/L (ref 4–11)

## 2020-11-30 PROCEDURE — 83605 ASSAY OF LACTIC ACID: CPT | Performed by: STUDENT IN AN ORGANIZED HEALTH CARE EDUCATION/TRAINING PROGRAM

## 2020-11-30 PROCEDURE — 71275 CT ANGIOGRAPHY CHEST: CPT | Mod: 26 | Performed by: RADIOLOGY

## 2020-11-30 PROCEDURE — 96374 THER/PROPH/DIAG INJ IV PUSH: CPT | Mod: 59 | Performed by: STUDENT IN AN ORGANIZED HEALTH CARE EDUCATION/TRAINING PROGRAM

## 2020-11-30 PROCEDURE — 93308 TTE F-UP OR LMTD: CPT | Performed by: STUDENT IN AN ORGANIZED HEALTH CARE EDUCATION/TRAINING PROGRAM

## 2020-11-30 PROCEDURE — 99214 OFFICE O/P EST MOD 30 MIN: CPT | Performed by: PHYSICIAN ASSISTANT

## 2020-11-30 PROCEDURE — 93010 ELECTROCARDIOGRAM REPORT: CPT | Mod: 59 | Performed by: STUDENT IN AN ORGANIZED HEALTH CARE EDUCATION/TRAINING PROGRAM

## 2020-11-30 PROCEDURE — 250N000011 HC RX IP 250 OP 636: Performed by: STUDENT IN AN ORGANIZED HEALTH CARE EDUCATION/TRAINING PROGRAM

## 2020-11-30 PROCEDURE — 99285 EMERGENCY DEPT VISIT HI MDM: CPT | Mod: 25 | Performed by: STUDENT IN AN ORGANIZED HEALTH CARE EDUCATION/TRAINING PROGRAM

## 2020-11-30 PROCEDURE — 80061 LIPID PANEL: CPT | Performed by: STUDENT IN AN ORGANIZED HEALTH CARE EDUCATION/TRAINING PROGRAM

## 2020-11-30 PROCEDURE — 258N000003 HC RX IP 258 OP 636: Performed by: STUDENT IN AN ORGANIZED HEALTH CARE EDUCATION/TRAINING PROGRAM

## 2020-11-30 PROCEDURE — 93005 ELECTROCARDIOGRAM TRACING: CPT | Mod: 59 | Performed by: STUDENT IN AN ORGANIZED HEALTH CARE EDUCATION/TRAINING PROGRAM

## 2020-11-30 PROCEDURE — U0003 INFECTIOUS AGENT DETECTION BY NUCLEIC ACID (DNA OR RNA); SEVERE ACUTE RESPIRATORY SYNDROME CORONAVIRUS 2 (SARS-COV-2) (CORONAVIRUS DISEASE [COVID-19]), AMPLIFIED PROBE TECHNIQUE, MAKING USE OF HIGH THROUGHPUT TECHNOLOGIES AS DESCRIBED BY CMS-2020-01-R: HCPCS | Performed by: PHYSICIAN ASSISTANT

## 2020-11-30 PROCEDURE — 71045 X-RAY EXAM CHEST 1 VIEW: CPT | Mod: 26 | Performed by: RADIOLOGY

## 2020-11-30 PROCEDURE — 93308 TTE F-UP OR LMTD: CPT | Mod: 26 | Performed by: STUDENT IN AN ORGANIZED HEALTH CARE EDUCATION/TRAINING PROGRAM

## 2020-11-30 PROCEDURE — 85610 PROTHROMBIN TIME: CPT | Performed by: STUDENT IN AN ORGANIZED HEALTH CARE EDUCATION/TRAINING PROGRAM

## 2020-11-30 PROCEDURE — 85025 COMPLETE CBC W/AUTO DIFF WBC: CPT | Performed by: STUDENT IN AN ORGANIZED HEALTH CARE EDUCATION/TRAINING PROGRAM

## 2020-11-30 PROCEDURE — 96361 HYDRATE IV INFUSION ADD-ON: CPT | Performed by: STUDENT IN AN ORGANIZED HEALTH CARE EDUCATION/TRAINING PROGRAM

## 2020-11-30 PROCEDURE — 250N000009 HC RX 250

## 2020-11-30 PROCEDURE — 93000 ELECTROCARDIOGRAM COMPLETE: CPT | Performed by: PHYSICIAN ASSISTANT

## 2020-11-30 PROCEDURE — 71045 X-RAY EXAM CHEST 1 VIEW: CPT

## 2020-11-30 PROCEDURE — 84484 ASSAY OF TROPONIN QUANT: CPT | Performed by: STUDENT IN AN ORGANIZED HEALTH CARE EDUCATION/TRAINING PROGRAM

## 2020-11-30 PROCEDURE — 80053 COMPREHEN METABOLIC PANEL: CPT | Performed by: STUDENT IN AN ORGANIZED HEALTH CARE EDUCATION/TRAINING PROGRAM

## 2020-11-30 PROCEDURE — 250N000013 HC RX MED GY IP 250 OP 250 PS 637: Performed by: STUDENT IN AN ORGANIZED HEALTH CARE EDUCATION/TRAINING PROGRAM

## 2020-11-30 PROCEDURE — 71275 CT ANGIOGRAPHY CHEST: CPT

## 2020-11-30 RX ORDER — OXYCODONE AND ACETAMINOPHEN 5; 325 MG/1; MG/1
1-2 TABLET ORAL EVERY 4 HOURS PRN
Qty: 12 TABLET | Refills: 0 | Status: SHIPPED | OUTPATIENT
Start: 2020-11-30 | End: 2021-05-10

## 2020-11-30 RX ORDER — IOPAMIDOL 755 MG/ML
72 INJECTION, SOLUTION INTRAVASCULAR ONCE
Status: COMPLETED | OUTPATIENT
Start: 2020-11-30 | End: 2020-11-30

## 2020-11-30 RX ORDER — HYDROMORPHONE HYDROCHLORIDE 1 MG/ML
0.5 INJECTION, SOLUTION INTRAMUSCULAR; INTRAVENOUS; SUBCUTANEOUS ONCE
Status: COMPLETED | OUTPATIENT
Start: 2020-11-30 | End: 2020-11-30

## 2020-11-30 RX ORDER — AMOXICILLIN 500 MG/1
1000 TABLET, FILM COATED ORAL 3 TIMES DAILY
Qty: 30 TABLET | Refills: 0 | Status: SHIPPED | OUTPATIENT
Start: 2020-11-30 | End: 2020-12-05

## 2020-11-30 RX ORDER — AZITHROMYCIN 500 MG/1
500 TABLET, FILM COATED ORAL DAILY
Qty: 3 TABLET | Refills: 0 | Status: SHIPPED | OUTPATIENT
Start: 2020-11-30 | End: 2021-05-10

## 2020-11-30 RX ORDER — ACETAMINOPHEN 325 MG/1
650 TABLET ORAL EVERY 4 HOURS PRN
Status: DISCONTINUED | OUTPATIENT
Start: 2020-11-30 | End: 2020-11-30 | Stop reason: HOSPADM

## 2020-11-30 RX ADMIN — HYDROMORPHONE HYDROCHLORIDE 0.5 MG: 1 INJECTION, SOLUTION INTRAMUSCULAR; INTRAVENOUS; SUBCUTANEOUS at 12:19

## 2020-11-30 RX ADMIN — LIDOCAINE HYDROCHLORIDE 0.2 ML: 10 INJECTION, SOLUTION EPIDURAL; INFILTRATION; INTRACAUDAL; PERINEURAL at 13:09

## 2020-11-30 RX ADMIN — SODIUM CHLORIDE, POTASSIUM CHLORIDE, SODIUM LACTATE AND CALCIUM CHLORIDE 1000 ML: 600; 310; 30; 20 INJECTION, SOLUTION INTRAVENOUS at 12:02

## 2020-11-30 RX ADMIN — ACETAMINOPHEN 650 MG: 325 TABLET, FILM COATED ORAL at 12:19

## 2020-11-30 RX ADMIN — IOPAMIDOL 72 ML: 755 INJECTION, SOLUTION INTRAVENOUS at 13:59

## 2020-11-30 NOTE — DISCHARGE INSTRUCTIONS
You were seen here today for your viral symptoms.  It looks like you have a left-sided pneumonia like we talked about.  Please take your antibiotics as prescribed and follow-up with your primary care provider.  Take percocet as needed for your pain. Also follow-up with your INR clinic as we talked about.  Return to the emergency department if you have any worsening chest pain, shortness of breath or inability to eat and drink.

## 2020-11-30 NOTE — ED TRIAGE NOTES
60 yr old male BIBA from [facility] with intermittent chest pain since this morning @ 0800. Clinic noticed t wave inversion. Pt reports he felt this way in January and needed to have a STENT placed. #20 LAC. Also reports fever, weakness, cough, and headache. VSS en route. No known covid exposure. Tmax 102 @ home.

## 2020-11-30 NOTE — PROGRESS NOTES
"CHIEF COMPLAINT:   Chief Complaint   Patient presents with     Urgent Care     URI     102.5 temp, dizziness, headache, chest pain on left side x started this am. Pt says he felt fine yesterday. No known expsoure to COVID. Pt had COVID spit test which was negative about 2 weeks ago.        HPI: Oswaldo Prabhakar is a 60 year old male with a history of CABG in 2014, NIR for unstable angina (1/2020),  who presents to clinic today for evaluation of chest pain. Patient reports that pain started this morning. It is on the left side of chest. Reports that the pain not severe, but this is what \"it felt like the last time: he had NIR placed. Chest feels like pressure, it is not severe. He rates it as a 2/10. Additionally, he is having viral symptoms, fever, chills, weakness, SOB, cough (non productive), nausea and dizziness.   Has no known + COVID exposures.         Past Medical History:   Diagnosis Date     Antiplatelet or antithrombotic long-term use      Coronary artery disease      Diaphragmatic hernia without mention of obstruction or gangrene      Heart disease      Hernia, abdominal      History of blood transfusion      History of thrombophlebitis      Hypertension      Nephrolithiasis 4/2010     Other and unspecified hyperlipidemia      Pulmonary embolus and infarction (H)     s/p hemothorax and filter s/p filter removal (2011), now on long term anti-coagulation     Statin intolerance 2/1/2017     Stented coronary artery 01/07/2020    NIR mid LAD     Unspecified retinal detachment     Childhood trauma, unrepaired     Past Surgical History:   Procedure Laterality Date     BYPASS GRAFT ARTERY CORONARY  4/4/2014    Procedure: BYPASS GRAFT ARTERY CORONARY;  Median Sternotomy, Coronary Artery Bypass Bypass x 4 using Left Internal Mammary, Left Saphenous Vein, on pump oxygenation;  Surgeon: Sherry Armando MD;  Location: [facility] OR     CLOSED REDUCTION, PERCUTANEOUS PINNING  HAND, COMBINED Left 11/5/2015    Procedure: " COMBINED CLOSED REDUCTION, PERCUTANEOUS PINNING HAND;  Surgeon: Carmella Love MD;  Location: [facility]     COLONOSCOPY  3/15/2013    Procedure: COLONOSCOPY;;  Surgeon: Racheal Shipman MD;  Location: [facility] GI     COMBINED CYSTOSCOPY, INSERT STENT URETER(S) Right 1/10/2020    Procedure: Cystoscopy, right retrograde pyelogram, interpretation of fluoroscopic images, placement of 6 x 26 double-J ureteral stent;  Surgeon: Nguyễn Woodard MD;  Location: [facility] OR     COMBINED CYSTOSCOPY, RETROGRADES, URETEROSCOPY, LASER HOLMIUM LITHOTRIPSY URETER(S), INSERT STENT Right 2/5/2020    Procedure: Cystoscopy, right ureteral stent exchange, right ureteroscopy with holmium lithotripsy and stone basketing, right retrograde pyelogram, interpretation of fluoroscopic images.;  Surgeon: Nguyễn Woodard MD;  Location: [facility] OR     CV ANGIOGRAM CORONARY GRAFT N/A 1/7/2020    Procedure: Angiogram Coronary Graft;  Surgeon: Chato Odonnell MD;  Location: [facility]     CV CORONARY ANGIOGRAM  1/7/2020    Procedure: CV CORONARY ANGIOGRAM;  Surgeon: Chato Odonnell MD;  Location:  HEART CARDIAC CATH LAB     CV PCI STENT DRUG ELUTING N/A 1/7/2020    Procedure: PCI Stent Drug Eluting;  Surgeon: Chato Odonnell MD;  Location: [facility]     CYSTOSCOPY       LAPAROSCOPIC CHOLECYSTECTOMY N/A 8/6/2018    Procedure: LAPAROSCOPIC CHOLECYSTECTOMY;  LAPAROSCOPIC CHOLECYSTECTOMY ;  Surgeon: José Miguel Stuart MD;  Location: [facility] OR     LITHOTRIPSY  4/2010     THORACIC SURGERY      lung surgery, thoracotomy, lung adhesion     ZZC NONSPECIFIC PROCEDURE      Spermatocele resection     Social History     Tobacco Use     Smoking status: Never Smoker     Smokeless tobacco: Never Used   Substance Use Topics     Alcohol use: No     Comment: very rare     Current Outpatient Medications   Medication     atorvastatin (LIPITOR) 80 MG tablet     calcium carbonate (TUMS)  500 MG chewable tablet     Cholecalciferol (VITAMIN D) 2000 UNITS CAPS     DiphenhydrAMINE HCl (BENADRYL PO)     evolocumab (REPATHA SURECLICK) 140 MG/ML prefilled autoinjector     ezetimibe (ZETIA) 10 MG tablet     fenofibrate (TRIGLIDE/LOFIBRA) 160 MG tablet     lisinopril (PRINIVIL/ZESTRIL) 2.5 MG tablet     metoprolol tartrate (LOPRESSOR) 25 MG tablet     OMEPRAZOLE PO     ondansetron (ZOFRAN) 4 MG tablet     ticagrelor (BRILINTA) 90 MG tablet     warfarin ANTICOAGULANT (COUMADIN) 5 MG tablet     tamsulosin (FLOMAX) 0.4 MG capsule     No current facility-administered medications for this visit.      Allergies   Allergen Reactions     Cats      Hay Fever & [A.R.M.]      Heparin Other (See Comments)     Heparin resistance per cardio-thoracic surgeon Dr. Armando     Hydrocodone-Acetaminophen Nausea and Vomiting     Acetaminophen is ok       10 point ROS of systems including Constitutional, Eyes, Respiratory, Cardiovascular, Gastroenterology, Genitourinary, Integumentary, Muscularskeletal, Psychiatric were all negative except for pertinent positives noted in my HPI.        Exam:  /81   Pulse 85   Temp 99.6  F (37.6  C) (Tympanic)   Wt 95.3 kg (210 lb)   SpO2 97%   BMI 28.48 kg/m    Constitutional: alert and no distress  Head: Normocephalic, atraumatic.  Eyes: conjunctiva clear, no drainage  ENT: TMs clear and shiny julieth, nasal mucosa pink and moist, throat without tonsillar hypertrophy or erythema  Neck: neck is supple, no cervical lymphadenopathy or nuchal rigidity  Cardiovascular: RRR  Respiratory: CTA bilaterally, no rhonchi or rales  Gastrointestinal: soft and nontender  Skin: no rashes  Neurologic: Speech clear, gait normal. Moves all extremities.    EKG -- T wave inversion in several leads, has had this with previous EKG    ASSESSMENT/PLAN:  1. Acute chest pain  - EKG 12-lead complete w/read - Clinics    2. Suspected 2019 novel coronavirus infection  - Symptomatic COVID-19 Virus (Coronavirus) by  PCR    Patient with significant cardiac history including CABG, and NIR placed 1/20 presenting to the clinic with chest pain, reports that it feels the same when he had this in the past with acute MI. Only have EKG from previous hospitalization in jan to compare it to, looks unchanged.   He drove to the clinic, so will have EMS transport to hospital.     Wife [phone]    Cele Montoya PA-C

## 2020-11-30 NOTE — ED PROVIDER NOTES
ED Provider Note    History     Chief Complaint   Patient presents with     Chest Pain     Fever     HPI  Oswaldo Prabhakar is a 60 year old male with PMH notable for CABG in 2014, PCI to LAD in January 2020, nephrolithiasis, hypertension, unprovoked PE on warfarin who presents to the ED with fever and chest pain.    He says he woke up this morning and started to have symptoms of fever, cough, shortness of breath and intermittent chest pain.  Yesterday he did not have any in his symptoms although he said that he felt like overnight his cough got a lot worse.  He was in the hospital last Thursday due to the hospitalization of his father.  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  He has not tested positive for coronavirus.  His wife has breast cancer at home but she does not have any symptoms of viral infection.    He says that he has also had a mild runny nose but denies any vomiting, nausea, diarrhea.  He has no abdominal pain and has been eating and drinking normally but his last meal was last night.  He drank some tea this morning.  Patient has no urinary symptoms today.    In regards to his chest pain he says that this is similar to the  episodes he was having in January of this year after he was diagnosed with unstable angina and had an LAD stent placed.  He says the episodes last from 2 to 4 minutes and have occurred approximately 3-4 times today with spontaneous resolution.  They are not associated with exertion, diaphoresis, nausea, diarrhea.  Nothing seems to make it better or worse.  He has not tried any medications for today.    He went to clinic this morning due to his viral symptoms and was directed to the emergency department due to his chest pain.    Past Medical History  Past Medical History:   Diagnosis Date     Antiplatelet or antithrombotic long-term use      Coronary artery disease      Diaphragmatic hernia without mention  of obstruction or gangrene      Heart disease      Hernia, abdominal      History of blood transfusion      History of thrombophlebitis      Hypertension      Nephrolithiasis 4/2010     Other and unspecified hyperlipidemia      Pulmonary embolus and infarction (H)     s/p hemothorax and filter s/p filter removal (2011), now on long term anti-coagulation     Statin intolerance 2/1/2017     Stented coronary artery 01/07/2020    NIR mid LAD     Unspecified retinal detachment     Childhood trauma, unrepaired     Past Surgical History:   Procedure Laterality Date     BYPASS GRAFT ARTERY CORONARY  4/4/2014    Procedure: BYPASS GRAFT ARTERY CORONARY;  Median Sternotomy, Coronary Artery Bypass Bypass x 4 using Left Internal Mammary, Left Saphenous Vein, on pump oxygenation;  Surgeon: Sherry Armando MD;  [facility]     CLOSED REDUCTION, PERCUTANEOUS PINNING  HAND, COMBINED Left 11/5/2015    Procedure: COMBINED CLOSED REDUCTION, PERCUTANEOUS PINNING HAND;  Surgeon: Carmella Love MD;  [facility]     COLONOSCOPY  3/15/2013    Procedure: COLONOSCOPY;;  Surgeon: Racheal Shipman MD;  Location: [facility]     COMBINED CYSTOSCOPY, INSERT STENT URETER(S) Right 1/10/2020    Procedure: Cystoscopy, right retrograde pyelogram, interpretation of fluoroscopic images, placement of 6 x 26 double-J ureteral stent;  Surgeon: Nguyễn Woodard MD;  Location: [facility]     COMBINED CYSTOSCOPY, RETROGRADES, URETEROSCOPY, LASER HOLMIUM LITHOTRIPSY URETER(S), INSERT STENT Right 2/5/2020    Procedure: Cystoscopy, right ureteral stent exchange, right ureteroscopy with holmium lithotripsy and stone basketing, right retrograde pyelogram, interpretation of fluoroscopic images.;  Surgeon: Nguyễn Woodard MD;  Location: [facility] OR     CV ANGIOGRAM CORONARY GRAFT N/A 1/7/2020    Procedure: Angiogram Coronary Graft;  Surgeon: Chato Odonnell MD;  [facility]     CV CORONARY ANGIOGRAM   1/7/2020    Procedure: CV CORONARY ANGIOGRAM;  Surgeon: Chato Odonnell MD;  Location:  [facility]     CV PCI STENT DRUG ELUTING N/A 1/7/2020    Procedure: PCI Stent Drug Eluting;  Surgeon: Chato Odonnell MD;  Location:  [facility]     CYSTOSCOPY       LAPAROSCOPIC CHOLECYSTECTOMY N/A 8/6/2018    Procedure: LAPAROSCOPIC CHOLECYSTECTOMY;  LAPAROSCOPIC CHOLECYSTECTOMY ;  Surgeon: José Miguel Stuart MD;  Location: [facility]     LITHOTRIPSY  4/2010     THORACIC SURGERY      lung surgery, thoracotomy, lung adhesion     ZZC NONSPECIFIC PROCEDURE      Spermatocele resection          atorvastatin (LIPITOR) 80 MG tablet       Cholecalciferol (VITAMIN D) 2000 UNITS CAPS       ezetimibe (ZETIA) 10 MG tablet       fenofibrate (TRIGLIDE/LOFIBRA) 160 MG tablet       lisinopril (PRINIVIL/ZESTRIL) 2.5 MG tablet       metoprolol tartrate (LOPRESSOR) 25 MG tablet       OMEPRAZOLE PO       ticagrelor (BRILINTA) 90 MG tablet       warfarin ANTICOAGULANT (COUMADIN) 5 MG tablet       calcium carbonate (TUMS) 500 MG chewable tablet       DiphenhydrAMINE HCl (BENADRYL PO)       evolocumab (REPATHA SURECLICK) 140 MG/ML prefilled autoinjector       ondansetron (ZOFRAN) 4 MG tablet       tamsulosin (FLOMAX) 0.4 MG capsule      Allergies   Allergen Reactions     Cats      Hay Fever & [A.R.M.]      Heparin Other (See Comments)     Heparin resistance per cardio-thoracic surgeon Dr. Armando     Hydrocodone-Acetaminophen Nausea and Vomiting     Acetaminophen is ok     Past medical history, past surgical history, medications, and allergies were reviewed with the patient. Additional pertinent items: None    Family History  Family History   Problem Relation Age of Onset     Heart Disease Mother      C.A.D. Father         3 vessel CABG, age 70     Cancer Father         bladder cancer     C.A.D. Paternal Grandmother      Hypertension Paternal Grandmother      Alzheimer Disease Paternal Grandmother       Diabetes No family hx of      Thyroid Disease No family hx of      Cancer - colorectal No family hx of      Family history was reviewed with the patient. Additional pertinent items: None    Social History  Social History     Tobacco Use     Smoking status: Never Smoker     Smokeless tobacco: Never Used   Substance Use Topics     Alcohol use: No     Comment: very rare     Drug use: No      Social history was reviewed with the patient. Additional pertinent items: None    Review of Systems  A complete review of systems was performed with pertinent positives and negatives noted in the HPI, and all other systems negative.     Physical Exam   BP: (!) 142/92  Pulse: 83  Temp: 101.4  F (38.6  C)  Resp: 18  Weight: 101.2 kg (223 lb)  SpO2: 96 %    Physical Exam  General: no acute distress. Appears stated age.   HENT: MMM, no oropharyngeal lesions  Eyes: PERRL, normal sclerae  Neck: non-tender, supple  Cardio: reg rate. Extremities well perfused  Resp: Normal work of breathing  Chest/Back: no visual signs of trauma, no CVA tenderness  Abdomen: no tenderness, non-distended, no rebound, no guarding  Neuro: alert and fully oriented. CN II-XII grossly intact. Grossly normal strength and sensation in all extremities.   MSK: no deformities.   Integumentary/Skin: no rash visualized, normal color  Psych: normal affect, normal behavior    ED Course       Assessed patient at 11AM  Ordered labs, XR, POCUS  Reassessed patient and unchanged  CXR showing atelectasis, pleural effusion  CT showing Left pneumonia  Rx abx and discharged with outpatient treatment of PNA    Procedures  Results for orders placed during the hospital encounter of 11/30/20   POC US ECHO LIMITED    Impression Limited Bedside Cardiac Ultrasound, performed and interpreted by me.   Indication: Chest Pain.  Parasternal long axis and parasternal short axis views were acquired.   Image quality was satisfactory.    Findings:    Global left ventricular function appears  intact.  There is no evidence of free fluid within the pericardium.    IMPRESSION: Grossly normal left ventricular function.  No pericardial effusion..               EKG Interpretation:      Interpreted by Reji Ye MD  Time reviewed: 1055  Symptoms at time of EKG: SOB   Rhythm: normal sinus   Rate: Normal  Axis: Normal  Ectopy: none  Conduction: normal  ST Segments/ T Waves: T wave inversion V1, V2, V3, V4 and V5  Q Waves: III and aVf  Comparison to prior: Similar, with extension of TWI into lateral leads more than prior    Clinical Impression: non-specific EKG     Labs Ordered and Resulted from Time of ED Arrival Up to the Time of Departure from the ED   COMPREHENSIVE METABOLIC PANEL - Abnormal; Notable for the following components:       Result Value    Chloride 110 (*)     Glucose 106 (*)     All other components within normal limits   LACTIC ACID WHOLE BLOOD   TROPONIN I   CBC WITH PLATELETS DIFFERENTIAL   INR   LIPID PROFILE     CT Chest Pulmonary Embolism w Contrast   Final Result   IMPRESSION:   1. No evidence of pulmonary embolus.   2. Left lower lobe and lingular patchy infiltrates which may represent   inflammation and/or infection.   3. CABG.   4. Granuloma in right upper lobe.   5. Hiatal hernia.   6. Probable left renal cyst.   7. Cholecystectomy.   8. Superior endplate compression deformity of T12 vertebra. Likely   from osteopenia.      CARMELO ART MD      XR Chest Port 1 View   Final Result   IMPRESSION: Mixed lingular and left lung opacities concerning for   atelectasis or infection with superimposed pleural effusion. No acute   airspace disease in the remaining lungs.      I have personally reviewed the examination and initial interpretation   and I agree with the findings.      MONIE PUTNAM MD      POC US ECHO LIMITED   Final Result   Limited Bedside Cardiac Ultrasound, performed and interpreted by me.    Indication: Chest Pain.   Parasternal long axis and parasternal short axis views  were acquired.    Image quality was satisfactory.      Findings:     Global left ventricular function appears intact.   There is no evidence of free fluid within the pericardium.      IMPRESSION: Grossly normal left ventricular function.  No pericardial effusion..           Assessments & Plan (with Medical Decision Making)     Oswaldo Prabhakar is a 60 year old male with history of CABG, PCI, hypertension, nephrolithiasis, pulmonary embolism who presents today with viral symptoms and intermittent atypical chest pain.    Considered ACS but unlikely given atypical nature of these symptoms as well as negative troponin. He had no recurrence of CP in the ED. He recently had a PCI done on the LAD and has been compliant with his medications. His INR is low today and given his CXR which read pleural effusion in the setting of a prior left sided hemorrhage resulting in thoracotomy in 2011, I ordered a CTPE which did not show PE but did show a left lower lobar pneumonia (fever and cough) without evidence of parapneumonic effusion. He is febrile and has been tested for COVID which is pending.     Advised him to quarantine from wife until COVID test is back.  I did speak to him regarding the chronic findings on CT including left renal cyst, right granuloma, and T12 endplate deformity - likely chronic as he told me he fell 2 years ago on his back and had severe pain then.     Given his pulmonary findings, I think that he has an alternate explanation for his chest pain that makes acute coronary syndrome much less likely. He is otherwise very well appearing with normal vitals and his PSI score is 58, only due to his age which makes him low risk for pneumonia complication.    Although he works at the [employer] his contact with the hospital system does not seem significant enough and I would categorize him as community acquired pneumonia and will treat with amoxicillin 1g TID for 5 days, and azithromycin 500mg daily for 3 days. Also  rx a small course of percocet to help with pain.    Regarding his low INR, advised him to follow up with INR clinic and he has the ability to do that, thankfully no PE today.     RTED precautions given.     Impression:  1. Shortness of breath  2. Fever  3. Left pulmonary infiltrate  4. Atypical chest pain    Reji Ye MD  Emergency Medicine     Reji Ye MD  11/30/20 1500       Reji Ye MD  11/30/20 9041

## 2020-11-30 NOTE — TELEPHONE ENCOUNTER
Anticoagulation Management    Discussed INR home monitoring program with Oswaldo Prabhakar reviewing:      Elibigility requirements: >= 3 months of anticoagulation therapy, indication for chronic anticoagulation and order from provider    Required testing frequency (q1-2 weeks)    Home meters, testing supplies, meter training, and reporting of INR results done through an outside company. Patient would be contacted by home monitoring company to review insurance coverage with home monitoring company prior to enrolling.    [facility] would continue to receive and manage INR results.    Home monitoring application may take several weeks and must continue to follow up with recommended INR monitoring in clinic until receives monitor and training completed.     Home monitoring terms reviewed with patient      Patient agrees to frequency of testing as directed by referring provider ( weekly or biweekly) Yes    Testing to be performed during business hours of [facility] Yes    Patient agrees they have the skill ( or a designated caregiver) necessary to perform the self test Yes    Patient agrees to report all INR results to INR home monitoring company Yes    Patient agrees to have additional INR test in clinic if a home result is critical Yes    Patient agrees to schedule an INR test at a [facility] clinic yearly for technique observation and quality check of INR results with their home meter Yes    Patient agrees to use a [facility] approved service provider and device for home monitoring Yes    [facility]    Referring provider: Dr. Samm Vazquez    Referring providers Clinic Fax number [phone]    Oswaldo Prabhakar is interested home INR monitoring and requests order be submitted.

## 2020-11-30 NOTE — ED AVS SNAPSHOT
[facility] Emergency Department  [address] [address]  Phone: [phone]                                    Oswaldo Prabhakar   MRN: [id number]    Department: [facility] Emergency Department   Date of Visit: 11/30/2020           After Visit Summary Signature Page    I have received my discharge instructions, and my questions have been answered. I have discussed any challenges I see with this plan with the nurse or doctor.    ..........................................................................................................................................  Patient/Patient Representative Signature      ..........................................................................................................................................  Patient Representative Print Name and Relationship to Patient    ..................................................               ................................................  Date                                   Time    ..........................................................................................................................................  Reviewed by Signature/Title    ...................................................              ..............................................  Date                                               Time          22EPIC Rev 08/18

## 2020-11-30 NOTE — PROGRESS NOTES
ANTICOAGULATION FOLLOW-UP CLINIC VISIT    Patient Name:  Oswaldo Prabhakar  Date:  2020  Contact Type:  Telephone    SUBJECTIVE:  Patient Findings     Positives:  Change in health, Missed doses, Change in medications    Comments:  Pt reports he missed some doses, but can't tell us specific days. Pt is going through a tough time with spouse going through breast cancer. Pt in the ED for chest pain/fever. Pt is diagnosed with Pneumonia and no PE. Pt is taking Zithromx 500mg for 3 days and Amoxicillin 2 Tabs TID for 5 days. Consulted with Blanche Calvo RPH and will do a boost today and put pt back on maintenance dose. Pt is interested in a home monitoring machine and will put in orders for pt to obtain this.         Clinical Outcomes     Comments:  Pt reports he missed some doses, but can't tell us specific days. Pt is going through a tough time with spouse going through breast cancer. Pt in the ED for chest pain/fever. Pt is diagnosed with Pneumonia and no PE. Pt is taking Zithromx 500mg for 3 days and Amoxicillin 2 Tabs TID for 5 days. Consulted with Blanche Calvo RPH and will do a boost today and put pt back on maintenance dose. Pt is interested in a home monitoring machine and will put in orders for pt to obtain this.            OBJECTIVE    Recent labs: (last 7 days)     20  1109   INR 1.08       ASSESSMENT / PLAN  INR assessment SUB    Recheck INR In: 4 DAYS    INR Location Clinic      Anticoagulation Summary  As of 2020    INR goal:  2.0-3.0   TTR:  36.2 % (7.2 mo)   INR used for dosin.08 (2020)   Warfarin maintenance plan:  7.5 mg (5 mg x 1.5) every Mon, Wed, Fri; 5 mg (5 mg x 1) all other days   Full warfarin instructions:  : 10 mg; Otherwise 7.5 mg every Mon, Wed, Fri; 5 mg all other days   Weekly warfarin total:  42.5 mg   Plan last modified:  Nano Greer RN (2020)   Next INR check:  2020   Priority:  Critical   Target end date:  Indefinite    Indications     Pulmonary emboli (H) [I26.99]  Long-term (current) use of anticoagulants [Z79.01] [Z79.01]             Anticoagulation Episode Summary     INR check location:      Preferred lab:      Send INR reminders to:  [facility]    Comments:  HIPPA INFO OK to speak with wife Josselyn MICHELLE to leave messages on Home.work and cell phones  use cell phone #[phone] ++++1/8/2020 ASA 81mg DAILY AND STOP ONCE INR >2.0++++  11/30/20 Home Monitoring Machine forms placed      Anticoagulation Care Providers     Provider Role Specialty Phone number    Samm Vazquez MD Referring Internal Medicine - Pediatrics [phone]            See the Encounter Report to view Anticoagulation Flowsheet and Dosing Calendar (Go to Encounters tab in chart review, and find the Anticoagulation Therapy Visit)    Spoke with patient. Gave them their lab results and new warfarin recommendation.  No changes in health, medication, or diet. No missed doses, no falls. No signs or symptoms of bleed or clotting.     Liliam Hyman RN

## 2020-11-30 NOTE — ED PROVIDER NOTES
"ED Provider Note  [facility]      History     Chief Complaint   Patient presents with     Chest Pain     Fever     HPI  Oswaldo Prabhakar is a 60 year old male with a history of CABG in 2014, NIR for unstable angina (1/2020) who presents to the ED today for chest pain and fever.    ***    {Past History:744480}      Review of Systems  {Complete vs limited ROS:089698::\"A complete review of systems was performed with pertinent positives and negatives noted in the HPI, and all other systems negative.\"}    Physical Exam   BP: (!) 142/92  Pulse: 83  Temp: 101.4  F (38.6  C)  Resp: 18  Weight: 101.2 kg (223 lb)  SpO2: 96 %  Physical Exam  ***  ED Course      Procedures   POCUS is ordered, but not resulted ***   {EKG done?:763866::\" \"}    {ed addendum:523304::\" \"}  {trauma activation or Fall?:451748::\" \"}  {Sepsis/Septic Shock/Stemi/Stroke:648691::\" \"}     No results found for any visits on 11/30/20.  Medications - No data to display     Assessments & Plan (with Medical Decision Making)   ***    I have reviewed the nursing notes. I have reviewed the findings, diagnosis, plan and need for follow up with the patient.    New Prescriptions    No medications on file       Final diagnoses:   None       --  Reji Ye MD  [facility] EMERGENCY DEPARTMENT  11/30/2020  "

## 2020-12-01 ENCOUNTER — TELEPHONE (OUTPATIENT)
Dept: EMERGENCY MEDICINE | Facility: CLINIC | Age: 60
End: 2020-12-01

## 2020-12-01 LAB
SARS-COV-2 RNA SPEC QL NAA+PROBE: NOT DETECTED
SPECIMEN SOURCE: NORMAL

## 2020-12-01 NOTE — TELEPHONE ENCOUNTER
Coronavirus (COVID-19) Notification     Reason for call  Patient requesting results     Lab Result    Lab test 2019-nCoV rRt-PCR in process        RN Recommendations/Instructions per [facility]  Continue quarantee and following instructions until you receive the results     Please Contact your PCP clinic or return to the Emergency department if your:    Symptoms worsen or other concerning symptom's.     Patient informed that if test for COVID19 is POSITIVE,  you will receive a call typically within 48 hours from the test date (date lab collected).  If NEGATIVE result, you will receive a letter in the mail or [place].      Madai Harris

## 2020-12-03 DIAGNOSIS — Z95.1 S/P CABG X 4: ICD-10-CM

## 2020-12-03 DIAGNOSIS — Z78.9 STATIN INTOLERANCE: ICD-10-CM

## 2020-12-03 DIAGNOSIS — I25.739 CORONARY ARTERY DISEASE INVOLVING NONAUTOLOGOUS BIOLOGICAL CORONARY BYPASS GRAFT WITH ANGINA PECTORIS (H): ICD-10-CM

## 2020-12-03 DIAGNOSIS — E78.5 HYPERLIPIDEMIA LDL GOAL <130: ICD-10-CM

## 2020-12-07 RX ORDER — EVOLOCUMAB 140 MG/ML
140 INJECTION, SOLUTION SUBCUTANEOUS
Qty: 6 EACH | Refills: 0 | Status: SHIPPED | OUTPATIENT
Start: 2020-12-07 | End: 2021-03-01

## 2020-12-07 NOTE — TELEPHONE ENCOUNTER
evolocumab (REPATHA SURECLICK) 140 MG/ML prefilled autoinjector    Last Written Prescription Date:  10/21/19  Last Fill Quantity: 6ml,   # refills: 3  Last Office Visit :1/27/20  Future Office visit: 12/21/20    Routing refill request to provider for review/approval because: not on protocol.

## 2020-12-08 ENCOUNTER — ANTICOAGULATION THERAPY VISIT (OUTPATIENT)
Dept: ANTICOAGULATION | Facility: CLINIC | Age: 60
End: 2020-12-08

## 2020-12-08 ENCOUNTER — TELEPHONE (OUTPATIENT)
Dept: CARDIOLOGY | Facility: CLINIC | Age: 60
End: 2020-12-08

## 2020-12-08 DIAGNOSIS — Z79.01 LONG TERM CURRENT USE OF ANTICOAGULANT THERAPY: ICD-10-CM

## 2020-12-08 DIAGNOSIS — I26.99 PULMONARY EMBOLI (H): ICD-10-CM

## 2020-12-08 LAB
CAPILLARY BLOOD COLLECTION: NORMAL
INR PPP: 2.1 (ref 0.86–1.14)

## 2020-12-08 PROCEDURE — 36416 COLLJ CAPILLARY BLOOD SPEC: CPT | Performed by: INTERNAL MEDICINE

## 2020-12-08 PROCEDURE — 85610 PROTHROMBIN TIME: CPT | Performed by: INTERNAL MEDICINE

## 2020-12-08 NOTE — TELEPHONE ENCOUNTER
PA Initiation    Medication: Repatha pending  Insurance Company: [employer] - Phone [phone] Fax [phone]  Pharmacy Filling the Rx: [facility] - [place], [place] - [facility] [address]  Filling Pharmacy Phone: [phone]  Filling Pharmacy Fax:    Start Date: 12/8/2020

## 2020-12-08 NOTE — PROGRESS NOTES
ANTICOAGULATION FOLLOW-UP CLINIC VISIT    Patient Name:  Oswaldo Prabhakar  Date:  2020  Contact Type:  Telephone    SUBJECTIVE:  Patient Findings     Comments:  Spoke to Oswaldo.  He is assisting his wife with treatments (Chemo). Is careful to avoid viral threat.  Updated calendar.  Next appt is virtual with Dr. Ocampo.  Requested next INR in 3 weeks.        Clinical Outcomes     Comments:  Spoke to Oswaldo.  He is assisting his wife with treatments (Chemo). Is careful to avoid viral threat.  Updated calendar.  Next appt is virtual with Dr. Ocampo.  Requested next INR in 3 weeks.           OBJECTIVE    Recent labs: (last 7 days)     20  1651   INR 2.10*       ASSESSMENT / PLAN  INR assessment THER    Recheck INR In: 3 WEEKS    INR Location Clinic      Anticoagulation Summary  As of 2020    INR goal:  2.0-3.0   TTR:  32.9 % (7.2 mo)   INR used for dosin.10 (2020)   Warfarin maintenance plan:  7.5 mg (5 mg x 1.5) every Mon, Fri; 5 mg (5 mg x 1) all other days   Full warfarin instructions:  7.5 mg every Mon, Fri; 5 mg all other days   Weekly warfarin total:  40 mg   Plan last modified:  Kwesi Garcia RN (2020)   Next INR check:  2020   Priority:  Critical   Target end date:  Indefinite    Indications    Pulmonary emboli (H) [I26.99]  Long-term (current) use of anticoagulants [Z79.01] [Z79.01]             Anticoagulation Episode Summary     INR check location:      Preferred lab:      Send INR reminders to:  [facility]    Comments:  HIPPA INFO OK to speak with wife Josselyn MICHELLE to leave messages on Home.work and cell phones  use cell phone #[phone] ++++2020 ASA 81mg DAILY AND STOP ONCE INR >2.0++++  20 Home Monitoring Machine forms placed      Anticoagulation Care Providers     Provider Role Specialty Phone number    Samm Vazquez MD Referring Internal Medicine - Pediatrics [phone]            See the Encounter Report to view Anticoagulation Flowsheet and Dosing  Calendar (Go to Encounters tab in chart review, and find the Anticoagulation Therapy Visit)    INR/CFX/F2 RESULT:INR result is 2.1    ASSESSMENT:Spoke with patient. Gave them their lab results and new warfarin recommendation.  No changes in health, medication, or diet. No missed doses, no falls. No signs or symptoms of bleed or clotting.    Increased stress with spouse being ill, and recent hospitalization.    DOSING ADJUSTMENT:Updated calendar.  Oswaldo has been taking 7.5mg on M and Fri, 5mg all other days    NEXT INR/FACTOR X OR FACTOR II:12/29 (due to stressors and exposure to viral threat)    PROTOCOL FOLLOWED:goal 2-3    Kwesi Garcia RN

## 2020-12-09 NOTE — TELEPHONE ENCOUNTER
Prior Authorization Approval    Authorization Effective Date: 12/8/2020  Authorization Expiration Date: 12/8/2021  Medication: Repatha approved  Approved Dose/Quantity: ud  Reference #: [id number]   Insurance Company: [employer] - Phone [phone] Fax [phone]  Expected CoPay: $0     CoPay Card Available: No    Foundation Assistance Needed:    Which Pharmacy is filling the prescription (Not needed for infusion/clinic administered): [facility] - [place], [place] - [address]  Pharmacy Notified: Yes  Patient Notified: Yes

## 2020-12-15 ENCOUNTER — HOSPITAL ENCOUNTER (INPATIENT)
Facility: CLINIC | Age: 60
LOS: 1 days | Discharge: HOME | DRG: 313 | End: 2020-12-16
Attending: EMERGENCY MEDICINE | Admitting: INTERNAL MEDICINE
Payer: COMMERCIAL

## 2020-12-15 ENCOUNTER — APPOINTMENT (OUTPATIENT)
Dept: GENERAL RADIOLOGY | Facility: CLINIC | Age: 60
DRG: 313 | End: 2020-12-15
Attending: EMERGENCY MEDICINE
Payer: COMMERCIAL

## 2020-12-15 DIAGNOSIS — R07.9 CHEST PAIN, UNSPECIFIED TYPE: ICD-10-CM

## 2020-12-15 DIAGNOSIS — Z20.828 EXPOSURE TO SARS-ASSOCIATED CORONAVIRUS: ICD-10-CM

## 2020-12-15 LAB
ALBUMIN SERPL-MCNC: 3.9 G/DL (ref 3.4–5)
ALP SERPL-CCNC: 45 U/L (ref 40–150)
ALT SERPL W P-5'-P-CCNC: 34 U/L (ref 0–70)
ANION GAP SERPL CALCULATED.3IONS-SCNC: 7 MMOL/L (ref 3–14)
AST SERPL W P-5'-P-CCNC: 27 U/L (ref 0–45)
BASOPHILS # BLD AUTO: 0.1 10E9/L (ref 0–0.2)
BASOPHILS NFR BLD AUTO: 1.2 %
BILIRUB SERPL-MCNC: 0.9 MG/DL (ref 0.2–1.3)
BUN SERPL-MCNC: 12 MG/DL (ref 7–30)
CALCIUM SERPL-MCNC: 9.6 MG/DL (ref 8.5–10.1)
CHLORIDE SERPL-SCNC: 109 MMOL/L (ref 94–109)
CO2 SERPL-SCNC: 26 MMOL/L (ref 20–32)
CREAT SERPL-MCNC: 1.11 MG/DL (ref 0.66–1.25)
D DIMER PPP FEU-MCNC: 0.3 UG/ML FEU (ref 0–0.5)
DIFFERENTIAL METHOD BLD: NORMAL
EOSINOPHIL # BLD AUTO: 0.4 10E9/L (ref 0–0.7)
EOSINOPHIL NFR BLD AUTO: 5.5 %
ERYTHROCYTE [DISTWIDTH] IN BLOOD BY AUTOMATED COUNT: 13 % (ref 10–15)
FLUAV+FLUBV RNA SPEC QL NAA+PROBE: NEGATIVE
FLUAV+FLUBV RNA SPEC QL NAA+PROBE: NEGATIVE
GFR SERPL CREATININE-BSD FRML MDRD: 73 ML/MIN/{1.73_M2}
GLUCOSE SERPL-MCNC: 103 MG/DL (ref 70–99)
HCT VFR BLD AUTO: 44.3 % (ref 40–53)
HGB BLD-MCNC: 14.2 G/DL (ref 13.3–17.7)
IMM GRANULOCYTES # BLD: 0 10E9/L (ref 0–0.4)
IMM GRANULOCYTES NFR BLD: 0.3 %
INR PPP: 2.01 (ref 0.86–1.14)
INTERPRETATION ECG - MUSE: NORMAL
LABORATORY COMMENT REPORT: NORMAL
LYMPHOCYTES # BLD AUTO: 1.6 10E9/L (ref 0.8–5.3)
LYMPHOCYTES NFR BLD AUTO: 24.5 %
MCH RBC QN AUTO: 28.1 PG (ref 26.5–33)
MCHC RBC AUTO-ENTMCNC: 32.1 G/DL (ref 31.5–36.5)
MCV RBC AUTO: 88 FL (ref 78–100)
MONOCYTES # BLD AUTO: 0.5 10E9/L (ref 0–1.3)
MONOCYTES NFR BLD AUTO: 7.6 %
NEUTROPHILS # BLD AUTO: 4 10E9/L (ref 1.6–8.3)
NEUTROPHILS NFR BLD AUTO: 60.9 %
NRBC # BLD AUTO: 0 10*3/UL
NRBC BLD AUTO-RTO: 0 /100
PLATELET # BLD AUTO: 302 10E9/L (ref 150–450)
POTASSIUM SERPL-SCNC: 3.4 MMOL/L (ref 3.4–5.3)
PROT SERPL-MCNC: 7.8 G/DL (ref 6.8–8.8)
RBC # BLD AUTO: 5.05 10E12/L (ref 4.4–5.9)
RSV RNA SPEC QL NAA+PROBE: NEGATIVE
SARS-COV-2 RNA SPEC QL NAA+PROBE: NEGATIVE
SODIUM SERPL-SCNC: 141 MMOL/L (ref 133–144)
SPECIMEN SOURCE: NORMAL
TROPONIN I SERPL-MCNC: <0.015 UG/L (ref 0–0.04)
TROPONIN I SERPL-MCNC: <0.015 UG/L (ref 0–0.04)
WBC # BLD AUTO: 6.6 10E9/L (ref 4–11)

## 2020-12-15 PROCEDURE — 87636 SARSCOV2 & INF A&B AMP PRB: CPT | Performed by: EMERGENCY MEDICINE

## 2020-12-15 PROCEDURE — 93005 ELECTROCARDIOGRAM TRACING: CPT | Performed by: EMERGENCY MEDICINE

## 2020-12-15 PROCEDURE — 84484 ASSAY OF TROPONIN QUANT: CPT | Performed by: EMERGENCY MEDICINE

## 2020-12-15 PROCEDURE — 250N000013 HC RX MED GY IP 250 OP 250 PS 637: Performed by: STUDENT IN AN ORGANIZED HEALTH CARE EDUCATION/TRAINING PROGRAM

## 2020-12-15 PROCEDURE — 93010 ELECTROCARDIOGRAM REPORT: CPT | Performed by: EMERGENCY MEDICINE

## 2020-12-15 PROCEDURE — 99285 EMERGENCY DEPT VISIT HI MDM: CPT | Mod: 25 | Performed by: EMERGENCY MEDICINE

## 2020-12-15 PROCEDURE — 84484 ASSAY OF TROPONIN QUANT: CPT | Performed by: STUDENT IN AN ORGANIZED HEALTH CARE EDUCATION/TRAINING PROGRAM

## 2020-12-15 PROCEDURE — 85027 COMPLETE CBC AUTOMATED: CPT | Performed by: EMERGENCY MEDICINE

## 2020-12-15 PROCEDURE — 85730 THROMBOPLASTIN TIME PARTIAL: CPT | Performed by: EMERGENCY MEDICINE

## 2020-12-15 PROCEDURE — 120N000001 HC R&B MED SURG/OB

## 2020-12-15 PROCEDURE — 85025 COMPLETE CBC W/AUTO DIFF WBC: CPT | Performed by: EMERGENCY MEDICINE

## 2020-12-15 PROCEDURE — 71045 X-RAY EXAM CHEST 1 VIEW: CPT | Mod: 26 | Performed by: RADIOLOGY

## 2020-12-15 PROCEDURE — C9803 HOPD COVID-19 SPEC COLLECT: HCPCS | Performed by: EMERGENCY MEDICINE

## 2020-12-15 PROCEDURE — 85610 PROTHROMBIN TIME: CPT | Performed by: EMERGENCY MEDICINE

## 2020-12-15 PROCEDURE — 250N000011 HC RX IP 250 OP 636: Performed by: STUDENT IN AN ORGANIZED HEALTH CARE EDUCATION/TRAINING PROGRAM

## 2020-12-15 PROCEDURE — 80053 COMPREHEN METABOLIC PANEL: CPT | Performed by: EMERGENCY MEDICINE

## 2020-12-15 PROCEDURE — 250N000013 HC RX MED GY IP 250 OP 250 PS 637: Performed by: EMERGENCY MEDICINE

## 2020-12-15 PROCEDURE — 71045 X-RAY EXAM CHEST 1 VIEW: CPT

## 2020-12-15 PROCEDURE — 96375 TX/PRO/DX INJ NEW DRUG ADDON: CPT | Performed by: EMERGENCY MEDICINE

## 2020-12-15 PROCEDURE — 85379 FIBRIN DEGRADATION QUANT: CPT | Performed by: EMERGENCY MEDICINE

## 2020-12-15 PROCEDURE — 99221 1ST HOSP IP/OBS SF/LOW 40: CPT | Performed by: INTERNAL MEDICINE

## 2020-12-15 PROCEDURE — 96366 THER/PROPH/DIAG IV INF ADDON: CPT | Performed by: EMERGENCY MEDICINE

## 2020-12-15 PROCEDURE — 96365 THER/PROPH/DIAG IV INF INIT: CPT | Performed by: EMERGENCY MEDICINE

## 2020-12-15 RX ORDER — ASPIRIN 81 MG/1
324 TABLET, CHEWABLE ORAL ONCE
Status: COMPLETED | OUTPATIENT
Start: 2020-12-15 | End: 2020-12-15

## 2020-12-15 RX ORDER — ATORVASTATIN CALCIUM 40 MG/1
80 TABLET, FILM COATED ORAL DAILY
Status: DISCONTINUED | OUTPATIENT
Start: 2020-12-16 | End: 2020-12-16 | Stop reason: HOSPADM

## 2020-12-15 RX ORDER — ASPIRIN 81 MG/1
81 TABLET, CHEWABLE ORAL DAILY
Status: DISCONTINUED | OUTPATIENT
Start: 2020-12-16 | End: 2020-12-16 | Stop reason: HOSPADM

## 2020-12-15 RX ORDER — HEPARIN SODIUM 10000 [USP'U]/100ML
0-5000 INJECTION, SOLUTION INTRAVENOUS CONTINUOUS
Status: DISCONTINUED | OUTPATIENT
Start: 2020-12-15 | End: 2020-12-16 | Stop reason: HOSPADM

## 2020-12-15 RX ORDER — MAGNESIUM HYDROXIDE/ALUMINUM HYDROXICE/SIMETHICONE 120; 1200; 1200 MG/30ML; MG/30ML; MG/30ML
30 SUSPENSION ORAL EVERY 4 HOURS PRN
Status: DISCONTINUED | OUTPATIENT
Start: 2020-12-15 | End: 2020-12-16 | Stop reason: HOSPADM

## 2020-12-15 RX ORDER — EZETIMIBE 10 MG/1
10 TABLET ORAL DAILY
Status: DISCONTINUED | OUTPATIENT
Start: 2020-12-16 | End: 2020-12-16 | Stop reason: HOSPADM

## 2020-12-15 RX ORDER — ACETAMINOPHEN 325 MG/1
650 TABLET ORAL EVERY 4 HOURS PRN
Status: DISCONTINUED | OUTPATIENT
Start: 2020-12-15 | End: 2020-12-16 | Stop reason: HOSPADM

## 2020-12-15 RX ORDER — POTASSIUM CHLORIDE 750 MG/1
40 TABLET, EXTENDED RELEASE ORAL ONCE
Status: COMPLETED | OUTPATIENT
Start: 2020-12-15 | End: 2020-12-16

## 2020-12-15 RX ORDER — ACETAMINOPHEN 650 MG/1
650 SUPPOSITORY RECTAL EVERY 4 HOURS PRN
Status: DISCONTINUED | OUTPATIENT
Start: 2020-12-15 | End: 2020-12-16 | Stop reason: HOSPADM

## 2020-12-15 RX ORDER — LISINOPRIL 2.5 MG/1
2.5 TABLET ORAL DAILY
Status: DISCONTINUED | OUTPATIENT
Start: 2020-12-16 | End: 2020-12-16 | Stop reason: HOSPADM

## 2020-12-15 RX ORDER — AMOXICILLIN 250 MG
1 CAPSULE ORAL 2 TIMES DAILY
Status: DISCONTINUED | OUTPATIENT
Start: 2020-12-15 | End: 2020-12-16 | Stop reason: HOSPADM

## 2020-12-15 RX ORDER — NITROGLYCERIN 0.4 MG/1
0.4 TABLET SUBLINGUAL EVERY 5 MIN PRN
Status: DISCONTINUED | OUTPATIENT
Start: 2020-12-15 | End: 2020-12-16 | Stop reason: HOSPADM

## 2020-12-15 RX ORDER — AMOXICILLIN 250 MG
2 CAPSULE ORAL 2 TIMES DAILY
Status: DISCONTINUED | OUTPATIENT
Start: 2020-12-15 | End: 2020-12-16 | Stop reason: HOSPADM

## 2020-12-15 RX ORDER — POLYETHYLENE GLYCOL 3350 17 G/17G
17 POWDER, FOR SOLUTION ORAL DAILY
Status: DISCONTINUED | OUTPATIENT
Start: 2020-12-16 | End: 2020-12-16 | Stop reason: HOSPADM

## 2020-12-15 RX ORDER — HYDROMORPHONE HYDROCHLORIDE 1 MG/ML
0.5 INJECTION, SOLUTION INTRAMUSCULAR; INTRAVENOUS; SUBCUTANEOUS
Status: COMPLETED | OUTPATIENT
Start: 2020-12-15 | End: 2020-12-16

## 2020-12-15 RX ORDER — LIDOCAINE 40 MG/G
CREAM TOPICAL
Status: DISCONTINUED | OUTPATIENT
Start: 2020-12-15 | End: 2020-12-16 | Stop reason: HOSPADM

## 2020-12-15 RX ORDER — FENOFIBRATE 160 MG/1
160 TABLET ORAL DAILY
Status: DISCONTINUED | OUTPATIENT
Start: 2020-12-16 | End: 2020-12-16 | Stop reason: HOSPADM

## 2020-12-15 RX ADMIN — Medication 12.5 MG: at 22:18

## 2020-12-15 RX ADMIN — ASPIRIN 81 MG CHEWABLE TABLET 324 MG: 81 TABLET CHEWABLE at 21:36

## 2020-12-15 RX ADMIN — TICAGRELOR 90 MG: 90 TABLET ORAL at 22:18

## 2020-12-15 RX ADMIN — DOCUSATE SODIUM AND SENNOSIDES 1 TABLET: 8.6; 5 TABLET ORAL at 22:18

## 2020-12-15 RX ADMIN — HEPARIN SODIUM 1150 UNITS/HR: 10000 INJECTION, SOLUTION INTRAVENOUS at 23:47

## 2020-12-15 ASSESSMENT — ENCOUNTER SYMPTOMS
ABDOMINAL PAIN: 0
FEVER: 0
NAUSEA: 0
SHORTNESS OF BREATH: 0
NUMBNESS: 0
HEADACHES: 0
COUGH: 0
WEAKNESS: 0
NECK PAIN: 0
ABDOMINAL DISTENTION: 0
VOMITING: 0
PALPITATIONS: 0
BACK PAIN: 0
CHILLS: 0
DIAPHORESIS: 0
RESPIRATORY NEGATIVE: 1
EYES NEGATIVE: 1

## 2020-12-15 ASSESSMENT — MIFFLIN-ST. JEOR: SCORE: 1794.68

## 2020-12-15 NOTE — ED PROVIDER NOTES
"ED Provider Note  [facility]      History     Chief Complaint   Patient presents with     Chest Pain     The history is provided by the patient and medical records.     Oswaldo Prabhakar is a 60 year old male with PMH notable for CAD s/p four-vessel CABG (2014), unstable angina s/p NIR to LIMA (January 2020), HTN, HLD, with previous statin intolerance, prior bilateral PE (chronically anticoagulated with warfarin), previous kidney stones, amongst others, presenting today for chest discomfort similar to previous unstable angina presentations.     Per chart review patient was seen in the ED 11/30 for fever and chest pain. He had normal vitals, a negative troponin and no recurrence of chest pain in the ED. He had a PSI score of 58 and his is INR was low. His chest XR read pleural effusion in the setting of a prior left sided hemorrhage resulting in thoracotomy in 2011 so a CTPE was ordered which did not show PE but did show a left lower lobar pneumonia (fever and cough) without evidence of parapneumonic effusion. He was febrile, but tested negative for Covid-19. He was with amoxicillin 1g TID for 5 days, and azithromycin 500mg daily for 3 days. Also rx a small course of percocet to help with pain. He was advised to follow up with INR clinic.     Patient reports he has a sense that something in his body is not right. He reports that he's been having some left-sided chest discomfort, below the collarbone, that is happening and twinges for a few seconds at a time. It will happen at rest, with activity, without any particular pattern. No radiation of the pain to his abdomen, back, neck. May have mild headache, but \"just mild\" and improved. No vision/hearing symptoms, no numbness, tingling or focal weakness. No palpitations or syncope/near syncope with these symptoms for the last few days. He does report the last few months he has had a couple episodes in which he did feel a little bit dizzy or " "lightheaded. He did feel like he might faint after sneezing once but that quickly resolved, then there was another seemingly unrelated episode a few months ago where while he was driving he did feel somewhat lightheaded, but was able to pull over the side of the road. He did not lose consciousness. No headaches or other vision/neuro symptoms. He did not have chest pain at the same time of this event.    He otherwise denies any nausea, vomiting, no change in bowel or bladder. No abdominal symptoms. No diaphoresis. No fevers or chills. No cough or breathing symptoms. He occasionally at the end of a long day will have mild swelling at the distal lower extremities, but nothing notable or new. No focal lower extremity swelling or discomfort, no unilateral findings. With regards to headache, it is global in nature    Otherwise healthy. He reports that he has been taking all of his medications appropriately. He has not yet taken his evening Brilinta but otherwise took all of his normal meds. Did not take any aspirin prior to arrival. This did have some improvement of his symptoms. Currently generally pain-free but occasionally will be twinges of the left-sided chest discomfort again that would last only seconds. No pleuritic components, etc.    No other new symptoms or complaints. Please see ROS for further details.    ----------------------------------------------    CV Coronary Angiogram Conclusion 1/6/2020:   \"Three vessel CAD s/p CABG with patent SVG to rPDA, OM2, and D1, atretic LIMA with severe proximal and mid LAD lesion culprit in ACS.  PCI with one drug eluting stent to the proximal / mid LAD.\"  Plan:     Follow bedrest per protocol    Continued medical management and lifestyle modifications for cardiovascular risk factor optimizations.    Arterial sheath removed from radial artery with TR band placement.    Cardiac rehabilitation.    Return to the primary inpatient team for further evaluation and managment    " Continuation of dual antiplatelet therapy for 12 months      Resume oral anticoagulant warfarin medication in Post antiplatelet therapy of Aspirin; give 81 mg every day, Ticagrelor; and 90 mg BID.      Continue high dose statin therapy  Continue triple therapy until INR therapeutic, then stop aspirin and continue ticagrelor and warfarin for 12 months.    ----------------------------    Past Medical History  Past Medical History:   Diagnosis Date     Antiplatelet or antithrombotic long-term use      Coronary artery disease      Diaphragmatic hernia without mention of obstruction or gangrene      Heart disease      Hernia, abdominal      History of blood transfusion      History of thrombophlebitis      Hypertension      Nephrolithiasis 4/2010     Other and unspecified hyperlipidemia      Pulmonary embolus and infarction (H)     s/p hemothorax and filter s/p filter removal (2011), now on long term anti-coagulation     Statin intolerance 2/1/2017     Stented coronary artery 01/07/2020    NIR mid LAD     Unspecified retinal detachment     Childhood trauma, unrepaired     Past Surgical History:   Procedure Laterality Date     BYPASS GRAFT ARTERY CORONARY  4/4/2014    Procedure: BYPASS GRAFT ARTERY CORONARY;  Median Sternotomy, Coronary Artery Bypass Bypass x 4 using Left Internal Mammary, Left Saphenous Vein, on pump oxygenation;  Surgeon: Sherry Armando MD;  Location: [facility] OR     CLOSED REDUCTION, PERCUTANEOUS PINNING  HAND, COMBINED Left 11/5/2015    Procedure: COMBINED CLOSED REDUCTION, PERCUTANEOUS PINNING HAND;  Surgeon: Carmella Love MD;  [facility]     COLONOSCOPY  3/15/2013    Procedure: COLONOSCOPY;;  Surgeon: Racheal Shipman MD;  Location: [facility]     COMBINED CYSTOSCOPY, INSERT STENT URETER(S) Right 1/10/2020    Procedure: Cystoscopy, right retrograde pyelogram, interpretation of fluoroscopic images, placement of 6 x 26 double-J ureteral stent;  Surgeon: Nguyễn Woodard MD;   Location: [facility] OR     COMBINED CYSTOSCOPY, RETROGRADES, URETEROSCOPY, LASER HOLMIUM LITHOTRIPSY URETER(S), INSERT STENT Right 2/5/2020    Procedure: Cystoscopy, right ureteral stent exchange, right ureteroscopy with holmium lithotripsy and stone basketing, right retrograde pyelogram, interpretation of fluoroscopic images.;  Surgeon: Nguyễn Woodard MD;  Location: [facility] OR     CV ANGIOGRAM CORONARY GRAFT N/A 1/7/2020    Procedure: Angiogram Coronary Graft;  Surgeon: Chato Odonnell MD;  Location: [facility]     CV CORONARY ANGIOGRAM  1/7/2020    Procedure: CV CORONARY ANGIOGRAM;  Surgeon: Chato Odonnell MD;  Location: [facility]     CV PCI STENT DRUG ELUTING N/A 1/7/2020    Procedure: PCI Stent Drug Eluting;  Surgeon: Chato Odonnell MD;  Location: [facility]     CYSTOSCOPY       LAPAROSCOPIC CHOLECYSTECTOMY N/A 8/6/2018    Procedure: LAPAROSCOPIC CHOLECYSTECTOMY;  LAPAROSCOPIC CHOLECYSTECTOMY ;  Surgeon: José Miguel Stuart MD;  Location: [facility] OR     LITHOTRIPSY  4/2010     THORACIC SURGERY      lung surgery, thoracotomy, lung adhesion     ZZC NONSPECIFIC PROCEDURE      Spermatocele resection          atorvastatin (LIPITOR) 80 MG tablet       azithromycin (ZITHROMAX) 500 MG tablet       calcium carbonate (TUMS) 500 MG chewable tablet       Cholecalciferol (VITAMIN D) 2000 UNITS CAPS       DiphenhydrAMINE HCl (BENADRYL PO)       evolocumab (REPATHA SURECLICK) 140 MG/ML prefilled autoinjector       ezetimibe (ZETIA) 10 MG tablet       fenofibrate (TRIGLIDE/LOFIBRA) 160 MG tablet       lisinopril (PRINIVIL/ZESTRIL) 2.5 MG tablet       metoprolol tartrate (LOPRESSOR) 25 MG tablet       OMEPRAZOLE PO       ondansetron (ZOFRAN) 4 MG tablet       oxyCODONE-acetaminophen (PERCOCET) 5-325 MG tablet       tamsulosin (FLOMAX) 0.4 MG capsule       ticagrelor (BRILINTA) 90 MG tablet       warfarin ANTICOAGULANT (COUMADIN) 5 MG  "tablet      Allergies   Allergen Reactions     Cats      Hay Fever & [A.R.M.]      Heparin Other (See Comments)     Heparin resistance per cardio-thoracic surgeon Dr. Armando     Hydrocodone-Acetaminophen Nausea and Vomiting     Acetaminophen is ok     Family History  Family History   Problem Relation Age of Onset     Heart Disease Mother      C.A.D. Father         3 vessel CABG, age 70     Cancer Father         bladder cancer     C.A.D. Paternal Grandmother      Hypertension Paternal Grandmother      Alzheimer Disease Paternal Grandmother      Diabetes No family hx of      Thyroid Disease No family hx of      Cancer - colorectal No family hx of      Social History   Social History     Tobacco Use     Smoking status: Never Smoker     Smokeless tobacco: Never Used   Substance Use Topics     Alcohol use: No     Comment: very rare     Drug use: No      Past medical history, past surgical history, medications, allergies, family history, and social history were reviewed with the patient. No additional pertinent items.       Review of Systems   Constitutional: Negative for chills, diaphoresis and fever.   Eyes: Negative.    Respiratory: Negative.  Negative for cough and shortness of breath.    Cardiovascular: Positive for chest pain (Left-sided, below collarbone). Negative for palpitations and leg swelling.   Gastrointestinal: Negative for abdominal distention, abdominal pain, nausea and vomiting.   Genitourinary: Negative.    Musculoskeletal: Negative for back pain and neck pain.   Neurological: Negative for syncope, weakness, numbness and headaches (\"Just mild\"; improved).   All other systems reviewed and are negative.    \    Physical Exam   BP: (!) 156/79  Pulse: 67  Temp: 98.9  F (37.2  C)  Resp: 16  Height: 180.3 cm (5' 11\")  Weight: 95.3 kg (210 lb)  SpO2: 96 %  Physical Exam  CONSTITUTIONAL: Well-developed and well-nourished. Awake and alert. Non-toxic appearance. No acute distress.   HENT:   - Head: Normocephalic " and atraumatic.   - Ears: Hearing and external ear grossly normal.   - Nose: Nose normal. No rhinorrhea. No epistaxis.   - Mouth/Throat: MMM  EYES: Conjunctivae and lids are normal. No scleral icterus.   NECK: Normal range of motion and phonation normal. Neck supple.  No tracheal deviation, no stridor. No edema or erythema noted. Seemed to be able to move head/neck easily.   CARDIOVASCULAR: Normal rate, regular rhythm and no appreciable abnormal heart sounds.  PULMONARY/CHEST: Normal work of breathing. No accessory muscle usage or stridor. No respiratory distress.  No appreciable abnormal breath sounds.  ABDOMEN: Soft, non-distended. No tenderness. No rigidity, rebound or guarding.   MUSCULOSKELETAL: Very scant/very mild bilateral lower extremity edema. No tenderness.  NEUROLOGIC: Awake, alert. Not disoriented. He displays no atrophy and no tremor. Normal tone. No seizure activity. GCS 15  SKIN: Skin is warm and dry. No rash noted. No diaphoresis. No pallor.   PSYCHIATRIC: Normal mood and affect. Speech and behavior normal. Thought processes linear. Cognition and memory are normal.     ED Course     ED Course as of Dec 15 2203   Tue Dec 15, 2020   2129 Discussed patients presentation and current state/plan w/ Cards attending who accepted patient for admission, with their request to admit to I. No residents in paging system so Cards Fellow paged to discuss patient as well.                EKG Interpretation:      Interpreted by Sherry Valenzuela MD  Time reviewed: 17:42  Symptoms at time of EKG: chest pain   Rhythm: normal sinus   Axis: Normal  Ectopy: none  Conduction: normal  ST Segments/ T Waves: ST & T wave abnormality, consider anterior ischemia ; not significantly changed from previous  Comparison to prior: Compared to previous on 11/10/2020, no significant changes     Assessments & Plan (with Medical Decision Making)   IMPRESSION: 60 year old male w/ PMH notable for CAD s/p four-vessel CABG (2014), unstable  "angina s/p NIR to LIMA (January 2020), HTN, HLD, with previous statin intolerance, prior bilateral PE (chronically anticoagulated with warfarin), previous kidney stones, amongst others, presenting today for chest discomfort similar to previous unstable angina presentations, as described further above in the HPI/ROS. Clinically, patient appears nontoxic, NAD. Vitals WNL. Otherwise on examination, no acute findings.     Ddx includes, but not limited to, ACS/unstable angina, PE, does not sound as consistent w/ PTX, dissection, think unlikely pericariditis, no sx's for lissett louis or boerrhave syndrome. Considered COVID, et al., Symptoms seem to high for it to be an abdominal process.     PLAN: ECG, labs, chest imaging, pain management as needed, dispo pending ED course, but given     RESULTS:  - Labs: No acute findings  - Imaging: Written preliminary reports reviewed:  --- CXR: \"Sternotomy with mediastinal clips. Minimal cardiac enlargement with normal pulmonary vascularity. No evidence for CHF or volume overload. Minimal left basilar pleural fluid or thickening has significantly decreased. No infiltrates or consolidation.\"  .  INTERVENTIONS:   - PO Aspirin    RE-EVALUATION:  - Pt otherwise continues to do well here in the ED, no acute issues or apparent concerning changes in vitals or clinical appearance.    DISCUSSIONS:  - w/ Cardiology attending: Reviewed prior cardiac history, prior PEs, etc., patient/presentation, current state of workup/any pending studies. They will admit for further evaluation/management, F/U pending studies as needed, coordinate w/ consulting services as needed. No additional requests/recommendations for workup/management for in the ED at this time. *  - w/ Patient: I have reviewed the available findings, plan with the patient. He expressed understanding and agreement with this plan. All questions answered to the best of our ability at this time.     DISPOSITION/PLANNING:  - IMPRESSION: " Chest pain  - DISPOSITION: Admit to [facility] for further evaluation/management (CSI as per Cards Attending)  --- Will need evening meds during admission (Ticagrelor, Metoprolol, etc.)  --- Consider heparin drip if pain recurs      ______________________________________________________________________        --  Sherry Valenzuela MD  [facility] EMERGENCY DEPARTMENT  12/15/2020     Sherry Valenzuela MD  12/17/20 0330

## 2020-12-15 NOTE — ED TRIAGE NOTES
Triage Assessment & Note:    Pulse 67   Temp 98.9  F (37.2  C) (Oral)   Resp 16   SpO2 96%     Patient presents with: PT c/o sharp intermittent chest pain located in the upper left chest radiating to the left arm pit area. PT has a hx of CABG x4. PT reports he had no pain at that time. PT denies SOB and diaphoresis at this time.     Home Treatments/Remedies: None    Febrile / Afebrile? Afebrile    A  Duration of C/o:  2 days     Rahul Saucedo RN  December 15, 2020

## 2020-12-16 ENCOUNTER — APPOINTMENT (OUTPATIENT)
Dept: CARDIOLOGY | Facility: CLINIC | Age: 60
DRG: 313 | End: 2020-12-16
Attending: STUDENT IN AN ORGANIZED HEALTH CARE EDUCATION/TRAINING PROGRAM
Payer: COMMERCIAL

## 2020-12-16 ENCOUNTER — APPOINTMENT (OUTPATIENT)
Dept: CT IMAGING | Facility: CLINIC | Age: 60
DRG: 313 | End: 2020-12-16
Attending: NURSE PRACTITIONER
Payer: COMMERCIAL

## 2020-12-16 VITALS
WEIGHT: 210 LBS | HEIGHT: 71 IN | RESPIRATION RATE: 16 BRPM | SYSTOLIC BLOOD PRESSURE: 127 MMHG | HEART RATE: 51 BPM | OXYGEN SATURATION: 97 % | DIASTOLIC BLOOD PRESSURE: 87 MMHG | TEMPERATURE: 97.9 F | BODY MASS INDEX: 29.4 KG/M2

## 2020-12-16 LAB
ALBUMIN SERPL-MCNC: 3.4 G/DL (ref 3.4–5)
ALP SERPL-CCNC: 36 U/L (ref 40–150)
ALT SERPL W P-5'-P-CCNC: 28 U/L (ref 0–70)
ANION GAP SERPL CALCULATED.3IONS-SCNC: 4 MMOL/L (ref 3–14)
APTT PPP: 32 SEC (ref 22–37)
AST SERPL W P-5'-P-CCNC: 21 U/L (ref 0–45)
BILIRUB SERPL-MCNC: 0.7 MG/DL (ref 0.2–1.3)
BUN SERPL-MCNC: 14 MG/DL (ref 7–30)
CALCIUM SERPL-MCNC: 8.9 MG/DL (ref 8.5–10.1)
CHLORIDE SERPL-SCNC: 111 MMOL/L (ref 94–109)
CO2 SERPL-SCNC: 25 MMOL/L (ref 20–32)
CREAT SERPL-MCNC: 1.05 MG/DL (ref 0.66–1.25)
ERYTHROCYTE [DISTWIDTH] IN BLOOD BY AUTOMATED COUNT: 13.2 % (ref 10–15)
ERYTHROCYTE [DISTWIDTH] IN BLOOD BY AUTOMATED COUNT: 13.2 % (ref 10–15)
GFR SERPL CREATININE-BSD FRML MDRD: 78 ML/MIN/{1.73_M2}
GLUCOSE SERPL-MCNC: 98 MG/DL (ref 70–99)
HBA1C MFR BLD: 5.8 % (ref 0–5.6)
HCT VFR BLD AUTO: 40.3 % (ref 40–53)
HCT VFR BLD AUTO: 40.4 % (ref 40–53)
HGB BLD-MCNC: 12.6 G/DL (ref 13.3–17.7)
HGB BLD-MCNC: 12.9 G/DL (ref 13.3–17.7)
INR PPP: 2.5 (ref 0.86–1.14)
MCH RBC QN AUTO: 27.6 PG (ref 26.5–33)
MCH RBC QN AUTO: 28.2 PG (ref 26.5–33)
MCHC RBC AUTO-ENTMCNC: 31.3 G/DL (ref 31.5–36.5)
MCHC RBC AUTO-ENTMCNC: 31.9 G/DL (ref 31.5–36.5)
MCV RBC AUTO: 88 FL (ref 78–100)
MCV RBC AUTO: 88 FL (ref 78–100)
PLATELET # BLD AUTO: 246 10E9/L (ref 150–450)
PLATELET # BLD AUTO: 267 10E9/L (ref 150–450)
POTASSIUM SERPL-SCNC: 4.1 MMOL/L (ref 3.4–5.3)
PROT SERPL-MCNC: 6.7 G/DL (ref 6.8–8.8)
RBC # BLD AUTO: 4.56 10E12/L (ref 4.4–5.9)
RBC # BLD AUTO: 4.58 10E12/L (ref 4.4–5.9)
SODIUM SERPL-SCNC: 141 MMOL/L (ref 133–144)
TROPONIN I SERPL-MCNC: <0.015 UG/L (ref 0–0.04)
TROPONIN I SERPL-MCNC: <0.015 UG/L (ref 0–0.04)
TSH SERPL DL<=0.005 MIU/L-ACNC: 2.03 MU/L (ref 0.4–4)
UFH PPP CHRO-ACNC: 0.36 IU/ML
UFH PPP CHRO-ACNC: 0.93 IU/ML
WBC # BLD AUTO: 6.2 10E9/L (ref 4–11)
WBC # BLD AUTO: 7 10E9/L (ref 4–11)

## 2020-12-16 PROCEDURE — 99207 PR APP CREDIT; MD BILLING SHARED VISIT: CPT | Performed by: NURSE PRACTITIONER

## 2020-12-16 PROCEDURE — 84443 ASSAY THYROID STIM HORMONE: CPT | Performed by: STUDENT IN AN ORGANIZED HEALTH CARE EDUCATION/TRAINING PROGRAM

## 2020-12-16 PROCEDURE — 85520 HEPARIN ASSAY: CPT | Performed by: STUDENT IN AN ORGANIZED HEALTH CARE EDUCATION/TRAINING PROGRAM

## 2020-12-16 PROCEDURE — 83036 HEMOGLOBIN GLYCOSYLATED A1C: CPT | Performed by: STUDENT IN AN ORGANIZED HEALTH CARE EDUCATION/TRAINING PROGRAM

## 2020-12-16 PROCEDURE — 84484 ASSAY OF TROPONIN QUANT: CPT | Performed by: STUDENT IN AN ORGANIZED HEALTH CARE EDUCATION/TRAINING PROGRAM

## 2020-12-16 PROCEDURE — 85610 PROTHROMBIN TIME: CPT | Performed by: STUDENT IN AN ORGANIZED HEALTH CARE EDUCATION/TRAINING PROGRAM

## 2020-12-16 PROCEDURE — 250N000011 HC RX IP 250 OP 636: Performed by: STUDENT IN AN ORGANIZED HEALTH CARE EDUCATION/TRAINING PROGRAM

## 2020-12-16 PROCEDURE — 93306 TTE W/DOPPLER COMPLETE: CPT | Mod: 26 | Performed by: INTERNAL MEDICINE

## 2020-12-16 PROCEDURE — 80053 COMPREHEN METABOLIC PANEL: CPT | Performed by: STUDENT IN AN ORGANIZED HEALTH CARE EDUCATION/TRAINING PROGRAM

## 2020-12-16 PROCEDURE — 250N000013 HC RX MED GY IP 250 OP 250 PS 637: Performed by: STUDENT IN AN ORGANIZED HEALTH CARE EDUCATION/TRAINING PROGRAM

## 2020-12-16 PROCEDURE — 250N000013 HC RX MED GY IP 250 OP 250 PS 637: Performed by: INTERNAL MEDICINE

## 2020-12-16 PROCEDURE — 85027 COMPLETE CBC AUTOMATED: CPT | Performed by: STUDENT IN AN ORGANIZED HEALTH CARE EDUCATION/TRAINING PROGRAM

## 2020-12-16 PROCEDURE — 999N000208 ECHOCARDIOGRAM COMPLETE

## 2020-12-16 PROCEDURE — 999N000127 HC STATISTIC PERIPHERAL IV START W US GUIDANCE

## 2020-12-16 PROCEDURE — 250N000011 HC RX IP 250 OP 636: Performed by: EMERGENCY MEDICINE

## 2020-12-16 PROCEDURE — 75574 CT ANGIO HRT W/3D IMAGE: CPT | Mod: 26 | Performed by: INTERNAL MEDICINE

## 2020-12-16 PROCEDURE — 75574 CT ANGIO HRT W/3D IMAGE: CPT

## 2020-12-16 PROCEDURE — 84484 ASSAY OF TROPONIN QUANT: CPT | Performed by: EMERGENCY MEDICINE

## 2020-12-16 PROCEDURE — 99238 HOSP IP/OBS DSCHRG MGMT 30/<: CPT | Performed by: INTERNAL MEDICINE

## 2020-12-16 PROCEDURE — 36415 COLL VENOUS BLD VENIPUNCTURE: CPT | Performed by: STUDENT IN AN ORGANIZED HEALTH CARE EDUCATION/TRAINING PROGRAM

## 2020-12-16 PROCEDURE — 255N000002 HC RX 255 OP 636: Performed by: STUDENT IN AN ORGANIZED HEALTH CARE EDUCATION/TRAINING PROGRAM

## 2020-12-16 RX ORDER — IOPAMIDOL 755 MG/ML
120 INJECTION, SOLUTION INTRAVASCULAR ONCE
Status: COMPLETED | OUTPATIENT
Start: 2020-12-16 | End: 2020-12-16

## 2020-12-16 RX ORDER — ONDANSETRON 4 MG/1
4 TABLET, FILM COATED ORAL EVERY 6 HOURS PRN
Status: DISCONTINUED | OUTPATIENT
Start: 2020-12-16 | End: 2020-12-16 | Stop reason: HOSPADM

## 2020-12-16 RX ORDER — NITROGLYCERIN 0.4 MG/1
.4-.8 TABLET SUBLINGUAL
Status: DISCONTINUED | OUTPATIENT
Start: 2020-12-16 | End: 2020-12-16 | Stop reason: HOSPADM

## 2020-12-16 RX ORDER — METOPROLOL TARTRATE 50 MG
50-100 TABLET ORAL
Status: CANCELLED | OUTPATIENT
Start: 2020-12-16

## 2020-12-16 RX ORDER — IVABRADINE 5 MG/1
5-15 TABLET, FILM COATED ORAL
Status: DISCONTINUED | OUTPATIENT
Start: 2020-12-16 | End: 2020-12-16 | Stop reason: HOSPADM

## 2020-12-16 RX ORDER — METHYLPREDNISOLONE SODIUM SUCCINATE 125 MG/2ML
125 INJECTION, POWDER, LYOPHILIZED, FOR SOLUTION INTRAMUSCULAR; INTRAVENOUS
Status: DISCONTINUED | OUTPATIENT
Start: 2020-12-16 | End: 2020-12-16 | Stop reason: HOSPADM

## 2020-12-16 RX ORDER — DIPHENHYDRAMINE HYDROCHLORIDE 50 MG/ML
25-50 INJECTION INTRAMUSCULAR; INTRAVENOUS
Status: DISCONTINUED | OUTPATIENT
Start: 2020-12-16 | End: 2020-12-16 | Stop reason: HOSPADM

## 2020-12-16 RX ORDER — DIPHENHYDRAMINE HCL 25 MG
25 CAPSULE ORAL
Status: DISCONTINUED | OUTPATIENT
Start: 2020-12-16 | End: 2020-12-16 | Stop reason: HOSPADM

## 2020-12-16 RX ORDER — METOPROLOL TARTRATE 25 MG/1
25-100 TABLET, FILM COATED ORAL
Status: DISCONTINUED | OUTPATIENT
Start: 2020-12-16 | End: 2020-12-16 | Stop reason: HOSPADM

## 2020-12-16 RX ORDER — ACYCLOVIR 200 MG/1
0-1 CAPSULE ORAL
Status: DISCONTINUED | OUTPATIENT
Start: 2020-12-16 | End: 2020-12-16 | Stop reason: HOSPADM

## 2020-12-16 RX ORDER — METOPROLOL TARTRATE 1 MG/ML
5-15 INJECTION, SOLUTION INTRAVENOUS
Status: DISCONTINUED | OUTPATIENT
Start: 2020-12-16 | End: 2020-12-16 | Stop reason: HOSPADM

## 2020-12-16 RX ORDER — ONDANSETRON 2 MG/ML
4 INJECTION INTRAMUSCULAR; INTRAVENOUS
Status: DISCONTINUED | OUTPATIENT
Start: 2020-12-16 | End: 2020-12-16 | Stop reason: HOSPADM

## 2020-12-16 RX ORDER — ONDANSETRON 2 MG/ML
4 INJECTION INTRAMUSCULAR; INTRAVENOUS EVERY 6 HOURS PRN
Status: DISCONTINUED | OUTPATIENT
Start: 2020-12-16 | End: 2020-12-16 | Stop reason: HOSPADM

## 2020-12-16 RX ADMIN — OMEPRAZOLE 20 MG: 20 CAPSULE, DELAYED RELEASE ORAL at 07:51

## 2020-12-16 RX ADMIN — HUMAN ALBUMIN MICROSPHERES AND PERFLUTREN 5 ML: 10; .22 INJECTION, SOLUTION INTRAVENOUS at 10:05

## 2020-12-16 RX ADMIN — NITROGLYCERIN 0.8 MG: 0.4 TABLET SUBLINGUAL at 15:18

## 2020-12-16 RX ADMIN — POTASSIUM CHLORIDE 40 MEQ: 750 TABLET, EXTENDED RELEASE ORAL at 00:07

## 2020-12-16 RX ADMIN — Medication 12.5 MG: at 07:47

## 2020-12-16 RX ADMIN — HYDROMORPHONE HYDROCHLORIDE 0.5 MG: 1 INJECTION, SOLUTION INTRAMUSCULAR; INTRAVENOUS; SUBCUTANEOUS at 16:07

## 2020-12-16 RX ADMIN — ASPIRIN 81 MG CHEWABLE TABLET 81 MG: 81 TABLET CHEWABLE at 07:51

## 2020-12-16 RX ADMIN — EZETIMIBE 10 MG: 10 TABLET ORAL at 07:47

## 2020-12-16 RX ADMIN — FENOFIBRATE 160 MG: 160 TABLET, FILM COATED ORAL at 07:48

## 2020-12-16 RX ADMIN — ATORVASTATIN CALCIUM 80 MG: 40 TABLET, FILM COATED ORAL at 07:51

## 2020-12-16 RX ADMIN — TICAGRELOR 90 MG: 90 TABLET ORAL at 09:10

## 2020-12-16 RX ADMIN — IOPAMIDOL 120 ML: 755 INJECTION, SOLUTION INTRAVENOUS at 15:11

## 2020-12-16 RX ADMIN — ACETAMINOPHEN 650 MG: 325 TABLET, FILM COATED ORAL at 16:13

## 2020-12-16 RX ADMIN — LISINOPRIL 2.5 MG: 2.5 TABLET ORAL at 07:47

## 2020-12-16 NOTE — H&P
Cardiology Inpatient H&P  December 15, 2020      Assessment and Plan:     Mr. Prabhakar is a 59 yo male with history of CAD (s/p 4-vessel CABG in 2014,  PCI with NIR to prox/mid LAD 01/2020, atretic LIMA on 01/2020 cath), HTN, HLD and PE who presents with unstable angina.     Unstable angina    CAD s/p 4V CABG in 2014 (LIMA-LAD, SVg-Diag, SVG-OM1, SVG-rPDA) and PCI with NIR to prox/mid LAD in 01/2020  Presenting with acute onset exertional chest pain at rest, alleviated by nitroglycerin/rest. Troponin negative x2, EKG with no acute ST/T changes. Given extensive cardiac history and alleviation with nitroglycerin, certainly concerning for cardiac etiology. Notably, his last coronary angiogram in 01/2020 showed atretic LIMA. DDx also includes pleurisy from recent pneumonia. Unlikely PE given therapeutic INR and normal d-dimer.   - S/p Aspirin 324 mg in ED, continue ASA 81 mg daily  - Continue PTA ticagrelor   - Start Heparin gtt    - Continue PTA atorvastatin, ezetimibe and fenofibrate  - Hold evolocumab q2 weeks (last dose was 14 days prior to admission)  - Continue PTA metoprolol tartrate 12.5 mg BID, lisinopril 2.5 mg QD  - Nitroglycerin PRN, will start gtt if persistent pain  - Repeat troponin and EKG overnight  - TTE in AM   - NPO at [facility] for possible coronary angiogram    History of unprovoked PE s/p thoracotomy with lung resection 2011  Anticoagulated with warfarin, INR 2.01 on admission.   - Hold warfarin while on heparin gtt for ACS  - Daily INR      HTN  - continue lisinopril 2.5mg daily  - metoprolol as above     HLD  - continue PTA atorvastatin 80mg, zetia 10mg, fenofibrate 162mg every day  - Holding evolocumab q14 days as above      FEN: NPO at [facility]  DVT PPx: heparin   Code: Full code    Disposition: Inpatient admission. Expected discharge in 1-2 days      To be staffed in AM     Moni Garcia MD  Internal Medicine, PGY-3  Pager: [phone]     CC:   Chest pain       HPI:   Mr. Prabhakar is a 59 yo male with  history of CAD (s/p 4-vessel CABG in 2014,  PCI with NIR to prox/mid LAD 01/2020, atretic LIMA on 01/2020 cath), HTN, HLD and PE who presents with chest pain.     Patient reports onset of intermittent left-sided chest pain about a day prior to admission. Pain is dull in nature, occurs at rest, radiates occassionally to left armpit, severity 1-3/10, no associated dyspnea or diaphoresis. Not worse with exertion but did improve with nitroglycerin in ED. Occurred several times while sitting at his desk at home, episodes lasting for several minutes and resolve spontaneously. No association with eating. Of note, he was recently diagnosed with let lower lobe pneumonia (11/30) for which he was treated with amoxicillin and azithromycin with improvement in symptoms (fever, cough). Currently denies fever or chills.     ED course:  - afebrile, HD stable   - troponin negative, EKG with no acute ST/T changes  - PRN nitroglycerin for CP with some improvement   - Loaded with  mg, admitted for ACS r/o    Review of Systems:    Complete review of systems was performed and negative except per HPI.        Past Medical History:     Past Medical History:   Diagnosis Date     Antiplatelet or antithrombotic long-term use      Coronary artery disease      Diaphragmatic hernia without mention of obstruction or gangrene      Heart disease      Hernia, abdominal      History of blood transfusion      History of thrombophlebitis      Hypertension      Nephrolithiasis 4/2010     Other and unspecified hyperlipidemia      Pulmonary embolus and infarction (H)     s/p hemothorax and filter s/p filter removal (2011), now on long term anti-coagulation     Statin intolerance 2/1/2017     Stented coronary artery 01/07/2020    NIR mid LAD     Unspecified retinal detachment     Childhood trauma, unrepaired        Past Surgical History:     Past Surgical History:   Procedure Laterality Date     BYPASS GRAFT ARTERY CORONARY  4/4/2014    Procedure:  BYPASS GRAFT ARTERY CORONARY;  Median Sternotomy, Coronary Artery Bypass Bypass x 4 using Left Internal Mammary, Left Saphenous Vein, on pump oxygenation;  Surgeon: Sherry Armando MD;  Location: [facility] OR     CLOSED REDUCTION, PERCUTANEOUS PINNING  HAND, COMBINED Left 11/5/2015    Procedure: COMBINED CLOSED REDUCTION, PERCUTANEOUS PINNING HAND;  Surgeon: Carmella Love MD;  [facility]     COLONOSCOPY  3/15/2013    Procedure: COLONOSCOPY;;  Surgeon: Racheal Shipman MD;  Location: [facility] GI     COMBINED CYSTOSCOPY, INSERT STENT URETER(S) Right 1/10/2020    Procedure: Cystoscopy, right retrograde pyelogram, interpretation of fluoroscopic images, placement of 6 x 26 double-J ureteral stent;  Surgeon: Nguyễn Woodard MD;  Location: [facility] OR     COMBINED CYSTOSCOPY, RETROGRADES, URETEROSCOPY, LASER HOLMIUM LITHOTRIPSY URETER(S), INSERT STENT Right 2/5/2020    Procedure: Cystoscopy, right ureteral stent exchange, right ureteroscopy with holmium lithotripsy and stone basketing, right retrograde pyelogram, interpretation of fluoroscopic images.;  Surgeon: Nguyễn Woodard MD;  Location: [facility] OR     CV ANGIOGRAM CORONARY GRAFT N/A 1/7/2020    Procedure: Angiogram Coronary Graft;  Surgeon: Chato Odonnell MD;  Location:  [facility]     CV CORONARY ANGIOGRAM  1/7/2020    Procedure: CV CORONARY ANGIOGRAM;  Surgeon: Chato Odonnell MD;  Location:  [facility]     CV PCI STENT DRUG ELUTING N/A 1/7/2020    Procedure: PCI Stent Drug Eluting;  Surgeon: Chato Oodnnell MD;  Location:  [facility]     CYSTOSCOPY       LAPAROSCOPIC CHOLECYSTECTOMY N/A 8/6/2018    Procedure: LAPAROSCOPIC CHOLECYSTECTOMY;  LAPAROSCOPIC CHOLECYSTECTOMY ;  Surgeon: José Miguel Stuart MD;  Location: [facility] OR     LITHOTRIPSY  4/2010     THORACIC SURGERY      lung surgery, thoracotomy, lung adhesion     ZZC NONSPECIFIC PROCEDURE      Spermatocele  resection        Social History:     Social History     Tobacco Use     Smoking status: Never Smoker     Smokeless tobacco: Never Used   Substance Use Topics     Alcohol use: No     Comment: very rare         Family History:     Family History   Problem Relation Age of Onset     Heart Disease Mother      C.A.D. Father         3 vessel CABG, age 70     Cancer Father         bladder cancer     C.A.D. Paternal Grandmother      Hypertension Paternal Grandmother      Alzheimer Disease Paternal Grandmother      Diabetes No family hx of      Thyroid Disease No family hx of      Cancer - colorectal No family hx of         Medications:       [START ON 12/16/2020] aspirin  81 mg Oral Daily     [START ON 12/16/2020] atorvastatin  80 mg Oral Daily     [START ON 12/16/2020] ezetimibe  10 mg Oral Daily     [START ON 12/16/2020] fenofibrate  160 mg Oral Daily     [START ON 12/16/2020] lisinopril  2.5 mg Oral Daily     metoprolol tartrate  12.5 mg Oral BID     [START ON 12/16/2020] omeprazole  20 mg Oral QAM AC     [START ON 12/16/2020] polyethylene glycol  17 g Oral Daily     senna-docusate  1 tablet Oral BID    Or     senna-docusate  2 tablet Oral BID     sodium chloride (PF)  3 mL Intracatheter Q8H     ticagrelor  90 mg Oral Q12H            Allergies:     Allergies   Allergen Reactions     Cats      Hay Fever & [A.R.M.]      Heparin Other (See Comments)     Heparin resistance per cardio-thoracic surgeon Dr. Armando     Hydrocodone-Acetaminophen Nausea and Vomiting     Acetaminophen is ok          Physical Exam:   Temp:  [98.9  F (37.2  C)] 98.9  F (37.2  C)  Pulse:  [67] 67  Resp:  [16] 16  BP: (156)/(79) 156/79  SpO2:  [96 %] 96 %  GEN: pleasant, no acute distress  HEENT: no icterus  CV: Bradycardic, normal s1/s2, no murmurs   CHEST: clear to ausculation bilaterally, no rales or wheezing. No chest wall tenderness   ABD: soft, non-tender, normal active bowel sounds  EXTR: pulses 2+ b/l. No edema.   NEURO: alert oriented, speech  fluent/appropriate, motor grossly nonfocal     Data:   CBC  Recent Labs   Lab 12/15/20  1735   WBC 6.6   RBC 5.05   HGB 14.2   HCT 44.3   MCV 88   MCH 28.1   MCHC 32.1   RDW 13.0        CMP  Recent Labs   Lab 12/15/20  1735      POTASSIUM 3.4   CHLORIDE 109   CO2 26   ANIONGAP 7   *   BUN 12   CR 1.11   GFRESTIMATED 73   GFRESTBLACK 84   GONZALES 9.6   PROTTOTAL 7.8   ALBUMIN 3.9   BILITOTAL 0.9   ALKPHOS 45   AST 27   ALT 34     Troponins  Lab Results   Component Value Date    TROPI <0.015 12/15/2020    TROPI <0.015 2020    TROPI <0.015 2020    TROPI <0.015 2020    TROPI <0.015 2018    TROPONIN 0.00 2020    TROPONIN 0.00 2017     INRNo lab results found in last 7 days.    EK/15/2020      2020 Transthoracic echocardiogram:   Interpretation Summary  Left ventricular size is normal. The Ejection Fraction is estimated at 55-60%.  The mid to distal anteroseptum and the apex appear hypokinetic  Right ventricular function, chamber size, wall motion, and thickness are  normal.  The inferior vena cava was normal in size with preserved respiratory  variability. No pericardial effusion is present.     Wall motion abnormality appears new compared to prior study.    2020 Coronary Angiogram:  Conclusion:  Three vessel CAD s/p CABG with patent SVG to rPDA, OM2, and D1, atretic LIMA with severe proximal and mid LAD lesion culprit in ACS.  PCI with one drug eluting stent to the proximal / mid LAD.    Plan:    Continuation of dual antiplatelet therapy for 12 months      Resume oral anticoagulant warfarin medication in Post antiplatelet therapy of    Aspirin; give 81 mg qd .     Ticagrelor; and 90 mg BID.      Continue high dose statin therapy  Continue triple therapy until INR therapeutic, then stop aspirin and continue ticagrelor and warfarin for 12 months.      ATTENDING NOTE:  Patient has been seen and evaluated by me on 2020. I have reviewed the H&P.   Please refer to it for additional details.  I have reviewed today's vital signs, medications, labs, and imaging results.  I have reviewed and edited, as necessary, the history, review of systems, physical examination, and assessment and plan.  I have discussed my assessment and plan with the nurse practitioner.   I have seen and examined the patient today as part of a shared visit with Katina Frances NP.  Oswaldo Prabhakar is a 60 year old male with risk factor profile (+) HTN, (-) DM, (+) hypercholesterolemia, (-) tobacco use, (-) fam Hx premature CAD, Hx CAD s/p 4V CABG (2014: LIMA-LAD, SVG-Rt PDA, SVG-D1, SVG-OM1), recent cath (Jan 2020) showing occluded LIMA-LAD and # patent SVGs with PCI of proximal LAD at that time, presented to [facility] ER overnight with 1 week exertional chest pain relieved by rest.  ECG shows < 1 mm ST depression in precordial leads and TWI in precordial leads; no ST elevation. This is slightly improved from the ECG (11/30/20).  Troponin x 4 negative.  Review or EMR shows he was seen in ER on 11/30/20 with chest pain and SOB and diagnosed with LF sided pneumonia by CXR and was discharged on antibiotics. Today's CXR appears improved.   ECHO pending this am.  Treat with ASA, Brilinta, Metoprolol, Lisinopril, Lipitor, Zetia, and Fenofibrate.  Patient on coumadin for Hx unprovoked PE with INR 2.5.    I would like to speak to the patient to clarify nature of symptoms but I am leaning toward coronary angiography to R/O ISR of proximal LAD and to ensure the SVGs remain patent.  If angiography necessary, consider radial access.    Steven Starks MD     Cardiovascular Division

## 2020-12-16 NOTE — DISCHARGE SUMMARY
[address] [address]  p: [phone]    Discharge Summary: Cardiology Service    Oswaldo Prabhakar MRN# [id number]   YOB: 1960 Age: 60 year old       Admission Date: 12/15/2020  Discharge Date: 12/16/20    Discharge Diagnoses:  1. Chest pain, atypical   2. CAD  3. HTN  4. HLD  5. Hx of unprovoked PE    Brief HPI:  Mr. Prabhakar is a 61 yo male with history of CAD (s/p 4-vessel CABG in 2014,  PCI with NIR to prox/mid LAD 01/2020, atretic LIMA on 01/2020 cath), HTN, HLD and PE who presents with chest pain.      Patient reports onset of intermittent left-sided chest pain about a day prior to admission. Pain is dull in nature, occurs at rest, radiates occassionally to left armpit, severity 1-3/10, no associated dyspnea or diaphoresis. Not worse with exertion but did improve with nitroglycerin in ED. Occurred several times while sitting at his desk at home, episodes lasting for several minutes and resolve spontaneously. No association with eating. Of note, he was recently diagnosed with let lower lobe pneumonia (11/30) for which he was treated with amoxicillin and azithromycin with improvement in symptoms (fever, cough). Currently denies fever or chills.      Hospital Course by Diagnosis:  # Chest pain, atypical     # CAD s/p 4V CABG in 2014 (LIMA-LAD, SVg-Diag, SVG-OM1, SVG-rPDA) and PCI with NIR to prox/mid LAD in 01/2020  Presenting with acute onset exertional chest pain at rest, alleviated by nitroglycerin/rest. Troponin negative x3, EKG shows < 1 mm ST depression in precordial leads and TWI in precordial leads; no ST elevation. This is slightly improved EKG from November of this year. Upon chart review, he was seen in ER on 11/30/20 with similar chest pain and SOB and diagnosed with left-sided pneumonia by CXR and was discharged on antibiotics. Patient reports chest pain resolved with abx treatment. Today's CXR appears improved. Notably,  his last coronary angiogram in 01/2020 showed atretic LIMA. Echo showed LVEF 50-55%. Patient underwent CT coronary angiogram this admission showing all veins grafts largely patent with patent distal runoff. LIMA graft occluded and proximal LAD stent is widely patent.   - Continue ticagrelor 90 mg BID   - Continue atorvastatin 80 mg, ezetimibe 10 mg, and fenofibrate 160 mg  - Continue Repatha   - Continue metoprolol tartrate 12.5 mg BID and lisinopril 2.5 mg every day  - Follow-up with Dr. Ocampo (primary cardiologist) on 12/21      # History of unprovoked PE s/p thoracotomy with lung resection 2011  Anticoagulated with warfarin, INR 2.5 on admission.   - Continue warfarin      # HTN  Well-controlled.   - continue lisinopril and metoprolol, as above      # HLD  - continue PTA atorvastatin, zetia, and fenofibrate, and Repatha        Pertinent Procedures:  1. CT coronary angiogram     Consults:  None     Medication Changes:  None     Discharge medications:   Current Discharge Medication List      CONTINUE these medications which have NOT CHANGED    Details   atorvastatin (LIPITOR) 80 MG tablet Take 1 tablet (80 mg) by mouth daily  Qty: 90 tablet, Refills: 2    Associated Diagnoses: Pure hyperglyceridemia; Hyperlipidemia LDL goal <130      azithromycin (ZITHROMAX) 500 MG tablet Take 1 tablet (500 mg) by mouth daily  Qty: 3 tablet, Refills: 0      calcium carbonate (TUMS) 500 MG chewable tablet Take 1 chew tab by mouth daily as needed      Cholecalciferol (VITAMIN D) 2000 UNITS CAPS Take 2,000 Units by mouth daily    Associated Diagnoses: 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      DiphenhydrAMINE HCl (BENADRYL PO) Take 25-50 mg by mouth daily as needed      evolocumab (REPATHA SURECLICK) 140 MG/ML prefilled autoinjector Inject 1 mL (140 mg) Subcutaneous every 14 days  Qty: 6 each, Refills: 0    Associated Diagnoses: Hyperlipidemia LDL goal <130; S/P CABG x 4; Coronary artery disease involving nonautologous biological  coronary bypass graft with angina pectoris (H); Statin intolerance      ezetimibe (ZETIA) 10 MG tablet Take 1 tablet (10 mg) by mouth daily  Qty: 90 tablet, Refills: 3    Associated Diagnoses: Statin intolerance; Hyperlipidemia LDL goal <70; Atherosclerosis of coronary artery of native heart without angina pectoris, unspecified vessel or lesion type; S/P CABG (coronary artery bypass graft)      fenofibrate (TRIGLIDE/LOFIBRA) 160 MG tablet Take 1 tablet (160 mg) by mouth daily  Qty: 90 tablet, Refills: 2    Associated Diagnoses: Hyperlipidemia LDL goal <130      lisinopril (PRINIVIL/ZESTRIL) 2.5 MG tablet Take 1 tablet (2.5 mg) by mouth daily  Qty: 90 tablet, Refills: 3    Associated Diagnoses: Essential hypertension      metoprolol tartrate (LOPRESSOR) 25 MG tablet Take 0.5 tablets (12.5 mg) by mouth 2 times daily  Qty: 90 tablet, Refills: 3    Associated Diagnoses: S/P CABG x 4      OMEPRAZOLE PO Take 20 mg by mouth      ondansetron (ZOFRAN) 4 MG tablet Take 1 tablet (4 mg) by mouth every 8 hours as needed for nausea  Qty: 12 tablet, Refills: 0    Associated Diagnoses: Non-intractable vomiting with nausea, unspecified vomiting type      oxyCODONE-acetaminophen (PERCOCET) 5-325 MG tablet Take 1-2 tablets by mouth every 4 hours as needed for pain  Qty: 12 tablet, Refills: 0      tamsulosin (FLOMAX) 0.4 MG capsule Take 1 capsule (0.4 mg) by mouth daily  Qty: 15 capsule, Refills: 4    Associated Diagnoses: Ureteral stone      ticagrelor (BRILINTA) 90 MG tablet Take 1 tablet (90 mg) by mouth every 12 hours  Qty: 180 tablet, Refills: 3    Associated Diagnoses: Unstable angina (H); S/P angioplasty with stent      warfarin ANTICOAGULANT (COUMADIN) 5 MG tablet TAKE 1.5 TABLETS OR AS DIRECTED  Qty: 135 tablet, Refills: 1    Associated Diagnoses: Pulmonary embolism (H)             Follow-up:  - PCP in 7-10 days  - Dr. Ocampo on 12/21     Labs or imaging requiring follow-up after discharge:  None     Code status:  Full      Condition on discharge  Pulse:  [48-56] 56  Resp:  [14-21] 15  BP: (127-147)/(84-90) 127/84  SpO2:  [95 %-97 %] 95 %  General: Alert, interactive, NAD  Eyes: sclera anicteric, EOMI  Neck: JVP none, carotid 2+ bilaterally  Cardiovascular: regular rate and rhythm, normal S1 and S2, no murmurs, gallops, or rubs  Resp: clear to auscultation bilaterally, no rales, wheezes, or rhonchi  GI: Soft, nontender, nondistended. +BS.  No HSM or masses, no rebound or guarding.  Extremities: No edema, no cyanosis or clubbing, dorsalis pedis and posterior tibialis pulses 2+ bilaterally  Skin: Warm and dry, no jaundice or rash  Neuro: CN 2-12 intact, moves all extremities equally  Psych: Alert & oriented x 3    Imaging with results:  Echocardiogram 12/16/20:  Interpretation Summary  The Ejection Fraction is estimated at 50-55%.  Technically difficult study.Extremely poor acoustic windows.  Limited information was obtained during study.  Right ventricular function, chamber size, wall motion, and thickness are  normal.  The inferior vena cava is normal.  No pericardial effusion is present.  No significant changes noted.    CT Coronary Angiogram 12/16/20:  IMPRESSION:  1.  Known three-vessel CAD status post CABG with vein grafts to PDA/OM  2/D1, atretic LIMA graft, PCI to proximal LAD.  2.  All vein grafts are widely patent, with patent distal runoff. LIMA  graft is occluded. Proximal LAD stent is widely patent.  3.  Please review Radiology report for incidental noncardiac findings  that will follow separately      Patient Care Team:  Samm Vazquez MD as PCP - General (Internal Medicine)  Reji Ocampo MD as MD (Cardiology)  Shiv Vizcarra MD as MD (Family Practice)  Carmella Love MD as MD (Orthopedics)  Lavelle Solis MD as MD ([facility])  Nicole Blanc LPN as Nurse Coordinator (Cardiology)  Sav Schmitt DO as MD (Family Practice)  Samm Vazquez MD as Assigned PCP  Nguyễn Woodard MD as  Assigned Surgical Provider  Reji Ocampo MD as Assigned Heart and Vascular Provider    Katina Frances DNP, APRN, CNP  [facility]  [phone]      ATTENDING NOTE:  Patient has been seen and evaluated by me on 12/16/2020. I have reviewed the H&P.  Please refer to it for additional details.  I have reviewed today's vital signs, medications, labs, and imaging results.  I have reviewed and edited, as necessary, the history, review of systems, physical examination, and assessment and plan.  I have discussed my assessment and plan with the nurse practitioner.   I have seen and examined the patient today as part of a shared visit with Katina Frances NP.  Oswaldo Prabhakar is a 60 year old male with risk factor profile (+) HTN, (-) DM, (+) hypercholesterolemia, (-) tobacco use, (-) fam Hx premature CAD, Hx CAD s/p 4V CABG (2014: LIMA-LAD, SVG-Rt PDA, SVG-D1, SVG-OM1), recent cath (Jan 2020) showing occluded LIMA-LAD and # patent SVGs with PCI of proximal LAD at that time, presented to [facility] ER overnight with 1 week exertional chest pain relieved by rest.  ECG shows < 1 mm ST depression in precordial leads and TWI in precordial leads; no ST elevation. This is slightly improved from the ECG (11/30/20).  Troponin x 4 negative.  Review or EMR shows he was seen in ER on 11/30/20 with chest pain and SOB and diagnosed with LF sided pneumonia by CXR and was discharged on antibiotics. Today's CXR appears improved.   ECHO showed LVEF 50-55%, poor acoustic windows..  Treat with ASA, Brilinta, Metoprolol, Lisinopril, Lipitor, Zetia, and Fenofibrate.  Patient on coumadin for Hx unprovoked PE with INR 2.5.  Coronary CTA showed all vein grafts are widely patent, with patent distal runoff. LIMA graft occluded. The proximal LAD stent is widely patent.  Based on the results of the coronary CTA, I would not recommend further diagnostic studies,  Patient may be discharged from ER.       Steven Starks MD      Cardiovascular Division

## 2020-12-16 NOTE — ED NOTES
Pt ambulatory to room - states pain is on left side of chest just below collar bone - states had this pain before and was stented - also has had pneumonia in the past. No exposure to covid - was tested two weeks ago with a neg result. Currently not in any pain - states just a twinge currently.

## 2020-12-16 NOTE — ED NOTES
[facility]    ED Nurse to Floor Handoff     Oswaldo Prabhakar is a 60 year old male who speaks English and lives with family members,  in a home  They arrived in the ED by car from home    ED Chief Complaint: Chest Pain    ED Dx;   Final diagnoses:   Chest pain, unspecified type         Needed?: No    Allergies:   Allergies   Allergen Reactions     Cats      Hay Fever & [A.R.M.]      Heparin Other (See Comments)     Heparin resistance per cardio-thoracic surgeon Dr. Armando     Hydrocodone-Acetaminophen Nausea and Vomiting     Acetaminophen is ok   .  Past Medical Hx:   Past Medical History:   Diagnosis Date     Antiplatelet or antithrombotic long-term use      Coronary artery disease      Diaphragmatic hernia without mention of obstruction or gangrene      Heart disease      Hernia, abdominal      History of blood transfusion      History of thrombophlebitis      Hypertension      Nephrolithiasis 4/2010     Other and unspecified hyperlipidemia      Pulmonary embolus and infarction (H)     s/p hemothorax and filter s/p filter removal (2011), now on long term anti-coagulation     Statin intolerance 2/1/2017     Stented coronary artery 01/07/2020    NRI mid LAD     Unspecified retinal detachment     Childhood trauma, unrepaired      Baseline Mental status: WDL  Current Mental Status changes: at basesline    Infection present or suspected this encounter: no  Sepsis suspected: No  Isolation type: No active isolations  Patient tested for COVID 19 prior to admission: YES     Activity level - Baseline/Home:  Independent  Activity Level - Current:   Independent    Bariatric equipment needed?: No    In the ED these meds were given:   Medications   HYDROmorphone (PF) (DILAUDID) injection 0.5 mg (has no administration in time range)   lidocaine 1 % 0.1-1 mL (has no administration in time range)   lidocaine (LMX4) cream (has no administration in time range)   sodium chloride  (PF) 0.9% PF flush 3 mL (3 mLs Intracatheter Not Given 12/15/20 2206)   sodium chloride (PF) 0.9% PF flush 3 mL (has no administration in time range)   medication instruction (has no administration in time range)   nitroGLYcerin (NITROSTAT) sublingual tablet 0.4 mg (has no administration in time range)   alum & mag hydroxide-simethicone (MAALOX) suspension 30 mL (has no administration in time range)   senna-docusate (SENOKOT-S/PERICOLACE) 8.6-50 MG per tablet 1 tablet (1 tablet Oral Given 12/15/20 2218)     Or   senna-docusate (SENOKOT-S/PERICOLACE) 8.6-50 MG per tablet 2 tablet ( Oral See Alternative 12/15/20 2218)   polyethylene glycol (MIRALAX) Packet 17 g (has no administration in time range)   acetaminophen (TYLENOL) tablet 650 mg (has no administration in time range)   acetaminophen (TYLENOL) Suppository 650 mg (has no administration in time range)   HOLD: nitroGLYcerin IF (has no administration in time range)   Continuing aspirin from home medication list OR daily aspirin order already placed during this visit (has no administration in time range)   Continuing beta blocker from home medication list OR beta blocker order already placed during this visit (has no administration in time range)   Continuing ACE inhibitor/ARB/ARNI from home medication list OR ACE inhibitor/ARB order already placed during this visit (has no administration in time range)   Continuing statin from home medication list OR statin order already placed during this visit (has no administration in time range)   atorvastatin (LIPITOR) tablet 80 mg (has no administration in time range)   ezetimibe (ZETIA) tablet 10 mg (has no administration in time range)   fenofibrate (TRIGLIDE/LOFIBRA) tablet 160 mg (has no administration in time range)   lisinopril (ZESTRIL) tablet 2.5 mg (has no administration in time range)   metoprolol tartrate (LOPRESSOR) half-tab 12.5 mg (12.5 mg Oral Given 12/15/20 2218)   omeprazole (priLOSEC) CR capsule 20 mg (has  no administration in time range)   ticagrelor (BRILINTA) tablet 90 mg (90 mg Oral Given 12/15/20 2218)   aspirin (ASA) chewable tablet 81 mg (has no administration in time range)   aspirin (ASA) chewable tablet 324 mg (324 mg Oral Given 12/15/20 2136)       Drips running?  No    Home pump  No    Current LDAs  Peripheral IV 12/15/20 Left Lower forearm (Active)   Site Assessment [facility] 12/15/20 1735   Line Status Saline locked;Checked every 1-2 hour 12/15/20 1735   Number of days: 0       Peripheral IV 12/15/20 Left Lower forearm (Active)   Site Assessment [facility] 12/15/20 2012   Line Status Saline locked 12/15/20 2012   Phlebitis Scale 0-->no symptoms 12/15/20 2012   Number of days: 0       Urethral Catheter Coude (Active)   Number of days: 861       Urethral Catheter (Active)   Number of days: 314       Incision/Surgical Site 04/04/14 Left;Other (Comment) Leg (Active)   Number of days: 2447       Incision/Surgical Site 04/04/14 Midline Chest (Active)   Number of days: 2447       Incision/Surgical Site 11/05/15 Left Finger (Comment which one) (Active)   Number of days: 1867       Incision/Surgical Site 08/06/18 Abdomen (Active)   Number of days: 862       Incision/Surgical Site 01/10/20 Penis (Active)   Number of days: 340       Incision/Surgical Site 02/05/20 Perineum (Active)   Number of days: 314       Labs results:   Labs Ordered and Resulted from Time of ED Arrival Up to the Time of Departure from the ED   COMPREHENSIVE METABOLIC PANEL - Abnormal; Notable for the following components:       Result Value    Glucose 103 (*)     All other components within normal limits   INR - Abnormal; Notable for the following components:    INR 2.01 (*)     All other components within normal limits   CBC WITH PLATELETS DIFFERENTIAL   TROPONIN I   D DIMER QUANTITATIVE   TROPONIN I   INFLUENZA A/B & SARS-COV2 PCR MULTIPLEX   IP ASSIGN PROVIDER TEAM TO TREATMENT TEAM   PERIPHERAL IV CATHETER   DAILY WEIGHTS   INTAKE AND OUTPUT   OBTAIN  "MEDICAL RECORDS   DOCUMENT   PATIENT EDUCATION   TELEMETRY MONITORING CARDIOLOGY ADULT   VITAL SIGNS AND PAIN ASSESSMENT   PULSE OXIMETRY NURSING   PERIPHERAL IV CATHETER   ACTIVITY   NOTIFY PROVIDER   BLOOD PRESSURE   ACUTE CORONARY SYNDROME ACUTE MYOCARDIAL INFARCTION (AMI) CARE MAP   TELEMETRY MONITORING CARDIOLOGY ADULT   NOTIFY PROVIDER   APPLY PNEUMATIC COMPRESSION DEVICE (PCD)       Imaging Studies: No results found for this or any previous visit (from the past 24 hour(s)).    Recent vital signs:   /84   Pulse 67   Temp 98.9  F (37.2  C) (Oral)   Resp 16   Ht 1.803 m (5' 11\")   Wt 95.3 kg (210 lb)   SpO2 96%   BMI 29.29 kg/m      [place] Coma Scale Score: 15 (12/15/20 2029)       Cardiac Rhythm: Bradycardia  Pt needs tele? Yes  Skin/wound Issues: None    Code Status: See EPIC    Pain control: good    Nausea control: pt had none    Abnormal labs/tests/findings requiring intervention:     Family present during ED course? No   Family Comments/Social Situation comments:     Tasks needing completion: None    Collin Aceves    [phone] [facility] ED      "

## 2020-12-17 ENCOUNTER — DOCUMENTATION ONLY (OUTPATIENT)
Dept: ANTICOAGULATION | Facility: CLINIC | Age: 60
End: 2020-12-17

## 2020-12-17 DIAGNOSIS — E78.1 PURE HYPERGLYCERIDEMIA: Primary | ICD-10-CM

## 2020-12-17 DIAGNOSIS — E78.5 HYPERLIPIDEMIA LDL GOAL <130: ICD-10-CM

## 2020-12-17 NOTE — PROGRESS NOTES
ANTICOAGULATION  MANAGEMENT: Discharge Review    Oswaldo Prabhakar chart reviewed for anticoagulation continuity of care    Emergency room visit on 12/15-12/16 for chest pain.    Discharge disposition: Home    Results:    Recent labs: (last 7 days)     12/15/20  1735 12/16/20  0540   INR 2.01* 2.50*     Anticoagulation inpatient management:     Patient was started on heparin.  This was discontinued since all findings were negative.    Anticoagulation discharge instructions:     Warfarin dosing: home regimen continued   Bridging: No   INR goal change: No      Medication changes affecting anticoagulation: No    Additional factors affecting anticoagulation: No    Plan     No adjustment to anticoagulation plan needed    Patient not contacted    No adjustment to Anticoagulation Calendar was required    Kristina Wright RN

## 2020-12-17 NOTE — DISCHARGE INSTRUCTIONS
Please follow-up as discussed with cardiology and your primary care doctor.    Please return to the ER for any worsening of symptoms including chest pain, shortness of breath, or dizziness.

## 2020-12-18 RX ORDER — ATORVASTATIN CALCIUM 80 MG/1
80 TABLET, FILM COATED ORAL DAILY
Qty: 90 TABLET | Refills: 2 | Status: SHIPPED | OUTPATIENT
Start: 2020-12-18 | End: 2021-11-18

## 2021-01-05 ENCOUNTER — MYC REFILL (OUTPATIENT)
Dept: CARDIOLOGY | Facility: CLINIC | Age: 61
End: 2021-01-05

## 2021-01-05 DIAGNOSIS — Z95.1 S/P CABG X 4: ICD-10-CM

## 2021-01-05 DIAGNOSIS — E78.5 HYPERLIPIDEMIA LDL GOAL <130: ICD-10-CM

## 2021-01-05 DIAGNOSIS — Z78.9 STATIN INTOLERANCE: ICD-10-CM

## 2021-01-05 DIAGNOSIS — I25.739 CORONARY ARTERY DISEASE INVOLVING NONAUTOLOGOUS BIOLOGICAL CORONARY BYPASS GRAFT WITH ANGINA PECTORIS (H): ICD-10-CM

## 2021-01-05 RX ORDER — EVOLOCUMAB 140 MG/ML
140 INJECTION, SOLUTION SUBCUTANEOUS
Qty: 6 EACH | Refills: 0 | Status: CANCELLED | OUTPATIENT
Start: 2021-01-05

## 2021-01-07 DIAGNOSIS — Z95.1 S/P CABG X 4: ICD-10-CM

## 2021-01-08 DIAGNOSIS — I10 ESSENTIAL HYPERTENSION: ICD-10-CM

## 2021-01-11 RX ORDER — LISINOPRIL 2.5 MG/1
2.5 TABLET ORAL DAILY
Qty: 90 TABLET | Refills: 0 | Status: SHIPPED | OUTPATIENT
Start: 2021-01-11 | End: 2021-11-01

## 2021-01-11 RX ORDER — METOPROLOL TARTRATE 25 MG/1
12.5 TABLET, FILM COATED ORAL 2 TIMES DAILY
Qty: 90 TABLET | Refills: 0 | Status: SHIPPED | OUTPATIENT
Start: 2021-01-11 | End: 2021-05-26

## 2021-01-11 NOTE — TELEPHONE ENCOUNTER
lisinopril (ZESTRIL) 2.5 MG tablet  Last Written Prescription Date:  1/23/2020  Last Fill Quantity: 90,   # refills: 3  Last Office Visit : 1/27/2020  Future Office visit:  None  90 Tabs, sent to pharm 1/11/2021      Ericka Cano RN  Central Triage Red Flags/Med Refills

## 2021-01-11 NOTE — TELEPHONE ENCOUNTER
Last Clinic Visit:   1/21/2020  [facility]  Scheduling has been notified to contact the pt for appointment.  RF 90 day

## 2021-02-12 ENCOUNTER — TELEPHONE (OUTPATIENT)
Dept: INTERNAL MEDICINE | Facility: CLINIC | Age: 61
End: 2021-02-12

## 2021-02-12 NOTE — TELEPHONE ENCOUNTER
ANTICOAGULATION     Oswaldo Prabhakar is overdue for INR check.      Spoke with Oswaldo. He will call and schedule for next week.     Due 12/29/20    Letters mailed 1/12/21, 1/27/21    Luisa Chen RN

## 2021-02-26 DIAGNOSIS — Z95.1 S/P CABG X 4: ICD-10-CM

## 2021-02-26 DIAGNOSIS — I25.739 CORONARY ARTERY DISEASE INVOLVING NONAUTOLOGOUS BIOLOGICAL CORONARY BYPASS GRAFT WITH ANGINA PECTORIS (H): ICD-10-CM

## 2021-02-26 DIAGNOSIS — E78.5 HYPERLIPIDEMIA LDL GOAL <130: ICD-10-CM

## 2021-02-26 DIAGNOSIS — Z78.9 STATIN INTOLERANCE: ICD-10-CM

## 2021-03-01 RX ORDER — EVOLOCUMAB 140 MG/ML
140 INJECTION, SOLUTION SUBCUTANEOUS
Qty: 6 ML | Refills: 1 | Status: SHIPPED | OUTPATIENT
Start: 2021-03-01 | End: 2021-08-16

## 2021-03-01 NOTE — TELEPHONE ENCOUNTER
"evolocumab (REPATHA SURECLICK) 140 MG/ML prefilled autoinjector      Last Written Prescription Date:  12/7/2020  Last Fill Quantity: 6 each,   # refills: 0  Last Office Visit : 1/27/2020  Future Office visit:  none    Routing refill request to provider for review/approval because:  Medication not on the Cardiology refill protocol.  - Hospital discharge 12/16/2020 \"continue on Repatha\"    "

## 2021-03-24 NOTE — Clinical Note
Procedure is scheduled in [facility].   Will agree to consider an appropriate level of outpatient care

## 2021-04-15 ENCOUNTER — TELEPHONE (OUTPATIENT)
Dept: ANTICOAGULATION | Facility: CLINIC | Age: 61
End: 2021-04-15

## 2021-04-15 NOTE — TELEPHONE ENCOUNTER
ANTICOAGULATION     Oswaldo Prabhakar is overdue for INR check.      Spoke with Oswaldo and scheduled lab appointment on 4/16/2021.    Marielena Chacon RN

## 2021-04-16 ENCOUNTER — ANTICOAGULATION THERAPY VISIT (OUTPATIENT)
Dept: ANTICOAGULATION | Facility: CLINIC | Age: 61
End: 2021-04-16

## 2021-04-16 DIAGNOSIS — Z79.01 LONG TERM CURRENT USE OF ANTICOAGULANT THERAPY: ICD-10-CM

## 2021-04-16 DIAGNOSIS — I26.99 PULMONARY EMBOLI (H): ICD-10-CM

## 2021-04-16 LAB — INR PPP: 1.38 (ref 0.86–1.14)

## 2021-04-16 PROCEDURE — 85610 PROTHROMBIN TIME: CPT | Performed by: PATHOLOGY

## 2021-04-16 PROCEDURE — 36416 COLLJ CAPILLARY BLOOD SPEC: CPT | Performed by: PATHOLOGY

## 2021-04-16 NOTE — PROGRESS NOTES
ANTICOAGULATION FOLLOW-UP CLINIC VISIT    Patient Name:  Oswaldo Prabhakar  Date:  2021  Contact Type:  Telephone    SUBJECTIVE:  Patient Findings     Comments:  Patient likely missed a dose or 2 of warfarin over the last week.         Clinical Outcomes     Comments:  Patient likely missed a dose or 2 of warfarin over the last week.            OBJECTIVE    Recent labs: (last 7 days)     21  1102   INR 1.38*       ASSESSMENT / PLAN  No question data found.  Anticoagulation Summary  As of 2021    INR goal:  2.0-3.0   TTR:  31.4 % (3.6 mo)   INR used for dosin.38 (2021)   Warfarin maintenance plan:  7.5 mg (5 mg x 1.5) every Mon, Fri; 5 mg (5 mg x 1) all other days   Full warfarin instructions:  : 12.5 mg; Otherwise 7.5 mg every Mon, Fri; 5 mg all other days   Weekly warfarin total:  40 mg   Plan last modified:  Kwesi Garcia RN (2020)   Next INR check:  2021   Priority:  Critical   Target end date:  Indefinite    Indications    Pulmonary emboli (H) [I26.99]  Long-term (current) use of anticoagulants [Z79.01] [Z79.01]             Anticoagulation Episode Summary     INR check location:      Preferred lab:      Send INR reminders to:  [facility]    Comments:  HIPPA INFO OK to speak with wife Josselyn OK to leave messages on Home.work and cell phones  use cell phone #[phone] ++++2020 ASA 81mg DAILY AND STOP ONCE INR >2.0++++  20 Home Monitoring Machine forms placed      Anticoagulation Care Providers     Provider Role Specialty Phone number    Samm Vazquez MD Referring Internal Medicine - Pediatrics [phone]            See the Encounter Report to view Anticoagulation Flowsheet and Dosing Calendar (Go to Encounters tab in chart review, and find the Anticoagulation Therapy Visit)    Spoke with patient.    Kristina Wright RN

## 2021-04-23 ENCOUNTER — ANTICOAGULATION THERAPY VISIT (OUTPATIENT)
Dept: ANTICOAGULATION | Facility: CLINIC | Age: 61
End: 2021-04-23

## 2021-04-23 DIAGNOSIS — Z79.01 LONG TERM CURRENT USE OF ANTICOAGULANT THERAPY: ICD-10-CM

## 2021-04-23 DIAGNOSIS — I26.99 PULMONARY EMBOLI (H): ICD-10-CM

## 2021-04-23 LAB — INR PPP: 2.56 (ref 0.86–1.14)

## 2021-04-23 PROCEDURE — 36416 COLLJ CAPILLARY BLOOD SPEC: CPT | Performed by: PATHOLOGY

## 2021-04-23 PROCEDURE — 85610 PROTHROMBIN TIME: CPT | Performed by: PATHOLOGY

## 2021-04-23 NOTE — PROGRESS NOTES
ANTICOAGULATION FOLLOW-UP CLINIC VISIT    Patient Name:  Oswaldo Prabhakar  Date:  2021  Contact Type:  Telephone    SUBJECTIVE:  Patient Findings     Comments:  Spoke to patient.  Made next appt for INR on         Clinical Outcomes     Comments:  Spoke to patient.  Made next appt for INR on            OBJECTIVE    Recent labs: (last 7 days)     21  1600   INR 2.56*       ASSESSMENT / PLAN  INR assessment THER    Recheck INR In: 3 WEEKS    INR Location Clinic      Anticoagulation Summary  As of 2021    INR goal:  2.0-3.0   TTR:  34.5 % (3.6 mo)   INR used for dosin.56 (2021)   Warfarin maintenance plan:  7.5 mg (5 mg x 1.5) every Mon, Fri; 5 mg (5 mg x 1) all other days   Full warfarin instructions:  7.5 mg every Mon, Fri; 5 mg all other days   Weekly warfarin total:  40 mg   No change documented:  Kwesi Garcia RN   Plan last modified:  Kwesi Garcia RN (2020)   Next INR check:  2021   Priority:  Critical   Target end date:  Indefinite    Indications    Pulmonary emboli (H) [I26.99]  Long-term (current) use of anticoagulants [Z79.01] [Z79.01]             Anticoagulation Episode Summary     INR check location:      Preferred lab:      Send INR reminders to:  [facility]    Comments:  HIPPA INFO OK to speak with wife Josselyn OK to leave messages on Home.work and cell phones  use cell phone #[phone] ++++2020 ASA 81mg DAILY AND STOP ONCE INR >2.0++++  20 Home Monitoring Machine forms placed      Anticoagulation Care Providers     Provider Role Specialty Phone number    Samm Vazquez MD Referring Internal Medicine - Pediatrics [phone]            See the Encounter Report to view Anticoagulation Flowsheet and Dosing Calendar (Go to Encounters tab in chart review, and find the Anticoagulation Therapy Visit)    INR/CFX/F2 RESULT:INR result is 2.56    ASSESSMENT:Spoke with patient. Gave them their lab results and new warfarin recommendation.  No  changes in health, medication, or diet. No missed doses, no falls. No signs or symptoms of bleed or clotting.    DOSING ADJUSTMENT:continue pattern of dosing    NEXT INR/FACTOR X OR FACTOR II:5/14    PROTOCOL FOLLOWED:goal 2-3    Kwesi Garcia, RN

## 2021-05-10 ENCOUNTER — HOSPITAL ENCOUNTER (OUTPATIENT)
Dept: CT IMAGING | Facility: CLINIC | Age: 61
Discharge: HOME | End: 2021-05-10
Attending: PHYSICIAN ASSISTANT | Admitting: PHYSICIAN ASSISTANT
Payer: COMMERCIAL

## 2021-05-10 ENCOUNTER — OFFICE VISIT (OUTPATIENT)
Dept: PEDIATRICS | Facility: CLINIC | Age: 61
End: 2021-05-10
Attending: FAMILY MEDICINE
Payer: COMMERCIAL

## 2021-05-10 ENCOUNTER — OFFICE VISIT (OUTPATIENT)
Dept: URGENT CARE | Facility: URGENT CARE | Age: 61
End: 2021-05-10
Payer: COMMERCIAL

## 2021-05-10 VITALS
OXYGEN SATURATION: 97 % | TEMPERATURE: 98.7 F | SYSTOLIC BLOOD PRESSURE: 136 MMHG | WEIGHT: 210 LBS | RESPIRATION RATE: 18 BRPM | DIASTOLIC BLOOD PRESSURE: 88 MMHG | BODY MASS INDEX: 29.29 KG/M2 | HEART RATE: 70 BPM

## 2021-05-10 VITALS
WEIGHT: 210 LBS | TEMPERATURE: 97.9 F | OXYGEN SATURATION: 97 % | BODY MASS INDEX: 29.29 KG/M2 | HEART RATE: 83 BPM | SYSTOLIC BLOOD PRESSURE: 122 MMHG | DIASTOLIC BLOOD PRESSURE: 86 MMHG

## 2021-05-10 DIAGNOSIS — R30.0 DYSURIA: ICD-10-CM

## 2021-05-10 DIAGNOSIS — R31.0 GROSS HEMATURIA: ICD-10-CM

## 2021-05-10 DIAGNOSIS — R11.0 NAUSEA: Primary | ICD-10-CM

## 2021-05-10 DIAGNOSIS — N13.2 HYDRONEPHROSIS WITH URINARY OBSTRUCTION DUE TO URETERAL CALCULUS: Primary | ICD-10-CM

## 2021-05-10 DIAGNOSIS — N17.9 ACUTE KIDNEY INJURY (H): ICD-10-CM

## 2021-05-10 DIAGNOSIS — R31.9 HEMATURIA, UNSPECIFIED TYPE: ICD-10-CM

## 2021-05-10 LAB
ALBUMIN SERPL-MCNC: 3.8 G/DL (ref 3.4–5)
ALBUMIN UR-MCNC: ABNORMAL MG/DL
ALP SERPL-CCNC: 55 U/L (ref 40–150)
ALT SERPL W P-5'-P-CCNC: 46 U/L (ref 0–70)
ANION GAP SERPL CALCULATED.3IONS-SCNC: 4 MMOL/L (ref 3–14)
APPEARANCE UR: ABNORMAL
AST SERPL W P-5'-P-CCNC: 26 U/L (ref 0–45)
BACTERIA #/AREA URNS HPF: ABNORMAL /HPF
BASOPHILS # BLD AUTO: 0.1 10E9/L (ref 0–0.2)
BASOPHILS NFR BLD AUTO: 0.9 %
BILIRUB SERPL-MCNC: 0.6 MG/DL (ref 0.2–1.3)
BILIRUB UR QL STRIP: NEGATIVE
BUN SERPL-MCNC: 16 MG/DL (ref 7–30)
CALCIUM SERPL-MCNC: 9.3 MG/DL (ref 8.5–10.1)
CAOX CRY #/AREA URNS HPF: ABNORMAL /HPF
CHLORIDE SERPL-SCNC: 108 MMOL/L (ref 94–109)
CO2 SERPL-SCNC: 28 MMOL/L (ref 20–32)
COLOR UR AUTO: YELLOW
CREAT SERPL-MCNC: 1.5 MG/DL (ref 0.66–1.25)
DIFFERENTIAL METHOD BLD: NORMAL
EOSINOPHIL # BLD AUTO: 0.4 10E9/L (ref 0–0.7)
EOSINOPHIL NFR BLD AUTO: 3.9 %
ERYTHROCYTE [DISTWIDTH] IN BLOOD BY AUTOMATED COUNT: 13.3 % (ref 10–15)
GFR SERPL CREATININE-BSD FRML MDRD: 50 ML/MIN/{1.73_M2}
GLUCOSE SERPL-MCNC: 91 MG/DL (ref 70–99)
GLUCOSE UR STRIP-MCNC: NEGATIVE MG/DL
HCT VFR BLD AUTO: 42.7 % (ref 40–53)
HGB BLD-MCNC: 13.9 G/DL (ref 13.3–17.7)
HGB UR QL STRIP: ABNORMAL
IMM GRANULOCYTES # BLD: 0 10E9/L (ref 0–0.4)
IMM GRANULOCYTES NFR BLD: 0.2 %
KETONES UR STRIP-MCNC: NEGATIVE MG/DL
LEUKOCYTE ESTERASE UR QL STRIP: NEGATIVE
LYMPHOCYTES # BLD AUTO: 1.8 10E9/L (ref 0.8–5.3)
LYMPHOCYTES NFR BLD AUTO: 19.7 %
MCH RBC QN AUTO: 27.7 PG (ref 26.5–33)
MCHC RBC AUTO-ENTMCNC: 32.6 G/DL (ref 31.5–36.5)
MCV RBC AUTO: 85 FL (ref 78–100)
MONOCYTES # BLD AUTO: 0.8 10E9/L (ref 0–1.3)
MONOCYTES NFR BLD AUTO: 8.3 %
MUCOUS THREADS #/AREA URNS LPF: PRESENT /LPF
NEUTROPHILS # BLD AUTO: 6.2 10E9/L (ref 1.6–8.3)
NEUTROPHILS NFR BLD AUTO: 67 %
NITRATE UR QL: NEGATIVE
NON-SQ EPI CELLS #/AREA URNS LPF: ABNORMAL /LPF
NRBC # BLD AUTO: 0 10*3/UL
NRBC BLD AUTO-RTO: 0 /100
PH UR STRIP: 5.5 PH (ref 5–7)
PLATELET # BLD AUTO: 262 10E9/L (ref 150–450)
POTASSIUM SERPL-SCNC: 3.4 MMOL/L (ref 3.4–5.3)
PROT SERPL-MCNC: 7.6 G/DL (ref 6.8–8.8)
RBC # BLD AUTO: 5.02 10E12/L (ref 4.4–5.9)
RBC #/AREA URNS AUTO: >100 /HPF
SODIUM SERPL-SCNC: 140 MMOL/L (ref 133–144)
SOURCE: ABNORMAL
SP GR UR STRIP: 1.02 (ref 1–1.03)
UROBILINOGEN UR STRIP-ACNC: 0.2 EU/DL (ref 0.2–1)
WBC # BLD AUTO: 9.2 10E9/L (ref 4–11)
WBC #/AREA URNS AUTO: ABNORMAL /HPF

## 2021-05-10 PROCEDURE — 81001 URINALYSIS AUTO W/SCOPE: CPT | Performed by: FAMILY MEDICINE

## 2021-05-10 PROCEDURE — 99215 OFFICE O/P EST HI 40 MIN: CPT | Performed by: PHYSICIAN ASSISTANT

## 2021-05-10 PROCEDURE — 74176 CT ABD & PELVIS W/O CONTRAST: CPT

## 2021-05-10 PROCEDURE — 36415 COLL VENOUS BLD VENIPUNCTURE: CPT | Performed by: PHYSICIAN ASSISTANT

## 2021-05-10 PROCEDURE — 80053 COMPREHEN METABOLIC PANEL: CPT | Performed by: PHYSICIAN ASSISTANT

## 2021-05-10 PROCEDURE — 99207 REFERRAL TO ACUTE AND DIAGNOSTIC SERVICES: CPT | Performed by: FAMILY MEDICINE

## 2021-05-10 PROCEDURE — 85025 COMPLETE CBC W/AUTO DIFF WBC: CPT | Performed by: PHYSICIAN ASSISTANT

## 2021-05-10 RX ORDER — TAMSULOSIN HYDROCHLORIDE 0.4 MG/1
0.4 CAPSULE ORAL DAILY
Qty: 90 CAPSULE | Refills: 0 | Status: SHIPPED | OUTPATIENT
Start: 2021-05-10 | End: 2023-09-12

## 2021-05-10 RX ORDER — ONDANSETRON 4 MG/1
4 TABLET, ORALLY DISINTEGRATING ORAL ONCE
Status: COMPLETED | OUTPATIENT
Start: 2021-05-10 | End: 2021-05-10

## 2021-05-10 RX ORDER — ONDANSETRON 4 MG/1
4 TABLET, ORALLY DISINTEGRATING ORAL EVERY 8 HOURS PRN
Qty: 10 TABLET | Refills: 0 | Status: SHIPPED | OUTPATIENT
Start: 2021-05-10

## 2021-05-10 RX ORDER — ACETAMINOPHEN 500 MG
1000 TABLET ORAL 3 TIMES DAILY PRN
Start: 2021-05-10

## 2021-05-10 RX ORDER — OXYCODONE HYDROCHLORIDE 5 MG/1
5 TABLET ORAL EVERY 4 HOURS PRN
Qty: 12 TABLET | Refills: 0 | Status: SHIPPED | OUTPATIENT
Start: 2021-05-10 | End: 2021-05-13

## 2021-05-10 RX ADMIN — ONDANSETRON 4 MG: 4 TABLET, ORALLY DISINTEGRATING ORAL at 15:36

## 2021-05-10 NOTE — PROGRESS NOTES
Assessment & Plan     Nausea  - ondansetron (ZOFRAN-ODT) ODT tab 4 mg    Dysuria    - *UA reflex to Microscopic and Culture ([place] [facility] (except [place] and [place])  - Urine Microscopic    Hematuria, unspecified type     4mg zofran given in the clinic for nausea  Patient opted for oral ibuprofen rather than toradol, 800mg ibuprofen given here in the clinic    Referred to [facility] in [place] after discussion with treatment team, patient agrees to be seen in the clinic also.     Jens Alexandra MD   [place] [facility]CH[facility]    Salena Chacon is a 60 year old male who presents to clinic today for the following health issues:  Chief Complaint   Patient presents with     Urgent Care     Kidney Problem     start today this morning sx lower right side pain, nausea, sweaty  hx of kidney stones tx none        HPI    Had laser therapy for kidney stones done a year ago. Previous hx of kidney stones (3-4 times)   Pain currently at 4/10  Has some back and abd discomfort.   Denies vomiting. He has some nausea. No fevers.         Patient Active Problem List    Diagnosis Date Noted     Chest pain, unspecified type 12/15/2020     Priority: Medium     UTI (urinary tract infection) 01/13/2020     Priority: Medium     Renal colic on right side 01/10/2020     Priority: Medium     Ureteral stone 01/10/2020     Priority: Medium     Added automatically from request for surgery [id number]       Unstable angina (H) 01/06/2020     Priority: Medium     Added automatically from request for surgery [id number]       Statin intolerance 02/01/2017     Priority: Medium     Long-term (current) use of anticoagulants [Z79.01] 06/20/2016     Priority: Medium     CAD (coronary artery disease) 05/13/2014     Priority: Medium     S/P CABG x 4 04/04/2014     Priority: Medium     Atypical chest pain 03/13/2014     Priority: Medium     Warfarin anticoagulation 07/05/2012     Priority: Medium     S/P Pulmonary embolus with infarction and  hemothorax in 2011; No identified thrombophilia; had transient but not persistent lupus inhibitor.       Pure hyperglyceridemia 07/21/2011     Priority: Medium     Calculus of kidney 07/21/2011     Priority: Medium     Pulmonary emboli (H) 06/08/2011     Priority: Medium     multiple       HYPERLIPIDEMIA LDL GOAL <130 02/10/2010     Priority: Medium     Retinal detachment 04/05/2006     Priority: Medium     Problem list name updated by automated process. Provider to review         Current Outpatient Medications   Medication     atorvastatin (LIPITOR) 80 MG tablet     calcium carbonate (TUMS) 500 MG chewable tablet     Cholecalciferol (VITAMIN D) 2000 UNITS CAPS     evolocumab (REPATHA SURECLICK) 140 MG/ML prefilled autoinjector     ezetimibe (ZETIA) 10 MG tablet     fenofibrate (TRIGLIDE/LOFIBRA) 160 MG tablet     lisinopril (ZESTRIL) 2.5 MG tablet     metoprolol tartrate (LOPRESSOR) 25 MG tablet     OMEPRAZOLE PO     ticagrelor (BRILINTA) 90 MG tablet     warfarin ANTICOAGULANT (COUMADIN) 5 MG tablet     No current facility-administered medications for this visit.            Objective    /86   Pulse 83   Temp 97.9  F (36.6  C)   Wt 95.3 kg (210 lb)   SpO2 97%   BMI 29.29 kg/m    Physical Exam     GEN: minimal distress, non-diaphoretic, pleasant    Results for orders placed or performed in visit on 05/10/21   *UA reflex to Microscopic and Culture ([facility] (except [place] and [place])     Status: Abnormal    Specimen: Midstream Urine   Result Value Ref Range    Color Urine Yellow     Appearance Urine Slightly Cloudy     Glucose Urine Negative NEG^Negative mg/dL    Bilirubin Urine Negative NEG^Negative    Ketones Urine Negative NEG^Negative mg/dL    Specific Gravity Urine 1.025 1.003 - 1.035    Blood Urine Large (A) NEG^Negative    pH Urine 5.5 5.0 - 7.0 pH    Protein Albumin Urine Trace (A) NEG^Negative mg/dL    Urobilinogen Urine 0.2 0.2 - 1.0 EU/dL    Nitrite Urine Negative  NEG^Negative    Leukocyte Esterase Urine Negative NEG^Negative    Source Midstream Urine    Urine Microscopic     Status: Abnormal   Result Value Ref Range    WBC Urine 0 - 5 OTO5^0 - 5 /HPF    RBC Urine >100 (A) OTO2^O - 2 /HPF    Squamous Epithelial /LPF Urine Few FEW^Few /LPF    Bacteria Urine Moderate (A) NEG^Negative /HPF    Calcium Oxalate Few (A) NEG^Negative /HPF    Mucous Urine Present (A) NEG^Negative /LPF                     The use of Dragon/[employer] dictation services may have been used to construct the content in this note; any grammatical or spelling errors are non-intentional. Please contact the author of this note directly if you are in need of any clarification.

## 2021-05-10 NOTE — PROGRESS NOTES
"    Assessment & Plan     Hydronephrosis with urinary obstruction due to ureteral calculus  Gross hematuria  Acute kidney injury  Stat CT shows 3.5 mm proximal ureter stone with mild hydronephrosis.  Recommend initiation of tamsulosin, hydration efforts utilizing lemon/citrus and pain management.  Recommend follow-up appointment with urology in 1 week that he can cancel if he passes a stone on his own.  In 1 week recheck kidney function either with PCP or urology.  Advised of warning signs and when to seek urgent evaluation.  Patient voiced understanding and agreement.  - tamsulosin (FLOMAX) 0.4 MG capsule  Dispense: 90 capsule; Refill: 0  - oxyCODONE (ROXICODONE) 5 MG tablet  Dispense: 12 tablet; Refill: 0  - acetaminophen (TYLENOL) 500 MG tablet  - UROLOGY ADULT REFERRAL  - ondansetron (ZOFRAN-ODT) 4 MG ODT tab  Dispense: 10 tablet; Refill: 0  - CT Abdomen Pelvis w/o Contrast  - Comprehensive metabolic panel (BMP + Alb, Alk Phos, ALT, AST, Total. Bili, TP)  - CBC with platelets and differential       BMI:   Estimated body mass index is 29.29 kg/m  as calculated from the following:    Height as of 12/15/20: 1.803 m (5' 11\").    Weight as of this encounter: 95.3 kg (210 lb).   Weight management plan: Patient was referred to their PCP to discuss a diet and exercise plan.    Return in about 1 week (around 5/17/2021) for urology.    MARVIN [facility] LINDSEY Chacon is a 60 year old who presents for the following health issues     HPI     Flank Pain  Onset/Duration: X 1 day  Description:   Character: Dull ache  Location: right flank  Radiation: RLQ abdomen  Intensity: moderate  Progression of Symptoms:  same  Accompanying Signs & Symptoms:  Fever/Chills: no  Gas/Bloating: no  Nausea: YES  Vomitting: no  Diarrhea: no  Constipation: no  Dysuria or Hematuria: YES- UA showed hematuria today -denies gross hematuria  History:   Trauma: no  Previous similar pain: YES- similar " to previous kidney stones, needed surgery each time ( X 2)  Previous tests done: UA only today in   Precipitating factors:   Does the pain change with:     Food: no    Bowel Movement: no    Urination: no   Other factors:  no  Therapies tried and outcome: Ibuprofen 800 mg administered at  today, this helped with pain.  Also was given Zofran for nausea, this helped.    Patient has a history of kidney stones, most recent in February 2020 that required a lithotripsy/stent placement.  Historical kidney stones have been calcium oxalate.    Review of Systems   Constitutional, HEENT, cardiovascular, pulmonary, GI, , musculoskeletal, neuro, skin, endocrine and psych systems are negative, except as otherwise noted.      Objective    /88 (BP Location: Right arm, Patient Position: Chair, Cuff Size: Adult Large)   Pulse 70   Temp 98.7  F (37.1  C) (Oral)   Resp 18   Wt 95.3 kg (210 lb)   SpO2 97%   BMI 29.29 kg/m    Body mass index is 29.29 kg/m .  Physical Exam   GENERAL: healthy, alert and no distress  EYES: Eyes grossly normal to inspection  RESP: lungs clear to auscultation - no rales, rhonchi or wheezes  CV: regular rate and rhythm, normal S1 S2, no S3 or S4, no murmur, click or rub, no peripheral edema and peripheral pulses strong  ABDOMEN: soft, nontender, no hepatosplenomegaly, no masses and bowel sounds normal, right lower quadrant tenderness and right flank/CVA tenderness  MS: no gross musculoskeletal defects noted, no edema  SKIN: no suspicious lesions or rashes  NEURO: Normal strength and tone, mentation intact and speech normal  PSYCH: mentation appears normal, affect normal/bright    Results for orders placed or performed in visit on 05/10/21 (from the past 24 hour(s))   Comprehensive metabolic panel (BMP + Alb, Alk Phos, ALT, AST, Total. Bili, TP)   Result Value Ref Range    Sodium 140 133 - 144 mmol/L    Potassium 3.4 3.4 - 5.3 mmol/L    Chloride 108 94 - 109 mmol/L    Carbon Dioxide 28 20 - 32  mmol/L    Anion Gap 4 3 - 14 mmol/L    Glucose 91 70 - 99 mg/dL    Urea Nitrogen 16 7 - 30 mg/dL    Creatinine 1.50 (H) 0.66 - 1.25 mg/dL    GFR Estimate 50 (L) >60 mL/min/[1.73_m2]    GFR Estimate If Black 58 (L) >60 mL/min/[1.73_m2]    Calcium 9.3 8.5 - 10.1 mg/dL    Bilirubin Total 0.6 0.2 - 1.3 mg/dL    Albumin 3.8 3.4 - 5.0 g/dL    Protein Total 7.6 6.8 - 8.8 g/dL    Alkaline Phosphatase 55 40 - 150 U/L    ALT 46 0 - 70 U/L    AST 26 0 - 45 U/L   CBC with platelets and differential   Result Value Ref Range    WBC 9.2 4.0 - 11.0 10e9/L    RBC Count 5.02 4.4 - 5.9 10e12/L    Hemoglobin 13.9 13.3 - 17.7 g/dL    Hematocrit 42.7 40.0 - 53.0 %    MCV 85 78 - 100 fl    MCH 27.7 26.5 - 33.0 pg    MCHC 32.6 31.5 - 36.5 g/dL    RDW 13.3 10.0 - 15.0 %    Platelet Count 262 150 - 450 10e9/L    Diff Method Automated Method     % Neutrophils 67.0 %    % Lymphocytes 19.7 %    % Monocytes 8.3 %    % Eosinophils 3.9 %    % Basophils 0.9 %    % Immature Granulocytes 0.2 %    Nucleated RBCs 0 0 /100    Absolute Neutrophil 6.2 1.6 - 8.3 10e9/L    Absolute Lymphocytes 1.8 0.8 - 5.3 10e9/L    Absolute Monocytes 0.8 0.0 - 1.3 10e9/L    Absolute Eosinophils 0.4 0.0 - 0.7 10e9/L    Absolute Basophils 0.1 0.0 - 0.2 10e9/L    Abs Immature Granulocytes 0.0 0 - 0.4 10e9/L    Absolute Nucleated RBC 0.0      *Note: Due to a large number of results and/or encounters for the requested time period, some results have not been displayed. A complete set of results can be found in Results Review.     Component      Latest Ref Rng & Units 5/10/2021   Color Urine       Yellow   Appearance Urine       Slightly Cloudy   Glucose Urine      NEG:Negative mg/dL Negative   Bilirubin Urine      NEG:Negative Negative   Ketones Urine      NEG:Negative mg/dL Negative   Specific Gravity Urine      1.003 - 1.035 1.025   Blood Urine      NEG:Negative Large (A)   pH Urine      5.0 - 7.0 pH 5.5   Protein Albumin Urine      NEG:Negative mg/dL Trace (A)   Urobilinogen  Urine      0.2 - 1.0 EU/dL 0.2   Nitrite Urine      NEG:Negative Negative   Leukocyte Esterase Urine      NEG:Negative Negative   Source       Midstream Urine   WBC Urine      OTO5:0 - 5 /HPF 0 - 5   RBC Urine      OTO2:O - 2 /HPF >100 (A)   Squamous Epithelial /LPF Urine      FEW:Few /LPF Few   Bacteria Urine      NEG:Negative /HPF Moderate (A)   Calcium Oxalate      NEG:Negative /HPF Few (A)   Mucous Urine      NEG:Negative /LPF Present (A)       Recent Results (from the past 744 hour(s))   CT Abdomen Pelvis w/o Contrast    Narrative    EXAM: CT ABDOMEN PELVIS W/O CONTRAST  LOCATION: [facility]  DATE/TIME: 5/10/2021 5:10 PM    INDICATION: Flank pain, recurrent stone disease suspected - right  COMPARISON: 02/13/2020  TECHNIQUE: CT scan of the abdomen and pelvis was performed without IV contrast. Multiplanar reformats were obtained. Dose reduction techniques were used.  CONTRAST: None.    FINDINGS:   LOWER CHEST: Small esophageal hiatal hernia.    HEPATOBILIARY: Cholecystectomy.    PANCREAS: Normal.    SPLEEN: Normal.    ADRENAL GLANDS: Normal.    KIDNEYS/BLADDER: The double-J right ureteric stent seen previously has been removed. 3.5 mm stone is now seen in the proximal right ureter with mild right-sided hydronephrosis. Additional 1.5 mm stone within the right kidney. Tiny punctate nonobstructing   stone within the left kidney. Simple cyst left kidney.    BOWEL: Normal.    LYMPH NODES: Normal.    VASCULATURE: Unremarkable.    PELVIC ORGANS: Normal.    MUSCULOSKELETAL: Chronic mild wedging T12.      Impression    IMPRESSION:   1.  3.5 mm stone in the proximal right ureter with mild right-sided hydronephrosis. The previously seen right-sided double-J ureteric stent has been removed.    2.  Punctate nonobstructing stone left kidney with 1.5 mm nonobstructing stone right kidney.

## 2021-05-10 NOTE — RESULT ENCOUNTER NOTE
Results discussed directly with patient while patient was present. Any further details documented in the note.   Josephine Moran PA-C

## 2021-05-10 NOTE — PATIENT INSTRUCTIONS
Patient Education     Treating Kidney Stones: Expectant Therapy  Most kidney stones are about the size of a grape seed. Stones of this size are small enough to pass through your urine. Once it is passed, a stone can be analyzed. This wait-and-see approach is called expectant therapy.  If pain is a problem, ask your healthcare provider about pain medicines. Then follow his or her directions on how much water to drink. Drinking more water creates more urine to flush out your stone. Also be sure to strain your urine. Take any stones you pass to your provider for analysis.     Drink lots of water  Drinking lots of water may help your stone pass. Water also dilutes the chemicals in your urine. This reduces your risk of forming new stones. You may be told to drink 8, 12-ounce glasses of water a day. Don't drink liquids that dehydrate you. This includes drinks that have caffeine or alcohol.   Strain your urine  Straining your urine lets you collect your stone for analysis. Use the strainer each time you urinate. Strain your urine for as long as your healthcare provider suggests. Watch for brown, tan, gold, or black specks or tiny solitario. These may be kidney stones.   Take your medicine  Your healthcare provider may give you medicine that makes it more likely for you to pass the kidney stone.    Follow up with your healthcare provider  Follow up by taking any stones you find to your provider for analysis. The type of stone you have will determine your diet and prevention program. You may need more tests in the future. These tests will help ensure that new stones are not forming. Contact your provider if you have new symptoms or questions. Keep your follow-up appointments.   [employer] last reviewed this educational content on 4/1/2020 2000-2021 [employer]. All rights reserved. This information is not intended as a substitute for professional medical care. Always follow your healthcare professional's  instructions.

## 2021-05-14 ENCOUNTER — ANTICOAGULATION THERAPY VISIT (OUTPATIENT)
Dept: ANTICOAGULATION | Facility: CLINIC | Age: 61
End: 2021-05-14

## 2021-05-14 DIAGNOSIS — I26.99 PULMONARY EMBOLI (H): ICD-10-CM

## 2021-05-14 DIAGNOSIS — Z79.01 LONG TERM CURRENT USE OF ANTICOAGULANT THERAPY: ICD-10-CM

## 2021-05-14 LAB — INR PPP: 1.97 (ref 0.86–1.14)

## 2021-05-14 PROCEDURE — 85610 PROTHROMBIN TIME: CPT | Performed by: PATHOLOGY

## 2021-05-14 PROCEDURE — 36416 COLLJ CAPILLARY BLOOD SPEC: CPT | Performed by: PATHOLOGY

## 2021-05-14 NOTE — PROGRESS NOTES
ANTICOAGULATION FOLLOW-UP CLINIC VISIT    Patient Name:  Oswaldo Prabhakar  Date:  2021  Contact Type:  Telephone    SUBJECTIVE:  Patient Findings     Comments:  Request pt to call back if he has missed any doses         Clinical Outcomes     Comments:  Request pt to call back if he has missed any doses            OBJECTIVE    Recent labs: (last 7 days)     21  1522   INR 1.97*       ASSESSMENT / PLAN  INR assessment SUB    Recheck INR In: 2 WEEKS    INR Location Clinic      Anticoagulation Summary  As of 2021    INR goal:  2.0-3.0   TTR:  52.9 % (3.6 mo)   INR used for dosin.97 (2021)   Warfarin maintenance plan:  7.5 mg (5 mg x 1.5) every Mon, Fri; 5 mg (5 mg x 1) all other days   Full warfarin instructions:  : 10 mg; Otherwise 7.5 mg every Mon, Fri; 5 mg all other days   Weekly warfarin total:  40 mg   Plan last modified:  Kwesi Gregory RN (2020)   Next INR check:  2021   Priority:  High   Target end date:  Indefinite    Indications    Pulmonary emboli (H) [I26.99]  Long-term (current) use of anticoagulants [Z79.01] [Z79.01]             Anticoagulation Episode Summary     INR check location:      Preferred lab:      Send INR reminders to:  [facility]    Comments:  HIPPA INFO OK to speak with wife Josselyn MICHELLE to leave messages on Home.work and cell phones  use cell phone #[phone] ++++2020 ASA 81mg DAILY AND STOP ONCE INR >2.0++++  20 Home Monitoring Machine forms placed      Anticoagulation Care Providers     Provider Role Specialty Phone number    Samm Vazquez MD Referring Internal Medicine - Pediatrics [phone]            See the Encounter Report to view Anticoagulation Flowsheet and Dosing Calendar (Go to Encounters tab in chart review, and find the Anticoagulation Therapy Visit)    Left message for patient with results and dosing recommendations. Asked patient to call back to report any missed doses, falls, signs and symptoms of bleeding or  clotting, any changes in health, medication, or diet. Asked patient to call back with any questions or concerns.     Liliam Hyman RN

## 2021-05-26 ENCOUNTER — MYC REFILL (OUTPATIENT)
Dept: INTERNAL MEDICINE | Facility: CLINIC | Age: 61
End: 2021-05-26

## 2021-05-26 ENCOUNTER — PRE VISIT (OUTPATIENT)
Dept: UROLOGY | Facility: CLINIC | Age: 61
End: 2021-05-26

## 2021-05-26 DIAGNOSIS — Z95.1 S/P CABG X 4: ICD-10-CM

## 2021-05-27 RX ORDER — METOPROLOL TARTRATE 25 MG/1
12.5 TABLET, FILM COATED ORAL 2 TIMES DAILY
Qty: 30 TABLET | Refills: 0 | Status: SHIPPED | OUTPATIENT
Start: 2021-05-27 | End: 2021-08-27

## 2021-05-27 NOTE — TELEPHONE ENCOUNTER
metoprolol tartrate (LOPRESSOR) 25 MG tablet  Take 0.5 tablets (12.5 mg) by mouth 2 times daily       Last Written Prescription Date:  1/11/21  Last Fill Quantity: 90,   # refills: 0  Last Office Visit : 1/21/20  Pt should return to clinic to see PCP in 4-6 months.   Future Office visit:  none    Overdue visit. 2nd request. 30 day RF to pharmacy. Scheduling has been notified to contact the pt for appointment.

## 2021-05-28 ENCOUNTER — TELEPHONE (OUTPATIENT)
Dept: INTERNAL MEDICINE | Facility: CLINIC | Age: 61
End: 2021-05-28

## 2021-06-02 ENCOUNTER — TELEPHONE (OUTPATIENT)
Dept: ANTICOAGULATION | Facility: CLINIC | Age: 61
End: 2021-06-02

## 2021-06-02 NOTE — TELEPHONE ENCOUNTER
ANTICOAGULATION     Oswaldo Prabhakar is overdue for INR check.      Left message for patient to call and schedule lab appointment as soon as possible. If returning call, please schedule.     CASEY MANZO RN

## 2021-06-16 ENCOUNTER — TELEPHONE (OUTPATIENT)
Dept: ANTICOAGULATION | Facility: CLINIC | Age: 61
End: 2021-06-16

## 2021-06-16 NOTE — TELEPHONE ENCOUNTER
ANTICOAGULATION     Oswaldo Prabhakar is overdue for INR check.      Spoke with Oswaldo and scheduled lab appointment on 6/17/21.    Marielena Chacon RN

## 2021-06-17 ENCOUNTER — ANTICOAGULATION THERAPY VISIT (OUTPATIENT)
Dept: ANTICOAGULATION | Facility: CLINIC | Age: 61
End: 2021-06-17

## 2021-06-17 DIAGNOSIS — Z79.01 LONG TERM CURRENT USE OF ANTICOAGULANT THERAPY: ICD-10-CM

## 2021-06-17 DIAGNOSIS — I26.99 PULMONARY EMBOLI (H): ICD-10-CM

## 2021-06-17 LAB — INR PPP: 3.02 (ref 0.86–1.14)

## 2021-06-17 PROCEDURE — 36416 COLLJ CAPILLARY BLOOD SPEC: CPT | Performed by: PATHOLOGY

## 2021-06-17 PROCEDURE — 85610 PROTHROMBIN TIME: CPT | Performed by: PATHOLOGY

## 2021-06-17 NOTE — PROGRESS NOTES
ANTICOAGULATION FOLLOW-UP CLINIC VISIT    Patient Name:  Oswaldo Prabhakar  Date:  6/17/2021  Contact Type:  Telephone    SUBJECTIVE:         OBJECTIVE    Recent labs: (last 7 days)     06/17/21  1325   INR 3.02*       ASSESSMENT / PLAN  No question data found.  Anticoagulation Summary  As of 6/17/2021    INR goal:  2.0-3.0   TTR:  74.3 % (3.6 mo)   INR used for dosing:  3.02 (6/17/2021)   Warfarin maintenance plan:  7.5 mg (5 mg x 1.5) every Mon, Fri; 5 mg (5 mg x 1) all other days   Full warfarin instructions:  7.5 mg every Mon, Fri; 5 mg all other days   Weekly warfarin total:  40 mg   No change documented:  Kristian Wright RN   Plan last modified:  Kwesi Gregory RN (12/8/2020)   Next INR check:  7/1/2021   Priority:  High   Target end date:  Indefinite    Indications    Pulmonary emboli (H) [I26.99]  Long-term (current) use of anticoagulants [Z79.01] [Z79.01]             Anticoagulation Episode Summary     INR check location:      Preferred lab:      Send INR reminders to:  [facility]    Comments:  HIPPA INFO OK to speak with wife Josselyn MICHELLE to leave messages on Home.work and cell phones  use cell phone #[phone] ++++1/8/2020 ASA 81mg DAILY AND STOP ONCE INR >2.0++++  11/30/20 Home Monitoring Machine forms placed      Anticoagulation Care Providers     Provider Role Specialty Phone number    Samm Vazquez MD Referring Internal Medicine - Pediatrics [phone]            See the Encounter Report to view Anticoagulation Flowsheet and Dosing Calendar (Go to Encounters tab in chart review, and find the Anticoagulation Therapy Visit)    Spoke with patient.     Kristina Wright, RN

## 2021-07-06 ENCOUNTER — TELEPHONE (OUTPATIENT)
Dept: INTERNAL MEDICINE | Facility: CLINIC | Age: 61
End: 2021-07-06

## 2021-07-06 NOTE — TELEPHONE ENCOUNTER
ANTICOAGULATION     Oswaldo Prabhakar is overdue for INR check.      Spoke with Oswaldo and scheduled lab appointment on 7/7/21    Hyun Blas

## 2021-07-07 ENCOUNTER — ANTICOAGULATION THERAPY VISIT (OUTPATIENT)
Dept: ANTICOAGULATION | Facility: CLINIC | Age: 61
End: 2021-07-07

## 2021-07-07 DIAGNOSIS — I26.99 PULMONARY EMBOLI (H): ICD-10-CM

## 2021-07-07 DIAGNOSIS — Z79.01 LONG TERM CURRENT USE OF ANTICOAGULANT THERAPY: ICD-10-CM

## 2021-07-07 LAB — INR PPP: 2.07 (ref 0.86–1.14)

## 2021-07-07 PROCEDURE — 36416 COLLJ CAPILLARY BLOOD SPEC: CPT | Performed by: PATHOLOGY

## 2021-07-07 PROCEDURE — 85610 PROTHROMBIN TIME: CPT | Performed by: PATHOLOGY

## 2021-07-07 NOTE — PROGRESS NOTES
ANTICOAGULATION MANAGEMENT     Oswaldo Prabhakar 60 year old male is on warfarin with therapeutic INR result. (Goal INR 2.0-3.0)    Recent labs: (last 7 days)     21  1354   INR 2.07*       ASSESSMENT     Source(s): Chart Review       Warfarin doses taken: Reviewed in chart    Diet: Unable to reach for an assessment    New illness, injury, or hospitalization: Unable to reach for an assessment    Medication/supplement changes: None noted-per chart review    Signs or symptoms of bleeding or clotting: Unable to reach for an assessment    Previous INR: Supratherapeutic    Additional findings: None     PLAN     Recommended plan for no diet, medication or health factor changes affecting INR     Dosing Instructions: Continue your current warfarin dose 7.5 mg every Mon, Fri; 5 mg all other days with next INR in 2 weeks         Detailed voice message left for Oswaldo with dosing instructions and follow up date.     Contact [phone] to schedule and with any changes, questions or concerns.     Education provided: Please call back if any changes to your diet, medications or how you've been taking warfarin and Contact [phone] with any changes, questions or concerns.     Plan made per [facility] anticoagulation protocol    BERTA ESTRADA RN  Anticoagulation Clinic  2021    _______________________________________________________________________     Anticoagulation Summary  As of 2021    INR goal:  2.0-3.0   TTR:  77.1 % (3.6 mo)   INR used for dosin.07 (2021)   Warfarin maintenance plan:  7.5 mg (5 mg x 1.5) every Mon, Fri; 5 mg (5 mg x 1) all other days   Full warfarin instructions:  7.5 mg every Mon, Fri; 5 mg all other days   Weekly warfarin total:  40 mg   No change documented:  Berta Estrada RN   Plan last modified:  Kwesi Gregory RN (2020)   Next INR check:  2021   Priority:  Maintenance   Target end date:  Indefinite    Indications    Pulmonary emboli (H) [I26.99]  Long-term (current) use of  anticoagulants [Z79.01] [Z79.01]             Anticoagulation Episode Summary     INR check location:      Preferred lab:      Send INR reminders to:  [facility]    Comments:  HIPPA INFO OK to speak with wife Josselyn OK to leave messages on Home.work and cell phones  use cell phone #[phone] ++++1/8/2020 ASA 81mg DAILY AND STOP ONCE INR >2.0++++  11/30/20 Home Monitoring Machine forms placed      Anticoagulation Care Providers     Provider Role Specialty Phone number    Samm Vazquez MD Referring Internal Medicine - Pediatrics [phone]

## 2021-07-14 ENCOUNTER — LAB (OUTPATIENT)
Dept: LAB | Facility: CLINIC | Age: 61
End: 2021-07-14
Attending: INTERNAL MEDICINE
Payer: COMMERCIAL

## 2021-07-14 DIAGNOSIS — I25.10 CORONARY ARTERY DISEASE WITHOUT ANGINA PECTORIS, UNSPECIFIED VESSEL OR LESION TYPE, UNSPECIFIED WHETHER NATIVE OR TRANSPLANTED HEART: ICD-10-CM

## 2021-07-14 DIAGNOSIS — E78.5 HYPERLIPIDEMIA LDL GOAL <70: ICD-10-CM

## 2021-07-14 DIAGNOSIS — Z78.9 STATIN INTOLERANCE: ICD-10-CM

## 2021-07-14 DIAGNOSIS — Z95.1 S/P CABG X 4: ICD-10-CM

## 2021-07-14 LAB
ALBUMIN SERPL-MCNC: 3.7 G/DL (ref 3.4–5)
ALP SERPL-CCNC: 41 U/L (ref 40–150)
ALT SERPL W P-5'-P-CCNC: 41 U/L (ref 0–70)
ANION GAP SERPL CALCULATED.3IONS-SCNC: 8 MMOL/L (ref 3–14)
AST SERPL W P-5'-P-CCNC: 29 U/L (ref 0–45)
BILIRUB SERPL-MCNC: 0.5 MG/DL (ref 0.2–1.3)
BUN SERPL-MCNC: 14 MG/DL (ref 7–30)
CALCIUM SERPL-MCNC: 8.6 MG/DL (ref 8.5–10.1)
CHLORIDE BLD-SCNC: 107 MMOL/L (ref 94–109)
CHOLEST SERPL-MCNC: 93 MG/DL
CO2 SERPL-SCNC: 27 MMOL/L (ref 20–32)
CREAT SERPL-MCNC: 0.98 MG/DL (ref 0.66–1.25)
FASTING STATUS PATIENT QL REPORTED: YES
GFR SERPL CREATININE-BSD FRML MDRD: 85 ML/MIN/1.73M2
GLUCOSE BLD-MCNC: 86 MG/DL (ref 70–99)
HDLC SERPL-MCNC: 41 MG/DL
LDLC SERPL CALC-MCNC: 23 MG/DL
NONHDLC SERPL-MCNC: 52 MG/DL
POTASSIUM BLD-SCNC: 3.7 MMOL/L (ref 3.4–5.3)
PROT SERPL-MCNC: 7.2 G/DL (ref 6.8–8.8)
SODIUM SERPL-SCNC: 142 MMOL/L (ref 133–144)
TRIGL SERPL-MCNC: 144 MG/DL

## 2021-07-14 PROCEDURE — 80061 LIPID PANEL: CPT | Performed by: PATHOLOGY

## 2021-07-14 PROCEDURE — 80053 COMPREHEN METABOLIC PANEL: CPT | Performed by: PATHOLOGY

## 2021-07-14 PROCEDURE — 36415 COLL VENOUS BLD VENIPUNCTURE: CPT | Performed by: PATHOLOGY

## 2021-07-15 ENCOUNTER — OFFICE VISIT (OUTPATIENT)
Dept: CARDIOLOGY | Facility: CLINIC | Age: 61
End: 2021-07-15
Attending: INTERNAL MEDICINE
Payer: COMMERCIAL

## 2021-07-15 VITALS
HEART RATE: 90 BPM | BODY MASS INDEX: 31.94 KG/M2 | DIASTOLIC BLOOD PRESSURE: 91 MMHG | WEIGHT: 229 LBS | OXYGEN SATURATION: 96 % | SYSTOLIC BLOOD PRESSURE: 144 MMHG

## 2021-07-15 DIAGNOSIS — Z95.1 S/P CABG X 4: ICD-10-CM

## 2021-07-15 DIAGNOSIS — E78.5 HYPERLIPIDEMIA LDL GOAL <70: ICD-10-CM

## 2021-07-15 DIAGNOSIS — I25.10 CORONARY ARTERY DISEASE WITHOUT ANGINA PECTORIS, UNSPECIFIED VESSEL OR LESION TYPE, UNSPECIFIED WHETHER NATIVE OR TRANSPLANTED HEART: Primary | ICD-10-CM

## 2021-07-15 DIAGNOSIS — Z78.9 STATIN INTOLERANCE: ICD-10-CM

## 2021-07-15 PROCEDURE — 99214 OFFICE O/P EST MOD 30 MIN: CPT | Performed by: INTERNAL MEDICINE

## 2021-07-15 PROCEDURE — G0463 HOSPITAL OUTPT CLINIC VISIT: HCPCS | Mod: 25

## 2021-07-15 PROCEDURE — 93005 ELECTROCARDIOGRAM TRACING: CPT

## 2021-07-15 ASSESSMENT — PAIN SCALES - GENERAL: PAINLEVEL: NO PAIN (0)

## 2021-07-15 NOTE — LETTER
7/15/2021      RE: Oswaldo Prabhakar  [address]  [place] [address]       Dear Colleague,    Thank you for the opportunity to participate in the care of your patient, Oswaldo Prabhakar, at the [facility] [place] [facility]. Please see a copy of my visit note below.    HPI:     I had the privilege to evaluated and examined Mr. YAQUELIN Kim.  with a history of CAD s/p 4V CABG in 2014 (LIMA-LAD, SVg-Diag, SVG-OM1, SVG-rPDA) and PCI with NIR to prox/mid LAD in 01/2020, hypertension, hyperlipidemia and pulmonary emboli.    In December 2020, patient had unstable angina when had a lobar pneumonia and was seen at ER and later by Dr Starks.    Patient denies chest pain, shortness of breath, palpitations, and intermittent claudication.    Patient has been less physical active due to  the COVID-19 period.      PAST MEDICAL HISTORY:  Past Medical History:   Diagnosis Date     Antiplatelet or antithrombotic long-term use      Coronary artery disease      Diaphragmatic hernia without mention of obstruction or gangrene      Heart disease      Hernia, abdominal      History of blood transfusion      History of thrombophlebitis      Hypertension      Nephrolithiasis 4/2010     Other and unspecified hyperlipidemia      Pulmonary embolus and infarction (H)     s/p hemothorax and filter s/p filter removal (2011), now on long term anti-coagulation     Statin intolerance 2/1/2017     Stented coronary artery 01/07/2020    NIR mid LAD     Unspecified retinal detachment     Childhood trauma, unrepaired       CURRENT MEDICATIONS:  Current Outpatient Medications   Medication Sig Dispense Refill     acetaminophen (TYLENOL) 500 MG tablet Take 2 tablets (1,000 mg) by mouth 3 times daily as needed for pain       atorvastatin (LIPITOR) 80 MG tablet Take 1 tablet (80 mg) by mouth daily 90 tablet 2     calcium carbonate (TUMS) 500 MG chewable tablet Take 1 chew tab by mouth  daily as needed       Cholecalciferol (VITAMIN D) 2000 UNITS CAPS Take 2,000 Units by mouth daily       evolocumab (REPATHA SURECLICK) 140 MG/ML prefilled autoinjector Inject 1 mL (140 mg) Subcutaneous every 14 days 6 mL 1     ezetimibe (ZETIA) 10 MG tablet Take 1 tablet (10 mg) by mouth daily . Please follow up as scheduled for further refills. 90 tablet 0     fenofibrate (TRIGLIDE/LOFIBRA) 160 MG tablet Take 1 tablet (160 mg) by mouth daily 90 tablet 2     lisinopril (ZESTRIL) 2.5 MG tablet Take 1 tablet (2.5 mg) by mouth daily 90 tablet 0     metoprolol tartrate (LOPRESSOR) 25 MG tablet Take 0.5 tablets (12.5 mg) by mouth 2 times daily For additional refills, please schedule a follow-up appointment at [phone] 30 tablet 0     OMEPRAZOLE PO Take 20 mg by mouth       ondansetron (ZOFRAN-ODT) 4 MG ODT tab Take 1 tablet (4 mg) by mouth every 8 hours as needed for nausea 10 tablet 0     tamsulosin (FLOMAX) 0.4 MG capsule Take 1 capsule (0.4 mg) by mouth daily 90 capsule 0     ticagrelor (BRILINTA) 90 MG tablet Take 1 tablet (90 mg) by mouth every 12 hours 180 tablet 3     warfarin ANTICOAGULANT (COUMADIN) 5 MG tablet TAKE 1.5 TABLETS OR AS DIRECTED 135 tablet 1       PAST SURGICAL HISTORY:  Past Surgical History:   Procedure Laterality Date     BYPASS GRAFT ARTERY CORONARY  4/4/2014    Procedure: BYPASS GRAFT ARTERY CORONARY;  Median Sternotomy, Coronary Artery Bypass Bypass x 4 using Left Internal Mammary, Left Saphenous Vein, on pump oxygenation;  Surgeon: Sherry Armando MD;  [facility]     CLOSED REDUCTION, PERCUTANEOUS PINNING  HAND, COMBINED Left 11/5/2015    Procedure: COMBINED CLOSED REDUCTION, PERCUTANEOUS PINNING HAND;  Surgeon: Carmella Love MD;  [facility]     COLONOSCOPY  3/15/2013    Procedure: COLONOSCOPY;;  Surgeon: Racheal Shipman MD;  [facility]: [facility]     COMBINED CYSTOSCOPY, INSERT STENT URETER(S) Right 1/10/2020    Procedure: Cystoscopy, right retrograde  pyelogram, interpretation of fluoroscopic images, placement of 6 x 26 double-J ureteral stent;  Surgeon: Nguyễn Woodard MD;  Location: [facility] OR     COMBINED CYSTOSCOPY, RETROGRADES, URETEROSCOPY, LASER HOLMIUM LITHOTRIPSY URETER(S), INSERT STENT Right 2/5/2020    Procedure: Cystoscopy, right ureteral stent exchange, right ureteroscopy with holmium lithotripsy and stone basketing, right retrograde pyelogram, interpretation of fluoroscopic images.;  Surgeon: Nguyễn Woodard MD;  Location: [facility] OR     CV ANGIOGRAM CORONARY GRAFT N/A 1/7/2020    Procedure: Angiogram Coronary Graft;  Surgeon: Chato Odonnell MD;  [facility]     CV CORONARY ANGIOGRAM  1/7/2020    Procedure: CV CORONARY ANGIOGRAM;  Surgeon: Chato Odonnell MD;  [facility]     CV PCI STENT DRUG ELUTING N/A 1/7/2020    Procedure: PCI Stent Drug Eluting;  Surgeon: Chato Odonnell MD;  [facility]     CYSTOSCOPY       LAPAROSCOPIC CHOLECYSTECTOMY N/A 8/6/2018    Procedure: LAPAROSCOPIC CHOLECYSTECTOMY;  LAPAROSCOPIC CHOLECYSTECTOMY ;  Surgeon: José Miguel Stuart MD;  Location: [facility]     LITHOTRIPSY  4/2010     THORACIC SURGERY      lung surgery, thoracotomy, lung adhesion     ZZC NONSPECIFIC PROCEDURE      Spermatocele resection       ALLERGIES     Allergies   Allergen Reactions     Cats      Hay Fever & [A.R.M.]      Heparin Other (See Comments)     Heparin resistance per cardio-thoracic surgeon Dr. Armando     Hydrocodone-Acetaminophen Nausea and Vomiting     Acetaminophen is ok       FAMILY HISTORY:  Family History   Problem Relation Age of Onset     Heart Disease Mother      C.A.D. Father         3 vessel CABG, age 70     Cancer Father         bladder cancer     C.A.D. Paternal Grandmother      Hypertension Paternal Grandmother      Alzheimer Disease Paternal Grandmother      Diabetes No family hx of      Thyroid Disease No  family hx of      Cancer - colorectal No family hx of        SOCIAL HISTORY:  Social History     Socioeconomic History     Marital status:      Spouse name: None     Number of children: None     Years of education: None     Highest education level: None   Occupational History     None   Tobacco Use     Smoking status: Never Smoker     Smokeless tobacco: Never Used   Substance and Sexual Activity     Alcohol use: No     Comment: very rare     Drug use: No     Sexual activity: None   Other Topics Concern     Parent/sibling w/ CABG, MI or angioplasty before 65F 55M? Not Asked   Social History Narrative     None     Social Determinants of Health     Financial Resource Strain:      Difficulty of Paying Living Expenses:    Food Insecurity:      Worried About Running Out of Food in the Last Year:      Ran Out of Food in the Last Year:    Transportation Needs:      Lack of Transportation (Medical):      Lack of Transportation (Non-Medical):    Physical Activity:      Days of Exercise per Week:      Minutes of Exercise per Session:    Stress:      Feeling of Stress :    Social Connections:      Frequency of Communication with Friends and Family:      Frequency of Social Gatherings with Friends and Family:      Attends Adventism Services:      Active Member of Clubs or Organizations:      Attends Club or Organization Meetings:      Marital Status:    Intimate Partner Violence:      Fear of Current or Ex-Partner:      Emotionally Abused:      Physically Abused:      Sexually Abused:        ROS:   Constitutional: No fever, chills, or sweats. No weight gain/loss   ENT: No visual disturbance, ear ache, epistaxis, sore throat  Allergies/Immunologic: Negative.   Respiratory: No cough, hemoptysia  Cardiovascular: As per HPI  GI: No nausea, vomiting, hematemesis, melena, or hematochezia  : No urinary frequency, dysuria, or hematuria  Integument: Negative  Psychiatric: Negative  Neuro: Negative  Endocrinology: Negative    Musculoskeletal: Negative    EXAM:  BP (!) 144/91 (BP Location: Right arm, Patient Position: Chair, Cuff Size: Adult Large)   Pulse 90   Wt 103.9 kg (229 lb)   SpO2 96%   BMI 31.94 kg/m    In general, the patient is a pleasant male in no apparent distress.    HEENT: NC/AT.  PERRLA.  EOMI.  Sclerae white, not injected.  Nares clear.  Pharynx without erythema or exudate.  Dentition intact.    Neck: No adenopathy.  No thyromegaly. Carotids +4/4 bilaterally without bruits.  No jugular venous distension.   Heart: RRR. Normal S1, S2 splits physiologically. No murmur, rub, click, or gallop. The PMI is in the 5th ICS in the midclavicular line. There is no heave.    Lungs: CTA.  No ronchi, wheezes, rales.  No dullness to percussion.   Abdomen: Soft, nontender, nondistended. No organomegaly.  No bruits.   Extremities: No clubbing, cyanosis, or edema.  The pulses are +4/4 at the radial, brachial, femoral, popliteal, DP, and PT sites bilaterally.  No bruits are noted.  Neurologic: Alert and oriented to person/place/time, normal speech, gait and affect  Skin: No petechiae, purpura or rash.    BP at end of visit: 136/84 right arm , left  Arm 132/86    Labs:  LIPID RESULTS:  Lab Results   Component Value Date    CHOL 93 07/14/2021    CHOL 83 11/30/2020    HDL 41 07/14/2021    HDL 44 11/30/2020    LDL 23 07/14/2021    LDL 19 11/30/2020    TRIG 144 07/14/2021    TRIG 99 11/30/2020    CHOLHDLRATIO 7.0 (H) 09/16/2015    NHDL 52 07/14/2021    NHDL 39 11/30/2020       LIVER ENZYME RESULTS:  Lab Results   Component Value Date    AST 29 07/14/2021    AST 26 05/10/2021    ALT 41 07/14/2021    ALT 46 05/10/2021       CBC RESULTS:  Lab Results   Component Value Date    WBC 9.2 05/10/2021    RBC 5.02 05/10/2021    HGB 13.9 05/10/2021    HCT 42.7 05/10/2021    MCV 85 05/10/2021    MCH 27.7 05/10/2021    MCHC 32.6 05/10/2021    RDW 13.3 05/10/2021     05/10/2021       BMP RESULTS:  Lab Results   Component Value Date      07/14/2021     05/10/2021    POTASSIUM 3.7 07/14/2021    POTASSIUM 3.4 05/10/2021    CHLORIDE 107 07/14/2021    CHLORIDE 108 05/10/2021    CO2 27 07/14/2021    CO2 28 05/10/2021    ANIONGAP 8 07/14/2021    ANIONGAP 4 05/10/2021    GLC 86 07/14/2021    GLC 91 05/10/2021    BUN 14 07/14/2021    BUN 16 05/10/2021    CR 0.98 07/14/2021    CR 1.50 (H) 05/10/2021    GFRESTIMATED 85 07/14/2021    GFRESTIMATED 50 (L) 05/10/2021    GFRESTBLACK 58 (L) 05/10/2021    GONZALES 8.6 07/14/2021    GONZALES 9.3 05/10/2021        A1C RESULTS:  Lab Results   Component Value Date    A1C 5.8 (H) 12/16/2020       INR RESULTS:  Lab Results   Component Value Date    INR 2.07 (H) 07/07/2021    INR 3.02 (H) 06/17/2021           Assessment and Plan:     We discussed the results with the patient.  We discussed the importance of a heart healthy lifestyle and diet.  We continue with the same medical regimen.    We discussed following items with the patient:    Medication Changes: None.     Recommendations: Non-fasting labs at your convenience. Repeat fasting labs in one year prior to seeing Dr Ocampo.    Please follow up: With Dr. Ocampo in one year.    Reji Ocampo MD, PhD  Professor of Medicine  Division of Cardiology        CC  Patient Care Team:  Samm Vazquez MD as PCP - General (Internal Medicine)  Reji Ocampo MD as MD (Cardiology)  Shiv Vizcarra MD as MD (Family Practice)  Carmella Love MD as MD (Orthopedics)  Lavelle Solis MD as MD (Family Practice)  Nicole Blanc LPN as Nurse Coordinator (Cardiology)  Sav Schmitt DO as MD (Family Practice)  Nguyễn Woodard MD as Assigned Surgical Provider  Lisa Talley MD as MD (Urology)  Josephine Moran PA-C as Physician Assistant (Family Medicine)  Geneva Mckeon, DEMETRA as Specialty Care Coordinator (Urology)

## 2021-07-15 NOTE — PROGRESS NOTES
HPI:     I had the privilege to evaluated and examined Mr. YAQUELIN Kim.  with a history of CAD s/p 4V CABG in 2014 (LIMA-LAD, SVg-Diag, SVG-OM1, SVG-rPDA) and PCI with NIR to prox/mid LAD in 01/2020, hypertension, hyperlipidemia and pulmonary emboli.    In December 2020, patient had unstable angina when had a lobar pneumonia and was seen at ER and later by Dr Starks.    Patient denies chest pain, shortness of breath, palpitations, and intermittent claudication.    Patient has been less physical active due to  the COVID-19 period.      PAST MEDICAL HISTORY:  Past Medical History:   Diagnosis Date     Antiplatelet or antithrombotic long-term use      Coronary artery disease      Diaphragmatic hernia without mention of obstruction or gangrene      Heart disease      Hernia, abdominal      History of blood transfusion      History of thrombophlebitis      Hypertension      Nephrolithiasis 4/2010     Other and unspecified hyperlipidemia      Pulmonary embolus and infarction (H)     s/p hemothorax and filter s/p filter removal (2011), now on long term anti-coagulation     Statin intolerance 2/1/2017     Stented coronary artery 01/07/2020    NIR mid LAD     Unspecified retinal detachment     Childhood trauma, unrepaired       CURRENT MEDICATIONS:  Current Outpatient Medications   Medication Sig Dispense Refill     acetaminophen (TYLENOL) 500 MG tablet Take 2 tablets (1,000 mg) by mouth 3 times daily as needed for pain       atorvastatin (LIPITOR) 80 MG tablet Take 1 tablet (80 mg) by mouth daily 90 tablet 2     calcium carbonate (TUMS) 500 MG chewable tablet Take 1 chew tab by mouth daily as needed       Cholecalciferol (VITAMIN D) 2000 UNITS CAPS Take 2,000 Units by mouth daily       evolocumab (REPATHA SURECLICK) 140 MG/ML prefilled autoinjector Inject 1 mL (140 mg) Subcutaneous every 14 days 6 mL 1     ezetimibe (ZETIA) 10 MG tablet Take 1 tablet (10 mg) by mouth daily . Please follow up as scheduled for further  refills. 90 tablet 0     fenofibrate (TRIGLIDE/LOFIBRA) 160 MG tablet Take 1 tablet (160 mg) by mouth daily 90 tablet 2     lisinopril (ZESTRIL) 2.5 MG tablet Take 1 tablet (2.5 mg) by mouth daily 90 tablet 0     metoprolol tartrate (LOPRESSOR) 25 MG tablet Take 0.5 tablets (12.5 mg) by mouth 2 times daily For additional refills, please schedule a follow-up appointment at [phone] 30 tablet 0     OMEPRAZOLE PO Take 20 mg by mouth       ondansetron (ZOFRAN-ODT) 4 MG ODT tab Take 1 tablet (4 mg) by mouth every 8 hours as needed for nausea 10 tablet 0     tamsulosin (FLOMAX) 0.4 MG capsule Take 1 capsule (0.4 mg) by mouth daily 90 capsule 0     ticagrelor (BRILINTA) 90 MG tablet Take 1 tablet (90 mg) by mouth every 12 hours 180 tablet 3     warfarin ANTICOAGULANT (COUMADIN) 5 MG tablet TAKE 1.5 TABLETS OR AS DIRECTED 135 tablet 1       PAST SURGICAL HISTORY:  Past Surgical History:   Procedure Laterality Date     BYPASS GRAFT ARTERY CORONARY  4/4/2014    Procedure: BYPASS GRAFT ARTERY CORONARY;  Median Sternotomy, Coronary Artery Bypass Bypass x 4 using Left Internal Mammary, Left Saphenous Vein, on pump oxygenation;  Surgeon: Sherry Armando MD;  [facility]     CLOSED REDUCTION, PERCUTANEOUS PINNING  HAND, COMBINED Left 11/5/2015    Procedure: COMBINED CLOSED REDUCTION, PERCUTANEOUS PINNING HAND;  Surgeon: Carmella Love MD;  [facility]     COLONOSCOPY  3/15/2013    Procedure: COLONOSCOPY;;  Surgeon: Racheal Shipman MD;  [facility]: [facility]     COMBINED CYSTOSCOPY, INSERT STENT URETER(S) Right 1/10/2020    Procedure: Cystoscopy, right retrograde pyelogram, interpretation of fluoroscopic images, placement of 6 x 26 double-J ureteral stent;  Surgeon: Nguyễn Woodard MD;  Location:  OR     COMBINED CYSTOSCOPY, RETROGRADES, URETEROSCOPY, LASER HOLMIUM LITHOTRIPSY URETER(S), INSERT STENT Right 2/5/2020    Procedure: Cystoscopy, right ureteral stent exchange, right ureteroscopy  with holmium lithotripsy and stone basketing, right retrograde pyelogram, interpretation of fluoroscopic images.;  Surgeon: Nguyễn Woodard MD;  Location: [facility] OR     CV ANGIOGRAM CORONARY GRAFT N/A 1/7/2020    Procedure: Angiogram Coronary Graft;  Surgeon: Chato Odonnell MD;  [facility]     CV CORONARY ANGIOGRAM  1/7/2020    Procedure: CV CORONARY ANGIOGRAM;  Surgeon: Chato Odonnell MD;  [facility]     CV PCI STENT DRUG ELUTING N/A 1/7/2020    Procedure: PCI Stent Drug Eluting;  Surgeon: Chato Odonnell MD;  [facility]     CYSTOSCOPY       LAPAROSCOPIC CHOLECYSTECTOMY N/A 8/6/2018    Procedure: LAPAROSCOPIC CHOLECYSTECTOMY;  LAPAROSCOPIC CHOLECYSTECTOMY ;  Surgeon: José Miguel Stuart MD;  Location: [facility]     LITHOTRIPSY  4/2010     THORACIC SURGERY      lung surgery, thoracotomy, lung adhesion     ZZC NONSPECIFIC PROCEDURE      Spermatocele resection       ALLERGIES     Allergies   Allergen Reactions     Cats      Hay Fever & [A.R.M.]      Heparin Other (See Comments)     Heparin resistance per cardio-thoracic surgeon Dr. Armando     Hydrocodone-Acetaminophen Nausea and Vomiting     Acetaminophen is ok       FAMILY HISTORY:  Family History   Problem Relation Age of Onset     Heart Disease Mother      C.A.D. Father         3 vessel CABG, age 70     Cancer Father         bladder cancer     C.A.D. Paternal Grandmother      Hypertension Paternal Grandmother      Alzheimer Disease Paternal Grandmother      Diabetes No family hx of      Thyroid Disease No family hx of      Cancer - colorectal No family hx of        SOCIAL HISTORY:  Social History     Socioeconomic History     Marital status:      Spouse name: None     Number of children: None     Years of education: None     Highest education level: None   Occupational History     None   Tobacco Use     Smoking status: Never Smoker      Smokeless tobacco: Never Used   Substance and Sexual Activity     Alcohol use: No     Comment: very rare     Drug use: No     Sexual activity: None   Other Topics Concern     Parent/sibling w/ CABG, MI or angioplasty before 65F 55M? Not Asked   Social History Narrative     None     Social Determinants of Health     Financial Resource Strain:      Difficulty of Paying Living Expenses:    Food Insecurity:      Worried About Running Out of Food in the Last Year:      Ran Out of Food in the Last Year:    Transportation Needs:      Lack of Transportation (Medical):      Lack of Transportation (Non-Medical):    Physical Activity:      Days of Exercise per Week:      Minutes of Exercise per Session:    Stress:      Feeling of Stress :    Social Connections:      Frequency of Communication with Friends and Family:      Frequency of Social Gatherings with Friends and Family:      Attends Holiness Services:      Active Member of Clubs or Organizations:      Attends Club or Organization Meetings:      Marital Status:    Intimate Partner Violence:      Fear of Current or Ex-Partner:      Emotionally Abused:      Physically Abused:      Sexually Abused:        ROS:   Constitutional: No fever, chills, or sweats. No weight gain/loss   ENT: No visual disturbance, ear ache, epistaxis, sore throat  Allergies/Immunologic: Negative.   Respiratory: No cough, hemoptysia  Cardiovascular: As per HPI  GI: No nausea, vomiting, hematemesis, melena, or hematochezia  : No urinary frequency, dysuria, or hematuria  Integument: Negative  Psychiatric: Negative  Neuro: Negative  Endocrinology: Negative   Musculoskeletal: Negative    EXAM:  BP (!) 144/91 (BP Location: Right arm, Patient Position: Chair, Cuff Size: Adult Large)   Pulse 90   Wt 103.9 kg (229 lb)   SpO2 96%   BMI 31.94 kg/m    In general, the patient is a pleasant male in no apparent distress.    HEENT: NC/AT.  PERRLA.  EOMI.  Sclerae white, not injected.  Nares clear.  Pharynx  without erythema or exudate.  Dentition intact.    Neck: No adenopathy.  No thyromegaly. Carotids +4/4 bilaterally without bruits.  No jugular venous distension.   Heart: RRR. Normal S1, S2 splits physiologically. No murmur, rub, click, or gallop. The PMI is in the 5th ICS in the midclavicular line. There is no heave.    Lungs: CTA.  No ronchi, wheezes, rales.  No dullness to percussion.   Abdomen: Soft, nontender, nondistended. No organomegaly.  No bruits.   Extremities: No clubbing, cyanosis, or edema.  The pulses are +4/4 at the radial, brachial, femoral, popliteal, DP, and PT sites bilaterally.  No bruits are noted.  Neurologic: Alert and oriented to person/place/time, normal speech, gait and affect  Skin: No petechiae, purpura or rash.    BP at end of visit: 136/84 right arm , left  Arm 132/86    Labs:  LIPID RESULTS:  Lab Results   Component Value Date    CHOL 93 07/14/2021    CHOL 83 11/30/2020    HDL 41 07/14/2021    HDL 44 11/30/2020    LDL 23 07/14/2021    LDL 19 11/30/2020    TRIG 144 07/14/2021    TRIG 99 11/30/2020    CHOLHDLRATIO 7.0 (H) 09/16/2015    NHDL 52 07/14/2021    NHDL 39 11/30/2020       LIVER ENZYME RESULTS:  Lab Results   Component Value Date    AST 29 07/14/2021    AST 26 05/10/2021    ALT 41 07/14/2021    ALT 46 05/10/2021       CBC RESULTS:  Lab Results   Component Value Date    WBC 9.2 05/10/2021    RBC 5.02 05/10/2021    HGB 13.9 05/10/2021    HCT 42.7 05/10/2021    MCV 85 05/10/2021    MCH 27.7 05/10/2021    MCHC 32.6 05/10/2021    RDW 13.3 05/10/2021     05/10/2021       BMP RESULTS:  Lab Results   Component Value Date     07/14/2021     05/10/2021    POTASSIUM 3.7 07/14/2021    POTASSIUM 3.4 05/10/2021    CHLORIDE 107 07/14/2021    CHLORIDE 108 05/10/2021    CO2 27 07/14/2021    CO2 28 05/10/2021    ANIONGAP 8 07/14/2021    ANIONGAP 4 05/10/2021    GLC 86 07/14/2021    GLC 91 05/10/2021    BUN 14 07/14/2021    BUN 16 05/10/2021    CR 0.98 07/14/2021    CR 1.50 (H)  05/10/2021    GFRESTIMATED 85 07/14/2021    GFRESTIMATED 50 (L) 05/10/2021    GFRESTBLACK 58 (L) 05/10/2021    GONZALES 8.6 07/14/2021    GONZALES 9.3 05/10/2021        A1C RESULTS:  Lab Results   Component Value Date    A1C 5.8 (H) 12/16/2020       INR RESULTS:  Lab Results   Component Value Date    INR 2.07 (H) 07/07/2021    INR 3.02 (H) 06/17/2021           Assessment and Plan:     We discussed the results with the patient.  We discussed the importance of a heart healthy lifestyle and diet.  We continue with the same medical regimen.    We discussed following items with the patient:    Medication Changes: None.     Recommendations: Non-fasting labs at your convenience. Repeat fasting labs in one year prior to seeing Dr Ocampo.    Please follow up: With Dr. Ocampo in one year.    Reji Ocampo MD, PhD  Professor of Medicine  Division of Cardiology        CC  Patient Care Team:  Samm Vazquez MD as PCP - General (Internal Medicine)  Reji Ocampo MD as MD (Cardiology)  Shiv Vizcarra MD as MD (Family Practice)  Carmella Love MD as MD (Orthopedics)  Lavelle Solis MD as MD (Family Practice)  Nicole Blanc LPN as Nurse Coordinator (Cardiology)  Sav Schmitt DO as MD (Family Practice)  Reji Ocampo MD as Assigned Heart and Vascular Provider  Nguyễn Woodard MD as Assigned Surgical Provider  Lisa Talley MD as MD (Urology)  Josephine Moran PA-C as Physician Assistant (Family Medicine)  Geneva Mckeon, DEMETRA as Specialty Care Coordinator (Urology)  Josephine Moran PA-C as Assigned PCP  SELF, REFERRED

## 2021-07-15 NOTE — NURSING NOTE
Labs: Patient was given results of the laboratory testing obtained today. Patient was instructed to return for the next laboratory testing in at Pt convenience and again in one year. Patient demonstrated understanding of this information and agreed to call with further questions or concerns.   Med Reconcile: Reviewed and verified all current medications with the patient. The updated medication list was printed and given to the patient.  Return Appointment: Patient given instructions regarding scheduling next clinic visit. Patient demonstrated understanding of this information and agreed to call with further questions or concerns.  Patient stated he understood all health information given and agreed to call with further questions or concerns.    Nicole Blanc LPN

## 2021-07-15 NOTE — NURSING NOTE
Chief Complaint   Patient presents with     Follow Up     on year f/u     Vitals were taken and medications reconciled.    Lopez Bowens, EMT  4:51 PM

## 2021-07-15 NOTE — PATIENT INSTRUCTIONS
Patient Instructions:  It was a pleasure to see you in the cardiology clinic today.      If you have any questions, you can reach my nurse, Nicole SALAZAR LPN, at [phone].  Press Option #1 for the [facility], and then press Option #4 for nursing.    We are encouraging the use of [employer] to communicate with your HealthCare Provider    Medication Changes: None.    Recommendations: Non-fasting labs at your convenience. Repeat fasting labs in one year prior to seeing Dr Ocampo.    Studies Ordered: None.    The results from today include: Labs.    Please follow up: With Dr. Ocampo in one year.    Sincerely,    Reji Ocampo MD     If you have an urgent need after hours (8:00 am to 4:30 pm) please call [phone] and ask for the cardiology fellow on call.

## 2021-07-16 LAB
ATRIAL RATE - MUSE: 85 BPM
DIASTOLIC BLOOD PRESSURE - MUSE: NORMAL MMHG
INTERPRETATION ECG - MUSE: NORMAL
P AXIS - MUSE: 41 DEGREES
PR INTERVAL - MUSE: 148 MS
QRS DURATION - MUSE: 84 MS
QT - MUSE: 354 MS
QTC - MUSE: 421 MS
R AXIS - MUSE: 27 DEGREES
SYSTOLIC BLOOD PRESSURE - MUSE: NORMAL MMHG
T AXIS - MUSE: 94 DEGREES
VENTRICULAR RATE- MUSE: 85 BPM

## 2021-08-03 ENCOUNTER — TELEPHONE (OUTPATIENT)
Dept: INTERNAL MEDICINE | Facility: CLINIC | Age: 61
End: 2021-08-03

## 2021-08-03 NOTE — TELEPHONE ENCOUNTER
ANTICOAGULATION     Oswaldo Prabhakar is overdue for INR check.      Spoke with Oswaldo and scheduled lab appointment on Friday, 8/6 at 3:00PM at the [facility].    Sav Tomas    ---  Sav Tomas  Pharmacy Intern  [facility], [facility] Discharge

## 2021-08-06 ENCOUNTER — LAB (OUTPATIENT)
Dept: LAB | Facility: CLINIC | Age: 61
End: 2021-08-06
Payer: COMMERCIAL

## 2021-08-06 ENCOUNTER — ANTICOAGULATION THERAPY VISIT (OUTPATIENT)
Dept: ANTICOAGULATION | Facility: CLINIC | Age: 61
End: 2021-08-06

## 2021-08-06 DIAGNOSIS — Z79.01 LONG TERM CURRENT USE OF ANTICOAGULANT THERAPY: ICD-10-CM

## 2021-08-06 DIAGNOSIS — I26.99 PULMONARY EMBOLI (H): Primary | ICD-10-CM

## 2021-08-06 DIAGNOSIS — I26.99 PULMONARY EMBOLI (H): ICD-10-CM

## 2021-08-06 LAB — INR PPP: 2.2 (ref 0.85–1.15)

## 2021-08-06 PROCEDURE — 85610 PROTHROMBIN TIME: CPT | Performed by: PATHOLOGY

## 2021-08-06 PROCEDURE — 36415 COLL VENOUS BLD VENIPUNCTURE: CPT | Performed by: PATHOLOGY

## 2021-08-06 NOTE — PROGRESS NOTES
ANTICOAGULATION MANAGEMENT     Oswaldo Prabhakar 60 year old male is on warfarin with therapeutic INR result. (Goal INR 2.0-3.0)    Recent labs: (last 7 days)     08/06/21  1505   INR 2.20*       ASSESSMENT     Source(s): Chart Review       Warfarin doses taken: Reviewed in chart    Diet: No new diet changes identified    New illness, injury, or hospitalization: No    Medication/supplement changes: None noted    Signs or symptoms of bleeding or clotting: No    Previous INR: Therapeutic last visit; previously outside of goal range    Additional findings: None     PLAN     Recommended plan for no diet, medication or health factor changes affecting INR     Dosing Instructions: Continue your current warfarin dose with next INR in 3 weeks       Summary  As of 8/6/2021    Full warfarin instructions:  7.5 mg every Mon, Fri; 5 mg all other days   Next INR check:  8/27/2021             Detailed voice message left for Oswaldo with dosing instructions and follow up date.     Contact [phone] to schedule and with any changes, questions or concerns.     Education provided: Please call back if any changes to your diet, medications or how you've been taking warfarin and Contact [phone] with any changes, questions or concerns.     Plan made per [facility] anticoagulation protocol    Marielena Chacon, RN  Anticoagulation Clinic  8/6/2021    _______________________________________________________________________     Anticoagulation Episode Summary     Current INR goal:  2.0-3.0   TTR:  88.8 % (4.2 mo)   Target end date:  Indefinite   Send INR reminders to:  [facility]    Indications    Pulmonary emboli (H) [I26.99]  Long-term (current) use of anticoagulants [Z79.01] [Z79.01]           Comments:  HIPPA INFO OK to speak with wife Josselyn MICHELLE to leave messages on Home.work and cell phones  use cell phone #[phone] ++++1/8/2020 ASA 81mg DAILY AND STOP ONCE INR >2.0++++  11/30/20 Home Monitoring Machine forms placed          Anticoagulation Care Providers     Provider Role Specialty Phone number    Samm Vazquez MD Referring Internal Medicine - Pediatrics [phone]

## 2021-08-09 ENCOUNTER — DOCUMENTATION ONLY (OUTPATIENT)
Dept: ANTICOAGULATION | Facility: CLINIC | Age: 61
End: 2021-08-09

## 2021-08-09 DIAGNOSIS — I26.99 PULMONARY EMBOLI (H): Primary | ICD-10-CM

## 2021-08-09 DIAGNOSIS — Z79.01 LONG TERM CURRENT USE OF ANTICOAGULANT THERAPY: ICD-10-CM

## 2021-08-09 NOTE — PROGRESS NOTES
ANTICOAGULATION MANAGEMENT      Oswaldo Prabhakar due for annual renewal of referral to anticoagulation monitoring. Order pended for your review and signature.      ANTICOAGULATION SUMMARY      Warfarin indication(s)     PE    Heart valve present?  NO       Current goal range   INR: 2.0-3.0     Goal appropriate for indication? Yes, INR 2-3 appropriate for hx of DVT, PE, hypercoagulable state, Afib, LVAD, or bileaflet AVR without risk factors     Current duration of therapy Indefinite/long term therapy   Time in Therapeutic Range (TTR)  (Goal > 60%) 88.8%       Office visit with referring provider's group within last year no       CASEY MANZO, RN

## 2021-08-11 DIAGNOSIS — Z95.1 S/P CABG X 4: ICD-10-CM

## 2021-08-11 DIAGNOSIS — I25.739 CORONARY ARTERY DISEASE INVOLVING NONAUTOLOGOUS BIOLOGICAL CORONARY BYPASS GRAFT WITH ANGINA PECTORIS (H): ICD-10-CM

## 2021-08-11 DIAGNOSIS — Z78.9 STATIN INTOLERANCE: ICD-10-CM

## 2021-08-11 DIAGNOSIS — E78.5 HYPERLIPIDEMIA LDL GOAL <130: ICD-10-CM

## 2021-08-15 NOTE — CONFIDENTIAL NOTE
evolocumab (REPATHA SURECLICK) 140 MG/ML prefilled autoinjector  Last Written Prescription Date:  3/1/21  Last Fill Quantity: 6ml,   # refills: 1  Last Office Visit :7/15/21  Future Office visit:  None      Routing refill request to provider for review/approval because: not on protocol

## 2021-08-16 RX ORDER — EVOLOCUMAB 140 MG/ML
140 INJECTION, SOLUTION SUBCUTANEOUS
Qty: 6 ML | Refills: 3 | Status: SHIPPED | OUTPATIENT
Start: 2021-08-16 | End: 2022-07-20

## 2021-08-24 ENCOUNTER — MYC MEDICAL ADVICE (OUTPATIENT)
Dept: CARDIOLOGY | Facility: CLINIC | Age: 61
End: 2021-08-24

## 2021-08-24 DIAGNOSIS — E78.5 HYPERLIPIDEMIA LDL GOAL <130: ICD-10-CM

## 2021-08-24 DIAGNOSIS — Z95.1 S/P CABG X 4: ICD-10-CM

## 2021-08-24 RX ORDER — FENOFIBRATE 160 MG/1
160 TABLET ORAL DAILY
Qty: 90 TABLET | Refills: 3 | Status: SHIPPED | OUTPATIENT
Start: 2021-08-24 | End: 2022-08-18

## 2021-08-27 NOTE — TELEPHONE ENCOUNTER
metoprolol tartrate (LOPRESSOR) 25 MG tablet   Last Written Prescription Date:  5/27/21  Last Fill Quantity: 30,   # refills: 0  Last Office Visit : 1/21/2020  [facility]     Future Office visit:  None> 4-6 months    Routing refill request to provider for review/approval because:     07/15/21 (!) 144/91 ( cardiology clinic. No change in meds)       Overdue appt 3nd request. 30 day RF pended.   Scheduling has been notified to contact the pt for appointment

## 2021-08-30 ENCOUNTER — TELEPHONE (OUTPATIENT)
Dept: INTERNAL MEDICINE | Facility: CLINIC | Age: 61
End: 2021-08-30

## 2021-08-30 RX ORDER — METOPROLOL TARTRATE 25 MG/1
TABLET, FILM COATED ORAL
Qty: 30 TABLET | Refills: 0 | Status: SHIPPED | OUTPATIENT
Start: 2021-08-30 | End: 2021-09-30

## 2021-08-30 NOTE — TELEPHONE ENCOUNTER
----- Message from Rhona Marc RN sent at 8/27/2021  1:50 PM CDT -----  Regarding: appt past due Sick  Please call to schedule.    Thanks    I do not need any follow up on the scheduling of this appointment unless the patient will no longer be receiving care in our clinic.

## 2021-09-11 ENCOUNTER — HEALTH MAINTENANCE LETTER (OUTPATIENT)
Age: 61
End: 2021-09-11

## 2021-09-27 ENCOUNTER — TELEPHONE (OUTPATIENT)
Dept: ANTICOAGULATION | Facility: CLINIC | Age: 61
End: 2021-09-27

## 2021-09-27 NOTE — TELEPHONE ENCOUNTER
ANTICOAGULATION     Oswaldo Prabhakar is overdue for INR check.      Left message for patient to call and schedule lab appointment as soon as possible. If returning call, please schedule.      Due 8/27/21    Luisa Chen RN

## 2021-09-29 DIAGNOSIS — Z95.1 S/P CABG X 4: ICD-10-CM

## 2021-09-30 RX ORDER — METOPROLOL TARTRATE 25 MG/1
12.5 TABLET, FILM COATED ORAL 2 TIMES DAILY
Qty: 90 TABLET | Refills: 0 | Status: SHIPPED | OUTPATIENT
Start: 2021-09-30 | End: 2021-11-01

## 2021-09-30 NOTE — TELEPHONE ENCOUNTER
METOPROLOL TARTRATE 25 MG TAB  Last Written Prescription Date:  8/30/2021  Last Fill Quantity: 30,   # refills: 0  Last Office Visit : 1/21/2020  Future Office visit:  11/1/2021  90 Tabs sent to pharm 9/30/2021      Ericka Cano RN  Central Triage Red Flags/Med Refills

## 2021-10-04 ENCOUNTER — LAB (OUTPATIENT)
Dept: LAB | Facility: CLINIC | Age: 61
End: 2021-10-04
Payer: COMMERCIAL

## 2021-10-04 ENCOUNTER — ANTICOAGULATION THERAPY VISIT (OUTPATIENT)
Dept: ANTICOAGULATION | Facility: CLINIC | Age: 61
End: 2021-10-04

## 2021-10-04 DIAGNOSIS — Z79.01 LONG TERM CURRENT USE OF ANTICOAGULANT THERAPY: ICD-10-CM

## 2021-10-04 DIAGNOSIS — I26.99 PULMONARY EMBOLI (H): Primary | ICD-10-CM

## 2021-10-04 DIAGNOSIS — I26.99 PULMONARY EMBOLI (H): ICD-10-CM

## 2021-10-04 LAB — INR BLD: 1.5 (ref 0.9–1.1)

## 2021-10-04 PROCEDURE — 85610 PROTHROMBIN TIME: CPT | Performed by: PATHOLOGY

## 2021-10-04 PROCEDURE — 36415 COLL VENOUS BLD VENIPUNCTURE: CPT | Performed by: PATHOLOGY

## 2021-10-04 NOTE — PROGRESS NOTES
ANTICOAGULATION MANAGEMENT     Oswaldo Prabhakar 61 year old male is on warfarin with subtherapeutic INR result. (Goal INR 2.0-3.0)    Recent labs: (last 7 days)     10/04/21  1632   INR 1.5*       ASSESSMENT     Source(s): Chart Review and Patient/Caregiver Call       Warfarin doses taken: Warfarin taken as instructed    Diet: Increased greens/vitamin K in diet; plans to resume previous intake    New illness, injury, or hospitalization: No    Medication/supplement changes: None noted    Signs or symptoms of bleeding or clotting: No    Previous INR: Therapeutic last 2(+) visits    Additional findings: None     PLAN     Recommended plan for temporary change(s) affecting INR     Dosing Instructions: Booster dose then continue your current warfarin dose with next INR in 1-2 weeks       Summary  As of 10/4/2021    Full warfarin instructions:  10/4: 12.5 mg; Otherwise 7.5 mg every Mon, Fri; 5 mg all other days   Next INR check:  10/18/2021             Telephone call with Oswaldo who verbalizes understanding and agrees to plan    Lab visit scheduled    Education provided: Importance of consistent vitamin K intake, Impact of vitamin K foods on INR, Goal range and significance of current result and Monitoring for clotting signs and symptoms    Plan made per [facility] anticoagulation protocol    Mag De La Cruz RN  Anticoagulation Clinic  10/4/2021    _______________________________________________________________________     Anticoagulation Episode Summary     Current INR goal:  2.0-3.0   TTR:  69.8 % (6.2 mo)   Target end date:  Indefinite   Send INR reminders to:  [facility]    Indications    Pulmonary emboli (H) [I26.99]  Long-term (current) use of anticoagulants [Z79.01] [Z79.01]           Comments:  HIPPA INFO OK to speak with wife Josselyn MICHELLE to leave messages on Home.work and cell phones  use cell phone #[phone] ++++1/8/2020 ASA 81mg DAILY AND STOP ONCE INR >2.0++++  11/30/20 Home Monitoring Machine forms placed          Anticoagulation Care Providers     Provider Role Specialty Phone number    Samm Vazquez MD Referring Internal Medicine - Pediatrics [phone]

## 2021-10-19 ENCOUNTER — ANTICOAGULATION THERAPY VISIT (OUTPATIENT)
Dept: ANTICOAGULATION | Facility: CLINIC | Age: 61
End: 2021-10-19

## 2021-10-19 ENCOUNTER — LAB (OUTPATIENT)
Dept: LAB | Facility: CLINIC | Age: 61
End: 2021-10-19
Payer: COMMERCIAL

## 2021-10-19 DIAGNOSIS — I26.99 PULMONARY EMBOLI (H): ICD-10-CM

## 2021-10-19 DIAGNOSIS — Z79.01 LONG TERM CURRENT USE OF ANTICOAGULANT THERAPY: ICD-10-CM

## 2021-10-19 DIAGNOSIS — I26.99 PULMONARY EMBOLI (H): Primary | ICD-10-CM

## 2021-10-19 LAB — INR BLD: 3.5 (ref 0.9–1.1)

## 2021-10-19 PROCEDURE — 36415 COLL VENOUS BLD VENIPUNCTURE: CPT | Performed by: PATHOLOGY

## 2021-10-19 PROCEDURE — 85610 PROTHROMBIN TIME: CPT | Performed by: PATHOLOGY

## 2021-10-19 NOTE — PROGRESS NOTES
ANTICOAGULATION MANAGEMENT     Oswaldo Prabhakar 61 year old male is on warfarin with supratherapeutic INR result. (Goal INR 2.0-3.0)    Recent labs: (last 7 days)     10/19/21  1621   INR 3.5*       ASSESSMENT     Source(s): Chart Review       Warfarin doses taken: Reviewed in chart    Diet: Unable to reach for an assessment    New illness, injury, or hospitalization: none noted    Medication/supplement changes: None noted    Signs or symptoms of bleeding or clotting: Unable to reach for an assessment    Previous INR: Subtherapeutic    Additional findings: None     PLAN     Recommended plan for no diet, medication or health factor changes affecting INR     Dosing Instructions:  Decrease your warfarin dose (6.2% change) with next INR in 2 weeks       Summary  As of 10/19/2021    Full warfarin instructions:  7.5 mg every Mon; 5 mg all other days   Next INR check:  11/2/2021             Detailed voice message left for Oswaldo with dosing instructions and follow up date.     Contact [phone] to schedule and with any changes, questions or concerns.     Education provided: Please call back if any changes to your diet, medications or how you've been taking warfarin, Goal range and significance of current result, Monitoring for bleeding signs and symptoms, Importance of notifying clinic for changes in medications; a sooner lab recheck maybe needed., Importance of notifying clinic for diarrhea, nausea/vomiting, reduced intake, and/or illness; a sooner lab recheck maybe needed., Importance of notifying clinic of upcoming surgeries and procedures 2 weeks in advance and Contact [phone] with any changes, questions or concerns.     Plan made per [facility] anticoagulation protocol    CASEY MANZO RN  Anticoagulation Clinic  10/19/2021    _______________________________________________________________________     Anticoagulation Episode Summary     Current INR goal:  2.0-3.0   TTR:  68.3 % (6.7 mo)   Target end date:  Indefinite    Send INR reminders to:  [facility]    Indications    Pulmonary emboli (H) [I26.99]  Long-term (current) use of anticoagulants [Z79.01] [Z79.01]           Comments:  HIPPA INFO OK to speak with wife Josselyn OK to leave messages on Home.work and cell phones  use cell phone #[phone] ++++1/8/2020 ASA 81mg DAILY AND STOP ONCE INR >2.0++++  11/30/20 Home Monitoring Machine forms placed         Anticoagulation Care Providers     Provider Role Specialty Phone number    Samm Vazquez MD Referring Internal Medicine - Pediatrics [phone]

## 2021-11-01 ENCOUNTER — OFFICE VISIT (OUTPATIENT)
Dept: INTERNAL MEDICINE | Facility: CLINIC | Age: 61
End: 2021-11-01
Payer: COMMERCIAL

## 2021-11-01 ENCOUNTER — ANCILLARY PROCEDURE (OUTPATIENT)
Dept: GENERAL RADIOLOGY | Facility: CLINIC | Age: 61
End: 2021-11-01
Attending: INTERNAL MEDICINE
Payer: COMMERCIAL

## 2021-11-01 VITALS
WEIGHT: 234 LBS | HEART RATE: 56 BPM | DIASTOLIC BLOOD PRESSURE: 88 MMHG | BODY MASS INDEX: 32.76 KG/M2 | OXYGEN SATURATION: 96 % | SYSTOLIC BLOOD PRESSURE: 153 MMHG | HEIGHT: 71 IN | RESPIRATION RATE: 16 BRPM

## 2021-11-01 DIAGNOSIS — M79.672 LEFT FOOT PAIN: Primary | ICD-10-CM

## 2021-11-01 DIAGNOSIS — Z23 NEED FOR PNEUMOCOCCAL VACCINATION: ICD-10-CM

## 2021-11-01 DIAGNOSIS — I25.111 ATHEROSCLEROSIS OF NATIVE CORONARY ARTERY OF NATIVE HEART WITH ANGINA PECTORIS WITH DOCUMENTED SPASM (H): ICD-10-CM

## 2021-11-01 DIAGNOSIS — Z95.1 S/P CABG X 4: ICD-10-CM

## 2021-11-01 DIAGNOSIS — M79.672 LEFT FOOT PAIN: ICD-10-CM

## 2021-11-01 DIAGNOSIS — I10 ESSENTIAL HYPERTENSION: ICD-10-CM

## 2021-11-01 DIAGNOSIS — I27.82 CHRONIC PULMONARY EMBOLISM WITHOUT ACUTE COR PULMONALE, UNSPECIFIED PULMONARY EMBOLISM TYPE (H): ICD-10-CM

## 2021-11-01 PROCEDURE — 99214 OFFICE O/P EST MOD 30 MIN: CPT | Mod: 25 | Performed by: INTERNAL MEDICINE

## 2021-11-01 PROCEDURE — 73630 X-RAY EXAM OF FOOT: CPT | Mod: LT | Performed by: RADIOLOGY

## 2021-11-01 PROCEDURE — 90471 IMMUNIZATION ADMIN: CPT | Performed by: INTERNAL MEDICINE

## 2021-11-01 PROCEDURE — 90732 PPSV23 VACC 2 YRS+ SUBQ/IM: CPT | Performed by: INTERNAL MEDICINE

## 2021-11-01 RX ORDER — LISINOPRIL 2.5 MG/1
2.5 TABLET ORAL DAILY
Qty: 90 TABLET | Refills: 3 | Status: SHIPPED | OUTPATIENT
Start: 2021-11-01 | End: 2022-08-02

## 2021-11-01 RX ORDER — METOPROLOL TARTRATE 25 MG/1
12.5 TABLET, FILM COATED ORAL 2 TIMES DAILY
Qty: 90 TABLET | Refills: 3 | Status: SHIPPED | OUTPATIENT
Start: 2021-11-01 | End: 2022-12-08

## 2021-11-01 RX ORDER — WARFARIN SODIUM 5 MG/1
TABLET ORAL
Qty: 135 TABLET | Refills: 3 | Status: SHIPPED | OUTPATIENT
Start: 2021-11-01 | End: 2023-01-26

## 2021-11-01 ASSESSMENT — PAIN SCALES - GENERAL: PAINLEVEL: NO PAIN (0)

## 2021-11-01 ASSESSMENT — MIFFLIN-ST. JEOR: SCORE: 1898.55

## 2021-11-01 NOTE — NURSING NOTE
Oswaldo Prabhakar is a 61 year old male patient that presents today in clinic for the following:    Chief Complaint   Patient presents with     RECHECK     Check up, problems with foot     The patient's allergies and medications were reviewed as noted. A set of vitals were recorded as noted without incident. The patient does not have any other questions for the provider.    Ivett Barry, EMT at 8:57 AM on 11/1/2021

## 2021-11-01 NOTE — PROGRESS NOTES
ASSESSMENT/PLAN:  1. Preventative Health Maintenance   High risk due to history of pulmonary emboli and coronary artery disease, will receive PCV 23 today.  - Pneumococcal vaccine 23 valent PPSV23  (Pneumovax) [14382]    2.  Left foot pain  Left foot pain on dorsum of foot appears to be focused at tarsal metatarsal articulation. Possibly due to slight falling of arch leading to change in mechanics of foot putting increased pressure on that joint. Inversion may increase the arch, which could explain why this relieves his pain. Will get Xray today to rule out fracture or other bone pathology. Advised to try over the counter arch support orthotics to see if this helps at all while he waits for podiatry appointment.   - XR Foot Left G/E 3 Views; Future  - Orthopedic  Referral; Future    3. Trace lower extremity edema  Bilateral LE edema first noticed a few weeks ago. No chest pain, shortness of breath, claudication, etc to indicate acute worsening of cardiac function leading to edema. Patient will continue to monitor edema and will try compression socks. If edema worsens, will need to consider cardiac etiology.     4. Refill of Medications that he uses for hypertension, PE and CABG  - lisinopril (ZESTRIL) 2.5 MG tablet; Take 1 tablet (2.5 mg) by mouth daily  Dispense: 90 tablet; Refill: 3  - metoprolol tartrate (LOPRESSOR) 25 MG tablet; Take 0.5 tablets (12.5 mg) by mouth 2 times daily  Dispense: 90 tablet; Refill: 3  - warfarin ANTICOAGULANT (COUMADIN) 5 MG tablet; TAKE 1.5 TABLETS (7.5mg) Mon and Fri and 1 tablet (5mg) on the other days  Dispense: 135 tablet; Refill: 3        Number and complexity of problems:  3 stable chronic illnesses (hypertension, pulmonary embolism, CABG)  1  undiagnosed new problem    Amount and Complexity of Data Reviewed/Analyzed:  1 Unique test ordered    Risk of complication/morbidity of patient management:  Patient receiving prescription management    Patient seen and discussed  "with attending physician Dr. Taylor Martino   MS3, [facility]      I, Samm Vazquez, was present with the medical student who participated in the service and in the documentation of the note.  I have verified the history and personally performed the physical exam and medical decision making.  I agree with the assessment and plan of care as documented in the note.      Samm Vazquez MD, FACP      Chief complaint:  Oswaldo Prabhakar is a 61 year old male presents for   Chief Complaint   Patient presents with     RECHECK     Check up, problems with foot        SUBJECTIVE:    Oswaldo reports a few months of pain in left foot with walking. Pain begins on dorsum of foot and radiates toward medial malleolus. Rates pain as 9/10. Happens every time he walks and with any distance. Never happens at rest. Pain does not get better if he stops walking. Pain is relieved with inversion of foot. Not change with eversion. Reports that pain came on suddenly a few months ago with no known inciting event.  Feels that it has gotten worse since onset. No numbness or tingling of feet bilaterally. No pain in R foot. No shortness of breath, chest pain, leg claudication or weakness with walking. Has history of plantar fascitis and reports that this pain feels different. Reports mild bilateral peripheral edema which he has noticed in last few weeks.     Has been doing well otherwise. No recent illnesses, injuries, hospitalizations. No recent med changes. No clotting events. No new renal stones. Lipids have improved. INR fluctuates, which Oswaldo believes is due to sensitivity to vitamin K in greens and other vegetables.     Diet is \"fine\" eating 1 serving of fruits and vegetables per day. Activity has decreased and he has gained weight. Walking with dog everyday. His wife was diagnosed with breast cancer in 2020 and is currently undergoing treatment. He is caring for her which has limited his time and ability to do as much exercise " or outdoor activity as he would like.   He is interested in shingrix vaccine and will check with his insurance company about coverage.     Medications and allergies were reviewed by me today.     Review Of Systems  Constitutional: no fevers, chills, night sweats or unintentional weight change   Eyes: no vision change  Ears, Nose, Mouth, Throat: no tinnitus or hearing change   Cardiovascular: no chest pain, palpitations, or pain with walking, no orthopnea or PND   Respiratory: no dyspnea, cough, shortness of breath or wheezing   GI: no nausea, vomiting, diarrhea or constipation, no abdominal pain   : no change in urine, no dysuria or hematuria  Musculoskeletal: no joint or muscle pain or swelling   Integumentary: no concerning lesions or moles   Neuro: no loss of strength or sensation, no numbness or tingling, no tremor, no dizziness, no headache   Endo: no polyuria or polydipsia  Heme/Lymph: no concerning bumps, no bleeding problems   Psych: no depression or anxiety, no sleep problems    Patient Active Problem List    Diagnosis Date Noted     Chest pain, unspecified type 12/15/2020     Priority: Medium     UTI (urinary tract infection) 01/13/2020     Priority: Medium     Renal colic on right side 01/10/2020     Priority: Medium     Ureteral stone 01/10/2020     Priority: Medium     Added automatically from request for surgery [id number]       Unstable angina (H) 01/06/2020     Priority: Medium     Added automatically from request for surgery [id number]       Statin intolerance 02/01/2017     Priority: Medium     Long-term (current) use of anticoagulants [Z79.01] 06/20/2016     Priority: Medium     CAD (coronary artery disease) 05/13/2014     Priority: Medium     S/P CABG x 4 04/04/2014     Priority: Medium     Atypical chest pain 03/13/2014     Priority: Medium     Warfarin anticoagulation 07/05/2012     Priority: Medium     S/P Pulmonary embolus with infarction and hemothorax in 2011; No identified thrombophilia; had  "transient but not persistent lupus inhibitor.       Pure hyperglyceridemia 07/21/2011     Priority: Medium     Calculus of kidney 07/21/2011     Priority: Medium     Pulmonary emboli (H) 06/08/2011     Priority: Medium     multiple       HYPERLIPIDEMIA LDL GOAL <130 02/10/2010     Priority: Medium     Retinal detachment 04/05/2006     Priority: Medium     Problem list name updated by automated process. Provider to review         PHYSICAL EXAM:  BP (!) 153/88 (BP Location: Right arm, Patient Position: Sitting, Cuff Size: Adult Regular)   Pulse 56   Resp 16   Ht 1.803 m (5' 11\")   Wt 106.1 kg (234 lb)   SpO2 96%   BMI 32.64 kg/m    Constitutional: no distress, comfortable, pleasant   Eyes: anicteric, normal extra-ocular movements   Cardiovascular: regular rate and rhythm, normal S1 and S2, no murmurs, rubs or gallops, peripheral pulses full and symmetric   Respiratory: clear to auscultation, no wheezes or crackles, normal breath sounds   Gastrointestinal: positive bowel sounds, nontender, no hepatosplenomegaly, no masses   Musculoskeletal: full range of motion, Trace edema bilaterally to mid-shin. Full active and passive range of motion of L ankle. Pain reproduced, but less intense, with plantarflexion. No pain with dorsiflexion. No tenderness to palpation of ankle or foot. L and R feet are symmetric with no visual changes to arches or bony prominences.   Skin: no concerning lesions, no jaundice   Neurological: cranial nerves intact, no tremor, Normal, symmetric gait.   Psychological: appropriate mood   Lymphatic: no cervical lymphadenopathy                  "

## 2021-11-10 ENCOUNTER — TELEPHONE (OUTPATIENT)
Dept: CARDIOLOGY | Facility: CLINIC | Age: 61
End: 2021-11-10
Payer: COMMERCIAL

## 2021-11-10 NOTE — TELEPHONE ENCOUNTER
PA Initiation    Medication: Repatha--PA Renewal (Initiated)  Insurance Company: [employer] - Phone [phone] Fax [phone]  Pharmacy Filling the Rx:    Filling Pharmacy Phone:    Filling Pharmacy Fax:    Start Date: 11/10/2021

## 2021-11-18 DIAGNOSIS — E78.5 HYPERLIPIDEMIA LDL GOAL <130: ICD-10-CM

## 2021-11-18 DIAGNOSIS — E78.1 PURE HYPERGLYCERIDEMIA: ICD-10-CM

## 2021-11-18 RX ORDER — ATORVASTATIN CALCIUM 80 MG/1
80 TABLET, FILM COATED ORAL DAILY
Qty: 90 TABLET | Refills: 2 | Status: SHIPPED | OUTPATIENT
Start: 2021-11-18 | End: 2022-10-07

## 2021-11-18 NOTE — TELEPHONE ENCOUNTER
Prior Authorization Approval    Authorization Effective Date: 12/9/2021  Authorization Expiration Date: 12/9/2022  Medication: Repatha--PA Renewal (Approved)  Approved Dose/Quantity: 2ml/28days  Reference #: KEY: [id number]   Insurance Company: [employer] [phone] Fax [phone]  Expected CoPay:       CoPay Card Available:      Foundation Assistance Needed:    Which Pharmacy is filling the prescription (Not needed for infusion/clinic administered):    Pharmacy Notified:    Patient Notified:

## 2021-12-23 ENCOUNTER — TELEPHONE (OUTPATIENT)
Dept: ANTICOAGULATION | Facility: CLINIC | Age: 61
End: 2021-12-23
Payer: COMMERCIAL

## 2021-12-23 NOTE — TELEPHONE ENCOUNTER
ANTICOAGULATION     Oswaldo Prabhakar is overdue for INR check.      Spoke with Oswaldo and scheduled lab appointment on 12/28    Mag De La Cruz RN

## 2021-12-28 ENCOUNTER — LAB (OUTPATIENT)
Dept: LAB | Facility: CLINIC | Age: 61
End: 2021-12-28
Payer: COMMERCIAL

## 2021-12-28 ENCOUNTER — ANTICOAGULATION THERAPY VISIT (OUTPATIENT)
Dept: ANTICOAGULATION | Facility: CLINIC | Age: 61
End: 2021-12-28

## 2021-12-28 DIAGNOSIS — I26.99 PULMONARY EMBOLI (H): ICD-10-CM

## 2021-12-28 DIAGNOSIS — Z79.01 LONG TERM CURRENT USE OF ANTICOAGULANT THERAPY: ICD-10-CM

## 2021-12-28 DIAGNOSIS — I26.99 PULMONARY EMBOLI (H): Primary | ICD-10-CM

## 2021-12-28 LAB — INR BLD: 5.2 (ref 0.9–1.1)

## 2021-12-28 PROCEDURE — 36415 COLL VENOUS BLD VENIPUNCTURE: CPT | Performed by: PATHOLOGY

## 2021-12-28 PROCEDURE — 85610 PROTHROMBIN TIME: CPT | Performed by: PATHOLOGY

## 2021-12-28 NOTE — PROGRESS NOTES
ANTICOAGULATION MANAGEMENT     Oswaldo Prabhakar 61 year old male is on warfarin with supratherapeutic INR result. (Goal INR 2.0-3.0)    Pt reports INR 5.3 which is not in the chart yet.    No results for input(s): INR in the last 168 hours.    ASSESSMENT     Source(s): Patient/Caregiver Call       Warfarin doses taken: More warfarin taken than planned which may be affecting INR Pt reports he has been taking 7.5mg MF and 5mg ROW    Diet: Decreased greens/vitamin K in diet; plans to resume previous intake    New illness, injury, or hospitalization: No    Medication/supplement changes: None noted    Signs or symptoms of bleeding or clotting: No    Previous INR: Supratherapeutic    Additional findings: None     PLAN     Recommended plan for no diet, medication or health factor changes affecting INR     Dosing Instructions: Hold dose then Decrease your warfarin dose (6.7% change) with next INR in 4 days       Summary  As of 12/28/2021    Full warfarin instructions:  12/29: Hold; Otherwise 5 mg every day   Next INR check:  12/31/2021             Telephone call with Oswaldo who verbalizes understanding and agrees to plan and who agrees to plan and repeated back plan correctly    Lab visit scheduled    Education provided: None required    Plan made per [facility] anticoagulation protocol    Liliam Hyman RN  Anticoagulation Clinic  12/28/2021    _______________________________________________________________________     Anticoagulation Episode Summary     Current INR goal:  2.0-3.0   TTR:  69.6 % (6.2 mo)   Target end date:  Indefinite   Send INR reminders to:  [facility]    Indications    Pulmonary emboli (H) [I26.99]  Long-term (current) use of anticoagulants [Z79.01] [Z79.01]           Comments:  HIPPA INFO OK to speak with wife Josselyn OK to leave messages on Home.work and cell phones  use cell phone #[phone] ++++1/8/2020 ASA 81mg DAILY AND STOP ONCE INR >2.0++++  11/30/20 Home Monitoring Machine forms placed          Anticoagulation Care Providers     Provider Role Specialty Phone number    Samm Vazquez MD Referring Internal Medicine - Pediatrics [phone]

## 2021-12-30 NOTE — PLAN OF CARE
Problem: Hemodialysis  Goal: Dialysis: Safe, effective, and comfortable hemodialysis treatment (Hemodialysis nurse only)  Note: 3.5 hr treatment completed with 2.5 kg removed.  3 spa given for BP support.    Goal: Dialysis: Free of complications related to initiation/termination of dialysis (Hemodialysis nurse only)  Note: BFR of 400 through treatment capped with sodium citrate.        Time 0068-3942     Reason for admission: UTI, sepsis    Vitals: VSS on RA; except febrile Tmax 101.6    /75 (BP Location: Right arm)   Pulse 72   Temp 100.2  F (37.9  C) (Oral)   Resp 16   Wt 95.1 kg (209 lb 9.6 oz)   SpO2 98%   BMI 28.42 kg/m      Activity: Ind  Pain: c/o HA and lower back discomfort  Neuro: A&Ox4  Cardiac: tachycardic  Respiratory: WDL on RA  GI/: dark red urine. Nausea, emesis x1, zofran given. No BM.   Diet: advanced to full liquid  Lines: PIV  Skin: WDL  Labs: INR 2.6. Renal US shows no hydronephrosis.   New this shift: Pt febrile most of shift; primary aware, tylenol given. Emesis x1. Family at bedside. IV abx given.     Plan: monitor temp, abx plan.       Continue to monitor and follow POC

## 2021-12-31 ENCOUNTER — LAB (OUTPATIENT)
Dept: LAB | Facility: CLINIC | Age: 61
End: 2021-12-31
Payer: COMMERCIAL

## 2021-12-31 ENCOUNTER — ANTICOAGULATION THERAPY VISIT (OUTPATIENT)
Dept: ANTICOAGULATION | Facility: CLINIC | Age: 61
End: 2021-12-31

## 2021-12-31 DIAGNOSIS — I26.99 PULMONARY EMBOLI (H): Primary | ICD-10-CM

## 2021-12-31 DIAGNOSIS — I26.99 PULMONARY EMBOLI (H): ICD-10-CM

## 2021-12-31 DIAGNOSIS — Z79.01 LONG TERM CURRENT USE OF ANTICOAGULANT THERAPY: ICD-10-CM

## 2021-12-31 LAB — INR BLD: 2.8 (ref 0.9–1.1)

## 2021-12-31 PROCEDURE — 36416 COLLJ CAPILLARY BLOOD SPEC: CPT

## 2021-12-31 PROCEDURE — 85610 PROTHROMBIN TIME: CPT

## 2021-12-31 NOTE — PROGRESS NOTES
ANTICOAGULATION MANAGEMENT     Oswaldo Prabhakar 61 year old male is on warfarin with therapeutic INR result. (Goal INR 2.0-3.0)    Recent labs: (last 7 days)     12/31/21  1035   INR 2.8*       ASSESSMENT     Source(s): Patient/Caregiver Call       Warfarin doses taken: Warfarin taken as instructed    Diet: No new diet changes identified    New illness, injury, or hospitalization: No    Medication/supplement changes: None noted    Signs or symptoms of bleeding or clotting: No    Previous INR: Supratherapeutic    Additional findings: None     PLAN     Recommended plan for no diet, medication or health factor changes affecting INR     Dosing Instructions: Continue your current warfarin dose with next INR in 1 week       Summary  As of 12/31/2021    Full warfarin instructions:  5 mg every day   Next INR check:  1/14/2022             Telephone call with Oswaldo who verbalizes understanding and agrees to plan and who agrees to plan and repeated back plan correctly    Patient offered & declined to schedule next visit    Education provided: None required    Plan made per [facility] anticoagulation protocol    Liliam Hyman RN  Anticoagulation Clinic  12/31/2021    _______________________________________________________________________     Anticoagulation Episode Summary     Current INR goal:  2.0-3.0   TTR:  50.2 % (8.6 mo)   Target end date:  Indefinite   Send INR reminders to:  [facility]    Indications    Pulmonary emboli (H) [I26.99]  Long-term (current) use of anticoagulants [Z79.01] [Z79.01]           Comments:  HIPPA INFO OK to speak with wife Josselyn OK to leave messages on Home.work and cell phones  use cell phone #[phone] ++++1/8/2020 ASA 81mg DAILY AND STOP ONCE INR >2.0++++  11/30/20 Home Monitoring Machine forms placed         Anticoagulation Care Providers     Provider Role Specialty Phone number    Samm Vazuqez MD Referring Internal Medicine - Pediatrics [phone]

## 2022-01-01 ENCOUNTER — HEALTH MAINTENANCE LETTER (OUTPATIENT)
Age: 62
End: 2022-01-01

## 2022-01-13 ENCOUNTER — TELEPHONE (OUTPATIENT)
Dept: ANTICOAGULATION | Facility: CLINIC | Age: 62
End: 2022-01-13
Payer: COMMERCIAL

## 2022-01-13 NOTE — TELEPHONE ENCOUNTER
ANTICOAGULATION     Oswaldo Prabhakar is overdue for INR check.      Left message for patient to call and schedule lab appointment as soon as possible. If returning call, please schedule.     Marielena Chacon RN

## 2022-01-18 ENCOUNTER — LAB (OUTPATIENT)
Dept: LAB | Facility: CLINIC | Age: 62
End: 2022-01-18
Attending: INTERNAL MEDICINE
Payer: COMMERCIAL

## 2022-01-18 ENCOUNTER — ANTICOAGULATION THERAPY VISIT (OUTPATIENT)
Dept: ANTICOAGULATION | Facility: CLINIC | Age: 62
End: 2022-01-18

## 2022-01-18 DIAGNOSIS — Z79.01 LONG TERM CURRENT USE OF ANTICOAGULANT THERAPY: ICD-10-CM

## 2022-01-18 DIAGNOSIS — I26.99 PULMONARY EMBOLI (H): Primary | ICD-10-CM

## 2022-01-18 DIAGNOSIS — Z95.1 S/P CABG X 4: ICD-10-CM

## 2022-01-18 DIAGNOSIS — Z78.9 STATIN INTOLERANCE: ICD-10-CM

## 2022-01-18 DIAGNOSIS — E78.5 HYPERLIPIDEMIA LDL GOAL <70: ICD-10-CM

## 2022-01-18 DIAGNOSIS — I26.99 PULMONARY EMBOLI (H): ICD-10-CM

## 2022-01-18 DIAGNOSIS — I25.10 CORONARY ARTERY DISEASE WITHOUT ANGINA PECTORIS, UNSPECIFIED VESSEL OR LESION TYPE, UNSPECIFIED WHETHER NATIVE OR TRANSPLANTED HEART: ICD-10-CM

## 2022-01-18 LAB — INR BLD: 2.7 (ref 0.9–1.1)

## 2022-01-18 NOTE — PROGRESS NOTES
ANTICOAGULATION MANAGEMENT     Oswaldo Prabhakar 61 year old male is on warfarin with therapeutic INR result. (Goal INR 2.0-3.0)    Recent labs: (last 7 days)     01/18/22  1553   INR 2.7*       ASSESSMENT     Source(s): Chart Review and Patient/Caregiver Call       Warfarin doses taken: Warfarin taken as instructed    Diet: No new diet changes identified    New illness, injury, or hospitalization: No    Medication/supplement changes: None noted    Signs or symptoms of bleeding or clotting: No    Previous INR: Therapeutic last visit; previously outside of goal range    Additional findings: None     PLAN     Recommended plan for no diet, medication or health factor changes affecting INR     Dosing Instructions: Continue your current warfarin dose with next INR in 4 weeks       Summary  As of 1/18/2022    Full warfarin instructions:  5 mg every day   Next INR check:  2/15/2022             Telephone call with Oswaldo who verbalizes understanding and agrees to plan and who agrees to plan and repeated back plan correctly    Lab visit scheduled    Education provided: Please call back if any changes to your diet, medications or how you've been taking warfarin and Contact [phone] with any changes, questions or concerns.     Plan made per [facility] anticoagulation protocol    Kristina Wright RN  Anticoagulation Clinic  1/18/2022    _______________________________________________________________________     Anticoagulation Episode Summary     Current INR goal:  2.0-3.0   TTR:  53.4 % (9.2 mo)   Target end date:  Indefinite   Send INR reminders to:  [facility]    Indications    Pulmonary emboli (H) [I26.99]  Long-term (current) use of anticoagulants [Z79.01] [Z79.01]           Comments:  HIPPA INFO OK to speak with wife Josselyn OK to leave messages on Home.work and cell phones  use cell phone #[phone] ++++         Anticoagulation Care Providers     Provider Role Specialty Phone number    Samm Vazquez MD Referring  Internal Medicine - Pediatrics [phone]

## 2022-01-20 ENCOUNTER — OFFICE VISIT (OUTPATIENT)
Dept: URGENT CARE | Facility: URGENT CARE | Age: 62
End: 2022-01-20
Payer: COMMERCIAL

## 2022-01-20 ENCOUNTER — ANCILLARY PROCEDURE (OUTPATIENT)
Dept: GENERAL RADIOLOGY | Facility: CLINIC | Age: 62
End: 2022-01-20
Attending: PHYSICIAN ASSISTANT
Payer: COMMERCIAL

## 2022-01-20 VITALS
SYSTOLIC BLOOD PRESSURE: 148 MMHG | TEMPERATURE: 99.9 F | OXYGEN SATURATION: 98 % | DIASTOLIC BLOOD PRESSURE: 80 MMHG | HEART RATE: 78 BPM | RESPIRATION RATE: 20 BRPM

## 2022-01-20 DIAGNOSIS — Z20.822 SUSPECTED 2019 NOVEL CORONAVIRUS INFECTION: Primary | ICD-10-CM

## 2022-01-20 DIAGNOSIS — R05.9 COUGH: ICD-10-CM

## 2022-01-20 DIAGNOSIS — I10 ESSENTIAL HYPERTENSION: ICD-10-CM

## 2022-01-20 DIAGNOSIS — Z79.01 LONG TERM CURRENT USE OF ANTICOAGULANT THERAPY: ICD-10-CM

## 2022-01-20 LAB
FLUAV AG SPEC QL IA: NEGATIVE
FLUBV AG SPEC QL IA: NEGATIVE
SARS-COV-2 RNA RESP QL NAA+PROBE: NORMAL

## 2022-01-20 PROCEDURE — U0003 INFECTIOUS AGENT DETECTION BY NUCLEIC ACID (DNA OR RNA); SEVERE ACUTE RESPIRATORY SYNDROME CORONAVIRUS 2 (SARS-COV-2) (CORONAVIRUS DISEASE [COVID-19]), AMPLIFIED PROBE TECHNIQUE, MAKING USE OF HIGH THROUGHPUT TECHNOLOGIES AS DESCRIBED BY CMS-2020-01-R: HCPCS | Mod: 90 | Performed by: PHYSICIAN ASSISTANT

## 2022-01-20 PROCEDURE — 99000 SPECIMEN HANDLING OFFICE-LAB: CPT | Performed by: PHYSICIAN ASSISTANT

## 2022-01-20 PROCEDURE — 71046 X-RAY EXAM CHEST 2 VIEWS: CPT | Performed by: RADIOLOGY

## 2022-01-20 PROCEDURE — 99214 OFFICE O/P EST MOD 30 MIN: CPT | Performed by: PHYSICIAN ASSISTANT

## 2022-01-20 PROCEDURE — U0005 INFEC AGEN DETEC AMPLI PROBE: HCPCS | Mod: 90 | Performed by: PHYSICIAN ASSISTANT

## 2022-01-20 PROCEDURE — 87804 INFLUENZA ASSAY W/OPTIC: CPT | Performed by: PHYSICIAN ASSISTANT

## 2022-01-20 NOTE — PATIENT INSTRUCTIONS
Follow up with primary care in 2 weeks for recheck of blood pressure.    If you develop chest pain when breathing or exerting yourself, or difficulty breathing- go to the ER.    Your COVID test results should be available within 1-2 days, but please allow up to 4 days. If you have MyChart, your COVID test results will be visible there. If you do not have MyChart, you will be called if your test is positive and sent a letter if your test is negative.  Please continue to isolate yourself until you receive your results.    Return to work/school/ guidance:   Please let your workplace manager and staffing office know when your isolation ends.       If you receive a positive COVID-19 test result, follow the guidance of the those who are giving you the results. Usually the return to work is 10 days from symptom onset or positive test date, whichever comes first (or in some cases 20 days if you are immunocompromised). If your symptoms started after your positive test, the 10 days should start when your symptoms started.   o If you work at [employer], you must also be cleared by Employee Occupational Health and Safety to return to work.      If you receive a negative COVID-19 test result and did not have a high risk exposure to someone with a known positive COVID-19 test, you can return to work once you're free of fever for 24 hours without fever-reducing medication and your symptoms are improving or resolved.    If you receive a negative COVID-19 test and if you had a high risk exposure to someone who has tested positive for COVID-19 then you can return to work 14 days after your last contact with the positive individual. Follow quarantine guidance given by your doctor or public health officials.    Sign up for [employer].   We know it's scary to hear that you might have COVID-19. We want to track your symptoms to make sure you're okay over the next 2 weeks. Please look for an email from [employer]--this is  a free, online program that we'll use to keep in touch. To sign up, follow the link in the email you will receive. Learn more at [email or web address]    How can I take care of myself?  Over the counter medications may help with your symptoms like congestion, cough, chills, or fever.     There are not many effective prescription treatments for early COVID-19. Hydroxychloroquine, ivermectin, and azithromycin are not effective or recommended for COVID-19.    If your symptoms started in the last 10 days, you may be able to receive a treatment with monoclonal antibodies. This treatment can lower your risk of severe illness and going to the hospital. It is given through an IV or under your skin (subcutaneous) and must be given at an infusion center. You must be 12 or older, weight at least 88 pounds, and have a positive COVID-19 test.      If you would like to sign up to be considered to receive the monoclonal antibody medicine, please complete a participation form through the [facility] here:  MNRAP ([email or web address]). You may also call the [facility] COVID-19 Public Hotline at 1-623.713.4423 (open Mon-Fri: 9am-7pm and Sat: 10am-6pm).     Not all people who are eligible will receive the medicine, since supply is limited. You will be contacted in the next 1 to 2 business days only if you are selected. If you do not receive a call, you have not been selected to receive the medicine. If you have any questions about this medication, please contact your primary care provider. For more information, see [email or web address]      Get lots of rest. Drink extra fluids (unless a doctor has told you not to)    Take Tylenol (acetaminophen) or ibuprofen for fever or pain. If you have liver or kidney problems, ask your family doctor if it's okay to take Tylenol or ibuprofen    Take over the counter medications for your  symptoms, as directed by your doctor. You may also talk to your pharmacist.      If you have other health problems (like cancer, heart failure, an organ transplant or severe kidney disease): Call your specialty clinic if you don't feel better in the next 2 days.    Know when to call 911. Emergency warning signs include:  o Trouble breathing or shortness of breath  o Pain or pressure in the chest that doesn't go away  o Feeling confused like you haven't felt before, or not being able to wake up  o Bluish-colored lips or face    Where can I get more information?     [facility] - About COVID-19: [email or web address]     CDC - What to Do If You're Sick:   www.cdc.gov/coronavirus/2019-ncov/about/steps-when-sick.html    CDC - Ending Home Isolation:  https://www.cdc.gov/coronavirus/2019-ncov/your-health/quarantine-isolation.html    CDC - Caring for Someone:  www.cdc.gov/coronavirus/2019-ncov/if-you-are-sick/care-for-someone.html    [facility] clinical trials (COVID-19 research studies): clinicalaffairs.[facility]-clinical-trials    Below are the COVID-19 hotlines at the [facility] ([facility]). Interpreters are available.  o For health questions: Call 793-839-4111 or 1-220.420.6618 (7 a.m. to 7 p.m.)  o For questions about schools and childcare: Call 429-803-8808 or 1-736.910.2411 (7 a.m. to 7 p.m.)      Adult Self-Care for Colds  Colds are caused by viruses. They can t be cured with antibiotics. However, you can relieve symptoms and support your body s efforts to heal itself. No matter which symptoms you have, be sure to drink plenty of fluids (water or clear soup); stop smoking and drinking alcohol; and get plenty of rest.      Understand a fever    Take your temperature several times a day. If your fever is 100.4 F (38.0 C) for more than a day, call your doctor.    Relax, lie down. Go to bed if you want. Just get off your feet and rest. Also, drink plenty of fluids to avoid  dehydration.    Take acetaminophen or a nonsteroidal anti-inflammatory agent (NSAID), such as ibuprofen.  Treat a troubled nose kindly    Breathe steam or heated humidified air to open blocked nasal passages.  a hot shower or use a vaporizer. Be careful not to get burned by the steam.    Saline nasal sprays and Mucinex help open a stuffy nose. Antihistamines can also help, but they can cause side effects such as drowsiness and drying of the eyes, nose, and mouth.  Soothe a sore throat and cough    Gargle every 2 hours with 1/4 teaspoon of salt dissolved in 1/2 cup of warm water. Suck on throat lozenges and cough drops to moisten your throat (those containing menthol work best).    Cough medicines are available but it is unclear how effective they actually are.    Try honey for cough    Take acetaminophen or an NSAID, such as ibuprofen to ease throat pain  Ease digestive problems    Put fluid back into your body. Take frequent sips of clear liquids such as water or broth. Do not drink beverages with a lot of sugar in them, such as juices and sodas. These can make diarrhea worse. Older children and adults can drink sports drinks.    As your appetite returns, you can resume your normal diet. Ask your doctor whether there are any foods you should avoid.  When to seek medical care  When you first notice symptoms, ask your health care provider if antiviral medications are appropriate. Antibiotics should not be taken for colds or flu. Also, call your doctor if you have any of the following symptoms or if you aren t feeling better after 7 days:    Shortness of breath    Pain or pressure in the chest or abdomen    Worsening symptoms, especially after a period of improvement    Fever of 100.4 F  (38.0 C) or higher, or fever that doesn t go down with medication    Sudden dizziness or confusion    Severe or continued vomiting    Signs of dehydration, including extreme thirst, dark urine, infrequent urination, dry  mouth    Spotted, red, or very sore throat      9823-0724 [employer]. [address] [place] [address], [place] [address]. All rights reserved. This information is not intended as a substitute for professional medical care. Always follow your healthcare professional's instructions.

## 2022-01-20 NOTE — PROGRESS NOTES
Assessment/Plan:    No acute distress or toxicity noted. Lungs CTAB. CXR negative for pneumonia, mass, pneumothorax per my review. Flu test negative. Suspect viral illness. Supportive treatments discussed.    Discussed that symptoms do overlap with possible COVID-19, and patient was tested for this today. Isolate while awaiting test results.     Pt does have hx of PE with some pink colored sputum and chest pain but CP is not pleuritic and only with coughing, he has no SOB, O2 sat 98% and no tachycardia/tachypnea. He is on warfarin in therapeutic range. Other symptoms such as fever/rhinorrhea point to viral illness as well. Do not suspect PE.   CP not related to exertion, do not suspect cardiac etiology.  Advised pt go to ER for any CP not related to coughing, or SOB.    Hx of HTN, BP mildly elevated today. F/u with PCP for recheck in 2 weeks.    See patient instructions below.  At the end of the encounter, I discussed results, diagnosis, medications. Discussed red flags for immediate return to clinic/ER, as well as indications for follow up if no improvement. Patient understood and agreed to plan. Patient was stable for discharge.      ICD-10-CM    1. Suspected 2019 novel coronavirus infection  Z20.822 Symptomatic; Unknown COVID-19 Virus (Coronavirus) by PCR Nose     Influenza A/B antigen   2. Essential hypertension  I10    3. Cough  R05.9 XR Chest 2 Views   4. Long term current use of anticoagulant therapy  Z79.01          Return in about 1 week (around 1/27/2022) for Follow up w/ primary care provider if not better.    Kathryn Miller, DAR, PAYAW  [facility]    ------------------------------------------------------------------------------------------------------------------------------------------------------------------------  HPI:  Oswaldo Prabhakar is a 61 year old male with hx of CAD (s/p CABG x 4), PE, HTN, and HLD who presents for evaluation of HA, rhinorrhea, cough and fever/chills  "onset yesterday. Tmax 101 F. He also notes some chest pain when coughing. Chest pain not related to exertion or deep breaths. He reports coughing up \"pink tinged sputum\" last night. No treatments tried. Patient reports no myalgias, chinmay hemoptysis, sore throat, loss of sense of taste or smell, shortness of breath, abdominal pain, nausea, vomiting, diarrhea, rash, leg pain/swelling, or any other symptoms. No known sick contacts/COVID exposure.     Pt has hx of one pulmonary embolism in 2011, has been on warfarin since. INR was 2.7 on 1/18/22.    Past Medical History:   Diagnosis Date     Antiplatelet or antithrombotic long-term use      Coronary artery disease      Diaphragmatic hernia without mention of obstruction or gangrene      Heart disease      Hernia, abdominal      History of blood transfusion      History of thrombophlebitis      Hypertension      Nephrolithiasis 4/2010     Other and unspecified hyperlipidemia      Pulmonary embolus and infarction (H)     s/p hemothorax and filter s/p filter removal (2011), now on long term anti-coagulation     Statin intolerance 2/1/2017     Stented coronary artery 01/07/2020    NIR mid LAD     Unspecified retinal detachment     Childhood trauma, unrepaired       Vitals:    01/20/22 1018   BP: (!) 148/80   Pulse: 78   Resp: 20   Temp: 99.9  F (37.7  C)   SpO2: 98%       Physical Exam  Vitals and nursing note reviewed.   HENT:      Right Ear: Tympanic membrane normal.      Left Ear: Tympanic membrane normal.      Mouth/Throat:      Mouth: Mucous membranes are moist.      Pharynx: Oropharynx is clear.   Cardiovascular:      Rate and Rhythm: Normal rate and regular rhythm.      Heart sounds: Normal heart sounds.   Pulmonary:      Effort: Pulmonary effort is normal.      Breath sounds: Normal breath sounds.   Chest:      Chest wall: No tenderness.   Neurological:      Mental Status: He is alert.         Labs/Imaging:  Results for orders placed or performed in visit on " 01/20/22 (from the past 24 hour(s))   Influenza A/B antigen    Specimen: Nose; Swab   Result Value Ref Range    Influenza A antigen Negative Negative    Influenza B antigen Negative Negative    Narrative    Test results must be correlated with clinical data. If necessary, results should be confirmed by a molecular assay or viral culture.     *Note: Due to a large number of results and/or encounters for the requested time period, some results have not been displayed. A complete set of results can be found in Results Review.     Results for orders placed or performed in visit on 01/20/22   XR Chest 2 Views     Status: None    Narrative    XR CHEST 2 VW 1/20/2022 10:56 AM    HISTORY: cough, chills; Cough    COMPARISON: 12/15/2020      Impression    IMPRESSION: No focal infiltrate, pleural effusion or pneumothorax. The  cardiac and mediastinal silhouettes are normal. Median sternotomy and  mediastinal surgical clips.    RUDY JOHNSON MD         SYSTEM ID:  [id number]   Results for orders placed or performed in visit on 01/20/22   Influenza A/B antigen     Status: Normal    Specimen: Nose; Swab   Result Value Ref Range    Influenza A antigen Negative Negative    Influenza B antigen Negative Negative    Narrative    Test results must be correlated with clinical data. If necessary, results should be confirmed by a molecular assay or viral culture.       Patient Instructions     Follow up with primary care in 2 weeks for recheck of blood pressure.    If you develop chest pain when breathing or exerting yourself, or difficulty breathing- go to the ER.    Your COVID test results should be available within 1-2 days, but please allow up to 4 days. If you have MyChart, your COVID test results will be visible there. If you do not have MyChart, you will be called if your test is positive and sent a letter if your test is negative.  Please continue to isolate yourself until you receive your results.    Return to work/school/  guidance:   Please let your workplace manager and staffing office know when your isolation ends.       If you receive a positive COVID-19 test result, follow the guidance of the those who are giving you the results. Usually the return to work is 10 days from symptom onset or positive test date, whichever comes first (or in some cases 20 days if you are immunocompromised). If your symptoms started after your positive test, the 10 days should start when your symptoms started.   o If you work at [employer], you must also be cleared by Employee Occupational Health and Safety to return to work.      If you receive a negative COVID-19 test result and did not have a high risk exposure to someone with a known positive COVID-19 test, you can return to work once you're free of fever for 24 hours without fever-reducing medication and your symptoms are improving or resolved.    If you receive a negative COVID-19 test and if you had a high risk exposure to someone who has tested positive for COVID-19 then you can return to work 14 days after your last contact with the positive individual. Follow quarantine guidance given by your doctor or public health officials.    Sign up for [employer].   We know it's scary to hear that you might have COVID-19. We want to track your symptoms to make sure you're okay over the next 2 weeks. Please look for an email from [employer]--this is a free, online program that we'll use to keep in touch. To sign up, follow the link in the email you will receive. Learn more at [email or web address]    How can I take care of myself?  Over the counter medications may help with your symptoms like congestion, cough, chills, or fever.     There are not many effective prescription treatments for early COVID-19. Hydroxychloroquine, ivermectin, and azithromycin are not effective or recommended for COVID-19.    If your symptoms started in the last 10 days, you may be able to receive a  treatment with monoclonal antibodies. This treatment can lower your risk of severe illness and going to the hospital. It is given through an IV or under your skin (subcutaneous) and must be given at an infusion center. You must be 12 or older, weight at least 88 pounds, and have a positive COVID-19 test.      If you would like to sign up to be considered to receive the monoclonal antibody medicine, please complete a participation form through the [place] Department of Health here:  MNRAP ([email or web address]). You may also call the [facility] COVID-19 Public Hotline at 1-241.953.8361 (open Mon-Fri: 9am-7pm and Sat: 10am-6pm).     Not all people who are eligible will receive the medicine, since supply is limited. You will be contacted in the next 1 to 2 business days only if you are selected. If you do not receive a call, you have not been selected to receive the medicine. If you have any questions about this medication, please contact your primary care provider. For more information, see [email or web address]      Get lots of rest. Drink extra fluids (unless a doctor has told you not to)    Take Tylenol (acetaminophen) or ibuprofen for fever or pain. If you have liver or kidney problems, ask your family doctor if it's okay to take Tylenol or ibuprofen    Take over the counter medications for your symptoms, as directed by your doctor. You may also talk to your pharmacist.      If you have other health problems (like cancer, heart failure, an organ transplant or severe kidney disease): Call your specialty clinic if you don't feel better in the next 2 days.    Know when to call 911. Emergency warning signs include:  o Trouble breathing or shortness of breath  o Pain or pressure in the chest that doesn't go away  o Feeling confused like you haven't felt before, or not being able to wake up  o Bluish-colored lips or face    Where can I get more  information?    [facility] - About COVID-19: [email or web address]     CDC - What to Do If You're Sick:   www.cdc.gov/coronavirus/2019-ncov/about/steps-when-sick.html    CDC - Ending Home Isolation:  https://www.cdc.gov/coronavirus/2019-ncov/your-health/quarantine-isolation.html    CDC - Caring for Someone:  www.cdc.gov/coronavirus/2019-ncov/if-you-are-sick/care-for-someone.html    [facility] clinical trials (COVID-19 research studies): clinicalaffairs.[facility]-clinical-trials    Below are the COVID-19 hotlines at the [place] Department of Health ([facility]). Interpreters are available.  o For health questions: Call 710-272-4770 or 1-199.648.8442 (7 a.m. to 7 p.m.)  o For questions about schools and childcare: Call 228-740-2157 or 1-492.994.1159 (7 a.m. to 7 p.m.)      Adult Self-Care for Colds  Colds are caused by viruses. They can t be cured with antibiotics. However, you can relieve symptoms and support your body s efforts to heal itself. No matter which symptoms you have, be sure to drink plenty of fluids (water or clear soup); stop smoking and drinking alcohol; and get plenty of rest.      Understand a fever    Take your temperature several times a day. If your fever is 100.4 F (38.0 C) for more than a day, call your doctor.    Relax, lie down. Go to bed if you want. Just get off your feet and rest. Also, drink plenty of fluids to avoid dehydration.    Take acetaminophen or a nonsteroidal anti-inflammatory agent (NSAID), such as ibuprofen.  Treat a troubled nose kindly    Breathe steam or heated humidified air to open blocked nasal passages.  a hot shower or use a vaporizer. Be careful not to get burned by the steam.    Saline nasal sprays and Mucinex help open a stuffy nose. Antihistamines can also help, but they can cause side effects such as drowsiness and drying of the eyes, nose, and mouth.  Soothe a sore throat and cough    Gargle every 2 hours with 1/4 teaspoon of  salt dissolved in 1/2 cup of warm water. Suck on throat lozenges and cough drops to moisten your throat (those containing menthol work best).    Cough medicines are available but it is unclear how effective they actually are.    Try honey for cough    Take acetaminophen or an NSAID, such as ibuprofen to ease throat pain  Ease digestive problems    Put fluid back into your body. Take frequent sips of clear liquids such as water or broth. Do not drink beverages with a lot of sugar in them, such as juices and sodas. These can make diarrhea worse. Older children and adults can drink sports drinks.    As your appetite returns, you can resume your normal diet. Ask your doctor whether there are any foods you should avoid.  When to seek medical care  When you first notice symptoms, ask your health care provider if antiviral medications are appropriate. Antibiotics should not be taken for colds or flu. Also, call your doctor if you have any of the following symptoms or if you aren t feeling better after 7 days:    Shortness of breath    Pain or pressure in the chest or abdomen    Worsening symptoms, especially after a period of improvement    Fever of 100.4 F  (38.0 C) or higher, or fever that doesn t go down with medication    Sudden dizziness or confusion    Severe or continued vomiting    Signs of dehydration, including extreme thirst, dark urine, infrequent urination, dry mouth    Spotted, red, or very sore throat      9301-6423 [employer]. [address] [place] [address], [place] [address]. All rights reserved. This information is not intended as a substitute for professional medical care. Always follow your healthcare professional's instructions.

## 2022-01-21 LAB — SARS-COV-2 RNA RESP QL NAA+PROBE: DETECTED

## 2022-01-23 ENCOUNTER — TELEPHONE (OUTPATIENT)
Dept: URGENT CARE | Facility: URGENT CARE | Age: 62
End: 2022-01-23
Payer: COMMERCIAL

## 2022-01-24 NOTE — TELEPHONE ENCOUNTER
"Coronavirus (COVID-19) Notification    Caller Name (Patient, parent, daughter/son, grandparent, etc)  The patient      Reason for call  Notify of Positive Coronavirus (COVID-19) lab results, assess symptoms,  review  [facility] recommendations    Lab Result    Lab test:  2019-nCoV rRt-PCR or SARS-CoV-2 PCR    Oropharyngeal AND/OR nasopharyngeal swabs is POSITIVE for 2019-nCoV RNA/SARS-COV-2 PCR (COVID-19 virus)    RN Recommendations/Instructions per [facility] Coronavirus COVID-19 recommendations    Brief introduction script  Introduce self then review script:  \"I am calling on behalf of [employer].  We were notified that your Coronavirus test (COVID-19) for was POSITIVE for the virus.  I have some information to relay to you but first I wanted to mention that the [place] Dept of Health will be contacting you shortly [it's possible MD already called Patient] to talk to you more about how you are feeling and other people you have had contact with who might now also have the virus.  Also,  [facility] is Partnering with the [facility] for Covid-19 research, you may be contacted directly by research staff.\"      Assessment (Inquire about Patient's current symptoms)   Assessment   Current Symptoms at time of phone call: (if no symptoms, document No symptoms] Fever-occasional,headache, productive cough    Symptoms onset (if applicable) 5 days ago     If at time of call, Patients symptoms hare worsened, the Patient should contact 911 or have someone drive them to Emergency Dept promptly:      If Patient calling 911, inform 911 personal that you have tested positive for the Coronavirus (COVID-19).  Place mask on and await 911 to arrive.    If Emergency Dept, If possible, please have another adult drive you to the Emergency Dept but you need to wear mask when in contact with other people.          Treatment Options:   Patient classified as COVID treatment eligible by Epic high risk algorithm: " Yes  Is the patient symptomatic at the time of result notification? Yes. Was the onset of symptoms within the last 5 days? No. You may be eligible to receive a new treatment with a monoclonal antibody for preventing hospitalization in patients at high risk for complications from COVID-19.   This medication is still experimental and available on a limited basis; it is given through an IV and must be given at an infusion center. Please note that not all people who are eligible will receive the medication since it is in limited supply.  Are you interested in being considered for this medication?  Yes.   Is the patient symptomatic?  Yes. Is the patient 18 years of age or older? Yes.  Is the patient newly (within the last week) on supplemental oxygen or requiring more oxygen than usual?  No. Patient criteria for selection: Is the patients weight equal to or greater than 40 kg (88 lbs)? Yes.  Is the patient's age 65 years or older?  No. Is the patient 55 years or older and have one or more of the following conditions: Hypertension, CHF, COPD/Chronic pulmonary disease?  Yes.  Patient qualifies, refer to MNRAP. The patient states he applied on the MNRAP for the monoclonal on 1/22/22 and was informed there are no treatments available in [place].     Review information with Patient    Your result was positive. This means you have COVID-19 (coronavirus).  We have sent you a letter that reviews the information that I'll be reviewing with you now.    How can I protect others?    If you have symptoms: stay home and away from others (self-isolate) until:    You've had no fever--and no medicine that reduces fever--for 1 full day (24 hours). And       Your other symptoms have gotten better. For example, your cough or breathing has improved. And     At least 10 days have passed since your symptoms started. (If you've been told by a doctor that you have a weak immune system, wait 20 days.)     If you don't have symptoms: Stay home and away  from others (self-isolate) until at least 10 days have passed since your first positive COVID-19 test. (Date test collected)    During this time:    Stay in your own room, including for meals. Use your own bathroom if you can.    Stay away from others in your home. No hugging, kissing or shaking hands. No visitors.     Don't go to work, school or anywhere else.     Clean  high touch  surfaces often (doorknobs, counters, handles, etc.). Use a household cleaning spray or wipes. You'll find a full list on the EPA website at www.epa.gov/pesticide-registration/list-n-disinfectants-use-against-sars-cov-2.     Cover your mouth and nose with a mask, tissue or other face covering to avoid spreading germs.    Wash your hands and face often with soap and water.    Make a list of people you have been in close contact with recently, even if either of you wore a face covering.   - Start your list from 2 days before you became ill or had a positive test.  - Include anyone that was within 6 feet of you for a cumulative total of 15 minutes or more in 24 hours. (Example: if you sat next to José for 5 minutes in the morning and 10 minutes in the afternoon, then you were in close contact for 15 minutes total that day. José would be added to your list.)    A public health worker will call or text you. It is important that you answer. They will ask you questions about possible exposures to COVID-19, such as people you have been in direct contact with and places you have visited.    Tell the people on your list that you have COVID-19; they should stay away from others for 14 days starting from the last time they were in contact with you (unless you are told something different from a public health worker).     Caregivers in these groups are at risk for severe illness due to COVID-19:  o People 65 years and older  o People who live in a nursing home or long-term care facility  o People with chronic disease (lung, heart, cancer, diabetes,  kidney, liver, immunologic)  o People who have a weakened immune system, including those who:  - Are in cancer treatment  - Take medicine that weakens the immune system, such as corticosteroids  - Had a bone marrow or organ transplant  - Have an immune deficiency  - Have poorly controlled HIV or AIDS  - Are obese (body mass index of 40 or higher)  - Smoke regularly    Caregivers should wear gloves while washing dishes, handling laundry and cleaning bedrooms and bathrooms.    Wash and dry laundry with special caution. Don't shake dirty laundry, and use the warmest water setting you can.    If you have a weakened immune system, ask your doctor about other actions you should take.    For more tips, go to www.cdc.gov/coronavirus/2019-ncov/downloads/10Things.pdf.    You should not go back to work until you meet the guidelines above for ending your home isolation. You don't need to be retested for COVID-19 before going back to work--studies show that you won't spread the virus if it's been at least 10 days since your symptoms started (or 20 days, if you have a weak immune system).    Employers: This document serves as formal notice of your employee's medical guidelines for going back to work. They must meet the above guidelines before going back to work in person.    How can I take care of myself?    1. Get lots of rest. Drink extra fluids (unless a doctor has told you not to).    2. Take Tylenol (acetaminophen) for fever or pain. If you have liver or kidney problems, ask your family doctor if it's okay to take Tylenol.     Take either:     650 mg (two 325 mg pills) every 4 to 6 hours, or     1,000 mg (two 500 mg pills) every 8 hours as needed.     Note: Don't take more than 3,000 mg in one day. Acetaminophen is found in many medicines (both prescribed and over-the-counter medicines). Read all labels to be sure you don't take too much.    For children, check the Tylenol bottle for the right dose (based on their age or  weight).    3. If you have other health problems (like cancer, heart failure, an organ transplant or severe kidney disease): Call your specialty clinic if you don't feel better in the next 2 days.    4. Know when to call 911: Emergency warning signs include:    Trouble breathing or shortness of breath    Pain or pressure in the chest that doesn't go away    Feeling confused like you haven't felt before, or not being able to wake up    Bluish-colored lips or face    5. Sign up for [employer]. We know it's scary to hear that you have COVID-19. We want to track your symptoms to make sure you're okay over the next 2 weeks. Please look for an email from [employer]--this is a free, online program that we'll use to keep in touch. To sign up, follow the link in the email. Learn more at [email or web address].    Where can I get more information?    [facility]: [email or web address]    Coronavirus Basics: www.health.[facility].[place]./diseases/coronavirus/basics.html    What to Do If You're Sick: www.cdc.gov/coronavirus/2019-ncov/about/steps-when-sick.html    Ending Home Isolation: www.cdc.gov/coronavirus/2019-ncov/hcp/disposition-in-home-patients.html     Caring for Someone with COVID-19: www.cdc.gov/coronavirus/2019-ncov/if-you-are-sick/care-for-someone.html     [facility] clinical trials (COVID-19 research studies): clinicalaffairs.[facility]/n-clinical-trials     A Positive COVID-19 letter will be sent via [employer] or the mail. (Exception, no letters sent to Presurgerical/Preprocedure Patients)    Priya Crowley LPN

## 2022-02-10 ENCOUNTER — OFFICE VISIT (OUTPATIENT)
Dept: INTERNAL MEDICINE | Facility: CLINIC | Age: 62
End: 2022-02-10
Payer: COMMERCIAL

## 2022-02-10 VITALS
DIASTOLIC BLOOD PRESSURE: 88 MMHG | SYSTOLIC BLOOD PRESSURE: 142 MMHG | WEIGHT: 230 LBS | BODY MASS INDEX: 32.2 KG/M2 | HEART RATE: 52 BPM | HEIGHT: 71 IN | OXYGEN SATURATION: 97 %

## 2022-02-10 DIAGNOSIS — Z01.818 PREOPERATIVE EXAMINATION: Primary | ICD-10-CM

## 2022-02-10 DIAGNOSIS — I25.810 CORONARY ARTERY DISEASE INVOLVING AUTOLOGOUS ARTERY CORONARY BYPASS GRAFT WITHOUT ANGINA PECTORIS: ICD-10-CM

## 2022-02-10 LAB
ATRIAL RATE - MUSE: 49 BPM
DIASTOLIC BLOOD PRESSURE - MUSE: NORMAL MMHG
INTERPRETATION ECG - MUSE: NORMAL
P AXIS - MUSE: 10 DEGREES
PR INTERVAL - MUSE: 154 MS
QRS DURATION - MUSE: 86 MS
QT - MUSE: 452 MS
QTC - MUSE: 408 MS
R AXIS - MUSE: 24 DEGREES
SYSTOLIC BLOOD PRESSURE - MUSE: NORMAL MMHG
T AXIS - MUSE: 73 DEGREES
VENTRICULAR RATE- MUSE: 49 BPM

## 2022-02-10 PROCEDURE — 99214 OFFICE O/P EST MOD 30 MIN: CPT | Mod: GC

## 2022-02-10 PROCEDURE — 93005 ELECTROCARDIOGRAM TRACING: CPT

## 2022-02-10 ASSESSMENT — ENCOUNTER SYMPTOMS
EYE REDNESS: 0
WEIGHT GAIN: 0
HOARSE VOICE: 0
SMELL DISTURBANCE: 0
TROUBLE SWALLOWING: 0
EYE IRRITATION: 0
HALLUCINATIONS: 0
DECREASED APPETITE: 0
COUGH DISTURBING SLEEP: 1
SINUS PAIN: 0
POLYDIPSIA: 0
HEMOPTYSIS: 0
ALTERED TEMPERATURE REGULATION: 1
NIGHT SWEATS: 0
SINUS CONGESTION: 1
INCREASED ENERGY: 0
EYE PAIN: 0
SPUTUM PRODUCTION: 1
NECK MASS: 0
POLYPHAGIA: 0
CHILLS: 0
POSTURAL DYSPNEA: 0
COUGH: 1
WHEEZING: 0
SHORTNESS OF BREATH: 0
SNORES LOUDLY: 0
DYSPNEA ON EXERTION: 0
SORE THROAT: 0
EYE WATERING: 0
FEVER: 1
DOUBLE VISION: 0
WEIGHT LOSS: 0
TASTE DISTURBANCE: 0
FATIGUE: 1

## 2022-02-10 ASSESSMENT — PAIN SCALES - GENERAL: PAINLEVEL: NO PAIN (0)

## 2022-02-10 ASSESSMENT — MIFFLIN-ST. JEOR: SCORE: 1880.4

## 2022-02-10 NOTE — NURSING NOTE
Chief Complaint   Patient presents with     Pre-Op Exam     2/21/22 catarac left       Jenelle Marie, EMT at 1:54 PM on 2/10/2022

## 2022-02-10 NOTE — PROGRESS NOTES
PRE-OP EVALUATION:  [facility]  Internal Medicine     HPI:      Oswaldo Prabhakar 61 year old male who presents today at the request of Dr. Harris Deras for preoperative cardiopulmonary risk assessment for the following intended procedure:  Left Cataract Surgery    Type of anesthesia anticipated: Light conscious sedation    Pertinent history:  Patient reports he is currently feeling well. He denies any chest pain, dyspnea, abdominal pain, bleeding or bruising. He recently had COVID-19 but now feels recovered although he continues to test positive on rapid tests. He is also running out of ticagrelor which we will continue to prescribe. He reports he has already talked to Dr. Ocampo regarding the scheduling of warfarin and ticagrelor around his surgery date. He has no further concerns or questions at this time.     Cardiac risks: Previous history of four vessel CABG (LIMA-LAD, SVG-diag, SVG-OM1, SVG-rPDA - 2014) and cardiac stent (LIMA - 2020)  Pulmonary risks: Previous history of unprovoked pulmonary embolism status post thoracotomy with lung resection (2011)   Exercise tolerance:   Personal history of bleeding tendencies/disorders: Previous history of pulmonary embolism, now on chronic anticoagulation with warfarin. Also on ticagrelor.   Personal history of adverse anesthesia reactions: No, per anesthesia pre-procedure evaluation 01/29/2020.                                                                                                                                                  .     MEDICAL HISTORY:     Patient Active Problem List   Diagnosis     Retinal detachment     HYPERLIPIDEMIA LDL GOAL <130     Pulmonary emboli (H)     Pure hyperglyceridemia     Calculus of kidney     Warfarin anticoagulation     Atypical chest pain     S/P CABG x 4     Atherosclerosis of native coronary artery of native heart with angina pectoris with documented spasm (H)     Long-term (current) use of anticoagulants  [Z79.01]     Statin intolerance     Unstable angina (H)     Renal colic on right side     Ureteral stone     UTI (urinary tract infection)     Chest pain, unspecified type     Past Surgical History:   Procedure Laterality Date     BYPASS GRAFT ARTERY CORONARY  4/4/2014    Procedure: BYPASS GRAFT ARTERY CORONARY;  Median Sternotomy, Coronary Artery Bypass Bypass x 4 using Left Internal Mammary, Left Saphenous Vein, on pump oxygenation;  Surgeon: Sherry Armando MD;  Location: [facility] OR     CLOSED REDUCTION, PERCUTANEOUS PINNING  HAND, COMBINED Left 11/5/2015    Procedure: COMBINED CLOSED REDUCTION, PERCUTANEOUS PINNING HAND;  Surgeon: Carmella Love MD;  [facility]     COLONOSCOPY  3/15/2013    Procedure: COLONOSCOPY;;  Surgeon: Racheal Shipman MD;  Location: [facility] GI     COMBINED CYSTOSCOPY, INSERT STENT URETER(S) Right 1/10/2020    Procedure: Cystoscopy, right retrograde pyelogram, interpretation of fluoroscopic images, placement of 6 x 26 double-J ureteral stent;  Surgeon: Nguyễn Woodard MD;  Location: [facility] OR     COMBINED CYSTOSCOPY, RETROGRADES, URETEROSCOPY, LASER HOLMIUM LITHOTRIPSY URETER(S), INSERT STENT Right 2/5/2020    Procedure: Cystoscopy, right ureteral stent exchange, right ureteroscopy with holmium lithotripsy and stone basketing, right retrograde pyelogram, interpretation of fluoroscopic images.;  Surgeon: Nguyễn Woodard MD;  Location: [facility] OR     CV ANGIOGRAM CORONARY GRAFT N/A 1/7/2020    Procedure: Angiogram Coronary Graft;  Surgeon: Chato Odonnell MD;  Location: [facility] CARDIAC CATH LAB     CV CORONARY ANGIOGRAM  1/7/2020    Procedure: CV CORONARY ANGIOGRAM;  Surgeon: Chato Odonnell MD;  Location: [facility] CARDIAC CATH LAB     CV PCI STENT DRUG ELUTING N/A 1/7/2020    Procedure: PCI Stent Drug Eluting;  Surgeon: Chato Odonnell MD;  Location: [facility] CARDIAC CATH LAB     CYSTOSCOPY       LAPAROSCOPIC  CHOLECYSTECTOMY N/A 8/6/2018    Procedure: LAPAROSCOPIC CHOLECYSTECTOMY;  LAPAROSCOPIC CHOLECYSTECTOMY ;  Surgeon: José Miguel Stuart MD;  Location: [facility] OR     LITHOTRIPSY  4/2010     THORACIC SURGERY      lung surgery, thoracotomy, lung adhesion     ZZC NONSPECIFIC PROCEDURE      Spermatocele resection     Family History   Problem Relation Age of Onset     Heart Disease Mother      C.A.D. Father         3 vessel CABG, age 70     Cancer Father         bladder cancer     C.A.D. Paternal Grandmother      Hypertension Paternal Grandmother      Alzheimer Disease Paternal Grandmother      Diabetes No family hx of      Thyroid Disease No family hx of      Cancer - colorectal No family hx of      Social History     Tobacco Use     Smoking status: Never Smoker     Smokeless tobacco: Never Used   Substance Use Topics     Alcohol use: No     Comment: very rare     Drug use: No     Current Outpatient Medications   Medication Sig Dispense Refill     acetaminophen (TYLENOL) 500 MG tablet Take 2 tablets (1,000 mg) by mouth 3 times daily as needed for pain       atorvastatin (LIPITOR) 80 MG tablet Take 1 tablet (80 mg) by mouth daily 90 tablet 2     Cholecalciferol (VITAMIN D) 2000 UNITS CAPS Take 2,000 Units by mouth daily       evolocumab (REPATHA SURECLICK) 140 MG/ML prefilled autoinjector Inject 1 mL (140 mg) Subcutaneous every 14 days 6 mL 3     ezetimibe (ZETIA) 10 MG tablet Take 1 tablet (10 mg) by mouth daily 90 tablet 3     fenofibrate (TRIGLIDE/LOFIBRA) 160 MG tablet Take 1 tablet (160 mg) by mouth daily 90 tablet 3     lisinopril (ZESTRIL) 2.5 MG tablet Take 1 tablet (2.5 mg) by mouth daily 90 tablet 3     metoprolol tartrate (LOPRESSOR) 25 MG tablet Take 0.5 tablets (12.5 mg) by mouth 2 times daily 90 tablet 3     ondansetron (ZOFRAN-ODT) 4 MG ODT tab Take 1 tablet (4 mg) by mouth every 8 hours as needed for nausea 10 tablet 0     warfarin ANTICOAGULANT (COUMADIN) 5 MG tablet TAKE 1.5 TABLETS (7.5mg) Mon and Fri and 1  tablet (5mg) on the other days 135 tablet 3     calcium carbonate (TUMS) 500 MG chewable tablet Take 1 chew tab by mouth daily as needed (Patient not taking: Reported on 2/10/2022)       OMEPRAZOLE PO Take 20 mg by mouth (Patient not taking: Reported on 2/10/2022)       tamsulosin (FLOMAX) 0.4 MG capsule Take 1 capsule (0.4 mg) by mouth daily (Patient not taking: Reported on 2/10/2022) 90 capsule 0     ticagrelor (BRILINTA) 90 MG tablet Take 1 tablet (90 mg) by mouth every 12 hours 180 tablet 3       Allergies   Allergen Reactions     Cats      Hay Fever & [A.R.M.]      Heparin Other (See Comments)     Heparin resistance per cardio-thoracic surgeon Dr. Armando     Hydrocodone-Acetaminophen Nausea and Vomiting     Acetaminophen is ok     Latex Allergy: NO      REVIEW OF SYSTEMS:     Answers for HPI/ROS submitted by the patient on 2/10/2022  General Symptoms: Yes  Skin Symptoms: No  HENT Symptoms: Yes  EYE SYMPTOMS: Yes  HEART SYMPTOMS: No  LUNG SYMPTOMS: Yes  INTESTINAL SYMPTOMS: No  URINARY SYMPTOMS: No  REPRODUCTIVE SYMPTOMS: No  SKELETAL SYMPTOMS: No  BLOOD SYMPTOMS: No  NERVOUS SYSTEM SYMPTOMS: No  MENTAL HEALTH SYMPTOMS: No  Ear pain: No  Ear discharge: No  Hearing loss: No  Tinnitus: Yes  Nosebleeds: No  Congestion: Yes  Sinus pain: No  Trouble swallowing: No   Voice hoarseness: No  Mouth sores: No  Sore throat: No  Tooth pain: No  Gum tenderness: No  Bleeding gums: No  Change in taste: No  Change in sense of smell: No  Dry mouth: No  Hearing aid used: No  Neck lump: No  Fever: Yes  Loss of appetite: No  Weight loss: No  Weight gain: No  Fatigue: Yes  Night sweats: No  Chills: No  Increased stress: No  Excessive hunger: No  Excessive thirst: No  Feeling hot or cold when others believe the temperature is normal: Yes  Loss of height: No  Post-operative complications: No  Surgical site pain: No  Hallucinations: No  Change in or Loss of Energy: No  Hyperactivity: No  Confusion: No  Eye pain: No  Vision loss:  "Yes  Dry eyes: No  Watery eyes: No  Eye bulging: No  Double vision: No  Flashing of lights: No  Spots: No  Floaters: Yes  Redness: No  Crossed eyes: No  Tunnel Vision: No  Yellowing of eyes: No  Eye irritation: No  Cough: Yes  Sputum or phlegm: Yes  Coughing up blood: No  Difficulty breating or shortness of breath: No  Snoring: No  Wheezing: No  Difficulty breathing on exertion: No  Nighttime Cough: Yes  Difficulty breathing when lying flat: No    EXAM:   BP (!) 142/88 (BP Location: Right arm, Patient Position: Sitting, Cuff Size: Adult Regular)   Pulse 52   Ht 1.803 m (5' 11\")   Wt 104.3 kg (230 lb)   SpO2 97%   BMI 32.08 kg/m      General: Alert and oriented without distress  Respiratory: CTAB without increased respiratory effort or accessory muscle use  Cardiovascular: RRR without murmurs, rubs or gallop. S1, S2 intact.  Skin: No overt lesions, bruises, rashes or bleeding on exposed skin surfaces.  Musculoskeletal: Patient able to sit to stand and walk independently without assistance.     DIAGNOSTICS:   The following, pertinent test results were reviewed: EKG without significant findings that would preclude him from surgery.     Dr. Shahid (staff) reviewed the EKG and agrees with the reading as documented by the resident.    IMPRESSION:     The proposed surgical procedure is considered to be low risk.  Patient has a low cardiovascular risk and a low pulmonary risk profile.      The patient has the following additional risks for perioperative complications:  Chronic anticoagulation.    Diagnoses for the visit today:  There are no diagnoses linked to this encounter.     RECOMMENDATIONS:     Instructions for home medications jose francisco-operatively discussed with patient verbally and in writing  Continue your cardiac medications as previously instructed by your Cardiologist. Otherwise, continue taking your other home medications as prescribed.     Routine follow up with the patient's primary care provider is " patria Chacon was seen today for pre-op exam.    Diagnoses and all orders for this visit:    Preoperative examination  -     EKG 12-lead complete w/read - Clinics    Coronary artery disease involving autologous artery coronary bypass graft without angina pectoris  Patient reports he needed a refill of his ticagrelor and was unsure when his next follow up visit with his cardiologist and primary care provider will be.   -     EKG 12-lead complete w/read - Clinics  -     ticagrelor (BRILINTA) 90 MG tablet; Take 1 tablet (90 mg) by mouth 2 times daily    Patient seen and case discussed with Dr. Shahid who agrees with the above assessment and plan.     José Tavares, DO  PGY-1, Internal Medicine  [facility]

## 2022-02-10 NOTE — PROGRESS NOTES
Surgeon (please enter first and last name): Dr. Harris Deras MD  Fax number for Preop Evaluation: [phone]  Location of Surgery: [facility]  Date of Surgery: 2/21/22  Procedure: left catarac  History of reaction to anesthesia? no    MAXINE Cohen at 1:04 PM on 2/10/2022      Faxed preop.  MAXINE Cohen at 3:16 PM on 2/10/2022

## 2022-02-15 ENCOUNTER — ANTICOAGULATION THERAPY VISIT (OUTPATIENT)
Dept: ANTICOAGULATION | Facility: CLINIC | Age: 62
End: 2022-02-15

## 2022-02-15 ENCOUNTER — LAB (OUTPATIENT)
Dept: LAB | Facility: CLINIC | Age: 62
End: 2022-02-15
Payer: COMMERCIAL

## 2022-02-15 DIAGNOSIS — I26.99 PULMONARY EMBOLI (H): Primary | ICD-10-CM

## 2022-02-15 DIAGNOSIS — I26.99 PULMONARY EMBOLI (H): ICD-10-CM

## 2022-02-15 DIAGNOSIS — Z79.01 LONG TERM CURRENT USE OF ANTICOAGULANT THERAPY: ICD-10-CM

## 2022-02-15 LAB — INR BLD: 2.8 (ref 0.9–1.1)

## 2022-02-15 NOTE — PROGRESS NOTES
ANTICOAGULATION MANAGEMENT     Oswaldo Prabhakar 61 year old male is on warfarin with therapeutic INR result. (Goal INR 2.0-3.0)    Recent labs: (last 7 days)     02/15/22  1617   INR 2.8*       ASSESSMENT     Source(s): Chart Review and Patient/Caregiver Call       Warfarin doses taken: Warfarin taken as instructed    Diet: No new diet changes identified    New illness, injury, or hospitalization: No    Medication/supplement changes: None noted    Signs or symptoms of bleeding or clotting: No    Previous INR: Therapeutic last 2(+) visits    Additional findings: Patient has upcoming eye surgery and he doesn't need to hold his warfarin in preparation for this.      PLAN     Recommended plan for no diet, medication or health factor changes affecting INR     Dosing Instructions: Continue your current warfarin dose with next INR in 4 weeks       Summary  As of 2/15/2022    Full warfarin instructions:  5 mg every day   Next INR check:  3/15/2022             Telephone call with Oswaldo who verbalizes understanding and agrees to plan and who agrees to plan and repeated back plan correctly    Patient offered & declined to schedule next visit    Education provided: Goal range and significance of current result, Importance of therapeutic range and Importance of following up at instructed interval    Plan made per [facility] anticoagulation protocol    Kristina Wright RN  Anticoagulation Clinic  2/15/2022    _______________________________________________________________________     Anticoagulation Episode Summary     Current INR goal:  2.0-3.0   TTR:  57.7 % (10.2 mo)   Target end date:  Indefinite   Send INR reminders to:  [facility]    Indications    Pulmonary emboli (H) [I26.99]  Long-term (current) use of anticoagulants [Z79.01] [Z79.01]           Comments:  HIPPA INFO OK to speak with wife Josselyn OK to leave messages on Home.work and cell phones  use cell phone #[phone] ++++         Anticoagulation Care Providers      Provider Role Specialty Phone number    Samm Vazquez MD Referring Internal Medicine - Pediatrics [phone]

## 2022-04-04 ENCOUNTER — ANTICOAGULATION THERAPY VISIT (OUTPATIENT)
Dept: ANTICOAGULATION | Facility: CLINIC | Age: 62
End: 2022-04-04

## 2022-04-04 ENCOUNTER — LAB (OUTPATIENT)
Dept: LAB | Facility: CLINIC | Age: 62
End: 2022-04-04
Payer: COMMERCIAL

## 2022-04-04 ENCOUNTER — OFFICE VISIT (OUTPATIENT)
Dept: CARDIOLOGY | Facility: CLINIC | Age: 62
End: 2022-04-04
Attending: INTERNAL MEDICINE
Payer: COMMERCIAL

## 2022-04-04 VITALS
HEART RATE: 66 BPM | SYSTOLIC BLOOD PRESSURE: 126 MMHG | BODY MASS INDEX: 31.87 KG/M2 | DIASTOLIC BLOOD PRESSURE: 85 MMHG | WEIGHT: 228.5 LBS | OXYGEN SATURATION: 97 %

## 2022-04-04 DIAGNOSIS — E78.5 HYPERLIPIDEMIA LDL GOAL <70: ICD-10-CM

## 2022-04-04 DIAGNOSIS — Z79.01 LONG TERM CURRENT USE OF ANTICOAGULANT THERAPY: ICD-10-CM

## 2022-04-04 DIAGNOSIS — Z78.9 STATIN INTOLERANCE: ICD-10-CM

## 2022-04-04 DIAGNOSIS — I26.99 PULMONARY EMBOLI (H): Primary | ICD-10-CM

## 2022-04-04 DIAGNOSIS — Z78.9 STATIN INTOLERANCE: Primary | ICD-10-CM

## 2022-04-04 DIAGNOSIS — Z95.1 S/P CABG X 4: ICD-10-CM

## 2022-04-04 DIAGNOSIS — I25.10 CORONARY ARTERY DISEASE WITHOUT ANGINA PECTORIS, UNSPECIFIED VESSEL OR LESION TYPE, UNSPECIFIED WHETHER NATIVE OR TRANSPLANTED HEART: ICD-10-CM

## 2022-04-04 DIAGNOSIS — I26.99 PULMONARY EMBOLI (H): ICD-10-CM

## 2022-04-04 LAB
ALBUMIN SERPL-MCNC: 3.8 G/DL (ref 3.4–5)
ALP SERPL-CCNC: 54 U/L (ref 40–150)
ALT SERPL W P-5'-P-CCNC: 46 U/L (ref 0–70)
ANION GAP SERPL CALCULATED.3IONS-SCNC: 8 MMOL/L (ref 3–14)
AST SERPL W P-5'-P-CCNC: 36 U/L (ref 0–45)
BILIRUB SERPL-MCNC: 0.7 MG/DL (ref 0.2–1.3)
BUN SERPL-MCNC: 13 MG/DL (ref 7–30)
CALCIUM SERPL-MCNC: 9 MG/DL (ref 8.5–10.1)
CHLORIDE BLD-SCNC: 106 MMOL/L (ref 94–109)
CHOLEST SERPL-MCNC: 74 MG/DL
CO2 SERPL-SCNC: 26 MMOL/L (ref 20–32)
CREAT SERPL-MCNC: 1.08 MG/DL (ref 0.66–1.25)
FASTING STATUS PATIENT QL REPORTED: YES
GFR SERPL CREATININE-BSD FRML MDRD: 79 ML/MIN/1.73M2
GLUCOSE BLD-MCNC: 89 MG/DL (ref 70–99)
HDLC SERPL-MCNC: 40 MG/DL
INR BLD: 1.2 (ref 0.9–1.1)
LDLC SERPL CALC-MCNC: 8 MG/DL
NONHDLC SERPL-MCNC: 34 MG/DL
POTASSIUM BLD-SCNC: 3.8 MMOL/L (ref 3.4–5.3)
PROT SERPL-MCNC: 7.7 G/DL (ref 6.8–8.8)
SODIUM SERPL-SCNC: 140 MMOL/L (ref 133–144)
TRIGL SERPL-MCNC: 131 MG/DL

## 2022-04-04 PROCEDURE — 80053 COMPREHEN METABOLIC PANEL: CPT | Performed by: PATHOLOGY

## 2022-04-04 PROCEDURE — G0463 HOSPITAL OUTPT CLINIC VISIT: HCPCS

## 2022-04-04 PROCEDURE — 80061 LIPID PANEL: CPT | Performed by: PATHOLOGY

## 2022-04-04 PROCEDURE — 99214 OFFICE O/P EST MOD 30 MIN: CPT | Performed by: INTERNAL MEDICINE

## 2022-04-04 PROCEDURE — 36415 COLL VENOUS BLD VENIPUNCTURE: CPT | Performed by: PATHOLOGY

## 2022-04-04 PROCEDURE — 85610 PROTHROMBIN TIME: CPT | Performed by: PATHOLOGY

## 2022-04-04 PROCEDURE — 83695 ASSAY OF LIPOPROTEIN(A): CPT | Mod: 90 | Performed by: PATHOLOGY

## 2022-04-04 PROCEDURE — 99000 SPECIMEN HANDLING OFFICE-LAB: CPT | Performed by: PATHOLOGY

## 2022-04-04 ASSESSMENT — PAIN SCALES - GENERAL: PAINLEVEL: NO PAIN (0)

## 2022-04-04 NOTE — LETTER
4/4/2022      RE: Oswaldo Prabhakar  [address]  [place] [address]       Dear Colleague,    Thank you for the opportunity to participate in the care of your patient, Oswaldo Prabhakar, at the [facility] [place] at [facility]. Please see a copy of my visit note below.    HPI:   I had the privilege to evaluate and examine Mr. YAQUELIN Prabhakar, 61 yr old,  with a history of CAD s/p 4V CABG in 2014 (LIMA-LAD, SVg-Diag, SVG-OM1, SVG-rPDA) and PCI with NIR to prox/mid LAD in 01/2020, hypertension, hyperlipidemia and pulmonary emboli.     Patient denies chest pain, shortness of breath, palpitations, and intermittent claudication.       PAST MEDICAL HISTORY:  Past Medical History:   Diagnosis Date     Antiplatelet or antithrombotic long-term use      Coronary artery disease      Diaphragmatic hernia without mention of obstruction or gangrene      Heart disease      Hernia, abdominal      History of blood transfusion      History of thrombophlebitis      Hypertension      Nephrolithiasis 4/2010     Other and unspecified hyperlipidemia      Pulmonary embolus and infarction (H)     s/p hemothorax and filter s/p filter removal (2011), now on long term anti-coagulation     Statin intolerance 2/1/2017     Stented coronary artery 01/07/2020    NIR mid LAD     Unspecified retinal detachment     Childhood trauma, unrepaired       CURRENT MEDICATIONS:  Current Outpatient Medications   Medication Sig Dispense Refill     acetaminophen (TYLENOL) 500 MG tablet Take 2 tablets (1,000 mg) by mouth 3 times daily as needed for pain       atorvastatin (LIPITOR) 80 MG tablet Take 1 tablet (80 mg) by mouth daily 90 tablet 2     calcium carbonate (TUMS) 500 MG chewable tablet Take 1 chew tab by mouth daily as needed        Cholecalciferol (VITAMIN D) 2000 UNITS CAPS Take 2,000 Units by mouth daily       evolocumab (REPATHA SURECLICK) 140 MG/ML prefilled autoinjector Inject 1 mL  (140 mg) Subcutaneous every 14 days 6 mL 3     ezetimibe (ZETIA) 10 MG tablet Take 1 tablet (10 mg) by mouth daily 90 tablet 3     fenofibrate (TRIGLIDE/LOFIBRA) 160 MG tablet Take 1 tablet (160 mg) by mouth daily 90 tablet 3     lisinopril (ZESTRIL) 2.5 MG tablet Take 1 tablet (2.5 mg) by mouth daily 90 tablet 3     metoprolol tartrate (LOPRESSOR) 25 MG tablet Take 0.5 tablets (12.5 mg) by mouth 2 times daily 90 tablet 3     OMEPRAZOLE PO Take 20 mg by mouth        ondansetron (ZOFRAN-ODT) 4 MG ODT tab Take 1 tablet (4 mg) by mouth every 8 hours as needed for nausea 10 tablet 0     ticagrelor (BRILINTA) 90 MG tablet Take 1 tablet (90 mg) by mouth 2 times daily 180 tablet 3     warfarin ANTICOAGULANT (COUMADIN) 5 MG tablet TAKE 1.5 TABLETS (7.5mg) Mon and Fri and 1 tablet (5mg) on the other days 135 tablet 3     tamsulosin (FLOMAX) 0.4 MG capsule Take 1 capsule (0.4 mg) by mouth daily (Patient not taking: Reported on 2/10/2022) 90 capsule 0     ticagrelor (BRILINTA) 90 MG tablet Take 1 tablet (90 mg) by mouth every 12 hours 180 tablet 3       PAST SURGICAL HISTORY:  Past Surgical History:   Procedure Laterality Date     BYPASS GRAFT ARTERY CORONARY  4/4/2014    Procedure: BYPASS GRAFT ARTERY CORONARY;  Median Sternotomy, Coronary Artery Bypass Bypass x 4 using Left Internal Mammary, Left Saphenous Vein, on pump oxygenation;  Surgeon: Sherry Armando MD;  [facility]     CLOSED REDUCTION, PERCUTANEOUS PINNING  HAND, COMBINED Left 11/5/2015    Procedure: COMBINED CLOSED REDUCTION, PERCUTANEOUS PINNING HAND;  Surgeon: Carmella Love MD;  [facility]     COLONOSCOPY  3/15/2013    Procedure: COLONOSCOPY;;  Surgeon: Racheal Shipman MD;  Location: [facility] GI     COMBINED CYSTOSCOPY, INSERT STENT URETER(S) Right 1/10/2020    Procedure: Cystoscopy, right retrograde pyelogram, interpretation of fluoroscopic images, placement of 6 x 26 double-J ureteral stent;  Surgeon: Nguyễn Woodard MD;   Location: [facility] OR     COMBINED CYSTOSCOPY, RETROGRADES, URETEROSCOPY, LASER HOLMIUM LITHOTRIPSY URETER(S), INSERT STENT Right 2/5/2020    Procedure: Cystoscopy, right ureteral stent exchange, right ureteroscopy with holmium lithotripsy and stone basketing, right retrograde pyelogram, interpretation of fluoroscopic images.;  Surgeon: Nguyễn Woodard MD;  Location: [facility] OR     CV ANGIOGRAM CORONARY GRAFT N/A 1/7/2020    Procedure: Angiogram Coronary Graft;  Surgeon: Chato Odonnell MD;  [facility]: [facility]     CV CORONARY ANGIOGRAM  1/7/2020    Procedure: CV CORONARY ANGIOGRAM;  Surgeon: Chato Odonnell MD;  [facility]     CV PCI STENT DRUG ELUTING N/A 1/7/2020    Procedure: PCI Stent Drug Eluting;  Surgeon: Chato Odonnell MD;  [facility]     CYSTOSCOPY       LAPAROSCOPIC CHOLECYSTECTOMY N/A 8/6/2018    Procedure: LAPAROSCOPIC CHOLECYSTECTOMY;  LAPAROSCOPIC CHOLECYSTECTOMY ;  Surgeon: José Miguel Stuart MD;  Location: [facility]     LITHOTRIPSY  4/2010     THORACIC SURGERY      lung surgery, thoracotomy, lung adhesion     ZZC NONSPECIFIC PROCEDURE      Spermatocele resection       ALLERGIES     Allergies   Allergen Reactions     Cats      Hay Fever & [A.R.M.]      Heparin Other (See Comments)     Heparin resistance per cardio-thoracic surgeon Dr. Armando     Hydrocodone-Acetaminophen Nausea and Vomiting     Acetaminophen is ok       FAMILY HISTORY:  Family History   Problem Relation Age of Onset     Heart Disease Mother      C.A.D. Father         3 vessel CABG, age 70     Cancer Father         bladder cancer     C.A.D. Paternal Grandmother      Hypertension Paternal Grandmother      Alzheimer Disease Paternal Grandmother      Diabetes No family hx of      Thyroid Disease No family hx of      Cancer - colorectal No family hx of        SOCIAL HISTORY:  Social History     Socioeconomic History     Marital  status:      Spouse name: None     Number of children: None     Years of education: None     Highest education level: None   Occupational History     None   Tobacco Use     Smoking status: Never Smoker     Smokeless tobacco: Never Used   Substance and Sexual Activity     Alcohol use: No     Comment: very rare     Drug use: No     Sexual activity: None   Other Topics Concern     Parent/sibling w/ CABG, MI or angioplasty before 65F 55M? Not Asked   Social History Narrative     None     Social Determinants of Health     Financial Resource Strain: Not on file   Food Insecurity: Not on file   Transportation Needs: Not on file   Physical Activity: Not on file   Stress: Not on file   Social Connections: Not on file   Intimate Partner Violence: Not on file   Housing Stability: Not on file       ROS:   Constitutional: No fever, chills, or sweats. No weight gain/loss   ENT: No visual disturbance, ear ache, epistaxis, sore throat  Allergies/Immunologic: Negative.   Respiratory: No cough, hemoptysia  Cardiovascular: As per HPI  GI: No nausea, vomiting, hematemesis, melena, or hematochezia  : No urinary frequency, dysuria, or hematuria  Integument: Negative  Psychiatric: Negative  Neuro: Negative  Endocrinology: Negative   Musculoskeletal: Negative    EXAM:  /85 (BP Location: Right arm, Patient Position: Chair, Cuff Size: Adult Regular)   Pulse 66   Wt 103.6 kg (228 lb 8 oz)   SpO2 97%   BMI 31.87 kg/m    In general, the patient is a pleasant male in no apparent distress.    HEENT: NC/AT.  PERRLA.  EOMI.  Sclerae white, not injected.  Nares clear.  Pharynx without erythema or exudate.  Dentition intact.    Neck: No adenopathy.  No thyromegaly. Carotids +4/4 bilaterally without bruits.  No jugular venous distension.   Heart: RRR. Normal S1, S2 splits physiologically. No murmur, rub, click, or gallop. The PMI is in the 5th ICS in the midclavicular line. There is no heave.    Lungs: CTA.  No ronchi, wheezes,  rales.  No dullness to percussion.   Abdomen: Soft, nontender, nondistended. No organomegaly.  No bruits.   Extremities: No clubbing, cyanosis, or edema.  The pulses are +4/4 at the radial, brachial, femoral, popliteal, DP, and PT sites bilaterally.  No bruits are noted.  Neurologic: Alert and oriented to person/place/time, normal speech, gait and affect  Skin: No petechiae, purpura or rash.    Labs:  LIPID RESULTS:  Lab Results   Component Value Date    CHOL 93 07/14/2021    CHOL 83 11/30/2020    HDL 41 07/14/2021    HDL 44 11/30/2020    LDL 23 07/14/2021    LDL 19 11/30/2020    TRIG 144 07/14/2021    TRIG 99 11/30/2020    CHOLHDLRATIO 7.0 (H) 09/16/2015    NHDL 52 07/14/2021    NHDL 39 11/30/2020       LIVER ENZYME RESULTS:  Lab Results   Component Value Date    AST 29 07/14/2021    AST 26 05/10/2021    ALT 41 07/14/2021    ALT 46 05/10/2021       CBC RESULTS:  Lab Results   Component Value Date    WBC 9.2 05/10/2021    RBC 5.02 05/10/2021    HGB 13.9 05/10/2021    HCT 42.7 05/10/2021    MCV 85 05/10/2021    MCH 27.7 05/10/2021    MCHC 32.6 05/10/2021    RDW 13.3 05/10/2021     05/10/2021       BMP RESULTS:  Lab Results   Component Value Date     04/04/2022     05/10/2021    POTASSIUM 3.8 04/04/2022    POTASSIUM 3.4 05/10/2021    CHLORIDE 106 04/04/2022    CHLORIDE 108 05/10/2021    CO2 27 07/14/2021    CO2 28 05/10/2021    ANIONGAP 8 07/14/2021    ANIONGAP 4 05/10/2021    GLC 86 07/14/2021    GLC 91 05/10/2021    BUN 14 07/14/2021    BUN 16 05/10/2021    CR 0.98 07/14/2021    CR 1.50 (H) 05/10/2021    GFRESTIMATED 85 07/14/2021    GFRESTIMATED 50 (L) 05/10/2021    GFRESTBLACK 58 (L) 05/10/2021    GONZALES 8.6 07/14/2021    GONZALES 9.3 05/10/2021        A1C RESULTS:  Lab Results   Component Value Date    A1C 5.8 (H) 12/16/2020       INR RESULTS:  Lab Results   Component Value Date    INR 1.2 (H) 04/04/2022    INR 2.8 (H) 02/15/2022    INR 2.20 (H) 08/06/2021    INR 2.07 (H) 07/07/2021    INR 3.02 (H)  06/17/2021           Assessment and Plan:     We discussed the results with patient.  We discussed the importance of a heart healthy diet and lifestyle.    Follow Up:   -Recheck labs in 3 months    Reji Ocampo MD, PhD  Professor of Medicine  Division of Cardiology  -Follow up with Dr. Ocampo in 1 year with labs prior      CC  Patient Care Team:  Samm Vazquez MD as PCP - General (Internal Medicine)  Reji Ocampo MD as MD (Cardiology)  Shiv Vizcarra MD as MD (Family Practice)  Carmella Love MD as MD (Orthopedics)  Lavelle Solis MD as MD (Family Practice)  Nicole Blanc LPN as Nurse Coordinator (Cardiology)  Sav Schmitt DO as MD (Family Practice)  Reji Ocampo MD as Assigned Heart and Vascular Provider  Lisa Talley MD as MD (Urology)  Josephine Moran PA-C as Physician Assistant (Family Medicine)  Geneva Mckeon, RN as Specialty Care Coordinator (Urology)  Samm Vazquez MD as Assigned PCP  Edita Way, RN as Specialty Care Coordinator (Cardiology)  REJI OCAMPO    HPI: I had the privilege to evaluated and examined Mr. YAQUELIN Prabhakar, 61 yr old,  with a history of CAD s/p 4V CABG in 2014 (LIMA-LAD, SVg-Diag, SVG-OM1, SVG-rPDA) and PCI with NIR to prox/mid LAD in 01/2020, hypertension, hyperlipidemia and pulmonary emboli.    Patient denies chest pain, shortness of breath, palpitations, and intermittent claudication.      PAST MEDICAL HISTORY:  Past Medical History:   Diagnosis Date     Antiplatelet or antithrombotic long-term use      Coronary artery disease      Diaphragmatic hernia without mention of obstruction or gangrene      Heart disease      Hernia, abdominal      History of blood transfusion      History of thrombophlebitis      Hypertension      Nephrolithiasis 4/2010     Other and unspecified hyperlipidemia      Pulmonary embolus and infarction (H)     s/p hemothorax and filter s/p filter removal (2011), now on long term anti-coagulation      Statin intolerance 2/1/2017     Stented coronary artery 01/07/2020    NIR mid LAD     Unspecified retinal detachment     Childhood trauma, unrepaired       CURRENT MEDICATIONS:  Current Outpatient Medications   Medication Sig Dispense Refill     acetaminophen (TYLENOL) 500 MG tablet Take 2 tablets (1,000 mg) by mouth 3 times daily as needed for pain       atorvastatin (LIPITOR) 80 MG tablet Take 1 tablet (80 mg) by mouth daily 90 tablet 2     calcium carbonate (TUMS) 500 MG chewable tablet Take 1 chew tab by mouth daily as needed        Cholecalciferol (VITAMIN D) 2000 UNITS CAPS Take 2,000 Units by mouth daily       evolocumab (REPATHA SURECLICK) 140 MG/ML prefilled autoinjector Inject 1 mL (140 mg) Subcutaneous every 14 days 6 mL 3     ezetimibe (ZETIA) 10 MG tablet Take 1 tablet (10 mg) by mouth daily 90 tablet 3     fenofibrate (TRIGLIDE/LOFIBRA) 160 MG tablet Take 1 tablet (160 mg) by mouth daily 90 tablet 3     lisinopril (ZESTRIL) 2.5 MG tablet Take 1 tablet (2.5 mg) by mouth daily 90 tablet 3     metoprolol tartrate (LOPRESSOR) 25 MG tablet Take 0.5 tablets (12.5 mg) by mouth 2 times daily 90 tablet 3     OMEPRAZOLE PO Take 20 mg by mouth        ondansetron (ZOFRAN-ODT) 4 MG ODT tab Take 1 tablet (4 mg) by mouth every 8 hours as needed for nausea 10 tablet 0     ticagrelor (BRILINTA) 90 MG tablet Take 1 tablet (90 mg) by mouth 2 times daily 180 tablet 3     warfarin ANTICOAGULANT (COUMADIN) 5 MG tablet TAKE 1.5 TABLETS (7.5mg) Mon and Fri and 1 tablet (5mg) on the other days 135 tablet 3     tamsulosin (FLOMAX) 0.4 MG capsule Take 1 capsule (0.4 mg) by mouth daily (Patient not taking: Reported on 2/10/2022) 90 capsule 0     ticagrelor (BRILINTA) 90 MG tablet Take 1 tablet (90 mg) by mouth every 12 hours 180 tablet 3       PAST SURGICAL HISTORY:  Past Surgical History:   Procedure Laterality Date     BYPASS GRAFT ARTERY CORONARY  4/4/2014    Procedure: BYPASS GRAFT ARTERY CORONARY;  Median Sternotomy,  Coronary Artery Bypass Bypass x 4 using Left Internal Mammary, Left Saphenous Vein, on pump oxygenation;  Surgeon: Sherry Armando MD;  [facility]: [facility]     CLOSED REDUCTION, PERCUTANEOUS PINNING  HAND, COMBINED Left 11/5/2015    Procedure: COMBINED CLOSED REDUCTION, PERCUTANEOUS PINNING HAND;  Surgeon: Carmella Love MD;  [facility]     COLONOSCOPY  3/15/2013    Procedure: COLONOSCOPY;;  Surgeon: Racheal Shipman MD;  Location: [facility] GI     COMBINED CYSTOSCOPY, INSERT STENT URETER(S) Right 1/10/2020    Procedure: Cystoscopy, right retrograde pyelogram, interpretation of fluoroscopic images, placement of 6 x 26 double-J ureteral stent;  Surgeon: Nguyễn Woodard MD;  Location: [facility] OR     COMBINED CYSTOSCOPY, RETROGRADES, URETEROSCOPY, LASER HOLMIUM LITHOTRIPSY URETER(S), INSERT STENT Right 2/5/2020    Procedure: Cystoscopy, right ureteral stent exchange, right ureteroscopy with holmium lithotripsy and stone basketing, right retrograde pyelogram, interpretation of fluoroscopic images.;  Surgeon: Nguyễn Woodard MD;  Location: [facility] OR     CV ANGIOGRAM CORONARY GRAFT N/A 1/7/2020    Procedure: Angiogram Coronary Graft;  Surgeon: Chato Odonnell MD;  Location:  HEART CARDIAC CATH LAB     CV CORONARY ANGIOGRAM  1/7/2020    Procedure: CV CORONARY ANGIOGRAM;  Surgeon: Chato Odonnell MD;  Location:  HEART CARDIAC CATH LAB     CV PCI STENT DRUG ELUTING N/A 1/7/2020    Procedure: PCI Stent Drug Eluting;  Surgeon: Chato Odonnell MD;  Location:  HEART CARDIAC CATH LAB     CYSTOSCOPY       LAPAROSCOPIC CHOLECYSTECTOMY N/A 8/6/2018    Procedure: LAPAROSCOPIC CHOLECYSTECTOMY;  LAPAROSCOPIC CHOLECYSTECTOMY ;  Surgeon: José Miguel Stuart MD;  Location: [facility] OR     LITHOTRIPSY  4/2010     THORACIC SURGERY      lung surgery, thoracotomy, lung adhesion     ZZC NONSPECIFIC PROCEDURE      Spermatocele resection       ALLERGIES     Allergies   Allergen  Reactions     Cats      Hay Fever & [A.R.M.]      Heparin Other (See Comments)     Heparin resistance per cardio-thoracic surgeon Dr. Armando     Hydrocodone-Acetaminophen Nausea and Vomiting     Acetaminophen is ok       FAMILY HISTORY:  Family History   Problem Relation Age of Onset     Heart Disease Mother      C.A.D. Father         3 vessel CABG, age 70     Cancer Father         bladder cancer     C.A.D. Paternal Grandmother      Hypertension Paternal Grandmother      Alzheimer Disease Paternal Grandmother      Diabetes No family hx of      Thyroid Disease No family hx of      Cancer - colorectal No family hx of        SOCIAL HISTORY:  Social History     Socioeconomic History     Marital status:      Spouse name: None     Number of children: None     Years of education: None     Highest education level: None   Occupational History     None   Tobacco Use     Smoking status: Never Smoker     Smokeless tobacco: Never Used   Substance and Sexual Activity     Alcohol use: No     Comment: very rare     Drug use: No     Sexual activity: None   Other Topics Concern     Parent/sibling w/ CABG, MI or angioplasty before 65F 55M? Not Asked   Social History Narrative     None     Social Determinants of Health     Financial Resource Strain: Not on file   Food Insecurity: Not on file   Transportation Needs: Not on file   Physical Activity: Not on file   Stress: Not on file   Social Connections: Not on file   Intimate Partner Violence: Not on file   Housing Stability: Not on file       ROS:   Constitutional: No fever, chills, or sweats. No weight gain/loss   ENT: No visual disturbance, ear ache, epistaxis, sore throat  Allergies/Immunologic: Negative.   Respiratory: No cough, hemoptysia  Cardiovascular: As per HPI  GI: No nausea, vomiting, hematemesis, melena, or hematochezia  : No urinary frequency, dysuria, or hematuria  Integument: Negative  Psychiatric: Negative  Neuro: Negative  Endocrinology: Negative    Musculoskeletal: Negative    EXAM:  /85 (BP Location: Right arm, Patient Position: Chair, Cuff Size: Adult Regular)   Pulse 66   Wt 103.6 kg (228 lb 8 oz)   SpO2 97%   BMI 31.87 kg/m    In general, the patient is a pleasant male in no apparent distress.    HEENT: NC/AT.  PERRLA.  EOMI.  Sclerae white, not injected.  Nares clear.  Pharynx without erythema or exudate.  Dentition intact.    Neck: No adenopathy.  No thyromegaly. Carotids +4/4 bilaterally without bruits.  No jugular venous distension.   Heart: RRR. Normal S1, S2 splits physiologically. No murmur, rub, click, or gallop. The PMI is in the 5th ICS in the midclavicular line. There is no heave.    Lungs: CTA.  No ronchi, wheezes, rales.  No dullness to percussion.   Abdomen: Soft, nontender, nondistended. No organomegaly.  No bruits.   Extremities: No clubbing, cyanosis, or edema.  The pulses are +4/4 at the radial, brachial, femoral, popliteal, DP, and PT sites bilaterally.  No bruits are noted.  Neurologic: Alert and oriented to person/place/time, normal speech, gait and affect  Skin: No petechiae, purpura or rash.    Labs:  LIPID RESULTS:  Lab Results   Component Value Date    CHOL 74 04/04/2022    CHOL 83 11/30/2020    HDL 40 04/04/2022    HDL 44 11/30/2020    LDL 8 04/04/2022    LDL 19 11/30/2020    TRIG 131 04/04/2022    TRIG 99 11/30/2020    CHOLHDLRATIO 7.0 (H) 09/16/2015    NHDL 34 04/04/2022    NHDL 39 11/30/2020       LIVER ENZYME RESULTS:  Lab Results   Component Value Date    AST 36 04/04/2022    AST 26 05/10/2021    ALT 46 04/04/2022    ALT 46 05/10/2021       CBC RESULTS:  Lab Results   Component Value Date    WBC 9.2 05/10/2021    RBC 5.02 05/10/2021    HGB 13.9 05/10/2021    HCT 42.7 05/10/2021    MCV 85 05/10/2021    MCH 27.7 05/10/2021    MCHC 32.6 05/10/2021    RDW 13.3 05/10/2021     05/10/2021       BMP RESULTS:  Lab Results   Component Value Date     04/04/2022     05/10/2021    POTASSIUM 3.8 04/04/2022     POTASSIUM 3.4 05/10/2021    CHLORIDE 106 04/04/2022    CHLORIDE 108 05/10/2021    CO2 27 07/14/2021    CO2 28 05/10/2021    ANIONGAP 8 07/14/2021    ANIONGAP 4 05/10/2021    GLC 86 07/14/2021    GLC 91 05/10/2021    BUN 14 07/14/2021    BUN 16 05/10/2021    CR 0.98 07/14/2021    CR 1.50 (H) 05/10/2021    GFRESTIMATED 85 07/14/2021    GFRESTIMATED 50 (L) 05/10/2021    GFRESTBLACK 58 (L) 05/10/2021    GONZALES 8.6 07/14/2021    GONZALES 9.3 05/10/2021        A1C RESULTS:  Lab Results   Component Value Date    A1C 5.8 (H) 12/16/2020       INR RESULTS:  Lab Results   Component Value Date    INR 1.2 (H) 04/04/2022    INR 2.8 (H) 02/15/2022    INR 2.20 (H) 08/06/2021    INR 2.07 (H) 07/07/2021    INR 3.02 (H) 06/17/2021             Assessment and Plan:     We discussed the results with patient.  We discussed the importance of a heart healthy diet and lifestyle.  We continue with same medical regimen    Follow Up:   -Recheck labs in 3 months  -Follow up with Dr. Ocampo in 1 year with labs prior    Reji Ocampo MD, PhD  Professor of Medicine  Division of Cardiology.        CC  Patient Care Team:  Samm Vazquez MD as PCP - General (Internal Medicine)  Reji Ocampo MD as MD (Cardiology)  Shiv Vizcarra MD as MD (Family Practice)  Carmella Love MD as MD (Orthopedics)  Lavelle Solis MD as MD (Family Practice)  Nicole Blanc LPN as Nurse Coordinator (Cardiology)  Sav Schmitt DO as MD (Family Practice)  Reji Ocampo MD as Assigned Heart and Vascular Provider  Lisa Talley MD as MD (Urology)  Josephine Moran PA-C as Physician Assistant (Family Medicine)  Geneva Mckeon, DEMETRA as Specialty Care Coordinator (Urology)  Samm Vazquez MD as Assigned PCP  Edita Way, RN as Specialty Care Coordinator (Cardiology)  REJI OCAMPO      Please do not hesitate to contact me if you have any questions/concerns.     Sincerely,     Reji Ocampo MD

## 2022-04-04 NOTE — PROGRESS NOTES
HPI:   I had the privilege to evaluate and examine Mr. YAQUELIN Prabhakar, 61 yr old,  with a history of CAD s/p 4V CABG in 2014 (LIMA-LAD, SVg-Diag, SVG-OM1, SVG-rPDA) and PCI with NIR to prox/mid LAD in 01/2020, hypertension, hyperlipidemia and pulmonary emboli.     Patient denies chest pain, shortness of breath, palpitations, and intermittent claudication.       PAST MEDICAL HISTORY:  Past Medical History:   Diagnosis Date     Antiplatelet or antithrombotic long-term use      Coronary artery disease      Diaphragmatic hernia without mention of obstruction or gangrene      Heart disease      Hernia, abdominal      History of blood transfusion      History of thrombophlebitis      Hypertension      Nephrolithiasis 4/2010     Other and unspecified hyperlipidemia      Pulmonary embolus and infarction (H)     s/p hemothorax and filter s/p filter removal (2011), now on long term anti-coagulation     Statin intolerance 2/1/2017     Stented coronary artery 01/07/2020    NIR mid LAD     Unspecified retinal detachment     Childhood trauma, unrepaired       CURRENT MEDICATIONS:  Current Outpatient Medications   Medication Sig Dispense Refill     acetaminophen (TYLENOL) 500 MG tablet Take 2 tablets (1,000 mg) by mouth 3 times daily as needed for pain       atorvastatin (LIPITOR) 80 MG tablet Take 1 tablet (80 mg) by mouth daily 90 tablet 2     calcium carbonate (TUMS) 500 MG chewable tablet Take 1 chew tab by mouth daily as needed        Cholecalciferol (VITAMIN D) 2000 UNITS CAPS Take 2,000 Units by mouth daily       evolocumab (REPATHA SURECLICK) 140 MG/ML prefilled autoinjector Inject 1 mL (140 mg) Subcutaneous every 14 days 6 mL 3     ezetimibe (ZETIA) 10 MG tablet Take 1 tablet (10 mg) by mouth daily 90 tablet 3     fenofibrate (TRIGLIDE/LOFIBRA) 160 MG tablet Take 1 tablet (160 mg) by mouth daily 90 tablet 3     lisinopril (ZESTRIL) 2.5 MG tablet Take 1 tablet (2.5 mg) by mouth daily 90 tablet 3     metoprolol tartrate  (LOPRESSOR) 25 MG tablet Take 0.5 tablets (12.5 mg) by mouth 2 times daily 90 tablet 3     OMEPRAZOLE PO Take 20 mg by mouth        ondansetron (ZOFRAN-ODT) 4 MG ODT tab Take 1 tablet (4 mg) by mouth every 8 hours as needed for nausea 10 tablet 0     ticagrelor (BRILINTA) 90 MG tablet Take 1 tablet (90 mg) by mouth 2 times daily 180 tablet 3     warfarin ANTICOAGULANT (COUMADIN) 5 MG tablet TAKE 1.5 TABLETS (7.5mg) Mon and Fri and 1 tablet (5mg) on the other days 135 tablet 3     tamsulosin (FLOMAX) 0.4 MG capsule Take 1 capsule (0.4 mg) by mouth daily (Patient not taking: Reported on 2/10/2022) 90 capsule 0     ticagrelor (BRILINTA) 90 MG tablet Take 1 tablet (90 mg) by mouth every 12 hours 180 tablet 3       PAST SURGICAL HISTORY:  Past Surgical History:   Procedure Laterality Date     BYPASS GRAFT ARTERY CORONARY  4/4/2014    Procedure: BYPASS GRAFT ARTERY CORONARY;  Median Sternotomy, Coronary Artery Bypass Bypass x 4 using Left Internal Mammary, Left Saphenous Vein, on pump oxygenation;  Surgeon: Sherry Armando MD;  Location: [facility]     CLOSED REDUCTION, PERCUTANEOUS PINNING  HAND, COMBINED Left 11/5/2015    Procedure: COMBINED CLOSED REDUCTION, PERCUTANEOUS PINNING HAND;  Surgeon: Carmella Love MD;  [facility]     COLONOSCOPY  3/15/2013    Procedure: COLONOSCOPY;;  Surgeon: Racheal Shipman MD;  Location: [facility]     COMBINED CYSTOSCOPY, INSERT STENT URETER(S) Right 1/10/2020    Procedure: Cystoscopy, right retrograde pyelogram, interpretation of fluoroscopic images, placement of 6 x 26 double-J ureteral stent;  Surgeon: Nguyễn Woodard MD;  Location: [facility]     COMBINED CYSTOSCOPY, RETROGRADES, URETEROSCOPY, LASER HOLMIUM LITHOTRIPSY URETER(S), INSERT STENT Right 2/5/2020    Procedure: Cystoscopy, right ureteral stent exchange, right ureteroscopy with holmium lithotripsy and stone basketing, right retrograde pyelogram, interpretation of fluoroscopic images.;  Surgeon:  Nguyễn Woodard MD;  Location: [facility]     CV ANGIOGRAM CORONARY GRAFT N/A 1/7/2020    Procedure: Angiogram Coronary Graft;  Surgeon: Chato Odonnell MD;  [facility]     CV CORONARY ANGIOGRAM  1/7/2020    Procedure: CV CORONARY ANGIOGRAM;  Surgeon: Chato Odonnell MD;  [facility]     CV PCI STENT DRUG ELUTING N/A 1/7/2020    Procedure: PCI Stent Drug Eluting;  Surgeon: Chato Odonnell MD;  [facility]     CYSTOSCOPY       LAPAROSCOPIC CHOLECYSTECTOMY N/A 8/6/2018    Procedure: LAPAROSCOPIC CHOLECYSTECTOMY;  LAPAROSCOPIC CHOLECYSTECTOMY ;  Surgeon: José Miguel Stuart MD;  Location: [facility]     LITHOTRIPSY  4/2010     THORACIC SURGERY      lung surgery, thoracotomy, lung adhesion     ZZC NONSPECIFIC PROCEDURE      Spermatocele resection       ALLERGIES     Allergies   Allergen Reactions     Cats      Hay Fever & [A.R.M.]      Heparin Other (See Comments)     Heparin resistance per cardio-thoracic surgeon Dr. Armando     Hydrocodone-Acetaminophen Nausea and Vomiting     Acetaminophen is ok       FAMILY HISTORY:  Family History   Problem Relation Age of Onset     Heart Disease Mother      C.A.D. Father         3 vessel CABG, age 70     Cancer Father         bladder cancer     C.A.D. Paternal Grandmother      Hypertension Paternal Grandmother      Alzheimer Disease Paternal Grandmother      Diabetes No family hx of      Thyroid Disease No family hx of      Cancer - colorectal No family hx of        SOCIAL HISTORY:  Social History     Socioeconomic History     Marital status:      Spouse name: None     Number of children: None     Years of education: None     Highest education level: None   Occupational History     None   Tobacco Use     Smoking status: Never Smoker     Smokeless tobacco: Never Used   Substance and Sexual Activity     Alcohol use: No     Comment: very rare     Drug use: No      Sexual activity: None   Other Topics Concern     Parent/sibling w/ CABG, MI or angioplasty before 65F 55M? Not Asked   Social History Narrative     None     Social Determinants of Health     Financial Resource Strain: Not on file   Food Insecurity: Not on file   Transportation Needs: Not on file   Physical Activity: Not on file   Stress: Not on file   Social Connections: Not on file   Intimate Partner Violence: Not on file   Housing Stability: Not on file       ROS:   Constitutional: No fever, chills, or sweats. No weight gain/loss   ENT: No visual disturbance, ear ache, epistaxis, sore throat  Allergies/Immunologic: Negative.   Respiratory: No cough, hemoptysia  Cardiovascular: As per HPI  GI: No nausea, vomiting, hematemesis, melena, or hematochezia  : No urinary frequency, dysuria, or hematuria  Integument: Negative  Psychiatric: Negative  Neuro: Negative  Endocrinology: Negative   Musculoskeletal: Negative    EXAM:  /85 (BP Location: Right arm, Patient Position: Chair, Cuff Size: Adult Regular)   Pulse 66   Wt 103.6 kg (228 lb 8 oz)   SpO2 97%   BMI 31.87 kg/m    In general, the patient is a pleasant male in no apparent distress.    HEENT: NC/AT.  PERRLA.  EOMI.  Sclerae white, not injected.  Nares clear.  Pharynx without erythema or exudate.  Dentition intact.    Neck: No adenopathy.  No thyromegaly. Carotids +4/4 bilaterally without bruits.  No jugular venous distension.   Heart: RRR. Normal S1, S2 splits physiologically. No murmur, rub, click, or gallop. The PMI is in the 5th ICS in the midclavicular line. There is no heave.    Lungs: CTA.  No ronchi, wheezes, rales.  No dullness to percussion.   Abdomen: Soft, nontender, nondistended. No organomegaly.  No bruits.   Extremities: No clubbing, cyanosis, or edema.  The pulses are +4/4 at the radial, brachial, femoral, popliteal, DP, and PT sites bilaterally.  No bruits are noted.  Neurologic: Alert and oriented to person/place/time, normal speech,  gait and affect  Skin: No petechiae, purpura or rash.    Labs:  LIPID RESULTS:  Lab Results   Component Value Date    CHOL 93 07/14/2021    CHOL 83 11/30/2020    HDL 41 07/14/2021    HDL 44 11/30/2020    LDL 23 07/14/2021    LDL 19 11/30/2020    TRIG 144 07/14/2021    TRIG 99 11/30/2020    CHOLHDLRATIO 7.0 (H) 09/16/2015    NHDL 52 07/14/2021    NHDL 39 11/30/2020       LIVER ENZYME RESULTS:  Lab Results   Component Value Date    AST 29 07/14/2021    AST 26 05/10/2021    ALT 41 07/14/2021    ALT 46 05/10/2021       CBC RESULTS:  Lab Results   Component Value Date    WBC 9.2 05/10/2021    RBC 5.02 05/10/2021    HGB 13.9 05/10/2021    HCT 42.7 05/10/2021    MCV 85 05/10/2021    MCH 27.7 05/10/2021    MCHC 32.6 05/10/2021    RDW 13.3 05/10/2021     05/10/2021       BMP RESULTS:  Lab Results   Component Value Date     04/04/2022     05/10/2021    POTASSIUM 3.8 04/04/2022    POTASSIUM 3.4 05/10/2021    CHLORIDE 106 04/04/2022    CHLORIDE 108 05/10/2021    CO2 27 07/14/2021    CO2 28 05/10/2021    ANIONGAP 8 07/14/2021    ANIONGAP 4 05/10/2021    GLC 86 07/14/2021    GLC 91 05/10/2021    BUN 14 07/14/2021    BUN 16 05/10/2021    CR 0.98 07/14/2021    CR 1.50 (H) 05/10/2021    GFRESTIMATED 85 07/14/2021    GFRESTIMATED 50 (L) 05/10/2021    GFRESTBLACK 58 (L) 05/10/2021    GONZALES 8.6 07/14/2021    GONZALES 9.3 05/10/2021        A1C RESULTS:  Lab Results   Component Value Date    A1C 5.8 (H) 12/16/2020       INR RESULTS:  Lab Results   Component Value Date    INR 1.2 (H) 04/04/2022    INR 2.8 (H) 02/15/2022    INR 2.20 (H) 08/06/2021    INR 2.07 (H) 07/07/2021    INR 3.02 (H) 06/17/2021           Assessment and Plan:     We discussed the results with patient.  We discussed the importance of a heart healthy diet and lifestyle.    Follow Up:   -Recheck labs in 3 months    Reji Ocampo MD, PhD  Professor of Medicine  Division of Cardiology  -Follow up with Dr. Ocampo in 1 year with labs prior      CC  Patient Care  Team:  Samm Vazquez MD as PCP - General (Internal Medicine)  Reji Ocampo MD as MD (Cardiology)  Shiv Vizcarra MD as MD (Family Practice)  Carmella Love MD as MD (Orthopedics)  Lavelle Solis MD as MD (Family Practice)  Nicole Blanc LPN as Nurse Coordinator (Cardiology)  Sav Schmitt DO as MD (Family Practice)  Reji Ocampo MD as Assigned Heart and Vascular Provider  Lisa Talley MD as MD (Urology)  Josephine Moran PA-C as Physician Assistant (Family Medicine)  Geneva Mckeon, RN as Specialty Care Coordinator (Urology)  Samm Vazquez MD as Assigned PCP  Edita Way, RN as Specialty Care Coordinator (Cardiology)  REJI OCAMPO

## 2022-04-04 NOTE — NURSING NOTE
Chief Complaint   Patient presents with     Follow Up     return esvin-  Damaris F/U     Vitals were taken and medications reconciled.    Jonas Mercado, EMT  3:48 PM

## 2022-04-04 NOTE — PROGRESS NOTES
ANTICOAGULATION MANAGEMENT     Oswaldo Prabhakar 61 year old male is on warfarin with subtherapeutic INR result. (Goal INR 2.0-3.0)    Recent labs: (last 7 days)     04/04/22  1527   INR 1.2*       ASSESSMENT       Source(s): Chart Review    Previous INR was Therapeutic last 2(+) visits    Medication, diet, health changes since last INR chart reviewed; none identified           PLAN     Unable to reach Oswaldo today.    Left message to take a booster dose of warfarin,  10mg mg tonight. Request call back for assessment.    Follow up required to confirm warfarin dose taken and assess for changes, confirm warfarin dose taken, confirm enoxaparin (Lovenox) doses taken, assess for changes , discuss out of range result  and discuss dosing instructions and confirm understanding of instructions    Kristina Wright RN  Anticoagulation Clinic  4/4/2022

## 2022-04-04 NOTE — NURSING NOTE
April 4, 2022    Medication Changes: None      Follow Up:   -Recheck labs in 3 months  -Follow up with Dr. Ocampo in 1 year with labs prior        Patient verbalized understanding of all health information given and agreed to call with further questions or concerns.      Edita Way RN

## 2022-04-04 NOTE — PATIENT INSTRUCTIONS
April 4, 2022    Cardiology Provider You Saw During Your Visit: Dr. Ocampo      Medication Changes: None      Follow Up:   -Recheck labs in 3 months  -Follow up with Dr. Ocampo in 1 year with labs prior      Labs:   Results for RUFINO NICHOLS (MRN [id number]) as of 4/4/2022 16:08   Ref. Range 4/4/2022 15:27   Sodium Latest Ref Range: 133 - 144 mmol/L 140   Potassium Latest Ref Range: 3.4 - 5.3 mmol/L 3.8   Chloride Latest Ref Range: 94 - 109 mmol/L 106   Carbon Dioxide Latest Ref Range: 20 - 32 mmol/L 26   Urea Nitrogen Latest Ref Range: 7 - 30 mg/dL 13   Creatinine Latest Ref Range: 0.66 - 1.25 mg/dL 1.08   GFR Estimate Latest Ref Range: >60 mL/min/1.73m2 79   Calcium Latest Ref Range: 8.5 - 10.1 mg/dL 9.0   Anion Gap Latest Ref Range: 3 - 14 mmol/L 8   Albumin Latest Ref Range: 3.4 - 5.0 g/dL 3.8   Protein Total Latest Ref Range: 6.8 - 8.8 g/dL 7.7   Bilirubin Total Latest Ref Range: 0.2 - 1.3 mg/dL 0.7   Alkaline Phosphatase Latest Ref Range: 40 - 150 U/L 54   ALT Latest Ref Range: 0 - 70 U/L 46   AST Latest Ref Range: 0 - 45 U/L 36   Cholesterol Latest Ref Range: <200 mg/dL 74   Patient Fasting > 8hrs? Unknown Yes   HDL Cholesterol Latest Ref Range: >=40 mg/dL 40   LDL Cholesterol Calculated Latest Ref Range: <=100 mg/dL 8   Non HDL Cholesterol Latest Ref Range: <130 mg/dL 34   Triglycerides Latest Ref Range: <150 mg/dL 131   Glucose Latest Ref Range: 70 - 99 mg/dL 89   INR Point of Care Latest Ref Range: 0.9 - 1.1  1.2 (H)           Follow the American Heart Association Diet and Lifestyle Recommendations:  -Limit saturated fat, trans fat, sodium, red meat, sweets and sugar-sweetened beverages. If you choose to eat red meat, compare labels and select the leanest cuts available.  -Aim for at least 150 minutes of moderate physical activity or 75 minutes of vigorous physical activity - or an equal combination of both - each week.      To Reach Us:  -During business hours: [phone], press option # 1 to schedule  an appointment or to leave a message for your care team.     -After hours, weekends or holidays: [phone], press option #4 and ask to speak to the on-call cardiologist. Inform them you are a patient at the [facility].      Edita Way RN  Cardiology Care Coordinator - General Cardiology  [facility]

## 2022-04-05 NOTE — PROGRESS NOTES
ANTICOAGULATION MANAGEMENT     Oswaldo Prabhakar 61 year old male is on warfarin with subtherapeutic INR result. (Goal INR 2.0-3.0)    Recent labs: (last 7 days)     04/04/22  1527   INR 1.2*       ASSESSMENT       Source(s): Chart Review and Patient/Caregiver Call       Warfarin doses taken: Warfarin taken as instructed    Diet: Increased greens/vitamin K in diet; ongoing change    New illness, injury, or hospitalization: No    Medication/supplement changes: None noted    Signs or symptoms of bleeding or clotting: No    Previous INR: Therapeutic last 2(+) visits    Additional findings: None       PLAN     Recommended plan for ongoing change(s) affecting INR     Dosing Instructions: booster dose then Increase your warfarin dose (14.3% change) with next INR in 1 week       Summary  As of 4/4/2022    Full warfarin instructions:  4/4: 10 mg; Otherwise 7.5 mg every Tue, Fri; 5 mg all other days   Next INR check:  4/12/2022             Telephone call with Oswaldo who verbalizes understanding and agrees to plan and who agrees to plan and repeated back plan correctly    Lab visit scheduled    Education provided: Importance of consistent vitamin K intake, Goal range and significance of current result, Monitoring for bleeding signs and symptoms, Monitoring for clotting signs and symptoms and Contact [phone] with any changes, questions or concerns.     Plan made per [facility] anticoagulation protocol    CASEY MANZO RN  Anticoagulation Clinic  4/5/2022    _______________________________________________________________________     Anticoagulation Episode Summary     Current INR goal:  2.0-3.0   TTR:  56.7 % (11.8 mo)   Target end date:  Indefinite   Send INR reminders to:  [facility]    Indications    Pulmonary emboli (H) [I26.99]  Long-term (current) use of anticoagulants [Z79.01] [Z79.01]           Comments:  HIPPA INFO OK to speak with wife Josselyn OK to leave messages on Home.work and cell phones  use cell phone  #[phone] +[phone]+         Anticoagulation Care Providers     Provider Role Specialty Phone number    Samm Vazquez MD Referring Internal Medicine - Pediatrics [phone]

## 2022-04-07 LAB — LPA SERPL-MCNC: 60 MG/DL

## 2022-04-12 ENCOUNTER — LAB (OUTPATIENT)
Dept: LAB | Facility: CLINIC | Age: 62
End: 2022-04-12
Payer: COMMERCIAL

## 2022-04-12 ENCOUNTER — ANTICOAGULATION THERAPY VISIT (OUTPATIENT)
Dept: ANTICOAGULATION | Facility: CLINIC | Age: 62
End: 2022-04-12

## 2022-04-12 DIAGNOSIS — I26.99 PULMONARY EMBOLI (H): ICD-10-CM

## 2022-04-12 DIAGNOSIS — I26.99 PULMONARY EMBOLI (H): Primary | ICD-10-CM

## 2022-04-12 DIAGNOSIS — Z79.01 LONG TERM CURRENT USE OF ANTICOAGULANT THERAPY: ICD-10-CM

## 2022-04-12 LAB — INR BLD: 1.5 (ref 0.9–1.1)

## 2022-04-12 PROCEDURE — 36416 COLLJ CAPILLARY BLOOD SPEC: CPT | Performed by: PATHOLOGY

## 2022-04-12 PROCEDURE — 85610 PROTHROMBIN TIME: CPT | Performed by: PATHOLOGY

## 2022-04-12 NOTE — PROGRESS NOTES
ANTICOAGULATION MANAGEMENT     Oswaldo Prabhakar 61 year old male is on warfarin with subtherapeutic INR result. (Goal INR 2.0-3.0)    Recent labs: (last 7 days)     04/12/22  1704   INR 1.5*       ASSESSMENT       Source(s): Chart Review       Warfarin doses taken: Reviewed in chart    Diet: Per previous note patient has been eating an increased amounts of vitamin K rich foods and wants to continue to eat this.     New illness, injury, or hospitalization: No    Medication/supplement changes: None noted    Signs or symptoms of bleeding or clotting: No    Previous INR: Subtherapeutic    Additional findings: None       PLAN     Recommended plan for ongoing change(s) affecting INR     Dosing Instructions: booster dose then Increase your warfarin dose (12.5% change) with next INR in 1 week       Summary  As of 4/12/2022    Full warfarin instructions:  4/12: 12.5 mg; Otherwise 5 mg every Mon, Wed, Fri; 7.5 mg all other days   Next INR check:  4/19/2022             Detailed voice message left for Oswaldo with dosing instructions and follow up date.     Contact [phone] to schedule and with any changes, questions or concerns.     Education provided: Please call back if any changes to your diet, medications or how you've been taking warfarin and Contact [phone] with any changes, questions or concerns.     Plan made per [facility] anticoagulation protocol    Liliam Hyman RN  Anticoagulation Clinic  4/12/2022    _______________________________________________________________________     Anticoagulation Episode Summary     Current INR goal:  2.0-3.0   TTR:  55.4 % (1 y)   Target end date:  Indefinite   Send INR reminders to:  [facility]    Indications    Pulmonary emboli (H) [I26.99]  Long-term (current) use of anticoagulants [Z79.01] [Z79.01]           Comments:  HIPPA INFO OK to speak with wife Josselyn OK to leave messages on Home.work and cell phones  use cell phone #[phone] +[phone]+         Anticoagulation Care  Providers     Provider Role Specialty Phone number    Samm Vazquez MD Referring Internal Medicine - Pediatrics [phone]

## 2022-04-17 NOTE — PROGRESS NOTES
HPI: I had the privilege to evaluated and examined Mr. YAQUELIN Prabhakar, 61 yr old,  with a history of CAD s/p 4V CABG in 2014 (LIMA-LAD, SVg-Diag, SVG-OM1, SVG-rPDA) and PCI with NIR to prox/mid LAD in 01/2020, hypertension, hyperlipidemia and pulmonary emboli.    Patient denies chest pain, shortness of breath, palpitations, and intermittent claudication.      PAST MEDICAL HISTORY:  Past Medical History:   Diagnosis Date     Antiplatelet or antithrombotic long-term use      Coronary artery disease      Diaphragmatic hernia without mention of obstruction or gangrene      Heart disease      Hernia, abdominal      History of blood transfusion      History of thrombophlebitis      Hypertension      Nephrolithiasis 4/2010     Other and unspecified hyperlipidemia      Pulmonary embolus and infarction (H)     s/p hemothorax and filter s/p filter removal (2011), now on long term anti-coagulation     Statin intolerance 2/1/2017     Stented coronary artery 01/07/2020    NIR mid LAD     Unspecified retinal detachment     Childhood trauma, unrepaired       CURRENT MEDICATIONS:  Current Outpatient Medications   Medication Sig Dispense Refill     acetaminophen (TYLENOL) 500 MG tablet Take 2 tablets (1,000 mg) by mouth 3 times daily as needed for pain       atorvastatin (LIPITOR) 80 MG tablet Take 1 tablet (80 mg) by mouth daily 90 tablet 2     calcium carbonate (TUMS) 500 MG chewable tablet Take 1 chew tab by mouth daily as needed        Cholecalciferol (VITAMIN D) 2000 UNITS CAPS Take 2,000 Units by mouth daily       evolocumab (REPATHA SURECLICK) 140 MG/ML prefilled autoinjector Inject 1 mL (140 mg) Subcutaneous every 14 days 6 mL 3     ezetimibe (ZETIA) 10 MG tablet Take 1 tablet (10 mg) by mouth daily 90 tablet 3     fenofibrate (TRIGLIDE/LOFIBRA) 160 MG tablet Take 1 tablet (160 mg) by mouth daily 90 tablet 3     lisinopril (ZESTRIL) 2.5 MG tablet Take 1 tablet (2.5 mg) by mouth daily 90 tablet 3     metoprolol tartrate  (LOPRESSOR) 25 MG tablet Take 0.5 tablets (12.5 mg) by mouth 2 times daily 90 tablet 3     OMEPRAZOLE PO Take 20 mg by mouth        ondansetron (ZOFRAN-ODT) 4 MG ODT tab Take 1 tablet (4 mg) by mouth every 8 hours as needed for nausea 10 tablet 0     ticagrelor (BRILINTA) 90 MG tablet Take 1 tablet (90 mg) by mouth 2 times daily 180 tablet 3     warfarin ANTICOAGULANT (COUMADIN) 5 MG tablet TAKE 1.5 TABLETS (7.5mg) Mon and Fri and 1 tablet (5mg) on the other days 135 tablet 3     tamsulosin (FLOMAX) 0.4 MG capsule Take 1 capsule (0.4 mg) by mouth daily (Patient not taking: Reported on 2/10/2022) 90 capsule 0     ticagrelor (BRILINTA) 90 MG tablet Take 1 tablet (90 mg) by mouth every 12 hours 180 tablet 3       PAST SURGICAL HISTORY:  Past Surgical History:   Procedure Laterality Date     BYPASS GRAFT ARTERY CORONARY  4/4/2014    Procedure: BYPASS GRAFT ARTERY CORONARY;  Median Sternotomy, Coronary Artery Bypass Bypass x 4 using Left Internal Mammary, Left Saphenous Vein, on pump oxygenation;  Surgeon: Sherry Armando MD;  [facility]     CLOSED REDUCTION, PERCUTANEOUS PINNING  HAND, COMBINED Left 11/5/2015    Procedure: COMBINED CLOSED REDUCTION, PERCUTANEOUS PINNING HAND;  Surgeon: Carmella Love MD;  [facility]     COLONOSCOPY  3/15/2013    Procedure: COLONOSCOPY;;  Surgeon: Racheal Shipman MD;  [facility]: [facility]     COMBINED CYSTOSCOPY, INSERT STENT URETER(S) Right 1/10/2020    Procedure: Cystoscopy, right retrograde pyelogram, interpretation of fluoroscopic images, placement of 6 x 26 double-J ureteral stent;  Surgeon: Nguyễn Woodard MD;  Location[facility]     COMBINED CYSTOSCOPY, RETROGRADES, URETEROSCOPY, LASER HOLMIUM LITHOTRIPSY URETER(S), INSERT STENT Right 2/5/2020    Procedure: Cystoscopy, right ureteral stent exchange, right ureteroscopy with holmium lithotripsy and stone basketing, right retrograde pyelogram, interpretation of fluoroscopic images.;  Surgeon:  Nguyễn Woodard MD;  Location: [facility]     CV ANGIOGRAM CORONARY GRAFT N/A 1/7/2020    Procedure: Angiogram Coronary Graft;  Surgeon: Chato Odonnell MD;  [facility]     CV CORONARY ANGIOGRAM  1/7/2020    Procedure: CV CORONARY ANGIOGRAM;  Surgeon: Chato Odonnell MD;  [facility]     CV PCI STENT DRUG ELUTING N/A 1/7/2020    Procedure: PCI Stent Drug Eluting;  Surgeon: Chato Odonnell MD;  [facility]     CYSTOSCOPY       LAPAROSCOPIC CHOLECYSTECTOMY N/A 8/6/2018    Procedure: LAPAROSCOPIC CHOLECYSTECTOMY;  LAPAROSCOPIC CHOLECYSTECTOMY ;  Surgeon: José Miguel Stuart MD;  [facility]     LITHOTRIPSY  4/2010     THORACIC SURGERY      lung surgery, thoracotomy, lung adhesion     ZZC NONSPECIFIC PROCEDURE      Spermatocele resection       ALLERGIES     Allergies   Allergen Reactions     Cats      Hay Fever & [A.R.M.]      Heparin Other (See Comments)     Heparin resistance per cardio-thoracic surgeon Dr. Armando     Hydrocodone-Acetaminophen Nausea and Vomiting     Acetaminophen is ok       FAMILY HISTORY:  Family History   Problem Relation Age of Onset     Heart Disease Mother      C.A.D. Father         3 vessel CABG, age 70     Cancer Father         bladder cancer     C.A.D. Paternal Grandmother      Hypertension Paternal Grandmother      Alzheimer Disease Paternal Grandmother      Diabetes No family hx of      Thyroid Disease No family hx of      Cancer - colorectal No family hx of        SOCIAL HISTORY:  Social History     Socioeconomic History     Marital status:      Spouse name: None     Number of children: None     Years of education: None     Highest education level: None   Occupational History     None   Tobacco Use     Smoking status: Never Smoker     Smokeless tobacco: Never Used   Substance and Sexual Activity     Alcohol use: No     Comment: very rare     Drug use: No      Sexual activity: None   Other Topics Concern     Parent/sibling w/ CABG, MI or angioplasty before 65F 55M? Not Asked   Social History Narrative     None     Social Determinants of Health     Financial Resource Strain: Not on file   Food Insecurity: Not on file   Transportation Needs: Not on file   Physical Activity: Not on file   Stress: Not on file   Social Connections: Not on file   Intimate Partner Violence: Not on file   Housing Stability: Not on file       ROS:   Constitutional: No fever, chills, or sweats. No weight gain/loss   ENT: No visual disturbance, ear ache, epistaxis, sore throat  Allergies/Immunologic: Negative.   Respiratory: No cough, hemoptysia  Cardiovascular: As per HPI  GI: No nausea, vomiting, hematemesis, melena, or hematochezia  : No urinary frequency, dysuria, or hematuria  Integument: Negative  Psychiatric: Negative  Neuro: Negative  Endocrinology: Negative   Musculoskeletal: Negative    EXAM:  /85 (BP Location: Right arm, Patient Position: Chair, Cuff Size: Adult Regular)   Pulse 66   Wt 103.6 kg (228 lb 8 oz)   SpO2 97%   BMI 31.87 kg/m    In general, the patient is a pleasant male in no apparent distress.    HEENT: NC/AT.  PERRLA.  EOMI.  Sclerae white, not injected.  Nares clear.  Pharynx without erythema or exudate.  Dentition intact.    Neck: No adenopathy.  No thyromegaly. Carotids +4/4 bilaterally without bruits.  No jugular venous distension.   Heart: RRR. Normal S1, S2 splits physiologically. No murmur, rub, click, or gallop. The PMI is in the 5th ICS in the midclavicular line. There is no heave.    Lungs: CTA.  No ronchi, wheezes, rales.  No dullness to percussion.   Abdomen: Soft, nontender, nondistended. No organomegaly.  No bruits.   Extremities: No clubbing, cyanosis, or edema.  The pulses are +4/4 at the radial, brachial, femoral, popliteal, DP, and PT sites bilaterally.  No bruits are noted.  Neurologic: Alert and oriented to person/place/time, normal speech,  gait and affect  Skin: No petechiae, purpura or rash.    Labs:  LIPID RESULTS:  Lab Results   Component Value Date    CHOL 74 04/04/2022    CHOL 83 11/30/2020    HDL 40 04/04/2022    HDL 44 11/30/2020    LDL 8 04/04/2022    LDL 19 11/30/2020    TRIG 131 04/04/2022    TRIG 99 11/30/2020    CHOLHDLRATIO 7.0 (H) 09/16/2015    NHDL 34 04/04/2022    NHDL 39 11/30/2020       LIVER ENZYME RESULTS:  Lab Results   Component Value Date    AST 36 04/04/2022    AST 26 05/10/2021    ALT 46 04/04/2022    ALT 46 05/10/2021       CBC RESULTS:  Lab Results   Component Value Date    WBC 9.2 05/10/2021    RBC 5.02 05/10/2021    HGB 13.9 05/10/2021    HCT 42.7 05/10/2021    MCV 85 05/10/2021    MCH 27.7 05/10/2021    MCHC 32.6 05/10/2021    RDW 13.3 05/10/2021     05/10/2021       BMP RESULTS:  Lab Results   Component Value Date     04/04/2022     05/10/2021    POTASSIUM 3.8 04/04/2022    POTASSIUM 3.4 05/10/2021    CHLORIDE 106 04/04/2022    CHLORIDE 108 05/10/2021    CO2 27 07/14/2021    CO2 28 05/10/2021    ANIONGAP 8 07/14/2021    ANIONGAP 4 05/10/2021    GLC 86 07/14/2021    GLC 91 05/10/2021    BUN 14 07/14/2021    BUN 16 05/10/2021    CR 0.98 07/14/2021    CR 1.50 (H) 05/10/2021    GFRESTIMATED 85 07/14/2021    GFRESTIMATED 50 (L) 05/10/2021    GFRESTBLACK 58 (L) 05/10/2021    GONZALES 8.6 07/14/2021    GONZALES 9.3 05/10/2021        A1C RESULTS:  Lab Results   Component Value Date    A1C 5.8 (H) 12/16/2020       INR RESULTS:  Lab Results   Component Value Date    INR 1.2 (H) 04/04/2022    INR 2.8 (H) 02/15/2022    INR 2.20 (H) 08/06/2021    INR 2.07 (H) 07/07/2021    INR 3.02 (H) 06/17/2021             Assessment and Plan:     We discussed the results with patient.  We discussed the importance of a heart healthy diet and lifestyle.  We continue with same medical regimen    Follow Up:   -Recheck labs in 3 months  -Follow up with Dr. Ocampo in 1 year with labs prior    Reji Ocampo MD, PhD  Professor of  Medicine  Division of Cardiology.        CC  Patient Care Team:  Samm Vazquez MD as PCP - General (Internal Medicine)  Reji Ocampo MD as MD (Cardiology)  Shiv Vizcarra MD as MD (Family Practice)  Carmella Love MD as MD (Orthopedics)  Lavelle Solis MD as MD (Family Practice)  Nicole Blanc LPN as Nurse Coordinator (Cardiology)  Sav Schmitt DO as MD (Family Practice)  Reji Ocampo MD as Assigned Heart and Vascular Provider  Lisa Talley MD as MD (Urology)  Josephine Moran PA-C as Physician Assistant (Family Medicine)  Geneva Mckeon, RN as Specialty Care Coordinator (Urology)  Samm Vazquez MD as Assigned PCP  Edita Way, RN as Specialty Care Coordinator (Cardiology)  REJI OCAMPO

## 2022-04-21 ENCOUNTER — TELEPHONE (OUTPATIENT)
Dept: ANTICOAGULATION | Facility: CLINIC | Age: 62
End: 2022-04-21
Payer: COMMERCIAL

## 2022-04-21 NOTE — TELEPHONE ENCOUNTER
ANTICOAGULATION     Oswaldo Prabhakar is overdue for INR check.      Spoke with Oswaldo and scheduled lab appointment on 4/25/22    Marielena Chacon RN     adult consistency food

## 2022-04-25 ENCOUNTER — TELEPHONE (OUTPATIENT)
Dept: ANTICOAGULATION | Facility: CLINIC | Age: 62
End: 2022-04-25

## 2022-04-25 ENCOUNTER — LAB (OUTPATIENT)
Dept: LAB | Facility: CLINIC | Age: 62
End: 2022-04-25
Payer: COMMERCIAL

## 2022-04-25 DIAGNOSIS — Z79.01 LONG TERM CURRENT USE OF ANTICOAGULANT THERAPY: ICD-10-CM

## 2022-04-25 DIAGNOSIS — I26.99 PULMONARY EMBOLI (H): ICD-10-CM

## 2022-04-25 DIAGNOSIS — I26.99 PULMONARY EMBOLI (H): Primary | ICD-10-CM

## 2022-04-25 LAB — INR BLD: 1.5 (ref 0.9–1.1)

## 2022-04-25 PROCEDURE — 36415 COLL VENOUS BLD VENIPUNCTURE: CPT | Performed by: PATHOLOGY

## 2022-04-25 PROCEDURE — 85610 PROTHROMBIN TIME: CPT | Performed by: PATHOLOGY

## 2022-04-25 NOTE — TELEPHONE ENCOUNTER
ANTICOAGULATION MANAGEMENT     Oswaldo Prabhakar 61 year old male is on warfarin with subtherapeutic INR result. (Goal INR 2.0-3.0 )    Recent labs: (last 7 days)     04/25/22  1547   INR 1.5*       ASSESSMENT       Source(s): Chart Review    Previous INR was Subtherapeutic    Medication, diet, health changes since last INR chart reviewed; none identified           PLAN     Unable to reach Oswaldo today.    Left message to take a booster dose of warfarin,  12.5 mg tonight. Request call back for assessment.    Follow up required to confirm warfarin dose taken and assess for changes    Liliam Hyman RN  Anticoagulation Clinic  4/25/2022

## 2022-04-26 NOTE — TELEPHONE ENCOUNTER
Addendum 4/26/22      ANTICOAGULATION MANAGEMENT     Oswaldo Prabhakar 61 year old male is on warfarin with subtherapeutic INR result. (Goal INR )    Recent labs: (last 7 days)     04/25/22  1547   INR 1.5*       ASSESSMENT       Source(s): Chart Review    Previous INR was Subtherapeutic    Medication, diet, health changes since last INR chart reviewed; none identified           PLAN     Unable to reach Oswaldo today. Left voice message X2    Left message to continue current dose of warfarin 7.5 mg tonight. Request call back for assessment.    Follow up required to confirm warfarin dose taken and assess for changes    Liliam Hyman RN  Anticoagulation Clinic  4/26/2022

## 2022-04-27 NOTE — TELEPHONE ENCOUNTER
ANTICOAGULATION MANAGEMENT     Oswaldo Prabhakar 61 year old male is on warfarin with subtherapeutic INR result. (Goal INR )    Recent labs: (last 7 days)     04/25/22  1547   INR 1.5*       ASSESSMENT       Source(s): Chart Review    Previous INR was Subtherapeutic    Medication, diet, health changes since last INR chart reviewed; none identified           PLAN     Recommended plan for no diet, medication or health factor changes affecting INR     Dosing Instructions: Increase your warfarin dose (16.7% change) with next INR in 1 week       Summary  As of 4/25/2022    Full warfarin instructions:  4/25: 12.5 mg; Otherwise 7.5 mg every day   Next INR check:  5/2/2022             Detailed voice message left for Oswaldo with dosing instructions and follow up date.     Contact [phone] to schedule and with any changes, questions or concerns.     Education provided: Please call back if any changes to your diet, medications or how you've been taking warfarin, Importance of consistent vitamin K intake, Goal range and significance of current result, Importance of following up at instructed interval, Monitoring for clotting signs and symptoms and Contact [phone] with any changes, questions or concerns.     Plan made per [facility] anticoagulation protocol    CASEY MANZO RN  Anticoagulation Clinic  4/27/2022    _______________________________________________________________________     Anticoagulation Episode Summary     Current INR goal:  2.0-3.0   TTR:  53.2 % (1 y)   Target end date:  Indefinite   Send INR reminders to:  [facility]    Indications    Pulmonary emboli (H) [I26.99]  Long-term (current) use of anticoagulants [Z79.01] [Z79.01]           Comments:  HIPPA INFO OK to speak with wife Josselyn OK to leave messages on Home.work and cell phones  use cell phone #[phone] ++++         Anticoagulation Care Providers     Provider Role Specialty Phone number    Samm Vazquez MD Referring Internal Medicine -  Pediatrics [phone]

## 2022-05-13 ENCOUNTER — TELEPHONE (OUTPATIENT)
Dept: ANTICOAGULATION | Facility: CLINIC | Age: 62
End: 2022-05-13
Payer: COMMERCIAL

## 2022-05-13 NOTE — TELEPHONE ENCOUNTER
ANTICOAGULATION     Oswaldo Prabhakar is overdue for INR check.      Reminder letter sent    Kristina Wright RN

## 2022-05-13 NOTE — LETTER
May 13, 2022      TO: Oswaldo YAQUELIN Aki  [address]  [place] [address]         Dear Khushi Oswaldo JAMISON Petershannan,    You are currently under the care of [facility] for your warfarin (Coumadin ) therapy.  We are contacting you because our records show you were due for an INR on 4/12/22.    There are potentially serious risks when taking warfarin without careful monitoring and we want to make sure you are safely managed.  Routine INR monitoring is required for warfarin refills.     Please call [phone] as soon as possible to schedule an appointment.  If there has been a change in your care or other concerns, please let us know so we can help and or update our records.     Sincerely,       [facility]

## 2022-07-19 ENCOUNTER — MYC MEDICAL ADVICE (OUTPATIENT)
Dept: CARDIOLOGY | Facility: CLINIC | Age: 62
End: 2022-07-19

## 2022-07-19 DIAGNOSIS — Z95.1 S/P CABG X 4: ICD-10-CM

## 2022-07-19 DIAGNOSIS — I25.739 CORONARY ARTERY DISEASE INVOLVING NONAUTOLOGOUS BIOLOGICAL CORONARY BYPASS GRAFT WITH ANGINA PECTORIS (H): ICD-10-CM

## 2022-07-19 DIAGNOSIS — E78.5 HYPERLIPIDEMIA LDL GOAL <130: ICD-10-CM

## 2022-07-19 DIAGNOSIS — Z78.9 STATIN INTOLERANCE: ICD-10-CM

## 2022-07-19 NOTE — TELEPHONE ENCOUNTER
evolocumab (REPATHA SURECLICK) 140 MG/ML prefilled autoinjector  Last Written Prescription Date:8/16/21  Last Fill Quantity: 6ml,   # refills: 3  Last Office Visit : 4/4/22  Future Office visit:  none    Routing refill request to provider for review/approval because: not on  Protocol. See my chart request.

## 2022-07-20 RX ORDER — EVOLOCUMAB 140 MG/ML
INJECTION, SOLUTION SUBCUTANEOUS
Qty: 6 ML | Refills: 3 | Status: SHIPPED | OUTPATIENT
Start: 2022-07-20 | End: 2023-06-29

## 2022-07-28 DIAGNOSIS — I27.82 CHRONIC PULMONARY EMBOLISM WITHOUT ACUTE COR PULMONALE, UNSPECIFIED PULMONARY EMBOLISM TYPE (H): Primary | ICD-10-CM

## 2022-07-31 DIAGNOSIS — I10 ESSENTIAL HYPERTENSION: ICD-10-CM

## 2022-08-02 RX ORDER — LISINOPRIL 2.5 MG/1
2.5 TABLET ORAL DAILY
Qty: 90 TABLET | Refills: 1 | Status: SHIPPED | OUTPATIENT
Start: 2022-08-02 | End: 2023-06-26

## 2022-08-15 ENCOUNTER — OFFICE VISIT (OUTPATIENT)
Dept: INTERNAL MEDICINE | Facility: CLINIC | Age: 62
End: 2022-08-15
Payer: COMMERCIAL

## 2022-08-15 VITALS
DIASTOLIC BLOOD PRESSURE: 89 MMHG | HEART RATE: 55 BPM | BODY MASS INDEX: 32.2 KG/M2 | HEIGHT: 71 IN | WEIGHT: 230 LBS | SYSTOLIC BLOOD PRESSURE: 137 MMHG | RESPIRATION RATE: 18 BRPM | OXYGEN SATURATION: 98 %

## 2022-08-15 DIAGNOSIS — Z12.11 SCREENING FOR COLON CANCER: ICD-10-CM

## 2022-08-15 DIAGNOSIS — L98.9 SKIN LESION: ICD-10-CM

## 2022-08-15 DIAGNOSIS — Z23 NEED FOR VACCINATION: ICD-10-CM

## 2022-08-15 DIAGNOSIS — Z13.1 SCREENING FOR DIABETES MELLITUS: ICD-10-CM

## 2022-08-15 DIAGNOSIS — Z00.00 ROUTINE GENERAL MEDICAL EXAMINATION AT A HEALTH CARE FACILITY: Primary | ICD-10-CM

## 2022-08-15 DIAGNOSIS — I27.82 CHRONIC PULMONARY EMBOLISM WITHOUT ACUTE COR PULMONALE, UNSPECIFIED PULMONARY EMBOLISM TYPE (H): ICD-10-CM

## 2022-08-15 DIAGNOSIS — I25.111 ATHEROSCLEROSIS OF NATIVE CORONARY ARTERY OF NATIVE HEART WITH ANGINA PECTORIS WITH DOCUMENTED SPASM (H): ICD-10-CM

## 2022-08-15 DIAGNOSIS — E78.5 HYPERLIPIDEMIA LDL GOAL <130: ICD-10-CM

## 2022-08-15 PROCEDURE — 0054A COVID-19,PF,PFIZER (12+ YRS): CPT | Performed by: INTERNAL MEDICINE

## 2022-08-15 PROCEDURE — 91305 COVID-19,PF,PFIZER (12+ YRS): CPT | Performed by: INTERNAL MEDICINE

## 2022-08-15 PROCEDURE — 99396 PREV VISIT EST AGE 40-64: CPT | Mod: 25 | Performed by: INTERNAL MEDICINE

## 2022-08-15 NOTE — PATIENT INSTRUCTIONS
Please ask if your insurance if you have coverage for Shingrix.  If so, ask where they want you to get it done - clinic or pharmacy.  Also ask for your wife.    [facility] [phone] (4th Floor [facility])     To schedule a lab appointment at the [facility], please call [phone].

## 2022-08-15 NOTE — PROGRESS NOTES
ASSESSMENT/PLAN:  Here for preventative physical  Screening for diabetes mellitus  -     Hemoglobin A1c; Future  Hyperlipidemia LDL goal <130 - He has cut his atorvastatin in 1/2 because the LDL was too low (8). He needs this rechecked.  -     Lipid panel reflex to direct LDL Fasting; Future  Screening for colon cancer  -     Colonscopy Screening  Referral; Future  Need for vaccination  -     COVID-19,PF,PFIZER (12+ Yrs GRAY LABEL)    Skin lesion  -     Adult Dermatology Referral; Future    Atherosclerosis of native coronary artery of native heart with angina pectoris with documented spasm (H)    Chronic PE - he has not had a warfarin for a long time.  They have changed the way they do it from fingerstick and venipuncture. He may look into home testing.  He is very sensitive to salads.    Samm Vazquez MD, FACP      SUBJECTIVE:    Here for a physical.    In general he is doing very well.    Gen: no fevers, night sweats or weight change  Eyes: no vision change, diplopia or red eyes  Ears, Noses, Mouth, Throat: no ringing in ears or hearing change, no epistaxis or nasal discharge, no oral lesions, throat clear  Cardiac: no chest pain, palpitations, or pain with walking  Lungs: no dyspnea, cough, or shortness of breath  GI: no nausea, vomiting, diarrhea or constipation, no abdominal pain  : no change in urine, hematuria, or sexual dysfunction  Musculoskeletal: no joint or muscle pain or swelling  Skin: he has a marshal on his left cheek x 2 months and will bleed with touching, no pain  Neuro: no loss of strength or sensation, no numbness or tingling, no tremor  Endo: no polyuria or polydipsia, no temperature intolerance  Heme/Lymph: no concerning bumps, no bleeding problems  Allergy: no environmental or drug allergies  Psych: no depression or anxiety    Patient Active Problem List   Diagnosis     Retinal detachment     HYPERLIPIDEMIA LDL GOAL <130     Pulmonary emboli (H)     Pure hyperglyceridemia     Calculus  of kidney     Warfarin anticoagulation     Atypical chest pain     S/P CABG x 4     Atherosclerosis of native coronary artery of native heart with angina pectoris with documented spasm (H)     Long-term (current) use of anticoagulants [Z79.01]     Statin intolerance     Unstable angina (H)     Renal colic on right side     Ureteral stone     UTI (urinary tract infection)     Chest pain, unspecified type       Past Medical History:   Diagnosis Date     Antiplatelet or antithrombotic long-term use      Coronary artery disease      Diaphragmatic hernia without mention of obstruction or gangrene      Heart disease      Hernia, abdominal      History of blood transfusion      History of thrombophlebitis      Hypertension      Nephrolithiasis 4/2010     Other and unspecified hyperlipidemia      Pulmonary embolus and infarction (H)     s/p hemothorax and filter s/p filter removal (2011), now on long term anti-coagulation     Statin intolerance 2/1/2017     Stented coronary artery 01/07/2020    NIR mid LAD     Unspecified retinal detachment     Childhood trauma, unrepaired       Past Surgical History:   Procedure Laterality Date     BYPASS GRAFT ARTERY CORONARY  4/4/2014    Procedure: BYPASS GRAFT ARTERY CORONARY;  Median Sternotomy, Coronary Artery Bypass Bypass x 4 using Left Internal Mammary, Left Saphenous Vein, on pump oxygenation;  Surgeon: Sherry Armando MD;  [facility]     CLOSED REDUCTION, PERCUTANEOUS PINNING  HAND, COMBINED Left 11/5/2015    Procedure: COMBINED CLOSED REDUCTION, PERCUTANEOUS PINNING HAND;  Surgeon: Carmella Love MD;  [facility]     COLONOSCOPY  3/15/2013    Procedure: COLONOSCOPY;;  Surgeon: Racheal Shipman MD;  [facility]: [facility]     COMBINED CYSTOSCOPY, INSERT STENT URETER(S) Right 1/10/2020    Procedure: Cystoscopy, right retrograde pyelogram, interpretation of fluoroscopic images, placement of 6 x 26 double-J ureteral stent;  Surgeon: Nguyễn Woodard,  MD;  Location: [facility]     COMBINED CYSTOSCOPY, RETROGRADES, URETEROSCOPY, LASER HOLMIUM LITHOTRIPSY URETER(S), INSERT STENT Right 2/5/2020    Procedure: Cystoscopy, right ureteral stent exchange, right ureteroscopy with holmium lithotripsy and stone basketing, right retrograde pyelogram, interpretation of fluoroscopic images.;  Surgeon: Nguyễn Woodard MD;  Location: [facility]     CV ANGIOGRAM CORONARY GRAFT N/A 1/7/2020    Procedure: Angiogram Coronary Graft;  Surgeon: Chato Odonnell MD;  [facility]     CV CORONARY ANGIOGRAM  1/7/2020    Procedure: CV CORONARY ANGIOGRAM;  Surgeon: Chato Odonnell MD;  [facility]     CV PCI STENT DRUG ELUTING N/A 1/7/2020    Procedure: PCI Stent Drug Eluting;  Surgeon: Chato Odonnell MD;  [facility]     CYSTOSCOPY       LAPAROSCOPIC CHOLECYSTECTOMY N/A 8/6/2018    Procedure: LAPAROSCOPIC CHOLECYSTECTOMY;  LAPAROSCOPIC CHOLECYSTECTOMY ;  Surgeon: José Miguel Stuart MD;  Location: [facility]     LITHOTRIPSY  4/2010     THORACIC SURGERY      lung surgery, thoracotomy, lung adhesion     ZZC NONSPECIFIC PROCEDURE      Spermatocele resection       Family History   Problem Relation Age of Onset     Heart Disease Mother      C.A.D. Father         3 vessel CABG, age 70     Cancer Father         bladder cancer     C.A.D. Paternal Grandmother      Hypertension Paternal Grandmother      Alzheimer Disease Paternal Grandmother      Diabetes No family hx of      Thyroid Disease No family hx of      Cancer - colorectal No family hx of        Social History     Socioeconomic History     Marital status:      Spouse name: Not on file     Number of children: Not on file     Years of education: Not on file     Highest education level: Not on file   Occupational History     Not on file   Tobacco Use     Smoking status: Never Smoker     Smokeless tobacco: Never Used   Substance  "and Sexual Activity     Alcohol use: No     Comment: very rare     Drug use: No     Sexual activity: Not on file   Other Topics Concern     Parent/sibling w/ CABG, MI or angioplasty before 65F 55M? Not Asked   Social History Narrative     Not on file     Social Determinants of Health     Financial Resource Strain: Not on file   Food Insecurity: Not on file   Transportation Needs: Not on file   Physical Activity: Not on file   Stress: Not on file   Social Connections: Not on file   Intimate Partner Violence: Not on file   Housing Stability: Not on file     Wife with breast cancer - she is cured (no signs of it)      Health Maintenance   Topic Date Due     ZOSTER IMMUNIZATION (1 of 2) Never done     PREVENTIVE CARE VISIT  05/19/2017     COVID-19 Vaccine (4 - Booster for Pfizer series) 03/28/2022     INFLUENZA VACCINE (1) 09/01/2022     COLORECTAL CANCER SCREENING  03/15/2023     DTAP/TDAP/TD IMMUNIZATION (3 - Td or Tdap) 07/05/2023     ADVANCE CARE PLANNING  08/30/2023     LIPID  04/04/2027     Pneumococcal Vaccine: Pediatrics (0 to 5 Years) and At-Risk Patients (6 to 64 Years) (3 - PPSV23 or PCV20) 09/02/2027     HEPATITIS C SCREENING  Completed     HIV SCREENING  Completed     PHQ-2 (once per calendar year)  Completed     IPV IMMUNIZATION  Aged Out     MENINGITIS IMMUNIZATION  Aged Out     HEPATITIS B IMMUNIZATION  Aged Out       OBJECTIVE:  Physical Exam:  /89 (BP Location: Right arm, Patient Position: Sitting, Cuff Size: Adult Large)   Pulse 55   Resp 18   Ht 1.803 m (5' 11\")   Wt 104.3 kg (230 lb)   SpO2 98%   BMI 32.08 kg/m    Constitutional: no distress, comfortable, pleasant   Eyes: anicteric, normal extra-ocular movements   Ears, Nose and Throat: tympanic membranes clear, throat clear, neck supple with full range of motion, no thyromegaly.   Cardiovascular: regular rate and rhythm, normal S1 and S2, no murmurs, rubs or gallops, peripheral pulses full and symmetric   Respiratory: clear to " auscultation, no wheezes or crackles, normal breath sounds   Gastrointestinal: positive bowel sounds, nontender, no hepatosplenomegaly, no masses   Musculoskeletal: full range of motion, no edema   Skin: left cheek - pink area with telangiactasias  Neurological: cranial nerves intact, normal strength and sensation, reflexes at patella and biceps normal, normal gait, no tremor   Psychological: appropriate mood   Lymphatic: no cervical  lymphadenopathy

## 2022-08-15 NOTE — NURSING NOTE
Oswaldo Prabhakar is a 61 year old male patient that presents today in clinic for the following:    Chief Complaint   Patient presents with     Follow Up     Pt here for routine preventative     The patient's allergies and medications were reviewed as noted. A set of vitals were recorded as noted without incident. The patient does not have any other questions for the provider.    Ivett Barry, EMT at 10:32 AM on 8/15/2022

## 2022-08-16 ENCOUNTER — MYC MEDICAL ADVICE (OUTPATIENT)
Dept: ANTICOAGULATION | Facility: CLINIC | Age: 62
End: 2022-08-16

## 2022-08-16 ENCOUNTER — TELEPHONE (OUTPATIENT)
Dept: ANTICOAGULATION | Facility: CLINIC | Age: 62
End: 2022-08-16

## 2022-08-16 ENCOUNTER — HOSPITAL ENCOUNTER (EMERGENCY)
Facility: CLINIC | Age: 62
Discharge: HOME | End: 2022-08-16
Attending: EMERGENCY MEDICINE | Admitting: EMERGENCY MEDICINE
Payer: COMMERCIAL

## 2022-08-16 VITALS
BODY MASS INDEX: 32.2 KG/M2 | RESPIRATION RATE: 18 BRPM | OXYGEN SATURATION: 95 % | WEIGHT: 230 LBS | SYSTOLIC BLOOD PRESSURE: 155 MMHG | HEIGHT: 71 IN | DIASTOLIC BLOOD PRESSURE: 90 MMHG | TEMPERATURE: 98.4 F | HEART RATE: 58 BPM

## 2022-08-16 DIAGNOSIS — H10.9 CONJUNCTIVITIS OF RIGHT EYE, UNSPECIFIED CONJUNCTIVITIS TYPE: ICD-10-CM

## 2022-08-16 DIAGNOSIS — E78.5 HYPERLIPIDEMIA LDL GOAL <130: Primary | ICD-10-CM

## 2022-08-16 PROCEDURE — 250N000011 HC RX IP 250 OP 636: Performed by: EMERGENCY MEDICINE

## 2022-08-16 PROCEDURE — 99283 EMERGENCY DEPT VISIT LOW MDM: CPT | Performed by: EMERGENCY MEDICINE

## 2022-08-16 PROCEDURE — 250N000009 HC RX 250: Performed by: EMERGENCY MEDICINE

## 2022-08-16 PROCEDURE — 99284 EMERGENCY DEPT VISIT MOD MDM: CPT | Performed by: EMERGENCY MEDICINE

## 2022-08-16 RX ORDER — ONDANSETRON 4 MG/1
4 TABLET, ORALLY DISINTEGRATING ORAL ONCE
Status: COMPLETED | OUTPATIENT
Start: 2022-08-16 | End: 2022-08-16

## 2022-08-16 RX ORDER — ERYTHROMYCIN 5 MG/G
0.5 OINTMENT OPHTHALMIC AT BEDTIME
Qty: 2.5 G | Refills: 0 | Status: SHIPPED | OUTPATIENT
Start: 2022-08-16 | End: 2022-08-21

## 2022-08-16 RX ORDER — TOBRAMYCIN 3 MG/ML
1-2 SOLUTION/ DROPS OPHTHALMIC
Qty: 30 ML | Refills: 0 | Status: SHIPPED | OUTPATIENT
Start: 2022-08-16 | End: 2022-08-21

## 2022-08-16 RX ORDER — PROPARACAINE HYDROCHLORIDE 5 MG/ML
1 SOLUTION/ DROPS OPHTHALMIC ONCE
Status: COMPLETED | OUTPATIENT
Start: 2022-08-16 | End: 2022-08-16

## 2022-08-16 RX ORDER — POLYVINYL ALCOHOL 14 MG/ML
1 SOLUTION/ DROPS OPHTHALMIC PRN
Qty: 20 ML | Refills: 0 | Status: SHIPPED | OUTPATIENT
Start: 2022-08-16 | End: 2023-04-13

## 2022-08-16 RX ADMIN — ONDANSETRON 4 MG: 4 TABLET, ORALLY DISINTEGRATING ORAL at 08:11

## 2022-08-16 RX ADMIN — FLUORESCEIN SODIUM 1 STRIP: 1 STRIP OPHTHALMIC at 08:30

## 2022-08-16 RX ADMIN — PROPARACAINE HYDROCHLORIDE 1 DROP: 5 SOLUTION/ DROPS OPHTHALMIC at 08:30

## 2022-08-16 ASSESSMENT — VISUAL ACUITY
OS: 20/30
OD: LIGHT PERCEPTION

## 2022-08-16 NOTE — ED PROVIDER NOTES
ED Provider Note  [facility]      History     Chief Complaint   Patient presents with     Eye Problem     HPI  Oswaldo Prabhakar is a 61 year old male with past medical history significant for of CAD s/p 4V CABG in 2014, and stent in 2020, HTN, HLD, PE s/p thoracotomy w/ lung resection in 2011, anticoagulated on Warfarin, and retinal detachment of right eye at age 12 with residual blindness presenting to the ED with painful and watering right eye.   Patient says he started having this sensation yesterday evening.  He tried putting some eyedrops in it for symptom control.  Patient says that overnight his eye was very painful and itchy.  Patient said is constantly watering.  Patient like something was in it and that he cannot see any foreign bodies.  Patient did not get anything in his eye that he knows of and no trauma.  Patient's baseline vision is that he can see light from the right eye and can sometimes see fingers in particular locations.  Patient says he had similar vision since he was 12 years old and had a retinal detachment.  Patient did note some nausea when the pain was severe.  Patient denies any other symptoms at this time.  Patient notes normal vision in his left eye.    Past Medical History  Past Medical History:   Diagnosis Date     Antiplatelet or antithrombotic long-term use      Coronary artery disease      Diaphragmatic hernia without mention of obstruction or gangrene      Heart disease      Hernia, abdominal      History of blood transfusion      History of thrombophlebitis      Hypertension      Nephrolithiasis 4/2010     Other and unspecified hyperlipidemia      Pulmonary embolus and infarction (H)     s/p hemothorax and filter s/p filter removal (2011), now on long term anti-coagulation     Statin intolerance 2/1/2017     Stented coronary artery 01/07/2020    NIR mid LAD     Unspecified retinal detachment     Childhood trauma, unrepaired     Past Surgical History:    Procedure Laterality Date     BYPASS GRAFT ARTERY CORONARY  4/4/2014    Procedure: BYPASS GRAFT ARTERY CORONARY;  Median Sternotomy, Coronary Artery Bypass Bypass x 4 using Left Internal Mammary, Left Saphenous Vein, on pump oxygenation;  Surgeon: Sherry Armando MD;  [facility]     CLOSED REDUCTION, PERCUTANEOUS PINNING  HAND, COMBINED Left 11/5/2015    Procedure: COMBINED CLOSED REDUCTION, PERCUTANEOUS PINNING HAND;  Surgeon: Carmella Love MD;  [facility]     COLONOSCOPY  3/15/2013    Procedure: COLONOSCOPY;;  Surgeon: Racheal Shipman MD;  [facility]: [facility]     COMBINED CYSTOSCOPY, INSERT STENT URETER(S) Right 1/10/2020    Procedure: Cystoscopy, right retrograde pyelogram, interpretation of fluoroscopic images, placement of 6 x 26 double-J ureteral stent;  Surgeon: Nguyễn Woodard MD;  Location: [facility] OR     COMBINED CYSTOSCOPY, RETROGRADES, URETEROSCOPY, LASER HOLMIUM LITHOTRIPSY URETER(S), INSERT STENT Right 2/5/2020    Procedure: Cystoscopy, right ureteral stent exchange, right ureteroscopy with holmium lithotripsy and stone basketing, right retrograde pyelogram, interpretation of fluoroscopic images.;  Surgeon: Nguyễn Woodard MD;  Location: [facility] OR     CV ANGIOGRAM CORONARY GRAFT N/A 1/7/2020    Procedure: Angiogram Coronary Graft;  Surgeon: Chato Odonnell MD;  Location:  HEART CARDIAC CATH LAB     CV CORONARY ANGIOGRAM  1/7/2020    Procedure: CV CORONARY ANGIOGRAM;  Surgeon: Chato Odonnell MD;  Location:  HEART CARDIAC CATH LAB     CV PCI STENT DRUG ELUTING N/A 1/7/2020    Procedure: PCI Stent Drug Eluting;  Surgeon: Chato Odonnell MD;  [facility]     CYSTOSCOPY       LAPAROSCOPIC CHOLECYSTECTOMY N/A 8/6/2018    Procedure: LAPAROSCOPIC CHOLECYSTECTOMY;  LAPAROSCOPIC CHOLECYSTECTOMY ;  Surgeon: José Miguel Stuart MD;  Location: [facility] OR     LITHOTRIPSY  4/2010     THORACIC SURGERY      lung  "surgery, thoracotomy, lung adhesion     ZZC NONSPECIFIC PROCEDURE      Spermatocele resection     acetaminophen (TYLENOL) 500 MG tablet  atorvastatin (LIPITOR) 80 MG tablet  calcium carbonate (TUMS) 500 MG chewable tablet  Cholecalciferol (VITAMIN D) 2000 UNITS CAPS  ezetimibe (ZETIA) 10 MG tablet  fenofibrate (TRIGLIDE/LOFIBRA) 160 MG tablet  lisinopril (ZESTRIL) 2.5 MG tablet  metoprolol tartrate (LOPRESSOR) 25 MG tablet  OMEPRAZOLE PO  ondansetron (ZOFRAN-ODT) 4 MG ODT tab  REPATHA SURECLICK 140 MG/ML prefilled autoinjector  tamsulosin (FLOMAX) 0.4 MG capsule  ticagrelor (BRILINTA) 90 MG tablet  warfarin ANTICOAGULANT (COUMADIN) 5 MG tablet      Allergies   Allergen Reactions     Cats      Hay Fever & [A.R.M.]      Heparin Other (See Comments)     Heparin resistance per cardio-thoracic surgeon Dr. Armando     Hydrocodone-Acetaminophen Nausea and Vomiting     Acetaminophen is ok     Family History  Family History   Problem Relation Age of Onset     Heart Disease Mother      C.A.D. Father         3 vessel CABG, age 70     Cancer Father         bladder cancer     C.A.D. Paternal Grandmother      Hypertension Paternal Grandmother      Alzheimer Disease Paternal Grandmother      Diabetes No family hx of      Thyroid Disease No family hx of      Cancer - colorectal No family hx of      Social History   Social History     Tobacco Use     Smoking status: Never Smoker     Smokeless tobacco: Never Used   Substance Use Topics     Alcohol use: No     Comment: very rare     Drug use: No      Past medical history, past surgical history, medications, allergies, family history, and social history were reviewed with the patient. No additional pertinent items.       Review of Systems  A complete review of systems was performed with pertinent positives and negatives noted in the HPI, and all other systems negative.    Physical Exam   BP: (!) 172/100  Pulse: 64  Temp: 98.4  F (36.9  C)  Resp: 18  Height: 180.3 cm (5' 11\")  Weight: " 104.3 kg (230 lb)  SpO2: 95 %  Physical Exam  Eyes:      General: Lids are normal.         Right eye: No foreign body, discharge or hordeolum.      Conjunctiva/sclera:      Right eye: Right conjunctiva is injected. No chemosis, exudate or hemorrhage.     Comments: Right eye resting position is deviated to the right.  Pupils minimally reactive.  No APD.  Conjunctiva is injected.  No obvious foreign body.  Mild diffuse fluorescein uptake from dry eye but no obvious corneal abrasion or foreign body.     Physical Exam   Constitutional: oriented to person, place, and time. appears well-developed and well-nourished.   HENT:   Head: Normocephalic and atraumatic.   Neck: Normal range of motion.   Pulmonary/Chest: Effort normal. No respiratory distress.   Neurological: alert and oriented to person, place, and time.   Skin: Skin is warm and dry.   Psychiatric:  normal mood and affect.  behavior is normal. Thought content normal.       ED Course      Procedures       The medical record was reviewed and interpreted.              No results found for any visits on 08/16/22.  Medications   proparacaine (ALCAINE) 0.5 % ophthalmic solution 1 drop (has no administration in time range)   fluorescein (FUL-HAYDE) ophthalmic strip 1 strip (has no administration in time range)   ondansetron (ZOFRAN ODT) ODT tab 4 mg (has no administration in time range)        Assessments & Plan (with Medical Decision Making)   Patient presents to the ER due to a right eye that is injected and watery.  Symptoms resolved with proparacaine eyedrops.  Patient was examined with a Woods lamp.  No obvious corneal abrasion or foreign body.  Patient's vision is at baseline.  Case was discussed with ophthalmology resident.  They recommend antibiotic drops in tears.  They will see the patient tomorrow morning at 730 in the clinic.  Plan of care was discussed with the patient and his wife.  No other acute other recommendations.  No concern for any other eye issues  since patient is baseline blind to the eye and symptoms were resolved with proparacaine.  Patient stable for discharge    I have reviewed the nursing notes. I have reviewed the findings, diagnosis, plan and need for follow up with the patient.    New Prescriptions    No medications on file       Final diagnoses:   Conjunctivitis of right eye, unspecified conjunctivitis type       --  Priscila Luis  [facility] EMERGENCY DEPARTMENT  8/16/2022     Priscila Luis MD  08/16/22 0957

## 2022-08-16 NOTE — DISCHARGE INSTRUCTIONS
Please follow up with Eye Clinic (phone: [phone]) tomorrow  at 7:30 am at the [facility] ([facility] 9th floor with Dr. Shama Love.     Use the saline tears and the erythromycin ointment as prescribed.     Return to the ER if any other problems/concerns.

## 2022-08-16 NOTE — TELEPHONE ENCOUNTER
ANTICOAGULATION  MANAGEMENT: Discharge Review    Oswaldo Prabhakar chart reviewed for anticoagulation continuity of care    Emergency room visit on 8/16/22 for right eye conjunctivitis.    Discharge disposition: Home    Results:    No results for input(s): INR, [id number], F2, ALMWH, AAUFH in the last 168 hours.  Anticoagulation inpatient management:     not applicable     Anticoagulation discharge instructions:     Warfarin dosing: home regimen continued   Bridging: No   INR goal change: No      Medication changes affecting anticoagulation: Yes:     START taking:  erythromycin (ROMYCIN)-Concurrent use of ERYTHROMYCIN and WARFARIN may result in an increased risk of bleeding.  polyvinyl alcohol (LIQUIFILM TEARS)  tobramycin (TOBREX)    Additional factors affecting anticoagulation: No     PLAN     Recommend to check INR on end of this week or ASAP-overdue INR    MyChart message sent    No adjustment to Anticoagulation Calendar was required    CASEY MANZO RN

## 2022-08-16 NOTE — ED TRIAGE NOTES
Pt presents to the ED with wife. Hx: blind in right eye since he was 12 yr from retinal detachment.     Since yesterday it hasn't felt right. He thinks something may have gotten in it, watering and very painful.   Rates pain 7/10, denies any changes to left eye vision.      Triage Assessment     Row Name 08/16/22 0715       Triage Assessment (Adult)    Airway WDL WDL       Respiratory WDL    Respiratory WDL WDL       Skin Circulation/Temperature WDL    Skin Circulation/Temperature WDL WDL       Cardiac WDL    Cardiac WDL X  hypertensive       Peripheral/Neurovascular WDL    Peripheral Neurovascular WDL WDL       Cognitive/Neuro/Behavioral WDL    Cognitive/Neuro/Behavioral WDL WDL

## 2022-08-17 ENCOUNTER — OFFICE VISIT (OUTPATIENT)
Dept: OPHTHALMOLOGY | Facility: CLINIC | Age: 62
End: 2022-08-17
Attending: OPHTHALMOLOGY
Payer: COMMERCIAL

## 2022-08-17 DIAGNOSIS — H21.1X1: Primary | ICD-10-CM

## 2022-08-17 DIAGNOSIS — H20.9 ANTERIOR UVEITIS: ICD-10-CM

## 2022-08-17 PROCEDURE — G0463 HOSPITAL OUTPT CLINIC VISIT: HCPCS

## 2022-08-17 PROCEDURE — 99203 OFFICE O/P NEW LOW 30 MIN: CPT | Mod: GC | Performed by: STUDENT IN AN ORGANIZED HEALTH CARE EDUCATION/TRAINING PROGRAM

## 2022-08-17 RX ORDER — PREDNISOLONE ACETATE 10 MG/ML
1-2 SUSPENSION/ DROPS OPHTHALMIC 4 TIMES DAILY
Qty: 10 ML | Refills: 0 | Status: SHIPPED | OUTPATIENT
Start: 2022-08-17 | End: 2023-05-17

## 2022-08-17 ASSESSMENT — VISUAL ACUITY
METHOD: SNELLEN - LINEAR
OS_SC: 20/20
OD_SC: HM

## 2022-08-17 ASSESSMENT — CONF VISUAL FIELD
OD_INFERIOR_TEMPORAL_RESTRICTION: 2
OD_INFERIOR_NASAL_RESTRICTION: 2
OD_SUPERIOR_TEMPORAL_RESTRICTION: 1
OS_NORMAL: 1
OD_SUPERIOR_NASAL_RESTRICTION: 1

## 2022-08-17 ASSESSMENT — EXTERNAL EXAM - LEFT EYE: OS_EXAM: NORMAL

## 2022-08-17 ASSESSMENT — EXTERNAL EXAM - RIGHT EYE: OD_EXAM: NORMAL

## 2022-08-17 ASSESSMENT — SLIT LAMP EXAM - LIDS
COMMENTS: NORMAL
COMMENTS: NORMAL

## 2022-08-17 ASSESSMENT — TONOMETRY
IOP_METHOD: TONOPEN
OD_IOP_MMHG: 15
OS_IOP_MMHG: 12

## 2022-08-17 NOTE — PROGRESS NOTES
Ophthalmology Acute Clinic     Chief Complaint(s) and History of Present Illness(es)     Eye Pain Right Eye       In right eye.              Comments     Pt. States that he woke up with sudden severe pain RE yesterday morning. Tried using AT's with no improvement. RE was watering excessively. Pain was so severe it caused and upset stomach and vomiting. Did have RD repair RE at age 12.  Ynes Enrique COT 7:43 AM August 17, 2022                     HPI:   Oswaldo Prabhakar is a 61 year old male who presents for right eye pain for one day. Started Monday 8/15 at night and he awoke in the middle of the night with n/v. Was seen in the ED yesterday and the pain cleared up immediately after the provider put the numbing drops in. He was prescribed erythromycin at bedtime right eye, tobramycin q1-2hours. His eye is better today. No longer hurts, but it still red.    Past Ocular history:   - Follows with [facility]  - Retinal detachment as a child, never corrected, has had poor vision in right eye since. Thinks his detachment was spontaneous (no trauma hx)  - Glasses: Readers  - Contact lens wear: None  - Ocular Surgical History: CE-IOL left eye, laser (PRP) left eye    Current Eye drops:   - erythromycin at bedtime right eye  - tobramycin q1-2h right eye    PMH: None    FH: No family hx glaucoma or AMD.     Review of systems for the eyes was negative other than the pertinent positives/negatives listed in the HPI.        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 Anterior uveitis       Patient presenting with new right eye pain and redness. Has longstanding history of poor vision in the right eye 2/2 childhood retinal detachment. Anterior exam notable for 3+ mixed cell, extensive NVI and iris synechiae to anterior lens capsule. Unclear whether these are new vs chronic findings as this is patient's first visit to our office.  Patient denied dilation today. Will treat AC inflammation and recommend patient follow up with his regular eye care provider within 1 week.    Plan:  - Discontinue erythromycin and tobramycin  - Start prednisolone QID right eye  - Start ATs 4-6x daily   - Patient would like to follow with [facility] - recommend seeing them within 1 week    Patient disposition:   Return for Follow up with [facility] within 1 week.    Patient seen with Dr. Henna Love MD  Resident Physician, PGY-2  Department of Ophthalmology  08/17/22 7:52 AM    Attending Physician Attestation:  Complete documentation of historical and exam elements from today's encounter can be found in the full encounter summary report (not reduplicated in this progress note).  I personally obtained the chief complaint(s) and history of present illness.  I confirmed and edited as necessary the review of systems, past medical/surgical history, family history, social history, and examination findings as documented by others; and I examined the patient myself.  I personally reviewed the relevant tests, images, and reports as documented above.  I formulated and edited as necessary the assessment and plan and discussed the findings and management plan with the patient and family.  - Nickolas Aguillon MD

## 2022-08-17 NOTE — PATIENT INSTRUCTIONS
Stop tobramycin drops  Start prednisolone drops 4x daily in the right eye    Follow up with [facility] within one week    Artificial Tears 4-5x/day:  Systane  Refresh  Tears Naturale  Genteal  Optive  Soothe  Hypotears    All of these are good products.    DO NOT USE VISINE OR CLEAR EYES.

## 2022-08-17 NOTE — NURSING NOTE
Chief Complaints and History of Present Illnesses   Patient presents with     Eye Pain Right Eye     Chief Complaint(s) and History of Present Illness(es)     Eye Pain Right Eye     Laterality: In right eye              Comments     Pt. States that he woke up with sudden severe pain RE yesterday morning. Tried using AT's with no improvement. RE was watering excessively. Pain was so severe it caused and upset stomach and vomiting. Did have RD repair RE at age 12.  Ynes Enrique COT 7:43 AM August 17, 2022

## 2022-08-17 NOTE — NURSING NOTE
Chief Complaints and History of Present Illnesses   Patient presents with     Eye Pain Right Eye     Chief Complaint(s) and History of Present Illness(es)     Eye Pain Right Eye     Laterality: In right eye              Comments     Pt. States that he woke up with sudden severe pain RE yesterday morning. Tried using AT's with no improvement. RE was watering excessively. Pain was so severe it caused and upset stomach and vomiting. Did have RD RE at age 12-was too late for surgery per patient. Did have RD LE years later that was repaired followed by CE/IOL.  Ynes Enrique COT 7:43 AM August 17, 2022

## 2022-08-18 RX ORDER — FENOFIBRATE 160 MG/1
160 TABLET ORAL DAILY
Qty: 90 TABLET | Refills: 2 | Status: SHIPPED | OUTPATIENT
Start: 2022-08-18 | End: 2023-08-21

## 2022-08-19 ENCOUNTER — TRANSFERRED RECORDS (OUTPATIENT)
Dept: HEALTH INFORMATION MANAGEMENT | Facility: CLINIC | Age: 62
End: 2022-08-19

## 2022-09-07 ENCOUNTER — MYC MEDICAL ADVICE (OUTPATIENT)
Dept: INTERNAL MEDICINE | Facility: CLINIC | Age: 62
End: 2022-09-07

## 2022-09-09 ENCOUNTER — OFFICE VISIT (OUTPATIENT)
Dept: INTERNAL MEDICINE | Facility: CLINIC | Age: 62
End: 2022-09-09
Payer: COMMERCIAL

## 2022-09-09 VITALS
BODY MASS INDEX: 31.92 KG/M2 | OXYGEN SATURATION: 95 % | HEIGHT: 71 IN | HEART RATE: 60 BPM | SYSTOLIC BLOOD PRESSURE: 139 MMHG | WEIGHT: 228 LBS | DIASTOLIC BLOOD PRESSURE: 93 MMHG

## 2022-09-09 DIAGNOSIS — E78.5 HYPERLIPIDEMIA LDL GOAL <130: Primary | ICD-10-CM

## 2022-09-09 DIAGNOSIS — I10 ESSENTIAL HYPERTENSION: ICD-10-CM

## 2022-09-09 DIAGNOSIS — Z01.818 PREOPERATIVE EXAMINATION: ICD-10-CM

## 2022-09-09 PROCEDURE — 99214 OFFICE O/P EST MOD 30 MIN: CPT | Performed by: INTERNAL MEDICINE

## 2022-09-09 NOTE — NURSING NOTE
"Oswaldo Prabhakar is a 62 year old male patient that presents today in clinic for the following:    Chief Complaint   Patient presents with     Pre-Op Exam     The patient's allergies and medications were reviewed as noted. A set of vitals were recorded as noted without incident: BP (!) 139/93 (BP Location: Right arm, Patient Position: Sitting, Cuff Size: Adult Large)   Pulse 60   Ht 1.803 m (5' 11\")   Wt 103.4 kg (228 lb)   SpO2 95%   BMI 31.80 kg/m  . The patient does not have any other questions for the provider.    Mandi Blanco, EMT at 3:57 PM on 9/9/2022    "

## 2022-09-09 NOTE — PROGRESS NOTES
HPI  62-year-old presents today for preoperative medical evaluation prior to planned right eye surgery.  Is being done for apparent neovascularity of his iris.  Patient's past history is significant for spontaneous thoracic bleed previous thoracotomy coronary artery disease with coronary artery bypass grafting hyperlipidemia on quadruple drug therapy for control.  Currently he is entirely asymptomatic from a cardiac standpoint.  He walks his dog regularly he is up and down stairs throughout the day and experiences no chest pain dizziness lightheadedness palpitations or other complaints.  He is maintained on Coumadin because of a history of an unprovoked pulmonary embolus.  He is on antiplatelet therapy following his bypass.  He has had no recent bleeding or clotting issues.  He has had no problems in the past with general anesthesia or conscious sedation with his prior surgeries.  Past Medical History:   Diagnosis Date     Antiplatelet or antithrombotic long-term use      Coronary artery disease      Diaphragmatic hernia without mention of obstruction or gangrene      Heart disease      Hernia, abdominal      History of blood transfusion      History of thrombophlebitis      Hypertension      Nephrolithiasis 4/2010     Other and unspecified hyperlipidemia      Pulmonary embolus and infarction (H)     s/p hemothorax and filter s/p filter removal (2011), now on long term anti-coagulation     Statin intolerance 2/1/2017     Stented coronary artery 01/07/2020    NIR mid LAD     Unspecified retinal detachment     Childhood trauma, unrepaired     Past Surgical History:   Procedure Laterality Date     BYPASS GRAFT ARTERY CORONARY  04/04/2014    Procedure: BYPASS GRAFT ARTERY CORONARY;  Median Sternotomy, Coronary Artery Bypass Bypass x 4 using Left Internal Mammary, Left Saphenous Vein, on pump oxygenation;  Surgeon: Sherry Armando MD;  Location: [facility] OR     CATARACT IOL, RT/LT       CLOSED REDUCTION, PERCUTANEOUS  PINNING  HAND, COMBINED Left 11/05/2015    Procedure: COMBINED CLOSED REDUCTION, PERCUTANEOUS PINNING HAND;  Surgeon: Carmella Love MD;  [facility]     COLONOSCOPY  03/15/2013    Procedure: COLONOSCOPY;;  Surgeon: Racheal Shipman MD;  Location: [facility]     COMBINED CYSTOSCOPY, INSERT STENT URETER(S) Right 01/10/2020    Procedure: Cystoscopy, right retrograde pyelogram, interpretation of fluoroscopic images, placement of 6 x 26 double-J ureteral stent;  Surgeon: Nguyễn Woodard MD;  Location: [facility] OR     COMBINED CYSTOSCOPY, RETROGRADES, URETEROSCOPY, LASER HOLMIUM LITHOTRIPSY URETER(S), INSERT STENT Right 02/05/2020    Procedure: Cystoscopy, right ureteral stent exchange, right ureteroscopy with holmium lithotripsy and stone basketing, right retrograde pyelogram, interpretation of fluoroscopic images.;  Surgeon: Nguyễn Woodard MD;  Location: [facility] OR     CV ANGIOGRAM CORONARY GRAFT N/A 01/07/2020    Procedure: Angiogram Coronary Graft;  Surgeon: Chato Odonnell MD;  Location:  HEART CARDIAC CATH LAB     CV CORONARY ANGIOGRAM  01/07/2020    Procedure: CV CORONARY ANGIOGRAM;  Surgeon: Chato Odonnell MD;  [facility]     CV PCI STENT DRUG ELUTING N/A 01/07/2020    Procedure: PCI Stent Drug Eluting;  Surgeon: Chato Odonnell MD;  [facility]     CYSTOSCOPY       LAPAROSCOPIC CHOLECYSTECTOMY N/A 08/06/2018    Procedure: LAPAROSCOPIC CHOLECYSTECTOMY;  LAPAROSCOPIC CHOLECYSTECTOMY ;  Surgeon: José Miguel Stuart MD;  Location: [facility] OR     LITHOTRIPSY  04/2010     RETINAL REATTACHMENT       THORACIC SURGERY      lung surgery, thoracotomy, lung adhesion     ZZC NONSPECIFIC PROCEDURE      Spermatocele resection     Family History   Problem Relation Age of Onset     Heart Disease Mother      C.A.D. Father         3 vessel CABG, age 70     Cancer Father         bladder cancer     C.A.D. Paternal  "Grandmother      Hypertension Paternal Grandmother      Alzheimer Disease Paternal Grandmother      Diabetes No family hx of      Thyroid Disease No family hx of      Cancer - colorectal No family hx of          Exam:  BP (!) 139/93 (BP Location: Right arm, Patient Position: Sitting, Cuff Size: Adult Large)   Pulse 60   Ht 1.803 m (5' 11\")   Wt 103.4 kg (228 lb)   SpO2 95%   BMI 31.80 kg/m    228 lbs 0 oz  Physical Exam   The patient is alert, oriented with a clear sensorium.   Skin shows no lesions or rashes and good turgor.   Head is normocephalic and atraumatic.   Eyes show right exophoria  Ears show normal TMs bilaterally.   Mouth shows clear oral mucosa.   Neck shows no nodes, thyromegaly or bruits.   Lungs are clear to percussion and auscultation.   Heart shows normal S1 and S2 without murmur or gallop.   Abdomen is obese, soft and nontender without masses or organomegaly.   Extremities show no edema and no evidence of active synovitis.   Neurologic examination shows cranial nerves II-XII intact. Motor shows 5/5 strength. Reflexes are symmetric.     ASSESSMENT  Right eye neurovascular issues  Hypertension on lisinopril  Coronary artery disease S/P CABG  Hyperlipidemia on multiple meds  History PE unprovoked on coumadin  History kidney stones  GERD on prn omeprazole    Plan  He is stable from both a medical and a cardiovascular standpoint and does not require any additional imaging or investigation prior to his planned low risk surgery.  He would be considered low risk for cardiovascular complications.    This note was completed using Dragon voice recognition software.      Angel Barry MD  General Internal Medicine  [facility]  [phone]        "

## 2022-09-23 ENCOUNTER — DOCUMENTATION ONLY (OUTPATIENT)
Dept: ANTICOAGULATION | Facility: CLINIC | Age: 62
End: 2022-09-23

## 2022-09-23 DIAGNOSIS — I26.99 PULMONARY EMBOLI (H): Primary | ICD-10-CM

## 2022-09-23 DIAGNOSIS — Z79.01 LONG TERM CURRENT USE OF ANTICOAGULANT THERAPY: ICD-10-CM

## 2022-09-23 NOTE — PROGRESS NOTES
ANTICOAGULATION CLINIC REFERRAL RENEWAL REQUEST       An annual renewal order is required for all patients referred to [facility].?  Please review and sign the pended referral order for Oswaldo Prabhakar.       ANTICOAGULATION SUMMARY      Warfarin indication(s)   PE    Mechanical heart valve present?  NO       Current goal range   INR: 2.0-3.0     Goal appropriate for indication? Goal INR 2-3, standard for indication(s) above     Time in Therapeutic Range (TTR)  (Goal > 60%) 55.4%       Office visit with referring provider's group within last year yes on 9/9/22       Liliam Hyman RN  [facility]

## 2022-09-27 ENCOUNTER — DOCUMENTATION ONLY (OUTPATIENT)
Dept: ANTICOAGULATION | Facility: CLINIC | Age: 62
End: 2022-09-27

## 2022-09-27 ENCOUNTER — MYC MEDICAL ADVICE (OUTPATIENT)
Dept: ANTICOAGULATION | Facility: CLINIC | Age: 62
End: 2022-09-27

## 2022-09-27 NOTE — LETTER
[facility] ANTICO[facility]UL[facility]  [address]  [facility] [address]  Phone: [phone]  Fax: [phone]     September 27, 2022        Oswaldo Prabhakar  [address] [place] [address]            Dear Oswaldo,    You are currently under the care of [facility] for your warfarin (Coumadin , Jantoven ) therapy.  We are contacting you because our records show you were due for an INR on 5/20/22.    There are potentially serious risks when taking warfarin without careful monitoring and we want to make sure you are safely managed.  Routine lab monitoring is required for warfarin refills.     Please call [phone] as soon as possible to schedule an appointment.  If there has been a change in your care or other concerns, please let us know so we can help and or update our records.     Sincerely,       [facility]

## 2022-09-27 NOTE — PROGRESS NOTES
ANTICOAGULATION     Oswaldo Prabhakar is overdue for INR check.      Reminder letter sent    CASEY MANZO RN

## 2022-09-27 NOTE — TELEPHONE ENCOUNTER
Oswaldo,     Our records show you were due to check your INR on 5/2/22.    Please call [phone] as soon as possible to schedule an appointment.  If there has been a change in your care or other concerns, please reply to let us know so we can help or update our records.       CASEY MANZO RN  [facility]

## 2022-10-04 DIAGNOSIS — E78.1 PURE HYPERGLYCERIDEMIA: ICD-10-CM

## 2022-10-04 DIAGNOSIS — E78.5 HYPERLIPIDEMIA LDL GOAL <130: ICD-10-CM

## 2022-10-07 RX ORDER — ATORVASTATIN CALCIUM 80 MG/1
TABLET, FILM COATED ORAL
Qty: 90 TABLET | Refills: 2 | Status: SHIPPED | OUTPATIENT
Start: 2022-10-07 | End: 2023-08-21

## 2022-10-26 DIAGNOSIS — I25.10 ATHEROSCLEROSIS OF CORONARY ARTERY OF NATIVE HEART WITHOUT ANGINA PECTORIS, UNSPECIFIED VESSEL OR LESION TYPE: ICD-10-CM

## 2022-10-26 DIAGNOSIS — Z78.9 STATIN INTOLERANCE: ICD-10-CM

## 2022-10-26 DIAGNOSIS — Z95.1 S/P CABG (CORONARY ARTERY BYPASS GRAFT): ICD-10-CM

## 2022-10-26 DIAGNOSIS — E78.5 HYPERLIPIDEMIA LDL GOAL <70: ICD-10-CM

## 2022-10-29 ENCOUNTER — HEALTH MAINTENANCE LETTER (OUTPATIENT)
Age: 62
End: 2022-10-29

## 2022-10-29 RX ORDER — EZETIMIBE 10 MG/1
TABLET ORAL
Qty: 90 TABLET | Refills: 1 | Status: SHIPPED | OUTPATIENT
Start: 2022-10-29 | End: 2023-05-05

## 2022-11-01 ENCOUNTER — DOCUMENTATION ONLY (OUTPATIENT)
Dept: ANTICOAGULATION | Facility: CLINIC | Age: 62
End: 2022-11-01

## 2022-11-01 NOTE — LETTER
[facility] ANTICOAGULATION CLINIC  [address]  [facility] [address]  Phone: [phone]  Fax: [phone]   November 1, 2022        Oswaldo Prabhakar  [address] [place] [address]            Dear Oswaldo,    You are currently under the care of [facility] Anticoagulation Management Program for your warfarin (Coumadin , Jantoven ) therapy.  We are contacting you because our records show you were due for an INR on 5/2/22.    There are potentially serious risks when taking warfarin without careful monitoring and we want to make sure you are safely managed.  Routine lab monitoring is required for warfarin refills.     Please call [phone] as soon as possible to schedule an appointment.  If there has been a change in your care or other concerns, please let us know so we can help and or update our records.     Sincerely,       [facility] Anticoagulation [facility]

## 2022-11-01 NOTE — PROGRESS NOTES
Anticoagulation clinic notificiation    Dr. Vazquez,    Your patient, Oswaldo Prabhakar, is past due for an INR. Their last result was 1.5 on 4/25/22 and was due to come back on 5/2/22.    Oswaldo Prabhakar received phone calls and letters over the last several weeks in attempt to arrange a follow up appointment.  Oswaldo Prabhakar will be sent another letter today.     No action is required from you unless you have additional information or if you would like to reach out personally to Oswaldo JAMISON Aki.    Please don t hesitate to contact the Anticoagulation Management Program if you have any questions or concerns.     Sincerely,     [facility] Anticoagulation Management Program

## 2022-11-02 ENCOUNTER — LAB (OUTPATIENT)
Dept: LAB | Facility: CLINIC | Age: 62
End: 2022-11-02
Payer: COMMERCIAL

## 2022-11-02 ENCOUNTER — ANTICOAGULATION THERAPY VISIT (OUTPATIENT)
Dept: ANTICOAGULATION | Facility: CLINIC | Age: 62
End: 2022-11-02

## 2022-11-02 DIAGNOSIS — I27.82 CHRONIC PULMONARY EMBOLISM WITHOUT ACUTE COR PULMONALE, UNSPECIFIED PULMONARY EMBOLISM TYPE (H): Primary | ICD-10-CM

## 2022-11-02 DIAGNOSIS — Z78.9 STATIN INTOLERANCE: ICD-10-CM

## 2022-11-02 DIAGNOSIS — Z79.01 LONG TERM CURRENT USE OF ANTICOAGULANT THERAPY: ICD-10-CM

## 2022-11-02 DIAGNOSIS — Z13.1 SCREENING FOR DIABETES MELLITUS: ICD-10-CM

## 2022-11-02 DIAGNOSIS — I26.99 PULMONARY EMBOLI (H): ICD-10-CM

## 2022-11-02 DIAGNOSIS — E78.5 HYPERLIPIDEMIA LDL GOAL <130: ICD-10-CM

## 2022-11-02 LAB
CHOLEST SERPL-MCNC: 102 MG/DL
HBA1C MFR BLD: 6.3 %
HDLC SERPL-MCNC: 41 MG/DL
INR PPP: 1.24 (ref 0.85–1.15)
LDLC SERPL CALC-MCNC: 32 MG/DL
LDLC SERPL DIRECT ASSAY-MCNC: 39 MG/DL
NONHDLC SERPL-MCNC: 61 MG/DL
TRIGL SERPL-MCNC: 145 MG/DL

## 2022-11-02 PROCEDURE — 36415 COLL VENOUS BLD VENIPUNCTURE: CPT | Performed by: PATHOLOGY

## 2022-11-02 PROCEDURE — 83721 ASSAY OF BLOOD LIPOPROTEIN: CPT | Mod: 90 | Performed by: PATHOLOGY

## 2022-11-02 PROCEDURE — 85610 PROTHROMBIN TIME: CPT | Performed by: PATHOLOGY

## 2022-11-02 PROCEDURE — 99000 SPECIMEN HANDLING OFFICE-LAB: CPT | Performed by: PATHOLOGY

## 2022-11-02 PROCEDURE — 83036 HEMOGLOBIN GLYCOSYLATED A1C: CPT | Mod: 90 | Performed by: PATHOLOGY

## 2022-11-02 PROCEDURE — 80061 LIPID PANEL: CPT | Mod: 90 | Performed by: PATHOLOGY

## 2022-11-02 NOTE — PROGRESS NOTES
ANTICOAGULATION MANAGEMENT     Oswaldo Prabhakar 62 year old male is on warfarin with subtherapeutic INR result. (Goal INR 2.0-3.0)    Recent labs: (last 7 days)     11/02/22  1428   INR 1.24*       ASSESSMENT       Source(s): Chart Review and Patient/Caregiver Call       Warfarin doses taken: Less warfarin taken than planned which may be affecting INR and Patient reports that he has been taking 7.5mg MF and 5mg all other days for the past 6-9 months    Diet: No new diet changes identified    New illness, injury, or hospitalization: No    Medication/supplement changes: None noted    Signs or symptoms of bleeding or clotting: No    Previous INR: Subtherapeutic    Additional findings: None       PLAN     Recommended plan for temporary change(s) affecting INR     Dosing Instructions: booster dose then Increase your warfarin dose (18% change) with next INR in 1 week       Summary  As of 11/2/2022    Full warfarin instructions:  11/2: 15 mg; Otherwise 5 mg every Tue, Fri; 7.5 mg all other days; Starting 11/2/2022   Next INR check:  11/9/2022             Telephone call with Oswaldo who verbalizes understanding and agrees to plan and who agrees to plan and repeated back plan correctly    Patient offered & declined to schedule next visit    Education provided:     Taking warfarin: Importance of taking warfarin as instructed    Goal range and lab monitoring: goal range and significance of current result, Importance of therapeutic range and Importance of following up at instructed interval    Plan made per [facility] anticoagulation protocol    Kristina Wright RN  Anticoagulation Clinic  11/2/2022    _______________________________________________________________________     Anticoagulation Episode Summary     Current INR goal:  2.0-3.0   TTR:  40.3 % (5.8 mo)   Target end date:  Indefinite   Send INR reminders to:  [facility]    Indications    Pulmonary emboli (H) [I26.99]  Long-term (current) use of anticoagulants [Z79.01]  [Z79.01]           Comments:  HIPPA INFO OK to speak with wife Josselyn OK to leave messages on Home.work and cell phones  use cell phone #[phone] +[phone]         Anticoagulation Care Providers     Provider Role Specialty Phone number    Samm Vazquez MD Referring Internal Medicine - Pediatrics [phone]

## 2022-11-16 ENCOUNTER — MYC MEDICAL ADVICE (OUTPATIENT)
Dept: ANTICOAGULATION | Facility: CLINIC | Age: 62
End: 2022-11-16

## 2022-11-16 NOTE — TELEPHONE ENCOUNTER
Oswaldo,     Our records show you were due to check your INR on 11/9/22.    Please call [phone] as soon as possible to schedule an appointment.  If there has been a change in your care or other concerns, please reply to let us know so we can help or update our records.       CASEY MANZO RN  [facility] Anticoagulation Management Program

## 2022-11-30 ENCOUNTER — MYC MEDICAL ADVICE (OUTPATIENT)
Dept: ANTICOAGULATION | Facility: CLINIC | Age: 62
End: 2022-11-30

## 2022-11-30 NOTE — TELEPHONE ENCOUNTER
Oswaldo,     Our records show you were due to check your INR on 11/9/22.    Please call [phone] as soon as possible to schedule an appointment.  If there has been a change in your care or other concerns, please reply to let us know so we can help or update our records.       CASEY MANZO RN  [facility] Anticoagulation Management Program

## 2022-12-06 DIAGNOSIS — Z95.1 S/P CABG X 4: ICD-10-CM

## 2022-12-07 ENCOUNTER — TELEPHONE (OUTPATIENT)
Dept: CARDIOLOGY | Facility: CLINIC | Age: 62
End: 2022-12-07

## 2022-12-08 RX ORDER — METOPROLOL TARTRATE 25 MG/1
12.5 TABLET, FILM COATED ORAL 2 TIMES DAILY
Qty: 90 TABLET | Refills: 0 | Status: SHIPPED | OUTPATIENT
Start: 2022-12-08 | End: 2023-03-09

## 2022-12-08 NOTE — TELEPHONE ENCOUNTER
METOPROLOL TARTRATE 25 MG TAB      Last Written Prescription Date:  11/1/21  Last Fill Quantity: 90,   # refills: 3  Last Office Visit : 9/9/22  Future Office visit:  8/21/23    Routing refill request to provider for review/approval because:  Blood pressure out of range     90 day valencia refill sent to pharmacy

## 2022-12-09 ENCOUNTER — HOSPITAL ENCOUNTER (EMERGENCY)
Facility: CLINIC | Age: 62
Discharge: HOME | End: 2022-12-10
Attending: INTERNAL MEDICINE | Admitting: INTERNAL MEDICINE
Payer: COMMERCIAL
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
## 2022-12-09 DIAGNOSIS — T18.128A ESOPHAGEAL OBSTRUCTION DUE TO FOOD IMPACTION: ICD-10-CM

## 2022-12-09 LAB — UPPER GI ENDOSCOPY: NORMAL

## 2022-12-09 PROCEDURE — G0500 MOD SEDAT ENDO SERVICE >5YRS: HCPCS | Performed by: INTERNAL MEDICINE

## 2022-12-09 PROCEDURE — 96374 THER/PROPH/DIAG INJ IV PUSH: CPT

## 2022-12-09 PROCEDURE — 96361 HYDRATE IV INFUSION ADD-ON: CPT

## 2022-12-09 PROCEDURE — 96360 HYDRATION IV INFUSION INIT: CPT

## 2022-12-09 PROCEDURE — 250N000009 HC RX 250: Performed by: INTERNAL MEDICINE

## 2022-12-09 PROCEDURE — 99291 CRITICAL CARE FIRST HOUR: CPT | Mod: 25

## 2022-12-09 PROCEDURE — 43235 EGD DIAGNOSTIC BRUSH WASH: CPT | Performed by: INTERNAL MEDICINE

## 2022-12-09 PROCEDURE — 255N000001 HC RX 255: Performed by: EMERGENCY MEDICINE

## 2022-12-09 PROCEDURE — 258N000003 HC RX IP 258 OP 636: Performed by: INTERNAL MEDICINE

## 2022-12-09 PROCEDURE — 250N000011 HC RX IP 250 OP 636: Performed by: INTERNAL MEDICINE

## 2022-12-09 RX ORDER — SIMETHICONE 40MG/0.6ML
133 SUSPENSION, DROPS(FINAL DOSAGE FORM)(ML) ORAL
Status: DISCONTINUED | OUTPATIENT
Start: 2022-12-09 | End: 2022-12-10 | Stop reason: HOSPADM

## 2022-12-09 RX ORDER — PROCHLORPERAZINE MALEATE 10 MG
10 TABLET ORAL EVERY 6 HOURS PRN
Status: CANCELLED | OUTPATIENT
Start: 2022-12-09

## 2022-12-09 RX ORDER — NALOXONE HYDROCHLORIDE 0.4 MG/ML
0.2 INJECTION, SOLUTION INTRAMUSCULAR; INTRAVENOUS; SUBCUTANEOUS
Status: CANCELLED | OUTPATIENT
Start: 2022-12-09

## 2022-12-09 RX ORDER — DIPHENHYDRAMINE HYDROCHLORIDE 50 MG/ML
25-50 INJECTION INTRAMUSCULAR; INTRAVENOUS
Status: DISCONTINUED | OUTPATIENT
Start: 2022-12-09 | End: 2022-12-10 | Stop reason: HOSPADM

## 2022-12-09 RX ORDER — FENTANYL CITRATE 50 UG/ML
50-100 INJECTION, SOLUTION INTRAMUSCULAR; INTRAVENOUS EVERY 5 MIN PRN
Status: DISCONTINUED | OUTPATIENT
Start: 2022-12-09 | End: 2022-12-10 | Stop reason: HOSPADM

## 2022-12-09 RX ORDER — NALOXONE HYDROCHLORIDE 0.4 MG/ML
0.2 INJECTION, SOLUTION INTRAMUSCULAR; INTRAVENOUS; SUBCUTANEOUS
Status: DISCONTINUED | OUTPATIENT
Start: 2022-12-09 | End: 2022-12-10 | Stop reason: HOSPADM

## 2022-12-09 RX ORDER — FLUMAZENIL 0.1 MG/ML
0.2 INJECTION, SOLUTION INTRAVENOUS
Status: CANCELLED | OUTPATIENT
Start: 2022-12-09 | End: 2022-12-10

## 2022-12-09 RX ORDER — FLUMAZENIL 0.1 MG/ML
0.2 INJECTION, SOLUTION INTRAVENOUS
Status: DISCONTINUED | OUTPATIENT
Start: 2022-12-09 | End: 2022-12-10 | Stop reason: HOSPADM

## 2022-12-09 RX ORDER — NALOXONE HYDROCHLORIDE 0.4 MG/ML
0.4 INJECTION, SOLUTION INTRAMUSCULAR; INTRAVENOUS; SUBCUTANEOUS
Status: CANCELLED | OUTPATIENT
Start: 2022-12-09

## 2022-12-09 RX ORDER — EPINEPHRINE 1 MG/ML
0.1 INJECTION, SOLUTION, CONCENTRATE INTRAVENOUS
Status: DISCONTINUED | OUTPATIENT
Start: 2022-12-09 | End: 2022-12-10 | Stop reason: HOSPADM

## 2022-12-09 RX ORDER — ONDANSETRON 2 MG/ML
4 INJECTION INTRAMUSCULAR; INTRAVENOUS
Status: CANCELLED | OUTPATIENT
Start: 2022-12-09

## 2022-12-09 RX ORDER — LIDOCAINE 40 MG/G
CREAM TOPICAL
Status: CANCELLED | OUTPATIENT
Start: 2022-12-09

## 2022-12-09 RX ORDER — NALOXONE HYDROCHLORIDE 0.4 MG/ML
0.4 INJECTION, SOLUTION INTRAMUSCULAR; INTRAVENOUS; SUBCUTANEOUS
Status: DISCONTINUED | OUTPATIENT
Start: 2022-12-09 | End: 2022-12-10 | Stop reason: HOSPADM

## 2022-12-09 RX ORDER — ATROPINE SULFATE 0.1 MG/ML
1 INJECTION INTRAVENOUS
Status: DISCONTINUED | OUTPATIENT
Start: 2022-12-09 | End: 2022-12-10 | Stop reason: HOSPADM

## 2022-12-09 RX ORDER — ONDANSETRON 2 MG/ML
4 INJECTION INTRAMUSCULAR; INTRAVENOUS EVERY 6 HOURS PRN
Status: CANCELLED | OUTPATIENT
Start: 2022-12-09

## 2022-12-09 RX ORDER — ONDANSETRON 4 MG/1
4 TABLET, ORALLY DISINTEGRATING ORAL EVERY 6 HOURS PRN
Status: CANCELLED | OUTPATIENT
Start: 2022-12-09

## 2022-12-09 RX ADMIN — ANTACID/ANTIFLATULENT 4 G: 380; 550; 10; 10 GRANULE, EFFERVESCENT ORAL at 20:09

## 2022-12-09 RX ADMIN — MIDAZOLAM 1 MG: 1 INJECTION INTRAMUSCULAR; INTRAVENOUS at 23:05

## 2022-12-09 RX ADMIN — FENTANYL CITRATE 50 MCG: 50 INJECTION, SOLUTION INTRAMUSCULAR; INTRAVENOUS at 23:08

## 2022-12-09 RX ADMIN — FENTANYL CITRATE 50 MCG: 50 INJECTION, SOLUTION INTRAMUSCULAR; INTRAVENOUS at 23:06

## 2022-12-09 RX ADMIN — MIDAZOLAM 1 MG: 1 INJECTION INTRAMUSCULAR; INTRAVENOUS at 23:12

## 2022-12-09 RX ADMIN — MIDAZOLAM 1 MG: 1 INJECTION INTRAMUSCULAR; INTRAVENOUS at 23:15

## 2022-12-09 RX ADMIN — MIDAZOLAM 1 MG: 1 INJECTION INTRAMUSCULAR; INTRAVENOUS at 23:16

## 2022-12-09 RX ADMIN — TOPICAL ANESTHETIC 0.5 ML: 200 SPRAY DENTAL; PERIODONTAL at 23:26

## 2022-12-09 RX ADMIN — SODIUM CHLORIDE 500 ML: 9 INJECTION, SOLUTION INTRAVENOUS at 23:14

## 2022-12-09 ASSESSMENT — ENCOUNTER SYMPTOMS: TROUBLE SWALLOWING: 1

## 2022-12-09 ASSESSMENT — ACTIVITIES OF DAILY LIVING (ADL): ADLS_ACUITY_SCORE: 35

## 2022-12-10 VITALS
SYSTOLIC BLOOD PRESSURE: 136 MMHG | HEART RATE: 82 BPM | RESPIRATION RATE: 19 BRPM | TEMPERATURE: 97.8 F | OXYGEN SATURATION: 94 % | DIASTOLIC BLOOD PRESSURE: 77 MMHG

## 2022-12-10 NOTE — ED NOTES
After ingestion of Ez-Gas and soda, pt immediately began to vomit large amounts of foamy emesis.  Pt reports no change in symptoms.

## 2022-12-10 NOTE — ED PROVIDER NOTES
History   Chief Complaint:  Food Bolus      The history is provided by the patient.      Oswaldo Prabhakar is a 62 year old male on Warfarin with history of pulmonary emboli who presents with food bolus in throat. Patient reports that steak became stuck in his throat around an hour and a half ago, while eating dinner. He states that it is painful, but he is able to spit up saliva. He notes that he has history of a similar episode, but he was able to pass the bolus on his own. No history of an EGD.     Review of Systems   HENT: Positive for trouble swallowing.         (+) Food bolus in throat   All other systems reviewed and are negative.    Allergies:  Cats  Hay Fever & [A.R.M.]  Heparin  Hydrocodone-Acetaminophen    Medications:  Atorvastatin  Ezetimibe  Fenofibrate  Lisinopril  Metoprolol tartrate  Omeprazole  Ondansetron  Tamsulosin  Ticagrelor  Warfarin    Past Medical History:     Retinal detachment   Pulmonary emboli  Hyperglyceridemia   Atherosclerosis   Unstable angina  UTI  Calculus of kidney      Past Surgical History:    CABG x4  Left hand reduction and pinning  CV angiogram coronary graft  CV PCI stent drug eluting  CV coronary angiogram   Cholecystectomy   Lithotripsy  Retinal reattachment   Spermatocele resection     Family History:    Mother - heart disease  Father - CAD with 3 vessel CABG, bladder cancer    Social History:  Presents with his wife  Presents via private vehicle  PCP: Samm Vazquez     Physical Exam     Patient Vitals for the past 24 hrs:   BP Temp Pulse Resp SpO2   12/10/22 0015 136/77 -- 82 19 94 %   12/10/22 0005 136/79 -- 88 16 95 %   12/09/22 2355 (!) 152/84 -- 80 16 95 %   12/09/22 2348 (!) 151/91 -- 78 16 96 %   12/09/22 2347 (!) 151/91 -- 79 11 95 %   12/09/22 2340 (!) 142/76 -- 79 17 95 %   12/09/22 2339 (!) 142/76 -- 79 15 95 %   12/09/22 2334 -- -- 78 18 95 %   12/09/22 2330 (!) 142/76 -- 79 18 97 %   12/09/22 2329 (!) 142/76 -- 78 19 98 %   12/09/22 2327 (!) 142/76 -- 79 19  97 %   12/09/22 2325 (!) 147/77 -- 80 18 95 %   12/09/22 2320 (!) 154/77 -- 89 21 98 %   12/09/22 2318 (!) 155/76 -- 92 -- --   12/09/22 2315 (!) 168/100 -- 101 20 93 %   12/09/22 2314 -- -- 62 16 96 %   12/09/22 2312 (!) 152/75 -- 76 11 95 %   12/09/22 2309 (!) 149/76 -- 73 11 98 %   12/09/22 2305 (!) 158/74 -- 57 14 98 %   12/09/22 2300 (!) 165/82 -- 61 10 97 %   12/09/22 2005 (!) 178/104 97.8  F (36.6  C) 71 16 99 %       Physical Exam  Constitutional:       Comments: Appears in obvious distress holding an emesis bag   Eyes:      Conjunctiva/sclera: Conjunctivae normal.   Cardiovascular:      Rate and Rhythm: Normal rate and regular rhythm.      Heart sounds: Normal heart sounds.   Pulmonary:      Effort: Pulmonary effort is normal.      Breath sounds: Normal breath sounds. No stridor.   Abdominal:      General: Bowel sounds are normal. There is no distension.      Palpations: Abdomen is soft.      Tenderness: There is no abdominal tenderness. There is no guarding or rebound.   Musculoskeletal:         General: Normal range of motion.      Cervical back: Neck supple.   Skin:     General: Skin is warm and dry.   Neurological:      Mental Status: He is alert.         Emergency Department Course       Emergency Department Course:     Reviewed:  I reviewed nursing notes, vitals, past medical history and Care Everywhere    Assessments:  2107 I obtained history and examined the patient as noted above.     Consults:  2134 I spoke to Dr. George, GI physician, regarding EGD of this patient.     Interventions:  2009 EZ Gas, 4 g, PO    Disposition:  The patient was discharged to home.     Impression & Plan   Medical Decision Making:    Oswaldo Prabhakar is a 62 year old male who presents to the emergency department with an esophageal food impaction.  I contacted the gastroenterology and the patient underwent EGD here with removal of foreign body.  He is now feeling remarkably improved.  Dr. George has written discharge  instructions for the patient.    Diagnosis:    ICD-10-CM    1. Esophageal obstruction due to food impaction  K22.2     T18.128A           Discharge Medications:  New Prescriptions    No medications on file       Scribe Disclosure:  I, Pranav Osorio, am serving as a scribe at 9:06 PM on 12/9/2022 to document services personally performed by Josselyn Sood MD based on my observations and the provider's statements to me.          Josselyn Sood MD  12/10/22 0023

## 2022-12-10 NOTE — DISCHARGE INSTRUCTIONS
The patient has received a copy of the Provation Report the doctor has written and was discussed with the patient and given to primary RN prior to returning to the inpatient floor.  All questions were answered and patient has provider's office phone number for further questions or concerns.

## 2022-12-10 NOTE — ED TRIAGE NOTES
Pt reports he has a piece of steak stuck in his esophagus.  Symptoms started when eating dinner.  Able to swallow oral secretions.   Pt pale and diaphoretic in triage.

## 2022-12-10 NOTE — SEDATION DOCUMENTATION
Procedure started at 23:01  Pt got a total of:  versed 1mg at  23:05  23:12  Fentanyl 50mcg  23:06  23:12  23:15  23:15  NS IVfluidsbolus  Hurricaine spray  Finished at 23:17  Pt tolerated well.

## 2022-12-10 NOTE — ED NOTES
Took over care of the patient- patient is fully recovered, up and talking in the room. Wife brought back from triage to room

## 2022-12-12 ENCOUNTER — TELEPHONE (OUTPATIENT)
Dept: ANTICOAGULATION | Facility: CLINIC | Age: 62
End: 2022-12-12

## 2022-12-12 NOTE — TELEPHONE ENCOUNTER
ANTICOAGULATION  MANAGEMENT: Discharge Review    Oswaldo Prabhakar chart reviewed for anticoagulation continuity of care    Emergency room visit on 12/9 for esophageal food impaction. Patient was eating steak at home when it became lodged in his throat. Patient saw GI and had and EDG done and the material was removed.    Discharge disposition: Home    Results:    No results for input(s): INR, [id number], F2, ALMWH, AAUFH in the last 168 hours.  Anticoagulation inpatient management:     not applicable     Anticoagulation discharge instructions:     Warfarin dosing: home regimen continued   Bridging: No   INR goal change: No      Medication changes affecting anticoagulation: No    Additional factors affecting anticoagulation: No     PLAN     No adjustment to anticoagulation plan needed    Patient not contacted    No adjustment to Anticoagulation Calendar was required    Kristina Wright RN

## 2022-12-14 ENCOUNTER — DOCUMENTATION ONLY (OUTPATIENT)
Dept: ANTICOAGULATION | Facility: CLINIC | Age: 62
End: 2022-12-14

## 2022-12-14 NOTE — LETTER
[facility] ANTICOAGULATION CLINIC  [address]  [place] [address]  Phone: [phone]  Fax: [phone]   December 14, 2022        Oswaldo Prabhakar  [address] [place] [address]            Dear Oswaldo,    You are currently under the care of [facility] Anticoagulation Management Program for your warfarin (Coumadin , Jantoven ) therapy.  We are contacting you because our records show you were due for an INR on 11/9/22.    There are potentially serious risks when taking warfarin without careful monitoring and we want to make sure you are safely managed.  Routine lab monitoring is required for warfarin refills.     Please call [phone] as soon as possible to schedule an appointment.  If there has been a change in your care or other concerns, please let us know so we can help and or update our records.     Sincerely,       [facility] Anticoagulation Management Program

## 2022-12-15 ENCOUNTER — TELEPHONE (OUTPATIENT)
Dept: CARDIOLOGY | Facility: CLINIC | Age: 62
End: 2022-12-15

## 2022-12-15 DIAGNOSIS — E78.5 HYPERLIPIDEMIA LDL GOAL <70: Primary | ICD-10-CM

## 2022-12-28 ENCOUNTER — TELEPHONE (OUTPATIENT)
Dept: CARDIOLOGY | Facility: CLINIC | Age: 62
End: 2022-12-28

## 2022-12-28 NOTE — TELEPHONE ENCOUNTER
Central Prior Authorization Team   Phone: [phone]    PA Initiation    Medication: Repatha Sureclick 140mg/ml soaj  Insurance Company: [employer] - Phone [phone] Fax [phone]  Pharmacy Filling the Rx: [facility] - [place], [place] - [facility] [address]  Filling Pharmacy Phone: [phone]  Filling Pharmacy Fax:    Start Date: 12/28/2022

## 2022-12-28 NOTE — TELEPHONE ENCOUNTER
[facility]    Fax:[phone]    Spec: [phone]    [place]: [phone]

## 2022-12-29 NOTE — TELEPHONE ENCOUNTER
Prior Authorization Approval    Authorization Effective Date: 12/28/2022  Authorization Expiration Date: 12/28/2023  Medication: Repatha Sureclick 140mg/ml soaj  Approved Dose/Quantity:   Reference #:     Insurance Company: [employer] Phone [phone] Fax [phone]  Expected CoPay:       CoPay Card Available:      Foundation Assistance Needed:    Which Pharmacy is filling the prescription (Not needed for infusion/clinic administered): [facility] - [place], [place] - [address]  Pharmacy Notified: Yes  Patient Notified: Yes

## 2023-01-10 ENCOUNTER — OFFICE VISIT (OUTPATIENT)
Dept: DERMATOLOGY | Facility: CLINIC | Age: 63
End: 2023-01-10
Payer: COMMERCIAL

## 2023-01-10 DIAGNOSIS — L57.0 ACTINIC KERATOSIS: Primary | ICD-10-CM

## 2023-01-10 PROCEDURE — 17000 DESTRUCT PREMALG LESION: CPT | Mod: GC | Performed by: DERMATOLOGY

## 2023-01-10 ASSESSMENT — PAIN SCALES - GENERAL: PAINLEVEL: NO PAIN (0)

## 2023-01-10 NOTE — LETTER
1/10/2023       RE: Oswaldo Prabhakar  [address]  [place] [address]     Dear Colleague,    Thank you for referring your patient, Oswaldo Prabhakar, to the [facility] DERMATOLOGY [facility]. Please see a copy of my visit note below.    [facility] Dermatology Note  Encounter Date: Edu 10, 2023  Office Visit     Dermatology Problem List:  # Hx AK  - L inferior cheek, s/p cryo 1/10/23    ____________________________________________    Assessment & Plan:    # Actinic keratosis  - Discussed etiology and low risk of progression to SCC.   - Treated with cryotherapy today, procedure note below.  - Discussed signs and symptoms of skin cancers and ABCDEs of melanoma.  - Recommended diligent sun protection with SPF 30 or higher. Handout provided.    Procedures Performed:   - Cryotherapy procedure note, location(s): L inferior cheek. After verbal consent and discussion of risks and benefits including, but not limited to, dyspigmentation/scar, blister, and pain, 1 lesion(s) was(were) treated with 1-2 mm freeze border for 2 cycles with liquid nitrogen. Post cryotherapy instructions were provided.    Follow-up: 2-3 months for FBSE and recheck of AK.     Staff and Scribe:     Scribe Disclosure:  I, Robert Salgado, am serving as a scribe to document services personally performed by Jose Carlos Olivo MD based on data collection and the provider's statements to me.     Collin Leong MD, PhD  PGY-2 Dermatology Resident     Provider Disclosure:   The documentation recorded by the scribe accurately reflects the services I personally performed and the decisions made by me.    Staff Physician Comments:   I saw and evaluated the patient with the resident and I agree with the assessment and plan.  I was present for the entire minor procedure and examination.    Jose Carlos Olivo MD  Pronouns: he/him/his    Department of  Dermatology  [facility]: Phone: [phone], Fax:[phone]  [facility]: Phone: [phone] Fax: [phone]  ____________________________________________    CC: Derm Problem (Pt here to have a lesion on L cheek evaluated)    HPI:  Mr. Oswaldo Prabhakar is a(n) 62 year old male who presents today as a new patient for lesion of concern on the L cheek, referral from PCP Dr. Samm Vazquez.     - Lesion present for several years, noted by PCP on recent visit who recommended dermatology evaluation.   - Notes no significant change to lesion since he first noticed it (estimates) about 3 years ago.   - Denies bleeding, denies tenderness.   - No personal history of skin cancer.  - History of sun burns in youth.   - Does not frequently wear sunscreen, though doesn't usually burn.  - Denies tanning bed use.  - [place]     Patient is otherwise feeling well, without additional skin concerns.    Labs Reviewed:  N/A    Physical Exam:  Vitals: There were no vitals taken for this visit.  SKIN:   Focused exam of face, notable for:  - On the L inferior cheek there is a ~3-4mm gritty pink papule without concerning dermoscopic features.     Medications:  Current Outpatient Medications   Medication     acetaminophen (TYLENOL) 500 MG tablet     atorvastatin (LIPITOR) 80 MG tablet     calcium carbonate (TUMS) 500 MG chewable tablet     Cholecalciferol (VITAMIN D) 2000 UNITS CAPS     ezetimibe (ZETIA) 10 MG tablet     fenofibrate (TRIGLIDE/LOFIBRA) 160 MG tablet     lisinopril (ZESTRIL) 2.5 MG tablet     metoprolol tartrate (LOPRESSOR) 25 MG tablet     OMEPRAZOLE PO     ondansetron (ZOFRAN-ODT) 4 MG ODT tab     prednisoLONE acetate (PRED FORTE) 1 % ophthalmic suspension     REPATHA SURECLICK 140 MG/ML prefilled autoinjector     tamsulosin (FLOMAX) 0.4 MG capsule     ticagrelor (BRILINTA) 90 MG tablet      warfarin ANTICOAGULANT (COUMADIN) 5 MG tablet     polyvinyl alcohol (LIQUIFILM TEARS) 1.4 % ophthalmic solution     No current facility-administered medications for this visit.      Past Medical History:   Patient Active Problem List   Diagnosis     Retinal detachment     HYPERLIPIDEMIA LDL GOAL <130     Pulmonary emboli (H)     Pure hyperglyceridemia     Calculus of kidney     Warfarin anticoagulation     Atypical chest pain     S/P CABG x 4     Atherosclerosis of native coronary artery of native heart with angina pectoris with documented spasm (H)     Long-term (current) use of anticoagulants [Z79.01]     Statin intolerance     Unstable angina (H)     Renal colic on right side     Ureteral stone     UTI (urinary tract infection)     Chest pain, unspecified type     Past Medical History:   Diagnosis Date     Antiplatelet or antithrombotic long-term use      Coronary artery disease      Diaphragmatic hernia without mention of obstruction or gangrene      Heart disease      Hernia, abdominal      History of blood transfusion      History of thrombophlebitis      Hypertension      Nephrolithiasis 4/2010     Other and unspecified hyperlipidemia      Pulmonary embolus and infarction (H)     s/p hemothorax and filter s/p filter removal (2011), now on long term anti-coagulation     Statin intolerance 2/1/2017     Stented coronary artery 01/07/2020    NIR mid LAD     Unspecified retinal detachment     Childhood trauma, unrepaired        CC Samm Vazquez MD  [address] [facility] [address]  [place] [address] on close of this encounter.

## 2023-01-10 NOTE — PATIENT INSTRUCTIONS
Patient Education     Checking for Skin Cancer  You can find cancer early by checking your skin each month. There are 3 kinds of skin cancer. They are melanoma, basal cell carcinoma, and squamous cell carcinoma. Doing monthly skin checks is the best way to find new marks or skin changes. Follow the instructions below for checking your skin.   The ABCDEs of checking moles for melanoma   Check your moles or growths for signs of melanoma using ABCDE:   Asymmetry: the sides of the mole or growth don t match  Border: the edges are ragged, notched, or blurred  Color: the color within the mole or growth varies  Diameter: the mole or growth is larger than 6 mm (size of a pencil eraser)  Evolving: the size, shape, or color of the mole or growth is changing (evolving is not shown in the images below)    Checking for other types of skin cancer  Basal cell carcinoma or squamous cell carcinoma have symptoms such as:     A spot or mole that looks different from all other marks on your skin  Changes in how an area feels, such as itching, tenderness, or pain  Changes in the skin's surface, such as oozing, bleeding, or scaliness  A sore that does not heal  New swelling or redness beyond the border of a mole    Who s at risk?  Anyone can get skin cancer. But you are at greater risk if you have:   Fair skin, light-colored hair, or light-colored eyes  Many moles or abnormal moles on your skin  A history of sunburns from sunlight or tanning beds  A family history of skin cancer  A history of exposure to radiation or chemicals  A weakened immune system  If you have had skin cancer in the past, you are at risk for recurring skin cancer.   How to check your skin  Do your monthly skin checkups in front of a full-length mirror. Check all parts of your body, including your:   Head (ears, face, neck, and scalp)  Torso (front, back, and sides)  Arms (tops, undersides, upper, and lower armpits)  Hands (palms, backs, and fingers, including  under the nails)  Buttocks and genitals  Legs (front, back, and sides)  Feet (tops, soles, toes, including under the nails, and between toes)  If you have a lot of moles, take digital photos of them each month. Make sure to take photos both up close and from a distance. These can help you see if any moles change over time.   Most skin changes are not cancer. But if you see any changes in your skin, call your doctor right away. Only he or she can diagnose a problem. If you have skin cancer, seeing your doctor can be the first step toward getting the treatment that could save your life.   [employer] last reviewed this educational content on 4/1/2019 2000-2020 [employer]. [address] [place] [address], [place] [address]. All rights reserved. This information is not intended as a substitute for professional medical care. Always follow your healthcare professional's instructions.       When should I call my doctor?  If you are worsening or not improving, please, contact us or seek urgent care as noted below.     Who should I call with questions (adults)?  [facility] (adult and pediatric): [phone]  [facility] (adult): [phone]  For urgent needs outside of business hours call the [facility] at [phone] and ask for the dermatology resident on call to be paged  If this is a medical emergency and you are unable to reach an ER, Call [phone]    Who should I call with questions (pediatric)?  [facility]- Pediatric Dermatology  Dr. Loraine Akers, Dr. Liz Coello, Dr. Luisa Morrison, ARASELI Thacker, Dr. Blanche Hogue, Dr. Ileana Chavez & Dr. Sav Patel  Non-urgent nurse triage line; [phone]- Lauren and Hiwot [facility] Care Coordinatormattie Rubio (/Complex ) [phone]    If you need a prescription refill, please contact your pharmacy. Refills are approved or denied by our  Physicians during normal business hours, Monday through Fridays  Per office policy, refills will not be granted if you have not been seen within the past year (or sooner depending on your child's condition)    Scheduling Information:  Pediatric Appointment Scheduling and Call Center [phone]  Radiology Scheduling- [phone]  Sedation Unit Scheduling- [phone]  [facility] Scheduling- General [phone]; Pediatric Dermatology [phone]  Main  Services: [phone]  Mohawk: [phone]  Hong Konger: [phone]  Hmong/Gambian/Georgian: [phone]  Preadmission Nursing Department Fax Number: [phone] (Fax all pre-operative paperwork to this number)    For urgent matters arising during evenings, weekends, or holidays that cannot wait for normal business hours please call [phone] and ask for the dermatology resident on call to be paged.

## 2023-01-10 NOTE — NURSING NOTE
Chief Complaint   Patient presents with     Derm Problem     Pt here to have a lesion on L cheek evaluated     Marvin Toscano, EMT

## 2023-01-10 NOTE — PROGRESS NOTES
[facility] Dermatology Note  Encounter Date: Edu 10, 2023  Office Visit     Dermatology Problem List:  # Hx AK  - L inferior cheek, s/p cryo 1/10/23    ____________________________________________    Assessment & Plan:    # Actinic keratosis  - Discussed etiology and low risk of progression to SCC.   - Treated with cryotherapy today, procedure note below.  - Discussed signs and symptoms of skin cancers and ABCDEs of melanoma.  - Recommended diligent sun protection with SPF 30 or higher. Handout provided.    Procedures Performed:   - Cryotherapy procedure note, location(s): L inferior cheek. After verbal consent and discussion of risks and benefits including, but not limited to, dyspigmentation/scar, blister, and pain, 1 lesion(s) was(were) treated with 1-2 mm freeze border for 2 cycles with liquid nitrogen. Post cryotherapy instructions were provided.    Follow-up: 2-3 months for FBSE and recheck of AK.     Staff and Scribe:     Scribe Disclosure:  I, Robert Salgado, am serving as a scribe to document services personally performed by Jose Carlos Olivo MD based on data collection and the provider's statements to me.     Collin Leong MD, PhD  PGY-2 Dermatology Resident     Provider Disclosure:   The documentation recorded by the scribe accurately reflects the services I personally performed and the decisions made by me.    Staff Physician Comments:   I saw and evaluated the patient with the resident and I agree with the assessment and plan.  I was present for the entire minor procedure and examination.    Jose Carlos Olivo MD  Pronouns: he/him/his    Department of Dermatology  [facility] [place]: Phone: [phone], Fax:[phone]  [facility] [place]: Phone: [phone] Fax: [phone]  ____________________________________________    CC: Derm Problem (Pt here to have a lesion on L  cheek evaluated)    HPI:  Mr. Oswaldo Prabhakar is a(n) 62 year old male who presents today as a new patient for lesion of concern on the L cheek, referral from PCP Dr. Samm Vazquez.     - Lesion present for several years, noted by PCP on recent visit who recommended dermatology evaluation.   - Notes no significant change to lesion since he first noticed it (estimates) about 3 years ago.   - Denies bleeding, denies tenderness.   - No personal history of skin cancer.  - History of sun burns in youth.   - Does not frequently wear sunscreen, though doesn't usually burn.  - Denies tanning bed use.  - [place]     Patient is otherwise feeling well, without additional skin concerns.    Labs Reviewed:  N/A    Physical Exam:  Vitals: There were no vitals taken for this visit.  SKIN:   Focused exam of face, notable for:  - On the L inferior cheek there is a ~3-4mm gritty pink papule without concerning dermoscopic features.     Medications:  Current Outpatient Medications   Medication     acetaminophen (TYLENOL) 500 MG tablet     atorvastatin (LIPITOR) 80 MG tablet     calcium carbonate (TUMS) 500 MG chewable tablet     Cholecalciferol (VITAMIN D) 2000 UNITS CAPS     ezetimibe (ZETIA) 10 MG tablet     fenofibrate (TRIGLIDE/LOFIBRA) 160 MG tablet     lisinopril (ZESTRIL) 2.5 MG tablet     metoprolol tartrate (LOPRESSOR) 25 MG tablet     OMEPRAZOLE PO     ondansetron (ZOFRAN-ODT) 4 MG ODT tab     prednisoLONE acetate (PRED FORTE) 1 % ophthalmic suspension     REPATHA SURECLICK 140 MG/ML prefilled autoinjector     tamsulosin (FLOMAX) 0.4 MG capsule     ticagrelor (BRILINTA) 90 MG tablet     warfarin ANTICOAGULANT (COUMADIN) 5 MG tablet     polyvinyl alcohol (LIQUIFILM TEARS) 1.4 % ophthalmic solution     No current facility-administered medications for this visit.      Past Medical History:   Patient Active Problem List   Diagnosis     Retinal detachment     HYPERLIPIDEMIA LDL GOAL <130      Pulmonary emboli (H)     Pure hyperglyceridemia     Calculus of kidney     Warfarin anticoagulation     Atypical chest pain     S/P CABG x 4     Atherosclerosis of native coronary artery of native heart with angina pectoris with documented spasm (H)     Long-term (current) use of anticoagulants [Z79.01]     Statin intolerance     Unstable angina (H)     Renal colic on right side     Ureteral stone     UTI (urinary tract infection)     Chest pain, unspecified type     Past Medical History:   Diagnosis Date     Antiplatelet or antithrombotic long-term use      Coronary artery disease      Diaphragmatic hernia without mention of obstruction or gangrene      Heart disease      Hernia, abdominal      History of blood transfusion      History of thrombophlebitis      Hypertension      Nephrolithiasis 4/2010     Other and unspecified hyperlipidemia      Pulmonary embolus and infarction (H)     s/p hemothorax and filter s/p filter removal (2011), now on long term anti-coagulation     Statin intolerance 2/1/2017     Stented coronary artery 01/07/2020    NIR mid LAD     Unspecified retinal detachment     Childhood trauma, unrepaired        CC Samm Vazquez MD  [address]  [place] [address] on close of this encounter.

## 2023-01-26 ENCOUNTER — MYC MEDICAL ADVICE (OUTPATIENT)
Dept: ANTICOAGULATION | Facility: CLINIC | Age: 63
End: 2023-01-26
Payer: COMMERCIAL

## 2023-01-26 DIAGNOSIS — I27.82 CHRONIC PULMONARY EMBOLISM WITHOUT ACUTE COR PULMONALE, UNSPECIFIED PULMONARY EMBOLISM TYPE (H): ICD-10-CM

## 2023-01-26 RX ORDER — WARFARIN SODIUM 5 MG/1
TABLET ORAL
Qty: 30 TABLET | Refills: 0 | Status: SHIPPED | OUTPATIENT
Start: 2023-01-26 | End: 2023-02-17

## 2023-01-26 NOTE — TELEPHONE ENCOUNTER
Refill of warfarin approved for 30 tablets. Pt is overdue for INR. Pt was to recheck INR on 11/9/22    Ritani message sent to notify pt

## 2023-02-17 DIAGNOSIS — I27.82 CHRONIC PULMONARY EMBOLISM WITHOUT ACUTE COR PULMONALE, UNSPECIFIED PULMONARY EMBOLISM TYPE (H): ICD-10-CM

## 2023-02-17 RX ORDER — WARFARIN SODIUM 5 MG/1
TABLET ORAL
Qty: 40 TABLET | Refills: 0 | Status: SHIPPED | OUTPATIENT
Start: 2023-02-17 | End: 2023-06-09

## 2023-02-17 NOTE — TELEPHONE ENCOUNTER
ANTICOAGULATION MANAGEMENT:  Medication Refill    Anticoagulation Summary  As of 11/2/2022    Warfarin maintenance plan:  5 mg (5 mg x 1) every Tue, Fri; 7.5 mg (5 mg x 1.5) all other days   Next INR check:  11/9/2022   Target end date:  Indefinite    Indications    Pulmonary emboli (H) [I26.99]  Long-term (current) use of anticoagulants [Z79.01] [Z79.01]             Anticoagulation Care Providers     Provider Role Specialty Phone number    Samm Vazquez MD Referring Internal Medicine - Pediatrics [phone]          Visit with referring provider/group within last year: Yes    [facility] referral signed within last year: Yes    Oswaldo does NOT meet all criteria for refill: > 2 weeks overdue for lab monitoring . valencia fill previously given on 1/26/23 and pt was made aware that he is due for INR - no appt made. ; routing to referring provider for further refills  per [facility] protocol    Josephine Leonard RN  Anticoagulation Clinic

## 2023-02-23 ENCOUNTER — DOCUMENTATION ONLY (OUTPATIENT)
Dept: ANTICOAGULATION | Facility: CLINIC | Age: 63
End: 2023-02-23
Payer: COMMERCIAL

## 2023-02-23 NOTE — PROGRESS NOTES
Anticoagulation clinic notificiation    Dr. Vazquez,    Your patient, Oswaldo Prabhakar, is past due for an INR. Their last result was 1.24 on 11/2/22 and was due to come back on 11/9/22.    Oswaldo Prabhakar received phone calls and letters over the last several weeks in attempt to arrange a follow up appointment.  Oswaldo Prabhakar will be sent another letter today.     No action is required from you unless you have additional information or if you would like to reach out personally to Oswaldo JAMISON Aki.    Please don t hesitate to contact the Anticoagulation Management Program if you have any questions or concerns.     Sincerely,     [facility] Anticoagulation Management Program

## 2023-02-23 NOTE — LETTER
[facility] ANTICOAGULATION CLINIC  [address] [address]  Phone: [phone]  Fax: [phone]   February 23, 2023        Oswaldo Prabhakar  [address] [address]            Dear Oswaldo,    You are currently under the care of [facility] Anticoagulation Management Program for your warfarin (Coumadin , Jantoven ) therapy.  We are contacting you because our records show you were due for an INR on 11/9/22.    There are potentially serious risks when taking warfarin without careful monitoring and we want to make sure you are safely managed.  Routine lab monitoring is required for warfarin refills.     Please call [phone] as soon as possible to schedule an appointment.  If there has been a change in your care or other concerns, please let us know so we can help and or update our records.     Sincerely,       [facility] Anticoagulation Management Program

## 2023-03-07 DIAGNOSIS — Z95.1 S/P CABG X 4: ICD-10-CM

## 2023-03-08 ENCOUNTER — TRANSFERRED RECORDS (OUTPATIENT)
Dept: HEALTH INFORMATION MANAGEMENT | Facility: CLINIC | Age: 63
End: 2023-03-08
Payer: COMMERCIAL

## 2023-03-09 ENCOUNTER — TELEPHONE (OUTPATIENT)
Dept: ANTICOAGULATION | Facility: CLINIC | Age: 63
End: 2023-03-09
Payer: COMMERCIAL

## 2023-03-09 DIAGNOSIS — I27.82 CHRONIC PULMONARY EMBOLISM WITHOUT ACUTE COR PULMONALE, UNSPECIFIED PULMONARY EMBOLISM TYPE (H): ICD-10-CM

## 2023-03-09 RX ORDER — WARFARIN SODIUM 5 MG/1
TABLET ORAL
Qty: 120 TABLET | Refills: 0 | OUTPATIENT
Start: 2023-03-09

## 2023-03-09 RX ORDER — METOPROLOL TARTRATE 25 MG/1
12.5 TABLET, FILM COATED ORAL 2 TIMES DAILY
Qty: 90 TABLET | Refills: 1 | Status: SHIPPED | OUTPATIENT
Start: 2023-03-09 | End: 2023-11-08

## 2023-03-09 NOTE — TELEPHONE ENCOUNTER
"ANTICOAGULATION     Oswaldo YAQUELIN Prabhakar is overdue for INR check.      Spoke with Oswaldo and scheduled lab appointment on 3/13/23     Received a refill request in [facility] for Oswaldo today.  Patient states he is \"good\" with Warfarin, and that it was a mistake.    Kwesi Gregory, RN      "

## 2023-03-09 NOTE — TELEPHONE ENCOUNTER
METOPROLOL TARTRATE 25 MG TAB    Office Visit    9/9/2022  [facility]  Internal Medicine

## 2023-03-13 ENCOUNTER — ANTICOAGULATION THERAPY VISIT (OUTPATIENT)
Dept: ANTICOAGULATION | Facility: CLINIC | Age: 63
End: 2023-03-13

## 2023-03-13 ENCOUNTER — LAB (OUTPATIENT)
Dept: LAB | Facility: CLINIC | Age: 63
End: 2023-03-13
Payer: COMMERCIAL

## 2023-03-13 ENCOUNTER — TRANSCRIBE ORDERS (OUTPATIENT)
Dept: OTHER | Age: 63
End: 2023-03-13

## 2023-03-13 DIAGNOSIS — H40.059: ICD-10-CM

## 2023-03-13 DIAGNOSIS — Z79.01 LONG TERM CURRENT USE OF ANTICOAGULANT THERAPY: ICD-10-CM

## 2023-03-13 DIAGNOSIS — I26.99 PULMONARY EMBOLI (H): ICD-10-CM

## 2023-03-13 DIAGNOSIS — H20.10 CHRONIC IRITIS: ICD-10-CM

## 2023-03-13 DIAGNOSIS — H40.50X0 NEOVASCULAR GLAUCOMA: Primary | ICD-10-CM

## 2023-03-13 DIAGNOSIS — I27.82 CHRONIC PULMONARY EMBOLISM WITHOUT ACUTE COR PULMONALE, UNSPECIFIED PULMONARY EMBOLISM TYPE (H): Primary | ICD-10-CM

## 2023-03-13 LAB — INR PPP: 1.86 (ref 0.85–1.15)

## 2023-03-13 PROCEDURE — 85610 PROTHROMBIN TIME: CPT | Performed by: PATHOLOGY

## 2023-03-13 PROCEDURE — 36415 COLL VENOUS BLD VENIPUNCTURE: CPT | Performed by: PATHOLOGY

## 2023-03-13 NOTE — PROGRESS NOTES
ANTICOAGULATION MANAGEMENT     Oswaldo Prabhakar 62 year old male is on warfarin with subtherapeutic INR result. (Goal INR 2.0-3.0)    Recent labs: (last 7 days)     03/13/23  0908   INR 1.86*       ASSESSMENT       Source(s): Chart Review    Previous INR was Subtherapeutic    Medication, diet, health changes since last INR chart reviewed; none identified             PLAN     Recommended plan for no diet, medication or health factor changes affecting INR     Dosing Instructions: Increase your warfarin dose (5.3% change) with next INR in 2 weeks       Summary  As of 3/13/2023    Full warfarin instructions:  5 mg every Fri; 7.5 mg all other days   Next INR check:  3/27/2023             Detailed voice message left for Oswaldo with dosing instructions and follow up date.   Sent TERMINALFOUR message with dosing and follow up instructions    Contact [phone] to schedule and with any changes, questions or concerns.     Education provided:     Please call back if any changes to your diet, medications or how you've been taking warfarin    Contact [phone] with any changes, questions or concerns.     Plan made per [facility] anticoagulation protocol    CASEY MANZO RN  Anticoagulation Clinic  3/13/2023    _______________________________________________________________________     Anticoagulation Episode Summary     Current INR goal:  2.0-3.0   TTR:  0.0 % (1.4 mo)   Target end date:  Indefinite   Send INR reminders to:  [facility]    Indications    Pulmonary emboli (H) [I26.99]  Long-term (current) use of anticoagulants [Z79.01] [Z79.01]           Comments:  HIPPA INFO OK to speak with wife Josselyn OK to leave messages on Home.work and cell phones  use cell phone #[phone] ++++         Anticoagulation Care Providers     Provider Role Specialty Phone number    Samm Vazquez MD Referring Internal Medicine - Pediatrics [phone]

## 2023-04-12 ENCOUNTER — MYC MEDICAL ADVICE (OUTPATIENT)
Dept: ANTICOAGULATION | Facility: CLINIC | Age: 63
End: 2023-04-12
Payer: COMMERCIAL

## 2023-04-12 DIAGNOSIS — H40.50X0 NEOVASCULAR GLAUCOMA: Primary | ICD-10-CM

## 2023-04-12 NOTE — TELEPHONE ENCOUNTER
Oswaldo,     Our records show you were due to check your INR on 3/27/23.    Please call [phone] as soon as possible to schedule an appointment.  If there has been a change in your care or other concerns, please reply to let us know so we can help or update our records.     [facility] Anticoagulation Management Program

## 2023-04-13 ENCOUNTER — OFFICE VISIT (OUTPATIENT)
Dept: OPHTHALMOLOGY | Facility: CLINIC | Age: 63
End: 2023-04-13
Attending: OPHTHALMOLOGY
Payer: COMMERCIAL

## 2023-04-13 DIAGNOSIS — H20.10 CHRONIC IRITIS: ICD-10-CM

## 2023-04-13 DIAGNOSIS — H40.50X0 NEOVASCULAR GLAUCOMA: Primary | ICD-10-CM

## 2023-04-13 DIAGNOSIS — H40.50X0 NEOVASCULAR GLAUCOMA, UNSPECIFIED GLAUCOMA STAGE, UNSPECIFIED LATERALITY: Primary | ICD-10-CM

## 2023-04-13 DIAGNOSIS — H40.50X0 NEOVASCULAR GLAUCOMA: ICD-10-CM

## 2023-04-13 DIAGNOSIS — H40.059: ICD-10-CM

## 2023-04-13 PROCEDURE — 92133 CPTRZD OPH DX IMG PST SGM ON: CPT | Performed by: OPHTHALMOLOGY

## 2023-04-13 PROCEDURE — 92083 EXTENDED VISUAL FIELD XM: CPT | Performed by: OPHTHALMOLOGY

## 2023-04-13 PROCEDURE — G0463 HOSPITAL OUTPT CLINIC VISIT: HCPCS | Performed by: OPHTHALMOLOGY

## 2023-04-13 PROCEDURE — 99214 OFFICE O/P EST MOD 30 MIN: CPT | Mod: GC | Performed by: OPHTHALMOLOGY

## 2023-04-13 RX ORDER — ACETAZOLAMIDE 250 MG/1
1 TABLET ORAL 4 TIMES DAILY
COMMUNITY
Start: 2023-03-07 | End: 2023-09-12

## 2023-04-13 RX ORDER — ACETAZOLAMIDE 250 MG/1
250 TABLET ORAL 2 TIMES DAILY
Qty: 1 TABLET | Refills: 3 | Status: SHIPPED | OUTPATIENT
Start: 2023-04-13 | End: 2023-09-12

## 2023-04-13 RX ORDER — TIMOLOL MALEATE 5 MG/ML
1 SOLUTION/ DROPS OPHTHALMIC 2 TIMES DAILY
COMMUNITY
Start: 2023-03-07 | End: 2024-02-21

## 2023-04-13 RX ORDER — BRIMONIDINE TARTRATE 2 MG/ML
1 SOLUTION/ DROPS OPHTHALMIC AT BEDTIME
COMMUNITY
Start: 2023-03-07 | End: 2023-04-18

## 2023-04-13 ASSESSMENT — GONIOSCOPY
OD_INFERIOR: CLOSED
OD_TEMPORAL: CLOSED
OS_SUPERIOR: OPEN TO CBB
OS_NASAL: OPEN TO CBB
OS_TEMPORAL: OPEN TO CBB
OD_NASAL: CLOSED
OD_SUPERIOR: CLOSED
OS_INFERIOR: OPEN TO CBB

## 2023-04-13 ASSESSMENT — PACHYMETRY
OD_CT(UM): 551
OS_CT(UM): 548

## 2023-04-13 ASSESSMENT — CUP TO DISC RATIO: OS_RATIO: 0.5

## 2023-04-13 ASSESSMENT — TONOMETRY
OS_IOP_MMHG: 14
OD_IOP_MMHG: 46
OD_IOP_MMHG: 58
IOP_METHOD: TONOPEN
OD_IOP_MMHG: 50
OS_IOP_MMHG: 13
IOP_METHOD: TONOPEN
IOP_METHOD: APPLANATION

## 2023-04-13 ASSESSMENT — CONF VISUAL FIELD
OD_SUPERIOR_NASAL_RESTRICTION: 1
OD_INFERIOR_TEMPORAL_RESTRICTION: 1
OS_SUPERIOR_TEMPORAL_RESTRICTION: 0
OS_SUPERIOR_NASAL_RESTRICTION: 0
OS_NORMAL: 1
OS_INFERIOR_NASAL_RESTRICTION: 0
OD_INFERIOR_NASAL_RESTRICTION: 1
OS_INFERIOR_TEMPORAL_RESTRICTION: 0
OD_SUPERIOR_TEMPORAL_RESTRICTION: 1

## 2023-04-13 ASSESSMENT — VISUAL ACUITY
OD_SC: HM
METHOD: SNELLEN - LINEAR
OS_SC: 20/20

## 2023-04-13 ASSESSMENT — EXTERNAL EXAM - RIGHT EYE: OD_EXAM: NORMAL

## 2023-04-13 ASSESSMENT — EXTERNAL EXAM - LEFT EYE: OS_EXAM: NORMAL

## 2023-04-13 ASSESSMENT — SLIT LAMP EXAM - LIDS
COMMENTS: NORMAL
COMMENTS: NORMAL

## 2023-04-13 NOTE — NURSING NOTE
Chief Complaints and History of Present Illnesses   Patient presents with     Glaucoma     Chief Complaint(s) and History of Present Illness(es)     Glaucoma            Laterality: both eyes          Comments    Pt. States that he had angle closure RE about 9 months ago. Lost vision RE as  a child due to retinal detachment. No c/o VA LE. No pain BE. Per patient RE has been unable to be controlled. Has been having intermittent pain and headaches RE.  Ynes Enrique COT 8:01 AM April 13, 2023

## 2023-04-13 NOTE — PROGRESS NOTES
Chief Complaint/Presenting Concern: Glaucoma intervention    History of Present Illness:   Oswaldo Prabhakar is a 62 year old patient who presents for referral for CPC right eye due to primary glaucoma provider Dr. Fiore at [facility] not having access to a CPC machine. 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 He has had one injection int the right eye about 6 months ago. He says his right eye feels different than his left eye but he does not have any pain and he says his vision in both eyes is at his baseline.    Relevant Past Medical/Family/Social History:  Past Medical History:   Diagnosis Date     Antiplatelet or antithrombotic long-term use      Coronary artery disease      Diaphragmatic hernia without mention of obstruction or gangrene      Heart disease      Hernia, abdominal      History of blood transfusion      History of thrombophlebitis      Hypertension      Nephrolithiasis 4/2010     Other and unspecified hyperlipidemia      Pulmonary embolus and infarction (H)     s/p hemothorax and filter s/p filter removal (2011), now on long term anti-coagulation     Statin intolerance 2/1/2017     Stented coronary artery 01/07/2020    NIR mid LAD     Unspecified retinal detachment     Childhood trauma, unrepaired     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[employer]  No family history of glaucoma    Relevant Review of Systems: chronic decreased vision in the right eye, no pain in the right eye, feels like his left eye is at baseline as well     Diagnosis: NVG right eye   Year diagnosis: 2023  Previous glaucoma surgery/laser: CEIOL left eye, PRP left eye, CEIOL right eye  Maximum intraocular pressure 50s  Currently Meds: Diamox 500 mg PO BID, prednisolone BID OD, timolol BID right eye, Brimonidine TID OD  Family  history: negative  CCT: 551/548  Gonio:  Refractive status: Pseudophakia  Trauma history: negative  Steroid exposure: positive prednisolone  Vasospastic disease: Migrane/Raynaud phenomenon: negative  A past hemodynamic crisis or Low BP: negative  Meds AEs/intolerance: none  PMHx: Asthma and respiratory problems/Cardiac/Renal/Kidney stones/Sulfa Allergy: kidney stones, bypass surgery  Anticoagulants: Warfarin for complicated PE    Today's testing:  - IOP: 58/13 mmHg applanation  - Visual field April 13, 2023  - Right eye - not able to completed  - Left eye - normal, reliable  - OCT Optic Nerve RNFL Spectralis April 13, 2023  - Right Eye: diffuse atrophy, reliable  - Left Eye: trace thinning IT, reliable    Additional Ocular History:   Anterior uveitis, chronic, right eye    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    Patient has chronic RD/inflammation and neovascularization in the right eye. He has had one injection int the right eye about 6 months ago. Will refer to retina for plan to control NVI. If neovascularization is poorly controlled TSCPC will cause worsening of NV and inflammation and will likely fail.     Recommend TSCPC right eye to control IOP. High risk for tube surgery given active NVI and highly elevated IOP.    Risks and benefits of TSCPC in the right eye, including risks of bleeding, inflammation, need for additional surgery, failure of surgery, and vision loss were explained to patient, who expressed understanding and wishes to proceed with surgery  Continue prednisolone 1 drop twice daily right eye   Switch from Timolol to Cosopt BID right eye   Start Latanoprost at bedtime right eye  Continue Brimonidine 1 drop three times daily right eye   Continue  Diamox 250mg BID    Schedule TSCPC right eye     Jada Villalta MD  Ophthalmology Resident PGY3  [facility]      Physician Attestation     Attending Physician Attestation:  Complete documentation of historical and exam elements from today's encounter can be found in the full encounter summary report (not reduplicated in this progress note). I personally obtained the chief complaint(s) and history of present illness. I confirmed and edited as necessary the review of systems, past medical/surgical history, family history, social history, and examination findings as documented by others; and I examined the patient myself. I personally reviewed the relevant tests, images, and reports as documented above. I personally reviewed the ophthalmic test(s) associated with this encounter, agree with the interpretation(s) as documented by the resident/fellow and have edited the corresponding report(s) as necessary. I formulated and edited as necessary the assessment and plan and discussed the findings and management plan with the patient and any family members present at the time of the visit.  Jay Jay Savgae M.D., Glaucoma, April 13, 2023

## 2023-04-18 ENCOUNTER — PREP FOR PROCEDURE (OUTPATIENT)
Dept: OPHTHALMOLOGY | Facility: CLINIC | Age: 63
End: 2023-04-18

## 2023-04-18 DIAGNOSIS — H40.51X3 NEOVASCULAR GLAUCOMA OF RIGHT EYE, SEVERE STAGE: Primary | ICD-10-CM

## 2023-04-18 RX ORDER — DORZOLAMIDE HYDROCHLORIDE AND TIMOLOL MALEATE 20; 5 MG/ML; MG/ML
1 SOLUTION/ DROPS OPHTHALMIC 2 TIMES DAILY
Qty: 10 ML | Refills: 3 | Status: SHIPPED | OUTPATIENT
Start: 2023-04-18 | End: 2023-06-13

## 2023-04-18 RX ORDER — BRIMONIDINE TARTRATE 2 MG/ML
1 SOLUTION/ DROPS OPHTHALMIC 3 TIMES DAILY
Qty: 10 ML | Refills: 4 | Status: SHIPPED | OUTPATIENT
Start: 2023-04-18 | End: 2023-06-13

## 2023-04-18 RX ORDER — LATANOPROST 50 UG/ML
1 SOLUTION/ DROPS OPHTHALMIC AT BEDTIME
Qty: 2.5 ML | Refills: 4 | Status: SHIPPED | OUTPATIENT
Start: 2023-04-18 | End: 2023-06-13

## 2023-04-18 RX ORDER — PROPARACAINE HYDROCHLORIDE 5 MG/ML
1 SOLUTION/ DROPS OPHTHALMIC ONCE
Status: CANCELLED | OUTPATIENT
Start: 2023-04-18 | End: 2023-04-18

## 2023-04-18 NOTE — TELEPHONE ENCOUNTER
Per Dr. Savage:     Switch from Timolol to Cosopt BID right eye  Start Latanoprost at bedtime right eye   Continue Brimonidine TID right eye   Continue Diamox 250mg BID PO     Called and let patient know.  Sent Cosopt, Latanoprost, and Brimonidine to patient's pharmacy.     JOHN Malloy 11:47 AM 04/18/2023

## 2023-04-19 ENCOUNTER — OFFICE VISIT (OUTPATIENT)
Dept: OPHTHALMOLOGY | Facility: CLINIC | Age: 63
End: 2023-04-19
Attending: OPHTHALMOLOGY
Payer: COMMERCIAL

## 2023-04-19 DIAGNOSIS — D49.81 CHOROID TUMOR: ICD-10-CM

## 2023-04-19 DIAGNOSIS — H50.111 SENSORY DEPRIVATION EXOTROPIA OF RIGHT EYE: ICD-10-CM

## 2023-04-19 DIAGNOSIS — H54.7 SENSORY DEPRIVATION EXOTROPIA OF RIGHT EYE: ICD-10-CM

## 2023-04-19 DIAGNOSIS — H40.51X3 NEOVASCULAR GLAUCOMA OF RIGHT EYE, SEVERE STAGE: Primary | ICD-10-CM

## 2023-04-19 DIAGNOSIS — H40.50X0 NEOVASCULAR GLAUCOMA, UNSPECIFIED GLAUCOMA STAGE, UNSPECIFIED LATERALITY: ICD-10-CM

## 2023-04-19 DIAGNOSIS — H21.1X1: ICD-10-CM

## 2023-04-19 PROCEDURE — 99207 FUNDUS PHOTOS OU (BOTH EYES): CPT | Mod: 26 | Performed by: OPHTHALMOLOGY

## 2023-04-19 PROCEDURE — G0463 HOSPITAL OUTPT CLINIC VISIT: HCPCS | Mod: 25 | Performed by: OPHTHALMOLOGY

## 2023-04-19 PROCEDURE — 92235 FLUORESCEIN ANGRPH MLTIFRAME: CPT | Performed by: OPHTHALMOLOGY

## 2023-04-19 PROCEDURE — 92250 FUNDUS PHOTOGRAPHY W/I&R: CPT | Performed by: OPHTHALMOLOGY

## 2023-04-19 PROCEDURE — 92235 FLUORESCEIN ANGRPH MLTIFRAME: CPT | Mod: 26 | Performed by: OPHTHALMOLOGY

## 2023-04-19 PROCEDURE — 99214 OFFICE O/P EST MOD 30 MIN: CPT | Mod: GC | Performed by: OPHTHALMOLOGY

## 2023-04-19 PROCEDURE — 76512 OPH US DX B-SCAN: CPT | Performed by: OPHTHALMOLOGY

## 2023-04-19 PROCEDURE — 92134 CPTRZ OPH DX IMG PST SGM RTA: CPT | Performed by: OPHTHALMOLOGY

## 2023-04-19 ASSESSMENT — CONF VISUAL FIELD
OD_SUPERIOR_NASAL_RESTRICTION: 1
OD_SUPERIOR_TEMPORAL_RESTRICTION: 1
OD_INFERIOR_NASAL_RESTRICTION: 1
OD_INFERIOR_TEMPORAL_RESTRICTION: 1

## 2023-04-19 ASSESSMENT — VISUAL ACUITY
OS_SC: 20/20
METHOD: SNELLEN - LINEAR

## 2023-04-19 ASSESSMENT — EXTERNAL EXAM - LEFT EYE: OS_EXAM: NORMAL

## 2023-04-19 ASSESSMENT — TONOMETRY
OS_IOP_MMHG: 13
IOP_METHOD: TONOPEN
OD_IOP_MMHG: 49

## 2023-04-19 ASSESSMENT — CUP TO DISC RATIO: OS_RATIO: 0.5

## 2023-04-19 ASSESSMENT — SLIT LAMP EXAM - LIDS
COMMENTS: NORMAL
COMMENTS: NORMAL

## 2023-04-19 ASSESSMENT — EXTERNAL EXAM - RIGHT EYE: OD_EXAM: NORMAL

## 2023-04-19 NOTE — NURSING NOTE
Chief Complaints and History of Present Illnesses   Patient presents with     Consult For     Chronic RD/inflammation and neovascularization in the right eye, referred by Dr Savage     Chief Complaint(s) and History of Present Illness(es)     Consult For            Laterality: right eye    Course: stable    Associated symptoms: headache (stable).  Negative for dryness, eye pain and flashes    Treatments tried: eye drops    Pain scale: 0/10    Comments: Chronic RD/inflammation and neovascularization in the right eye, referred by Dr Savage          Comments    He states that his vision has seemed stable in both eyes, since his recent eye exam with Dr Savage.       Lab Results       Component                Value               Date                       A1C                      6.3                 11/02/2022                 A1C                      5.8                 12/16/2020              He is using:  prednisolone 1 drop twice daily right eye   Cosopt BID right eye   Timolol BID right eye   Latanoprost at bedtime right eye  Brimonidine 1 drop twice times daily right eye   Diamox 250mg BID    JOHN Valdes 8:06 AM  April 19, 2023

## 2023-04-19 NOTE — PROGRESS NOTES
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 He has a history of chonic RD of the right eye complicated by neovascular glaucoma. He saw Dr. Savage on 4/13/23. She recommended a CPC but also asked him to see us for evaluation of the neovascularization.     At baseline, his vision is poor (LP on 4/13). He has not had any noticeable vision loss. He did have new onset headache and pain of the right eye on 8/16/22 for which he went to the ED. This woke him in the night and prevented sleep, ultimately prompting his ED visit with the unrelenting pain.  The eye is not painful.        PAST MEDICAL HISTORY:  CAD s/p CABG x 4  HLD  Renal stones  PE on chronic warfarin    PAST OCULAR SURGERY:  2022  Left CEIOL  2022  Right CEIOL  2000s  Left  Laser retinopexy      ASSESSMENT & PLAN    # History of retinal detachment, right eye   - extensive scarring is present; except for superior retina   - DDx includes previous RRD or SRD, DUSN, not likely RP   - FA does not show any areas of obvious non-perfusion with ischemia in healthy looking part of the retina   - disc leakage OU is of uncertain significance   - observe    # Retinal/choroidal mass, right eye   - dome shaped amelanotic anterior vascular mass is difficult to see on exam   - best visualized on US: Size: 5.88 mm x 4.42 mm x 2.27mm (thickness) ; posterior shadowing and moderate internal reflectivity   - difficult to examine because of peripheral location and poor pupil dilation   - differentials include amelanotic choroidal melanoma vs vasoproliferative tumor   - tx options: enucleation given NVG and poor vision potential vs observe closely with serial b scan or MRI to differentiate melanoma    - refer to Dr. Isaac     # Neovascular glaucoma, right eye   - VA is LP or bare HM today   - DDx for etiology:  secondary effect of mass  (possible given vascularity of mass and proximity to iris, but unusual for such extensive rubeosis); retinal ischemia (FA today is not consistent with active ischemia);   - RD was first noted ~5-6 decades and resolved at some point in the past leaving extensive CR scarring; not very likely to cause NVG   - IOP is 49 but eye is comfortable so no injection recommended today    # Sensory exotopia, right eye   - not bothered by appearance   - monitor    Albert Mckeon MD  Retina Fellow, PGY6    Complete documentation of historical and exam elements from today's encounter can be found in the full encounter summary report (not reduplicated in this progress note). I personally obtained the chief complaint(s) and history of present illness.  I confirmed and edited as necessary the review of systems, past medical/surgical history, family history, social history, and examination findings as documented by others; and I examined the patient myself. I personally reviewed the relevant tests, images, and reports as documented above. I formulated and edited as necessary the assessment and plan and discussed the findings and management plan with the patient and family.    David Byers MD, PhD

## 2023-04-20 ENCOUNTER — HOSPITAL ENCOUNTER (OUTPATIENT)
Facility: AMBULATORY SURGERY CENTER | Age: 63
End: 2023-04-20
Attending: OPHTHALMOLOGY | Admitting: OPHTHALMOLOGY
Payer: COMMERCIAL

## 2023-04-20 ENCOUNTER — TELEPHONE (OUTPATIENT)
Dept: OPHTHALMOLOGY | Facility: CLINIC | Age: 63
End: 2023-04-20

## 2023-04-20 ENCOUNTER — MYC MEDICAL ADVICE (OUTPATIENT)
Dept: ANTICOAGULATION | Facility: CLINIC | Age: 63
End: 2023-04-20

## 2023-04-20 DIAGNOSIS — H40.51X3 NEOVASCULAR GLAUCOMA OF RIGHT EYE, SEVERE STAGE: ICD-10-CM

## 2023-04-20 NOTE — TELEPHONE ENCOUNTER
Called to schedule surgery with Dr. Jay Jay Savage. Oswaldo was seen yesterday by Dr. David Larios and is scheduled to see Dr. Maya Isaac on Monday, April 24. Oswaldo said they think there may be a tumor in his eye and he may have the eye taken out.    I let Oswaldo know I would pass this message along to Dr. Jay Jay Savage to see how she would like to proceed and find out if we should just hold off until Oswaldo finishes with Retina.

## 2023-04-20 NOTE — TELEPHONE ENCOUNTER
ANTICOAGULATION     Oswaldo Prabhakar is overdue for INR check.      Mychart sent    Cyndee Easley RN

## 2023-04-24 ENCOUNTER — OFFICE VISIT (OUTPATIENT)
Dept: OPHTHALMOLOGY | Facility: CLINIC | Age: 63
End: 2023-04-24
Attending: OPHTHALMOLOGY
Payer: COMMERCIAL

## 2023-04-24 DIAGNOSIS — D49.81 CHOROID TUMOR: Primary | ICD-10-CM

## 2023-04-24 PROCEDURE — 99213 OFFICE O/P EST LOW 20 MIN: CPT | Mod: GC | Performed by: OPHTHALMOLOGY

## 2023-04-24 PROCEDURE — G0463 HOSPITAL OUTPT CLINIC VISIT: HCPCS | Performed by: OPHTHALMOLOGY

## 2023-04-24 ASSESSMENT — VISUAL ACUITY
OS_SC: 20/15
OD_SC: LP
METHOD: SNELLEN - LINEAR

## 2023-04-24 ASSESSMENT — CONF VISUAL FIELD
OD_SUPERIOR_NASAL_RESTRICTION: 1
OD_INFERIOR_TEMPORAL_RESTRICTION: 1
OD_SUPERIOR_TEMPORAL_RESTRICTION: 1
OD_INFERIOR_NASAL_RESTRICTION: 1

## 2023-04-24 ASSESSMENT — TONOMETRY
OS_IOP_MMHG: 17
OD_IOP_MMHG: 44
IOP_METHOD: TONOPEN

## 2023-04-24 ASSESSMENT — SLIT LAMP EXAM - LIDS
COMMENTS: NORMAL
COMMENTS: NORMAL

## 2023-04-24 ASSESSMENT — EXTERNAL EXAM - RIGHT EYE: OD_EXAM: NORMAL

## 2023-04-24 ASSESSMENT — EXTERNAL EXAM - LEFT EYE: OS_EXAM: NORMAL

## 2023-04-24 ASSESSMENT — CUP TO DISC RATIO: OS_RATIO: 0.5

## 2023-04-24 NOTE — PROGRESS NOTES
CC -   Choroidal lesion OD    INTERVAL HISTORY - Initial visit with me, referred for choroidal lesion OD referred by Modesta  No pain      PMH  -   Oswaldo Prabhakar is a 62 year old patient with h/o choroidal lesion OD noted 4/2023 by Modesta.      Came to [facility] for NVG OD 8/2022 seen in ED, had been at [facility], diagnosis with NVG OD and referred to [facility] for CPC and PRP, seen by Adebayo at [facility] and Modesta & Janette at [facility].    Poor vision OD since childhood d/t RD not diagnosis until too late to repair    CAD s/p CABG x 4  No h/o cancer    PE on chronic warfarin    PAST OCULAR SURGERY:  CE/IOL OU 2022  Laser pexy OS 2000s      RETINAL IMAGING  OCT 4/19/23  OD - diffuse ERM, severe outer atrophy diffuse  OS - retina normal, PHF attached      FA 4/19/23:  - Right eye: Diffuse pinpoint hyperfluorescence, trace disc leakage.   - Left eye: Trace disc leakage.       U/S 4/19/23  OD - ST mass with hyperR base, size 2.27 x 5.88T . 4.42L, CVV moderate reflectivity mostly        ASSESSMENT & PLAN    # Retinal/choroidal lesion OD   - noted 4/19/232023   - dome shaped anterior, difficult to see on exam d/t IOL edge/capsule & modeate pupil   - U/S not typical for CMM or metastasis with hyperR outer edge   - ?regressed vasoprolfeative tumor   - monitor closely   - recheck 6 weeks as precuation   - if stable then longer intervals     - OK to proceed with CPC    # NVG OD   - sees aJnette   - seen by Modesta 4/19/23, no sig ischemia to Tx on FA 4/19/23     - no pain but IOP very high   - OK to proceed with CPC          # h/o RD OD childhood 1970s   - ?exudative vs RRD   - resolved spontanously with poor vision since childhood          RTC: 6 weeks, DFE OD, Optos OD, U/S OD A+B scan      Orestes Forbes MD  Ophthalmology, PGY-3      ATTESTATION     Attending Physician Attestation:      Complete documentation of historical and exam elements from today's encounter can be found in the full encounter summary report (not reduplicated  in this progress note).  I personally obtained the chief complaint(s) and history of present illness.  I confirmed and edited as necessary the review of systems, past medical/surgical history, family history, social history, and examination findings as documented by others; and I examined the patient myself.  I personally reviewed the relevant tests, images, and reports as documented above.  I personally reviewed the ophthalmic test(s) associated with this encounter, agree with the interpretation(s) as documented by the resident/fellow, and have edited the corresponding report(s) as necessary.   I formulated and edited as necessary the assessment and plan and discussed the findings and management plan with the patient and family    Maya Isaac MD, PhD  , Vitreoretinal Surgery  Department of Ophthalmology  [facility]

## 2023-04-24 NOTE — NURSING NOTE
Chief Complaints and History of Present Illnesses   Patient presents with     Consult For     Retinal/choroidal mass, right eye  No vision change since last visit  Prednisolone bid RE  Latanoprost every day  Brimonidine tid RE  Timolol bid   Cosopt bid RE   Diamox 250 bid  Maile Dziuk [facility] 11:28 AM April 24, 2023        Chief Complaint(s) and History of Present Illness(es)     Consult For            Laterality: right eye    Associated symptoms: Negative for eye pain, pain with eye movement, flashes and floaters    Treatments tried: eye drops    Pain scale: 0/10    Comments: Retinal/choroidal mass, right eye  No vision change since last visit  Prednisolone bid RE  Latanoprost every day  Brimonidine tid RE  Timolol bid   Cosopt bid RE   Diamox 250 bid  Maile Virgieiuk [facility] 11:28 AM April 24, 2023

## 2023-05-02 ENCOUNTER — TELEPHONE (OUTPATIENT)
Dept: OPHTHALMOLOGY | Facility: CLINIC | Age: 63
End: 2023-05-02
Payer: COMMERCIAL

## 2023-05-02 PROBLEM — H40.51X3 NEOVASCULAR GLAUCOMA OF RIGHT EYE, SEVERE STAGE: Status: ACTIVE | Noted: 2023-04-20

## 2023-05-02 NOTE — TELEPHONE ENCOUNTER
Patient is scheduled for surgery with Dr. Jay Jay Savage     Spoke with: Oswaldo     Date of Surgery: 05/17     Location: [facility]:  [address] [place] [id number]     H&P will be completed at: Dr. Taylor LUNA     Post Op scheduled on 05/18, 05/23, and 06/13     Surgery packet was mailed 05/02     Additional comments: Advised RN will call 1 - 3 business days prior with arrival time and instructions. Informed patient they will need an adult  and responsible adult to stay with for 24 hours following surgery

## 2023-05-03 DIAGNOSIS — Z95.1 S/P CABG (CORONARY ARTERY BYPASS GRAFT): ICD-10-CM

## 2023-05-03 DIAGNOSIS — Z78.9 STATIN INTOLERANCE: ICD-10-CM

## 2023-05-03 DIAGNOSIS — E78.5 HYPERLIPIDEMIA LDL GOAL <70: ICD-10-CM

## 2023-05-03 DIAGNOSIS — I25.10 ATHEROSCLEROSIS OF CORONARY ARTERY OF NATIVE HEART WITHOUT ANGINA PECTORIS, UNSPECIFIED VESSEL OR LESION TYPE: ICD-10-CM

## 2023-05-05 RX ORDER — EZETIMIBE 10 MG/1
10 TABLET ORAL DAILY
Qty: 90 TABLET | Refills: 0 | Status: SHIPPED | OUTPATIENT
Start: 2023-05-05 | End: 2023-08-11

## 2023-05-05 NOTE — TELEPHONE ENCOUNTER
Last Clinic Visit: 4/4/2022  [facility]   scheduled for follow-up 7/31/23

## 2023-05-09 ASSESSMENT — ENCOUNTER SYMPTOMS
EYE PAIN: 1
NECK MASS: 0
SINUS CONGESTION: 1
SORE THROAT: 0
SINUS PAIN: 0
TROUBLE SWALLOWING: 0
EYE IRRITATION: 0
EYE WATERING: 0
DOUBLE VISION: 0
TASTE DISTURBANCE: 1
HOARSE VOICE: 0
EYE REDNESS: 0
SMELL DISTURBANCE: 0

## 2023-05-10 DIAGNOSIS — H40.51X3 NEOVASCULAR GLAUCOMA OF RIGHT EYE, SEVERE STAGE: ICD-10-CM

## 2023-05-16 ENCOUNTER — OFFICE VISIT (OUTPATIENT)
Dept: INTERNAL MEDICINE | Facility: CLINIC | Age: 63
End: 2023-05-16
Payer: COMMERCIAL

## 2023-05-16 ENCOUNTER — ANESTHESIA EVENT (OUTPATIENT)
Dept: SURGERY | Facility: AMBULATORY SURGERY CENTER | Age: 63
End: 2023-05-16
Payer: COMMERCIAL

## 2023-05-16 VITALS
WEIGHT: 209.4 LBS | HEART RATE: 57 BPM | OXYGEN SATURATION: 97 % | DIASTOLIC BLOOD PRESSURE: 81 MMHG | HEIGHT: 71 IN | BODY MASS INDEX: 29.31 KG/M2 | TEMPERATURE: 98.2 F | SYSTOLIC BLOOD PRESSURE: 131 MMHG

## 2023-05-16 DIAGNOSIS — I10 ESSENTIAL HYPERTENSION: ICD-10-CM

## 2023-05-16 DIAGNOSIS — Z01.818 PREOPERATIVE EXAMINATION: Primary | ICD-10-CM

## 2023-05-16 PROCEDURE — 99214 OFFICE O/P EST MOD 30 MIN: CPT | Performed by: NURSE PRACTITIONER

## 2023-05-16 NOTE — PROGRESS NOTES
Surgeon (please enter first and last name): Jay Jay Savage MD  Location of Surgery: [facility] OR  Date of Surgery: 05/17/2023  Procedure: RIGHT EYE CYCLOPHOTOCOAGULATION TRANSSCLERAL WITH Gprobe LASER  History of reaction to anesthesia? No     Cuauhtemoc Rubin, EMT at 2:33 PM on 5/16/2023.  Primary care clinic: [phone]        Answers for HPI/ROS submitted by the patient on 5/9/2023  General Symptoms: No  Skin Symptoms: No  HENT Symptoms: Yes  EYE SYMPTOMS: Yes  HEART SYMPTOMS: No  LUNG SYMPTOMS: No  INTESTINAL SYMPTOMS: No  URINARY SYMPTOMS: No  REPRODUCTIVE SYMPTOMS: No  SKELETAL SYMPTOMS: No  BLOOD SYMPTOMS: No  NERVOUS SYSTEM SYMPTOMS: No  MENTAL HEALTH SYMPTOMS: No  Ear pain: No  Ear discharge: No  Hearing loss: No  Tinnitus: No  Nosebleeds: No  Congestion: Yes  Sinus pain: No  Trouble swallowing: No   Voice hoarseness: No  Mouth sores: No  Sore throat: No  Tooth pain: No  Gum tenderness: No  Bleeding gums: No  Change in taste: Yes  Change in sense of smell: No  Dry mouth: No  Hearing aid used: No  Neck lump: No  Eye pain: Yes  Vision loss: No  Dry eyes: No  Watery eyes: No  Eye bulging: No  Double vision: No  Flashing of lights: No  Spots: No  Floaters: No  Redness: No  Crossed eyes: No  Tunnel Vision: No  Yellowing of eyes: No  Eye irritation: No

## 2023-05-16 NOTE — PATIENT INSTRUCTIONS
Pre-operative Instructions:  Proceed with surgery as planned  Medications:   Take antihypertensives with a sip of water  Nothing to eat or drink:  After 2am  Avoid Ibuprofen, other NSAIDS, and ASA for 7 days prior to surgery  Tylenol if needed for discomforts, not to exceed 3000 mg/day  Limit alcohol use and hydrate well in advance of surgery  Call surgeon's office if illness symptoms prior to surgery  Self-isolate as possible and mask for any outings  Hand-hygiene following all non-immediate household interactions  Covid testing as scheduled  Results on any labs obtained will be available through [facility]

## 2023-05-16 NOTE — PROGRESS NOTES
Oswaldo Prabhakar is 62 year old male here at the request of Dr. Savage for cardiovascular, pulmonary, and perioperative risk assessment prior to surgery.The intended surgical procedure is RIGHT EYE CYCLOPHOTOCOAGULATION TRANSSCLERAL WITH Gprobe LASER.  A copy of this note will be sent to the surgeon.      Primary Care Provider: Samm Vazquez  Proposed Surgery/Procedure:  RIGHT EYE CYCLOPHOTOCOAGULATION TRANSSCLERAL WITH Gprobe LASER  Date of Surgery/Procedure:  5/17/23  Time of Surgery/Procedure:  11:30am  Hospital/Surgical Facility:  [facility] [place]  Type of Anesthesia Anticipated:  General       HPI: History of retinal detachment of the right eye complicated by neovascular glaucoma.   Reason for surgery: Neovascular glaucoma of right eye, severe stage     Preoperative Screening Questions:        5/9/2023     3:06 PM   Pre-op Questionnaire   1. Have you ever had a heart attack or stroke? No   2. Have you ever had surgery on your heart or blood vessels, such as a stent placement, a coronary artery bypass, or surgery on an artery in your head, neck, heart, or legs? YES - CABG x4 in 2014   3. Do you have chest pain with activity? No   4. Do you have a history of  heart failure? No   5. Do you currently have a cold, bronchitis or symptoms of other infection? No   6. Do you have a cough, shortness of breath, or wheezing? No   7. Do you or anyone in your family have previous history of blood clots? No   8. Do you or does anyone in your family have a serious bleeding problem such as prolonged bleeding following surgeries or cuts? No   9. Have you ever had problems with anemia or been told to take iron pills? No   10. Have you had any abnormal blood loss such as black, tarry or bloody stools? No   11. Have you ever had a blood transfusion? YES - 2014 during hospitalization related to CABG   11a. Have you ever had a transfusion reaction? No   12. Are you willing to have a blood transfusion if it is medically needed before, during, or  after your surgery? Yes   13. Have you or any of your relatives ever had problems with anesthesia? No   14. Do you have sleep apnea, excessive snoring or daytime drowsiness? No   15. Do you have any artifical heart valves or other implanted medical devices like a pacemaker, defibrillator, or continuous glucose monitor? No   16. Do you have artificial joints? No   17. Are you allergic to latex? No    }        PROBLEM LIST:   Patient Active Problem List   Diagnosis     Retinal detachment     HYPERLIPIDEMIA LDL GOAL <130     Pulmonary emboli (H)     Pure hyperglyceridemia     Calculus of kidney     Warfarin anticoagulation     Atypical chest pain     S/P CABG x 4     Atherosclerosis of native coronary artery of native heart with angina pectoris with documented spasm (H)     Long-term (current) use of anticoagulants [Z79.01]     Statin intolerance     Unstable angina (H)     Renal colic on right side     Ureteral stone     UTI (urinary tract infection)     Chest pain, unspecified type     Neovascular glaucoma of right eye, severe stage       PAST SURGICAL HX:   Past Surgical History:   Procedure Laterality Date     BYPASS GRAFT ARTERY CORONARY  04/04/2014    Procedure: BYPASS GRAFT ARTERY CORONARY;  Median Sternotomy, Coronary Artery Bypass Bypass x 4 using Left Internal Mammary, Left Saphenous Vein, on pump oxygenation;  Surgeon: Sherry Armando MD;  Location: [facility] OR     CATARACT IOL, RT/LT       CLOSED REDUCTION, PERCUTANEOUS PINNING  HAND, COMBINED Left 11/05/2015    Procedure: COMBINED CLOSED REDUCTION, PERCUTANEOUS PINNING HAND;  Surgeon: Carmella Love MD;  [facility]     COLONOSCOPY  03/15/2013    Procedure: COLONOSCOPY;;  Surgeon: Racheal Shipman MD;  Location: [facility] GI     COMBINED CYSTOSCOPY, INSERT STENT URETER(S) Right 01/10/2020    Procedure: Cystoscopy, right retrograde pyelogram, interpretation of fluoroscopic images, placement of 6 x 26 double-J ureteral stent;  Surgeon: Vance  Nguyễn Ang MD;  Location: [facility] OR     COMBINED CYSTOSCOPY, RETROGRADES, URETEROSCOPY, LASER HOLMIUM LITHOTRIPSY URETER(S), INSERT STENT Right 02/05/2020    Procedure: Cystoscopy, right ureteral stent exchange, right ureteroscopy with holmium lithotripsy and stone basketing, right retrograde pyelogram, interpretation of fluoroscopic images.;  Surgeon: Nguyễn Woodard MD;  Location: [facility] OR     CV ANGIOGRAM CORONARY GRAFT N/A 01/07/2020    Procedure: Angiogram Coronary Graft;  Surgeon: Chato Odonnell MD;  Location: [facility] CARDIAC CATH LAB     CV CORONARY ANGIOGRAM  01/07/2020    Procedure: CV CORONARY ANGIOGRAM;  Surgeon: Chato Odonnell MD;  Location: [facility] CARDIAC CATH LAB     CV PCI STENT DRUG ELUTING N/A 01/07/2020    Procedure: PCI Stent Drug Eluting;  Surgeon: Chato Odonnell MD;  Location: [facility] CARDIAC CATH LAB     CYSTOSCOPY       ESOPHAGOSCOPY, GASTROSCOPY, DUODENOSCOPY (EGD), COMBINED N/A 12/9/2022    Procedure: ESOPHAGOGASTRODUODENOSCOPY (EGD);  Surgeon: Beata George MD;  Location:  GI     LAPAROSCOPIC CHOLECYSTECTOMY N/A 08/06/2018    Procedure: LAPAROSCOPIC CHOLECYSTECTOMY;  LAPAROSCOPIC CHOLECYSTECTOMY ;  Surgeon: José Miguel Stuart MD;  Location:  OR     LITHOTRIPSY  04/2010     RETINAL REATTACHMENT       THORACIC SURGERY      lung surgery, thoracotomy, lung adhesion     ZZC NONSPECIFIC PROCEDURE      Spermatocele resection       FAMILY MEDICAL HX:   Family History   Problem Relation Age of Onset     Heart Disease Mother      Glaucoma Father      C.A.D. Father         3 vessel CABG, age 70     Cancer Father         bladder cancer     C.A.D. Paternal Grandmother      Hypertension Paternal Grandmother      Alzheimer Disease Paternal Grandmother      Diabetes No family hx of      Thyroid Disease No family hx of      Cancer - colorectal No family hx of        IMMUNIZATION HX:   Immunization History   Administered Date(s)  Administered     COVID-19 MONOVALENT 12+ (Pfizer) 01/05/2021, 01/25/2021, 11/28/2021     COVID-19 Monovalent 12+ (Pfizer 2022) 08/15/2022     Flu, Unspecified 10/02/2019     Hepatitis B, Adult 07/11/2014     Influenza (IIV3) PF 10/03/2011, 02/01/2013, 10/13/2016, 10/11/2017     Pneumo Conj 13-V (2010&after) 03/04/2015     Pneumococcal 23 valent 11/01/2021     TD,PF 7+ (Tenivac) 08/01/2005     TDAP Vaccine (Boostrix) 07/05/2013       MEDICATIONS:   Current Outpatient Medications   Medication Sig Dispense Refill     acetaminophen (TYLENOL) 500 MG tablet Take 2 tablets (1,000 mg) by mouth 3 times daily as needed for pain       acetaZOLAMIDE (DIAMOX) 250 MG tablet Take 1 tablet by mouth 4 times daily       acetaZOLAMIDE (DIAMOX) 250 MG tablet Take 1 tablet (250 mg) by mouth 2 times daily 1 tablet 3     atorvastatin (LIPITOR) 80 MG tablet TAKE 1 TABLET BY MOUTH EVERY DAY 90 tablet 2     brimonidine (ALPHAGAN) 0.2 % ophthalmic solution Place 1 drop into the right eye 3 times daily 10 mL 4     calcium carbonate (TUMS) 500 MG chewable tablet Take 1 chew tab by mouth daily as needed        Cholecalciferol (VITAMIN D) 2000 UNITS CAPS Take 2,000 Units by mouth daily       dorzolamide-timolol (COSOPT) 2-0.5 % ophthalmic solution Place 1 drop into the right eye 2 times daily 10 mL 3     ezetimibe (ZETIA) 10 MG tablet Take 1 tablet (10 mg) by mouth daily 90 tablet 0     fenofibrate (TRIGLIDE/LOFIBRA) 160 MG tablet Take 1 tablet (160 mg) by mouth daily 90 tablet 2     latanoprost (XALATAN) 0.005 % ophthalmic solution Place 1 drop into the right eye At Bedtime 2.5 mL 4     lisinopril (ZESTRIL) 2.5 MG tablet Take 1 tablet (2.5 mg) by mouth daily 90 tablet 1     metoprolol tartrate (LOPRESSOR) 25 MG tablet Take 0.5 tablets (12.5 mg) by mouth 2 times daily 90 tablet 1     OMEPRAZOLE PO Take 20 mg by mouth        ondansetron (ZOFRAN-ODT) 4 MG ODT tab Take 1 tablet (4 mg) by mouth every 8 hours as needed for nausea 10 tablet 0      prednisoLONE acetate (PRED FORTE) 1 % ophthalmic suspension Place 1-2 drops into the right eye 4 times daily 10 mL 0     REPATHA SURECLICK 140 MG/ML prefilled autoinjector INJECT THE CONTENTS OF 1 AUTOINJECTOR PEN UNDER THE SKIN EVERY OTHER WEEK 6 mL 3     tamsulosin (FLOMAX) 0.4 MG capsule Take 1 capsule (0.4 mg) by mouth daily 90 capsule 0     timolol maleate (TIMOPTIC) 0.5 % ophthalmic solution Place 1 drop into the right eye 2 times daily       warfarin ANTICOAGULANT (COUMADIN) 5 MG tablet TAKE 1 TABLET (5 MG) ON  AND  AND TAKE 1.5 TABLETS (7.5 MG) ALL OTHER DAYS OR AS DIRECTED BY THE INR CLINIC 40 tablet 0     ticagrelor (BRILINTA) 90 MG tablet Take 1 tablet (90 mg) by mouth 2 times daily 180 tablet 3       SOCIAL HX:   Social History     Socioeconomic History     Marital status:    Tobacco Use     Smoking status: Never     Smokeless tobacco: Never   Substance and Sexual Activity     Alcohol use: No     Comment: very rare     Drug use: No       This is a LOW risk surgery.  Low risk: endoscopic procedures, cataract surgery  Intermediate: intraperitoneal and intrathoracic procedures, carotid endarterectomy, head and neck surgery, orthopedic surgery, and prostate surgery.       Audit C Alcohol Screening Tool  AUDIT   Questions 0 1 2 3 4 Score   1. How often do you have a drink  containing alcohol? Never Monthly or less 2 to 4  times a  month 2 to 3  times a  week 4 or more  times a  week    2. How many drinks containing alcohol  do you have on a typical day when you are drinking? 1 or 2 3 or 4 5 or 6 7 to 9 10 or more    3. How often do you have more than five  or more drinks on one occasion? Never Less  than  monthly Monthly Weekly Daily or  almost  daily    Scorin  A score of 4 for adult men or 3 for adult women is an indication of hazardous drinking (risk for physical or physiological harm).   A score of 5 or more for adult men or 4 or more for adult women is an indication of an  alcohol use disorder.      Cardiovascular Risk:  This patient ambulates without assist.  without   chest pain. He IS able to climb a flight of stairs without chest pain.    The patient does not  have chest pain at Rest or with Exercise.    He does  have a history of hypertension, high cholesterol and positive family history.   The patient does not  have a history of stroke, and does  have a history of valvular disease.    Pulmonary Risk:  In terms of risk factors for pulmonary complications, the patient does  have a history of pulmonary embolism and partial lobe resection of LLL.     Pulmonary Risk:  In terms of risk factors for pulmonary complications, the patient does not have a history of CHF, COPD, age >60 years, being functionally dependent.       Is this patient going to undergo any of the following procedures that would put him at higher risk of postoperative pulmonary complications:  Prolonged surgery (>3 hours): No  Abdominal surgery/thoracic surgery/neurosurgery/head and neck: No  Vascular/aortic aneurysm repair: No  General anesthesia: No    Perioperative Complications:  The patient does not  have a history of bleeding or clotting problems in the past. The patient has not  had complications from past surgeries.  The patient does not  have a family history of any anesthesia or surgical complications.      ROS:  Constitutional: no fevers, night sweats or unintentional weight change   Eyes: no vision change, diplopia or red eyes   Ears, Nose, Mouth, Throat: no tinnitus or hearing change, no epistaxis or nasal discharge, no oral lesions, throat clear   Cardiovascular: no chest pain, palpitations, or pain with walking, no orthopnea or PND   Respiratory: no dyspnea, cough, shortness of breath or wheezing   GI: no nausea, vomiting, diarrhea or constipation, no abdominal pain   : no change in urine, no dysuria or hematuria  Musculoskeletal: no joint or muscle pain or swelling   Neuro: no loss of strength or  "sensation, no numbness or tingling, no tremor, no dizziness, no headache   Endo: no polyuria or polydipsia, no temperature intolerance   Heme/Lymph: no concerning bumps, no bleeding problems   Allergy: no environmental allergies   Psych: no depression or anxiety, no sleep problems      PHYSICAL EXAM:  /81 (BP Location: Right arm, Patient Position: Sitting, Cuff Size: Adult Regular)   Pulse 57   Temp 98.2  F (36.8  C) (Oral)   Ht 1.803 m (5' 11\")   Wt 95 kg (209 lb 6.4 oz)   SpO2 97%   BMI 29.21 kg/m      Wt Readings from Last 1 Encounters:   05/16/23 95 kg (209 lb 6.4 oz)     Constitutional: no distress, comfortable, pleasant   Eyes: anicteric, normal extra-ocular movements   Ears, Nose and Throat: tympanic membranes clear, nose clear and free of lesions, throat clear, neck supple with full range of motion, no thyromegaly.   Cardiovascular: regular rate and rhythm, normal S1 and S2, no murmurs, rubs or gallops, peripheral pulses full and symmetric   Respiratory: clear to auscultation, no wheezes or crackles, normal breath sounds   Gastrointestinal: positive bowel sounds, nontender, no hepatosplenomegaly, no masses   Musculoskeletal: full range of motion, no edema   Skin: no concerning lesions, no jaundice   Neurological: cranial nerves intact, normal strength and sensation, reflexes at patella and biceps normal, normal gait, no tremor   Psychological: appropriate mood   Lymphatic: no cervical, supra or infra clavicular, axillary or inguinal lymphadenopathy      Risk Assessment    Surgical Risk:  The proposed surgical procedure is considered LOW risk.    Revised Cardiac Risk Index  The patient has the following serious cardiovascular risks for perioperative complications such as (MI, PE, VFib and 3  AV Block):  No serious cardiac risks    INTERPRETATION: 0 risks: Class I (very low risk - 0.4% complication rate)    [facility] Activity Status Index  Total Points: 45.45  Total METs: 8.33    Functional capacity is " classified as:  Excellent: >10 METs  Good: 7-10 METs   Moderate: 4 - 6 METs  Poor: <4 METs    The patient has the following additional risks for perioperative complications:  No identified additional risks        A/P:    (Z01.818) Preoperative examination  (primary encounter diagnosis)  Comment: Preop exam completed. Low cardiac and pulmonary risk.   Plan: No labs or EKG indicated. Has held anticoagulants for 2 days per surgeons instructions.       The patient with   Patient Active Problem List   Diagnosis     Retinal detachment     HYPERLIPIDEMIA LDL GOAL <130     Pulmonary emboli (H)     Pure hyperglyceridemia     Calculus of kidney     Warfarin anticoagulation     Atypical chest pain     S/P CABG x 4     Atherosclerosis of native coronary artery of native heart with angina pectoris with documented spasm (H)     Long-term (current) use of anticoagulants [Z79.01]     Statin intolerance     Unstable angina (H)     Renal colic on right side     Ureteral stone     UTI (urinary tract infection)     Chest pain, unspecified type     Neovascular glaucoma of right eye, severe stage    presents prior to surgery for assessment of perioperative risk. The patient is at LOW   risk for cardiovascular complications and at LOW   risk for pulmonary complications of this LOW   risk surgery.    We use the simplified risk score for PONV that was created by Mohinder et al. [11] to evaluate adults preoperatively, and base the preventive strategy on the resulting degree of predicted risk. The simplified risk score is easy to use and accurately predicts the risk for PONV in our adult patients. The components of the simplified risk score include the following four highly predictive risk factors:  ?Female gender  ?Nonsmoker  ?History of motion sickness or previous PONV  ?Expected administration of postoperative opioids  This risk score has been validated within and across institutions; the presence of 0, 1, 2, 3, and 4    Number of these risk  factors corresponds to risk of PONV of 10, 20, 40, 60, and 80 percent, respectively    No evidence of functionally compromising cardiac or pulmonary status  The patient is recommended to hold aspirin or NSAIDS for 10 days prior to surgery.  The patient is instructed as to which medications to take with sips of water the morning of surgery  The patient has held warfarin and brillinta for 2 days prior to surgery as instructed by the surgeon.     Pre-Op Plan:   Proceed with surgery as planned.  No food for 8 hours before surgery.  No liquid for 2 hours before surgery.   Call surgeon  If you develop a fever, respiratory illness or other symptoms.  Hold the following medications the morning of  Surgery:  Take the following medications the morning of surgery with a sip of water:    Letter sent to requesting surgeon  listed above  Written pre-operative instructions given.    Laboratory studies:  None  Pregnancy testing was not indicated.    Cardiovascular: EKG was not indicated based on risk assessment.        Please contact our office if there are any further questions or information required about this patient.    Magnus Penaloza, NP student       I was present with the NPP student who participated in the service and in the documentation of the services provided.  I have verified the history and personally performed the physical exam and medical decision making, as documented by the student and edited by me.      Nirali Connell, ANP, AGACNP  Nurse Practitioner      [facility] Preop Guidelines    ACC/AHA Guidelines        Answers for HPI/ROS submitted by the patient on 5/9/2023  General Symptoms: No  Skin Symptoms: No  HENT Symptoms: Yes  EYE SYMPTOMS: Yes  HEART SYMPTOMS: No  LUNG SYMPTOMS: No  INTESTINAL SYMPTOMS: No  URINARY SYMPTOMS: No  REPRODUCTIVE SYMPTOMS: No  SKELETAL SYMPTOMS: No  BLOOD SYMPTOMS: No  NERVOUS SYSTEM SYMPTOMS: No  MENTAL HEALTH SYMPTOMS: No  Ear pain: No  Ear discharge: No  Hearing loss: No  Tinnitus:  No  Nosebleeds: No  Congestion: Yes  Sinus pain: No  Trouble swallowing: No   Voice hoarseness: No  Mouth sores: No  Sore throat: No  Tooth pain: No  Gum tenderness: No  Bleeding gums: No  Change in taste: Yes  Change in sense of smell: No  Dry mouth: No  Hearing aid used: No  Neck lump: No  Eye pain: Yes  Vision loss: No  Dry eyes: No  Watery eyes: No  Eye bulging: No  Double vision: No  Flashing of lights: No  Spots: No  Floaters: No  Redness: No  Crossed eyes: No  Tunnel Vision: No  Yellowing of eyes: No  Eye irritation: No

## 2023-05-16 NOTE — H&P (VIEW-ONLY)
Oswaldo Prabhakar is 62 year old male here at the request of Dr. Savaeg for cardiovascular, pulmonary, and perioperative risk assessment prior to surgery.The intended surgical procedure is RIGHT EYE CYCLOPHOTOCOAGULATION TRANSSCLERAL WITH Gprobe LASER.  A copy of this note will be sent to the surgeon.      Primary Care Provider: Samm Vazquez  Proposed Surgery/Procedure:  RIGHT EYE CYCLOPHOTOCOAGULATION TRANSSCLERAL WITH Gprobe LASER  Date of Surgery/Procedure:  5/17/23  Time of Surgery/Procedure:  11:30am  Hospital/Surgical Facility:  [facility] [place]  Type of Anesthesia Anticipated:  General       HPI: History of retinal detachment of the right eye complicated by neovascular glaucoma.   Reason for surgery: Neovascular glaucoma of right eye, severe stage     Preoperative Screening Questions:        5/9/2023     3:06 PM   Pre-op Questionnaire   1. Have you ever had a heart attack or stroke? No   2. Have you ever had surgery on your heart or blood vessels, such as a stent placement, a coronary artery bypass, or surgery on an artery in your head, neck, heart, or legs? YES - CABG x4 in 2014   3. Do you have chest pain with activity? No   4. Do you have a history of  heart failure? No   5. Do you currently have a cold, bronchitis or symptoms of other infection? No   6. Do you have a cough, shortness of breath, or wheezing? No   7. Do you or anyone in your family have previous history of blood clots? No   8. Do you or does anyone in your family have a serious bleeding problem such as prolonged bleeding following surgeries or cuts? No   9. Have you ever had problems with anemia or been told to take iron pills? No   10. Have you had any abnormal blood loss such as black, tarry or bloody stools? No   11. Have you ever had a blood transfusion? YES - 2014 during hospitalization related to CABG   11a. Have you ever had a transfusion reaction? No   12. Are you willing to have a blood transfusion if it is medically needed before, during, or  after your surgery? Yes   13. Have you or any of your relatives ever had problems with anesthesia? No   14. Do you have sleep apnea, excessive snoring or daytime drowsiness? No   15. Do you have any artifical heart valves or other implanted medical devices like a pacemaker, defibrillator, or continuous glucose monitor? No   16. Do you have artificial joints? No   17. Are you allergic to latex? No    }        PROBLEM LIST:   Patient Active Problem List   Diagnosis     Retinal detachment     HYPERLIPIDEMIA LDL GOAL <130     Pulmonary emboli (H)     Pure hyperglyceridemia     Calculus of kidney     Warfarin anticoagulation     Atypical chest pain     S/P CABG x 4     Atherosclerosis of native coronary artery of native heart with angina pectoris with documented spasm (H)     Long-term (current) use of anticoagulants [Z79.01]     Statin intolerance     Unstable angina (H)     Renal colic on right side     Ureteral stone     UTI (urinary tract infection)     Chest pain, unspecified type     Neovascular glaucoma of right eye, severe stage       PAST SURGICAL HX:   Past Surgical History:   Procedure Laterality Date     BYPASS GRAFT ARTERY CORONARY  04/04/2014    Procedure: BYPASS GRAFT ARTERY CORONARY;  Median Sternotomy, Coronary Artery Bypass Bypass x 4 using Left Internal Mammary, Left Saphenous Vein, on pump oxygenation;  Surgeon: Sherry Armando MD;  Location: [facility] OR     CATARACT IOL, RT/LT       CLOSED REDUCTION, PERCUTANEOUS PINNING  HAND, COMBINED Left 11/05/2015    Procedure: COMBINED CLOSED REDUCTION, PERCUTANEOUS PINNING HAND;  Surgeon: Carmella Love MD;  [facility]     COLONOSCOPY  03/15/2013    Procedure: COLONOSCOPY;;  Surgeon: Racheal Shipman MD;  Location: [facility] GI     COMBINED CYSTOSCOPY, INSERT STENT URETER(S) Right 01/10/2020    Procedure: Cystoscopy, right retrograde pyelogram, interpretation of fluoroscopic images, placement of 6 x 26 double-J ureteral stent;  Surgeon: Vance  Nguyễn Ang MD;  Location: [facility] OR     COMBINED CYSTOSCOPY, RETROGRADES, URETEROSCOPY, LASER HOLMIUM LITHOTRIPSY URETER(S), INSERT STENT Right 02/05/2020    Procedure: Cystoscopy, right ureteral stent exchange, right ureteroscopy with holmium lithotripsy and stone basketing, right retrograde pyelogram, interpretation of fluoroscopic images.;  Surgeon: Nguyễn Woodard MD;  Location: [facility] OR     CV ANGIOGRAM CORONARY GRAFT N/A 01/07/2020    Procedure: Angiogram Coronary Graft;  Surgeon: Chato Odonnell MD;  Location: [facility] CARDIAC CATH LAB     CV CORONARY ANGIOGRAM  01/07/2020    Procedure: CV CORONARY ANGIOGRAM;  Surgeon: Chato Odonnell MD;  Location: [facility] CARDIAC CATH LAB     CV PCI STENT DRUG ELUTING N/A 01/07/2020    Procedure: PCI Stent Drug Eluting;  Surgeon: Chato Odonnell MD;  Location: [facility] CARDIAC CATH LAB     CYSTOSCOPY       ESOPHAGOSCOPY, GASTROSCOPY, DUODENOSCOPY (EGD), COMBINED N/A 12/9/2022    Procedure: ESOPHAGOGASTRODUODENOSCOPY (EGD);  Surgeon: Beata George MD;  Location:  GI     LAPAROSCOPIC CHOLECYSTECTOMY N/A 08/06/2018    Procedure: LAPAROSCOPIC CHOLECYSTECTOMY;  LAPAROSCOPIC CHOLECYSTECTOMY ;  Surgeon: José Miguel Stuart MD;  Location:  OR     LITHOTRIPSY  04/2010     RETINAL REATTACHMENT       THORACIC SURGERY      lung surgery, thoracotomy, lung adhesion     ZZC NONSPECIFIC PROCEDURE      Spermatocele resection       FAMILY MEDICAL HX:   Family History   Problem Relation Age of Onset     Heart Disease Mother      Glaucoma Father      C.A.D. Father         3 vessel CABG, age 70     Cancer Father         bladder cancer     C.A.D. Paternal Grandmother      Hypertension Paternal Grandmother      Alzheimer Disease Paternal Grandmother      Diabetes No family hx of      Thyroid Disease No family hx of      Cancer - colorectal No family hx of        IMMUNIZATION HX:   Immunization History   Administered Date(s)  Administered     COVID-19 MONOVALENT 12+ (Pfizer) 01/05/2021, 01/25/2021, 11/28/2021     COVID-19 Monovalent 12+ (Pfizer 2022) 08/15/2022     Flu, Unspecified 10/02/2019     Hepatitis B, Adult 07/11/2014     Influenza (IIV3) PF 10/03/2011, 02/01/2013, 10/13/2016, 10/11/2017     Pneumo Conj 13-V (2010&after) 03/04/2015     Pneumococcal 23 valent 11/01/2021     TD,PF 7+ (Tenivac) 08/01/2005     TDAP Vaccine (Boostrix) 07/05/2013       MEDICATIONS:   Current Outpatient Medications   Medication Sig Dispense Refill     acetaminophen (TYLENOL) 500 MG tablet Take 2 tablets (1,000 mg) by mouth 3 times daily as needed for pain       acetaZOLAMIDE (DIAMOX) 250 MG tablet Take 1 tablet by mouth 4 times daily       acetaZOLAMIDE (DIAMOX) 250 MG tablet Take 1 tablet (250 mg) by mouth 2 times daily 1 tablet 3     atorvastatin (LIPITOR) 80 MG tablet TAKE 1 TABLET BY MOUTH EVERY DAY 90 tablet 2     brimonidine (ALPHAGAN) 0.2 % ophthalmic solution Place 1 drop into the right eye 3 times daily 10 mL 4     calcium carbonate (TUMS) 500 MG chewable tablet Take 1 chew tab by mouth daily as needed        Cholecalciferol (VITAMIN D) 2000 UNITS CAPS Take 2,000 Units by mouth daily       dorzolamide-timolol (COSOPT) 2-0.5 % ophthalmic solution Place 1 drop into the right eye 2 times daily 10 mL 3     ezetimibe (ZETIA) 10 MG tablet Take 1 tablet (10 mg) by mouth daily 90 tablet 0     fenofibrate (TRIGLIDE/LOFIBRA) 160 MG tablet Take 1 tablet (160 mg) by mouth daily 90 tablet 2     latanoprost (XALATAN) 0.005 % ophthalmic solution Place 1 drop into the right eye At Bedtime 2.5 mL 4     lisinopril (ZESTRIL) 2.5 MG tablet Take 1 tablet (2.5 mg) by mouth daily 90 tablet 1     metoprolol tartrate (LOPRESSOR) 25 MG tablet Take 0.5 tablets (12.5 mg) by mouth 2 times daily 90 tablet 1     OMEPRAZOLE PO Take 20 mg by mouth        ondansetron (ZOFRAN-ODT) 4 MG ODT tab Take 1 tablet (4 mg) by mouth every 8 hours as needed for nausea 10 tablet 0      prednisoLONE acetate (PRED FORTE) 1 % ophthalmic suspension Place 1-2 drops into the right eye 4 times daily 10 mL 0     REPATHA SURECLICK 140 MG/ML prefilled autoinjector INJECT THE CONTENTS OF 1 AUTOINJECTOR PEN UNDER THE SKIN EVERY OTHER WEEK 6 mL 3     tamsulosin (FLOMAX) 0.4 MG capsule Take 1 capsule (0.4 mg) by mouth daily 90 capsule 0     timolol maleate (TIMOPTIC) 0.5 % ophthalmic solution Place 1 drop into the right eye 2 times daily       warfarin ANTICOAGULANT (COUMADIN) 5 MG tablet TAKE 1 TABLET (5 MG) ON  AND  AND TAKE 1.5 TABLETS (7.5 MG) ALL OTHER DAYS OR AS DIRECTED BY THE INR CLINIC 40 tablet 0     ticagrelor (BRILINTA) 90 MG tablet Take 1 tablet (90 mg) by mouth 2 times daily 180 tablet 3       SOCIAL HX:   Social History     Socioeconomic History     Marital status:    Tobacco Use     Smoking status: Never     Smokeless tobacco: Never   Substance and Sexual Activity     Alcohol use: No     Comment: very rare     Drug use: No       This is a LOW risk surgery.  Low risk: endoscopic procedures, cataract surgery  Intermediate: intraperitoneal and intrathoracic procedures, carotid endarterectomy, head and neck surgery, orthopedic surgery, and prostate surgery.       Audit C Alcohol Screening Tool  AUDIT   Questions 0 1 2 3 4 Score   1. How often do you have a drink  containing alcohol? Never Monthly or less 2 to 4  times a  month 2 to 3  times a  week 4 or more  times a  week    2. How many drinks containing alcohol  do you have on a typical day when you are drinking? 1 or 2 3 or 4 5 or 6 7 to 9 10 or more    3. How often do you have more than five  or more drinks on one occasion? Never Less  than  monthly Monthly Weekly Daily or  almost  daily    Scorin  A score of 4 for adult men or 3 for adult women is an indication of hazardous drinking (risk for physical or physiological harm).   A score of 5 or more for adult men or 4 or more for adult women is an indication of an  alcohol use disorder.      Cardiovascular Risk:  This patient ambulates without assist.  without   chest pain. He IS able to climb a flight of stairs without chest pain.    The patient does not  have chest pain at Rest or with Exercise.    He does  have a history of hypertension, high cholesterol and positive family history.   The patient does not  have a history of stroke, and does  have a history of valvular disease.    Pulmonary Risk:  In terms of risk factors for pulmonary complications, the patient does  have a history of pulmonary embolism and partial lobe resection of LLL.     Pulmonary Risk:  In terms of risk factors for pulmonary complications, the patient does not have a history of CHF, COPD, age >60 years, being functionally dependent.       Is this patient going to undergo any of the following procedures that would put him at higher risk of postoperative pulmonary complications:  Prolonged surgery (>3 hours): No  Abdominal surgery/thoracic surgery/neurosurgery/head and neck: No  Vascular/aortic aneurysm repair: No  General anesthesia: No    Perioperative Complications:  The patient does not  have a history of bleeding or clotting problems in the past. The patient has not  had complications from past surgeries.  The patient does not  have a family history of any anesthesia or surgical complications.      ROS:  Constitutional: no fevers, night sweats or unintentional weight change   Eyes: no vision change, diplopia or red eyes   Ears, Nose, Mouth, Throat: no tinnitus or hearing change, no epistaxis or nasal discharge, no oral lesions, throat clear   Cardiovascular: no chest pain, palpitations, or pain with walking, no orthopnea or PND   Respiratory: no dyspnea, cough, shortness of breath or wheezing   GI: no nausea, vomiting, diarrhea or constipation, no abdominal pain   : no change in urine, no dysuria or hematuria  Musculoskeletal: no joint or muscle pain or swelling   Neuro: no loss of strength or  "sensation, no numbness or tingling, no tremor, no dizziness, no headache   Endo: no polyuria or polydipsia, no temperature intolerance   Heme/Lymph: no concerning bumps, no bleeding problems   Allergy: no environmental allergies   Psych: no depression or anxiety, no sleep problems      PHYSICAL EXAM:  /81 (BP Location: Right arm, Patient Position: Sitting, Cuff Size: Adult Regular)   Pulse 57   Temp 98.2  F (36.8  C) (Oral)   Ht 1.803 m (5' 11\")   Wt 95 kg (209 lb 6.4 oz)   SpO2 97%   BMI 29.21 kg/m      Wt Readings from Last 1 Encounters:   05/16/23 95 kg (209 lb 6.4 oz)     Constitutional: no distress, comfortable, pleasant   Eyes: anicteric, normal extra-ocular movements   Ears, Nose and Throat: tympanic membranes clear, nose clear and free of lesions, throat clear, neck supple with full range of motion, no thyromegaly.   Cardiovascular: regular rate and rhythm, normal S1 and S2, no murmurs, rubs or gallops, peripheral pulses full and symmetric   Respiratory: clear to auscultation, no wheezes or crackles, normal breath sounds   Gastrointestinal: positive bowel sounds, nontender, no hepatosplenomegaly, no masses   Musculoskeletal: full range of motion, no edema   Skin: no concerning lesions, no jaundice   Neurological: cranial nerves intact, normal strength and sensation, reflexes at patella and biceps normal, normal gait, no tremor   Psychological: appropriate mood   Lymphatic: no cervical, supra or infra clavicular, axillary or inguinal lymphadenopathy      Risk Assessment    Surgical Risk:  The proposed surgical procedure is considered LOW risk.    Revised Cardiac Risk Index  The patient has the following serious cardiovascular risks for perioperative complications such as (MI, PE, VFib and 3  AV Block):  No serious cardiac risks    INTERPRETATION: 0 risks: Class I (very low risk - 0.4% complication rate)    [facility] Activity Status Index  Total Points: 45.45  Total METs: 8.33    Functional capacity is " classified as:  Excellent: >10 METs  Good: 7-10 METs   Moderate: 4 - 6 METs  Poor: <4 METs    The patient has the following additional risks for perioperative complications:  No identified additional risks        A/P:    (Z01.818) Preoperative examination  (primary encounter diagnosis)  Comment: Preop exam completed. Low cardiac and pulmonary risk.   Plan: No labs or EKG indicated. Has held anticoagulants for 2 days per surgeons instructions.       The patient with   Patient Active Problem List   Diagnosis     Retinal detachment     HYPERLIPIDEMIA LDL GOAL <130     Pulmonary emboli (H)     Pure hyperglyceridemia     Calculus of kidney     Warfarin anticoagulation     Atypical chest pain     S/P CABG x 4     Atherosclerosis of native coronary artery of native heart with angina pectoris with documented spasm (H)     Long-term (current) use of anticoagulants [Z79.01]     Statin intolerance     Unstable angina (H)     Renal colic on right side     Ureteral stone     UTI (urinary tract infection)     Chest pain, unspecified type     Neovascular glaucoma of right eye, severe stage    presents prior to surgery for assessment of perioperative risk. The patient is at LOW   risk for cardiovascular complications and at LOW   risk for pulmonary complications of this LOW   risk surgery.    We use the simplified risk score for PONV that was created by Mohinder et al. [11] to evaluate adults preoperatively, and base the preventive strategy on the resulting degree of predicted risk. The simplified risk score is easy to use and accurately predicts the risk for PONV in our adult patients. The components of the simplified risk score include the following four highly predictive risk factors:  ?Female gender  ?Nonsmoker  ?History of motion sickness or previous PONV  ?Expected administration of postoperative opioids  This risk score has been validated within and across institutions; the presence of 0, 1, 2, 3, and 4    Number of these risk  factors corresponds to risk of PONV of 10, 20, 40, 60, and 80 percent, respectively    No evidence of functionally compromising cardiac or pulmonary status  The patient is recommended to hold aspirin or NSAIDS for 10 days prior to surgery.  The patient is instructed as to which medications to take with sips of water the morning of surgery  The patient has held warfarin and brillinta for 2 days prior to surgery as instructed by the surgeon.     Pre-Op Plan:   Proceed with surgery as planned.  No food for 8 hours before surgery.  No liquid for 2 hours before surgery.   Call surgeon  If you develop a fever, respiratory illness or other symptoms.  Hold the following medications the morning of  Surgery:  Take the following medications the morning of surgery with a sip of water:    Letter sent to requesting surgeon  listed above  Written pre-operative instructions given.    Laboratory studies:  None  Pregnancy testing was not indicated.    Cardiovascular: EKG was not indicated based on risk assessment.        Please contact our office if there are any further questions or information required about this patient.    Magnus Penaloza, NP student       I was present with the NPP student who participated in the service and in the documentation of the services provided.  I have verified the history and personally performed the physical exam and medical decision making, as documented by the student and edited by me.      Nirali Connell, ANP, AGACNP  Nurse Practitioner      [place] Preop Guidelines    ACC/AHA Guidelines        Answers for HPI/ROS submitted by the patient on 5/9/2023  General Symptoms: No  Skin Symptoms: No  HENT Symptoms: Yes  EYE SYMPTOMS: Yes  HEART SYMPTOMS: No  LUNG SYMPTOMS: No  INTESTINAL SYMPTOMS: No  URINARY SYMPTOMS: No  REPRODUCTIVE SYMPTOMS: No  SKELETAL SYMPTOMS: No  BLOOD SYMPTOMS: No  NERVOUS SYSTEM SYMPTOMS: No  MENTAL HEALTH SYMPTOMS: No  Ear pain: No  Ear discharge: No  Hearing loss: No  Tinnitus:  No  Nosebleeds: No  Congestion: Yes  Sinus pain: No  Trouble swallowing: No   Voice hoarseness: No  Mouth sores: No  Sore throat: No  Tooth pain: No  Gum tenderness: No  Bleeding gums: No  Change in taste: Yes  Change in sense of smell: No  Dry mouth: No  Hearing aid used: No  Neck lump: No  Eye pain: Yes  Vision loss: No  Dry eyes: No  Watery eyes: No  Eye bulging: No  Double vision: No  Flashing of lights: No  Spots: No  Floaters: No  Redness: No  Crossed eyes: No  Tunnel Vision: No  Yellowing of eyes: No  Eye irritation: No

## 2023-05-16 NOTE — NURSING NOTE
"Oswaldo Prabhakar is a 62 year old male patient that presents today in clinic for the following:    Chief Complaint   Patient presents with     Pre-Op Exam     The patient's allergies and medications were reviewed as noted. A set of vitals were recorded as noted without incident: /81 (BP Location: Right arm, Patient Position: Sitting, Cuff Size: Adult Regular)   Pulse 57   Temp 98.2  F (36.8  C) (Oral)   Ht 1.803 m (5' 11\")   Wt 95 kg (209 lb 6.4 oz)   SpO2 97%   BMI 29.21 kg/m  . The patient does not have any other questions for the provider.    Cuauhtemoc Rubin, EMT at 2:50 PM on 5/16/2023.  Primary care clinic: [phone]  "

## 2023-05-17 ENCOUNTER — HOSPITAL ENCOUNTER (OUTPATIENT)
Facility: AMBULATORY SURGERY CENTER | Age: 63
Discharge: HOME | End: 2023-05-17
Attending: OPHTHALMOLOGY
Payer: COMMERCIAL

## 2023-05-17 ENCOUNTER — ANESTHESIA (OUTPATIENT)
Dept: SURGERY | Facility: AMBULATORY SURGERY CENTER | Age: 63
End: 2023-05-17
Payer: COMMERCIAL

## 2023-05-17 VITALS
OXYGEN SATURATION: 94 % | TEMPERATURE: 96.9 F | HEART RATE: 52 BPM | BODY MASS INDEX: 29.26 KG/M2 | WEIGHT: 209 LBS | HEIGHT: 71 IN | RESPIRATION RATE: 16 BRPM | SYSTOLIC BLOOD PRESSURE: 123 MMHG | DIASTOLIC BLOOD PRESSURE: 77 MMHG

## 2023-05-17 DIAGNOSIS — H40.51X3 NEOVASCULAR GLAUCOMA OF RIGHT EYE, SEVERE STAGE: ICD-10-CM

## 2023-05-17 DIAGNOSIS — Z98.890 POSTOPERATIVE EYE STATE: Primary | ICD-10-CM

## 2023-05-17 DIAGNOSIS — H20.9 ANTERIOR UVEITIS: ICD-10-CM

## 2023-05-17 PROCEDURE — 66710 CILIARY TRANSSLERAL THERAPY: CPT | Mod: RT

## 2023-05-17 PROCEDURE — 66710 CILIARY TRANSSLERAL THERAPY: CPT | Mod: RT | Performed by: OPHTHALMOLOGY

## 2023-05-17 RX ORDER — PREDNISOLONE ACETATE 10 MG/ML
1 SUSPENSION/ DROPS OPHTHALMIC
Qty: 10 ML | Refills: 3 | Status: SHIPPED | OUTPATIENT
Start: 2023-05-17 | End: 2023-06-13

## 2023-05-17 RX ORDER — LIDOCAINE HYDROCHLORIDE 20 MG/ML
INJECTION, SOLUTION INFILTRATION; PERINEURAL PRN
Status: DISCONTINUED | OUTPATIENT
Start: 2023-05-17 | End: 2023-05-17

## 2023-05-17 RX ORDER — ONDANSETRON 2 MG/ML
INJECTION INTRAMUSCULAR; INTRAVENOUS PRN
Status: DISCONTINUED | OUTPATIENT
Start: 2023-05-17 | End: 2023-05-17

## 2023-05-17 RX ORDER — PROPOFOL 10 MG/ML
INJECTION, EMULSION INTRAVENOUS PRN
Status: DISCONTINUED | OUTPATIENT
Start: 2023-05-17 | End: 2023-05-17

## 2023-05-17 RX ORDER — FENTANYL CITRATE 50 UG/ML
INJECTION, SOLUTION INTRAMUSCULAR; INTRAVENOUS PRN
Status: DISCONTINUED | OUTPATIENT
Start: 2023-05-17 | End: 2023-05-17

## 2023-05-17 RX ORDER — PROPARACAINE HYDROCHLORIDE 5 MG/ML
1 SOLUTION/ DROPS OPHTHALMIC ONCE
Status: COMPLETED | OUTPATIENT
Start: 2023-05-17 | End: 2023-05-17

## 2023-05-17 RX ORDER — BALANCED SALT SOLUTION 6.4; .75; .48; .3; 3.9; 1.7 MG/ML; MG/ML; MG/ML; MG/ML; MG/ML; MG/ML
SOLUTION OPHTHALMIC PRN
Status: DISCONTINUED | OUTPATIENT
Start: 2023-05-17 | End: 2023-05-17 | Stop reason: HOSPADM

## 2023-05-17 RX ORDER — SODIUM CHLORIDE, SODIUM LACTATE, POTASSIUM CHLORIDE, CALCIUM CHLORIDE 600; 310; 30; 20 MG/100ML; MG/100ML; MG/100ML; MG/100ML
INJECTION, SOLUTION INTRAVENOUS CONTINUOUS PRN
Status: DISCONTINUED | OUTPATIENT
Start: 2023-05-17 | End: 2023-05-17

## 2023-05-17 RX ORDER — DEXAMETHASONE SODIUM PHOSPHATE 4 MG/ML
INJECTION, SOLUTION INTRA-ARTICULAR; INTRALESIONAL; INTRAMUSCULAR; INTRAVENOUS; SOFT TISSUE PRN
Status: DISCONTINUED | OUTPATIENT
Start: 2023-05-17 | End: 2023-05-17

## 2023-05-17 RX ORDER — PROPOFOL 10 MG/ML
INJECTION, EMULSION INTRAVENOUS CONTINUOUS PRN
Status: DISCONTINUED | OUTPATIENT
Start: 2023-05-17 | End: 2023-05-17

## 2023-05-17 RX ORDER — PREDNISOLONE ACETATE 10 MG/ML
1 SUSPENSION/ DROPS OPHTHALMIC 4 TIMES DAILY
Qty: 5 ML | Refills: 1 | Status: SHIPPED | OUTPATIENT
Start: 2023-05-17 | End: 2023-10-25

## 2023-05-17 RX ORDER — DEXAMETHASONE SODIUM PHOSPHATE 10 MG/ML
INJECTION, SOLUTION INTRAMUSCULAR; INTRAVENOUS PRN
Status: DISCONTINUED | OUTPATIENT
Start: 2023-05-17 | End: 2023-05-17 | Stop reason: HOSPADM

## 2023-05-17 RX ADMIN — FENTANYL CITRATE 50 MCG: 50 INJECTION, SOLUTION INTRAMUSCULAR; INTRAVENOUS at 11:42

## 2023-05-17 RX ADMIN — PROPOFOL 30 MG: 10 INJECTION, EMULSION INTRAVENOUS at 11:30

## 2023-05-17 RX ADMIN — LIDOCAINE HYDROCHLORIDE 100 MG: 20 INJECTION, SOLUTION INFILTRATION; PERINEURAL at 11:28

## 2023-05-17 RX ADMIN — PROPARACAINE HYDROCHLORIDE 1 DROP: 5 SOLUTION/ DROPS OPHTHALMIC at 10:31

## 2023-05-17 RX ADMIN — ONDANSETRON 4 MG: 2 INJECTION INTRAMUSCULAR; INTRAVENOUS at 11:32

## 2023-05-17 RX ADMIN — PROPOFOL 150 MCG/KG/MIN: 10 INJECTION, EMULSION INTRAVENOUS at 11:29

## 2023-05-17 RX ADMIN — PROPOFOL 150 MG: 10 INJECTION, EMULSION INTRAVENOUS at 11:28

## 2023-05-17 RX ADMIN — SODIUM CHLORIDE, SODIUM LACTATE, POTASSIUM CHLORIDE, CALCIUM CHLORIDE: 600; 310; 30; 20 INJECTION, SOLUTION INTRAVENOUS at 10:50

## 2023-05-17 RX ADMIN — DEXAMETHASONE SODIUM PHOSPHATE 4 MG: 4 INJECTION, SOLUTION INTRA-ARTICULAR; INTRALESIONAL; INTRAMUSCULAR; INTRAVENOUS; SOFT TISSUE at 11:32

## 2023-05-17 NOTE — ANESTHESIA PREPROCEDURE EVALUATION
Anesthesia Pre-Procedure Evaluation    Patient: Oswaldo Prabhakar   MRN: [id number] : 1960        Procedure : Procedure(s):  RIGHT EYE CYCLOPHOTOCOAGULATION TRANSSCLERAL WITH Gprobe LASER          Past Medical History:   Diagnosis Date     Antiplatelet or antithrombotic long-term use      Coronary artery disease      Diaphragmatic hernia without mention of obstruction or gangrene      Heart disease      Hernia, abdominal      History of blood transfusion      History of thrombophlebitis      Hypertension      Nephrolithiasis 2010     Other and unspecified hyperlipidemia      Pulmonary embolus and infarction (H)     s/p hemothorax and filter s/p filter removal (), now on long term anti-coagulation     Statin intolerance 2017     Stented coronary artery 2020    NIR mid LAD     Unspecified retinal detachment     Childhood trauma, unrepaired      Past Surgical History:   Procedure Laterality Date     BYPASS GRAFT ARTERY CORONARY  2014    Procedure: BYPASS GRAFT ARTERY CORONARY;  Median Sternotomy, Coronary Artery Bypass Bypass x 4 using Left Internal Mammary, Left Saphenous Vein, on pump oxygenation;  Surgeon: Sherry Armando MD;  Location: [facility] OR     CATARACT IOL, RT/LT       CLOSED REDUCTION, PERCUTANEOUS PINNING  HAND, COMBINED Left 2015    Procedure: COMBINED CLOSED REDUCTION, PERCUTANEOUS PINNING HAND;  Surgeon: Carmella Love MD;  [facility]: [facility]     COLONOSCOPY  03/15/2013    Procedure: COLONOSCOPY;;  Surgeon: Racheal Shipman MD;  Location: [facility] GI     COMBINED CYSTOSCOPY, INSERT STENT URETER(S) Right 01/10/2020    Procedure: Cystoscopy, right retrograde pyelogram, interpretation of fluoroscopic images, placement of 6 x 26 double-J ureteral stent;  Surgeon: Nguyễn Woodard MD;  Location:  OR     COMBINED CYSTOSCOPY, RETROGRADES, URETEROSCOPY, LASER HOLMIUM LITHOTRIPSY URETER(S), INSERT STENT Right 2020    Procedure: Cystoscopy, right ureteral  stent exchange, right ureteroscopy with holmium lithotripsy and stone basketing, right retrograde pyelogram, interpretation of fluoroscopic images.;  Surgeon: Nguyễn Woodard MD;  Location: [facility] OR     CV ANGIOGRAM CORONARY GRAFT N/A 01/07/2020    Procedure: Angiogram Coronary Graft;  Surgeon: Chato Odonnell MD;  Location: [facility] CARDIAC CATH LAB     CV CORONARY ANGIOGRAM  01/07/2020    Procedure: CV CORONARY ANGIOGRAM;  Surgeon: Chato Odonnell MD;  Location: [facility] CARDIAC CATH LAB     CV PCI STENT DRUG ELUTING N/A 01/07/2020    Procedure: PCI Stent Drug Eluting;  Surgeon: Chato Odonnell MD;  Location: [facility] CARDIAC CATH LAB     CYSTOSCOPY       ESOPHAGOSCOPY, GASTROSCOPY, DUODENOSCOPY (EGD), COMBINED N/A 12/9/2022    Procedure: ESOPHAGOGASTRODUODENOSCOPY (EGD);  Surgeon: Beata George MD;  Location:  GI     LAPAROSCOPIC CHOLECYSTECTOMY N/A 08/06/2018    Procedure: LAPAROSCOPIC CHOLECYSTECTOMY;  LAPAROSCOPIC CHOLECYSTECTOMY ;  Surgeon: José Miguel Stuart MD;  Location: [facility] OR     LITHOTRIPSY  04/2010     RETINAL REATTACHMENT       THORACIC SURGERY      lung surgery, thoracotomy, lung adhesion     ZZC NONSPECIFIC PROCEDURE      Spermatocele resection      Allergies   Allergen Reactions     Cats      Hay Fever & [A.R.M.]      Heparin Other (See Comments)     Heparin resistance per cardio-thoracic surgeon Dr. Armando     Hydrocodone-Acetaminophen Nausea and Vomiting     Acetaminophen is ok      Social History     Tobacco Use     Smoking status: Never     Smokeless tobacco: Never   Vaping Use     Vaping status: Not on file   Substance Use Topics     Alcohol use: No     Comment: very rare      Wt Readings from Last 1 Encounters:   05/17/23 94.8 kg (209 lb)        Anesthesia Evaluation            ROS/MED HX  ENT/Pulmonary:  - neg pulmonary ROS     Neurologic:  - neg neurologic ROS     Cardiovascular:  - neg cardiovascular ROS   (+) Dyslipidemia  hypertension--CAD angina---Taking blood thinners     METS/Exercise Tolerance: >4 METS    Hematologic:  - neg hematologic  ROS     Musculoskeletal:  - neg musculoskeletal ROS     GI/Hepatic:  - neg GI/hepatic ROS     Renal/Genitourinary:  - neg Renal ROS   (+) renal disease, type: CRI,     Endo:  - neg endo ROS     Psychiatric/Substance Use:  - neg psychiatric ROS     Infectious Disease:  - neg infectious disease ROS     Malignancy:  - neg malignancy ROS     Other:  - neg other ROS          Physical Exam    Airway  airway exam normal      Mallampati: II   TM distance: > 3 FB   Neck ROM: full   Mouth opening: > 3 cm    Respiratory Devices and Support         Dental       (+) Completely normal teeth      Cardiovascular          Rhythm and rate: regular and normal     Pulmonary   pulmonary exam normal        breath sounds clear to auscultation           OUTSIDE LABS:  CBC:   Lab Results   Component Value Date    WBC 9.2 05/10/2021    WBC 6.2 12/16/2020    HGB 13.9 05/10/2021    HGB 12.6 (L) 12/16/2020    HCT 42.7 05/10/2021    HCT 40.3 12/16/2020     05/10/2021     12/16/2020     BMP:   Lab Results   Component Value Date     04/04/2022     07/14/2021    POTASSIUM 3.8 04/04/2022    POTASSIUM 3.7 07/14/2021    CHLORIDE 106 04/04/2022    CHLORIDE 107 07/14/2021    CO2 26 04/04/2022    CO2 27 07/14/2021    BUN 13 04/04/2022    BUN 14 07/14/2021    CR 1.08 04/04/2022    CR 0.98 07/14/2021    GLC 89 04/04/2022    GLC 86 07/14/2021     COAGS:   Lab Results   Component Value Date    PTT 32 12/15/2020    INR 1.86 (H) 03/13/2023    FIBR 286 04/04/2014     POC:   Lab Results   Component Value Date     (H) 04/05/2014     HEPATIC:   Lab Results   Component Value Date    ALBUMIN 3.8 04/04/2022    PROTTOTAL 7.7 04/04/2022    ALT 46 04/04/2022    AST 36 04/04/2022    ALKPHOS 54 04/04/2022    BILITOTAL 0.7 04/04/2022     OTHER:   Lab Results   Component Value Date    PH 7.32 (L) 04/04/2014    LACT 1.5  11/30/2020    A1C 6.3 (H) 11/02/2022    GONZALES 9.0 04/04/2022    PHOS 3.6 06/18/2015    MAG 1.8 01/06/2020    LIPASE 188 07/25/2018    TSH 2.03 12/16/2020       Anesthesia Plan    ASA Status:  3   NPO Status:  NPO Appropriate    Anesthesia Type: General.     - Airway: LMA   Induction: Intravenous.   Maintenance: TIVA.        Consents    Anesthesia Plan(s) and associated risks, benefits, and realistic alternatives discussed. Questions answered and patient/representative(s) expressed understanding.    - Discussed:     - Discussed with:  Patient      - Extended Intubation/Ventilatory Support Discussed: No.      - Patient is DNR/DNI Status: No    Use of blood products discussed: No .     Postoperative Care    Pain management: IV analgesics, Oral pain medications, Multi-modal analgesia.   PONV prophylaxis: Ondansetron (or other 5HT-3), Background Propofol Infusion     Comments:                Edson Driver MD

## 2023-05-17 NOTE — DISCHARGE INSTRUCTIONS
Discharge Instructions Following Glaucoma Laser Procedure  Jay Jay Savage MD - Glaucoma and cataract surgeon    Date: 5/17/2023    Stop taking Diamox today     Please leave the eye patch in the operated RIGHT eye in place overnight. We will remove it at your follow up appointment tomorrow and discuss your new eye drop regimen.    Please bring the new eye drops to your appointment tomorrow.     You will notice blurred or double vision initially, this will gradually clear.     If you experience any severe pain, sudden decrease in vision, or discharge for the eye, contact your doctor immediately.     Minor itching, scratching, burning etc. is normal. Use regular or extra-strength Tylenol for discomfort.    You may bathe or shower normally.     Do not rub or push on the operative eye for 1 week.  You may wipe the lids gently with a warm wet cloth to remove matter from eyelashes.    Continue with your usual diet and medications prescribed by your doctor.    Sunglasses will increase your comfort post-operatively.    Post Operative Visit on 5/18/23 in the [facility] ([address] [place] [address])  Bring all material and medications to the office on the first post-op day.  Call your surgeon at [phone] or the answering service for any problems, especially pain.      [facility]  Home Care Following Anesthesia  For 24 hours after surgery:  Get plenty of rest.  A responsible adult must stay with you for at least 24 hours after you leave the surgery center.  Do not drive or use heavy equipment.  If you have weakness or tingling, don't drive or use heavy equipment until this feeling goes away.   Do not drink alcohol.   Avoid strenuous or risky activities.  Ask for help when climbing stairs.  You may feel lightheaded.  IF so, sit for a few minutes before standing.  Have someone help you get up.   If you have nausea (feel sick to your stomach): Drink only clear  liquids such as apple juice, ginger ale, broth or 7-Up.  Rest may also help.  Be sure to drink enough fluids.  Move to a regular diet as you feel able.   You may have a slight fever.  Call the doctor if your fever is over 100 F (37.7 C) (taken under the tongue) or lasts longer than 24 hours.  You may have a dry mouth, a sore throat, muscle aches or trouble sleeping. These should go away after 24 hours.  Do not make important or legal decisions.   It is recommended to avoid smoking.               Tips for taking pain medications  To get the best pain relief possible, remember these points:  Take pain medications as directed, before pain becomes severe.  Pain medication can upset your stomach: taking it with food may help.  Constipation is a common side effect of pain medication. Drink plenty of  fluids.  Eat foods high in fiber. Take a stool softener if recommended by your doctor or pharmacist.  Do not drink alcohol, drive or operate machinery while taking pain medications.  Ask about other ways to control pain, such as with heat, ice or relaxation.    Tylenol/Acetaminophen Consumption  To help encourage the safe use of acetaminophen, the makers of TYLENOL  have lowered the maximum daily dose for single-ingredient Extra Strength TYLENOL  (acetaminophen) products sold in the [place] from 8 pills per day (4,000 mg) to 6 pills per day (3,000 mg). The dosing interval has also changed from 2 pills every 4-6 hours to 2 pills every 6 hours.  If you feel your pain relief is insufficient, you may take Tylenol/Acetaminophen in addition to your narcotic pain medication.   Be careful not to exceed 3,000 mg of Tylenol/Acetaminophen in a 24 hour period from all sources.  If you are taking extra strength Tylenol/acetaminophen (500 mg), the maximum dose is 6 tablets in 24 hours.  If you are taking regular strength acetaminophen (325 mg), the maximum dose is 9 tablets in 24 hours.    Call a doctor for any of the following:  Signs of  infection (fever, growing tenderness at the surgery site, a large amount of drainage or bleeding, severe pain, foul-smelling drainage, redness, swelling).  It has been over 8 to 10 hours since surgery and you are still not able to urinate (pass water).  Headache for over 24 hours.  Numbness, tingling or weakness the day after surgery (if you had spinal anesthesia).  Signs of Covid-19 infection (temperature over 100 degrees, shortness of breath, cough, loss of taste/smell, generalized body aches, persistent headache, chills, sore throat, nausea/vomiting/diarrhea)  Your doctor is:       Dr. Jay Jay Savage, Ophthalmology: [phone]               Or dial [phone] and ask for the resident on call for:  Ophthalmology  For emergency care, call the:  [facility]:  [phone] (TTY for hearing impaired: [phone])

## 2023-05-17 NOTE — BRIEF OP NOTE
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  Complications: None.  Implants: * No implants in log *

## 2023-05-17 NOTE — INTERVAL H&P NOTE
"I have reviewed the surgical (or preoperative) H&P that is linked to this encounter, and examined the patient. There are no significant changes    Clinical Conditions Present on Arrival:  Clinically Significant Risk Factors Present on Admission                # Drug Induced Coagulation Defect: home medication list includes an anticoagulant medication  # Drug Induced Platelet Defect: home medication list includes an antiplatelet medication  # Overweight: Estimated body mass index is 29.16 kg/m  as calculated from the following:    Height as of this encounter: 1.803 m (5' 10.98\").    Weight as of this encounter: 94.8 kg (209 lb).       "

## 2023-05-17 NOTE — ANESTHESIA POSTPROCEDURE EVALUATION
Patient: Oswaldo Prabhakar    Procedure: Procedure(s):  RIGHT EYE CYCLOPHOTOCOAGULATION TRANSSCLERAL WITH Gprobe LASER       Anesthesia Type:  General    Note:  Disposition: Outpatient   Postop Pain Control: Uneventful            Sign Out: Well controlled pain   PONV: No   Neuro/Psych: Uneventful            Sign Out: Acceptable/Baseline neuro status   Airway/Respiratory: Uneventful            Sign Out: Acceptable/Baseline resp. status   CV/Hemodynamics: Uneventful            Sign Out: Acceptable CV status   Other NRE: NONE   DID A NON-ROUTINE EVENT OCCUR? No           Last vitals:  Vitals Value Taken Time   /80 05/17/23 1203   Temp     Pulse 51 05/17/23 1206   Resp 17 05/17/23 1206   SpO2 99 % 05/17/23 1206   Vitals shown include unvalidated device data.    Electronically Signed By: Edson Driver MD  May 17, 2023  12:07 PM

## 2023-05-17 NOTE — ANESTHESIA CARE TRANSFER NOTE
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    Anesthesia Type:   General     Note:    Oropharynx: oropharynx clear of all foreign objects and spontaneously breathing  Level of Consciousness: awake  Oxygen Supplementation: room air      Dentition: dentition unchanged  Vital Signs Stable: post-procedure vital signs reviewed and stable  Report to RN Given: handoff report given  Patient transferred to: PACU  Comments: Uneventful transport   Report to RN - nunu BOWENS  Pt comfortable  Exchanging well; color natl  Pt responds appropriately to command  IV patent  Lips/teeth/dentition as preop status  Questions answered    Handoff Report: Identifed the Patient, Identified the Reponsible Provider, Reviewed the pertinent medical history, Discussed the surgical course, Reviewed Intra-OP anesthesia mangement and issues during anesthesia, Set expectations for post-procedure period and Allowed opportunity for questions and acknowledgement of understanding      Vitals:  Vitals Value Taken Time   /80 05/17/23 1203   Temp 96.5    Pulse 51 05/17/23 1205   Resp 17 05/17/23 1205   SpO2 99 % 05/17/23 1205   Vitals shown include unvalidated device data.    Electronically Signed By: NOLBERTO HYDE CRNA  May 17, 2023  12:06 PM

## 2023-05-18 ENCOUNTER — DOCUMENTATION ONLY (OUTPATIENT)
Dept: ANTICOAGULATION | Facility: CLINIC | Age: 63
End: 2023-05-18

## 2023-05-18 ENCOUNTER — OFFICE VISIT (OUTPATIENT)
Dept: OPHTHALMOLOGY | Facility: CLINIC | Age: 63
End: 2023-05-18
Attending: OPHTHALMOLOGY
Payer: COMMERCIAL

## 2023-05-18 DIAGNOSIS — Z98.890 POSTOPERATIVE EYE STATE: Primary | ICD-10-CM

## 2023-05-18 DIAGNOSIS — H40.51X3 NEOVASCULAR GLAUCOMA OF RIGHT EYE, SEVERE STAGE: ICD-10-CM

## 2023-05-18 DIAGNOSIS — D49.81 CHOROID TUMOR: ICD-10-CM

## 2023-05-18 PROCEDURE — 99024 POSTOP FOLLOW-UP VISIT: CPT | Mod: GC | Performed by: OPHTHALMOLOGY

## 2023-05-18 PROCEDURE — G0463 HOSPITAL OUTPT CLINIC VISIT: HCPCS | Performed by: OPHTHALMOLOGY

## 2023-05-18 ASSESSMENT — TONOMETRY
IOP_METHOD: APPLANATION TT
OD_IOP_MMHG: 41
OS_IOP_MMHG: 14
IOP_METHOD: TONOPEN
OD_IOP_MMHG: 43

## 2023-05-18 ASSESSMENT — VISUAL ACUITY
OS_SC: 20/15
METHOD: SNELLEN - LINEAR

## 2023-05-18 ASSESSMENT — SLIT LAMP EXAM - LIDS
COMMENTS: NO PTOSIS
COMMENTS: NORMAL

## 2023-05-18 ASSESSMENT — EXTERNAL EXAM - LEFT EYE: OS_EXAM: NORMAL

## 2023-05-18 ASSESSMENT — EXTERNAL EXAM - RIGHT EYE: OD_EXAM: NORMAL

## 2023-05-18 NOTE — PROGRESS NOTES
ANTICOAGULATION     Oswaldo Prabhakar is overdue for INR check.      Reminder letter sent    Marielena Chacon RN

## 2023-05-18 NOTE — PATIENT INSTRUCTIONS
Continue not taking Diamox pills    Eye drops RIGHT EYE  Prednisolone acetate (pink cap) every hour while awake   Cosopt (blue cap) 2 times a day  Brimonidine (purple cap) 3 times a day  Latanoprost (teal cap) once at bedtime

## 2023-05-18 NOTE — PROGRESS NOTES
Chief Complaint/Presenting Concern: Post-op    History of Present Illness:   Oswaldo Prabhakar is a 62 year old patient who presents for referral for CPC right eye due to primary glaucoma provider Dr. iFore at [facility] not having access to a CPC machine. 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 He has had one injection int the right eye about 6 months ago. He says his right eye feels different than his left eye but he does not have any pain and he says his vision in both eyes is at his baseline.  - 05/17/2023: s/p right eye TSCPC #20 shots    Today, 05/18/2023, patient states soreness has improved significantly. Stopped the Diamox yesterday. Hasn't used eye drops yet.  Vision in the good left eye is perfect, no changes.     Relevant Past Medical/Family/Social History:  Past Medical History:   Diagnosis Date     Antiplatelet or antithrombotic long-term use      Coronary artery disease      Diaphragmatic hernia without mention of obstruction or gangrene      Heart disease      Hernia, abdominal      History of blood transfusion      History of thrombophlebitis      Hypertension      Nephrolithiasis 4/2010     Other and unspecified hyperlipidemia      Pulmonary embolus and infarction (H)     s/p hemothorax and filter s/p filter removal (2011), now on long term anti-coagulation     Statin intolerance 2/1/2017     Stented coronary artery 01/07/2020    NIR mid LAD     Unspecified retinal detachment     Childhood trauma, unrepaired     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[employer]  No family history of glaucoma    Relevant Review of Systems: chronic decreased vision in the right eye, no pain in the right eye, feels like his left eye is at baseline as well     Diagnosis: NVG right eye   Year diagnosis: 2023  Previous  glaucoma surgery/laser: CEIOL left eye, PRP left eye, CEIOL right eye  Maximum intraocular pressure 50s  Currently Meds: Diamox 500 mg PO BID, prednisolone BID OD, timolol BID right eye, Brimonidine TID OD  Family history: negative  CCT: 551/548  Gonio:  Refractive status: Pseudophakia  Trauma history: negative  Steroid exposure: positive prednisolone  Vasospastic disease: Migrane/Raynaud phenomenon: negative  A past hemodynamic crisis or Low BP: negative  Meds AEs/intolerance: none  PMHx: Asthma and respiratory problems/Cardiac/Renal/Kidney stones/Sulfa Allergy: kidney stones, bypass surgery  Anticoagulants: Warfarin for complicated PE    - Visual field April 13, 2023  - Right eye - not able to completed  - Left eye - normal, reliable  - OCT Optic Nerve RNFL Spectralis April 13, 2023  - Right Eye: diffuse atrophy, reliable  - Left Eye: trace thinning IT, reliable    Today's testing:  - IOP: 43 mmHg applanation right eye    Additional Ocular History:   Anterior uveitis, chronic, right eye    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    Patient has chronic RD/inflammation and neovascularization in the right eye. He has had one injection int the right eye about 6 months ago. Will refer to retina for plan to control NVI. If neovascularization is poorly controlled TSCPC will cause worsening of NV and inflammation and will likely fail. Recommended TSCPC right eye to control IOP. High risk for tube surgery given active NVI and highly elevated IOP.    Now POD#1 s/p right eye TSCPC (20 shots) 05/17/2023, significant AC inflammation.     Continue holding off Diamox    Eye drops RIGHT EYE  o Prednisolone acetate every hour    o Cosopt 2 times a day  o Brimonidine 3 times a  day  o Latanoprost at bedtime (poor vision with macular scarring)    RTC: 1 week [facility], IOP    Physician Attestation     Attending Physician Attestation:  Complete documentation of historical and exam elements from today's encounter can be found in the full encounter summary report (not reduplicated in this progress note). I personally obtained the chief complaint(s) and history of present illness. I confirmed and edited as necessary the review of systems, past medical/surgical history, family history, social history, and examination findings as documented by others; and I examined the patient myself. I personally reviewed the relevant tests, images, and reports as documented above. I formulated and edited as necessary the assessment and plan and discussed the findings and management plan with the patient and any family members present at the time of the visit.  Jya Jay Savage M.D., Glaucoma, May 18, 2023    My privilege to be part of your care,  Eddi Engle MD, MSc  Ophthalmology PGY-3 resident physician

## 2023-05-18 NOTE — LETTER
[facility]  [address] [address]  Phone: [phone]  Fax: [phone]     May 18, 2023        Oswaldo Prabhakar  [address] [address]            Dear Oswaldo,    You are currently under the care of [facility] for your warfarin (Coumadin , Jantoven ) therapy.  We are contacting you because our records show you were due for an INR on 3/27/23.    There are potentially serious risks when taking warfarin without careful monitoring and we want to make sure you are safely managed.  Routine lab monitoring is required for warfarin refills.     Please call [phone] as soon as possible to schedule an appointment.  If there has been a change in your care or other concerns, please let us know so we can help and or update our records.     Sincerely,       [facility]

## 2023-05-18 NOTE — LETTER
[facility]ICO[facility]UL[facility]  [address] [address]  Phone: [phone]  Fax: [phone]       May 18, 2023        Oswaldo Prabhakar  [address] [address]            Dear Oswaldo,    You are currently under the care of [facility] for your warfarin (Coumadin , Jantoven ) therapy.  We are contacting you because our records show you were due for an INR on 3/27/23.    There are potentially serious risks when taking warfarin without careful monitoring and we want to make sure you are safely managed.  Routine lab monitoring is required for warfarin refills.     Please call [phone] as soon as possible to schedule an appointment.  If there has been a change in your care or other concerns, please let us know so we can help and or update our records.     Sincerely,       [facility]

## 2023-05-18 NOTE — NURSING NOTE
Chief Complaints and History of Present Illnesses   Patient presents with     Post Op (Ophthalmology) Right Eye     Chief Complaint(s) and History of Present Illness(es)     Post Op (Ophthalmology) Right Eye            Laterality: right eye    Onset: 1 day ago          Comments    1 day s/p CYCLOPHOTOCOAGULATION TRANSSCLERAL WITH Gprobe LASER RE 05/17/2023-Pt. States that he is doing well. No pain BE. Was able to sleep ok last night.   Ynes Enrique COT 10:07 AM May 18, 2023

## 2023-05-20 NOTE — OP NOTE
"Oswaldo Prabhakar  [id number]  5/17/2023    PREOPERATIVE DIAGNOSIS: Neovascular glaucoma of right eye, severe stage    POSTOPERATIVE DIAGNOSIS: Same as pre-operative diagnosis    OPERATION PERFORMED: Procedure(s):  RIGHT EYE CYCLOPHOTOCOAGULATION TRANSSCLERAL WITH Gprobe LASER    SURGEON:  Jay Jay Savage MD- Primary Surgeon  Eddi Engle MD- Assisting Resident     ANESTHESIA: MAC with block    COMPLICATIONS:  none    FINDINGS:  Anatomy as expected    ESTIMATED BLOOD LOSS:  none    SPECIMENS:  * No specimens in log *    IMPLANTS: None    The patient Oswaldo Prabhakar was met in the operating area, correct patient, correct eye, correct procedure were verified in a time out. Monitored anesthesia care was initiated and a retrobulbar block was administered to the operative eye. The operative eye was prepped and draped in the usual sterile fashion. After a time out procedure, a speculum was inserted to the operative eye aand the laser was used with the following settings: 4 seconds, 1250 mW, 20 applications over 360 degrees. The patient tolerated the procedure well. he received a subconjunctival injection of decadron, steroid and antibiotics ointment, a patch and a shield and left for recovery in stable condition.    \"Oswaldo Prabhakar\" will be seen in clinic tomorrow as scheduled.      "

## 2023-05-23 ENCOUNTER — OFFICE VISIT (OUTPATIENT)
Dept: OPHTHALMOLOGY | Facility: CLINIC | Age: 63
End: 2023-05-23
Attending: OPHTHALMOLOGY
Payer: COMMERCIAL

## 2023-05-23 DIAGNOSIS — Z98.890 POSTOPERATIVE EYE STATE: Primary | ICD-10-CM

## 2023-05-23 DIAGNOSIS — H40.51X3 NEOVASCULAR GLAUCOMA OF RIGHT EYE, SEVERE STAGE: ICD-10-CM

## 2023-05-23 PROCEDURE — 99024 POSTOP FOLLOW-UP VISIT: CPT | Mod: GC | Performed by: OPHTHALMOLOGY

## 2023-05-23 PROCEDURE — G0463 HOSPITAL OUTPT CLINIC VISIT: HCPCS | Performed by: OPHTHALMOLOGY

## 2023-05-23 ASSESSMENT — TONOMETRY
OD_IOP_MMHG: 24
OD_IOP_MMHG: 26
IOP_METHOD: TONOPEN
IOP_METHOD: TONOPEN
OS_IOP_MMHG: 13
OS_IOP_MMHG: 12
IOP_METHOD: APPLANATION TT
OD_IOP_MMHG: 27

## 2023-05-23 ASSESSMENT — VISUAL ACUITY
OS_SC: 20/15
OD_SC: HM
METHOD: SNELLEN - LINEAR

## 2023-05-23 ASSESSMENT — EXTERNAL EXAM - LEFT EYE: OS_EXAM: NORMAL

## 2023-05-23 ASSESSMENT — EXTERNAL EXAM - RIGHT EYE: OD_EXAM: NORMAL

## 2023-05-23 ASSESSMENT — SLIT LAMP EXAM - LIDS
COMMENTS: NO PTOSIS
COMMENTS: NORMAL

## 2023-05-23 NOTE — NURSING NOTE
Chief Complaints and History of Present Illnesses   Patient presents with     Post Op (Ophthalmology) Right Eye     Chief Complaint(s) and History of Present Illness(es)     Post Op (Ophthalmology) Right Eye            Laterality: right eye    Onset: 1 week ago          Comments    s/p right eye TSCPC #20 shots-Pt. States that it feels like there is something in RE. No pain BE. No change in VA BE.   Ynes Enrique COT 10:12 AM May 23, 2023

## 2023-05-23 NOTE — PATIENT INSTRUCTIONS
Eye drops RIGHT EYE  Prednisolone acetate (pink cap) 6 times daily for 1 week then decrease to 4 times daily until next visit   Cosopt (blue cap) 2 times a day  Brimonidine (purple cap) 3 times a day  Latanoprost (teal cap) once at bedtime

## 2023-05-23 NOTE — PROGRESS NOTES
Chief Complaint/Presenting Concern: Post-op    History of Present Illness:   Oswaldo Prabhakar is a 62 year old patient who presents for referral for CPC right eye due to primary glaucoma provider Dr. Fiore at [facility] not having access to a CPC machine. 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 He has had one injection int the right eye about 6 months ago. He says his right eye feels different than his left eye but he does not have any pain and he says his vision in both eyes is at his baseline.  - 05/17/2023: s/p right eye TSCPC #20 shots    Today, 05/23/2023, POW#1, patient states eye is more comfortable now. Just intermittent FBS.   Vision in the good left eye is perfect, no changes.     Relevant Past Medical/Family/Social History:  Past Medical History:   Diagnosis Date     Antiplatelet or antithrombotic long-term use      Coronary artery disease      Diaphragmatic hernia without mention of obstruction or gangrene      Heart disease      Hernia, abdominal      History of blood transfusion      History of thrombophlebitis      Hypertension      Nephrolithiasis 4/2010     Other and unspecified hyperlipidemia      Pulmonary embolus and infarction (H)     s/p hemothorax and filter s/p filter removal (2011), now on long term anti-coagulation     Statin intolerance 2/1/2017     Stented coronary artery 01/07/2020    NIR mid LAD     Unspecified retinal detachment     Childhood trauma, unrepaired     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[employer]  No family history of glaucoma    Relevant Review of Systems: chronic decreased vision in the right eye, no pain in the right eye, feels like his left eye is at baseline as well     Diagnosis: NVG right eye   Year diagnosis: 2023  Previous glaucoma surgery/laser: CEIOL left  eye, PRP left eye, CEIOL right eye  Maximum intraocular pressure 50s  Currently Meds: Diamox 500 mg PO BID, prednisolone BID OD, timolol BID right eye, Brimonidine TID OD  Family history: negative  CCT: 551/548  Gonio:  Refractive status: Pseudophakia  Trauma history: negative  Steroid exposure: positive prednisolone  Vasospastic disease: Migrane/Raynaud phenomenon: negative  A past hemodynamic crisis or Low BP: negative  Meds AEs/intolerance: none  PMHx: Asthma and respiratory problems/Cardiac/Renal/Kidney stones/Sulfa Allergy: kidney stones, bypass surgery  Anticoagulants: Warfarin for complicated PE    - Visual field April 13, 2023  - Right eye - not able to completed  - Left eye - normal, reliable  - OCT Optic Nerve RNFL Spectralis April 13, 2023  - Right Eye: diffuse atrophy, reliable  - Left Eye: trace thinning IT, reliable    Today's testing:  - IOP: 26 and 12 mmHg applanation   AC cell rare    Additional Ocular History:   Anterior uveitis, chronic, right eye    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    Patient has chronic RD/inflammation and neovascularization in the right eye. He has had one injection int the right eye about 6 months ago. Will refer to retina for plan to control NVI. If neovascularization is poorly controlled TSCPC will cause worsening of NV and inflammation and will likely fail. Recommended TSCPC right eye to control IOP. High risk for tube surgery given active NVI and highly elevated IOP.    Now POW#1 s/p right eye TSCPC (20 shots) 05/17/2023, no post-op inflammation, IOP 26 (vs 58 pre-CPC). Subjectively comfortable.     Continue holding off Diamox    Eye drops RIGHT EYE  o Decrease Prednisolone acetate 6x/day for 1 week, then  QID  o Cosopt 2 times a day  o Brimonidine 3 times a day  o Latanoprost at bedtime (poor vision with macular scarring)    RTC: 3 weeks VA, IOP    Physician Attestation     Attending Physician Attestation:  Complete documentation of historical and exam elements from today's encounter can be found in the full encounter summary report (not reduplicated in this progress note). I personally obtained the chief complaint(s) and history of present illness. I confirmed and edited as necessary the review of systems, past medical/surgical history, family history, social history, and examination findings as documented by others; and I examined the patient myself. I personally reviewed the relevant tests, images, and reports as documented above. I formulated and edited as necessary the assessment and plan and discussed the findings and management plan with the patient and any family members present at the time of the visit.  Jay Jay Savage M.D., Glaucoma, May 23, 2023    My privilege to be part of your care,  Eddi Engle MD, MSc  Ophthalmology PGY-3 resident physician

## 2023-05-26 DIAGNOSIS — H21.1X1: Primary | ICD-10-CM

## 2023-06-09 ENCOUNTER — TELEPHONE (OUTPATIENT)
Dept: ANTICOAGULATION | Facility: CLINIC | Age: 63
End: 2023-06-09
Payer: COMMERCIAL

## 2023-06-09 DIAGNOSIS — I27.82 CHRONIC PULMONARY EMBOLISM WITHOUT ACUTE COR PULMONALE, UNSPECIFIED PULMONARY EMBOLISM TYPE (H): ICD-10-CM

## 2023-06-09 RX ORDER — WARFARIN SODIUM 5 MG/1
TABLET ORAL
Qty: 40 TABLET | Refills: 0 | Status: SHIPPED | OUTPATIENT
Start: 2023-06-09 | End: 2023-07-07

## 2023-06-09 NOTE — TELEPHONE ENCOUNTER
6/9/23    Oswaldo is unavailable at time of encounter.  Will call writer back.  Patient requests refill of Warfarin.  Would like to schedule patient for an INR and discuss strength of tablets and pharmacy preference prior to submitting prescription.    Kwesi Gregory RN      Attempt x2:  LVM at 4:20pm requesting patient schedule next INR.  Filled valencia prescription today 6/9/23. DEMETRA [place]

## 2023-06-09 NOTE — TELEPHONE ENCOUNTER
ANTICOAGULATION MANAGEMENT:  Medication Refill    Anticoagulation Summary  As of 3/13/2023    Warfarin maintenance plan:  5 mg (5 mg x 1) every Fri; 7.5 mg (5 mg x 1.5) all other days   Next INR check:  3/27/2023   Target end date:  Indefinite    Indications    Pulmonary emboli (H) [I26.99]  Long-term (current) use of anticoagulants [Z79.01] [Z79.01]             Anticoagulation Care Providers     Provider Role Specialty Phone number    Samm Vazquez MD Referring Internal Medicine - Pediatrics [phone]          Visit with referring provider/group within last year: Yes    [facility] referral signed last signed: 09/25/2022; within last year: Yes    Oswaldo does NOT meet all criteria for refill: > 2 weeks overdue for lab monitoring . 30 day valencia fill approved; patient notified to schedule labs per [facility] protocol    Kwesi Gregory RN  Anticoagulation Clinic

## 2023-06-12 ENCOUNTER — OFFICE VISIT (OUTPATIENT)
Dept: OPHTHALMOLOGY | Facility: CLINIC | Age: 63
End: 2023-06-12
Attending: OPHTHALMOLOGY
Payer: COMMERCIAL

## 2023-06-12 DIAGNOSIS — H21.1X1: ICD-10-CM

## 2023-06-12 DIAGNOSIS — D31.31 CHOROIDAL NEVUS, RIGHT: Primary | ICD-10-CM

## 2023-06-12 PROCEDURE — 99214 OFFICE O/P EST MOD 30 MIN: CPT | Mod: 24 | Performed by: OPHTHALMOLOGY

## 2023-06-12 PROCEDURE — G0463 HOSPITAL OUTPT CLINIC VISIT: HCPCS | Performed by: OPHTHALMOLOGY

## 2023-06-12 PROCEDURE — 76510 OPH US DX B-SCAN&QUAN A-SCAN: CPT | Performed by: OPHTHALMOLOGY

## 2023-06-12 PROCEDURE — 92134 CPTRZ OPH DX IMG PST SGM RTA: CPT | Performed by: OPHTHALMOLOGY

## 2023-06-12 PROCEDURE — 99207 FUNDUS PHOTOS OU (BOTH EYES): CPT | Mod: 26 | Performed by: OPHTHALMOLOGY

## 2023-06-12 PROCEDURE — 92250 FUNDUS PHOTOGRAPHY W/I&R: CPT | Performed by: OPHTHALMOLOGY

## 2023-06-12 ASSESSMENT — CONF VISUAL FIELD
OD_INFERIOR_NASAL_RESTRICTION: 1
OD_SUPERIOR_NASAL_RESTRICTION: 1
OD_SUPERIOR_TEMPORAL_RESTRICTION: 1
OD_INFERIOR_TEMPORAL_RESTRICTION: 1

## 2023-06-12 ASSESSMENT — VISUAL ACUITY
METHOD: SNELLEN - LINEAR
OS_SC: 20/15
OD_SC: HM

## 2023-06-12 ASSESSMENT — EXTERNAL EXAM - RIGHT EYE: OD_EXAM: NORMAL

## 2023-06-12 ASSESSMENT — TONOMETRY
IOP_METHOD: TONOPEN
OS_IOP_MMHG: 15
OD_IOP_MMHG: 42

## 2023-06-12 ASSESSMENT — SLIT LAMP EXAM - LIDS
COMMENTS: NORMAL
COMMENTS: NORMAL

## 2023-06-12 ASSESSMENT — EXTERNAL EXAM - LEFT EYE: OS_EXAM: NORMAL

## 2023-06-12 NOTE — NURSING NOTE
Chief Complaints and History of Present Illnesses   Patient presents with     Iris Neovascularization Follow Up     Chief Complaint(s) and History of Present Illness(es)     Iris Neovascularization Follow Up            Laterality: right eye    Associated symptoms: Negative for dryness, eye pain, tearing, flashes, floaters and itching    Pain scale: 0/10          Comments    Oswaldo is here to continue care for Neovascularization of iris or ciliary body, right. He has had RE laser surgery since last visit ( for glaucoma).    Rigoberto Monique COT 7:34 AM June 12, 2023

## 2023-06-12 NOTE — PROGRESS NOTES
CC -   Choroidal lesion OD    INTERVAL HISTORY - Initial visit with me, referred for choroidal lesion OD referred by Modesta  No pain      PMH  -   Oswaldo Prabhakar is a 62 year old patient with h/o choroidal lesion OD noted 4/2023 by Modesta.      Came to [facility] for NVG OD 8/2022 seen in ED, had been at [facility], diagnosis with NVG OD and referred to [facility] for CPC and PRP, seen by Adebayo at [facility] and Modesta & Janette at [facility].    Poor vision OD since childhood d/t RD not diagnosis until too late to repair    CAD s/p CABG x 4  No h/o cancer    PE on chronic warfarin    PAST OCULAR SURGERY:  CE/IOL OU 2022  Laser pexy OS 2000s  CPC OD 5/17/23 ([facility])      RETINAL IMAGING  OCT 06/12/23   OD - diffuse ERM, severe outer atrophy diffuse  OS - retina normal, PHF attached      FA 4/19/23:  - Right eye: Diffuse pinpoint hyperfluorescence, trace disc leakage.   - Left eye: Trace disc leakage.       U/S OD  A-scan - high reflective (6/2023)  B-scan - ST mass with hyperR base, size 2.27 x 5.88T . 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   - noted 4/19/232023   - dome shaped anterior, difficult to see on exam d/t IOL edge/capsule & modeate pupil   - U/S not typical for met or CMM, hyper-reflective base but no visible external abnormalities   - ?weak transillumination shadow visible (6/2023)   - monitor closely      - stable at 2 months 6/2023   - recheck 3-4 months   - if stable then longer intervals      # NVG OD   - sees Janette   - h/o Avastin ~ 1/2023  (VRS)   - seen by Modesta 4/19/23, no sig ischemia to Tx on FA 4/19/23   - s/p CPC 6/2023     - IOP still very high, no pain   - could consider anti-VEGF         # h/o RD OD childhood 1970s   - ?exudative vs RRD   - resolved spontanously with poor vision since childhood          RTC: 3 months, Optos OD, U/S OD A+B scan - OCT far ST periphery OD        ATTESTATION     Attending  Physician Attestation:      Complete documentation of historical and exam elements from today's encounter can be found in the full encounter summary report (not reduplicated in this progress note).  I personally obtained the chief complaint(s) and history of present illness.  I confirmed and edited as necessary the review of systems, past medical/surgical history, family history, social history, and examination findings as documented by others; and I examined the patient myself.  I personally reviewed the relevant tests, images, and reports as documented above.  I formulated and edited as necessary the assessment and plan and discussed the findings and management plan with the patient and family    Maya Isaac MD, PhD  , Vitreoretinal Surgery  Department of Ophthalmology  [facility]

## 2023-06-13 ENCOUNTER — OFFICE VISIT (OUTPATIENT)
Dept: OPHTHALMOLOGY | Facility: CLINIC | Age: 63
End: 2023-06-13
Attending: OPHTHALMOLOGY
Payer: COMMERCIAL

## 2023-06-13 DIAGNOSIS — Z98.890 POSTOPERATIVE EYE STATE: Primary | ICD-10-CM

## 2023-06-13 DIAGNOSIS — H40.51X3 NEOVASCULAR GLAUCOMA OF RIGHT EYE, SEVERE STAGE: ICD-10-CM

## 2023-06-13 PROCEDURE — G0463 HOSPITAL OUTPT CLINIC VISIT: HCPCS | Performed by: OPHTHALMOLOGY

## 2023-06-13 PROCEDURE — 99024 POSTOP FOLLOW-UP VISIT: CPT | Performed by: OPHTHALMOLOGY

## 2023-06-13 RX ORDER — DORZOLAMIDE HYDROCHLORIDE AND TIMOLOL MALEATE 20; 5 MG/ML; MG/ML
1 SOLUTION/ DROPS OPHTHALMIC 2 TIMES DAILY
Qty: 10 ML | Refills: 3 | Status: SHIPPED | OUTPATIENT
Start: 2023-06-13 | End: 2024-02-21

## 2023-06-13 RX ORDER — LATANOPROST 50 UG/ML
1 SOLUTION/ DROPS OPHTHALMIC AT BEDTIME
Qty: 2.5 ML | Refills: 4 | Status: SHIPPED | OUTPATIENT
Start: 2023-06-13 | End: 2024-02-21

## 2023-06-13 RX ORDER — BRIMONIDINE TARTRATE 2 MG/ML
1 SOLUTION/ DROPS OPHTHALMIC 3 TIMES DAILY
Qty: 10 ML | Refills: 4 | Status: SHIPPED | OUTPATIENT
Start: 2023-06-13 | End: 2024-02-21

## 2023-06-13 ASSESSMENT — TONOMETRY
OS_IOP_MMHG: 14
OD_IOP_MMHG: 60
OD_IOP_MMHG: 60
OS_IOP_MMHG: 14
IOP_METHOD: APPLANATION
IOP_METHOD: APPLANATION TT

## 2023-06-13 ASSESSMENT — CONF VISUAL FIELD
OS_INFERIOR_TEMPORAL_RESTRICTION: 0
OS_NORMAL: 1
OD_INFERIOR_NASAL_RESTRICTION: 1
OD_SUPERIOR_TEMPORAL_RESTRICTION: 1
OS_SUPERIOR_TEMPORAL_RESTRICTION: 0
OD_INFERIOR_TEMPORAL_RESTRICTION: 1
OS_INFERIOR_NASAL_RESTRICTION: 0
OD_SUPERIOR_NASAL_RESTRICTION: 1
OS_SUPERIOR_NASAL_RESTRICTION: 0
METHOD: COUNTING FINGERS

## 2023-06-13 ASSESSMENT — EXTERNAL EXAM - LEFT EYE: OS_EXAM: NORMAL

## 2023-06-13 ASSESSMENT — SLIT LAMP EXAM - LIDS
COMMENTS: NORMAL
COMMENTS: NORMAL

## 2023-06-13 ASSESSMENT — VISUAL ACUITY
METHOD: SNELLEN - LINEAR
OD_SC: HM
OS_SC: 20/20

## 2023-06-13 ASSESSMENT — EXTERNAL EXAM - RIGHT EYE: OD_EXAM: XT

## 2023-06-13 NOTE — PROGRESS NOTES
Chief Complaint/Presenting Concern: Post-op    History of Present Illness:   Oswaldo Prabhakar is a 62 year old patient who presents for referral for CPC right eye due to primary glaucoma provider Dr. Fiore at [facility] not having access to a CPC machine. 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 He has had one injection int the right eye about 6 months ago. He says his right eye feels different than his left eye but he does not have any pain and he says his vision in both eyes is at his baseline.  - 05/17/2023: s/p right eye TSCPC #20 shots    Today, 06/13/2023, POW#4, patient states eye remains comfortable. Just intermittent FBS. Vision in the good left eye is perfect, no changes.   Using eye drops as recommended.     Relevant Past Medical/Family/Social History:  Past Medical History:   Diagnosis Date     Antiplatelet or antithrombotic long-term use      Coronary artery disease      Diaphragmatic hernia without mention of obstruction or gangrene      Heart disease      Hernia, abdominal      History of blood transfusion      History of thrombophlebitis      Hypertension      Nephrolithiasis 4/2010     Other and unspecified hyperlipidemia      Pulmonary embolus and infarction (H)     s/p hemothorax and filter s/p filter removal (2011), now on long term anti-coagulation     Statin intolerance 2/1/2017     Stented coronary artery 01/07/2020    NIR mid LAD     Unspecified retinal detachment     Childhood trauma, unrepaired     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[employer]  No family history of glaucoma    Relevant Review of Systems: chronic decreased vision in the right eye, no pain in the right eye, feels like his left eye is at baseline as well     Diagnosis: NVG right eye   Year diagnosis: 2023  Previous glaucoma  surgery/laser: CEIOL left eye, PRP left eye, CEIOL right eye  Maximum intraocular pressure 50s  Currently Meds: Diamox 500 mg PO BID, prednisolone BID OD, timolol BID right eye, Brimonidine TID OD  Family history: negative  CCT: 551/548  Gonio:  Refractive status: Pseudophakia  Trauma history: negative  Steroid exposure: positive prednisolone  Vasospastic disease: Migrane/Raynaud phenomenon: negative  A past hemodynamic crisis or Low BP: negative  Meds AEs/intolerance: none  PMHx: Asthma and respiratory problems/Cardiac/Renal/Kidney stones/Sulfa Allergy: kidney stones, bypass surgery  Anticoagulants: Warfarin for complicated PE    - Visual field April 13, 2023  - Right eye - not able to completed  - Left eye - normal, reliable  - OCT Optic Nerve RNFL Spectralis April 13, 2023  - Right Eye: diffuse atrophy, reliable  - Left Eye: trace thinning IT, reliable    Today's testing:  - IOP: 60 and 14 mmHg applanation   AC cell rare    Additional Ocular History:   Anterior uveitis, chronic, right eye    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    Patient has chronic RD/inflammation and neovascularization in the right eye. He has had one injection int the right eye about 6 months ago. Will refer to retina for plan to control NVI. If neovascularization is poorly controlled TSCPC will cause worsening of NV and inflammation and will likely fail. Recommended TSCPC right eye to control IOP. High risk for tube surgery given active NVI and highly elevated IOP.    Now POW#4 s/p right eye TSCPC (20 shots) 05/17/2023, no post-op inflammation, IOP 60 (vs 58 pre-CPC). Subjectively comfortable. Could consider repeat TSCPC next month versus tube shunt, poor visual prognosis.     Continue  holding off Diamox    Eye drops RIGHT EYE  o Decrease Prednisolone acetate to 4 times a day and continue for NVG/comfort  o Cosopt 2 times a day  o Brimonidine 3 times a day  o Latanoprost at bedtime    RTC in 1 month VA, IOP     Physician Attestation     Attending Physician Attestation:  Complete documentation of historical and exam elements from today's encounter can be found in the full encounter summary report (not reduplicated in this progress note). I personally obtained the chief complaint(s) and history of present illness. I confirmed and edited as necessary the review of systems, past medical/surgical history, family history, social history, and examination findings as documented by others; and I examined the patient myself. I personally reviewed the relevant tests, images, and reports as documented above. I formulated and edited as necessary the assessment and plan and discussed the findings and management plan with the patient and any family members present at the time of the visit.  Jay Jay Savage M.D., Glaucoma, June 13, 2023    My privilege to be part of your care,  Eddi Engle MD, MSc  Ophthalmology PGY-3 resident physician

## 2023-06-13 NOTE — NURSING NOTE
Chief Complaints and History of Present Illnesses   Patient presents with     Follow Up     Choroidal nevus RE     Chief Complaint(s) and History of Present Illness(es)     Follow Up            Laterality: right eye    Associated symptoms: Negative for eye pain, photophobia, flashes and floaters    Comments: Choroidal nevus RE          Comments    Pt states eyes have been feeling about the same. No eye pain, flashes, floaters or sensitivity to lights.     Juan Antonio Corral 10:36 AM June 13, 2023

## 2023-06-20 ENCOUNTER — TELEPHONE (OUTPATIENT)
Dept: CARDIOLOGY | Facility: CLINIC | Age: 63
End: 2023-06-20
Payer: COMMERCIAL

## 2023-06-22 ENCOUNTER — TELEPHONE (OUTPATIENT)
Dept: ANTICOAGULATION | Facility: CLINIC | Age: 63
End: 2023-06-22
Payer: COMMERCIAL

## 2023-06-22 DIAGNOSIS — I10 ESSENTIAL HYPERTENSION: ICD-10-CM

## 2023-06-22 NOTE — TELEPHONE ENCOUNTER
ANTICOAGULATION     Oswaldo Prabhakar is overdue for INR check.      Spoke with pt and scheduled lab appointment on +6/27    Josephine Leonard RN

## 2023-06-26 DIAGNOSIS — E78.5 HYPERLIPIDEMIA LDL GOAL <130: ICD-10-CM

## 2023-06-26 DIAGNOSIS — Z95.1 S/P CABG X 4: ICD-10-CM

## 2023-06-26 DIAGNOSIS — Z78.9 STATIN INTOLERANCE: ICD-10-CM

## 2023-06-26 DIAGNOSIS — I25.739 CORONARY ARTERY DISEASE INVOLVING NONAUTOLOGOUS BIOLOGICAL CORONARY BYPASS GRAFT WITH ANGINA PECTORIS (H): ICD-10-CM

## 2023-06-26 RX ORDER — LISINOPRIL 2.5 MG/1
TABLET ORAL
Qty: 90 TABLET | Refills: 0 | Status: SHIPPED | OUTPATIENT
Start: 2023-06-26 | End: 2023-09-27

## 2023-06-26 NOTE — TELEPHONE ENCOUNTER
lisinopril (ZESTRIL) 2.5 MG tablet  Last Written Prescription Date:  8/2/22  Last Fill Quantity: 90,   # refills: 1  Last Office Visit : 5/16/23  Future Office visit:  8/21/23    Routing refill request to provider for review/approval because:  Gap in med rf.  MESERET Hartman+ past due    '

## 2023-06-26 NOTE — TELEPHONE ENCOUNTER
Pt was last seen in clinic on 5/16/23. Pt last had lisinopril (ZESTRIL) 2.5 MG tablet refilled on 3/27/23 for a 90 day supply by PCP. Due for refill. Medication refill sent to pharmacy for 90 day supply, CMP ordered.     AKHIL CHRISTINE RN on 6/26/2023 at 3:25 PM

## 2023-06-27 ENCOUNTER — LAB (OUTPATIENT)
Dept: LAB | Facility: CLINIC | Age: 63
End: 2023-06-27
Payer: COMMERCIAL

## 2023-06-27 DIAGNOSIS — I10 ESSENTIAL HYPERTENSION: ICD-10-CM

## 2023-06-27 DIAGNOSIS — Z79.01 LONG TERM CURRENT USE OF ANTICOAGULANT THERAPY: ICD-10-CM

## 2023-06-27 DIAGNOSIS — E78.5 HYPERLIPIDEMIA LDL GOAL <70: ICD-10-CM

## 2023-06-27 DIAGNOSIS — I26.99 PULMONARY EMBOLI (H): ICD-10-CM

## 2023-06-27 LAB — INR PPP: 3.86 (ref 0.85–1.15)

## 2023-06-27 PROCEDURE — 85610 PROTHROMBIN TIME: CPT | Performed by: PATHOLOGY

## 2023-06-27 PROCEDURE — 36415 COLL VENOUS BLD VENIPUNCTURE: CPT | Performed by: PATHOLOGY

## 2023-06-29 RX ORDER — EVOLOCUMAB 140 MG/ML
INJECTION, SOLUTION SUBCUTANEOUS
Qty: 6 ML | Refills: 0 | Status: SHIPPED | OUTPATIENT
Start: 2023-06-29 | End: 2023-08-21

## 2023-06-29 NOTE — TELEPHONE ENCOUNTER
REPATHA SURECLICK 140MG/ML SOAJ      Last Written Prescription Date:  7-20-22  Last Fill Quantity: 6 ml,   # refills: 3  Last Office Visit : 4-4-22  Future Office visit:  8-21-23    Routing refill request to provider for review/approval because:  Med not on cards protocol

## 2023-07-07 DIAGNOSIS — I27.82 CHRONIC PULMONARY EMBOLISM WITHOUT ACUTE COR PULMONALE, UNSPECIFIED PULMONARY EMBOLISM TYPE (H): ICD-10-CM

## 2023-07-07 RX ORDER — WARFARIN SODIUM 5 MG/1
TABLET ORAL
Qty: 50 TABLET | Refills: 0 | Status: SHIPPED | OUTPATIENT
Start: 2023-07-07 | End: 2023-07-26

## 2023-07-07 NOTE — TELEPHONE ENCOUNTER
ANTICOAGULATION MANAGEMENT:  Medication Refill    Anticoagulation Summary  As of 6/27/2023    Warfarin maintenance plan:  5 mg (5 mg x 1) every Fri; 7.5 mg (5 mg x 1.5) all other days   Next INR check:  7/11/2023   Target end date:  Indefinite    Indications    Pulmonary emboli (H) [I26.99]  Long-term (current) use of anticoagulants [Z79.01] [Z79.01]             Anticoagulation Care Providers     Provider Role Specialty Phone number    Samm Vazquez MD Referring Internal Medicine - Pediatrics [phone]          Visit with referring provider/group within last year: Yes    [facility] referral signed last signed: 09/25/2022; within last year: Yes    Oswaldo meets all criteria for refill (current [facility] referral, office visit with referring provider/group in last year, lab monitoring up to date or not exceeding 2 weeks overdue).     Noted that Oswaldo is historically non-compliant with recheck interval and [facility] was unable to reach him to discuss his last INR result which was out of range.     Rx instructions and quantity supplied updated to match patient's current dosing plan. Warfarin 30 day supply granted.     Mag De La Cruz RN  Anticoagulation Clinic

## 2023-07-12 RX ORDER — ACETAZOLAMIDE 250 MG/1
TABLET ORAL
Qty: 1 TABLET | Refills: 3 | OUTPATIENT
Start: 2023-07-12

## 2023-07-18 ENCOUNTER — OFFICE VISIT (OUTPATIENT)
Dept: OPHTHALMOLOGY | Facility: CLINIC | Age: 63
End: 2023-07-18
Attending: OPHTHALMOLOGY
Payer: COMMERCIAL

## 2023-07-18 DIAGNOSIS — Z98.890 POSTOPERATIVE EYE STATE: Primary | ICD-10-CM

## 2023-07-18 PROCEDURE — G0463 HOSPITAL OUTPT CLINIC VISIT: HCPCS | Performed by: OPHTHALMOLOGY

## 2023-07-18 PROCEDURE — 99024 POSTOP FOLLOW-UP VISIT: CPT | Mod: GC | Performed by: OPHTHALMOLOGY

## 2023-07-18 ASSESSMENT — VISUAL ACUITY
METHOD: SNELLEN - LINEAR
OS_SC: 20/20
OD_SC: HM

## 2023-07-18 ASSESSMENT — SLIT LAMP EXAM - LIDS
COMMENTS: NORMAL
COMMENTS: NORMAL

## 2023-07-18 ASSESSMENT — EXTERNAL EXAM - LEFT EYE: OS_EXAM: NORMAL

## 2023-07-18 ASSESSMENT — TONOMETRY
IOP_METHOD: APPLANATION MB
OD_IOP_MMHG: 50
OS_IOP_MMHG: 15
OD_IOP_MMHG: 46
IOP_METHOD: APPLANATION
OS_IOP_MMHG: 15

## 2023-07-18 ASSESSMENT — EXTERNAL EXAM - RIGHT EYE: OD_EXAM: XT

## 2023-07-18 NOTE — PROGRESS NOTES
Chief Complaint/Presenting Concern: Post-op    History of Present Illness:   Oswaldo Prabhakar is a 62 year old patient who presents for referral for CPC right eye due to primary glaucoma provider Dr. Fiore at [facility] not having access to a CPC machine. 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 He has had one injection int the right eye about 6 months ago. He says his right eye feels different than his left eye but he does not have any pain and he says his vision in both eyes is at his baseline.  - 05/17/2023: s/p right eye TSCPC #20 shots    Today, 07/18/2023, POW#9 s/p TSCPC right eye. Patient states eye continues to be comfortable and without pain. No changes in left eye vision. Using IOP drops as instructed but only using prednisolone daily in the right eye.    Relevant Past Medical/Family/Social History:  Past Medical History:   Diagnosis Date     Antiplatelet or antithrombotic long-term use      Coronary artery disease      Diaphragmatic hernia without mention of obstruction or gangrene      Heart disease      Hernia, abdominal      History of blood transfusion      History of thrombophlebitis      Hypertension      Nephrolithiasis 4/2010     Other and unspecified hyperlipidemia      Pulmonary embolus and infarction (H)     s/p hemothorax and filter s/p filter removal (2011), now on long term anti-coagulation     Statin intolerance 2/1/2017     Stented coronary artery 01/07/2020    NIR mid LAD     Unspecified retinal detachment     Childhood trauma, unrepaired     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[employer]  No family history of glaucoma    Relevant Review of Systems: chronic decreased vision in the right eye, no pain in the right eye, feels like his left eye is at baseline as well     Diagnosis: NVG  right eye   Year diagnosis: 2023  Previous glaucoma surgery/laser: CEIOL left eye, PRP left eye, CEIOL right eye  Maximum intraocular pressure 50s  Currently Meds: Diamox 500 mg PO BID, prednisolone BID OD, timolol BID right eye, Brimonidine TID OD  Family history: negative  CCT: 551/548  Gonio:  Refractive status: Pseudophakia  Trauma history: negative  Steroid exposure: positive prednisolone  Vasospastic disease: Migrane/Raynaud phenomenon: negative  A past hemodynamic crisis or Low BP: negative  Meds AEs/intolerance: none  PMHx: Asthma and respiratory problems/Cardiac/Renal/Kidney stones/Sulfa Allergy: kidney stones, bypass surgery  Anticoagulants: Warfarin for complicated PE    - Visual field April 13, 2023  - Right eye - not able to completed  - Left eye - normal, reliable  - OCT Optic Nerve RNFL Spectralis April 13, 2023  - Right Eye: diffuse atrophy, reliable  - Left Eye: trace thinning IT, reliable    Today's testing:  - IOP: 45 and 15 mmHg applanation   AC trace cell right eye    Additional Ocular History:   Anterior uveitis, chronic, right eye    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    Patient has chronic RD/inflammation and neovascularization in the right eye. He has had one injection int the right eye about 6 months ago. Now POW#9 s/p right eye TSCPC (20 shots) 05/17/2023, no post-op inflammation, IOP 46 mmHg. Subjectively comfortable. Could consider repeat TSCPC next month versus tube shunt, poor visual prognosis, message sent to Dr. Isaac for update.      Continue holding off Diamox    Eye drops RIGHT EYE  o Continue Prednisolone acetate  daily for NVG/comfort  o Cosopt 2 times a day  o Brimonidine 3 times a day  o Latanoprost at bedtime    RTC in 2 month [facility], IOP     Melissa Love MD  Resident Physician, PGY-3  Department of Ophthalmology      Physician Attestation     Attending Physician Attestation:  Complete documentation of historical and exam elements from today's encounter can be found in the full encounter summary report (not reduplicated in this progress note). I personally obtained the chief complaint(s) and history of present illness. I confirmed and edited as necessary the review of systems, past medical/surgical history, family history, social history, and examination findings as documented by others; and I examined the patient myself. I formulated and edited as necessary the assessment and plan and discussed the findings and management plan with the patient and any family members present at the time of the visit.  Jay Jay Savage M.D., Glaucoma, July 18, 2023

## 2023-07-18 NOTE — NURSING NOTE
Chief Complaints and History of Present Illnesses   Patient presents with     Post Op (Ophthalmology) Right Eye     Post  CYCLOPHOTOCOAGULATION TRANSSCLERAL WITH Gprobe LASER right eye 05/17/2023     Chief Complaint(s) and History of Present Illness(es)     Post Op (Ophthalmology) Right Eye            Laterality: right eye    Course: stable    Associated symptoms: dryness.  Negative for eye pain, headache and burning    Treatments tried: eye drops and artificial tears    Pain scale: 0/10    Comments: Post  CYCLOPHOTOCOAGULATION TRANSSCLERAL WITH Gprobe LASER right eye 05/17/2023          Comments    He states that his vision has seemed stable in both eyes, since his last eye exam.  Patient denies having any eye discomfort.    He is using:  Prednisolone acetate once every two days in the right eye  Latanoprost at bedtime right eye   Brimonidine daily right eye   Artificial tears as needed    JOHN Valdes 10:17 AM  July 18, 2023

## 2023-07-21 ENCOUNTER — TELEPHONE (OUTPATIENT)
Dept: ANTICOAGULATION | Facility: CLINIC | Age: 63
End: 2023-07-21
Payer: COMMERCIAL

## 2023-07-21 NOTE — TELEPHONE ENCOUNTER
ANTICOAGULATION     Oswaldo Prabhakar is overdue for INR check.      Left message for patient to call and schedule lab appointment as soon as possible. If returning call, please schedule.     Kristina Wright RN

## 2023-07-26 DIAGNOSIS — I27.82 CHRONIC PULMONARY EMBOLISM WITHOUT ACUTE COR PULMONALE, UNSPECIFIED PULMONARY EMBOLISM TYPE (H): ICD-10-CM

## 2023-07-26 RX ORDER — WARFARIN SODIUM 5 MG/1
TABLET ORAL
Qty: 100 TABLET | Refills: 0 | Status: SHIPPED | OUTPATIENT
Start: 2023-07-26 | End: 2023-10-24

## 2023-08-08 DIAGNOSIS — Z95.1 S/P CABG (CORONARY ARTERY BYPASS GRAFT): ICD-10-CM

## 2023-08-08 DIAGNOSIS — E78.5 HYPERLIPIDEMIA LDL GOAL <70: ICD-10-CM

## 2023-08-08 DIAGNOSIS — I25.10 ATHEROSCLEROSIS OF CORONARY ARTERY OF NATIVE HEART WITHOUT ANGINA PECTORIS, UNSPECIFIED VESSEL OR LESION TYPE: ICD-10-CM

## 2023-08-08 DIAGNOSIS — Z78.9 STATIN INTOLERANCE: ICD-10-CM

## 2023-08-08 RX ORDER — LATANOPROST 50 UG/ML
1 SOLUTION/ DROPS OPHTHALMIC AT BEDTIME
Qty: 2.5 ML | Refills: 4 | OUTPATIENT
Start: 2023-08-08

## 2023-08-11 RX ORDER — EZETIMIBE 10 MG/1
10 TABLET ORAL DAILY
Qty: 30 TABLET | Refills: 0 | Status: SHIPPED | OUTPATIENT
Start: 2023-08-11 | End: 2023-08-21

## 2023-08-11 NOTE — TELEPHONE ENCOUNTER
EZETIMIBE 10 MG TABLET   Last Written Prescription Date:   5/5/2023  Last Fill Quantity: 90,   # refills: 0  Last Office Visit :  4/4/2022  Future Office visit:   8/21/2023  30 Days sent to pharm to cover Pt until visit August 21, 2023.       Recent Labs   Lab Test 04/04/22  1527   ALT 46       Lipid panel on file in past 12 mos          Recent Labs   Lab Test 11/02/22  1427 03/01/16  1439 09/16/15  1047   CHOL 102   < > 246*   TRIG 145   < > 183*   HDL 41   < > 35*   LDL 32  39   < > 174*   NHDL 61   < >  --    VLDL  --   --  37*   CHOLHDLRATIO  --   --  7.0*     Ericka Cano RN  Central Triage Red Flags/Med Refills  Antihyperlipidemic agents [id number]/11/2023 01:38 PM   Protocol Details Normal serum ALT on record in past 12 mos    Recent (12 mo) or future (30 days) visit within the authorizing provider's specialty

## 2023-08-16 DIAGNOSIS — Z95.1 S/P CABG (CORONARY ARTERY BYPASS GRAFT): Primary | ICD-10-CM

## 2023-08-21 ENCOUNTER — TELEPHONE (OUTPATIENT)
Dept: CARDIOLOGY | Facility: CLINIC | Age: 63
End: 2023-08-21

## 2023-08-21 ENCOUNTER — OFFICE VISIT (OUTPATIENT)
Dept: CARDIOLOGY | Facility: CLINIC | Age: 63
End: 2023-08-21
Attending: INTERNAL MEDICINE
Payer: COMMERCIAL

## 2023-08-21 ENCOUNTER — LAB (OUTPATIENT)
Dept: LAB | Facility: CLINIC | Age: 63
End: 2023-08-21
Payer: COMMERCIAL

## 2023-08-21 ENCOUNTER — ANTICOAGULATION THERAPY VISIT (OUTPATIENT)
Dept: ANTICOAGULATION | Facility: CLINIC | Age: 63
End: 2023-08-21

## 2023-08-21 VITALS
WEIGHT: 224.9 LBS | OXYGEN SATURATION: 97 % | SYSTOLIC BLOOD PRESSURE: 153 MMHG | HEART RATE: 59 BPM | HEIGHT: 71 IN | BODY MASS INDEX: 31.48 KG/M2 | DIASTOLIC BLOOD PRESSURE: 88 MMHG

## 2023-08-21 DIAGNOSIS — I25.739 CORONARY ARTERY DISEASE INVOLVING NONAUTOLOGOUS BIOLOGICAL CORONARY BYPASS GRAFT WITH ANGINA PECTORIS (H): Primary | ICD-10-CM

## 2023-08-21 DIAGNOSIS — Z95.1 S/P CABG X 4: ICD-10-CM

## 2023-08-21 DIAGNOSIS — Z95.1 S/P CABG (CORONARY ARTERY BYPASS GRAFT): ICD-10-CM

## 2023-08-21 DIAGNOSIS — I27.82 CHRONIC PULMONARY EMBOLISM WITHOUT ACUTE COR PULMONALE, UNSPECIFIED PULMONARY EMBOLISM TYPE (H): Primary | ICD-10-CM

## 2023-08-21 DIAGNOSIS — I25.10 ATHEROSCLEROSIS OF CORONARY ARTERY OF NATIVE HEART WITHOUT ANGINA PECTORIS, UNSPECIFIED VESSEL OR LESION TYPE: ICD-10-CM

## 2023-08-21 DIAGNOSIS — I26.99 PULMONARY EMBOLI (H): ICD-10-CM

## 2023-08-21 DIAGNOSIS — Z79.01 LONG TERM CURRENT USE OF ANTICOAGULANT THERAPY: ICD-10-CM

## 2023-08-21 DIAGNOSIS — E78.1 PURE HYPERGLYCERIDEMIA: ICD-10-CM

## 2023-08-21 DIAGNOSIS — E78.5 HYPERLIPIDEMIA LDL GOAL <70: ICD-10-CM

## 2023-08-21 DIAGNOSIS — Z78.9 STATIN INTOLERANCE: ICD-10-CM

## 2023-08-21 DIAGNOSIS — I10 ESSENTIAL HYPERTENSION: ICD-10-CM

## 2023-08-21 DIAGNOSIS — E78.5 HYPERLIPIDEMIA LDL GOAL <130: ICD-10-CM

## 2023-08-21 LAB
ALBUMIN SERPL BCG-MCNC: 4.4 G/DL (ref 3.5–5.2)
ALP SERPL-CCNC: 51 U/L (ref 40–129)
ALT SERPL W P-5'-P-CCNC: 25 U/L (ref 0–70)
ANION GAP SERPL CALCULATED.3IONS-SCNC: 10 MMOL/L (ref 7–15)
AST SERPL W P-5'-P-CCNC: 28 U/L (ref 0–45)
BILIRUB SERPL-MCNC: 0.6 MG/DL
BUN SERPL-MCNC: 11.1 MG/DL (ref 8–23)
CALCIUM SERPL-MCNC: 10.8 MG/DL (ref 8.8–10.2)
CHLORIDE SERPL-SCNC: 105 MMOL/L (ref 98–107)
CHOLEST SERPL-MCNC: 78 MG/DL
CREAT SERPL-MCNC: 1.18 MG/DL (ref 0.67–1.17)
DEPRECATED HCO3 PLAS-SCNC: 26 MMOL/L (ref 22–29)
GFR SERPL CREATININE-BSD FRML MDRD: 71 ML/MIN/1.73M2
GLUCOSE SERPL-MCNC: 101 MG/DL (ref 70–99)
HDLC SERPL-MCNC: 44 MG/DL
INR PPP: 1.46 (ref 0.85–1.15)
LDLC SERPL CALC-MCNC: 15 MG/DL
LDLC SERPL DIRECT ASSAY-MCNC: 23 MG/DL
NONHDLC SERPL-MCNC: 34 MG/DL
POTASSIUM SERPL-SCNC: 4.3 MMOL/L (ref 3.4–5.3)
PROT SERPL-MCNC: 7.5 G/DL (ref 6.4–8.3)
SODIUM SERPL-SCNC: 141 MMOL/L (ref 136–145)
TRIGL SERPL-MCNC: 97 MG/DL

## 2023-08-21 PROCEDURE — 36415 COLL VENOUS BLD VENIPUNCTURE: CPT | Performed by: PATHOLOGY

## 2023-08-21 PROCEDURE — 85610 PROTHROMBIN TIME: CPT | Performed by: PATHOLOGY

## 2023-08-21 PROCEDURE — 99214 OFFICE O/P EST MOD 30 MIN: CPT | Performed by: INTERNAL MEDICINE

## 2023-08-21 PROCEDURE — 83721 ASSAY OF BLOOD LIPOPROTEIN: CPT | Performed by: INTERNAL MEDICINE

## 2023-08-21 PROCEDURE — 80061 LIPID PANEL: CPT | Performed by: PATHOLOGY

## 2023-08-21 PROCEDURE — 99000 SPECIMEN HANDLING OFFICE-LAB: CPT | Performed by: PATHOLOGY

## 2023-08-21 PROCEDURE — 80053 COMPREHEN METABOLIC PANEL: CPT | Performed by: PATHOLOGY

## 2023-08-21 PROCEDURE — G0463 HOSPITAL OUTPT CLINIC VISIT: HCPCS | Performed by: INTERNAL MEDICINE

## 2023-08-21 RX ORDER — EVOLOCUMAB 140 MG/ML
INJECTION, SOLUTION SUBCUTANEOUS
Qty: 6 ML | Refills: 3 | Status: SHIPPED | OUTPATIENT
Start: 2023-08-21 | End: 2023-08-22

## 2023-08-21 RX ORDER — EZETIMIBE 10 MG/1
10 TABLET ORAL DAILY
Qty: 90 TABLET | Refills: 3 | Status: SHIPPED | OUTPATIENT
Start: 2023-08-21

## 2023-08-21 RX ORDER — ATORVASTATIN CALCIUM 80 MG/1
80 TABLET, FILM COATED ORAL DAILY
Qty: 90 TABLET | Refills: 3 | Status: SHIPPED | OUTPATIENT
Start: 2023-08-21 | End: 2024-03-07

## 2023-08-21 RX ORDER — FENOFIBRATE 160 MG/1
160 TABLET ORAL DAILY
Qty: 90 TABLET | Refills: 3 | Status: SHIPPED | OUTPATIENT
Start: 2023-08-21 | End: 2024-03-07

## 2023-08-21 ASSESSMENT — PAIN SCALES - GENERAL: PAINLEVEL: NO PAIN (0)

## 2023-08-21 NOTE — PROGRESS NOTES
HPI:     I had the privilege to evaluate and examine Mr. YAQUELIN Prabhakar, 62 yr old,  with a history of CAD s/p 4V CABG in 2014 (LIMA-LAD, SVg-Diag, SVG-OM1, SVG-rPDA) and PCI with NIR to prox/mid LAD in 01/2020, hypertension, hyperlipidemia and pulmonary emboli.     Patient denies chest pain, shortness of breath, palpitations, and intermittent claudication.       PAST MEDICAL HISTORY:  Past Medical History:   Diagnosis Date    Antiplatelet or antithrombotic long-term use     Coronary artery disease     Diaphragmatic hernia without mention of obstruction or gangrene     Heart disease     Hernia, abdominal     History of blood transfusion     History of thrombophlebitis     Hypertension     Nephrolithiasis 4/2010    Other and unspecified hyperlipidemia     Pulmonary embolus and infarction (H)     s/p hemothorax and filter s/p filter removal (2011), now on long term anti-coagulation    Statin intolerance 2/1/2017    Stented coronary artery 01/07/2020    NIR mid LAD    Unspecified retinal detachment     Childhood trauma, unrepaired       CURRENT MEDICATIONS:  Current Outpatient Medications   Medication Sig Dispense Refill    acetaminophen (TYLENOL) 500 MG tablet Take 2 tablets (1,000 mg) by mouth 3 times daily as needed for pain      atorvastatin (LIPITOR) 80 MG tablet TAKE 1 TABLET BY MOUTH EVERY DAY 90 tablet 2    brimonidine (ALPHAGAN) 0.2 % ophthalmic solution Place 1 drop into the right eye 3 times daily 10 mL 4    calcium carbonate (TUMS) 500 MG chewable tablet Take 1 chew tab by mouth daily as needed       Cholecalciferol (VITAMIN D) 2000 UNITS CAPS Take 2,000 Units by mouth daily      dorzolamide-timolol (COSOPT) 2-0.5 % ophthalmic solution Place 1 drop into the right eye 2 times daily 10 mL 3    ezetimibe (ZETIA) 10 MG tablet Take 1 tablet (10 mg) by mouth daily 30 tablet 0    fenofibrate (TRIGLIDE/LOFIBRA) 160 MG tablet Take 1 tablet (160 mg) by mouth daily 90 tablet 2    latanoprost (XALATAN) 0.005 %  ophthalmic solution Place 1 drop into the right eye At Bedtime 2.5 mL 4    lisinopril (ZESTRIL) 2.5 MG tablet TAKE 1 TABLET BY MOUTH EVERY DAY 90 tablet 0    metoprolol tartrate (LOPRESSOR) 25 MG tablet Take 0.5 tablets (12.5 mg) by mouth 2 times daily 90 tablet 1    OMEPRAZOLE PO Take 20 mg by mouth       ondansetron (ZOFRAN-ODT) 4 MG ODT tab Take 1 tablet (4 mg) by mouth every 8 hours as needed for nausea 10 tablet 0    prednisoLONE acetate (PRED FORTE) 1 % ophthalmic suspension Place 1 drop into both eyes 4 times daily 5 mL 1    REPATHA SURECLICK 140 MG/ML prefilled autoinjector INJECT THE CONTENTS OF 1 AUTOINJECTOR PEN UNDER THE SKIN EVERY OTHER WEEK 6 mL 0    timolol maleate (TIMOPTIC) 0.5 % ophthalmic solution Place 1 drop into the right eye 2 times daily      warfarin ANTICOAGULANT (COUMADIN) 5 MG tablet Take 5 to 7.5mg (1 to 1.5 tabs) by mouth daily OR AS DIRECTED.  Adjust dose based on INR. 100 tablet 0    acetaZOLAMIDE (DIAMOX) 250 MG tablet Take 1 tablet by mouth 4 times daily      acetaZOLAMIDE (DIAMOX) 250 MG tablet Take 1 tablet (250 mg) by mouth 2 times daily 1 tablet 3    tamsulosin (FLOMAX) 0.4 MG capsule Take 1 capsule (0.4 mg) by mouth daily 90 capsule 0    ticagrelor (BRILINTA) 90 MG tablet Take 1 tablet (90 mg) by mouth 2 times daily 180 tablet 3       PAST SURGICAL HISTORY:  Past Surgical History:   Procedure Laterality Date    BYPASS GRAFT ARTERY CORONARY  04/04/2014    Procedure: BYPASS GRAFT ARTERY CORONARY;  Median Sternotomy, Coronary Artery Bypass Bypass x 4 using Left Internal Mammary, Left Saphenous Vein, on pump oxygenation;  Surgeon: Sherry Armando MD;  Location: [facility] OR    CATARACT IOL, RT/LT      CLOSED REDUCTION, PERCUTANEOUS PINNING  HAND, COMBINED Left 11/05/2015    Procedure: COMBINED CLOSED REDUCTION, PERCUTANEOUS PINNING HAND;  Surgeon: Carmella Love MD;  [facility]    COLONOSCOPY  03/15/2013    Procedure: COLONOSCOPY;;  Surgeon: Racheal Shipman  MD;  Location: [facility] GI    COMBINED CYSTOSCOPY, INSERT STENT URETER(S) Right 01/10/2020    Procedure: Cystoscopy, right retrograde pyelogram, interpretation of fluoroscopic images, placement of 6 x 26 double-J ureteral stent;  Surgeon: Nguyễn Woodard MD;  Location: [facility] OR    COMBINED CYSTOSCOPY, RETROGRADES, URETEROSCOPY, LASER HOLMIUM LITHOTRIPSY URETER(S), INSERT STENT Right 02/05/2020    Procedure: Cystoscopy, right ureteral stent exchange, right ureteroscopy with holmium lithotripsy and stone basketing, right retrograde pyelogram, interpretation of fluoroscopic images.;  Surgeon: Nguyễn Woodard MD;  Location: [facility] OR    CV ANGIOGRAM CORONARY GRAFT N/A 01/07/2020    Procedure: Angiogram Coronary Graft;  Surgeon: Chato Odonnell MD;  Location: [facility] CARDIAC CATH LAB    CV CORONARY ANGIOGRAM  01/07/2020    Procedure: CV CORONARY ANGIOGRAM;  Surgeon: Chato Odonnell MD;  Location: [facility] CARDIAC CATH LAB    CV PCI STENT DRUG ELUTING N/A 01/07/2020    Procedure: PCI Stent Drug Eluting;  Surgeon: Chato Odonnell MD;  [facility]:  [facility] C[facility]    CYCLOPHOTOCOAGULATION TRANSSCLERAL WITH MICROPULSE LASER Right 5/17/2023    Procedure: RIGHT EYE CYCLOPHOTOCOAGULATION TRANSSCLERAL WITH Gprobe LASER;  Surgeon: Jay Jay Savage MD;  Location: [facility] OR    CYSTOSCOPY      ESOPHAGOSCOPY, GASTROSCOPY, DUODENOSCOPY (EGD), COMBINED N/A 12/9/2022    Procedure: ESOPHAGOGASTRODUODENOSCOPY (EGD);  Surgeon: Beata George MD;  Location:  GI    LAPAROSCOPIC CHOLECYSTECTOMY N/A 08/06/2018    Procedure: LAPAROSCOPIC CHOLECYSTECTOMY;  LAPAROSCOPIC CHOLECYSTECTOMY ;  Surgeon: José Miguel Stuart MD;  Location:  OR    LITHOTRIPSY  04/2010    RETINAL REATTACHMENT      THORACIC SURGERY      lung surgery, thoracotomy, lung adhesion    ZZC NONSPECIFIC PROCEDURE      Spermatocele resection       ALLERGIES     Allergies   Allergen Reactions    Cats     Hay Fever &  "[A.R.M.]     Heparin Other (See Comments)     Heparin resistance per cardio-thoracic surgeon Dr. Armando    Hydrocodone-Acetaminophen Nausea and Vomiting     Acetaminophen is ok       FAMILY HISTORY:  Family History   Problem Relation Age of Onset    Heart Disease Mother     Glaucoma Father     C.A.D. Father         3 vessel CABG, age 70    Cancer Father         bladder cancer    C.A.D. Paternal Grandmother     Hypertension Paternal Grandmother     Alzheimer Disease Paternal Grandmother     Diabetes No family hx of     Thyroid Disease No family hx of     Cancer - colorectal No family hx of        SOCIAL HISTORY:  Social History     Socioeconomic History    Marital status:      Spouse name: None    Number of children: None    Years of education: None    Highest education level: None   Tobacco Use    Smoking status: Never    Smokeless tobacco: Never   Substance and Sexual Activity    Alcohol use: No     Comment: very rare    Drug use: No       ROS:   Constitutional: No fever, chills, or sweats. No weight gain/loss   ENT: No visual disturbance, ear ache, epistaxis, sore throat  Allergies/Immunologic: Negative.   Respiratory: No cough, hemoptysia  Cardiovascular: As per HPI  GI: No nausea, vomiting, hematemesis, melena, or hematochezia  : No urinary frequency, dysuria, or hematuria  Integument: Negative  Psychiatric: Negative  Neuro: Negative  Endocrinology: Negative   Musculoskeletal: Negative    EXAM:  BP (!) 153/88 (BP Location: Right arm, Patient Position: Sitting, Cuff Size: Adult Regular)   Pulse 59   Ht 1.806 m (5' 11.1\")   Wt 102 kg (224 lb 14.4 oz)   SpO2 97%   BMI 31.28 kg/m    In general, the patient is a pleasant male in no apparent distress.    HEENT: NC/AT.  PERRLA.  EOMI.  Sclerae white, not injected.  Nares clear.  Pharynx without erythema or exudate.  Dentition intact.    Neck: No adenopathy.  No thyromegaly. Carotids +4/4 bilaterally without bruits.  No jugular venous distension.   Heart: " RRR. Normal S1, S2 splits physiologically. No murmur, rub, click, or gallop. The PMI is in the 5th ICS in the midclavicular line. There is no heave.    Lungs: CTA.  No ronchi, wheezes, rales.  No dullness to percussion.   Abdomen: Soft, nontender, nondistended. No organomegaly.  No bruits.   Extremities: No clubbing, cyanosis, or edema.  The pulses are +4/4 at the radial, brachial, femoral, popliteal, DP, and PT sites bilaterally.  No bruits are noted.  Neurologic: Alert and oriented to person/place/time, normal speech, gait and affect  Skin: No petechiae, purpura or rash.    BP at the end of 130/82    Labs:  LIPID RESULTS:  Lab Results   Component Value Date    CHOL 78 08/21/2023    CHOL 83 11/30/2020    HDL 44 08/21/2023    HDL 44 11/30/2020    LDL 15 08/21/2023    LDL 19 11/30/2020    TRIG 97 08/21/2023    TRIG 99 11/30/2020    CHOLHDLRATIO 7.0 (H) 09/16/2015    NHDL 34 08/21/2023    NHDL 39 11/30/2020       LIVER ENZYME RESULTS:  Lab Results   Component Value Date    AST 28 08/21/2023    AST 26 05/10/2021    ALT 25 08/21/2023    ALT 46 05/10/2021       CBC RESULTS:  Lab Results   Component Value Date    WBC 9.2 05/10/2021    RBC 5.02 05/10/2021    HGB 13.9 05/10/2021    HCT 42.7 05/10/2021    MCV 85 05/10/2021    MCH 27.7 05/10/2021    MCHC 32.6 05/10/2021    RDW 13.3 05/10/2021     05/10/2021       BMP RESULTS:  Lab Results   Component Value Date     08/21/2023     05/10/2021    POTASSIUM 4.3 08/21/2023    POTASSIUM 3.8 04/04/2022    POTASSIUM 3.4 05/10/2021    CHLORIDE 105 08/21/2023    CHLORIDE 106 04/04/2022    CHLORIDE 108 05/10/2021    CO2 26 08/21/2023    CO2 26 04/04/2022    CO2 28 05/10/2021    ANIONGAP 10 08/21/2023    ANIONGAP 8 04/04/2022    ANIONGAP 4 05/10/2021     (H) 08/21/2023    GLC 89 04/04/2022    GLC 91 05/10/2021    BUN 11.1 08/21/2023    BUN 13 04/04/2022    BUN 16 05/10/2021    CR 1.18 (H) 08/21/2023    CR 1.50 (H) 05/10/2021    GFRESTIMATED 71 08/21/2023     GFRESTIMATED 50 (L) 05/10/2021    GFRESTBLACK 58 (L) 05/10/2021    GONZALES 10.8 (H) 08/21/2023    GONZALES 9.3 05/10/2021        A1C RESULTS:  Lab Results   Component Value Date    A1C 6.3 (H) 11/02/2022    A1C 5.8 (H) 12/16/2020       INR RESULTS:  Lab Results   Component Value Date    INR 1.46 (H) 08/21/2023    INR 3.86 (H) 06/27/2023    INR 2.07 (H) 07/07/2021    INR 3.02 (H) 06/17/2021             Assessment and Plan:     We discussed the results with patient,  We discussed following items with patient:      Medication Changes: None        Follow Up: With Dr. Ocampo in 1 year with fasting labs prior to visit        Follow the American Heart Association Diet and Lifestyle Recommendations:  -Limit saturated fat, trans fat, sodium, red meat, sweets and sugar-sweetened beverages. If you choose to eat red meat, compare labels and select the leanest cuts available     Reji Ocampo MD, PhD  Professor of Medicine  Division of Cardiology     CC  Patient Care Team:  Simi Guevara MD as PCP - General (Internal Medicine)  Reji Ocampo MD as MD (Cardiology)  Shiv Vizcarra MD as MD (Family Practice)  Carmella Love MD as MD (Orthopedics)  Lavelle Solis MD as MD (Family Practice)  Sav Schmitt DO as MD (Family Practice)  Reji Ocampo MD as Assigned Heart and Vascular Provider  Lisa Talley MD as MD (Urology)  Josephine Moran PA-C as Physician Assistant (Family Medicine)  Geneva Mckeon, DEMETRA as Specialty Care Coordinator (Urology)  Simi Guevara MD as Assigned PCP  Edita Way, RN as Specialty Care Coordinator (Cardiology)  Jose Carlos Olivo MD as MD (Dermatology)  Jay Jay Savage MD as Assigned Surgical Provider  SIMI GUEAVRA

## 2023-08-21 NOTE — TELEPHONE ENCOUNTER
Central Prior Authorization Team   Phone: [phone]    PA Initiation    Medication: Repatha 140mg/ml  Insurance Company: [employer] - Phone [phone] Fax [phone]  Pharmacy Filling the Rx: [id number] [place] [facility] [place] - [address] [facility]  Filling Pharmacy Phone: [phone]  Filling Pharmacy Fax:    Start Date: 8/21/2023

## 2023-08-21 NOTE — NURSING NOTE
Chief Complaint   Patient presents with    Follow Up     Return cardiology       Vitals were taken, medications reconciled.    Светлана Samuels CMA  11:27 AM

## 2023-08-21 NOTE — TELEPHONE ENCOUNTER
Prior Authorization Not Needed per Insurance    Medication: Repatha 140mg/ml  Insurance Company: [employer] - Phone [phone] Fax [phone]  Expected CoPay:      Pharmacy [facility]illing the [id number] [place] [address]  Pharmacy Notified: Yes  Patient Notified: Yes    PA previously approved, effective through 12/28/2023.

## 2023-08-21 NOTE — LETTER
8/21/2023      RE: Oswaldo Prabhakar  [address]  [place] [address]       Dear Colleague,    Thank you for the opportunity to participate in the care of your patient, Oswaldo Prabhakar, at the [facility]ART [facility] [place] at [facility]. Please see a copy of my visit note below.    HPI:     I had the privilege to evaluate and examine Mr. YAQUELIN Prabhakar, 62 yr old,  with a history of CAD s/p 4V CABG in 2014 (LIMA-LAD, SVg-Diag, SVG-OM1, SVG-rPDA) and PCI with NIR to prox/mid LAD in 01/2020, hypertension, hyperlipidemia and pulmonary emboli.     Patient denies chest pain, shortness of breath, palpitations, and intermittent claudication.       PAST MEDICAL HISTORY:  Past Medical History:   Diagnosis Date    Antiplatelet or antithrombotic long-term use     Coronary artery disease     Diaphragmatic hernia without mention of obstruction or gangrene     Heart disease     Hernia, abdominal     History of blood transfusion     History of thrombophlebitis     Hypertension     Nephrolithiasis 4/2010    Other and unspecified hyperlipidemia     Pulmonary embolus and infarction (H)     s/p hemothorax and filter s/p filter removal (2011), now on long term anti-coagulation    Statin intolerance 2/1/2017    Stented coronary artery 01/07/2020    NIR mid LAD    Unspecified retinal detachment     Childhood trauma, unrepaired       CURRENT MEDICATIONS:  Current Outpatient Medications   Medication Sig Dispense Refill    acetaminophen (TYLENOL) 500 MG tablet Take 2 tablets (1,000 mg) by mouth 3 times daily as needed for pain      atorvastatin (LIPITOR) 80 MG tablet TAKE 1 TABLET BY MOUTH EVERY DAY 90 tablet 2    brimonidine (ALPHAGAN) 0.2 % ophthalmic solution Place 1 drop into the right eye 3 times daily 10 mL 4    calcium carbonate (TUMS) 500 MG chewable tablet Take 1 chew tab by mouth daily as needed       Cholecalciferol (VITAMIN D) 2000 UNITS CAPS Take 2,000 Units by mouth  daily      dorzolamide-timolol (COSOPT) 2-0.5 % ophthalmic solution Place 1 drop into the right eye 2 times daily 10 mL 3    ezetimibe (ZETIA) 10 MG tablet Take 1 tablet (10 mg) by mouth daily 30 tablet 0    fenofibrate (TRIGLIDE/LOFIBRA) 160 MG tablet Take 1 tablet (160 mg) by mouth daily 90 tablet 2    latanoprost (XALATAN) 0.005 % ophthalmic solution Place 1 drop into the right eye At Bedtime 2.5 mL 4    lisinopril (ZESTRIL) 2.5 MG tablet TAKE 1 TABLET BY MOUTH EVERY DAY 90 tablet 0    metoprolol tartrate (LOPRESSOR) 25 MG tablet Take 0.5 tablets (12.5 mg) by mouth 2 times daily 90 tablet 1    OMEPRAZOLE PO Take 20 mg by mouth       ondansetron (ZOFRAN-ODT) 4 MG ODT tab Take 1 tablet (4 mg) by mouth every 8 hours as needed for nausea 10 tablet 0    prednisoLONE acetate (PRED FORTE) 1 % ophthalmic suspension Place 1 drop into both eyes 4 times daily 5 mL 1    REPATHA SURECLICK 140 MG/ML prefilled autoinjector INJECT THE CONTENTS OF 1 AUTOINJECTOR PEN UNDER THE SKIN EVERY OTHER WEEK 6 mL 0    timolol maleate (TIMOPTIC) 0.5 % ophthalmic solution Place 1 drop into the right eye 2 times daily      warfarin ANTICOAGULANT (COUMADIN) 5 MG tablet Take 5 to 7.5mg (1 to 1.5 tabs) by mouth daily OR AS DIRECTED.  Adjust dose based on INR. 100 tablet 0    acetaZOLAMIDE (DIAMOX) 250 MG tablet Take 1 tablet by mouth 4 times daily      acetaZOLAMIDE (DIAMOX) 250 MG tablet Take 1 tablet (250 mg) by mouth 2 times daily 1 tablet 3    tamsulosin (FLOMAX) 0.4 MG capsule Take 1 capsule (0.4 mg) by mouth daily 90 capsule 0    ticagrelor (BRILINTA) 90 MG tablet Take 1 tablet (90 mg) by mouth 2 times daily 180 tablet 3       PAST SURGICAL HISTORY:  Past Surgical History:   Procedure Laterality Date    BYPASS GRAFT ARTERY CORONARY  04/04/2014    Procedure: BYPASS GRAFT ARTERY CORONARY;  Median Sternotomy, Coronary Artery Bypass Bypass x 4 using Left Internal Mammary, Left Saphenous Vein, on pump oxygenation;  Surgeon: Sherry Armando,  MD;  Location: [facility] OR    CATARACT IOL, RT/LT      CLOSED REDUCTION, PERCUTANEOUS PINNING  HAND, COMBINED Left 11/05/2015    Procedure: COMBINED CLOSED REDUCTION, PERCUTANEOUS PINNING HAND;  Surgeon: Carmella Love MD;  [facility]    COLONOSCOPY  03/15/2013    Procedure: COLONOSCOPY;;  Surgeon: Racheal Shipman MD;  Location: [facility] GI    COMBINED CYSTOSCOPY, INSERT STENT URETER(S) Right 01/10/2020    Procedure: Cystoscopy, right retrograde pyelogram, interpretation of fluoroscopic images, placement of 6 x 26 double-J ureteral stent;  Surgeon: Nguyễn Woodard MD;  Location: [facility] OR    COMBINED CYSTOSCOPY, RETROGRADES, URETEROSCOPY, LASER HOLMIUM LITHOTRIPSY URETER(S), INSERT STENT Right 02/05/2020    Procedure: Cystoscopy, right ureteral stent exchange, right ureteroscopy with holmium lithotripsy and stone basketing, right retrograde pyelogram, interpretation of fluoroscopic images.;  Surgeon: Nguyễn Woodard MD;  Location: [facility] OR    CV ANGIOGRAM CORONARY GRAFT N/A 01/07/2020    Procedure: Angiogram Coronary Graft;  Surgeon: Chato Odonnell MD;  Location:  HEART CARDIAC CATH LAB    CV CORONARY ANGIOGRAM  01/07/2020    Procedure: CV CORONARY ANGIOGRAM;  Surgeon: Chato Odonnell MD;  Location:  HEART CARDIAC CATH LAB    CV PCI STENT DRUG ELUTING N/A 01/07/2020    Procedure: PCI Stent Drug Eluting;  Surgeon: Chato Odonnell MD;  Location:  HEART CARDIAC CATH LAB    CYCLOPHOTOCOAGULATION TRANSSCLERAL WITH MICROPULSE LASER Right 5/17/2023    Procedure: RIGHT EYE CYCLOPHOTOCOAGULATION TRANSSCLERAL WITH Gprobe LASER;  Surgeon: Jay Jay Savage MD;  Location: [facility] OR    CYSTOSCOPY      ESOPHAGOSCOPY, GASTROSCOPY, DUODENOSCOPY (EGD), COMBINED N/A 12/9/2022    Procedure: ESOPHAGOGASTRODUODENOSCOPY (EGD);  Surgeon: Beata George MD;  Location: [facility]    LAPAROSCOPIC CHOLECYSTECTOMY N/A 08/06/2018    Procedure: LAPAROSCOPIC CHOLECYSTECTOMY;  " LAPAROSCOPIC CHOLECYSTECTOMY ;  Surgeon: José Miguel Stuart MD;  Location: [facility] OR    LITHOTRIPSY  04/2010    RETINAL REATTACHMENT      THORACIC SURGERY      lung surgery, thoracotomy, lung adhesion    ZZC NONSPECIFIC PROCEDURE      Spermatocele resection       ALLERGIES     Allergies   Allergen Reactions    Cats     Hay Fever & [A.R.M.]     Heparin Other (See Comments)     Heparin resistance per cardio-thoracic surgeon Dr. Armando    Hydrocodone-Acetaminophen Nausea and Vomiting     Acetaminophen is ok       FAMILY HISTORY:  Family History   Problem Relation Age of Onset    Heart Disease Mother     Glaucoma Father     C.A.D. Father         3 vessel CABG, age 70    Cancer Father         bladder cancer    C.A.D. Paternal Grandmother     Hypertension Paternal Grandmother     Alzheimer Disease Paternal Grandmother     Diabetes No family hx of     Thyroid Disease No family hx of     Cancer - colorectal No family hx of        SOCIAL HISTORY:  Social History     Socioeconomic History    Marital status:      Spouse name: None    Number of children: None    Years of education: None    Highest education level: None   Tobacco Use    Smoking status: Never    Smokeless tobacco: Never   Substance and Sexual Activity    Alcohol use: No     Comment: very rare    Drug use: No       ROS:   Constitutional: No fever, chills, or sweats. No weight gain/loss   ENT: No visual disturbance, ear ache, epistaxis, sore throat  Allergies/Immunologic: Negative.   Respiratory: No cough, hemoptysia  Cardiovascular: As per HPI  GI: No nausea, vomiting, hematemesis, melena, or hematochezia  : No urinary frequency, dysuria, or hematuria  Integument: Negative  Psychiatric: Negative  Neuro: Negative  Endocrinology: Negative   Musculoskeletal: Negative    EXAM:  BP (!) 153/88 (BP Location: Right arm, Patient Position: Sitting, Cuff Size: Adult Regular)   Pulse 59   Ht 1.806 m (5' 11.1\")   Wt 102 kg (224 lb 14.4 oz)   SpO2 97%   BMI 31.28 " kg/m    In general, the patient is a pleasant male in no apparent distress.    HEENT: NC/AT.  PERRLA.  EOMI.  Sclerae white, not injected.  Nares clear.  Pharynx without erythema or exudate.  Dentition intact.    Neck: No adenopathy.  No thyromegaly. Carotids +4/4 bilaterally without bruits.  No jugular venous distension.   Heart: RRR. Normal S1, S2 splits physiologically. No murmur, rub, click, or gallop. The PMI is in the 5th ICS in the midclavicular line. There is no heave.    Lungs: CTA.  No ronchi, wheezes, rales.  No dullness to percussion.   Abdomen: Soft, nontender, nondistended. No organomegaly.  No bruits.   Extremities: No clubbing, cyanosis, or edema.  The pulses are +4/4 at the radial, brachial, femoral, popliteal, DP, and PT sites bilaterally.  No bruits are noted.  Neurologic: Alert and oriented to person/place/time, normal speech, gait and affect  Skin: No petechiae, purpura or rash.    BP at the end of 130/82    Labs:  LIPID RESULTS:  Lab Results   Component Value Date    CHOL 78 08/21/2023    CHOL 83 11/30/2020    HDL 44 08/21/2023    HDL 44 11/30/2020    LDL 15 08/21/2023    LDL 19 11/30/2020    TRIG 97 08/21/2023    TRIG 99 11/30/2020    CHOLHDLRATIO 7.0 (H) 09/16/2015    NHDL 34 08/21/2023    NHDL 39 11/30/2020       LIVER ENZYME RESULTS:  Lab Results   Component Value Date    AST 28 08/21/2023    AST 26 05/10/2021    ALT 25 08/21/2023    ALT 46 05/10/2021       CBC RESULTS:  Lab Results   Component Value Date    WBC 9.2 05/10/2021    RBC 5.02 05/10/2021    HGB 13.9 05/10/2021    HCT 42.7 05/10/2021    MCV 85 05/10/2021    MCH 27.7 05/10/2021    MCHC 32.6 05/10/2021    RDW 13.3 05/10/2021     05/10/2021       BMP RESULTS:  Lab Results   Component Value Date     08/21/2023     05/10/2021    POTASSIUM 4.3 08/21/2023    POTASSIUM 3.8 04/04/2022    POTASSIUM 3.4 05/10/2021    CHLORIDE 105 08/21/2023    CHLORIDE 106 04/04/2022    CHLORIDE 108 05/10/2021    CO2 26 08/21/2023    CO2  26 04/04/2022    CO2 28 05/10/2021    ANIONGAP 10 08/21/2023    ANIONGAP 8 04/04/2022    ANIONGAP 4 05/10/2021     (H) 08/21/2023    GLC 89 04/04/2022    GLC 91 05/10/2021    BUN 11.1 08/21/2023    BUN 13 04/04/2022    BUN 16 05/10/2021    CR 1.18 (H) 08/21/2023    CR 1.50 (H) 05/10/2021    GFRESTIMATED 71 08/21/2023    GFRESTIMATED 50 (L) 05/10/2021    GFRESTBLACK 58 (L) 05/10/2021    GONZALES 10.8 (H) 08/21/2023    GONZALES 9.3 05/10/2021        A1C RESULTS:  Lab Results   Component Value Date    A1C 6.3 (H) 11/02/2022    A1C 5.8 (H) 12/16/2020       INR RESULTS:  Lab Results   Component Value Date    INR 1.46 (H) 08/21/2023    INR 3.86 (H) 06/27/2023    INR 2.07 (H) 07/07/2021    INR 3.02 (H) 06/17/2021             Assessment and Plan:     We discussed the results with patient,  We discussed following items with patient:      Medication Changes: None        Follow Up: With Dr. Ocampo in 1 year with fasting labs prior to visit        Follow the American Heart Association Diet and Lifestyle Recommendations:  -Limit saturated fat, trans fat, sodium, red meat, sweets and sugar-sweetened beverages. If you choose to eat red meat, compare labels and select the leanest cuts available     Reji Ocampo MD, PhD  Professor of Medicine  Division of Cardiology     CC  Patient Care Team:  Samm Vazquez MD as PCP - General (Internal Medicine)  Reji Ocampo MD as MD (Cardiology)  Shiv Vizcarra MD as MD (Family Practice)

## 2023-08-21 NOTE — PATIENT INSTRUCTIONS
August 21, 2023    Cardiology Provider You Saw During Your Visit: Dr. Ocampo      Medication Changes: None      Follow Up: With Dr. Ocampo in 1 year with fasting labs prior to visit      Follow the American Heart Association Diet and Lifestyle Recommendations:  -Limit saturated fat, trans fat, sodium, red meat, sweets and sugar-sweetened beverages. If you choose to eat red meat, compare labels and select the leanest cuts available.  -Aim for at least 150 minutes of moderate physical activity or 75 minutes of vigorous physical activity - or an equal combination of both - each week.      To Reach Us:  -During business hours: [phone], press option # 1 to schedule an appointment or to leave a message for your care team.     -After hours, weekends or holidays: [phone], press option #4 and ask to speak to the on-call cardiologist. Inform them you are a patient at the [facility].        **If you have a cardiac device, please make sure you schedule an in-person device check just prior to your cardiology provider appointments**        Edita Way RN  Cardiology Care Coordinator - General Cardiology  [facility]

## 2023-08-21 NOTE — PROGRESS NOTES
ANTICOAGULATION MANAGEMENT     Oswaldo Prabhakar 62 year old male is on warfarin with subtherapeutic INR result. (Goal INR 2.0-3.0)    Recent labs: (last 7 days)     08/21/23  1107   INR 1.46*       ASSESSMENT     Source(s): Chart Review and Patient/Caregiver Call     Warfarin doses taken: Missed dose(s) may be affecting INR and Patient reports taking 7.5mg Mon and Fri and 5mg all other days for the past several months  Diet: No new diet changes identified  Medication/supplement changes: None noted  New illness, injury, or hospitalization: No  Signs or symptoms of bleeding or clotting: No  Previous result: Supratherapeutic  Additional findings: None       PLAN     Recommended plan for temporary change(s) affecting INR     Dosing Instructions: booster dose then continue your current warfarin dose with next INR in 1 week       Summary  As of 8/21/2023      Full warfarin instructions:  8/21: 12.5 mg; Otherwise 7.5 mg every Mon, Fri; 5 mg all other days   Next INR check:  8/31/2023               Telephone call with Oswaldo who verbalizes understanding and agrees to plan and who agrees to plan and repeated back plan correctly    Lab visit scheduled    Education provided:   Taking warfarin: Importance of taking warfarin as instructed  Goal range and lab monitoring: goal range and significance of current result and Importance of therapeutic range    Plan made per [facility] anticoagulation protocol    Kristina Wright RN  Anticoagulation Clinic  8/21/2023    _______________________________________________________________________     Anticoagulation Episode Summary       Current INR goal:  2.0-3.0   TTR:  41.7 % (1.8 mo)   Target end date:  Indefinite   Send INR reminders to:  [facility]    Indications    Pulmonary emboli (H) [I26.99]  Long-term (current) use of anticoagulants [Z79.01] [Z79.01]             Comments:  HIPPA INFO OK to speak with wife Josselyn OK to leave messages on Home.work and cell phones  use cell phone  #[phone] ++++             Anticoagulation Care Providers       Provider Role Specialty Phone number    Samm Vazquez MD Referring Internal Medicine - Pediatrics [phone]

## 2023-08-22 DIAGNOSIS — Z95.1 S/P CABG X 4: ICD-10-CM

## 2023-08-22 DIAGNOSIS — E78.5 HYPERLIPIDEMIA LDL GOAL <130: ICD-10-CM

## 2023-08-22 DIAGNOSIS — Z78.9 STATIN INTOLERANCE: ICD-10-CM

## 2023-08-22 DIAGNOSIS — I25.739 CORONARY ARTERY DISEASE INVOLVING NONAUTOLOGOUS BIOLOGICAL CORONARY BYPASS GRAFT WITH ANGINA PECTORIS (H): ICD-10-CM

## 2023-08-22 RX ORDER — EVOLOCUMAB 140 MG/ML
INJECTION, SOLUTION SUBCUTANEOUS
Qty: 6 ML | Refills: 3 | Status: SHIPPED | OUTPATIENT
Start: 2023-08-22 | End: 2023-12-01

## 2023-08-24 DIAGNOSIS — E83.52 HYPERCALCEMIA: Primary | ICD-10-CM

## 2023-08-30 DIAGNOSIS — D31.31 CHOROIDAL NEVUS, RIGHT: Primary | ICD-10-CM

## 2023-08-31 ENCOUNTER — LAB (OUTPATIENT)
Dept: LAB | Facility: CLINIC | Age: 63
End: 2023-08-31
Payer: COMMERCIAL

## 2023-08-31 ENCOUNTER — ANTICOAGULATION THERAPY VISIT (OUTPATIENT)
Dept: ANTICOAGULATION | Facility: CLINIC | Age: 63
End: 2023-08-31

## 2023-08-31 DIAGNOSIS — E83.52 HYPERCALCEMIA: ICD-10-CM

## 2023-08-31 DIAGNOSIS — I27.82 CHRONIC PULMONARY EMBOLISM WITHOUT ACUTE COR PULMONALE, UNSPECIFIED PULMONARY EMBOLISM TYPE (H): Primary | ICD-10-CM

## 2023-08-31 DIAGNOSIS — I26.99 PULMONARY EMBOLI (H): ICD-10-CM

## 2023-08-31 DIAGNOSIS — Z79.01 LONG TERM CURRENT USE OF ANTICOAGULANT THERAPY: ICD-10-CM

## 2023-08-31 LAB
ANION GAP SERPL CALCULATED.3IONS-SCNC: 8 MMOL/L (ref 7–15)
BUN SERPL-MCNC: 9.9 MG/DL (ref 8–23)
CA-I BLD-MCNC: 5 MG/DL (ref 4.4–5.2)
CALCIUM SERPL-MCNC: 9.7 MG/DL (ref 8.8–10.2)
CHLORIDE SERPL-SCNC: 105 MMOL/L (ref 98–107)
CREAT SERPL-MCNC: 1.12 MG/DL (ref 0.67–1.17)
DEPRECATED HCO3 PLAS-SCNC: 26 MMOL/L (ref 22–29)
GFR SERPL CREATININE-BSD FRML MDRD: 75 ML/MIN/1.73M2
GLUCOSE SERPL-MCNC: 106 MG/DL (ref 70–99)
INR PPP: 3.3 (ref 0.85–1.15)
POTASSIUM SERPL-SCNC: 4 MMOL/L (ref 3.4–5.3)
PTH-INTACT SERPL-MCNC: 17 PG/ML (ref 15–65)
SODIUM SERPL-SCNC: 139 MMOL/L (ref 136–145)

## 2023-08-31 PROCEDURE — 80048 BASIC METABOLIC PNL TOTAL CA: CPT | Performed by: PATHOLOGY

## 2023-08-31 PROCEDURE — 82330 ASSAY OF CALCIUM: CPT | Performed by: PATHOLOGY

## 2023-08-31 PROCEDURE — 85610 PROTHROMBIN TIME: CPT | Performed by: PATHOLOGY

## 2023-08-31 PROCEDURE — 83970 ASSAY OF PARATHORMONE: CPT | Performed by: PATHOLOGY

## 2023-08-31 PROCEDURE — 36415 COLL VENOUS BLD VENIPUNCTURE: CPT | Performed by: PATHOLOGY

## 2023-08-31 NOTE — PROGRESS NOTES
ANTICOAGULATION MANAGEMENT     Oswaldo Prabhakar 62 year old male is on warfarin with supratherapeutic INR result. (Goal INR 2.0-3.0)    Recent labs: (last 7 days)     08/31/23  1034   INR 3.30*       ASSESSMENT     Source(s): Chart Review and Patient/Caregiver Call     Warfarin doses taken: Warfarin taken as instructed  Diet: No new diet changes identified  Medication/supplement changes: None noted  New illness, injury, or hospitalization: No  Signs or symptoms of bleeding or clotting: No  Previous result: Subtherapeutic, time before that was supra therapeutic  Additional findings: None       PLAN     Recommended plan for no diet, medication or health factor changes affecting INR     Dosing Instructions: decrease your warfarin dose (6% change) with next INR in 1 week       Summary  As of 8/31/2023      Full warfarin instructions:  7.5 mg every Mon; 5 mg all other days   Next INR check:  9/7/2023               Telephone call with Oswaldo who verbalizes understanding and agrees to plan    Patient offered & declined to schedule next visit    Education provided:   Symptom monitoring: monitoring for bleeding signs and symptoms    Plan made per [facility] anticoagulation protocol    Marielena Chacon RN  Anticoagulation Clinic  8/31/2023    _______________________________________________________________________     Anticoagulation Episode Summary       Current INR goal:  2.0-3.0   TTR:  43.6 % (2.1 mo)   Target end date:  Indefinite   Send INR reminders to:  [facility]    Indications    Pulmonary emboli (H) [I26.99]  Long-term (current) use of anticoagulants [Z79.01] [Z79.01]             Comments:  HIPPA INFO OK to speak with wife Josselyn OK to leave messages on Home.work and cell phones  use cell phone #[phone] ++++             Anticoagulation Care Providers       Provider Role Specialty Phone number    Samm Vazquez MD Referring Internal Medicine - Pediatrics [phone]

## 2023-09-07 ENCOUNTER — LAB (OUTPATIENT)
Dept: LAB | Facility: CLINIC | Age: 63
End: 2023-09-07
Payer: COMMERCIAL

## 2023-09-07 ENCOUNTER — ANTICOAGULATION THERAPY VISIT (OUTPATIENT)
Dept: ANTICOAGULATION | Facility: CLINIC | Age: 63
End: 2023-09-07

## 2023-09-07 DIAGNOSIS — Z79.01 LONG TERM CURRENT USE OF ANTICOAGULANT THERAPY: ICD-10-CM

## 2023-09-07 DIAGNOSIS — I27.82 CHRONIC PULMONARY EMBOLISM WITHOUT ACUTE COR PULMONALE, UNSPECIFIED PULMONARY EMBOLISM TYPE (H): Primary | ICD-10-CM

## 2023-09-07 DIAGNOSIS — I26.99 PULMONARY EMBOLI (H): ICD-10-CM

## 2023-09-07 LAB — INR PPP: 1.67 (ref 0.85–1.15)

## 2023-09-07 PROCEDURE — 36415 COLL VENOUS BLD VENIPUNCTURE: CPT | Performed by: PATHOLOGY

## 2023-09-07 PROCEDURE — 85610 PROTHROMBIN TIME: CPT | Performed by: PATHOLOGY

## 2023-09-07 NOTE — PROGRESS NOTES
ANTICOAGULATION MANAGEMENT     Oswaldo Prabhakar 63 year old male is on warfarin with subtherapeutic INR result. (Goal INR 2.0-3.0)    Recent labs: (last 7 days)     09/07/23  1545   INR 1.67*       ASSESSMENT     Source(s): Chart Review and Patient/Caregiver Call     Warfarin doses taken: Warfarin taken as instructed  Diet: No new diet changes identified  Medication/supplement changes: None noted  New illness, injury, or hospitalization: No  Signs or symptoms of bleeding or clotting: No  Previous result: Supratherapeutic  Additional findings: None       PLAN     Recommended plan for no diet, medication or health factor changes affecting INR     Dosing Instructions: Increase your warfarin dose (6.7% change) with next INR in 1 week       Summary  As of 9/7/2023      Full warfarin instructions:  7.5 mg every Mon, Thu; 5 mg all other days   Next INR check:  9/14/2023               Telephone call with Oswaldo who verbalizes understanding and agrees to plan and who agrees to plan and repeated back plan correctly    Lab visit scheduled    Education provided:   Taking warfarin: Importance of taking warfarin as instructed  Goal range and lab monitoring: goal range and significance of current result and Importance of therapeutic range    Plan made per [facility] anticoagulation protocol    Kristina Wright RN  Anticoagulation Clinic  9/7/2023    _______________________________________________________________________     Anticoagulation Episode Summary       Current INR goal:  2.0-3.0   TTR:  45.4 % (2.4 mo)   Target end date:  Indefinite   Send INR reminders to:  [facility]    Indications    Pulmonary emboli (H) [I26.99]  Long-term (current) use of anticoagulants [Z79.01] [Z79.01]             Comments:  HIPPA INFO OK to speak with wife Josselyn OK to leave messages on Home.work and cell phones  use cell phone #[phone] ++++             Anticoagulation Care Providers       Provider Role Specialty Phone number    Samm Vazquez,  MD Referring Internal Medicine - Pediatrics [phone]

## 2023-09-12 ENCOUNTER — OFFICE VISIT (OUTPATIENT)
Dept: OPHTHALMOLOGY | Facility: CLINIC | Age: 63
End: 2023-09-12
Attending: OPHTHALMOLOGY
Payer: COMMERCIAL

## 2023-09-12 DIAGNOSIS — H31.8 CHOROIDAL LESION: ICD-10-CM

## 2023-09-12 PROCEDURE — 92134 CPTRZ OPH DX IMG PST SGM RTA: CPT | Performed by: OPHTHALMOLOGY

## 2023-09-12 PROCEDURE — 99207 FUNDUS PHOTOS OD (RIGHT EYE): CPT | Mod: 26 | Performed by: OPHTHALMOLOGY

## 2023-09-12 PROCEDURE — 99214 OFFICE O/P EST MOD 30 MIN: CPT | Performed by: OPHTHALMOLOGY

## 2023-09-12 PROCEDURE — 92250 FUNDUS PHOTOGRAPHY W/I&R: CPT | Performed by: OPHTHALMOLOGY

## 2023-09-12 PROCEDURE — G0463 HOSPITAL OUTPT CLINIC VISIT: HCPCS | Performed by: OPHTHALMOLOGY

## 2023-09-12 PROCEDURE — 76510 OPH US DX B-SCAN&QUAN A-SCAN: CPT | Performed by: OPHTHALMOLOGY

## 2023-09-12 ASSESSMENT — VISUAL ACUITY
OD_SC: CF @ 1' ECC
METHOD: SNELLEN - LINEAR
OS_SC: 20/20

## 2023-09-12 ASSESSMENT — TONOMETRY
IOP_METHOD: ICARE
OS_IOP_MMHG: 17
OD_IOP_MMHG: 58

## 2023-09-12 ASSESSMENT — SLIT LAMP EXAM - LIDS
COMMENTS: NORMAL
COMMENTS: NORMAL

## 2023-09-12 ASSESSMENT — EXTERNAL EXAM - RIGHT EYE: OD_EXAM: NORMAL

## 2023-09-12 ASSESSMENT — EXTERNAL EXAM - LEFT EYE: OS_EXAM: NORMAL

## 2023-09-12 NOTE — NURSING NOTE
Chief Complaints and History of Present Illnesses   Patient presents with    Follow Up     Chief Complaint(s) and History of Present Illness(es)       Follow Up              Laterality: both eyes    Course: stable    Associated symptoms: Negative for eye pain, flashes and floaters    Treatments tried: no treatments              Comments    Here for follow up. VA is the same. No eye pain. No flashes or floaters.    Juan Antonio Lozano COT 8:26 AM September 12, 2023

## 2023-09-12 NOTE — PROGRESS NOTES
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      Came to N for NVG OD 8/2022 seen in ED, had been at [facility], diagnosis with NVG OD and referred to N for CPC and PRP, seen by Adebayo at [facility] and Modesta & Janette at [facility].    Poor vision OD since childhood d/t RD not diagnosis until too late to repair    CAD s/p CABG x 4  No h/o cancer    PE on chronic warfarin    PAST OCULAR SURGERY:  CE/IOL OU 2022  Laser pexy OS 2000s  CPC OD 5/17/23 ([facility])      RETINAL IMAGING  OCT 06/12/23   OD - diffuse ERM, severe outer atrophy diffuse  OS - retina normal, PHF attached      FA 4/19/23:  - Right eye: Diffuse pinpoint hyperfluorescence, trace disc leakage.   - Left eye: Trace disc leakage.       U/S OD  A-scan - high reflective (6/2023)  B-scan - ST mass with hyperR base, size 2.27 x 5.88T . 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     ATTESTATION     Attending Physician Attestation:      Complete documentation of historical and exam elements from today's encounter can be found in the full encounter summary report (not reduplicated in this progress note).  I personally obtained the chief complaint(s) and history of present illness.  I confirmed and edited as necessary the review of systems, past medical/surgical history, family history, social history, and examination findings as documented by others; and I examined the patient myself.  I personally reviewed the relevant tests, images, and reports as documented above.  I formulated and edited as necessary the assessment and plan and discussed the findings and management plan with the patient and family    Maya Isaac MD, PhD  , Vitreoretinal Surgery  Department of Ophthalmology  [facility]

## 2023-09-14 ENCOUNTER — LAB (OUTPATIENT)
Dept: LAB | Facility: CLINIC | Age: 63
End: 2023-09-14
Payer: COMMERCIAL

## 2023-09-14 ENCOUNTER — ANTICOAGULATION THERAPY VISIT (OUTPATIENT)
Dept: ANTICOAGULATION | Facility: CLINIC | Age: 63
End: 2023-09-14

## 2023-09-14 DIAGNOSIS — Z79.01 LONG TERM CURRENT USE OF ANTICOAGULANT THERAPY: ICD-10-CM

## 2023-09-14 DIAGNOSIS — I27.82 CHRONIC PULMONARY EMBOLISM WITHOUT ACUTE COR PULMONALE, UNSPECIFIED PULMONARY EMBOLISM TYPE (H): Primary | ICD-10-CM

## 2023-09-14 DIAGNOSIS — I26.99 PULMONARY EMBOLI (H): ICD-10-CM

## 2023-09-14 LAB — INR PPP: 2.64 (ref 0.85–1.15)

## 2023-09-14 PROCEDURE — 36415 COLL VENOUS BLD VENIPUNCTURE: CPT | Performed by: PATHOLOGY

## 2023-09-14 PROCEDURE — 85610 PROTHROMBIN TIME: CPT | Performed by: PATHOLOGY

## 2023-09-14 NOTE — PROGRESS NOTES
ANTICOAGULATION MANAGEMENT     Oswaldo Prabhakar 63 year old male is on warfarin with therapeutic INR result. (Goal INR 2.0-3.0)    Recent labs: (last 7 days)     09/14/23  1116   INR 2.64*       ASSESSMENT     Source(s): Chart Review  Previous INR was Subtherapeutic  Medication, diet, health changes since last INR chart reviewed; none identified         PLAN     Recommended plan for no diet, medication or health factor changes affecting INR     Dosing Instructions: Continue your current warfarin dose with next INR in 10 days       Summary  As of 9/14/2023      Full warfarin instructions:  7.5 mg every Mon, Thu; 5 mg all other days   Next INR check:  9/25/2023               Detailed voice message left for Oswaldo with dosing instructions and follow up date.     Check at provider office visit    Education provided:   Please call back if any changes to your diet, medications or how you've been taking warfarin    Plan made per [facility] anticoagulation protocol    Kwesi Gregory, RN  Anticoagulation Clinic  9/14/2023    _______________________________________________________________________     Anticoagulation Episode Summary       Current INR goal:  2.0-3.0   TTR:  47.2 % (2.6 mo)   Target end date:  Indefinite   Send INR reminders to:  [facility]    Indications    Pulmonary emboli (H) [I26.99]  Long-term (current) use of anticoagulants [Z79.01] [Z79.01]             Comments:  HIPPA INFO OK to speak with wife Josselyn OK to leave messages on Home.work and cell phones  use cell phone #[phone] ++++             Anticoagulation Care Providers       Provider Role Specialty Phone number    Samm Vazquez MD Referring Internal Medicine - Pediatrics [phone]

## 2023-09-15 ENCOUNTER — DOCUMENTATION ONLY (OUTPATIENT)
Dept: ANTICOAGULATION | Facility: CLINIC | Age: 63
End: 2023-09-15
Payer: COMMERCIAL

## 2023-09-15 DIAGNOSIS — Z79.01 LONG TERM CURRENT USE OF ANTICOAGULANT THERAPY: ICD-10-CM

## 2023-09-15 DIAGNOSIS — I27.82 CHRONIC PULMONARY EMBOLISM WITHOUT ACUTE COR PULMONALE, UNSPECIFIED PULMONARY EMBOLISM TYPE (H): Primary | ICD-10-CM

## 2023-09-24 NOTE — PROGRESS NOTES
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   - 05/17/2023: s/p right eye TSCPC #20 shots    Today, 09/25/2023, s/p TSCPC right eye 5/17/23. Patient states eye continues to be comfortable and without pain. No changes in left eye vision. Using IOP drops as instructed but only using prednisolone daily in the right eye.'    Current drops:  Eye drops RIGHT EYE  Continue Prednisolone acetate daily for NVG/comfort  Cosopt 2 times a day  Brimonidine 3 times a day  Latanoprost at bedtime    Relevant Past Medical/Family/Social History:  Past Medical History:   Diagnosis Date    Antiplatelet or antithrombotic long-term use     Coronary artery disease     Diaphragmatic hernia without mention of obstruction or gangrene     Heart disease     Hernia, abdominal     History of blood transfusion     History of thrombophlebitis     Hypertension     Nephrolithiasis 4/2010    Other and unspecified hyperlipidemia     Pulmonary embolus and infarction (H)     s/p hemothorax and filter s/p filter removal (2011), now on long term anti-coagulation    Statin intolerance 2/1/2017    Stented coronary artery 01/07/2020    NIR mid LAD    Unspecified retinal detachment     Childhood trauma, unrepaired     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[employer]  No family history of glaucoma    Relevant Review of Systems: chronic decreased vision in the right eye, no pain in the right eye, feels like his left eye is at baseline as well     Diagnosis: NVG right eye   Year diagnosis: 2023  Previous glaucoma surgery/laser: CEIOL left eye, PRP left eye, CEIOL right eye  Maximum intraocular pressure 50s  Currently Meds: Diamox 500 mg PO BID,  prednisolone BID OD, timolol BID right eye, Brimonidine TID OD  Family history: negative  CCT: 551/548  Gonio:  Refractive status: Pseudophakia  Trauma history: negative  Steroid exposure: positive prednisolone  Vasospastic disease: Migrane/Raynaud phenomenon: negative  A past hemodynamic crisis or Low BP: negative  Meds AEs/intolerance: none  PMHx: Asthma and respiratory problems/Cardiac/Renal/Kidney stones/Sulfa Allergy: kidney stones, bypass surgery  Anticoagulants: Warfarin for complicated PE  - Visual field April 13, 2023  - Right eye - not able to completed  - Left eye - normal, reliable  - OCT Optic Nerve RNFL Spectralis April 13, 2023  - Right Eye: diffuse atrophy, reliable  - Left Eye: trace thinning IT, reliable    Today's testing:  - 52/14 mmHg applanation   AC trace cell right eye    Additional Ocular History:   Anterior uveitis, chronic, right eye    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  Patient has chronic RD/inflammation and neovascularization in the right eye. He has had one injection int the right eye about 7 months ago. Now POM4 s/p right eye TSCPC (20 shots) 05/17/2023, no post-op inflammation, IOP 52 mmHg. Subjectively comfortable.   Recommend Repeat TSCPC, will avoid tube shunt, poor visual prognosis and possible CMM.   Risks and benefits of TSCPC in the right eye, including risks of inflammation, need for additional surgery, failure of surgery, and vision loss were explained to patient, who expressed understanding and wishes to proceed with surgery  Eye drops RIGHT EYE  Continue Prednisolone acetate daily for NVG/comfort  Cosopt 2 times a  day  Brimonidine 3 times a day  Latanoprost at bedtime    Schedule TSCPC right eye, aim for 22 shots     Kyler Elliott M.D.  Resident Physician, PGY-2  Department of Ophthalmology      Physician Attestation     Attending Physician Attestation:  Complete documentation of historical and exam elements from today's encounter can be found in the full encounter summary report (not reduplicated in this progress note). I personally obtained the chief complaint(s) and history of present illness. I confirmed and edited as necessary the review of systems, past medical/surgical history, family history, social history, and examination findings as documented by others; and I examined the patient myself. I personally reviewed the relevant tests, images, and reports as documented above. I formulated and edited as necessary the assessment and plan and discussed the findings and management plan with the patient and any family members present at the time of the visit.  Jay Jay Savage M.D., Glaucoma, September 25, 2023

## 2023-09-25 ENCOUNTER — PREP FOR PROCEDURE (OUTPATIENT)
Dept: OPHTHALMOLOGY | Facility: CLINIC | Age: 63
End: 2023-09-25
Payer: COMMERCIAL

## 2023-09-25 ENCOUNTER — OFFICE VISIT (OUTPATIENT)
Dept: OPHTHALMOLOGY | Facility: CLINIC | Age: 63
End: 2023-09-25
Attending: OPHTHALMOLOGY
Payer: COMMERCIAL

## 2023-09-25 DIAGNOSIS — H40.51X3 NEOVASCULAR GLAUCOMA OF RIGHT EYE, SEVERE STAGE: Primary | ICD-10-CM

## 2023-09-25 PROCEDURE — G0463 HOSPITAL OUTPT CLINIC VISIT: HCPCS | Performed by: OPHTHALMOLOGY

## 2023-09-25 PROCEDURE — 99214 OFFICE O/P EST MOD 30 MIN: CPT | Mod: GC | Performed by: OPHTHALMOLOGY

## 2023-09-25 RX ORDER — PROPARACAINE HYDROCHLORIDE 5 MG/ML
1 SOLUTION/ DROPS OPHTHALMIC ONCE
Status: CANCELLED | OUTPATIENT
Start: 2023-09-25 | End: 2023-09-25

## 2023-09-25 ASSESSMENT — SLIT LAMP EXAM - LIDS
COMMENTS: NORMAL
COMMENTS: NORMAL

## 2023-09-25 ASSESSMENT — VISUAL ACUITY
OS_SC: 20/20
METHOD: SNELLEN - LINEAR
OS_SC+: +2
OD_SC: HM

## 2023-09-25 ASSESSMENT — CONF VISUAL FIELD
OD_INFERIOR_NASAL_RESTRICTION: 1
OS_INFERIOR_TEMPORAL_RESTRICTION: 0
OS_SUPERIOR_TEMPORAL_RESTRICTION: 0
OD_SUPERIOR_NASAL_RESTRICTION: 1
OD_INFERIOR_TEMPORAL_RESTRICTION: 1
OD_SUPERIOR_TEMPORAL_RESTRICTION: 1
OS_INFERIOR_NASAL_RESTRICTION: 0
METHOD: COUNTING FINGERS
OS_SUPERIOR_NASAL_RESTRICTION: 0
OS_NORMAL: 1

## 2023-09-25 ASSESSMENT — TONOMETRY
IOP_METHOD: APPLANATION
OD_IOP_MMHG: 52
IOP_METHOD: APPLANATION
OS_IOP_MMHG: 14
OD_IOP_MMHG: 48
OS_IOP_MMHG: 14

## 2023-09-25 ASSESSMENT — EXTERNAL EXAM - RIGHT EYE: OD_EXAM: NORMAL

## 2023-09-25 ASSESSMENT — EXTERNAL EXAM - LEFT EYE: OS_EXAM: NORMAL

## 2023-09-25 ASSESSMENT — CUP TO DISC RATIO: OS_RATIO: 0.5

## 2023-09-25 NOTE — NURSING NOTE
Chief Complaints and History of Present Illnesses   Patient presents with    Glaucoma Follow Up     9 week follow up      Chief Complaint(s) and History of Present Illness(es)       Glaucoma Follow Up              Comments: 9 week follow up               Comments    Pt states vision is the same as last visit. No eye pain today. No new flashes or floaters.   No new redness or dryness. No DM.    RACHELL García September 25, 2023 8:31 AM

## 2023-09-26 DIAGNOSIS — I10 ESSENTIAL HYPERTENSION: ICD-10-CM

## 2023-09-27 ENCOUNTER — TELEPHONE (OUTPATIENT)
Dept: OPHTHALMOLOGY | Facility: CLINIC | Age: 63
End: 2023-09-27
Payer: COMMERCIAL

## 2023-09-27 RX ORDER — LISINOPRIL 2.5 MG/1
2.5 TABLET ORAL DAILY
Qty: 90 TABLET | Refills: 0 | Status: SHIPPED | OUTPATIENT
Start: 2023-09-27 | End: 2023-11-20

## 2023-09-27 NOTE — TELEPHONE ENCOUNTER
Patient is schedule for surgery with: Dr. Savage      Surgery Date: 10/25/23     Location[facility]    H&P: to be completed by [facility]     Post-op: 10/26, 10/31, and 11/21    Patient will receive a phone call from pre-admission nurses 1-2 days prior to surgery with arrival and start time.    Patient aware times are subject to change up until day before surgery.     Patient questions/concerns: N/A     Surgery packet was sent via US mail on 9/27/23      Patricia Saldana on 9/27/2023 at 10:43 AM

## 2023-09-27 NOTE — TELEPHONE ENCOUNTER
lisinopril (ZESTRIL) 2.5 MG tablet         Last Written Prescription Date:  6/26/23  Last Fill Quantity: 90,   # refills: 0    Last Office Visit : 5/16/23  Future Office visit:  11/20/23    Routing refill request to provider for review/approval because:  Please review BP next appt.  BP Readings from Last 3 Encounters:   08/21/23 (!) 153/88   05/17/23 123/77   05/16/23 131/81

## 2023-09-28 ENCOUNTER — TELEPHONE (OUTPATIENT)
Dept: ANTICOAGULATION | Facility: CLINIC | Age: 63
End: 2023-09-28
Payer: COMMERCIAL

## 2023-09-28 NOTE — TELEPHONE ENCOUNTER
ANTICOAGULATION     Oswaldo Prabhakar is overdue for INR check.      Spoke with Oswaldo who declined to schedule a lab appointment at this time. If calling back please schedule as soon as possible.  He states he will have an INR checked in a day or two.  DEMETRA Rangel RN

## 2023-10-10 ENCOUNTER — OFFICE VISIT (OUTPATIENT)
Dept: INTERNAL MEDICINE | Facility: CLINIC | Age: 63
End: 2023-10-10
Payer: COMMERCIAL

## 2023-10-10 ENCOUNTER — MYC MEDICAL ADVICE (OUTPATIENT)
Dept: ANTICOAGULATION | Facility: CLINIC | Age: 63
End: 2023-10-10

## 2023-10-10 VITALS
HEART RATE: 80 BPM | SYSTOLIC BLOOD PRESSURE: 134 MMHG | OXYGEN SATURATION: 96 % | WEIGHT: 231.1 LBS | DIASTOLIC BLOOD PRESSURE: 89 MMHG | BODY MASS INDEX: 32.14 KG/M2

## 2023-10-10 DIAGNOSIS — Z95.1 S/P CABG X 4: ICD-10-CM

## 2023-10-10 DIAGNOSIS — Z12.11 ENCOUNTER FOR SCREENING COLONOSCOPY: Primary | ICD-10-CM

## 2023-10-10 DIAGNOSIS — Z00.00 HEALTHCARE MAINTENANCE: ICD-10-CM

## 2023-10-10 LAB
ATRIAL RATE - MUSE: 84 BPM
DIASTOLIC BLOOD PRESSURE - MUSE: NORMAL MMHG
INTERPRETATION ECG - MUSE: NORMAL
P AXIS - MUSE: 53 DEGREES
PR INTERVAL - MUSE: 144 MS
QRS DURATION - MUSE: 84 MS
QT - MUSE: 332 MS
QTC - MUSE: 392 MS
R AXIS - MUSE: 56 DEGREES
SYSTOLIC BLOOD PRESSURE - MUSE: NORMAL MMHG
T AXIS - MUSE: 103 DEGREES
VENTRICULAR RATE- MUSE: 84 BPM

## 2023-10-10 PROCEDURE — 93005 ELECTROCARDIOGRAM TRACING: CPT

## 2023-10-10 PROCEDURE — 99213 OFFICE O/P EST LOW 20 MIN: CPT | Mod: 25

## 2023-10-10 PROCEDURE — 90480 ADMN SARSCOV2 VAC 1/ONLY CMP: CPT

## 2023-10-10 PROCEDURE — 91320 SARSCV2 VAC 30MCG TRS-SUC IM: CPT

## 2023-10-10 ASSESSMENT — ENCOUNTER SYMPTOMS
SINUS PAIN: 0
TASTE DISTURBANCE: 0
SMELL DISTURBANCE: 0
SINUS CONGESTION: 0
NECK PAIN: 0
TROUBLE SWALLOWING: 0
ARTHRALGIAS: 1
HOARSE VOICE: 0
NECK MASS: 0
JOINT SWELLING: 0
SORE THROAT: 0
MYALGIAS: 0
STIFFNESS: 0
MUSCLE WEAKNESS: 0
MUSCLE CRAMPS: 0
BACK PAIN: 0

## 2023-10-10 NOTE — PROGRESS NOTES
Surgeon: Jay Jay Savage MD   Fax number for Preop Evaluation: N/A  Location of Surgery: [facility] OR  Date of Surgery: 10/25/2023  Procedure: Cyclophotoagulation  History of reaction to anesthesia? No    MAXINE Clayton at 8:06 AM on 10/10/2023.Answers submitted by the patient for this visit:  Symptoms you have experienced in the last 30 days (Submitted on 10/10/2023)  General Symptoms: No  Skin Symptoms: No  HENT Symptoms: Yes  EYE SYMPTOMS: No  HEART SYMPTOMS: No  LUNG SYMPTOMS: No  INTESTINAL SYMPTOMS: No  URINARY SYMPTOMS: No  REPRODUCTIVE SYMPTOMS: No  SKELETAL SYMPTOMS: Yes  BLOOD SYMPTOMS: No  NERVOUS SYSTEM SYMPTOMS: No  MENTAL HEALTH SYMPTOMS: No  Please answer the questions below to tell us what conditions you are experiencing: (Submitted on 10/10/2023)  Ear pain: No  Ear discharge: No  Hearing loss: No  Tinnitus: Yes  Nosebleeds: No  Congestion: No  Sinus pain: No  Trouble swallowing: No   Voice hoarseness: No  Mouth sores: No  Sore throat: No  Tooth pain: No  Gum tenderness: No  Bleeding gums: No  Change in taste: No  Change in sense of smell: No  Dry mouth: No  Hearing aid used: No  Neck lump: No  Please answer the questions below to tell us what condition you are experiencing: (Submitted on 10/10/2023)  Back pain: No  Muscle aches: No  Neck pain: No  Swollen joints: No  Joint pain: Yes  Bone pain: No  Muscle cramps: No  Muscle weakness: No  Joint stiffness: No  Bone fracture: No

## 2023-10-10 NOTE — PROGRESS NOTES
Subjective   Oswaldo Prabhakar is a 63 y.o. male with PMH of CAD s/p 4V CABG in 2014 (LIMA-LAD, SVg-Diag, SVG-OM1, SVG-rPDA) and PCI with NIR to prox/mid LAD in 01/2020, hypertension, hyperlipidemia and pulmonary emboli.    He presents for pre-operative evaluation for his eye laser surgery. Pre-op for Glaucoma - Cyclophotoagulation; laser procedure;  He perceives light and movements; but no vision in the right eye, so goal to keep the ye.   He denies any fevers, chills, chest pain, shortness of breath, changes in bowel habits.   Endorses left sided rib edge numbness since his thoracotomy.     Interestingly, he reports that due to his high family CAD risk, he pre-emptively opted for an ischemic workup, talked to Dr. Ocampo, and they did a coronary CT and had complete LAD blockage and 80% in others per patient so had CABG X4 in 2014;     FH - strong CAD risk  SH: never smoked, EtOH denies, recreational drug use denies.     PMH: hx of right eyeball nevus, glaucoma, familial hypercholesterolemia, CAD s/p CABG 2014, s/p NIR bilateral PE on warfarin, hx of cholecystectomy, hx of kidney stones,   Medications: Warfarin, ticagrelor, lisinopril 2.5 mg every day, metoprol tartrate1.2.5 BID, ezetimibe 10 mg every day, fenofibrate 160 every day, evolocumab qOweek, atorvastatin every day, omeprazole,        Objective:   Vitals: BP (!) 156/95 (BP Location: Right arm, Patient Position: Sitting, Cuff Size: Adult Regular)   Pulse 85   Wt 104.8 kg (231 lb 1.6 oz)   SpO2 97%   BMI 32.14 kg/m      General: alert and oriented x3, in no apparent cardiopulmonary distress   HEENT: normocephalic, atraumatic, MMM, left eye pupil round reactive to light, right eye pupil fixed , EOM intact in the left, r, no scleral icterus or chemosis, no conjunctival pallor   Neurologic: CN left II, CN III-XII grossly intact, 5/5 strength in all 4 extremities, no dysmetria, normal gait, sensation grossly intact   Cardiac: Regular rate and rhythm, normal S1,  S2, systolic murmur in the RUSB, no gallops or rubs    Pulmonary: chest clear to auscultation bilaterally, no dullness to percussion, no wheezes, rales, or rhonchi   Abdomen: soft, non-tender no irregular masses, abdominal aorta palpable, no organomegaly, no rebound or guarding.   Skin: excoriations from scratching on legs, healing.   Lymphatic: No cervical, supra/infraclavicular lymphadenopathy.   Extremities: No lower extremity edema, palpable bilateral radial pulses.   Musculoskeletal: No joint swelling, full ROM in all large joints   Psychiatric: Linear thought processes, normal speech, mood appropriate affect.       Labs Reviewed:   9/14/2023  - INR 2.64   8/31/2023 - PTH wnl, BMP unremarkable.  8/21/2023 - LDL 15 , cholesterol 78, triglycerides 97, HDL 44, non HDL 34      Imaging Reviewed:   2/15/2020   Interpretation Summary  The Ejection Fraction is estimated at 50-55%.  Technically difficult study.Extremely poor acoustic windows.  Limited information was obtained during study.  Right ventricular function, chamber size, wall motion, and thickness are  normal.  The inferior vena cava is normal.  No pericardial effusion is present.  No significant changes noted.    ASSESSMENT AND PLAN   Oswaldo Prabhakar is a 63 y.o. male with PMH of CAD s/p 4V CABG in 2014 (LIMA-LAD, SVg-Diag, SVG-OM1, SVG-rPDA) and PCI with NIR to prox/mid LAD in 01/2020, hypertension, hyperlipidemia and pulmonary emboli on anticoagulation who presents for pre-operative evaluation prior to his cyclo photocoagulation procedure of his right eye.Overall a low risk procedure without any contraindications.       PREOPERATIVE EVALUATION FOR TRANSSCLERAL CYCLOPHOTOCOAGULATION with GPROBE LASER   Low postoperative risk for pulmonary complications (ARISCAT LOW)   RCRI point 1 - patient will require cardiac monitoring during the procedure      - advised to hold lisinopril on the day of the procedure     - defer to surgeon for anticoagulation/antiplatelet  changes; ideally continue through surgery.     - baseline EKG obtained, unchanged T-wave inversions and ST depressions in V2-V5 similar to prevous EKG from 2/2022. No chest pain, dyspnea, or anginal symptoms per patient.   Hypertension     - AHA ACC protocol performed after elevated Bps (sytolic 150s); BP improved 134 ,140 systolic.     Oswaldo was seen today for pre-op exam.    Diagnoses and all orders for this visit:    Encounter for screening colonoscopy  -     Colonoscopy Screening  Referral; Future    S/P CABG x 4  -     EKG 12-lead complete w/read - Clinics    Healthcare maintenance  -     COVID-19 12+ (2023-24) (PFIZER)          Health maintenance    - had flu shot yesterday, giving covid-19 vaccine to be given today 10/10/2023.     - colonoscopy ordered today 10/10/2023    - lung cancer screening not indicated.     Case discussed with attending Dr. Asha Klein MD  PGY3   Internal Medicine   [facility]     Answers submitted by the patient for this visit:  Symptoms you have experienced in the last 30 days (Submitted on 10/10/2023)  General Symptoms: No  Skin Symptoms: No  HENT Symptoms: Yes  EYE SYMPTOMS: No  HEART SYMPTOMS: No  LUNG SYMPTOMS: No  INTESTINAL SYMPTOMS: No  URINARY SYMPTOMS: No  REPRODUCTIVE SYMPTOMS: No  SKELETAL SYMPTOMS: Yes  BLOOD SYMPTOMS: No  NERVOUS SYSTEM SYMPTOMS: No  MENTAL HEALTH SYMPTOMS: No  Please answer the questions below to tell us what conditions you are experiencing: (Submitted on 10/10/2023)  Ear pain: No  Ear discharge: No  Hearing loss: No  Tinnitus: Yes  Nosebleeds: No  Congestion: No  Sinus pain: No  Trouble swallowing: No   Voice hoarseness: No  Mouth sores: No  Sore throat: No  Tooth pain: No  Gum tenderness: No  Bleeding gums: No  Change in taste: No  Change in sense of smell: No  Dry mouth: No  Hearing aid used: No  Neck lump: No  Please answer the questions below to tell us what condition you are experiencing: (Submitted on  10/10/2023)  Back pain: No  Muscle aches: No  Neck pain: No  Swollen joints: No  Joint pain: Yes  Bone pain: No  Muscle cramps: No  Muscle weakness: No  Joint stiffness: No  Bone fracture: No    While the patient was in clinic, I reviewed the pertinent medical history and results.  I discussed the current findings on physical examination, as well as the patient s diagnosis and treatment plan with the resident and agree with the information as documented with the following exceptions: none.  Angel Barry MD

## 2023-10-10 NOTE — H&P (VIEW-ONLY)
Subjective   Oswaldo Prabhakar is a 63 y.o. male with PMH of CAD s/p 4V CABG in 2014 (LIMA-LAD, SVg-Diag, SVG-OM1, SVG-rPDA) and PCI with NIR to prox/mid LAD in 01/2020, hypertension, hyperlipidemia and pulmonary emboli.    He presents for pre-operative evaluation for his eye laser surgery. Pre-op for Glaucoma - Cyclophotoagulation; laser procedure;  He perceives light and movements; but no vision in the right eye, so goal to keep the ye.   He denies any fevers, chills, chest pain, shortness of breath, changes in bowel habits.   Endorses left sided rib edge numbness since his thoracotomy.     Interestingly, he reports that due to his high family CAD risk, he pre-emptively opted for an ischemic workup, talked to Dr. Ocampo, and they did a coronary CT and had complete LAD blockage and 80% in others per patient so had CABG X4 in 2014;     FH - strong CAD risk  SH: never smoked, EtOH denies, recreational drug use denies.     PMH: hx of right eyeball nevus, glaucoma, familial hypercholesterolemia, CAD s/p CABG 2014, s/p NIR bilateral PE on warfarin, hx of cholecystectomy, hx of kidney stones,   Medications: Warfarin, ticagrelor, lisinopril 2.5 mg every day, metoprol tartrate1.2.5 BID, ezetimibe 10 mg every day, fenofibrate 160 every day, evolocumab qOweek, atorvastatin every day, omeprazole,        Objective:   Vitals: BP (!) 156/95 (BP Location: Right arm, Patient Position: Sitting, Cuff Size: Adult Regular)   Pulse 85   Wt 104.8 kg (231 lb 1.6 oz)   SpO2 97%   BMI 32.14 kg/m      General: alert and oriented x3, in no apparent cardiopulmonary distress   HEENT: normocephalic, atraumatic, MMM, left eye pupil round reactive to light, right eye pupil fixed , EOM intact in the left, r, no scleral icterus or chemosis, no conjunctival pallor   Neurologic: CN left II, CN III-XII grossly intact, 5/5 strength in all 4 extremities, no dysmetria, normal gait, sensation grossly intact   Cardiac: Regular rate and rhythm, normal S1,  S2, systolic murmur in the RUSB, no gallops or rubs    Pulmonary: chest clear to auscultation bilaterally, no dullness to percussion, no wheezes, rales, or rhonchi   Abdomen: soft, non-tender no irregular masses, abdominal aorta palpable, no organomegaly, no rebound or guarding.   Skin: excoriations from scratching on legs, healing.   Lymphatic: No cervical, supra/infraclavicular lymphadenopathy.   Extremities: No lower extremity edema, palpable bilateral radial pulses.   Musculoskeletal: No joint swelling, full ROM in all large joints   Psychiatric: Linear thought processes, normal speech, mood appropriate affect.       Labs Reviewed:   9/14/2023  - INR 2.64   8/31/2023 - PTH wnl, BMP unremarkable.  8/21/2023 - LDL 15 , cholesterol 78, triglycerides 97, HDL 44, non HDL 34      Imaging Reviewed:   2/15/2020   Interpretation Summary  The Ejection Fraction is estimated at 50-55%.  Technically difficult study.Extremely poor acoustic windows.  Limited information was obtained during study.  Right ventricular function, chamber size, wall motion, and thickness are  normal.  The inferior vena cava is normal.  No pericardial effusion is present.  No significant changes noted.    ASSESSMENT AND PLAN   Oswaldo Prabhakar is a 63 y.o. male with PMH of CAD s/p 4V CABG in 2014 (LIMA-LAD, SVg-Diag, SVG-OM1, SVG-rPDA) and PCI with NIR to prox/mid LAD in 01/2020, hypertension, hyperlipidemia and pulmonary emboli on anticoagulation who presents for pre-operative evaluation prior to his cyclo photocoagulation procedure of his right eye.Overall a low risk procedure without any contraindications.       PREOPERATIVE EVALUATION FOR TRANSSCLERAL CYCLOPHOTOCOAGULATION with GPROBE LASER   Low postoperative risk for pulmonary complications (ARISCAT LOW)   RCRI point 1 - patient will require cardiac monitoring during the procedure      - advised to hold lisinopril on the day of the procedure     - defer to surgeon for anticoagulation/antiplatelet  changes; ideally continue through surgery.     - baseline EKG obtained, unchanged T-wave inversions and ST depressions in V2-V5 similar to prevous EKG from 2/2022. No chest pain, dyspnea, or anginal symptoms per patient.   Hypertension     - AHA ACC protocol performed after elevated Bps (sytolic 150s); BP improved 134 ,140 systolic.     Oswaldo was seen today for pre-op exam.    Diagnoses and all orders for this visit:    Encounter for screening colonoscopy  -     Colonoscopy Screening  Referral; Future    S/P CABG x 4  -     EKG 12-lead complete w/read - Clinics    Healthcare maintenance  -     COVID-19 12+ (2023-24) (PFIZER)          Health maintenance    - had flu shot yesterday, giving covid-19 vaccine to be given today 10/10/2023.     - colonoscopy ordered today 10/10/2023    - lung cancer screening not indicated.     Case discussed with attending Dr. Asha Klein MD  PGY3   Internal Medicine   [facility]     Answers submitted by the patient for this visit:  Symptoms you have experienced in the last 30 days (Submitted on 10/10/2023)  General Symptoms: No  Skin Symptoms: No  HENT Symptoms: Yes  EYE SYMPTOMS: No  HEART SYMPTOMS: No  LUNG SYMPTOMS: No  INTESTINAL SYMPTOMS: No  URINARY SYMPTOMS: No  REPRODUCTIVE SYMPTOMS: No  SKELETAL SYMPTOMS: Yes  BLOOD SYMPTOMS: No  NERVOUS SYSTEM SYMPTOMS: No  MENTAL HEALTH SYMPTOMS: No  Please answer the questions below to tell us what conditions you are experiencing: (Submitted on 10/10/2023)  Ear pain: No  Ear discharge: No  Hearing loss: No  Tinnitus: Yes  Nosebleeds: No  Congestion: No  Sinus pain: No  Trouble swallowing: No   Voice hoarseness: No  Mouth sores: No  Sore throat: No  Tooth pain: No  Gum tenderness: No  Bleeding gums: No  Change in taste: No  Change in sense of smell: No  Dry mouth: No  Hearing aid used: No  Neck lump: No  Please answer the questions below to tell us what condition you are experiencing: (Submitted on  10/10/2023)  Back pain: No  Muscle aches: No  Neck pain: No  Swollen joints: No  Joint pain: Yes  Bone pain: No  Muscle cramps: No  Muscle weakness: No  Joint stiffness: No  Bone fracture: No    While the patient was in clinic, I reviewed the pertinent medical history and results.  I discussed the current findings on physical examination, as well as the patient s diagnosis and treatment plan with the resident and agree with the information as documented with the following exceptions: none.  Angel Barry MD

## 2023-10-10 NOTE — NURSING NOTE
Oswaldo Prabhakar is a 63 year old male that presents in clinic today for the following:     Chief Complaint   Patient presents with    Pre-Op Exam       The patient's allergies and medications were reviewed. The patient's vitals were obtained without incident. The patient does not have any other questions for the provider.     Sammy Simmons, EMT at 8:10 AM on 10/10/2023   Primary Care Clinic: [phone]

## 2023-10-17 ENCOUNTER — DOCUMENTATION ONLY (OUTPATIENT)
Dept: ANTICOAGULATION | Facility: CLINIC | Age: 63
End: 2023-10-17
Payer: COMMERCIAL

## 2023-10-17 NOTE — PROGRESS NOTES
ANTICOAGULATION     Oswaldo Prabhakar is overdue for an INR check.     Reminder letter sent    Melvin Ritchie RN

## 2023-10-17 NOTE — LETTER
[facility]  [address]  [facility] [address]  Phone: [phone]  Fax: [phone]   October 17, 2023        Oswaldo Prabhakar  [facility] [facility] [address]            Dear Oswaldo,    You are currently under the care of [facility] for your warfarin (Coumadin , Jantoven ) therapy.  We are contacting you because our records show you were due for an INR on 09/25/2023.    There are potentially serious risks when taking warfarin without careful monitoring and we want to make sure you are safely managed.  Routine lab monitoring is required for warfarin refills.     Please call [phone] as soon as possible to schedule an appointment.  If there has been a change in your care or other concerns, please let us know so we can help and/or update our records.     Sincerely,       [facility]

## 2023-10-22 ENCOUNTER — ANESTHESIA EVENT (OUTPATIENT)
Dept: SURGERY | Facility: AMBULATORY SURGERY CENTER | Age: 63
End: 2023-10-22
Payer: COMMERCIAL

## 2023-10-24 DIAGNOSIS — I27.82 CHRONIC PULMONARY EMBOLISM WITHOUT ACUTE COR PULMONALE, UNSPECIFIED PULMONARY EMBOLISM TYPE (H): ICD-10-CM

## 2023-10-24 RX ORDER — WARFARIN SODIUM 5 MG/1
TABLET ORAL
Qty: 45 TABLET | Refills: 0 | Status: SHIPPED | OUTPATIENT
Start: 2023-10-24 | End: 2024-03-07

## 2023-10-24 NOTE — TELEPHONE ENCOUNTER
Pt overdue for INR level on 9/25/23. Pt has received multiple notifications    Refill of warfarin approved for #45 tablets

## 2023-10-25 ENCOUNTER — HOSPITAL ENCOUNTER (OUTPATIENT)
Facility: AMBULATORY SURGERY CENTER | Age: 63
Discharge: HOME | End: 2023-10-25
Attending: OPHTHALMOLOGY
Payer: COMMERCIAL

## 2023-10-25 ENCOUNTER — ANESTHESIA (OUTPATIENT)
Dept: SURGERY | Facility: AMBULATORY SURGERY CENTER | Age: 63
End: 2023-10-25
Payer: COMMERCIAL

## 2023-10-25 VITALS
WEIGHT: 225 LBS | TEMPERATURE: 98 F | RESPIRATION RATE: 16 BRPM | OXYGEN SATURATION: 96 % | SYSTOLIC BLOOD PRESSURE: 128 MMHG | HEIGHT: 71 IN | BODY MASS INDEX: 31.5 KG/M2 | DIASTOLIC BLOOD PRESSURE: 74 MMHG | HEART RATE: 48 BPM

## 2023-10-25 DIAGNOSIS — H40.51X3 NEOVASCULAR GLAUCOMA OF RIGHT EYE, SEVERE STAGE: ICD-10-CM

## 2023-10-25 PROCEDURE — 66710 CILIARY TRANSSLERAL THERAPY: CPT | Mod: RT | Performed by: OPHTHALMOLOGY

## 2023-10-25 PROCEDURE — 66710 CILIARY TRANSSLERAL THERAPY: CPT | Mod: RT

## 2023-10-25 RX ORDER — ONDANSETRON 4 MG/1
4 TABLET, ORALLY DISINTEGRATING ORAL EVERY 30 MIN PRN
Status: DISCONTINUED | OUTPATIENT
Start: 2023-10-25 | End: 2023-10-26 | Stop reason: HOSPADM

## 2023-10-25 RX ORDER — FENTANYL CITRATE 50 UG/ML
INJECTION, SOLUTION INTRAMUSCULAR; INTRAVENOUS PRN
Status: DISCONTINUED | OUTPATIENT
Start: 2023-10-25 | End: 2023-10-25

## 2023-10-25 RX ORDER — ACETAMINOPHEN 325 MG/1
975 TABLET ORAL ONCE
Status: COMPLETED | OUTPATIENT
Start: 2023-10-25 | End: 2023-10-25

## 2023-10-25 RX ORDER — DEXAMETHASONE SODIUM PHOSPHATE 4 MG/ML
INJECTION, SOLUTION INTRA-ARTICULAR; INTRALESIONAL; INTRAMUSCULAR; INTRAVENOUS; SOFT TISSUE PRN
Status: DISCONTINUED | OUTPATIENT
Start: 2023-10-25 | End: 2023-10-25

## 2023-10-25 RX ORDER — DEXAMETHASONE SODIUM PHOSPHATE 10 MG/ML
INJECTION, SOLUTION INTRAMUSCULAR; INTRAVENOUS PRN
Status: DISCONTINUED | OUTPATIENT
Start: 2023-10-25 | End: 2023-10-25 | Stop reason: HOSPADM

## 2023-10-25 RX ORDER — ONDANSETRON 2 MG/ML
INJECTION INTRAMUSCULAR; INTRAVENOUS PRN
Status: DISCONTINUED | OUTPATIENT
Start: 2023-10-25 | End: 2023-10-25

## 2023-10-25 RX ORDER — OXYCODONE HYDROCHLORIDE 5 MG/1
5 TABLET ORAL
Status: COMPLETED | OUTPATIENT
Start: 2023-10-25 | End: 2023-10-25

## 2023-10-25 RX ORDER — ONDANSETRON 2 MG/ML
4 INJECTION INTRAMUSCULAR; INTRAVENOUS EVERY 30 MIN PRN
Status: DISCONTINUED | OUTPATIENT
Start: 2023-10-25 | End: 2023-10-26 | Stop reason: HOSPADM

## 2023-10-25 RX ORDER — PROPOFOL 10 MG/ML
INJECTION, EMULSION INTRAVENOUS PRN
Status: DISCONTINUED | OUTPATIENT
Start: 2023-10-25 | End: 2023-10-25

## 2023-10-25 RX ORDER — PREDNISOLONE ACETATE 10 MG/ML
1-2 SUSPENSION/ DROPS OPHTHALMIC
Qty: 10 ML | Refills: 3 | Status: SHIPPED | OUTPATIENT
Start: 2023-10-25 | End: 2024-02-21

## 2023-10-25 RX ORDER — BALANCED SALT SOLUTION 6.4; .75; .48; .3; 3.9; 1.7 MG/ML; MG/ML; MG/ML; MG/ML; MG/ML; MG/ML
SOLUTION OPHTHALMIC PRN
Status: DISCONTINUED | OUTPATIENT
Start: 2023-10-25 | End: 2023-10-25 | Stop reason: HOSPADM

## 2023-10-25 RX ORDER — SODIUM CHLORIDE, SODIUM LACTATE, POTASSIUM CHLORIDE, CALCIUM CHLORIDE 600; 310; 30; 20 MG/100ML; MG/100ML; MG/100ML; MG/100ML
INJECTION, SOLUTION INTRAVENOUS CONTINUOUS
Status: DISCONTINUED | OUTPATIENT
Start: 2023-10-25 | End: 2023-10-26 | Stop reason: HOSPADM

## 2023-10-25 RX ORDER — OXYCODONE HYDROCHLORIDE 5 MG/1
10 TABLET ORAL
Status: DISCONTINUED | OUTPATIENT
Start: 2023-10-25 | End: 2023-10-26 | Stop reason: HOSPADM

## 2023-10-25 RX ORDER — PROPARACAINE HYDROCHLORIDE 5 MG/ML
1 SOLUTION/ DROPS OPHTHALMIC ONCE
Status: DISCONTINUED | OUTPATIENT
Start: 2023-10-25 | End: 2023-10-26 | Stop reason: HOSPADM

## 2023-10-25 RX ORDER — LIDOCAINE 40 MG/G
CREAM TOPICAL
Status: DISCONTINUED | OUTPATIENT
Start: 2023-10-25 | End: 2023-10-26 | Stop reason: HOSPADM

## 2023-10-25 RX ADMIN — PROPOFOL 200 MG: 10 INJECTION, EMULSION INTRAVENOUS at 07:58

## 2023-10-25 RX ADMIN — OXYCODONE HYDROCHLORIDE 5 MG: 5 TABLET ORAL at 08:33

## 2023-10-25 RX ADMIN — ACETAMINOPHEN 975 MG: 325 TABLET ORAL at 07:14

## 2023-10-25 RX ADMIN — DEXAMETHASONE SODIUM PHOSPHATE 4 MG: 4 INJECTION, SOLUTION INTRA-ARTICULAR; INTRALESIONAL; INTRAMUSCULAR; INTRAVENOUS; SOFT TISSUE at 08:02

## 2023-10-25 RX ADMIN — ONDANSETRON 4 MG: 2 INJECTION INTRAMUSCULAR; INTRAVENOUS at 08:02

## 2023-10-25 RX ADMIN — SODIUM CHLORIDE, SODIUM LACTATE, POTASSIUM CHLORIDE, CALCIUM CHLORIDE: 600; 310; 30; 20 INJECTION, SOLUTION INTRAVENOUS at 07:15

## 2023-10-25 RX ADMIN — FENTANYL CITRATE 50 MCG: 50 INJECTION, SOLUTION INTRAMUSCULAR; INTRAVENOUS at 07:56

## 2023-10-25 NOTE — OP NOTE
"Oswaldo Prabhakar  [id number]  10/25/2023    PREOPERATIVE DIAGNOSIS: Neovascular glaucoma right eye, severe stage     POSTOPERATIVE DIAGNOSIS: Same as pre-operative diagnosis    OPERATION PERFORMED: Procedure(s):  RIGHT EYE CYCLOPHOTOCOAGULATION TRANSSCLERAL WITH GPROBE LASER    SURGEON:  Jay Jay Savage MD- Primary Surgeon     ANESTHESIA: General with LMA    COMPLICATIONS:  none    FINDINGS:  Anatomy as expected    ESTIMATED BLOOD LOSS:  none    SPECIMENS:  * No specimens in log *    IMPLANTS: None     The patient Oswaldo Prabhakar was met in the operating room, correct patient, correct eye, correct procedure were verified in a time out.  The operative eye was prepped and draped in the usual sterile fashion. After a time out procedure, a speculum was inserted to the operative eye aand the laser was used with the following settings: 4 seconds, 1250 mW, 20 applications over 360 degrees. The patient tolerated the procedure well. he received a subconjunctival injection of decadron, steroid/antibiotics ointment, and left for recovery in stable condition.    \"Oswaldo Prabhakar\" will be seen in clinic tomorrow as scheduled.     "

## 2023-10-25 NOTE — INTERVAL H&P NOTE
I have reviewed the surgical (or preoperative) H&P that is linked to this encounter, and examined the patient. There are no significant changes

## 2023-10-25 NOTE — ANESTHESIA PREPROCEDURE EVALUATION
Anesthesia Pre-Procedure Evaluation    Patient: Oswaldo Prabhakar   MRN: [id number] : 1960        Procedure : Procedure(s):  RIGHT EYE CYCLOPHOTOCOAGULATION TRANSSCLERAL WITH GPROBE LASER          Past Medical History:   Diagnosis Date    Antiplatelet or antithrombotic long-term use     Coronary artery disease     Diaphragmatic hernia without mention of obstruction or gangrene     Heart disease     Hernia, abdominal     History of blood transfusion     History of thrombophlebitis     Hypertension     Nephrolithiasis 2010    Other and unspecified hyperlipidemia     Pulmonary embolus and infarction (H)     s/p hemothorax and filter s/p filter removal (), now on long term anti-coagulation    Statin intolerance 2017    Stented coronary artery 2020    NIR mid LAD    Unspecified retinal detachment     Childhood trauma, unrepaired      Past Surgical History:   Procedure Laterality Date    BYPASS GRAFT ARTERY CORONARY  2014    Procedure: BYPASS GRAFT ARTERY CORONARY;  Median Sternotomy, Coronary Artery Bypass Bypass x 4 using Left Internal Mammary, Left Saphenous Vein, on pump oxygenation;  Surgeon: Sherry Armando MD;  Location: [facility] OR    CATARACT IOL, RT/LT      CLOSED REDUCTION, PERCUTANEOUS PINNING  HAND, COMBINED Left 2015    Procedure: COMBINED CLOSED REDUCTION, PERCUTANEOUS PINNING HAND;  Surgeon: Carmella Love MD;  [facility]    COLONOSCOPY  03/15/2013    Procedure: COLONOSCOPY;;  Surgeon: Racheal Shipman MD;  Location: [facility] GI    COMBINED CYSTOSCOPY, INSERT STENT URETER(S) Right 01/10/2020    Procedure: Cystoscopy, right retrograde pyelogram, interpretation of fluoroscopic images, placement of 6 x 26 double-J ureteral stent;  Surgeon: Nguyễn Woodard MD;  Location:  OR    COMBINED CYSTOSCOPY, RETROGRADES, URETEROSCOPY, LASER HOLMIUM LITHOTRIPSY URETER(S), INSERT STENT Right 2020    Procedure: Cystoscopy, right ureteral stent exchange, right  ureteroscopy with holmium lithotripsy and stone basketing, right retrograde pyelogram, interpretation of fluoroscopic images.;  Surgeon: Nguyễn Woodard MD;  Location: [facility] OR    CV ANGIOGRAM CORONARY GRAFT N/A 01/07/2020    Procedure: Angiogram Coronary Graft;  Surgeon: Chato Odonnell MD;  Location: [facility] CARDIAC CATH LAB    CV CORONARY ANGIOGRAM  01/07/2020    Procedure: CV CORONARY ANGIOGRAM;  Surgeon: Chato Odonnell MD;  Location: [facility] CARDIAC CATH LAB    CV PCI STENT DRUG ELUTING N/A 01/07/2020    Procedure: PCI Stent Drug Eluting;  Surgeon: Chato Odonnell MD;  Location: [facility] CARDIAC CATH LAB    CYCLOPHOTOCOAGULATION TRANSSCLERAL WITH MICROPULSE LASER Right 5/17/2023    Procedure: RIGHT EYE CYCLOPHOTOCOAGULATION TRANSSCLERAL WITH Gprobe LASER;  Surgeon: Jay Jay Savage MD;  Location: [facility] OR    CYSTOSCOPY      ESOPHAGOSCOPY, GASTROSCOPY, DUODENOSCOPY (EGD), COMBINED N/A 12/9/2022    Procedure: ESOPHAGOGASTRODUODENOSCOPY (EGD);  Surgeon: Beata George MD;  Location:  GI    LAPAROSCOPIC CHOLECYSTECTOMY N/A 08/06/2018    Procedure: LAPAROSCOPIC CHOLECYSTECTOMY;  LAPAROSCOPIC CHOLECYSTECTOMY ;  Surgeon: José Miguel Stuart MD;  Location:  OR    LITHOTRIPSY  04/2010    RETINAL REATTACHMENT      THORACIC SURGERY      lung surgery, thoracotomy, lung adhesion    ZZC NONSPECIFIC PROCEDURE      Spermatocele resection      Allergies   Allergen Reactions    Cats     Hay Fever & [A.R.M.]     Heparin Other (See Comments)     Heparin resistance per cardio-thoracic surgeon Dr. Armando    Hydrocodone-Acetaminophen Nausea and Vomiting     Acetaminophen is ok      Social History     Tobacco Use    Smoking status: Never    Smokeless tobacco: Never   Substance Use Topics    Alcohol use: No     Comment: very rare      Wt Readings from Last 1 Encounters:   10/10/23 104.8 kg (231 lb 1.6 oz)        Anesthesia Evaluation            ROS/MED HX  ENT/Pulmonary:  -  neg pulmonary ROS     Neurologic:  - neg neurologic ROS     Cardiovascular:  - neg cardiovascular ROS   (+) Dyslipidemia hypertension- -  CAD angina-  - -   Taking blood thinners                                   METS/Exercise Tolerance: >4 METS    Hematologic:  - neg hematologic  ROS     Musculoskeletal:  - neg musculoskeletal ROS     GI/Hepatic:  - neg GI/hepatic ROS     Renal/Genitourinary:  - neg Renal ROS   (+) renal disease, type: CRI,            Endo:     (+)               Obesity,       Psychiatric/Substance Use:  - neg psychiatric ROS     Infectious Disease:  - neg infectious disease ROS     Malignancy:  - neg malignancy ROS     Other:  - neg other ROS          Physical Exam    Airway  airway exam normal           Respiratory Devices and Support         Dental       (+) Minor Abnormalities - some fillings, tiny chips      Cardiovascular   cardiovascular exam normal          Pulmonary   pulmonary exam normal                OUTSIDE LABS:  CBC:   Lab Results   Component Value Date    WBC 9.2 05/10/2021    WBC 6.2 12/16/2020    HGB 13.9 05/10/2021    HGB 12.6 (L) 12/16/2020    HCT 42.7 05/10/2021    HCT 40.3 12/16/2020     05/10/2021     12/16/2020     BMP:   Lab Results   Component Value Date     08/31/2023     08/21/2023    POTASSIUM 4.0 08/31/2023    POTASSIUM 4.3 08/21/2023    CHLORIDE 105 08/31/2023    CHLORIDE 105 08/21/2023    CO2 26 08/31/2023    CO2 26 08/21/2023    BUN 9.9 08/31/2023    BUN 11.1 08/21/2023    CR 1.12 08/31/2023    CR 1.18 (H) 08/21/2023     (H) 08/31/2023     (H) 08/21/2023     COAGS:   Lab Results   Component Value Date    PTT 32 12/15/2020    INR 2.64 (H) 09/14/2023    FIBR 286 04/04/2014     POC:   Lab Results   Component Value Date     (H) 04/05/2014     HEPATIC:   Lab Results   Component Value Date    ALBUMIN 4.4 08/21/2023    PROTTOTAL 7.5 08/21/2023    ALT 25 08/21/2023    AST 28 08/21/2023    ALKPHOS 51 08/21/2023    BILITOTAL 0.6  08/21/2023     OTHER:   Lab Results   Component Value Date    PH 7.32 (L) 04/04/2014    LACT 1.5 11/30/2020    A1C 6.3 (H) 11/02/2022    GONZALES 9.7 08/31/2023    PHOS 3.6 06/18/2015    MAG 1.8 01/06/2020    LIPASE 188 07/25/2018    TSH 2.03 12/16/2020       Anesthesia Plan    ASA Status:  3    NPO Status:  NPO Appropriate    Anesthesia Type: General.     - Airway: LMA   Induction: Intravenous, Propofol.   Maintenance: TIVA.        Consents    Anesthesia Plan(s) and associated risks, benefits, and realistic alternatives discussed. Questions answered and patient/representative(s) expressed understanding.     - Discussed: Risks, Benefits and Alternatives for the PROCEDURE were discussed     - Discussed with:  Patient            Postoperative Care    Pain management: IV analgesics, Oral pain medications, Multi-modal analgesia.   PONV prophylaxis: Ondansetron (or other 5HT-3), Background Propofol Infusion     Comments:                David Gamboa MD

## 2023-10-25 NOTE — BRIEF OP NOTE
[facility]    Brief Operative Note    Pre-operative diagnosis: Neovascular glaucoma of right eye, severe stage [H40.51X3]  Post-operative diagnosis Same as pre-operative diagnosis    Procedure: RIGHT EYE CYCLOPHOTOCOAGULATION TRANSSCLERAL WITH GPROBE LASER, Right - Eye    Surgeon: Surgeon(s) and Role:     * Jay Jay Savage MD - Primary  Anesthesia: General   Estimated Blood Loss: None  Drains: None  Specimens: * No specimens in log *  Findings:   None.  Complications: None.  Implants: * No implants in log *

## 2023-10-25 NOTE — ANESTHESIA CARE TRANSFER NOTE
Patient: Oswaldo Prabhakar    Procedure: Procedure(s):  RIGHT EYE CYCLOPHOTOCOAGULATION TRANSSCLERAL WITH GPROBE LASER       Diagnosis: Neovascular glaucoma of right eye, severe stage [H40.51X3]  Diagnosis Additional Information: No value filed.    Anesthesia Type:   General     Note:    Oropharynx: oropharynx clear of all foreign objects and spontaneously breathing  Level of Consciousness: drowsy  Oxygen Supplementation: room air    Independent Airway: airway patency satisfactory and stable  Dentition: dentition unchanged  Vital Signs Stable: post-procedure vital signs reviewed and stable  Report to RN Given: handoff report given  Patient transferred to: Phase II    Handoff Report: Identifed the Patient, Identified the Reponsible Provider, Reviewed the pertinent medical history, Discussed the surgical course, Reviewed Intra-OP anesthesia mangement and issues during anesthesia, Set expectations for post-procedure period and Allowed opportunity for questions and acknowledgement of understanding  Vitals:  Vitals Value Taken Time   BP     Temp     Pulse     Resp     SpO2         Electronically Signed By: NOLBERTO Armstrong CRNA  October 25, 2023  8:14 AM

## 2023-10-25 NOTE — ANESTHESIA POSTPROCEDURE EVALUATION
Patient: Oswaldo Prabhakar    Procedure: Procedure(s):  RIGHT EYE CYCLOPHOTOCOAGULATION TRANSSCLERAL WITH GPROBE LASER       Anesthesia Type:  General    Note:  Disposition: Outpatient   Postop Pain Control: Uneventful            Sign Out: Well controlled pain   PONV: No   Neuro/Psych: Uneventful            Sign Out: Acceptable/Baseline neuro status   Airway/Respiratory: Uneventful            Sign Out: Acceptable/Baseline resp. status   CV/Hemodynamics: Uneventful            Sign Out: Acceptable CV status; No obvious hypovolemia; No obvious fluid overload   Other NRE: NONE   DID A NON-ROUTINE EVENT OCCUR?            Last vitals:  Vitals Value Taken Time   /77 10/25/23 0830   Temp 36.5  C (97.7  F) 10/25/23 0830   Pulse 52 10/25/23 0834   Resp 15 10/25/23 0834   SpO2 96 % 10/25/23 0834   Vitals shown include unfiled device data.    Electronically Signed By: David Gamboa MD  October 25, 2023  11:31 AM

## 2023-10-26 ENCOUNTER — OFFICE VISIT (OUTPATIENT)
Dept: OPHTHALMOLOGY | Facility: CLINIC | Age: 63
End: 2023-10-26
Attending: OPHTHALMOLOGY
Payer: COMMERCIAL

## 2023-10-26 DIAGNOSIS — Z98.890 POSTOPERATIVE EYE STATE: Primary | ICD-10-CM

## 2023-10-26 DIAGNOSIS — H40.51X3 NEOVASCULAR GLAUCOMA OF RIGHT EYE, SEVERE STAGE: ICD-10-CM

## 2023-10-26 ASSESSMENT — SLIT LAMP EXAM - LIDS
COMMENTS: NORMAL
COMMENTS: NORMAL

## 2023-10-26 ASSESSMENT — TONOMETRY
OD_IOP_MMHG: 42
OD_IOP_MMHG: 43
OS_IOP_MMHG: 14
IOP_METHOD: TONOPEN
IOP_METHOD: TONOPEN
OD_IOP_MMHG: 44
IOP_METHOD: APPLANATION

## 2023-10-26 ASSESSMENT — EXTERNAL EXAM - RIGHT EYE: OD_EXAM: NORMAL

## 2023-10-26 ASSESSMENT — VISUAL ACUITY
METHOD: SNELLEN - LINEAR
OS_SC: 20/20

## 2023-10-26 ASSESSMENT — EXTERNAL EXAM - LEFT EYE: OS_EXAM: NORMAL

## 2023-10-26 NOTE — PROGRESS NOTES
Ophthalmology Visit    Date of Visit: 10/26/23    Chief Complaint(s) and History of Present Illness(es)    No visit information to display         HPI:   Oswaldo Prabhakar is a 63 year old male who presents for 1 day postop s/p TSCPC right eye. Patient slept well overnight and is comfortable. No vision changes.     Review of systems for the eyes was negative other than the pertinent positives/negatives listed in the HPI.      Operative details (10/25/23)  Laser settings used: 4 seconds, 1250 mW, 20 applications over 360 degrees.   He received a subconjunctival injection of decadron, steroid/antibiotics ointment       Assessment & Plan      Oswaldo Prabhakar is a 63 year old male with the following diagnoses:   1. Postoperative eye state    2. Neovascular glaucoma of right eye, severe stage       1 day s/p TSCPC right eye  Doing well post-operatively without pain. VA stable and IOP 44. Exam with 1+ corneal edema, deep AC with 2+ flare and trace cells.     Plan:  Discussed findings with patient.  Patient has chronic RD/inflammation and neovascularization in the right eye. He has had one injection in the right eye about 7 months ago. Now POM5 s/p right eye TSCPC (20 shots) 05/17/2023, and again POD1 s/p right eye TSCPC 20 shots on 10/25/23. Subjectively comfortable.   Eye drops RIGHT EYE  Increase Prednisolone acetate to q1h  Continue Cosopt 2 times a day  Continue Brimonidine 3 times a day  Continue Latanoprost at bedtime    -Return and RD precautions discussed   -Postop instructions discussed    Patient disposition:   RTC on 10/31/23 with Dr. Savage ([place])    Patient seen with Dr. Love, PYG3    Kvng De Los Santos MD  Resident Physician, PGY-2  Department of Ophthalmology

## 2023-10-26 NOTE — NURSING NOTE
Chief Complaints and History of Present Illnesses   Patient presents with    Post Op (Ophthalmology) Right Eye     Chief Complaint(s) and History of Present Illness(es)       Post Op (Ophthalmology) Right Eye              Laterality: right eye    Associated symptoms: redness.  Negative for double vision, eye pain and photophobia    Treatments tried: eye drops    Pain scale: 0/10              Comments    Pt reports right eye is doing well, moderate light sensitivity. Denies pain.   Compliant with drops and taking as prescribed;   Prednisolone lgtts 8 am  Brimonidine lgtts 10 pm  Latanoprost lgtts 9  pm    Mean Noe OA, October 26, 2023

## 2023-10-26 NOTE — PATIENT INSTRUCTIONS
Increase prednisolone (pink top) to every hour in the right eye.  Continue all of you're pressure lowering drops.

## 2023-10-31 ENCOUNTER — OFFICE VISIT (OUTPATIENT)
Dept: OPHTHALMOLOGY | Facility: CLINIC | Age: 63
End: 2023-10-31
Attending: OPHTHALMOLOGY
Payer: COMMERCIAL

## 2023-10-31 DIAGNOSIS — Z98.890 POSTOPERATIVE EYE STATE: Primary | ICD-10-CM

## 2023-10-31 DIAGNOSIS — H40.51X3 NEOVASCULAR GLAUCOMA OF RIGHT EYE, SEVERE STAGE: ICD-10-CM

## 2023-10-31 PROCEDURE — 99024 POSTOP FOLLOW-UP VISIT: CPT | Performed by: OPHTHALMOLOGY

## 2023-10-31 PROCEDURE — 99214 OFFICE O/P EST MOD 30 MIN: CPT | Performed by: OPHTHALMOLOGY

## 2023-10-31 ASSESSMENT — CONF VISUAL FIELD
OD_SUPERIOR_TEMPORAL_RESTRICTION: 1
OS_SUPERIOR_TEMPORAL_RESTRICTION: 0
OS_INFERIOR_NASAL_RESTRICTION: 0
OD_SUPERIOR_NASAL_RESTRICTION: 1
OD_INFERIOR_NASAL_RESTRICTION: 1
OS_SUPERIOR_NASAL_RESTRICTION: 0
OS_INFERIOR_TEMPORAL_RESTRICTION: 0
OS_NORMAL: 1
OD_INFERIOR_TEMPORAL_RESTRICTION: 1

## 2023-10-31 ASSESSMENT — EXTERNAL EXAM - LEFT EYE: OS_EXAM: NORMAL

## 2023-10-31 ASSESSMENT — TONOMETRY
OD_IOP_MMHG: 39
IOP_METHOD: TONOPEN
OS_IOP_MMHG: 15

## 2023-10-31 ASSESSMENT — EXTERNAL EXAM - RIGHT EYE: OD_EXAM: NORMAL

## 2023-10-31 ASSESSMENT — SLIT LAMP EXAM - LIDS
COMMENTS: NORMAL
COMMENTS: NORMAL

## 2023-10-31 ASSESSMENT — VISUAL ACUITY
OD_SC: CF 1FT
METHOD: SNELLEN - LINEAR
OS_SC: 20/20

## 2023-10-31 NOTE — PROGRESS NOTES
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   - 05/17/2023: s/p right eye TSCPC #20 shots  - S/p repest TSCPC right eye 10/25/23    Today, 10/31/2023, s/p TSCPC right eye 5/17/23, S/p repeat TSCPC POW1 10/25/23. Patient states eye continues to be comfortable and without pain. No changes in left eye vision.    Relevant Past Medical/Family/Social History:  Past Medical History:   Diagnosis Date    Antiplatelet or antithrombotic long-term use     Coronary artery disease     Diaphragmatic hernia without mention of obstruction or gangrene     Heart disease     Hernia, abdominal     History of blood transfusion     History of thrombophlebitis     Hypertension     Nephrolithiasis 4/2010    Other and unspecified hyperlipidemia     Pulmonary embolus and infarction (H)     s/p hemothorax and filter s/p filter removal (2011), now on long term anti-coagulation    Statin intolerance 2/1/2017    Stented coronary artery 01/07/2020    NIR mid LAD    Unspecified retinal detachment     Childhood trauma, unrepaired     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[employer]  No family history of glaucoma    Relevant Review of Systems: chronic decreased vision in the right eye, no pain in the right eye, feels like his left eye is at baseline as well     Diagnosis: NVG right eye   Year diagnosis: 2023  Previous glaucoma surgery/laser: CEIOL left eye, PRP left eye, CEIOL right eye  Maximum intraocular pressure 50s  Currently Meds: Diamox 500 mg PO BID, prednisolone BID OD, timolol BID right eye, Brimonidine TID OD  Family history: negative  CCT: 551/548  Gonio:  Refractive status: Pseudophakia  Trauma  history: negative  Steroid exposure: positive prednisolone  Vasospastic disease: Migrane/Raynaud phenomenon: negative  A past hemodynamic crisis or Low BP: negative  Meds AEs/intolerance: none  PMHx: Asthma and respiratory problems/Cardiac/Renal/Kidney stones/Sulfa Allergy: kidney stones, bypass surgery  Anticoagulants: Warfarin for complicated PE  - Visual field April 13, 2023  - Right eye - not able to completed  - Left eye - normal, reliable  - OCT Optic Nerve RNFL Spectralis April 13, 2023  - Right Eye: diffuse atrophy, reliable  - Left Eye: trace thinning IT, reliable    Today's testing:  - 39/15 mmHg applanation   AC trace cell right eye    Additional Ocular History:   Anterior uveitis, chronic, right eye    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  Patient has chronic RD/inflammation and neovascularization in the right eye. He has had one injection int the right eye about 7 months ago. Now POM4 s/p right eye TSCPC (20 shots) 05/17/2023, no post-op inflammation, IOP 52 mmHg. Subjectively comfortable.   S/p POW1 Repeat TSCPC, will avoid tube shunt, poor visual prognosis and possible CMM. IOP remains elevated, will recheck at 1 month appointment before considering further surgery   Eye drops RIGHT EYE  Taper Prednisolone 4/3/2/1  Cosopt 2 times a day  Brimonidine 3 times a day  Latanoprost at bedtime    RTC in 1 month VA, IOP       Physician Attestation     Attending Physician Attestation:  Complete documentation of historical and exam elements from today's encounter can be found in the full encounter summary report (not reduplicated in this progress note). I  personally obtained the chief complaint(s) and history of present illness. I confirmed and edited as necessary the review of systems, past medical/surgical history, family history, social history, and examination findings as documented by others; and I examined the patient myself. I personally reviewed the relevant tests, images, and reports as documented above. I formulated and edited as necessary the assessment and plan and discussed the findings and management plan with the patient and any family members present at the time of the visit.  Jay Jay Savage M.D., Glaucoma, October 31, 2023

## 2023-10-31 NOTE — PATIENT INSTRUCTIONS
Eye drops RIGHT EYE  Decrease Prednisolone acetate (pink cap) to 1 drop 4 times daily for 1 week then 1 drop 3 times daily for 1 week then 1 drop 2 times daily for 1 week then once daily   Cosopt (blue cap) 2 times a day  Brimonidine (purple cap) 3 times a day  Latanoprost (teal cap) once at bedtime

## 2023-10-31 NOTE — NURSING NOTE
Chief Complaints and History of Present Illnesses   Patient presents with    Post Op (Ophthalmology) Right Eye     Chief Complaint(s) and History of Present Illness(es)       Post Op (Ophthalmology) Right Eye              Laterality: right eye    Onset: 1 week ago    Associated symptoms: Negative for double vision, eye pain and photophobia    Treatments tried: eye drops    Pain scale: 0/10              Comments    Pt reports right eye is doing well. Denies pain/light sensitivity/redness.  Complaint with drops and taking as prescribed; lgtts 7 AM & 7:15 AM    Mean Noe ESCOBEDO, October 31, 2023

## 2023-11-02 ENCOUNTER — TELEPHONE (OUTPATIENT)
Dept: ANTICOAGULATION | Facility: CLINIC | Age: 63
End: 2023-11-02
Payer: COMMERCIAL

## 2023-11-02 NOTE — TELEPHONE ENCOUNTER
ANTICOAGULATION     Oswaldo Prabhakar is overdue for an INR check.     Left message for patient to call and schedule lab appointment as soon as possible. If returning call, please schedule. Reminder letter sent.    Melvin Ritchie RN

## 2023-11-02 NOTE — LETTER
[facility]  [address]  [facility] [address]  Phone: [phone]  Fax: [phone]   November 2, 2023        Oswaldo Prabhakar  [facility] [facility] [address]            Dear Oswaldo,    You are currently under the care of [facility] for your warfarin (Coumadin , Jantoven ) therapy.  We are contacting you because our records show you were due for an INR on 09/25/2023.    There are potentially serious risks when taking warfarin without careful monitoring and we want to make sure you are safely managed.  Routine lab monitoring is required for warfarin refills.     Please call [phone] as soon as possible to schedule a lab appointment. If it is difficult for you to get to lab, please call us to discuss options.  If there has been a change in your care or other concerns, please let us know so we can help and/or update our records.         Sincerely,       [facility]

## 2023-11-05 DIAGNOSIS — Z95.1 S/P CABG X 4: ICD-10-CM

## 2023-11-08 RX ORDER — METOPROLOL TARTRATE 25 MG/1
12.5 TABLET, FILM COATED ORAL 2 TIMES DAILY
Qty: 90 TABLET | Refills: 3 | Status: SHIPPED | OUTPATIENT
Start: 2023-11-08 | End: 2024-03-07

## 2023-11-11 ENCOUNTER — HEALTH MAINTENANCE LETTER (OUTPATIENT)
Age: 63
End: 2023-11-11

## 2023-11-17 ASSESSMENT — ENCOUNTER SYMPTOMS
EYE REDNESS: 0
EYE WATERING: 0
DYSPNEA ON EXERTION: 0
SNORES LOUDLY: 0
WHEEZING: 0
SPUTUM PRODUCTION: 0
COUGH DISTURBING SLEEP: 0
SHORTNESS OF BREATH: 0
POSTURAL DYSPNEA: 0
DOUBLE VISION: 0
EYE PAIN: 1
HEMOPTYSIS: 0
COUGH: 1
EYE IRRITATION: 0

## 2023-11-20 ENCOUNTER — ANTICOAGULATION THERAPY VISIT (OUTPATIENT)
Dept: ANTICOAGULATION | Facility: CLINIC | Age: 63
End: 2023-11-20

## 2023-11-20 ENCOUNTER — LAB (OUTPATIENT)
Dept: LAB | Facility: CLINIC | Age: 63
End: 2023-11-20
Payer: COMMERCIAL

## 2023-11-20 ENCOUNTER — OFFICE VISIT (OUTPATIENT)
Dept: INTERNAL MEDICINE | Facility: CLINIC | Age: 63
End: 2023-11-20
Payer: COMMERCIAL

## 2023-11-20 VITALS
OXYGEN SATURATION: 97 % | WEIGHT: 235 LBS | DIASTOLIC BLOOD PRESSURE: 92 MMHG | SYSTOLIC BLOOD PRESSURE: 155 MMHG | HEIGHT: 71 IN | HEART RATE: 59 BPM | BODY MASS INDEX: 32.9 KG/M2

## 2023-11-20 DIAGNOSIS — Z79.01 WARFARIN ANTICOAGULATION: ICD-10-CM

## 2023-11-20 DIAGNOSIS — Z13.1 DIABETES MELLITUS SCREENING: ICD-10-CM

## 2023-11-20 DIAGNOSIS — Z13.1 SCREENING FOR DIABETES MELLITUS: ICD-10-CM

## 2023-11-20 DIAGNOSIS — I10 ESSENTIAL HYPERTENSION: ICD-10-CM

## 2023-11-20 DIAGNOSIS — I27.82 CHRONIC PULMONARY EMBOLISM WITHOUT ACUTE COR PULMONALE, UNSPECIFIED PULMONARY EMBOLISM TYPE (H): ICD-10-CM

## 2023-11-20 DIAGNOSIS — Z00.00 ROUTINE GENERAL MEDICAL EXAMINATION AT A HEALTH CARE FACILITY: Primary | ICD-10-CM

## 2023-11-20 DIAGNOSIS — Z79.01 LONG TERM CURRENT USE OF ANTICOAGULANT THERAPY: ICD-10-CM

## 2023-11-20 DIAGNOSIS — I25.810 CORONARY ARTERY DISEASE INVOLVING AUTOLOGOUS ARTERY CORONARY BYPASS GRAFT WITHOUT ANGINA PECTORIS: ICD-10-CM

## 2023-11-20 DIAGNOSIS — I27.82 CHRONIC PULMONARY EMBOLISM WITHOUT ACUTE COR PULMONALE, UNSPECIFIED PULMONARY EMBOLISM TYPE (H): Primary | ICD-10-CM

## 2023-11-20 DIAGNOSIS — Z95.1 S/P CABG X 4: ICD-10-CM

## 2023-11-20 LAB
BASOPHILS # BLD AUTO: 0.1 10E3/UL (ref 0–0.2)
BASOPHILS NFR BLD AUTO: 1 %
EOSINOPHIL # BLD AUTO: 0.8 10E3/UL (ref 0–0.7)
EOSINOPHIL NFR BLD AUTO: 13 %
ERYTHROCYTE [DISTWIDTH] IN BLOOD BY AUTOMATED COUNT: 13.6 % (ref 10–15)
HBA1C MFR BLD: 6.7 %
HCT VFR BLD AUTO: 43.4 % (ref 40–53)
HGB BLD-MCNC: 14 G/DL (ref 13.3–17.7)
IMM GRANULOCYTES # BLD: 0 10E3/UL
IMM GRANULOCYTES NFR BLD: 0 %
INR PPP: 2.34 (ref 0.85–1.15)
LYMPHOCYTES # BLD AUTO: 1.7 10E3/UL (ref 0.8–5.3)
LYMPHOCYTES NFR BLD AUTO: 28 %
MCH RBC QN AUTO: 27.4 PG (ref 26.5–33)
MCHC RBC AUTO-ENTMCNC: 32.3 G/DL (ref 31.5–36.5)
MCV RBC AUTO: 85 FL (ref 78–100)
MONOCYTES # BLD AUTO: 0.5 10E3/UL (ref 0–1.3)
MONOCYTES NFR BLD AUTO: 9 %
NEUTROPHILS # BLD AUTO: 3 10E3/UL (ref 1.6–8.3)
NEUTROPHILS NFR BLD AUTO: 49 %
NRBC # BLD AUTO: 0 10E3/UL
NRBC BLD AUTO-RTO: 0 /100
PLATELET # BLD AUTO: 258 10E3/UL (ref 150–450)
RBC # BLD AUTO: 5.11 10E6/UL (ref 4.4–5.9)
WBC # BLD AUTO: 6 10E3/UL (ref 4–11)

## 2023-11-20 PROCEDURE — 83036 HEMOGLOBIN GLYCOSYLATED A1C: CPT | Performed by: INTERNAL MEDICINE

## 2023-11-20 PROCEDURE — 99396 PREV VISIT EST AGE 40-64: CPT | Performed by: INTERNAL MEDICINE

## 2023-11-20 PROCEDURE — 99000 SPECIMEN HANDLING OFFICE-LAB: CPT | Performed by: PATHOLOGY

## 2023-11-20 PROCEDURE — 85610 PROTHROMBIN TIME: CPT | Performed by: PATHOLOGY

## 2023-11-20 PROCEDURE — 36415 COLL VENOUS BLD VENIPUNCTURE: CPT | Performed by: PATHOLOGY

## 2023-11-20 PROCEDURE — 85025 COMPLETE CBC W/AUTO DIFF WBC: CPT | Performed by: PATHOLOGY

## 2023-11-20 RX ORDER — LISINOPRIL 2.5 MG/1
2.5 TABLET ORAL DAILY
Qty: 90 TABLET | Refills: 4 | Status: SHIPPED | OUTPATIENT
Start: 2023-11-20

## 2023-11-20 ASSESSMENT — PAIN SCALES - GENERAL: PAINLEVEL: NO PAIN (0)

## 2023-11-20 NOTE — PROGRESS NOTES
ANTICOAGULATION MANAGEMENT     Oswaldo Prabhakar 63 year old male is on warfarin with therapeutic INR result. (Goal INR 2.0-3.0)    Recent labs: (last 7 days)     11/20/23  0851   INR 2.34*       ASSESSMENT     Source(s): Chart Review and Patient/Caregiver Call     Warfarin doses taken: Warfarin taken as instructed  Diet: No new diet changes identified  Medication/supplement changes: None noted  New illness, injury, or hospitalization: No  Signs or symptoms of bleeding or clotting: No  Previous result: Therapeutic last visit; previously outside of goal range  Additional findings:  Noted in chart, had Right eye procedure 10/25/23, Warfarin was not held.       PLAN     Recommended plan for no diet, medication or health factor changes affecting INR     Dosing Instructions: Continue your current warfarin dose with next INR in 3 weeks       Summary  As of 11/20/2023      Full warfarin instructions:  7.5 mg every Mon, Thu; 5 mg all other days   Next INR check:  12/11/2023               Telephone call with Oswaldo who verbalizes understanding and agrees to plan    Lab visit scheduled    Education provided:   Contact [phone] with any changes, questions or concerns.     Plan made per [facility] anticoagulation protocol    Argenis Briones RN  Anticoagulation Clinic  11/20/2023    _______________________________________________________________________     Anticoagulation Episode Summary       Current INR goal:  2.0-3.0   TTR:  71.4% (4.8 mo)   Target end date:  Indefinite   Send INR reminders to:  [facility]    Indications    Pulmonary emboli (H) [I26.99]  Long-term (current) use of anticoagulants [Z79.01] [Z79.01]  Chronic pulmonary embolism without acute cor pulmonale  unspecified pulmonary embolism type (H) [I27.82]             Comments:               Anticoagulation Care Providers       Provider Role Specialty Phone number    Samm Vazquez MD Referring Internal Medicine - Pediatrics [phone]

## 2023-11-20 NOTE — PROGRESS NOTES
Oswaldo is a 63 year old that presents in clinic today for the following:     Chief Complaint   Patient presents with    Physical     Pt her for annual physical            11/20/2023     7:44 AM   Additional Questions   Roomed by MVO       Screenings from encounters over the past 10 days    No data recorded       Deanne Giles at 7:48 AM on 11/20/2023

## 2023-11-20 NOTE — PROGRESS NOTES
ASSESSMENT/PLAN:  Prevention - knows he needs to get a colonoscopy but has been busy. He knows about the need to get shingles vaccine and will check with his insurance company about this.  Otherwise up-to-date on vaccinations.    CAD 4V CABG in  and NIR to LAD in  - seen by cards 23 - f/u in 1yr, on lipitor, Zetia, fenofribrate, Repatha, lisinopril, metoprolol, Brilinta, lipids very good on 23 with a LDL of 15!There is no indication that he needs to stop the Brilinta so this was refilled.    H/O PE - on warfarin.  INR will be checked today.      High blood pressure - high today but normal before and being monitored by cardiology    Samm Vazquez MD, FACP    Addendum : His labs show that his hemoglobin A1c was 6.7%.  This is the first time it has been over 6.5%.  I suggested that he have this rechecked in a month or so before definitively making the diagnosis of diabetes.  Lab order has been placed in this regard and he has been notified via [employer].  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 He has a lens implant. Abnormal blood vessel growth are blocking the drainage. There is talk of removing the eye - maybe infections afterwards. There is no vision out of that eye.     No cardiac symptoms. He knows that he needs to be seen right away if he does.  No respiratory symptoms.  He has kidney stones every 5-6 years. There is a lot of pain with them and has had laser surgery. He has been told to use lemon juice for calcium oxalate.    Father  of bladder cancer which spread.  Answers submitted by the patient for this visit:  Symptoms you have experienced in the last 30 days (Submitted on 2023)  General Symptoms: No  Skin Symptoms: No  HENT Symptoms: No  EYE SYMPTOMS: Yes  HEART SYMPTOMS: No  LUNG SYMPTOMS: Yes  INTESTINAL SYMPTOMS: No  URINARY SYMPTOMS: No  REPRODUCTIVE SYMPTOMS: No  SKELETAL  SYMPTOMS: No  BLOOD SYMPTOMS: No  NERVOUS SYSTEM SYMPTOMS: No  MENTAL HEALTH SYMPTOMS: No  Please answer the questions below to tell us what conditions you are experiencing: (Submitted on 11/17/2023)  Eye pain: Yes  Vision loss: No  Dry eyes: No  Watery eyes: No  Eye bulging: No  Double vision: No  Flashing of lights: No  Spots: No  Floaters: No  Redness: No  Crossed eyes: No  Tunnel Vision: No  Yellowing of eyes: No  Eye irritation: No  Please answer the questions below to tell us what condition you are experiencing: (Submitted on 11/17/2023)  Cough: Yes  Sputum or phlegm: No  Coughing up blood: No  Difficulty breating or shortness of breath: No  Snoring: No  Wheezing: No  Difficulty breathing on exertion: No  Nighttime Cough: No  Difficulty breathing when lying flat: No    Patient Active Problem List   Diagnosis    Retinal detachment    HYPERLIPIDEMIA LDL GOAL <130    Pulmonary emboli (H)    Pure hyperglyceridemia    Calculus of kidney    Warfarin anticoagulation    Atypical chest pain    S/P CABG x 4    Atherosclerosis of native coronary artery of native heart with angina pectoris with documented spasm (H24)    Long-term (current) use of anticoagulants [Z79.01]    Statin intolerance    Unstable angina (H)    Renal colic on right side    Ureteral stone    UTI (urinary tract infection)    Chest pain, unspecified type    Neovascular glaucoma of right eye, severe stage    Chronic pulmonary embolism without acute cor pulmonale, unspecified pulmonary embolism type (H)       Past Medical History:   Diagnosis Date    Antiplatelet or antithrombotic long-term use     Coronary artery disease     Diaphragmatic hernia without mention of obstruction or gangrene     Heart disease     Hernia, abdominal     History of blood transfusion     History of thrombophlebitis     Hypertension     Nephrolithiasis 4/2010    Other and unspecified hyperlipidemia     Pulmonary embolus and infarction (H)     s/p hemothorax and filter s/p filter  removal (2011), now on long term anti-coagulation    Statin intolerance 2/1/2017    Stented coronary artery 01/07/2020    NIR mid LAD    Unspecified retinal detachment     Childhood trauma, unrepaired       Past Surgical History:   Procedure Laterality Date    BYPASS GRAFT ARTERY CORONARY  04/04/2014    Procedure: BYPASS GRAFT ARTERY CORONARY;  Median Sternotomy, Coronary Artery Bypass Bypass x 4 using Left Internal Mammary, Left Saphenous Vein, on pump oxygenation;  Surgeon: Sherry Armando MD;  Location: [facility]    CATARACT IOL, RT/LT      CLOSED REDUCTION, PERCUTANEOUS PINNING  HAND, COMBINED Left 11/05/2015    Procedure: COMBINED CLOSED REDUCTION, PERCUTANEOUS PINNING HAND;  Surgeon: Carmella Love MD;  [facility]    COLONOSCOPY  03/15/2013    Procedure: COLONOSCOPY;;  Surgeon: Racheal Shipman MD;  [facility]    COMBINED CYSTOSCOPY, INSERT STENT URETER(S) Right 01/10/2020    Procedure: Cystoscopy, right retrograde pyelogram, interpretation of fluoroscopic images, placement of 6 x 26 double-J ureteral stent;  Surgeon: Nguyễn Woodard MD;  Location: [facility]    COMBINED CYSTOSCOPY, RETROGRADES, URETEROSCOPY, LASER HOLMIUM LITHOTRIPSY URETER(S), INSERT STENT Right 02/05/2020    Procedure: Cystoscopy, right ureteral stent exchange, right ureteroscopy with holmium lithotripsy and stone basketing, right retrograde pyelogram, interpretation of fluoroscopic images.;  Surgeon: Nguyễn Woodard MD;  Location: [facility] OR    CV ANGIOGRAM CORONARY GRAFT N/A 01/07/2020    Procedure: Angiogram Coronary Graft;  Surgeon: Chato Odonnell MD;  Location: [facility] CARDIAC CATH LAB    CV CORONARY ANGIOGRAM  01/07/2020    Procedure: CV CORONARY ANGIOGRAM;  Surgeon: Chato Odonnell MD;  Location: [facility] CARDIAC CATH LAB    CV PCI STENT DRUG ELUTING N/A 01/07/2020    Procedure: PCI Stent Drug Eluting;  Surgeon: Chato Odonnell MD;  Location: [facility]  CARDIAC CATH LAB    CYCLOPHOTOCOAGULATION TRANSSCLERAL WITH MICROPULSE LASER Right 5/17/2023    Procedure: RIGHT EYE CYCLOPHOTOCOAGULATION TRANSSCLERAL WITH Gprobe LASER;  Surgeon: Jay Jay Savage MD;  Location: [facility] OR    CYCLOPHOTOCOAGULATION TRANSSCLERAL WITH MICROPULSE LASER Right 10/25/2023    Procedure: RIGHT EYE CYCLOPHOTOCOAGULATION TRANSSCLERAL WITH GPROBE LASER;  Surgeon: Jay Jay Savage MD;  Location: [facility] OR    CYSTOSCOPY      ESOPHAGOSCOPY, GASTROSCOPY, DUODENOSCOPY (EGD), COMBINED N/A 12/9/2022    Procedure: ESOPHAGOGASTRODUODENOSCOPY (EGD);  Surgeon: Beata George MD;  [facility]: [facility]    LAPAROSCOPIC CHOLECYSTECTOMY N/A 08/06/2018    Procedure: LAPAROSCOPIC CHOLECYSTECTOMY;  LAPAROSCOPIC CHOLECYSTECTOMY ;  Surgeon: José Miguel Stuart MD;  Location: [facility]    LITHOTRIPSY  04/2010    RETINAL REATTACHMENT      THORACIC SURGERY      lung surgery, thoracotomy, lung adhesion    ZZC NONSPECIFIC PROCEDURE      Spermatocele resection       Family History   Problem Relation Age of Onset    Heart Disease Mother     Glaucoma Father     C.A.D. Father         3 vessel CABG, age 70    Cancer Father         bladder cancer    C.A.D. Paternal Grandmother     Hypertension Paternal Grandmother     Alzheimer Disease Paternal Grandmother     Diabetes No family hx of     Thyroid Disease No family hx of     Cancer - colorectal No family hx of        Social History     Socioeconomic History    Marital status:      Spouse name: Not on file    Number of children: Not on file    Years of education: Not on file    Highest education level: Not on file   Occupational History    Not on file   Tobacco Use    Smoking status: Never    Smokeless tobacco: Never   Substance and Sexual Activity    Alcohol use: No     Comment: very rare    Drug use: No    Sexual activity: Not on file   Other Topics Concern    Parent/sibling w/ CABG, MI or angioplasty before 65F 55M? Not Asked   Social History Narrative    Not on file      Social Determinants of Health     Financial Resource Strain: Low Risk  (11/17/2023)    Financial Resource Strain     Within the past 12 months, have you or your family members you live with been unable to get utilities (heat, electricity) when it was really needed?: No   Food Insecurity: Low Risk  (11/17/2023)    Food Insecurity     Within the past 12 months, did you worry that your food would run out before you got money to buy more?: No     Within the past 12 months, did the food you bought just not last and you didn t have money to get more?: No   Transportation Needs: Low Risk  (11/17/2023)    Transportation Needs     Within the past 12 months, has lack of transportation kept you from medical appointments, getting your medicines, non-medical meetings or appointments, work, or from getting things that you need?: No   Physical Activity: Not on file   Stress: Not on file   Social Connections: Not on file   Interpersonal Safety: Low Risk  (11/20/2023)    Interpersonal Safety     Do you feel physically and emotionally safe where you currently live?: Yes     Within the past 12 months, have you been hit, slapped, kicked or otherwise physically hurt by someone?: No     Within the past 12 months, have you been humiliated or emotionally abused in other ways by your partner or ex-partner?: No   Housing Stability: Low Risk  (11/17/2023)    Housing Stability     Do you have housing? : Yes     Are you worried about losing your housing?: No       Health Maintenance   Topic Date Due    ANNUAL REVIEW OF HM ORDERS  Never done    ZOSTER IMMUNIZATION (1 of 2) Never done    RSV VACCINE (Pregnancy & 60+) (1 - 1-dose 60+ series) Never done    COLORECTAL CANCER SCREENING  03/15/2023    YEARLY PREVENTIVE VISIT  08/15/2023    ADVANCE CARE PLANNING  08/30/2023    INFLUENZA VACCINE (1) 09/01/2023    COVID-19 Vaccine (6 - 2023-24 season) 12/05/2023    Pneumococcal Vaccine: Pediatrics (0 to 5 Years) and At-Risk Patients (6 to 64 Years)  "(3 - PPSV23 or PCV20) 11/01/2026    LIPID  08/21/2028    DTAP/TDAP/TD IMMUNIZATION (3 - Td or Tdap) 06/09/2033    HEPATITIS C SCREENING  Completed    HIV SCREENING  Completed    PHQ-2 (once per calendar year)  Completed    IPV IMMUNIZATION  Aged Out    HPV IMMUNIZATION  Aged Out    MENINGITIS IMMUNIZATION  Aged Out    RSV MONOCLONAL ANTIBODY  Aged Out       OBJECTIVE:  Physical Exam:  BP (!) 155/92 (BP Location: Right arm, Patient Position: Sitting, Cuff Size: Adult Regular)   Pulse 59   Ht 1.803 m (5' 11\")   Wt 106.6 kg (235 lb)   SpO2 97%   BMI 32.78 kg/m    Constitutional: no distress, comfortable, pleasant   Eyes: anicteric, the right eye deviates laterally  Ears, Nose and Throat: tympanic membranes clear, throat clear, neck supple with full range of motion, no thyromegaly.   Cardiovascular: regular rate and rhythm, normal S1 and S2, no murmurs, rubs or gallops  Respiratory: clear to auscultation, no wheezes or crackles, normal breath sounds   Gastrointestinal: positive bowel sounds  Skin: no concerning lesions from the waist up  Neurological: cranial nerves intact, normal gait, no tremor   Psychological: appropriate mood   Lymphatic: no cervical  lymphadenopathy          "

## 2023-11-21 ENCOUNTER — OFFICE VISIT (OUTPATIENT)
Dept: OPHTHALMOLOGY | Facility: CLINIC | Age: 63
End: 2023-11-21
Attending: OPHTHALMOLOGY
Payer: COMMERCIAL

## 2023-11-21 DIAGNOSIS — H40.51X3 NEOVASCULAR GLAUCOMA OF RIGHT EYE, SEVERE STAGE: Primary | ICD-10-CM

## 2023-11-21 PROBLEM — E11.9 DIABETES MELLITUS, TYPE 2 (H): Status: ACTIVE | Noted: 2023-11-21

## 2023-11-21 PROCEDURE — 99024 POSTOP FOLLOW-UP VISIT: CPT | Performed by: OPHTHALMOLOGY

## 2023-11-21 PROCEDURE — 99214 OFFICE O/P EST MOD 30 MIN: CPT | Performed by: OPHTHALMOLOGY

## 2023-11-21 ASSESSMENT — SLIT LAMP EXAM - LIDS
COMMENTS: NORMAL
COMMENTS: NORMAL

## 2023-11-21 ASSESSMENT — TONOMETRY
OD_IOP_MMHG: 39
OS_IOP_MMHG: 17
IOP_METHOD: TONOPEN
OS_IOP_MMHG: 14
IOP_METHOD: ICARE
OD_IOP_MMHG: 33

## 2023-11-21 ASSESSMENT — VISUAL ACUITY
OS_SC: 20/20
METHOD: SNELLEN - LINEAR
OD_SC: HM

## 2023-11-21 ASSESSMENT — EXTERNAL EXAM - RIGHT EYE: OD_EXAM: NORMAL

## 2023-11-21 ASSESSMENT — EXTERNAL EXAM - LEFT EYE: OS_EXAM: NORMAL

## 2023-11-21 NOTE — PATIENT INSTRUCTIONS
Eye drops RIGHT EYE  Continue Prednisolone acetate (pink cap) 1 drop 2 times daily  Cosopt (blue cap) 2 times a day  Brimonidine (purple cap) 2 times a day  Latanoprost (teal cap) once at bedtime

## 2023-11-21 NOTE — PROGRESS NOTES
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   - 05/17/2023: s/p right eye TSCPC #20 shots  - S/p repest TSCPC right eye 10/25/23    Today, 11/21/2023, s/p TSCPC right eye 5/17/23, S/p repeat TSCPC POM1 10/25/23. Patient states eye continues to be comfortable and without pain. No changes in left eye vision.    Relevant Past Medical/Family/Social History:  Past Medical History:   Diagnosis Date    Antiplatelet or antithrombotic long-term use     Coronary artery disease     Diaphragmatic hernia without mention of obstruction or gangrene     Heart disease     Hernia, abdominal     History of blood transfusion     History of thrombophlebitis     Hypertension     Nephrolithiasis 4/2010    Other and unspecified hyperlipidemia     Pulmonary embolus and infarction (H)     s/p hemothorax and filter s/p filter removal (2011), now on long term anti-coagulation    Statin intolerance 2/1/2017    Stented coronary artery 01/07/2020    NIR mid LAD    Unspecified retinal detachment     Childhood trauma, unrepaired     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[employer]  No family history of glaucoma    Relevant Review of Systems: chronic decreased vision in the right eye, no pain in the right eye, feels like his left eye is at baseline as well     Diagnosis: NVG right eye   Year diagnosis: 2023  Previous glaucoma surgery/laser: CEIOL left eye, PRP left eye, CEIOL right eye  Maximum intraocular pressure 50s  Currently Meds: Diamox 500 mg PO BID, prednisolone BID OD, timolol BID right eye, Brimonidine TID OD  Family history: negative  CCT: 551/548  Gonio:  Refractive status: Pseudophakia  Trauma  history: negative  Steroid exposure: positive prednisolone  Vasospastic disease: Migrane/Raynaud phenomenon: negative  A past hemodynamic crisis or Low BP: negative  Meds AEs/intolerance: none  PMHx: Asthma and respiratory problems/Cardiac/Renal/Kidney stones/Sulfa Allergy: kidney stones, bypass surgery  Anticoagulants: Warfarin for complicated PE  - Visual field April 13, 2023  - Right eye - not able to completed  - Left eye - normal, reliable  - OCT Optic Nerve RNFL Spectralis April 13, 2023  - Right Eye: diffuse atrophy, reliable  - Left Eye: trace thinning IT, reliable    Today's testing:  - 33 mmHg right eye   AC trace cell right eye    Additional Ocular History:   Anterior uveitis, chronic, right eye    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  Patient has chronic RD/inflammation and neovascularization in the right eye. He has had one injection int the right eye about 7 months ago. Now s/p repeat right eye TSCPC Subjectively comfortable. will avoid tube shunt, poor visual prognosis and possible CMM. IOP remains elevated, will recheck at 2 month appointment before considering further surgery, will consider tube shunt next if cleared by Dr. Isaac   Eye drops RIGHT EYE  Continue Prednisolone 2 times daily, still has some active AC inflammation   Cosopt 2 times a day  Brimonidine 3 times a day  Latanoprost at bedtime    RTC in 2 month [facility], IOP       Physician Attestation     Attending Physician Attestation:  Complete documentation of historical and exam elements from today's encounter can be found in the full encounter summary report (not reduplicated in this  progress note). I personally obtained the chief complaint(s) and history of present illness. I confirmed and edited as necessary the review of systems, past medical/surgical history, family history, social history, and examination findings as documented by others; and I examined the patient myself. I personally reviewed the relevant tests, images, and reports as documented above.  I formulated and edited as necessary the assessment and plan and discussed the findings and management plan with the patient and any family members present at the time of the visit.  Jay Jay Savage M.D., Glaucoma, November 21, 2023

## 2023-11-21 NOTE — NURSING NOTE
Chief Complaints and History of Present Illnesses   Patient presents with    Post Op (Ophthalmology) Right Eye     RE 4 week post op cyclophotocoagulation      Chief Complaint(s) and History of Present Illness(es)       Post Op (Ophthalmology) Right Eye              Associated symptoms: Negative for redness, flashes and floaters    Comments: RE 4 week post op cyclophotocoagulation               Comments    DOS 10/25/2023. Pt states the color of his RE seems to be changing, he noticed that started last week. Pt states once or twice a day he will feel a sharp pain in RE, rates pain at 4/10. Pt denies vision changes, flashes, floaters, and redness.   Per pt using cosopt, latanoprost, and Alphagan.     Nicole ESCOBEDO 9:30 AM November 21, 2023

## 2023-11-30 ENCOUNTER — MYC MEDICAL ADVICE (OUTPATIENT)
Dept: INTERNAL MEDICINE | Facility: CLINIC | Age: 63
End: 2023-11-30
Payer: COMMERCIAL

## 2023-11-30 DIAGNOSIS — Z23 NEED FOR SHINGLES VACCINE: Primary | ICD-10-CM

## 2023-12-01 ENCOUNTER — MYC REFILL (OUTPATIENT)
Dept: INTERNAL MEDICINE | Facility: CLINIC | Age: 63
End: 2023-12-01
Payer: COMMERCIAL

## 2023-12-01 ENCOUNTER — MYC REFILL (OUTPATIENT)
Dept: CARDIOLOGY | Facility: CLINIC | Age: 63
End: 2023-12-01
Payer: COMMERCIAL

## 2023-12-01 DIAGNOSIS — Z95.1 S/P CABG X 4: ICD-10-CM

## 2023-12-01 DIAGNOSIS — Z23 NEED FOR SHINGLES VACCINE: ICD-10-CM

## 2023-12-01 DIAGNOSIS — E78.5 HYPERLIPIDEMIA LDL GOAL <130: ICD-10-CM

## 2023-12-01 DIAGNOSIS — I25.739 CORONARY ARTERY DISEASE INVOLVING NONAUTOLOGOUS BIOLOGICAL CORONARY BYPASS GRAFT WITH ANGINA PECTORIS (H): ICD-10-CM

## 2023-12-01 DIAGNOSIS — Z78.9 STATIN INTOLERANCE: ICD-10-CM

## 2023-12-04 NOTE — TELEPHONE ENCOUNTER
evolocumab (REPATHA SURECLICK) 140 MG/ML prefilled autoinjector   Last Written Prescription Date:  8/22/2023  Last Fill Quantity: 6 ml,   # refills: 3  Last Office Visit : 8/21/2023  [facility]  Future Office visit:  none    Routing refill request to provider for review/approval because:  Medication not on the Cardiology refill protocol.  - patient received a message that his prescription was running out and needed a new one sent.

## 2023-12-07 RX ORDER — EVOLOCUMAB 140 MG/ML
140 INJECTION, SOLUTION SUBCUTANEOUS
Qty: 6 ML | Refills: 3 | Status: SHIPPED | OUTPATIENT
Start: 2023-12-07

## 2023-12-11 ENCOUNTER — ANTICOAGULATION THERAPY VISIT (OUTPATIENT)
Dept: ANTICOAGULATION | Facility: CLINIC | Age: 63
End: 2023-12-11

## 2023-12-11 ENCOUNTER — LAB (OUTPATIENT)
Dept: LAB | Facility: CLINIC | Age: 63
End: 2023-12-11
Payer: COMMERCIAL

## 2023-12-11 DIAGNOSIS — I27.82 CHRONIC PULMONARY EMBOLISM WITHOUT ACUTE COR PULMONALE, UNSPECIFIED PULMONARY EMBOLISM TYPE (H): ICD-10-CM

## 2023-12-11 DIAGNOSIS — Z79.01 LONG TERM CURRENT USE OF ANTICOAGULANT THERAPY: ICD-10-CM

## 2023-12-11 DIAGNOSIS — I27.82 CHRONIC PULMONARY EMBOLISM WITHOUT ACUTE COR PULMONALE, UNSPECIFIED PULMONARY EMBOLISM TYPE (H): Primary | ICD-10-CM

## 2023-12-11 LAB — INR PPP: 1.94 (ref 0.85–1.15)

## 2023-12-11 PROCEDURE — 36415 COLL VENOUS BLD VENIPUNCTURE: CPT | Performed by: PATHOLOGY

## 2023-12-11 PROCEDURE — 85610 PROTHROMBIN TIME: CPT | Performed by: PATHOLOGY

## 2023-12-11 NOTE — PROGRESS NOTES
ANTICOAGULATION MANAGEMENT     Oswaldo Prabhakar 63 year old male is on warfarin with subtherapeutic INR result. (Goal INR 2.0-3.0)    Recent labs: (last 7 days)     12/11/23  1016   INR 1.94*       ASSESSMENT     Source(s): Chart Review     Warfarin doses taken: Reviewed in chart  Diet: No new diet changes identified  Medication/supplement changes: None noted  New illness, injury, or hospitalization: No  Signs or symptoms of bleeding or clotting: No  Previous result: Therapeutic last 2(+) visits  Additional findings: None       PLAN     Recommended plan for no diet, medication or health factor changes affecting INR     Dosing Instructions: Continue your current warfarin dose with next INR in 2 weeks       Summary  As of 12/11/2023      Full warfarin instructions:  7.5 mg every Mon, Thu; 5 mg all other days   Next INR check:  12/25/2023               Detailed voice message left for Oswaldo with dosing instructions and follow up date.     Contact [phone] to schedule and with any changes, questions or concerns.     Education provided:   Please call back if any changes to your diet, medications or how you've been taking warfarin  Contact [phone] with any changes, questions or concerns.     Plan made per [facility] anticoagulation protocol    Kristina Wright RN  Anticoagulation Clinic  12/11/2023    _______________________________________________________________________     Anticoagulation Episode Summary       Current INR goal:  2.0-3.0   TTR:  73.2% (5.5 mo)   Target end date:  Indefinite   Send INR reminders to:   [facility]    Indications    Pulmonary emboli (H) [I26.99]  Long-term (current) use of anticoagulants [Z79.01] [Z79.01]  Chronic pulmonary embolism without acute cor pulmonale  unspecified pulmonary embolism type (H) [I27.82]             Comments:               Anticoagulation Care Providers       Provider Role Specialty Phone number    Samm Vazquez MD Referring Internal Medicine - Pediatrics  [phone]

## 2023-12-15 ENCOUNTER — DOCUMENTATION ONLY (OUTPATIENT)
Dept: ANTICOAGULATION | Facility: CLINIC | Age: 63
End: 2023-12-15

## 2023-12-15 ENCOUNTER — HOSPITAL ENCOUNTER (EMERGENCY)
Facility: CLINIC | Age: 63
Discharge: HOME | End: 2023-12-15
Attending: EMERGENCY MEDICINE | Admitting: EMERGENCY MEDICINE
Payer: COMMERCIAL

## 2023-12-15 ENCOUNTER — NURSE TRIAGE (OUTPATIENT)
Dept: CARDIOLOGY | Facility: CLINIC | Age: 63
End: 2023-12-15
Payer: COMMERCIAL

## 2023-12-15 ENCOUNTER — APPOINTMENT (OUTPATIENT)
Dept: ULTRASOUND IMAGING | Facility: CLINIC | Age: 63
End: 2023-12-15
Attending: EMERGENCY MEDICINE
Payer: COMMERCIAL

## 2023-12-15 VITALS
TEMPERATURE: 98.2 F | HEART RATE: 56 BPM | HEIGHT: 70 IN | OXYGEN SATURATION: 97 % | DIASTOLIC BLOOD PRESSURE: 87 MMHG | BODY MASS INDEX: 33.72 KG/M2 | SYSTOLIC BLOOD PRESSURE: 148 MMHG | RESPIRATION RATE: 16 BRPM

## 2023-12-15 DIAGNOSIS — M79.604 RIGHT LEG PAIN: ICD-10-CM

## 2023-12-15 LAB
ALBUMIN SERPL BCG-MCNC: 4.4 G/DL (ref 3.5–5.2)
ALP SERPL-CCNC: 50 U/L (ref 40–150)
ALT SERPL W P-5'-P-CCNC: 35 U/L (ref 0–70)
ANION GAP SERPL CALCULATED.3IONS-SCNC: 10 MMOL/L (ref 7–15)
APTT PPP: 30 SECONDS (ref 22–38)
AST SERPL W P-5'-P-CCNC: 34 U/L (ref 0–45)
BASOPHILS # BLD AUTO: 0.1 10E3/UL (ref 0–0.2)
BASOPHILS NFR BLD AUTO: 1 %
BILIRUB SERPL-MCNC: 0.5 MG/DL
BUN SERPL-MCNC: 13.8 MG/DL (ref 8–23)
CALCIUM SERPL-MCNC: 9.5 MG/DL (ref 8.8–10.2)
CHLORIDE SERPL-SCNC: 106 MMOL/L (ref 98–107)
CK SERPL-CCNC: 135 U/L (ref 39–308)
CREAT SERPL-MCNC: 1.1 MG/DL (ref 0.67–1.17)
D DIMER PPP FEU-MCNC: 0.3 UG/ML FEU (ref 0–0.5)
DEPRECATED HCO3 PLAS-SCNC: 25 MMOL/L (ref 22–29)
EGFRCR SERPLBLD CKD-EPI 2021: 76 ML/MIN/1.73M2
EOSINOPHIL # BLD AUTO: 0.7 10E3/UL (ref 0–0.7)
EOSINOPHIL NFR BLD AUTO: 10 %
ERYTHROCYTE [DISTWIDTH] IN BLOOD BY AUTOMATED COUNT: 13.8 % (ref 10–15)
GLUCOSE SERPL-MCNC: 97 MG/DL (ref 70–99)
HCT VFR BLD AUTO: 42.3 % (ref 40–53)
HGB BLD-MCNC: 13.5 G/DL (ref 13.3–17.7)
IMM GRANULOCYTES # BLD: 0 10E3/UL
IMM GRANULOCYTES NFR BLD: 0 %
INR PPP: 1.99 (ref 0.85–1.15)
LYMPHOCYTES # BLD AUTO: 2.4 10E3/UL (ref 0.8–5.3)
LYMPHOCYTES NFR BLD AUTO: 32 %
MAGNESIUM SERPL-MCNC: 1.7 MG/DL (ref 1.7–2.3)
MCH RBC QN AUTO: 27.2 PG (ref 26.5–33)
MCHC RBC AUTO-ENTMCNC: 31.9 G/DL (ref 31.5–36.5)
MCV RBC AUTO: 85 FL (ref 78–100)
MONOCYTES # BLD AUTO: 0.6 10E3/UL (ref 0–1.3)
MONOCYTES NFR BLD AUTO: 8 %
NEUTROPHILS # BLD AUTO: 3.6 10E3/UL (ref 1.6–8.3)
NEUTROPHILS NFR BLD AUTO: 49 %
NRBC # BLD AUTO: 0 10E3/UL
NRBC BLD AUTO-RTO: 0 /100
PLATELET # BLD AUTO: 252 10E3/UL (ref 150–450)
POTASSIUM SERPL-SCNC: 4.3 MMOL/L (ref 3.4–5.3)
PROT SERPL-MCNC: 7.3 G/DL (ref 6.4–8.3)
RBC # BLD AUTO: 4.97 10E6/UL (ref 4.4–5.9)
SODIUM SERPL-SCNC: 141 MMOL/L (ref 135–145)
WBC # BLD AUTO: 7.4 10E3/UL (ref 4–11)

## 2023-12-15 PROCEDURE — 85610 PROTHROMBIN TIME: CPT | Performed by: EMERGENCY MEDICINE

## 2023-12-15 PROCEDURE — 93971 EXTREMITY STUDY: CPT | Mod: 26 | Performed by: RADIOLOGY

## 2023-12-15 PROCEDURE — 99284 EMERGENCY DEPT VISIT MOD MDM: CPT | Mod: 25

## 2023-12-15 PROCEDURE — 36415 COLL VENOUS BLD VENIPUNCTURE: CPT | Performed by: EMERGENCY MEDICINE

## 2023-12-15 PROCEDURE — 82550 ASSAY OF CK (CPK): CPT | Performed by: EMERGENCY MEDICINE

## 2023-12-15 PROCEDURE — 85041 AUTOMATED RBC COUNT: CPT | Performed by: EMERGENCY MEDICINE

## 2023-12-15 PROCEDURE — 85014 HEMATOCRIT: CPT | Performed by: EMERGENCY MEDICINE

## 2023-12-15 PROCEDURE — 85730 THROMBOPLASTIN TIME PARTIAL: CPT | Performed by: EMERGENCY MEDICINE

## 2023-12-15 PROCEDURE — 93971 EXTREMITY STUDY: CPT | Mod: RT

## 2023-12-15 PROCEDURE — 83735 ASSAY OF MAGNESIUM: CPT | Performed by: EMERGENCY MEDICINE

## 2023-12-15 PROCEDURE — 80053 COMPREHEN METABOLIC PANEL: CPT | Performed by: EMERGENCY MEDICINE

## 2023-12-15 PROCEDURE — 85379 FIBRIN DEGRADATION QUANT: CPT | Performed by: EMERGENCY MEDICINE

## 2023-12-15 ASSESSMENT — ACTIVITIES OF DAILY LIVING (ADL): ADLS_ACUITY_SCORE: 33

## 2023-12-15 NOTE — ED TRIAGE NOTES
Pt ambulatory from home with R leg/calf swelling and pain. Started yesterday. Hx of DVTs and CABG. On blood thinner.     Triage Assessment (Adult)       Row Name 12/15/23 1402          Triage Assessment    Airway WDL WDL        Respiratory WDL    Respiratory WDL WDL        Skin Circulation/Temperature WDL    Skin Circulation/Temperature WDL WDL        Cardiac WDL    Cardiac WDL WDL        Peripheral/Neurovascular WDL    Peripheral Neurovascular WDL WDL        Cognitive/Neuro/Behavioral WDL    Cognitive/Neuro/Behavioral WDL WDL        Pickett Coma Scale    Best Eye Response 4-->(E4) spontaneous     Best Motor Response 6-->(M6) obeys commands     Best Verbal Response 5-->(V5) oriented     Daron Coma Scale Score 15

## 2023-12-15 NOTE — ED PROVIDER NOTES
[facility] EMERGENCY DEPARTMENT ([facility])    12/15/23       ED PROVIDER NOTE   History     Chief Complaint   Patient presents with    Leg Pain    Leg Swelling     The history is provided by the patient and medical records.     Oswaldo Chacon patient has had pulmonary embolism in the past and is on warfarin as well as Brilinta has had 2 days of is a 63 year old male with history of testicular cancer undergoing radiation oncology with neoplasm to bone, PE on warfarin as well as Brilinta, has had 2 days of atraumatic right calf pain. No chest pain or shortness of breath, infectious symptoms, or direct trauma.     This part of the document was transcribed by Danae Reina, Medical Scribe.      Past Medical History  Past Medical History:   Diagnosis Date    Antiplatelet or antithrombotic long-term use     Coronary artery disease     Diaphragmatic hernia without mention of obstruction or gangrene     Heart disease     Hernia, abdominal     History of blood transfusion     History of thrombophlebitis     Hypertension     Nephrolithiasis 4/2010    Other and unspecified hyperlipidemia     Pulmonary embolus and infarction (H)     s/p hemothorax and filter s/p filter removal (2011), now on long term anti-coagulation    Statin intolerance 2/1/2017    Stented coronary artery 01/07/2020    NIR mid LAD    Unspecified retinal detachment     Childhood trauma, unrepaired     Past Surgical History:   Procedure Laterality Date    BYPASS GRAFT ARTERY CORONARY  04/04/2014    Procedure: BYPASS GRAFT ARTERY CORONARY;  Median Sternotomy, Coronary Artery Bypass Bypass x 4 using Left Internal Mammary, Left Saphenous Vein, on pump oxygenation;  Surgeon: Sherry Armando MD;  Location: [facility] OR    CATARACT IOL, RT/LT      CLOSED REDUCTION, PERCUTANEOUS PINNING  HAND, COMBINED Left 11/05/2015    Procedure: COMBINED CLOSED REDUCTION, PERCUTANEOUS PINNING HAND;  Surgeon: Carmella Love MD;  [facility]    COLONOSCOPY   03/15/2013    Procedure: COLONOSCOPY;;  Surgeon: Racheal Shipman MD;  Location: [facility] GI    COMBINED CYSTOSCOPY, INSERT STENT URETER(S) Right 01/10/2020    Procedure: Cystoscopy, right retrograde pyelogram, interpretation of fluoroscopic images, placement of 6 x 26 double-J ureteral stent;  Surgeon: Nguyễn Woodard MD;  Location: [facility] OR    COMBINED CYSTOSCOPY, RETROGRADES, URETEROSCOPY, LASER HOLMIUM LITHOTRIPSY URETER(S), INSERT STENT Right 02/05/2020    Procedure: Cystoscopy, right ureteral stent exchange, right ureteroscopy with holmium lithotripsy and stone basketing, right retrograde pyelogram, interpretation of fluoroscopic images.;  Surgeon: Nguyễn Woodard MD;  Location: [facility] OR    CV ANGIOGRAM CORONARY GRAFT N/A 01/07/2020    Procedure: Angiogram Coronary Graft;  Surgeon: Chato Odonnell MD;  Location:  HEART CARDIAC CATH LAB    CV CORONARY ANGIOGRAM  01/07/2020    Procedure: CV CORONARY ANGIOGRAM;  Surgeon: Chato Odonnell MD;  Location: [facility] CARDIAC CATH LAB    CV PCI STENT DRUG ELUTING N/A 01/07/2020    Procedure: PCI Stent Drug Eluting;  Surgeon: Chato Odonnell MD;  Location:  HEART CARDIAC CATH LAB    CYCLOPHOTOCOAGULATION TRANSSCLERAL WITH MICROPULSE LASER Right 5/17/2023    Procedure: RIGHT EYE CYCLOPHOTOCOAGULATION TRANSSCLERAL WITH Gprobe LASER;  Surgeon: Jay Jay Savage MD;  Location: [facility] OR    CYCLOPHOTOCOAGULATION TRANSSCLERAL WITH MICROPULSE LASER Right 10/25/2023    Procedure: RIGHT EYE CYCLOPHOTOCOAGULATION TRANSSCLERAL WITH GPROBE LASER;  Surgeon: Jay Jay Savage MD;  Location: [facility] OR    CYSTOSCOPY      ESOPHAGOSCOPY, GASTROSCOPY, DUODENOSCOPY (EGD), COMBINED N/A 12/9/2022    Procedure: ESOPHAGOGASTRODUODENOSCOPY (EGD);  Surgeon: Beata George MD;  Location: [facility] GI    LAPAROSCOPIC CHOLECYSTECTOMY N/A 08/06/2018    Procedure: LAPAROSCOPIC CHOLECYSTECTOMY;  LAPAROSCOPIC CHOLECYSTECTOMY ;  Surgeon: Sade  José Miguel MONTES MD;  Location: [facility] OR    LITHOTRIPSY  04/2010    RETINAL REATTACHMENT      THORACIC SURGERY      lung surgery, thoracotomy, lung adhesion    ZZC NONSPECIFIC PROCEDURE      Spermatocele resection     acetaminophen (TYLENOL) 500 MG tablet  atorvastatin (LIPITOR) 80 MG tablet  brimonidine (ALPHAGAN) 0.2 % ophthalmic solution  calcium carbonate (TUMS) 500 MG chewable tablet  Cholecalciferol (VITAMIN D) 2000 UNITS CAPS  dorzolamide-timolol (COSOPT) 2-0.5 % ophthalmic solution  evolocumab (REPATHA SURECLICK) 140 MG/ML prefilled autoinjector  ezetimibe (ZETIA) 10 MG tablet  fenofibrate (TRIGLIDE/LOFIBRA) 160 MG tablet  latanoprost (XALATAN) 0.005 % ophthalmic solution  lisinopril (ZESTRIL) 2.5 MG tablet  metoprolol tartrate (LOPRESSOR) 25 MG tablet  OMEPRAZOLE PO  ondansetron (ZOFRAN-ODT) 4 MG ODT tab  prednisoLONE acetate (PRED FORTE) 1 % ophthalmic suspension  reason aspirin not prescribed, intentional,  reason aspirin not prescribed, intentional,  ticagrelor (BRILINTA) 90 MG tablet  timolol maleate (TIMOPTIC) 0.5 % ophthalmic solution  warfarin ANTICOAGULANT (COUMADIN) 5 MG tablet      Allergies   Allergen Reactions    Cats     Hay Fever & [A.R.M.]     Heparin Other (See Comments)     Heparin resistance per cardio-thoracic surgeon Dr. Armando    Hydrocodone-Acetaminophen Nausea and Vomiting     Acetaminophen is ok     Family History  Family History   Problem Relation Age of Onset    Heart Disease Mother     Glaucoma Father     C.A.D. Father         3 vessel CABG, age 70    Cancer Father         bladder cancer    C.A.D. Paternal Grandmother     Hypertension Paternal Grandmother     Alzheimer Disease Paternal Grandmother     Diabetes No family hx of     Thyroid Disease No family hx of     Cancer - colorectal No family hx of      Social History   Social History     Tobacco Use    Smoking status: Never    Smokeless tobacco: Never   Substance Use Topics    Alcohol use: No     Comment: very rare    Drug use: No      "    A medically appropriate review of systems was performed with pertinent positives and negatives noted in the HPI, and all other systems negative.    Physical Exam   BP: (!) 172/99  Pulse: 62  Temp: 98.2  F (36.8  C)  Resp: 16  Height: 177.8 cm (5' 10\")  SpO2: 97 %    Physical Exam  Constitutional:       Comments: able to ambulate, breathing comfortably   Cardiovascular:      Pulses:           Popliteal pulses are 3+ on the right side.   Musculoskeletal:      Right hip: Normal range of motion.      Right knee: Normal range of motion.      Right lower leg: Tenderness (minimal, over right calf) present. 1+ Edema present.      Right ankle: Normal range of motion.      Comments: No discoloration to right lower extremity. positive Homans' sign    Neurological:      Sensory: Sensation is intact.      Motor: Motor function is intact.       ED Course, Procedures, & Data      Procedures               Results for orders placed or performed during the hospital encounter of 12/15/23   US Lower Extremity Venous Duplex Right     Status: None    Narrative    EXAMINATION: DOPPLER VENOUS ULTRASOUND OF THE RIGHT LOWER EXTREMITY,  12/15/2023 3:21 PM     COMPARISON: None.    HISTORY: Right lower extremity swelling and pain    TECHNIQUE:  Gray-scale evaluation with compression, spectral flow and  color Doppler assessment of the deep venous system of the right lower  extremity from groin to knee, and ankle. Similar evaluation of the  left common femoral vein obtained for comparison.     FINDINGS:  Right: the common femoral, femoral, popliteal, posterior tibial and  peroneal veins demonstrate normal compressibility and blood flow.    Left: the common femoral vein demonstrates normal compressibility and  blood flow.      Impression    IMPRESSION:  No evidence of deep venous thrombosis in the right lower extremity.    I have personally reviewed the examination and initial interpretation  and I agree with the findings.    YUSUF CAMPBELL, " MD         SYSTEM ID:  [id number]   INR     Status: Abnormal   Result Value Ref Range    INR 1.99 (H) 0.85 - 1.15   Partial thromboplastin time     Status: Normal   Result Value Ref Range    aPTT 30 22 - 38 Seconds   D dimer quantitative     Status: Normal   Result Value Ref Range    D-Dimer Quantitative 0.30 0.00 - 0.50 ug/mL FEU    Narrative    This D-dimer assay is intended for use in conjunction with a clinical pretest probability assessment model to exclude pulmonary embolism (PE) and deep venous thrombosis (DVT) in outpatients suspected of PE or DVT. The cut-off value is 0.50 ug/mL FEU.    For patients 50 years of age or older, the application of age-adjusted cut-off values for D-Dimer may increase the specificity without significant effect on sensitivity. The literature suggested calculation age adjusted cut-off in ug/L = age in years x 10 ug/L. The results in this laboratory are reported as ug/mL rather than ug/L. The calculation for age adjusted cut off in ug/mL= age in years x 0.01 ug/mL. For example, the cut off for a 76 year old male is 76 x 0.01 ug/mL = 0.76 ug/mL (760 ug/L).    M Jefersonhizeny et al. Age adjusted D-dimer cut-off levels to rule out pulmonary embolism: The ADJUST-PE Study. JAQUELIN 2014;311:4111-4682.; HJ Dora et al. Diagnostic accuracy of conventional or age adjusted D-dimer cutoff values in older patients with suspected venous thromboembolism. Systemic review and meta-analysis. BMJ 2013:346:f2492.   Comprehensive metabolic panel     Status: Normal   Result Value Ref Range    Sodium 141 135 - 145 mmol/L    Potassium 4.3 3.4 - 5.3 mmol/L    Carbon Dioxide (CO2) 25 22 - 29 mmol/L    Anion Gap 10 7 - 15 mmol/L    Urea Nitrogen 13.8 8.0 - 23.0 mg/dL    Creatinine 1.10 0.67 - 1.17 mg/dL    GFR Estimate 76 >60 mL/min/1.73m2    Calcium 9.5 8.8 - 10.2 mg/dL    Chloride 106 98 - 107 mmol/L    Glucose 97 70 - 99 mg/dL    Alkaline Phosphatase 50 40 - 150 U/L    AST 34 0 - 45 U/L    ALT 35 0 - 70 U/L     Protein Total 7.3 6.4 - 8.3 g/dL    Albumin 4.4 3.5 - 5.2 g/dL    Bilirubin Total 0.5 <=1.2 mg/dL   Magnesium     Status: Normal   Result Value Ref Range    Magnesium 1.7 1.7 - 2.3 mg/dL   CK total     Status: Normal   Result Value Ref Range     39 - 308 U/L   CBC with platelets and differential     Status: None   Result Value Ref Range    WBC Count 7.4 4.0 - 11.0 10e3/uL    RBC Count 4.97 4.40 - 5.90 10e6/uL    Hemoglobin 13.5 13.3 - 17.7 g/dL    Hematocrit 42.3 40.0 - 53.0 %    MCV 85 78 - 100 fL    MCH 27.2 26.5 - 33.0 pg    MCHC 31.9 31.5 - 36.5 g/dL    RDW 13.8 10.0 - 15.0 %    Platelet Count 252 150 - 450 10e3/uL    % Neutrophils 49 %    % Lymphocytes 32 %    % Monocytes 8 %    % Eosinophils 10 %    % Basophils 1 %    % Immature Granulocytes 0 %    NRBCs per 100 WBC 0 <1 /100    Absolute Neutrophils 3.6 1.6 - 8.3 10e3/uL    Absolute Lymphocytes 2.4 0.8 - 5.3 10e3/uL    Absolute Monocytes 0.6 0.0 - 1.3 10e3/uL    Absolute Eosinophils 0.7 0.0 - 0.7 10e3/uL    Absolute Basophils 0.1 0.0 - 0.2 10e3/uL    Absolute Immature Granulocytes 0.0 <=0.4 10e3/uL    Absolute NRBCs 0.0 10e3/uL   CBC with platelets differential     Status: None    Narrative    The following orders were created for panel order CBC with platelets differential.  Procedure                               Abnormality         Status                     ---------                               -----------         ------                     CBC with platelets and d...[[id number]]                      Final result                 Please view results for these tests on the individual orders.     Medications - No data to display    US Lower Extremity Venous Duplex Right   Final Result   IMPRESSION:   No evidence of deep venous thrombosis in the right lower extremity.      I have personally reviewed the examination and initial interpretation   and I agree with the findings.      YUSUF CAMPBELL MD            SYSTEM ID:  [id number]             Critical  care was not performed.     Medical Decision Making  The patient's presentation was of high complexity (an acute health issue posing potential threat to life or bodily function).    The patient's evaluation involved:  ordering and/or review of 3+ test(s) in this encounter (see separate area of note for details)    The patient's management necessitated moderate risk (prescription drug management including medications given in the ED).    Assessment & Plan      Will rule out deep vein thrombosis or Baker's cyst in right lower extremity. We will screen for rhabdo or electrolyte abnormality. Will check INR as patient is on warfarin and will ensure he is not subtherapeutic. I have reviewed the cardiology telephone and nurse triage note from today in Epic which expresses the concern of potential statin-induced rhabdomyolysis. Labs, DVT study, and will reassess after.    330pm: No DVT on ultrasound visualized.  CK normal.  Discussed findings with patient and will follow-up with his primary care team.  INR 1.99 -therapeutic    This part of the document was transcribed by Danae Reina, Medical Scribe.      I have reviewed the nursing notes. I have reviewed the findings, diagnosis, plan and need for follow up with the patient.    New Prescriptions    No medications on file       Final diagnoses:   Right leg pain       Adiel Aguiar MD  [facility] EMERGENCY DEPARTMENT  12/15/2023     Adiel Aguiar MD  12/15/23 [id number]

## 2023-12-15 NOTE — TELEPHONE ENCOUNTER
"1. REASON FOR CALL or QUESTION: \"What is your reason for calling today?\" or \"How can I best help you?\" or \"What question do you have that I can help answer?\"    Patient was transferred to Triage. Patient is C/O severe muscle pain to his right calf that has been occurring a day and a half. He states it is increasing in intensity. No other pain to any other area just the calf muscle. Patient states he is on atorvastatin 80mg along with zetia, fenofibrate, and repatha. He states he also feels a little nausea. 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 Will route to the nurses with Dr. Ocampo to follow-up with patient.   "

## 2023-12-15 NOTE — TELEPHONE ENCOUNTER
Spoke to Oswaldo.  None of his cardiac meds are new, he has been on the statin for years.  He is not aware of any trauma or injury to leg that would cause musculoskeletal pain.  No discoloration or redness, and cannot feel any pounding heartbeat sensation in leg.  No other changes in health that correlate.  Advised if pain does not subside, recommend proceeding to ED for evalutation for possible DVT or ?     Unable to determine cause of symptoms, feel unlikely to be side effect of statin since occurring out of nowhere after long term use.     Patient agrees with plan, denies further questions.

## 2023-12-15 NOTE — PROGRESS NOTES
ANTICOAGULATION  MANAGEMENT: Discharge Review    Oswaldo Prabhakar chart reviewed for anticoagulation continuity of care    Emergency room visit on 12/15/23 for Right Leg Pain.    Discharge disposition: Home    Results:    Recent labs: (last 7 days)     12/11/23  1016 12/15/23  1448   INR 1.94* 1.99*     Anticoagulation inpatient management:     not applicable     Anticoagulation discharge instructions:     Warfarin dosing: home regimen continued   Bridging: No   INR goal change: No      Medication changes affecting anticoagulation: Yes: Tylenol PRN for pain    Additional factors affecting anticoagulation:   330pm: No DVT on ultrasound visualized.  CK normal.  Discussed findings with patient and will follow-up with his primary care team.  INR 1.99 -therapeutic      PLAN     No adjustment to anticoagulation plan needed    Patient not contacted. INR was just completed on 12/11/23.    No adjustment to Anticoagulation Calendar was required    Aldo Garber RN, BSN, PHN  Anticoagulation Clinic   [phone]

## 2023-12-15 NOTE — DISCHARGE INSTRUCTIONS
For pain, please take 975-1000mg acetaminophen (tylenol) every 8 hours -- do not take more than 3000mg in a 24 hour period.    Return to emergency department if chest pain shortness of breath fever or any other concerning symptoms.  Otherwise, please follow-up today with your primary cardiology and primary care team  No signs of elevated CK (indicator of rhabdomyolysis) or deep vein thrombosis

## 2024-01-01 NOTE — PROGRESS NOTES
Pt phones on this date to report he is scheduled for gall bladder removal on 8/6 @ [facility].  He states he has been taking lovenox 120 mg subcutaneous daily since last week & was instructed by MD to continue this through 8/4.  A total of 5 refills of lovenox was ordered by the MD so pt should have adequate quantity of syringes.  Pt is not currently taking coumadin.  [facility] will plan to follow up with pt on 8/6 post op.  Currently pt does not know if he will be spending a night in the hospital or not.  Aristeo MCCRARY  
2024

## 2024-01-03 DIAGNOSIS — D31.31 CHOROIDAL NEVUS, RIGHT: Primary | ICD-10-CM

## 2024-01-12 ENCOUNTER — OFFICE VISIT (OUTPATIENT)
Dept: OPHTHALMOLOGY | Facility: CLINIC | Age: 64
End: 2024-01-12
Attending: OPHTHALMOLOGY
Payer: COMMERCIAL

## 2024-01-12 ENCOUNTER — MYC MEDICAL ADVICE (OUTPATIENT)
Dept: ANTICOAGULATION | Facility: CLINIC | Age: 64
End: 2024-01-12

## 2024-01-12 DIAGNOSIS — D31.31 CHOROIDAL NEVUS, RIGHT: ICD-10-CM

## 2024-01-12 DIAGNOSIS — H40.51X3 NEOVASCULAR GLAUCOMA OF RIGHT EYE, SEVERE STAGE: Primary | ICD-10-CM

## 2024-01-12 PROCEDURE — 99211 OFF/OP EST MAY X REQ PHY/QHP: CPT | Mod: 25 | Performed by: OPHTHALMOLOGY

## 2024-01-12 PROCEDURE — 92250 FUNDUS PHOTOGRAPHY W/I&R: CPT | Performed by: OPHTHALMOLOGY

## 2024-01-12 PROCEDURE — 92134 CPTRZ OPH DX IMG PST SGM RTA: CPT | Performed by: OPHTHALMOLOGY

## 2024-01-12 PROCEDURE — 99211 OFF/OP EST MAY X REQ PHY/QHP: CPT | Mod: 27 | Performed by: OPHTHALMOLOGY

## 2024-01-12 PROCEDURE — 999N000103 HC STATISTIC NO CHARGE FACILITY FEE

## 2024-01-12 PROCEDURE — 99213 OFFICE O/P EST LOW 20 MIN: CPT | Mod: 24 | Performed by: OPHTHALMOLOGY

## 2024-01-12 PROCEDURE — 76510 OPH US DX B-SCAN&QUAN A-SCAN: CPT | Performed by: OPHTHALMOLOGY

## 2024-01-12 PROCEDURE — 99024 POSTOP FOLLOW-UP VISIT: CPT | Mod: GC | Performed by: OPHTHALMOLOGY

## 2024-01-12 PROCEDURE — 99207 FUNDUS PHOTOS OD (RIGHT EYE): CPT | Mod: 26 | Performed by: OPHTHALMOLOGY

## 2024-01-12 ASSESSMENT — EXTERNAL EXAM - LEFT EYE
OS_EXAM: NORMAL
OS_EXAM: NORMAL

## 2024-01-12 ASSESSMENT — VISUAL ACUITY
OS_SC: 20/20
OD_SC: HM
METHOD: SNELLEN - LINEAR
OS_SC: 20/20
METHOD: SNELLEN - LINEAR
OD_SC: HM

## 2024-01-12 ASSESSMENT — TONOMETRY
OS_IOP_MMHG: 12
OS_IOP_MMHG: 12
OD_IOP_MMHG: 26
OS_IOP_MMHG: 12
OD_IOP_MMHG: 30
IOP_METHOD: APPLANATION
IOP_METHOD: TONOPEN
IOP_METHOD: TONOPEN
OD_IOP_MMHG: 26

## 2024-01-12 ASSESSMENT — SLIT LAMP EXAM - LIDS
COMMENTS: NORMAL

## 2024-01-12 ASSESSMENT — EXTERNAL EXAM - RIGHT EYE
OD_EXAM: NORMAL
OD_EXAM: NORMAL

## 2024-01-12 ASSESSMENT — CUP TO DISC RATIO: OS_RATIO: 0.4

## 2024-01-12 NOTE — NURSING NOTE
Chief Complaints and History of Present Illnesses   Patient presents with    Follow Up     Chief Complaint(s) and History of Present Illness(es)       Follow Up              Laterality: both eyes    Course: stable    Associated symptoms: Negative for eye pain, flashes and floaters              Comments    Here for follow up. VA is about the same. No flashes or floaters. No pain.    Juan Antonio LOPEZ 8:40 AM January 12, 2024

## 2024-01-12 NOTE — PROGRESS NOTES
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   - 05/17/2023: s/p right eye TSCPC #20 shots  - S/p repest TSCPC right eye 10/25/23    Today, 01/12/2024, s/p TSCPC right eye 5/17/23, S/p repeat TSCPC 10/25/23. Patient states eye continues to be comfortable and without pain. No changes in left eye vision.    Relevant Past Medical/Family/Social History:  Past Medical History:   Diagnosis Date     Antiplatelet or antithrombotic long-term use      Coronary artery disease      Diaphragmatic hernia without mention of obstruction or gangrene      Heart disease      Hernia, abdominal      History of blood transfusion      History of thrombophlebitis      Hypertension      Nephrolithiasis 4/2010     Other and unspecified hyperlipidemia      Pulmonary embolus and infarction (H)     s/p hemothorax and filter s/p filter removal (2011), now on long term anti-coagulation     Statin intolerance 2/1/2017     Stented coronary artery 01/07/2020    NIR mid LAD     Unspecified retinal detachment     Childhood trauma, unrepaired     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[employer]  No family history of glaucoma    Relevant Review of Systems: chronic decreased vision in the right eye, no pain in the right eye, feels like his left eye is at baseline as well     Diagnosis: NVG right eye   Year diagnosis: 2023  Previous glaucoma surgery/laser: CEIOL left eye, PRP left eye, CEIOL right eye  Maximum intraocular pressure 50s  Currently Meds: Diamox 500 mg PO BID, prednisolone BID OD, timolol BID right eye, Brimonidine TID OD  Family history: negative  CCT: 551/548  Gonio:  Refractive status:  Pseudophakia  Trauma history: negative  Steroid exposure: positive prednisolone  Vasospastic disease: Migrane/Raynaud phenomenon: negative  A past hemodynamic crisis or Low BP: negative  Meds AEs/intolerance: none  PMHx: Asthma and respiratory problems/Cardiac/Renal/Kidney stones/Sulfa Allergy: kidney stones, bypass surgery  Anticoagulants: Warfarin for complicated PE  - Visual field April 13, 2023  - Right eye - not able to completed  - Left eye - normal, reliable  - OCT Optic Nerve RNFL Spectralis April 13, 2023  - Right Eye: diffuse atrophy, reliable  - Left Eye: trace thinning IT, reliable    Today's testing:  - 30 mmHg right eye  AC rare cell right eye    Additional Ocular History:   Anterior uveitis, chronic, right eye    Hx of spontaneous RD right eye as a child, never corrected    Hx of PRP left eye    Pseudophakia, both eyes  - right eye DIB00 +20.0 9/12/22  - left eye DIB00 19.5 2/22/22    Exotropia right eye  - sensory XT right eye longstanding    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 Discussed findings with patient.  ? Patient has chronic RD/inflammation and neovascularization in the right eye. He has had one injection int the right eye about 7 months ago. Now s/p repeat right eye TSCPC Subjectively comfortable. will avoid tube shunt, poor visual prognosis and possible CMM.  ? Saw Dr. Isaac today. 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 Still concerned for intraocular malignancy. Recommends holding off on tube. Following up [id number]  ? Eye drops RIGHT EYE  o Taper Prednisolone to once daily for 1 week then stop  o Cosopt 2 times a day  o Brimonidine 3 times a day  o Latanoprost at bedtime    RTC in 4 months [facility], IOP    Petra Martinez MD  PGY3 Ophthalmology Resident  [employer] [facility] [employer]      Physician Attestation     Attending Physician Attestation:  Complete documentation of historical and exam  elements from today's encounter can be found in the full encounter summary report (not reduplicated in this progress note). I personally obtained the chief complaint(s) and history of present illness. I confirmed and edited as necessary the review of systems, past medical/surgical history, family history, social history, and examination findings as documented by others; and I examined the patient myself. I personally reviewed the relevant tests, images, and reports as documented above. I formulated and edited as necessary the assessment and plan and discussed the findings and management plan with the patient and any family members present at the time of the visit.  Jay Jay Savage M.D., Glaucoma, January 11, 2024

## 2024-01-12 NOTE — PROGRESS NOTES
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      Came to N for NVG OD 8/2022 seen in ED, had been at [facility], diagnosis with NVG OD and referred to [facility] for CPC and PRP, seen by Adebayo at [facility] and Modesta & Janette at [facility].    Poor vision OD since childhood d/t RD not diagnosis until too late to repair    CAD s/p CABG x 4  No h/o cancer    PE on chronic warfarin    PAST OCULAR SURGERY:  CE/IOL OU 2022  Laser pexy OS 2000s  CPC OD 5/17/23 (HS)  CPC OD 10/25/23    RETINAL IMAGING  OCT 01/12/24   OD - macula diffuse ERM, severe outer atrophy diffuse  OS - (prior) retina normal, PHF attached      FA 4/19/23:  - Right eye: Diffuse pinpoint hyperfluorescence, trace disc leakage.   - Left eye: Trace disc leakage.       U/S OD  A-scan - med/high reflective (1/2024)  B-scan - ST mass with hyperR base, size 2.27 x 5.88T . 4.42L (4/2023) -> 1.95mm x 6.19T x 5.01L (06/12/23) (6/2023) -> 2.26 x 6.38T x 5.10L (9/12/23)-> 2.29 x 5.13L x 6.56T (1/12/24)        ASSESSMENT & PLAN    # choroidal lesion OD   - noted 4/19/232023   - dome shaped anterior, difficult to see on exam d/t IOL edge/capsule & modeate pupil   - U/S not typical for met or CMM, hyper-reflective base but no visible external abnormalities   - too anterior for Optos or OCT     - ?weak transillumination shadow visible (6/2023)   - surrounding DBH 9/2023   - suspect PEHCR but cannot r/o large nevus     - continue to monitor closely as precuation   - recheck 4 months     - if stable then longer intervals      # NVG OD   - sees Janette   - h/o Avastin ~ 1/2023  (VRS)   - seen by Modesta 4/19/23, no sig ischemia to Tx on FA 4/19/23   - s/p CPC 6/2023     - IOP still very high, no pain   - could consider anti-VEGF         # h/o RD OD childhood 1970s   - ?exudative vs RRD   - resolved spontanously with poor vision since  childhood        Return in about 4 months (around 5/12/2024) for DFE OD, US OD.     ATTESTATION     Attending Physician Attestation:      Complete documentation of historical and exam elements from today's encounter can be found in the full encounter summary report (not reduplicated in this progress note).  I personally obtained the chief complaint(s) and history of present illness.  I confirmed and edited as necessary the review of systems, past medical/surgical history, family history, social history, and examination findings as documented by others; and I examined the patient myself.  I personally reviewed the relevant tests, images, and reports as documented above.  I formulated and edited as necessary the assessment and plan and discussed the findings and management plan with the patient and family    Maya Isaac MD, PhD  , Vitreoretinal Surgery  Department of Ophthalmology  [facility]

## 2024-01-12 NOTE — PATIENT INSTRUCTIONS
Eye drops RIGHT EYE  Taper Prednisolone to once daily for one week then stop  Cosopt (blue cap) 2 times a day  Brimonidine (purple cap) 2 times a day  Latanoprost (teal cap) once at bedtime

## 2024-01-18 ENCOUNTER — TELEPHONE (OUTPATIENT)
Dept: CARDIOLOGY | Facility: CLINIC | Age: 64
End: 2024-01-18
Payer: COMMERCIAL

## 2024-01-30 NOTE — TELEPHONE ENCOUNTER
Central Prior Authorization Team   Phone: [phone]    PA Initiation    Medication: Repatha 140mg/ml   Insurance Company: [employer] - Phone [phone] Fax [phone]  Pharmacy Filling the Rx: [facility] - [place], [place] - [address]  Filling Pharmacy Phone: [phone]  Filling Pharmacy Fax:    Start Date: 1/30/2024

## 2024-01-31 NOTE — TELEPHONE ENCOUNTER
Prior Authorization Approval    Authorization Effective Date: 1/30/2024  Authorization Expiration Date: 1/30/2025  Medication: Repatha 140mg/ml   Approved Dose/Quantity:   Reference #:     Insurance Company: [employer] - Phone [phone] Fax [phone]  Expected CoPay:       CoPay Card Available:      Foundation Assistance Needed:    Which Pharmacy is filling the prescription (Not needed for infusion/clinic administered): [facility] - [place], [place] - [facility] [address]  Pharmacy Notified:  yes  Patient Notified:  yes- Pharmacy will contact patient when ready to /ship

## 2024-02-01 ENCOUNTER — TELEPHONE (OUTPATIENT)
Dept: ANTICOAGULATION | Facility: CLINIC | Age: 64
End: 2024-02-01
Payer: COMMERCIAL

## 2024-02-01 NOTE — TELEPHONE ENCOUNTER
ANTICOAGULATION     Oswaldo Prabhakar is overdue for an INR check.     Spoke with Oswaldo and scheduled lab appointment on 2/6/24.    Marielena Chacon RN   none

## 2024-02-06 ENCOUNTER — LAB (OUTPATIENT)
Dept: LAB | Facility: CLINIC | Age: 64
End: 2024-02-06
Payer: COMMERCIAL

## 2024-02-06 ENCOUNTER — ANTICOAGULATION THERAPY VISIT (OUTPATIENT)
Dept: ANTICOAGULATION | Facility: CLINIC | Age: 64
End: 2024-02-06

## 2024-02-06 DIAGNOSIS — I27.82 CHRONIC PULMONARY EMBOLISM WITHOUT ACUTE COR PULMONALE, UNSPECIFIED PULMONARY EMBOLISM TYPE (H): ICD-10-CM

## 2024-02-06 DIAGNOSIS — Z79.01 LONG TERM CURRENT USE OF ANTICOAGULANT THERAPY: ICD-10-CM

## 2024-02-06 DIAGNOSIS — Z13.1 SCREENING FOR DIABETES MELLITUS: ICD-10-CM

## 2024-02-06 DIAGNOSIS — I27.82 CHRONIC PULMONARY EMBOLISM WITHOUT ACUTE COR PULMONALE, UNSPECIFIED PULMONARY EMBOLISM TYPE (H): Primary | ICD-10-CM

## 2024-02-06 LAB
HBA1C MFR BLD: 6.9 %
INR PPP: 2.22 (ref 0.85–1.15)

## 2024-02-06 PROCEDURE — 83036 HEMOGLOBIN GLYCOSYLATED A1C: CPT | Performed by: INTERNAL MEDICINE

## 2024-02-06 PROCEDURE — 85610 PROTHROMBIN TIME: CPT | Performed by: PATHOLOGY

## 2024-02-06 PROCEDURE — 99000 SPECIMEN HANDLING OFFICE-LAB: CPT | Performed by: PATHOLOGY

## 2024-02-06 PROCEDURE — 36415 COLL VENOUS BLD VENIPUNCTURE: CPT | Performed by: PATHOLOGY

## 2024-02-06 NOTE — Clinical Note
Potential access sites were evaluated for patency using ultrasound.   The left radial artery was selected. Access was obtained under with Sonosite and Fluoroscopic guidance using a micropuncture 21 guage needle with direct visualization of needle entry.      with patient

## 2024-02-06 NOTE — PROGRESS NOTES
Outpatient Physical Therapy  DAILY TREATMENT     [facility] [address]  [address]  [place] [address]  Phone:  [phone]  Fax:  [phone]    Date: 02/21/2018    Patient: Guillermina Recio  YOB: 1936  MRN: [id number]     Time Calculation  Start time: 0245  Stop time: 0332       Chief Complaint: shoulder pain  Visit #: 7    SUBJECTIVE:  No new c/o    OBJECTIVE:    Therapeutic Exercises (CPT 51960):     1. Nustep L3 13 minutes    2. Pulleys 3 minutes     3. Supine cane flexion     4. Mid rows and pull downs with orange tband     5. Hs curls    6. Bridging     7. Shuttle 4c, 30x    Therapeutic Treatments and Modalities:     1. Manual Therapy (CPT 69991), c/s manual traction, upper trap stw, g/h obs    2. E Stim Unattended (CPT 38363), ifc and MHP to right shoulder and c/s     Time-based treatments/modalities:  Manual therapy minutes (CPT 40242): 15 minutes  Therapeutic exercise minutes (CPT 10888): 13 minutes  ASSESSMENT:   Response to treatment: mild soreness in shoulders.     PLAN/RECOMMENDATIONS:     Continue to progress core and postural exercises,      ANTICOAGULATION MANAGEMENT     Oswaldo Prabhakar 63 year old male is on warfarin with therapeutic INR result. (Goal INR 2.0-3.0)    Recent labs: (last 7 days)     02/06/24  1316   INR 2.22*       ASSESSMENT     Source(s): Chart Review  Previous INR was Subtherapeutic  Medication, diet, health changes since last INR chart reviewed; none identified         PLAN     Recommended plan for no diet, medication or health factor changes affecting INR     Dosing Instructions: Continue your current warfarin dose with next INR in 2 weeks       Summary  As of 2/6/2024      Full warfarin instructions:  7.5 mg every Mon, Thu; 5 mg all other days   Next INR check:  2/20/2024               Detailed voice message left for Oswaldo with dosing instructions and follow up date.     Contact [phone] to schedule and with any changes, questions or concerns.     Education provided:   Please call back if any changes to your diet, medications or how you've been taking warfarin  Goal range and lab monitoring: goal range and significance of current result, Importance of therapeutic range, and Importance of following up at instructed interval  Interaction IS NOT anticipated between warfarin and eyedrop changes.  Importance of notifying anticoagulation clinic for: health changes.    Plan made per [facility] anticoagulation protocol    Argenis Briones RN  Anticoagulation Clinic  2/6/2024    _______________________________________________________________________     Anticoagulation Episode Summary       Current INR goal:  2.0-3.0   TTR:  77.1% (7.4 mo)   Target end date:  Indefinite   Send INR reminders to:  [facility]    Indications    Pulmonary emboli (H) [I26.99]  Long-term (current) use of anticoagulants [Z79.01] [Z79.01]  Chronic pulmonary embolism without acute cor pulmonale  unspecified pulmonary embolism type (H) [I27.82]             Comments:               Anticoagulation Care Providers       Provider Role Specialty Phone number    Sick  Samm BHARDWAJ MD Referring Internal Medicine - Pediatrics [phone]

## 2024-02-13 ENCOUNTER — TELEPHONE (OUTPATIENT)
Dept: INTERNAL MEDICINE | Facility: CLINIC | Age: 64
End: 2024-02-13

## 2024-02-13 NOTE — TELEPHONE ENCOUNTER
Called patient and lvm- he needs to     Read his mychart  Make a visit with Dr. Vazquez (virtual ok)  Let me know what questions he has or if needs me to go though results with him  Donte Lopez RN on 2/13/2024 at 9:58 AM

## 2024-02-21 ENCOUNTER — HOSPITAL ENCOUNTER (OUTPATIENT)
Facility: CLINIC | Age: 64
Setting detail: OBSERVATION
Discharge: HOME | End: 2024-02-22
Attending: EMERGENCY MEDICINE | Admitting: INTERNAL MEDICINE
Payer: COMMERCIAL

## 2024-02-21 ENCOUNTER — APPOINTMENT (OUTPATIENT)
Dept: CARDIOLOGY | Facility: CLINIC | Age: 64
End: 2024-02-21
Attending: NURSE PRACTITIONER
Payer: COMMERCIAL

## 2024-02-21 ENCOUNTER — APPOINTMENT (OUTPATIENT)
Dept: CT IMAGING | Facility: CLINIC | Age: 64
End: 2024-02-21
Attending: EMERGENCY MEDICINE
Payer: COMMERCIAL

## 2024-02-21 DIAGNOSIS — I20.0 UNSTABLE ANGINA (H): ICD-10-CM

## 2024-02-21 LAB
ABO/RH(D): NORMAL
ALBUMIN SERPL BCG-MCNC: 4.5 G/DL (ref 3.5–5.2)
ALP SERPL-CCNC: 49 U/L (ref 40–150)
ALT SERPL W P-5'-P-CCNC: 18 U/L (ref 0–70)
ANION GAP SERPL CALCULATED.3IONS-SCNC: 10 MMOL/L (ref 7–15)
ANTIBODY SCREEN: NEGATIVE
AST SERPL W P-5'-P-CCNC: 29 U/L (ref 0–45)
ATRIAL RATE - MUSE: 65 BPM
ATRIAL RATE - MUSE: 67 BPM
BASOPHILS # BLD AUTO: 0.1 10E3/UL (ref 0–0.2)
BASOPHILS NFR BLD AUTO: 1 %
BILIRUB SERPL-MCNC: 0.5 MG/DL
BUN SERPL-MCNC: 14.6 MG/DL (ref 8–23)
CALCIUM SERPL-MCNC: 10.3 MG/DL (ref 8.8–10.2)
CHLORIDE SERPL-SCNC: 104 MMOL/L (ref 98–107)
CREAT SERPL-MCNC: 1.21 MG/DL (ref 0.67–1.17)
DEPRECATED HCO3 PLAS-SCNC: 25 MMOL/L (ref 22–29)
DIASTOLIC BLOOD PRESSURE - MUSE: NORMAL MMHG
DIASTOLIC BLOOD PRESSURE - MUSE: NORMAL MMHG
EGFRCR SERPLBLD CKD-EPI 2021: 68 ML/MIN/1.73M2
EOSINOPHIL # BLD AUTO: 0.4 10E3/UL (ref 0–0.7)
EOSINOPHIL NFR BLD AUTO: 5 %
ERYTHROCYTE [DISTWIDTH] IN BLOOD BY AUTOMATED COUNT: 13.8 % (ref 10–15)
GLUCOSE SERPL-MCNC: 117 MG/DL (ref 70–99)
HCT VFR BLD AUTO: 45.4 % (ref 40–53)
HGB BLD-MCNC: 14.2 G/DL (ref 13.3–17.7)
HOLD SPECIMEN: NORMAL
IMM GRANULOCYTES # BLD: 0 10E3/UL
IMM GRANULOCYTES NFR BLD: 0 %
INR PPP: 2.58 (ref 0.85–1.15)
INTERPRETATION ECG - MUSE: NORMAL
INTERPRETATION ECG - MUSE: NORMAL
LIPASE SERPL-CCNC: 53 U/L (ref 13–60)
LVEF ECHO: NORMAL
LYMPHOCYTES # BLD AUTO: 1.6 10E3/UL (ref 0.8–5.3)
LYMPHOCYTES NFR BLD AUTO: 21 %
MAGNESIUM SERPL-MCNC: 3.1 MG/DL (ref 1.7–2.3)
MCH RBC QN AUTO: 26.4 PG (ref 26.5–33)
MCHC RBC AUTO-ENTMCNC: 31.3 G/DL (ref 31.5–36.5)
MCV RBC AUTO: 84 FL (ref 78–100)
MONOCYTES # BLD AUTO: 0.6 10E3/UL (ref 0–1.3)
MONOCYTES NFR BLD AUTO: 7 %
NEUTROPHILS # BLD AUTO: 5.2 10E3/UL (ref 1.6–8.3)
NEUTROPHILS NFR BLD AUTO: 66 %
NRBC # BLD AUTO: 0 10E3/UL
NRBC BLD AUTO-RTO: 0 /100
NT-PROBNP SERPL-MCNC: 36 PG/ML (ref 0–900)
P AXIS - MUSE: -2 DEGREES
P AXIS - MUSE: -6 DEGREES
PLATELET # BLD AUTO: 341 10E3/UL (ref 150–450)
POTASSIUM SERPL-SCNC: 4.5 MMOL/L (ref 3.4–5.3)
PR INTERVAL - MUSE: 136 MS
PR INTERVAL - MUSE: 144 MS
PROT SERPL-MCNC: 8 G/DL (ref 6.4–8.3)
QRS DURATION - MUSE: 82 MS
QRS DURATION - MUSE: 86 MS
QT - MUSE: 386 MS
QT - MUSE: 398 MS
QTC - MUSE: 401 MS
QTC - MUSE: 420 MS
R AXIS - MUSE: 24 DEGREES
R AXIS - MUSE: 40 DEGREES
RBC # BLD AUTO: 5.38 10E6/UL (ref 4.4–5.9)
SARS-COV-2 RNA RESP QL NAA+PROBE: NEGATIVE
SODIUM SERPL-SCNC: 139 MMOL/L (ref 135–145)
SPECIMEN EXPIRATION DATE: NORMAL
SYSTOLIC BLOOD PRESSURE - MUSE: NORMAL MMHG
SYSTOLIC BLOOD PRESSURE - MUSE: NORMAL MMHG
T AXIS - MUSE: 66 DEGREES
T AXIS - MUSE: 77 DEGREES
TROPONIN T SERPL HS-MCNC: 10 NG/L
TROPONIN T SERPL HS-MCNC: 10 NG/L
TROPONIN T SERPL HS-MCNC: 11 NG/L
VENTRICULAR RATE- MUSE: 65 BPM
VENTRICULAR RATE- MUSE: 67 BPM
WBC # BLD AUTO: 7.8 10E3/UL (ref 4–11)

## 2024-02-21 PROCEDURE — 250N000013 HC RX MED GY IP 250 OP 250 PS 637: Performed by: NURSE PRACTITIONER

## 2024-02-21 PROCEDURE — 93005 ELECTROCARDIOGRAM TRACING: CPT | Performed by: EMERGENCY MEDICINE

## 2024-02-21 PROCEDURE — 99285 EMERGENCY DEPT VISIT HI MDM: CPT | Mod: 25 | Performed by: EMERGENCY MEDICINE

## 2024-02-21 PROCEDURE — 36415 COLL VENOUS BLD VENIPUNCTURE: CPT | Performed by: NURSE PRACTITIONER

## 2024-02-21 PROCEDURE — 85610 PROTHROMBIN TIME: CPT | Performed by: EMERGENCY MEDICINE

## 2024-02-21 PROCEDURE — 83735 ASSAY OF MAGNESIUM: CPT | Performed by: EMERGENCY MEDICINE

## 2024-02-21 PROCEDURE — 99418 PROLNG IP/OBS E/M EA 15 MIN: CPT | Mod: 25 | Performed by: NURSE PRACTITIONER

## 2024-02-21 PROCEDURE — 250N000013 HC RX MED GY IP 250 OP 250 PS 637: Performed by: INTERNAL MEDICINE

## 2024-02-21 PROCEDURE — 93010 ELECTROCARDIOGRAM REPORT: CPT | Mod: 76 | Performed by: EMERGENCY MEDICINE

## 2024-02-21 PROCEDURE — C8929 TTE W OR WO FOL WCON,DOPPLER: HCPCS

## 2024-02-21 PROCEDURE — 86900 BLOOD TYPING SEROLOGIC ABO: CPT | Performed by: EMERGENCY MEDICINE

## 2024-02-21 PROCEDURE — 71275 CT ANGIOGRAPHY CHEST: CPT | Mod: 26 | Performed by: RADIOLOGY

## 2024-02-21 PROCEDURE — 83880 ASSAY OF NATRIURETIC PEPTIDE: CPT | Performed by: EMERGENCY MEDICINE

## 2024-02-21 PROCEDURE — 87635 SARS-COV-2 COVID-19 AMP PRB: CPT | Performed by: NURSE PRACTITIONER

## 2024-02-21 PROCEDURE — 93010 ELECTROCARDIOGRAM REPORT: CPT | Performed by: EMERGENCY MEDICINE

## 2024-02-21 PROCEDURE — 93306 TTE W/DOPPLER COMPLETE: CPT | Mod: 26 | Performed by: INTERNAL MEDICINE

## 2024-02-21 PROCEDURE — 71275 CT ANGIOGRAPHY CHEST: CPT

## 2024-02-21 PROCEDURE — 85025 COMPLETE CBC W/AUTO DIFF WBC: CPT | Performed by: EMERGENCY MEDICINE

## 2024-02-21 PROCEDURE — 250N000013 HC RX MED GY IP 250 OP 250 PS 637: Performed by: EMERGENCY MEDICINE

## 2024-02-21 PROCEDURE — 36415 COLL VENOUS BLD VENIPUNCTURE: CPT | Performed by: EMERGENCY MEDICINE

## 2024-02-21 PROCEDURE — 84484 ASSAY OF TROPONIN QUANT: CPT | Performed by: NURSE PRACTITIONER

## 2024-02-21 PROCEDURE — 255N000002 HC RX 255 OP 636: Performed by: INTERNAL MEDICINE

## 2024-02-21 PROCEDURE — 120N000002 HC R&B MED SURG/OB UMMC

## 2024-02-21 PROCEDURE — 84484 ASSAY OF TROPONIN QUANT: CPT | Performed by: EMERGENCY MEDICINE

## 2024-02-21 PROCEDURE — 80053 COMPREHEN METABOLIC PANEL: CPT | Performed by: EMERGENCY MEDICINE

## 2024-02-21 PROCEDURE — 83690 ASSAY OF LIPASE: CPT | Performed by: EMERGENCY MEDICINE

## 2024-02-21 PROCEDURE — 250N000011 HC RX IP 250 OP 636: Performed by: RADIOLOGY

## 2024-02-21 PROCEDURE — 999N000208 ECHOCARDIOGRAM COMPLETE

## 2024-02-21 PROCEDURE — 93005 ELECTROCARDIOGRAM TRACING: CPT | Mod: 76 | Performed by: EMERGENCY MEDICINE

## 2024-02-21 PROCEDURE — 99223 1ST HOSP IP/OBS HIGH 75: CPT | Mod: 25 | Performed by: NURSE PRACTITIONER

## 2024-02-21 RX ORDER — VITAMIN B COMPLEX
2000 TABLET ORAL EVERY EVENING
Status: DISCONTINUED | OUTPATIENT
Start: 2024-02-21 | End: 2024-02-22 | Stop reason: HOSPADM

## 2024-02-21 RX ORDER — ATORVASTATIN CALCIUM 40 MG/1
80 TABLET, FILM COATED ORAL DAILY
Status: DISCONTINUED | OUTPATIENT
Start: 2024-02-22 | End: 2024-02-22 | Stop reason: HOSPADM

## 2024-02-21 RX ORDER — AMOXICILLIN 250 MG
2 CAPSULE ORAL 2 TIMES DAILY PRN
Status: DISCONTINUED | OUTPATIENT
Start: 2024-02-21 | End: 2024-02-22 | Stop reason: HOSPADM

## 2024-02-21 RX ORDER — PREDNISOLONE ACETATE 10 MG/ML
1-2 SUSPENSION/ DROPS OPHTHALMIC
Status: DISCONTINUED | OUTPATIENT
Start: 2024-02-21 | End: 2024-02-22

## 2024-02-21 RX ORDER — TIMOLOL MALEATE 5 MG/ML
1 SOLUTION/ DROPS OPHTHALMIC 2 TIMES DAILY
Status: DISCONTINUED | OUTPATIENT
Start: 2024-02-21 | End: 2024-02-22

## 2024-02-21 RX ORDER — ONDANSETRON 4 MG/1
4 TABLET, ORALLY DISINTEGRATING ORAL EVERY 8 HOURS PRN
Status: DISCONTINUED | OUTPATIENT
Start: 2024-02-21 | End: 2024-02-22 | Stop reason: HOSPADM

## 2024-02-21 RX ORDER — LATANOPROST 50 UG/ML
1 SOLUTION/ DROPS OPHTHALMIC AT BEDTIME
Status: DISCONTINUED | OUTPATIENT
Start: 2024-02-21 | End: 2024-02-22

## 2024-02-21 RX ORDER — CALCIUM CARBONATE 500 MG/1
500 TABLET, CHEWABLE ORAL DAILY PRN
Status: DISCONTINUED | OUTPATIENT
Start: 2024-02-21 | End: 2024-02-22 | Stop reason: HOSPADM

## 2024-02-21 RX ORDER — POLYETHYLENE GLYCOL 3350 17 G/17G
17 POWDER, FOR SOLUTION ORAL DAILY PRN
Status: DISCONTINUED | OUTPATIENT
Start: 2024-02-21 | End: 2024-02-22 | Stop reason: HOSPADM

## 2024-02-21 RX ORDER — ACETAMINOPHEN 325 MG/1
650 TABLET ORAL EVERY 4 HOURS PRN
Status: DISCONTINUED | OUTPATIENT
Start: 2024-02-21 | End: 2024-02-22 | Stop reason: HOSPADM

## 2024-02-21 RX ORDER — LIDOCAINE 40 MG/G
CREAM TOPICAL
Status: DISCONTINUED | OUTPATIENT
Start: 2024-02-21 | End: 2024-02-22 | Stop reason: HOSPADM

## 2024-02-21 RX ORDER — MAGNESIUM HYDROXIDE/ALUMINUM HYDROXICE/SIMETHICONE 120; 1200; 1200 MG/30ML; MG/30ML; MG/30ML
30 SUSPENSION ORAL EVERY 4 HOURS PRN
Status: DISCONTINUED | OUTPATIENT
Start: 2024-02-21 | End: 2024-02-22 | Stop reason: HOSPADM

## 2024-02-21 RX ORDER — NITROGLYCERIN 0.4 MG/1
0.4 TABLET SUBLINGUAL EVERY 5 MIN PRN
Status: DISCONTINUED | OUTPATIENT
Start: 2024-02-21 | End: 2024-02-22 | Stop reason: HOSPADM

## 2024-02-21 RX ORDER — NITROGLYCERIN 0.4 MG/1
0.4 TABLET SUBLINGUAL ONCE
Status: COMPLETED | OUTPATIENT
Start: 2024-02-21 | End: 2024-02-21

## 2024-02-21 RX ORDER — FENOFIBRATE 160 MG/1
160 TABLET ORAL DAILY
Status: DISCONTINUED | OUTPATIENT
Start: 2024-02-22 | End: 2024-02-22 | Stop reason: HOSPADM

## 2024-02-21 RX ORDER — IOPAMIDOL 755 MG/ML
73 INJECTION, SOLUTION INTRAVASCULAR ONCE
Status: COMPLETED | OUTPATIENT
Start: 2024-02-21 | End: 2024-02-21

## 2024-02-21 RX ORDER — BRIMONIDINE TARTRATE 2 MG/ML
1 SOLUTION/ DROPS OPHTHALMIC 3 TIMES DAILY
Status: DISCONTINUED | OUTPATIENT
Start: 2024-02-21 | End: 2024-02-22

## 2024-02-21 RX ORDER — DORZOLAMIDE HYDROCHLORIDE AND TIMOLOL MALEATE 20; 5 MG/ML; MG/ML
1 SOLUTION/ DROPS OPHTHALMIC 2 TIMES DAILY
Status: DISCONTINUED | OUTPATIENT
Start: 2024-02-21 | End: 2024-02-22

## 2024-02-21 RX ORDER — ACETAMINOPHEN 500 MG
1000 TABLET ORAL 3 TIMES DAILY PRN
Status: DISCONTINUED | OUTPATIENT
Start: 2024-02-21 | End: 2024-02-22 | Stop reason: HOSPADM

## 2024-02-21 RX ORDER — EZETIMIBE 10 MG/1
10 TABLET ORAL DAILY
Status: DISCONTINUED | OUTPATIENT
Start: 2024-02-22 | End: 2024-02-22 | Stop reason: HOSPADM

## 2024-02-21 RX ORDER — ACETAMINOPHEN 650 MG/1
650 SUPPOSITORY RECTAL EVERY 4 HOURS PRN
Status: DISCONTINUED | OUTPATIENT
Start: 2024-02-21 | End: 2024-02-22 | Stop reason: HOSPADM

## 2024-02-21 RX ORDER — BISACODYL 10 MG
10 SUPPOSITORY, RECTAL RECTAL DAILY PRN
Status: DISCONTINUED | OUTPATIENT
Start: 2024-02-21 | End: 2024-02-22 | Stop reason: HOSPADM

## 2024-02-21 RX ORDER — LISINOPRIL 2.5 MG/1
2.5 TABLET ORAL DAILY
Status: DISCONTINUED | OUTPATIENT
Start: 2024-02-22 | End: 2024-02-22 | Stop reason: HOSPADM

## 2024-02-21 RX ORDER — AMOXICILLIN 250 MG
1 CAPSULE ORAL 2 TIMES DAILY PRN
Status: DISCONTINUED | OUTPATIENT
Start: 2024-02-21 | End: 2024-02-22 | Stop reason: HOSPADM

## 2024-02-21 RX ADMIN — NITROGLYCERIN 0.4 MG: 0.4 TABLET SUBLINGUAL at 09:56

## 2024-02-21 RX ADMIN — HUMAN ALBUMIN MICROSPHERES AND PERFLUTREN 6 ML: 10; .22 INJECTION, SOLUTION INTRAVENOUS at 15:09

## 2024-02-21 RX ADMIN — TICAGRELOR 90 MG: 90 TABLET ORAL at 20:15

## 2024-02-21 RX ADMIN — TICAGRELOR 90 MG: 90 TABLET ORAL at 09:55

## 2024-02-21 RX ADMIN — Medication 2000 UNITS: at 20:15

## 2024-02-21 RX ADMIN — IOPAMIDOL 73 ML: 755 INJECTION, SOLUTION INTRAVENOUS at 11:48

## 2024-02-21 RX ADMIN — Medication 12.5 MG: at 20:15

## 2024-02-21 ASSESSMENT — ACTIVITIES OF DAILY LIVING (ADL)
ADLS_ACUITY_SCORE: 35

## 2024-02-21 NOTE — H&P
[facility]   Cardiology Service  History and Physical      Oswaldo Prabhakar MRN# [id number]   YOB: 1960 Age: 63 year old       Admission Date: 2/21/2024      Assessment and plan:   Oswaldo Prabhakar is a 63 year old male with a history of coronary artery disease s/p CABG x 4, hyperlipidemia, type 2 diabetes mellitus, chronic PE on Coumadin.     CAD s/p CABG X 4 2014, NIR prox/mid LAD 2020  HTN  HLD  Patient presents with sudden onset chest pressure while at work. Associated symptoms of weakness, dizziness and nausea. Pain described as pressure/heaviness in central chest, nonradiating. Pain lasted roughly 30 minutes prior to patient walking from [facility] (where he works) to [facility]. On arrival, hemodynamically stable, nitroglycerin given with minimal effect on pain. Pain resolved on its own ~ 1 hour after initiation. EKG without acute ST changes, initial troponin negative and BNP negative.   - Trend troponin X 3 to ensure negative. If begins to rise, would trend to peak  - TTE to assess EF/WMA  - NPO @ midnight for possible invasive procedure  - Continue PTA Brilinta 90 mg BID  - Continue Atorvastatin 80 mg daily + Zetia 10 mg daily  - Continue Lisinopril 2.5 mg daily  - Continue Metoprolol 12.5 mg BID    Pulmonary emoblism and infarction s/p hemothorax and filter s/p filter removal 2011  - Hold PTA Warfarin for possible coronary intervention tomorrow (INR goal 2-3)  - Daily CBC and INR    5. Spontaneous retinal detachment right eye  6. Chronic uveitis and neovascularization right eye  Spontaneous RD as a child, follows with ophthalmology   - Continue current regimen:   Eye drops RIGHT EYE  Cosopt 2 times a day  Brimonidine 3 times a day  Latanoprost at bedtime    FEN: 2 g Na  Code status: FULL  Prophylaxis:  ambulation  Isolation: none  Disposition: possibly tomorrow    Clinically Significant Risk Factors Present on Admission          # Hypercalcemia: Highest Ca = 10.3 mg/dL in last  2 days, will monitor as appropriate      # Drug Induced Coagulation Defect: home medication list includes an anticoagulant medication  # Drug Induced Platelet Defect: home medication list includes an antiplatelet medication   # DMII: A1C = 6.9 % (Ref range: <5.7 %) within past 6 months      Patient discussed with Dr. Keagan ARVIZU CNP  [facility]  [phone]    Medical Decision Making       70 MINUTES SPENT BY ME on the date of service doing chart review, history, exam, documentation & further activities per the note.        Chief Complaint: chest tightness    HPI:   Oswaldo Prabhakar is a 63 year old male with a history of coronary artery disease s/p CABG x 4, hyperlipidemia, type 2 diabetes mellitus, chronic PE on Coumadin.     Patient presents with sudden onset chest pressure while at work. Associated symptoms of weakness, dizziness and nausea. Pain described as pressure/heaviness in central chest, nonradiating. Pain lasted roughly 30 minutes prior to patient walking from [facility] (where he works) to [facility]. On arrival, hemodynamically stable, nitroglycerin given with minimal effect on pain. Pain resolved on its own ~ 1 hour after initiation. EKG without acute ST changes, initial troponin negative and BNP negative.     On exam, patient is pain free. Pain is not reproducible, does not begin with leaning forward or flat or with a deep breath. Plan to admit and trend troponins and obtain an echocardiogram. If these are unremarkable, no need for further cardiac intervention while admitted. If it is abnormal, low threshold for coronary angiogram in AM. This plan was discussed with patient and staff cardiologist who are in agreement.     Past Medical History:   Diagnosis Date    Antiplatelet or antithrombotic long-term use     Coronary artery disease     Diaphragmatic hernia without mention of obstruction or gangrene     Heart disease     Hernia, abdominal     History of blood transfusion     History of  thrombophlebitis     Hypertension     Nephrolithiasis 4/2010    Other and unspecified hyperlipidemia     Pulmonary embolus and infarction (H)     s/p hemothorax and filter s/p filter removal (2011), now on long term anti-coagulation    Statin intolerance 2/1/2017    Stented coronary artery 01/07/2020    NIR mid LAD    Unspecified retinal detachment     Childhood trauma, unrepaired       Past Surgical History:   Procedure Laterality Date    BYPASS GRAFT ARTERY CORONARY  04/04/2014    Procedure: BYPASS GRAFT ARTERY CORONARY;  Median Sternotomy, Coronary Artery Bypass Bypass x 4 using Left Internal Mammary, Left Saphenous Vein, on pump oxygenation;  Surgeon: Sherry Armando MD;  Location: [facility] OR    CATARACT IOL, RT/LT      CLOSED REDUCTION, PERCUTANEOUS PINNING  HAND, COMBINED Left 11/05/2015    Procedure: COMBINED CLOSED REDUCTION, PERCUTANEOUS PINNING HAND;  Surgeon: Carmella Love MD;  [facility]    COLONOSCOPY  03/15/2013    Procedure: COLONOSCOPY;;  Surgeon: Racheal Shipman MD;  Location: [facility] GI    COMBINED CYSTOSCOPY, INSERT STENT URETER(S) Right 01/10/2020    Procedure: Cystoscopy, right retrograde pyelogram, interpretation of fluoroscopic images, placement of 6 x 26 double-J ureteral stent;  Surgeon: Nguyễn Woodard MD;  Location: [facility] OR    COMBINED CYSTOSCOPY, RETROGRADES, URETEROSCOPY, LASER HOLMIUM LITHOTRIPSY URETER(S), INSERT STENT Right 02/05/2020    Procedure: Cystoscopy, right ureteral stent exchange, right ureteroscopy with holmium lithotripsy and stone basketing, right retrograde pyelogram, interpretation of fluoroscopic images.;  Surgeon: Nguyễn Woodard MD;  Location: [facility] OR    CV ANGIOGRAM CORONARY GRAFT N/A 01/07/2020    Procedure: Angiogram Coronary Graft;  Surgeon: Chato Odonnell MD;  Location:  [facility] CARDIAC CATH LAB    CV CORONARY ANGIOGRAM  01/07/2020    Procedure: CV CORONARY ANGIOGRAM;  Surgeon: Chato Odonnell MD;   Location:  HE[facility] CARDIAC CATH LAB    CV PCI STENT DRUG ELUTING N/A 01/07/2020    Procedure: PCI Stent Drug Eluting;  Surgeon: Chato Odonnell MD;  Location: [facility] CARDIAC C[facility] LAB    CYCLOPHOTOCOAGULATION TRANSSCLERAL WITH MICROPULSE LASER Right 5/17/2023    Procedure: RIGHT EYE CYCLOPHOTOCOAGULATION TRANSSCLERAL WITH Gprobe LASER;  Surgeon: Jay Jay Savage MD;  Location: [facility] OR    CYCLOPHOTOCOAGULATION TRANSSCLERAL WITH MICROPULSE LASER Right 10/25/2023    Procedure: RIGHT EYE CYCLOPHOTOCOAGULATION TRANSSCLERAL WITH GPROBE LASER;  Surgeon: Jay Jay Savage MD;  Location: [facility] OR    CYSTOSCOPY      ESOPHAGOSCOPY, GASTROSCOPY, DUODENOSCOPY (EGD), COMBINED N/A 12/9/2022    Procedure: ESOPHAGOGASTRODUODENOSCOPY (EGD);  Surgeon: Beata George MD;  Location:  GI    LAPAROSCOPIC CHOLECYSTECTOMY N/A 08/06/2018    Procedure: LAPAROSCOPIC CHOLECYSTECTOMY;  LAPAROSCOPIC CHOLECYSTECTOMY ;  Surgeon: José Miguel Stuart MD;  Location: [facility] OR    LITHOTRIPSY  04/2010    RETINAL REATTACHMENT      THORACIC SURGERY      lung surgery, thoracotomy, lung adhesion    ZZC NONSPECIFIC PROCEDURE      Spermatocele resection       bevacizumab (AVASTIN) intravitreal inj 1.25 mg    acetaminophen (TYLENOL) 500 MG tablet, Take 2 tablets (1,000 mg) by mouth 3 times daily as needed for pain  atorvastatin (LIPITOR) 80 MG tablet, Take 1 tablet (80 mg) by mouth daily  brimonidine (ALPHAGAN) 0.2 % ophthalmic solution, Place 1 drop into the right eye 3 times daily  calcium carbonate (TUMS) 500 MG chewable tablet, Take 1 chew tab by mouth daily as needed   Cholecalciferol (VITAMIN D) 2000 UNITS CAPS, Take 2,000 Units by mouth daily  dorzolamide-timolol (COSOPT) 2-0.5 % ophthalmic solution, Place 1 drop into the right eye 2 times daily  evolocumab (REPATHA SURECLICK) 140 MG/ML prefilled autoinjector, Inject 1 mL (140 mg) Subcutaneous every 14 days  ezetimibe (ZETIA) 10 MG tablet, Take 1 tablet (10 mg) by mouth  daily  fenofibrate (TRIGLIDE/LOFIBRA) 160 MG tablet, Take 1 tablet (160 mg) by mouth daily  latanoprost (XALATAN) 0.005 % ophthalmic solution, Place 1 drop into the right eye At Bedtime  lisinopril (ZESTRIL) 2.5 MG tablet, Take 1 tablet (2.5 mg) by mouth daily  metoprolol tartrate (LOPRESSOR) 25 MG tablet, Take 0.5 tablets (12.5 mg) by mouth 2 times daily  OMEPRAZOLE PO, Take 20 mg by mouth   ondansetron (ZOFRAN-ODT) 4 MG ODT tab, Take 1 tablet (4 mg) by mouth every 8 hours as needed for nausea  prednisoLONE acetate (PRED FORTE) 1 % ophthalmic suspension, Place 1-2 drops into the right eye every 2 hours  reason aspirin not prescribed, intentional,, Please choose reason not prescribed from choices below.  reason aspirin not prescribed, intentional,, Please choose reason not prescribed from choices below.  ticagrelor (BRILINTA) 90 MG tablet, Take 1 tablet (90 mg) by mouth 2 times daily  timolol maleate (TIMOPTIC) 0.5 % ophthalmic solution, Place 1 drop into the right eye 2 times daily  warfarin ANTICOAGULANT (COUMADIN) 5 MG tablet, Take 1.5 tablets (7.5 mg) on Mondays and Thursdays and take 1 tablet (5 mg) all other days or as directed by the INR clinic        Family History   Problem Relation Age of Onset    Heart Disease Mother     Glaucoma Father     C.A.D. Father         3 vessel CABG, age 70    Cancer Father         bladder cancer    C.A.D. Paternal Grandmother     Hypertension Paternal Grandmother     Alzheimer Disease Paternal Grandmother     Diabetes No family hx of     Thyroid Disease No family hx of     Cancer - colorectal No family hx of        Social History     Tobacco Use    Smoking status: Never    Smokeless tobacco: Never   Substance Use Topics    Alcohol use: No     Comment: very rare       Allergies   Allergen Reactions    Cats     Hay Fever & [A.R.M.]     Heparin Other (See Comments)     Heparin resistance per cardio-thoracic surgeon Dr. Armando    Hydrocodone-Acetaminophen Nausea and Vomiting      Acetaminophen is ok         ROS:   CONSTITUTIONAL:No report of fevers or chills  RESPIRATORY: No cough, wheezing, SOB, or hemoptysis  CARDIOVASCULAR: see HPI  MUSCULO-SKELETAL: No joint pain/swelling, no muscle pain  NEURO: No paresthesias, syncope, pre-syncope, lightheadness, dizziness or vertigo  ENDOCRINE: No temperature intolerance, no skin/hair changes  PSYCHIATRIC: No change in mood, feeling down/anxious, no change in sleep or appetite  GI: no melena or hematochezia, no change in bowel habits  : no hematuria or dysuria, no hesitancy, dribbling or incontinence  HEME: no easy bruising or bleeding, no history of anemia, no history of blood clots  SKIN: no rashes or sores, no unusual itching      Physical Examination:  Vitals: /81   Pulse 58   Temp 98.3  F (36.8  C) (Oral)   Resp 16   SpO2 96%   BMI= There is no height or weight on file to calculate BMI.    GENERAL APPEARANCE: healthy, alert and no distress  HEENT: Normocephalic, atraumatic. Sclera clear. No xanthelasmas. normal pupil size and reaction, normal palate, mucosa moist  NECK: JVP not eleavted  CHEST: Normal work of breathing. Lungs clear to auscultation without rales, rhonchi or wheezes, no use of accessory muscles, no retractions  CARDIOVASCULAR: regular rhythm, normal S1 and S2, no S3 or S4 and no murmur, click or rub.  ABDOMEN: soft, non tender, without hepatosplenomegaly, no masses palpable, bowel sounds normal  EXTREMITIES: warm, no edema, DP/PT pulses 2+ bilaterally, no clubbing or cyanosis   NEURO: alert and oriented to person/place/time, normal speech  PSYCH: Mood and affect are appropriate  SKIN: no ecchymoses, no rashes      Laboratory:  CMP  Recent Labs   Lab 02/21/24  0946      POTASSIUM 4.5   CHLORIDE 104   CO2 25   ANIONGAP 10   *   BUN 14.6   CR 1.21*   GFRESTIMATED 68   GONZALES 10.3*   MAG 3.1*   PROTTOTAL 8.0   ALBUMIN 4.5   BILITOTAL 0.5   ALKPHOS 49   AST 29   ALT 18     CBC  Recent Labs   Lab 02/21/24  0946    WBC 7.8   RBC 5.38   HGB 14.2   HCT 45.4   MCV 84   MCH 26.4*   MCHC 31.3*   RDW 13.8          Lab Results   Component Value Date    TROPI <0.015 12/16/2020    TROPI <0.015 12/16/2020    TROPI <0.015 12/15/2020    TROPONIN 0.00 01/06/2020    TROPONIN 0.00 04/27/2017       Imaging:  EKG 2/21/24:

## 2024-02-21 NOTE — ED TRIAGE NOTES
PT walked in for chest pressure and weakness that started 30 min ago. He says the pain/pressure is in the center of his chest and he feels weak. He has a hx of bypass surgery. Alert and oriented

## 2024-02-21 NOTE — ED PROVIDER NOTES
ED Provider Note  [facility]      History     Chief Complaint   Patient presents with    Chest Pain     HPI  Oswaldo Prabhakar is a 63 year old male with a history of coronary artery disease s/p CABG x 4, hyperlipidemia, type 2 diabetes mellitus, chronic PE on Coumadin who presents to the emergency department due to chest pain.  He reports he was walking into work from the [employer] and when he arrived at work he had sudden onset chest heaviness with associated weakness, nausea, lightheadedness.  The pain is substernal and radiates to the left shoulder.  He reports prior to this he has generally been feeling well without any recent fevers.  He has had an intermittent cough.  He has noted trace swelling in his feet.  No other URI symptoms.  Denies any abdominal pain.  He reports he has not taken his morning Brilinta.  Has not taken any nitro prior to symptom onset.      Per Chart Review, patient's most recently was found to have an INR level of 2.22 on 2/6/24. Prior to that his INR was 1.99 on 12/15/23 and 1.94 on 12/11/23.         Past Medical History  Past Medical History:   Diagnosis Date    Antiplatelet or antithrombotic long-term use     Coronary artery disease     Diaphragmatic hernia without mention of obstruction or gangrene     Heart disease     Hernia, abdominal     History of blood transfusion     History of thrombophlebitis     Hypertension     Nephrolithiasis 4/2010    Other and unspecified hyperlipidemia     Pulmonary embolus and infarction (H)     s/p hemothorax and filter s/p filter removal (2011), now on long term anti-coagulation    Statin intolerance 2/1/2017    Stented coronary artery 01/07/2020    NIR mid LAD    Unspecified retinal detachment     Childhood trauma, unrepaired     Past Surgical History:   Procedure Laterality Date    BYPASS GRAFT ARTERY CORONARY  04/04/2014    Procedure: BYPASS GRAFT ARTERY CORONARY;  Median Sternotomy, Coronary Artery Bypass Bypass x  4 using Left Internal Mammary, Left Saphenous Vein, on pump oxygenation;  Surgeon: Sherry Armando MD;  Location: [facility] OR    CATARACT IOL, RT/LT      CLOSED REDUCTION, PERCUTANEOUS PINNING  HAND, COMBINED Left 11/05/2015    Procedure: COMBINED CLOSED REDUCTION, PERCUTANEOUS PINNING HAND;  Surgeon: Carmella Love MD;  [facility]    COLONOSCOPY  03/15/2013    Procedure: COLONOSCOPY;;  Surgeon: Racheal Shipman MD;  Location: [facility] GI    COMBINED CYSTOSCOPY, INSERT STENT URETER(S) Right 01/10/2020    Procedure: Cystoscopy, right retrograde pyelogram, interpretation of fluoroscopic images, placement of 6 x 26 double-J ureteral stent;  Surgeon: Nguyễn Woodard MD;  Location: [facility] OR    COMBINED CYSTOSCOPY, RETROGRADES, URETEROSCOPY, LASER HOLMIUM LITHOTRIPSY URETER(S), INSERT STENT Right 02/05/2020    Procedure: Cystoscopy, right ureteral stent exchange, right ureteroscopy with holmium lithotripsy and stone basketing, right retrograde pyelogram, interpretation of fluoroscopic images.;  Surgeon: Nguyễn Woodard MD;  Location: [facility] OR    CV ANGIOGRAM CORONARY GRAFT N/A 01/07/2020    Procedure: Angiogram Coronary Graft;  Surgeon: Chato Odonnell MD;  Location:  [facility]    CV CORONARY ANGIOGRAM  01/07/2020    Procedure: CV CORONARY ANGIOGRAM;  Surgeon: Chato Odonnell MD;  Location:  [facility]    CV PCI STENT DRUG ELUTING N/A 01/07/2020    Procedure: PCI Stent Drug Eluting;  Surgeon: Chato Odonnell MD;  Location:  [facility]    CYCLOPHOTOCOAGULATION TRANSSCLERAL WITH MICROPULSE LASER Right 5/17/2023    Procedure: RIGHT EYE CYCLOPHOTOCOAGULATION TRANSSCLERAL WITH Gprobe LASER;  Surgeon: Jay Jay Savage MD;  Location: [facility] OR    CYCLOPHOTOCOAGULATION TRANSSCLERAL WITH MICROPULSE LASER Right 10/25/2023    Procedure: RIGHT EYE CYCLOPHOTOCOAGULATION TRANSSCLERAL WITH GPROBE LASER;  Surgeon: Janette  MD Jay Jay;  Location: [facility] OR    CYSTOSCOPY      ESOPHAGOSCOPY, GASTROSCOPY, DUODENOSCOPY (EGD), COMBINED N/A 12/9/2022    Procedure: ESOPHAGOGASTRODUODENOSCOPY (EGD);  Surgeon: Beata George MD;  Location: [facility]    LAPAROSCOPIC CHOLECYSTECTOMY N/A 08/06/2018    Procedure: LAPAROSCOPIC CHOLECYSTECTOMY;  LAPAROSCOPIC CHOLECYSTECTOMY ;  Surgeon: José Miguel Stuart MD;  Location: [facility]    LITHOTRIPSY  04/2010    RETINAL REATTACHMENT      THORACIC SURGERY      lung surgery, thoracotomy, lung adhesion    ZZC NONSPECIFIC PROCEDURE      Spermatocele resection     acetaminophen (TYLENOL) 500 MG tablet  atorvastatin (LIPITOR) 80 MG tablet  brimonidine (ALPHAGAN) 0.2 % ophthalmic solution  calcium carbonate (TUMS) 500 MG chewable tablet  Cholecalciferol (VITAMIN D) 2000 UNITS CAPS  dorzolamide-timolol (COSOPT) 2-0.5 % ophthalmic solution  evolocumab (REPATHA SURECLICK) 140 MG/ML prefilled autoinjector  ezetimibe (ZETIA) 10 MG tablet  fenofibrate (TRIGLIDE/LOFIBRA) 160 MG tablet  latanoprost (XALATAN) 0.005 % ophthalmic solution  lisinopril (ZESTRIL) 2.5 MG tablet  metoprolol tartrate (LOPRESSOR) 25 MG tablet  OMEPRAZOLE PO  ondansetron (ZOFRAN-ODT) 4 MG ODT tab  prednisoLONE acetate (PRED FORTE) 1 % ophthalmic suspension  reason aspirin not prescribed, intentional,  reason aspirin not prescribed, intentional,  ticagrelor (BRILINTA) 90 MG tablet  timolol maleate (TIMOPTIC) 0.5 % ophthalmic solution  warfarin ANTICOAGULANT (COUMADIN) 5 MG tablet      Allergies   Allergen Reactions    Cats     Hay Fever & [A.R.M.]     Heparin Other (See Comments)     Heparin resistance per cardio-thoracic surgeon Dr. Armando    Hydrocodone-Acetaminophen Nausea and Vomiting     Acetaminophen is ok     Family History  Family History   Problem Relation Age of Onset    Heart Disease Mother     Glaucoma Father     C.A.D. Father         3 vessel CABG, age 70    Cancer Father         bladder cancer    C.A.D. Paternal Grandmother     Hypertension  Paternal Grandmother     Alzheimer Disease Paternal Grandmother     Diabetes No family hx of     Thyroid Disease No family hx of     Cancer - colorectal No family hx of      Social History   Social History     Tobacco Use    Smoking status: Never    Smokeless tobacco: Never   Substance Use Topics    Alcohol use: No     Comment: very rare    Drug use: No      Past medical history, past surgical history, medications, allergies, family history, and social history were reviewed with the patient. No additional pertinent items.      A medically appropriate review of systems was performed with pertinent positives and negatives noted in the HPI, and all other systems negative.    Physical Exam   BP: (!) 149/99  Pulse: 76  Temp: 97.9  F (36.6  C)  Resp: 12  SpO2: 96 %  Physical Exam  General: patient is alert and oriented, ill-appearing and diaphoretic  Head: atraumatic and normocephalic  EENT: moist mucus membranes  Neck: supple  Cardiovascular: regular rate and rhythm without rubs, murmurs or gallops. 2+ radial pulses bilaterally.   Pulmonary: lungs clear to auscultation bilaterally   Abdomen: soft, non-tender  Musculoskeletal: normal range of motion  Neurological: Alert and oriented, moving all extremities symmetrically, normal gait, no facial droop  skin: warm, dry     ED Course, Procedures, & Data      Procedures            EKG Interpretation:      Interpreted by Carmella Brooks MD  Time reviewed: 0926  Symptoms at time of EKG: chest pain   Rhythm: normal sinus   Rate: Normal  Axis: Normal  Ectopy: none  Conduction: normal  ST Segments/ T Waves: T wave inversion V1 and V2  Q Waves: none  Comparison to prior: Unchanged    Clinical Impression: no acute changes           EKG Interpretation:      Interpreted by Carmella Brooks MD  Time reviewed:1016   Symptoms at time of EKG: chest pain   Rhythm: Normal sinus   Rate: Normal  Axis: Normal  Ectopy: None  Conduction: Normal  ST Segments/ T Waves: No ST-T wave  changes, No acute ischemic changes, and T wave inversion V1, V2, V3, V4, and V5  Q Waves: None  Comparison to prior: increased precordial twave inversion    Clinical Impression: Increased twave inversion              Results for orders placed or performed during the hospital encounter of 02/21/24   EKG 12 lead     Status: None (Preliminary result)   Result Value Ref Range    Systolic Blood Pressure  mmHg    Diastolic Blood Pressure  mmHg    Ventricular Rate 67 BPM    Atrial Rate 67 BPM    WI Interval 136 ms    QRS Duration 86 ms     ms    QTc 420 ms    P Axis -6 degrees    R AXIS 40 degrees    T Axis 66 degrees    Interpretation ECG       Sinus rhythm  Nonspecific ST and T wave abnormality  Abnormal ECG       Medications - No data to display  Labs Ordered and Resulted from Time of ED Arrival to Time of ED Departure - No data to display  No orders to display          Critical care was not performed.     Medical Decision Making  The patient's presentation was of high complexity (an acute health issue posing potential threat to life or bodily function).    The patient's evaluation involved:  review of external note(s) from 1 sources (cardiology notes)  ordering and/or review of 3+ test(s) in this encounter (see separate area of note for details)  discussion of management or test interpretation with another health professional (cardiology)    The patient's management necessitated moderate risk (prescription drug management including medications given in the ED) and high risk (a decision regarding hospitalization).    Assessment & Plan    63-year-old male with a history of coronary artery disease status status post CABG x 4, type 2 diabetes mellitus, hyperlipidemia, chronic PE on Coumadin who presents to the emergency department with chest pain and lightheadedness.  He has noted to be mildly hypertensive otherwise hemodynamically stable, afebrile and in no respiratory distress.  He is ECG shows T wave inversions in  V1 and V2 with flattening throughout the precordial leads however similar to previous.  Serial EKG did show increasing T wave inversions.  Differential diagnosis includes but is not limited to ACS, worsening PE, chest pain secondary to hypertension, pneumothorax, less likely aortic dissection or infectious etiology.  He was given Brilinta and sublingual nitro.  Laboratory evaluation shows initial troponin of 10.  LFTs and lipase within normal limits, no leukocytosis.  INR of 2.58.  CT of the chest shows no evidence of PE or dissection.  He does have some esophageal thickening and incidental lung nodules as well as a moderate hiatal hernia.  He was made aware of these findings for follow-up.  He is currently pain-free following sublingual nitro.  Discussed with cardiology with plan to admit for further cardiac workup..    I have reviewed the nursing notes. I have reviewed the findings, diagnosis, plan and need for follow up with the patient.    New Prescriptions    No medications on file       Final diagnoses:   Unstable angina (H)     I, Reji Frankel, am serving as a trained medical scribe to document services personally performed by Carmella Brooks MD, based on the provider's statements to me.      ICarmella MD, was physically present and have reviewed and verified the accuracy of this note documented by Reji Frankel.     Carmella Brooks MD  [facility] EMERGENCY DEPARTMENT  2/21/2024           Carmella Brooks MD  02/21/24 [id number]

## 2024-02-21 NOTE — MEDICATION SCRIBE - ADMISSION MEDICATION HISTORY
Medication Scribe Admission Medication History    Admission medication history is complete. The information provided in this note is only as accurate as the sources available at the time of the update.    Information Source(s): Patient and CareEverywhere/SureScripts via in-person    Pertinent Information:   -Pt stated he is finished with his eye drops  -Pt stated he is due for his repatha today but has a 3 day before and after valencia period    Changes made to PTA medication list:  Added: None  Deleted: timolol maleate 0.5%, Prednisolone acetate 1%, Latanoprost 0.005%, Dorzolamide-timolol 2-0.5%, Brimonidine 0.2%  Changed: Omeprazole 20 mg (no frequency) --> Omeprazole 20 mg (PRN)    Allergies reviewed with patient and updates made in EHR: yes    Medication History Completed By: Brittney Chadwick 2/21/2024 3:48 PM    Prior to Admission medications    Medication Sig Last Dose Taking? Auth Provider Long Term End Date   acetaminophen (TYLENOL) 500 MG tablet Take 2 tablets (1,000 mg) by mouth 3 times daily as needed for pain Unknown at unknown Yes Josephine Moran PA-C     atorvastatin (LIPITOR) 80 MG tablet Take 1 tablet (80 mg) by mouth daily 2/20/2024 at pm Yes Reji Ocampo MD Yes    calcium carbonate (TUMS) 500 MG chewable tablet Take 1 chew tab by mouth daily as needed  2/20/2024 at unknown Yes Reported, Patient     Cholecalciferol (VITAMIN D) 2000 UNITS CAPS Take 2,000 Units by mouth daily 2/20/2024 at pm Yes Reported, Patient     evolocumab (REPATHA SURECLICK) 140 MG/ML prefilled autoinjector Inject 1 mL (140 mg) Subcutaneous every 14 days 2/7/2024 at unknown Yes Reji Ocampo MD     ezetimibe (ZETIA) 10 MG tablet Take 1 tablet (10 mg) by mouth daily 2/20/2024 at pm Yes Reji Ocampo MD Yes    fenofibrate (TRIGLIDE/LOFIBRA) 160 MG tablet Take 1 tablet (160 mg) by mouth daily 2/20/2024 at pm Yes Reji Ocampo MD Yes    lisinopril (ZESTRIL) 2.5 MG tablet Take 1 tablet (2.5 mg) by mouth daily  2/20/2024 at pm Yes Samm Vazquez MD Yes    metoprolol tartrate (LOPRESSOR) 25 MG tablet Take 0.5 tablets (12.5 mg) by mouth 2 times daily 2/21/2024 at am Yes Samm Vazquez MD Yes    OMEPRAZOLE PO Take 20 mg by mouth as needed Past Week at unknown Yes Reported, Patient     ondansetron (ZOFRAN-ODT) 4 MG ODT tab Take 1 tablet (4 mg) by mouth every 8 hours as needed for nausea Unknown at unknown Yes Joesphine Moran PA-C     ticagrelor (BRILINTA) 90 MG tablet Take 1 tablet (90 mg) by mouth 2 times daily 2/21/2024 at unknown Yes Samm Vazquez MD Yes    warfarin ANTICOAGULANT (COUMADIN) 5 MG tablet Take 1.5 tablets (7.5 mg) on Mondays and Thursdays and take 1 tablet (5 mg) all other days or as directed by the INR clinic 2/20/2024 at pm Yes Samm Vazquez MD     reason aspirin not prescribed, intentional, Please choose reason not prescribed from choices below.   Samm Vazquez MD Yes    reason aspirin not prescribed, intentional, Please choose reason not prescribed from choices below.   Samm Vazquez MD Yes

## 2024-02-22 ENCOUNTER — DOCUMENTATION ONLY (OUTPATIENT)
Dept: ANTICOAGULATION | Facility: CLINIC | Age: 64
End: 2024-02-22
Payer: COMMERCIAL

## 2024-02-22 VITALS
WEIGHT: 223.7 LBS | OXYGEN SATURATION: 95 % | DIASTOLIC BLOOD PRESSURE: 91 MMHG | TEMPERATURE: 98.1 F | HEART RATE: 60 BPM | SYSTOLIC BLOOD PRESSURE: 143 MMHG | BODY MASS INDEX: 32.1 KG/M2 | RESPIRATION RATE: 18 BRPM

## 2024-02-22 DIAGNOSIS — I27.82 CHRONIC PULMONARY EMBOLISM WITHOUT ACUTE COR PULMONALE, UNSPECIFIED PULMONARY EMBOLISM TYPE (H): Primary | ICD-10-CM

## 2024-02-22 DIAGNOSIS — Z79.01 LONG TERM CURRENT USE OF ANTICOAGULANT THERAPY: ICD-10-CM

## 2024-02-22 PROBLEM — I20.0 UNSTABLE ANGINA (H): Status: ACTIVE | Noted: 2024-02-22

## 2024-02-22 PROBLEM — I20.0 UNSTABLE ANGINA (H): Status: RESOLVED | Noted: 2020-01-06 | Resolved: 2024-02-22

## 2024-02-22 LAB
ANION GAP SERPL CALCULATED.3IONS-SCNC: 12 MMOL/L (ref 7–15)
ANION GAP SERPL CALCULATED.3IONS-SCNC: 4 MMOL/L (ref 7–15)
BUN SERPL-MCNC: 13.7 MG/DL (ref 8–23)
BUN SERPL-MCNC: 16.9 MG/DL (ref 8–23)
CALCIUM SERPL-MCNC: 6.6 MG/DL (ref 8.8–10.2)
CALCIUM SERPL-MCNC: 9.5 MG/DL (ref 8.8–10.2)
CHLORIDE SERPL-SCNC: 106 MMOL/L (ref 98–107)
CHLORIDE SERPL-SCNC: 117 MMOL/L (ref 98–107)
CREAT SERPL-MCNC: 0.84 MG/DL (ref 0.67–1.17)
CREAT SERPL-MCNC: 1.03 MG/DL (ref 0.67–1.17)
DEPRECATED HCO3 PLAS-SCNC: 20 MMOL/L (ref 22–29)
DEPRECATED HCO3 PLAS-SCNC: 22 MMOL/L (ref 22–29)
EGFRCR SERPLBLD CKD-EPI 2021: 83 ML/MIN/1.73M2
EGFRCR SERPLBLD CKD-EPI 2021: >90 ML/MIN/1.73M2
GLUCOSE SERPL-MCNC: 81 MG/DL (ref 70–99)
GLUCOSE SERPL-MCNC: 99 MG/DL (ref 70–99)
HOLD SPECIMEN: NORMAL
INR PPP: 2.68 (ref 0.85–1.15)
MAGNESIUM SERPL-MCNC: 1.4 MG/DL (ref 1.7–2.3)
POTASSIUM SERPL-SCNC: 3 MMOL/L (ref 3.4–5.3)
POTASSIUM SERPL-SCNC: 4 MMOL/L (ref 3.4–5.3)
SODIUM SERPL-SCNC: 140 MMOL/L (ref 135–145)
SODIUM SERPL-SCNC: 141 MMOL/L (ref 135–145)

## 2024-02-22 PROCEDURE — 80048 BASIC METABOLIC PNL TOTAL CA: CPT | Performed by: NURSE PRACTITIONER

## 2024-02-22 PROCEDURE — G0378 HOSPITAL OBSERVATION PER HR: HCPCS

## 2024-02-22 PROCEDURE — 250N000013 HC RX MED GY IP 250 OP 250 PS 637: Performed by: NURSE PRACTITIONER

## 2024-02-22 PROCEDURE — 85610 PROTHROMBIN TIME: CPT | Performed by: NURSE PRACTITIONER

## 2024-02-22 PROCEDURE — 36415 COLL VENOUS BLD VENIPUNCTURE: CPT | Performed by: INTERNAL MEDICINE

## 2024-02-22 PROCEDURE — 82374 ASSAY BLOOD CARBON DIOXIDE: CPT | Performed by: INTERNAL MEDICINE

## 2024-02-22 PROCEDURE — 99238 HOSP IP/OBS DSCHRG MGMT 30/<: CPT | Mod: FS

## 2024-02-22 PROCEDURE — 83735 ASSAY OF MAGNESIUM: CPT | Performed by: NURSE PRACTITIONER

## 2024-02-22 PROCEDURE — 36415 COLL VENOUS BLD VENIPUNCTURE: CPT | Performed by: NURSE PRACTITIONER

## 2024-02-22 PROCEDURE — 250N000013 HC RX MED GY IP 250 OP 250 PS 637: Performed by: INTERNAL MEDICINE

## 2024-02-22 RX ADMIN — Medication 12.5 MG: at 09:27

## 2024-02-22 RX ADMIN — ATORVASTATIN CALCIUM 80 MG: 40 TABLET, FILM COATED ORAL at 09:26

## 2024-02-22 RX ADMIN — EZETIMIBE 10 MG: 10 TABLET ORAL at 09:27

## 2024-02-22 RX ADMIN — LISINOPRIL 2.5 MG: 2.5 TABLET ORAL at 09:26

## 2024-02-22 RX ADMIN — TICAGRELOR 90 MG: 90 TABLET ORAL at 09:27

## 2024-02-22 RX ADMIN — FENOFIBRATE 160 MG: 160 TABLET, FILM COATED ORAL at 09:27

## 2024-02-22 ASSESSMENT — ACTIVITIES OF DAILY LIVING (ADL)
ADLS_ACUITY_SCORE: 35

## 2024-02-22 NOTE — DISCHARGE SUMMARY
[facility]  Discharge Summary - Medicine & Pediatrics       Date of Admission:  2/21/2024  Date of Discharge:  2/22/2024  Discharging Provider: Dr. 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 Vera or [facility] cardiology team at the next   available appointment. This appointment can be with a Mid-level provider   (NP or PA) if needed.    Appointments on [place] and/or [facility] (with [facility] or [facility]   provider or service). Call [phone] if you haven't heard regarding   these appointments within 7 days of discharge.        Recommend follow up with primary care provider in 1-2 weeks for INR & basic metabolic panel  Recommend follow up with Cardiology within 1-2 weeks for office visit and to schedule a Cardiac CT or Stress test.     Discharge Disposition   Discharged to home  Condition at discharge: Stable    Hospital Course   Oswaldo Prabhakar is a 63 year old male with a history of coronary artery disease s/p CABG x 4, hyperlipidemia, type 2 diabetes mellitus, chronic PE on Coumadin who was admitted on 2/21/2024 for chest pain evaluation.     Day of admission, patient presented with sudden onset chest pressure while at work. Associated symptoms of weakness, dizziness and nausea. Pain described as pressure/heaviness in central chest, nonradiating. Pain lasted roughly 30  minutes prior to patient walking from [facility] (where he works) to [facility]. Patient had no chest pain recurrence during hospitalization with normal Troponin and was discharged in stable conditions.     Follow up with Cardiology for Stress test or Cardiac CT.   Follow up for normal INR management.    The following problems were addressed during his hospitalization:    # CAD s/p CABG X 4 2014, NIR prox/mid LAD 2020  # Hypertension  # Hyperlipidemia  # Acute Chest pain - Resolved  Extensive cardiac history with presentation for acute chest pain. Patient presents with On arrival, hemodynamically stable, nitroglycerin given with minimal effect on pain. Pain resolved on its own ~ 1 hour after initiation. EKG without acute ST changes, initial & repeat troponin negative and BNP negative. ECHO 2/21/2024 unchanged from previous. No recurrence of pain during admission.   - Continue PTA Brilinta 90 mg BID  - Continue PTA Atorvastatin 80 mg daily + Zetia 10 mg daily  - Continue PTA Lisinopril 2.5 mg daily  - Continue PTA Metoprolol tartrate 12.5 mg BID    # History of Pulmonary emoblism and infarction s/p hemothorax and filter s/p filter removal 2011  Warfarin held on 2/22 for possible coronary angiogram. Resumed at discharge.  - Continue PTA Warfarin     # History of Spontaneous retinal detachment right eye  # Chronic uveitis and neovascularization right eye  Spontaneous RD as a child, follows with ophthalmology. No medication changes at discharge.  - Continue current regimen:   - Eye drops RIGHT EYE  Cosopt 2 times a day  Brimonidine 3 times a day  Latanoprost at bedtime    Consultations This Hospital Stay   SMOKING CESSATION PROGRAM IP CONSULT    Code Status   Full Code       The patient was discussed with Dr. Boogie  Note written as collaboration with medical student  Sherry Felipe PA-C  Cards [address] [place] [address] EMERGENCY DEPARTMENT  [address] [facility] [address]  Phone:  [phone]  ______________________________________________________________________    Physical Exam   Vital Signs: Temp: 98.1  F (36.7  C) Temp src: Oral BP: (!) 143/91 Pulse: 60   Resp: 18 SpO2: 95 % O2 Device: None (Room air)    Weight: 223 lbs 11.2 oz  General: No acute distress, resting in bed, interactive  HEENT: Atraumatic, symmetric reactive pupils, moist mucous membranes  Respiratory: Clear to auscultation bilaterally, no cough, wheezes or increased work of breathing  Cardiac: Regular rate an rhythm, normal S1/S2 no S3/S4/murmurs, no peripheral edema  Abdomen: round, soft, nontender  : No bullock  MSK: Joints symmetric with age related changes without hemarthrosis  Neuro/Psych: No lateralizing defects, Aox3, appropriate mood/affect    Primary Care Physician   Samm PUENTE Sick    Discharge Orders      Activity    Your activity upon discharge: activity as tolerated     Adult [facility] Follow-up and recommended labs and tests    Follow up with Dr. Vera or [facility] cardiology team at the next available appointment. This appointment can be with a Mid-level provider (NP or PA) if needed.    Appointments on [place] and/or [facility] (with [facility] or [facility] provider or service). Call [phone] if you haven't heard regarding these appointments within 7 days of discharge.     Reason for your hospital stay    Your reason for hospitalization was due to chest pain and concern for heart injury.  Your electrocardiogram did not show any evidence of damage. Your troponin (heart enzyme indicating damage) was within normal limits.     Diet    Follow this diet upon discharge: Low sodium (2g max) diet.       Significant Results and Procedures   Most Recent 3 CBC's:  Recent Labs   Lab Test 02/21/24  0946 12/15/23  1448 11/20/23  0851   WBC 7.8 7.4 6.0   HGB 14.2 13.5 14.0   MCV 84 85 85    252 258     Most Recent 2 BMP's:  Recent Labs   Lab Test 02/22/24  1104 02/21/24  0946    139   POTASSIUM 4.0 4.5    CHLORIDE 106 104   CO2 22 25   BUN 16.9 14.6   CR 1.03 1.21*   ANIONGAP 12 10   GONZALES 9.5 10.3*   GLC 99 117*     Most Recent 3 INR's:  Recent Labs   Lab Test 02/22/24  0638 02/21/24  0946 02/06/24  1316   INR 2.68* 2.58* 2.22*     Most Recent 3 Troponin's:  Recent Labs   Lab Test 12/16/20  0540 12/16/20  0203 12/15/20  2142 01/06/20  1042 01/06/20  1041 04/27/17  2226 04/27/17  1626   TROPI <0.015 <0.015 <0.015   < >  --    < >  --    TROPONIN  --   --   --   --  0.00  --  0.00    < > = values in this interval not displayed.     Most Recent 3 BNP's:  Recent Labs   Lab Test 02/21/24  0946 04/27/17  2226   NTBNPI 36 108     Most Recent D-dimer:  Recent Labs   Lab Test 12/15/23  1448   DD 0.30   ,   Results for orders placed or performed during the hospital encounter of 02/21/24   CT Chest Pulmonary Embolism w Contrast    Narrative    EXAMINATION: CTA pulmonary angiogram, 2/21/2024 12:16 PM     COMPARISON: Chest x-ray 1 2020.2, CT chest 11/30/2020    HISTORY: chest pain, shortness of breath, prior PE    TECHNIQUE: Volumetric helical acquisition of CT images of the chest  from the lung apices to the kidneys were acquired after the  administration of 80 mL of Isovue-370 IV contrast. Flash technique  with free breathing acquisition.  Post-processed multiplanar and/or  MIP reformations were obtained, archived to PACS and used in  interpretation of this study.     FINDINGS:  Contrast bolus is: excellent.  Exam is negative for acute  pulmonary embolism.    Lungs: The trachea and central airways are patent. No pneumothorax or  pleural effusion. No focal airspace opacity. No suspicious pulmonary  nodule. Scattered punctate calcified granulomas. Atelectasis at the  base of the lingula. Subtle tree-in-bud nodularity throughout the left  upper lobe.    Mediastinum: CABG. The visualized thyroid gland is unremarkable. The  heart size is within normal limits. No pericardial effusion. The  ascending aorta and main pulmonary artery  diameters are within normal  limits. Normal appearance and configuration of the great vessels off  of the aortic arch. No suspicious mediastinal, hilar, or axillary  lymph nodes. Circumferential thickening of the upper thoracic  esophagus. Moderate hiatal hernia.    Upper abdomen: Cholecystectomy.    Bones/soft tissues: Degenerative changes of the spine.      Impression    IMPRESSION:   1. Exam is negative for acute pulmonary embolism.    2. Nonspecific nodularity in the left upper lobe which may represent  inflammation/infection.    3. Nonspecific circumferential mucosal thickening of the upper  thoracic esophagus which may represent esophagitis.    4. Moderate hiatal hernia.      In the event of a positive result for acute pulmonary embolism  resulting in right heart strain, please activate the PERT  Multidisciplinary group for consultation by paging [phone]  ([phone]).     PERT -- Pulmonary Embolism Response Team (Multidisciplinary team  including cardiology, interventional radiology, critical care,  hematology)    I have personally reviewed the examination and initial interpretation  and I agree with the findings.    CARMELO ART MD         SYSTEM ID:  [id number]   Echo Complete     Value    LVEF  55-60%    Narrative    [id number]  [id number]  [id number]  [id number]^KATIA^EM^DONALD     [facility]  Echocardiography Laboratory  [address] [place] [id number]     Name: RUFINO NICHOLS  MRN: [id number]  : 1960  Study Date: 2024 02:25 PM  Age: 63 yrs  Gender: Male  Patient Location: [facility]  Reason For Study: Chest Pain  Ordering Physician: EM MONTALVO  Performed By: Shama Lewis RDCS     BSA: 2.2 m2  Height: 72 in  Weight: 210 lb  ______________________________________________________________________________  Procedure  Complete Portable Echo Adult. Contrast Optison. Optison (NDC #0473-1985-02)  given intravenously. Patient was given 6 ml mixture of  3 ml Optison and 6 ml  saline. 3 ml wasted.  ______________________________________________________________________________  Interpretation Summary  Global and regional left ventricular function is normal with an EF of 55-60%.  Right ventricular function, chamber size, wall motion, and thickness are  normal.  Pulmonary artery systolic pressure is normal.  The inferior vena cava is normal.  No pericardial effusion is present.  No significant changes noted.  ______________________________________________________________________________  Left Ventricle  Global and regional left ventricular function is normal with an EF of 55-60%.  Left ventricular wall thickness is normal. Left ventricular size is normal.  Grade I or early diastolic dysfunction. No regional wall motion abnormalities  are seen.     Right Ventricle  Right ventricular function, chamber size, wall motion, and thickness are  normal.     Atria  Both atria appear normal.     Mitral Valve  The mitral valve is normal.     Aortic Valve  Aortic valve sclerosis is present.     Tricuspid Valve  The tricuspid valve is normal. Trace tricuspid insufficiency is present. The  right ventricular systolic pressure is approximated at 16.0 mmHg plus the  right atrial pressure. Pulmonary artery systolic pressure is normal.     Pulmonic Valve  The pulmonic valve is normal.     Vessels  The thoracic aorta is normal. The pulmonary artery and bifurcation cannot be  assessed. The inferior vena cava is normal.     Pericardium  No pericardial effusion is present.     Compared to Previous Study  No significant changes noted.  ______________________________________________________________________________  MMode/2D Measurements & Calculations  IVSd: 1.0 cm  LVIDd: 4.6 cm  LVIDs: 3.2 cm  LVPWd: 0.98 cm  FS: 29.4 %  LV mass(C)d: 159.6 grams  LV mass(C)dI: 73.4 grams/m2  Ao root diam: 3.3 cm  asc Aorta Diam: 3.3 cm  LVOT diam: 2.2 cm  LVOT area: 3.7 cm2  Ao root diam index Ht(cm/m):  1.8  Ao root diam index BSA (cm/m2): 1.5  Asc Ao diam index BSA (cm/m2): 1.5  Asc Ao diam index Ht(cm/m): 1.8  LA Volume (BP): 54.2 ml     LA Volume Index (BP): 24.9 ml/m2  RWT: 0.43     Doppler Measurements & Calculations  MV E max jesus: 62.4 cm/sec  MV A max jesus: 80.5 cm/sec  MV E/A: 0.78  MV dec time: 0.15 sec  TR max jesus: 199.8 cm/sec  TR max P.0 mmHg  E/E' av.1  Lateral E/e': 8.1  Medial E/e': 10.1  RV S Jesus: 8.7 cm/sec     ______________________________________________________________________________  Report approved by: Ayana Jennings 2024 03:34 PM           *Note: Due to a large number of results and/or encounters for the requested time period, some results have not been displayed. A complete set of results can be found in Results Review.       Discharge Medications   Current Discharge Medication List        CONTINUE these medications which have NOT CHANGED    Details   acetaminophen (TYLENOL) 500 MG tablet Take 2 tablets (1,000 mg) by mouth 3 times daily as needed for pain    Associated Diagnoses: Hydronephrosis with urinary obstruction due to ureteral calculus      atorvastatin (LIPITOR) 80 MG tablet Take 1 tablet (80 mg) by mouth daily  Qty: 90 tablet, Refills: 3    Associated Diagnoses: Pure hyperglyceridemia; Hyperlipidemia LDL goal <130      calcium carbonate (TUMS) 500 MG chewable tablet Take 1 chew tab by mouth daily as needed       Cholecalciferol (VITAMIN D) 2000 UNITS CAPS Take 2,000 Units by mouth daily    Associated Diagnoses: 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      evolocumab (REPATHA SURECLICK) 140 MG/ML prefilled autoinjector Inject 1 mL (140 mg) Subcutaneous every 14 days  Qty: 6 mL, Refills: 3    Associated Diagnoses: Hyperlipidemia LDL goal <130; S/P CABG x 4; Statin intolerance; Coronary artery disease involving nonautologous biological coronary bypass graft with angina pectoris (H24)      ezetimibe (ZETIA) 10 MG tablet Take 1 tablet (10 mg) by mouth daily  Qty: 90  tablet, Refills: 3    Associated Diagnoses: S/P CABG (coronary artery bypass graft); Hyperlipidemia LDL goal <70; Statin intolerance; Atherosclerosis of coronary artery of native heart without angina pectoris, unspecified vessel or lesion type      fenofibrate (TRIGLIDE/LOFIBRA) 160 MG tablet Take 1 tablet (160 mg) by mouth daily  Qty: 90 tablet, Refills: 3    Associated Diagnoses: Hyperlipidemia LDL goal <130      lisinopril (ZESTRIL) 2.5 MG tablet Take 1 tablet (2.5 mg) by mouth daily  Qty: 90 tablet, Refills: 4    Associated Diagnoses: Essential hypertension      metoprolol tartrate (LOPRESSOR) 25 MG tablet Take 0.5 tablets (12.5 mg) by mouth 2 times daily  Qty: 90 tablet, Refills: 3    Associated Diagnoses: S/P CABG x 4      OMEPRAZOLE PO Take 20 mg by mouth as needed      ondansetron (ZOFRAN-ODT) 4 MG ODT tab Take 1 tablet (4 mg) by mouth every 8 hours as needed for nausea  Qty: 10 tablet, Refills: 0    Associated Diagnoses: Hydronephrosis with urinary obstruction due to ureteral calculus      ticagrelor (BRILINTA) 90 MG tablet Take 1 tablet (90 mg) by mouth 2 times daily  Qty: 180 tablet, Refills: 3    Associated Diagnoses: Coronary artery disease involving autologous artery coronary bypass graft without angina pectoris      warfarin ANTICOAGULANT (COUMADIN) 5 MG tablet Take 1.5 tablets (7.5 mg) on Mondays and Thursdays and take 1 tablet (5 mg) all other days or as directed by the INR clinic  Qty: 45 tablet, Refills: 0    Comments: Pt overdue for INR level check  Associated Diagnoses: Chronic pulmonary embolism without acute cor pulmonale, unspecified pulmonary embolism type (H)      !! reason aspirin not prescribed, intentional, Please choose reason not prescribed from choices below.    Associated Diagnoses: Coronary artery disease involving autologous artery coronary bypass graft without angina pectoris      !! reason aspirin not prescribed, intentional, Please choose reason not prescribed from choices  below.    Associated Diagnoses: Coronary artery disease involving autologous artery coronary bypass graft without angina pectoris       !! - Potential duplicate medications found. Please discuss with provider.        Allergies   Allergies   Allergen Reactions    Cats     Hay Fever & [A.R.M.]     Heparin Other (See Comments)     Heparin resistance per cardio-thoracic surgeon Dr. Armando    Hydrocodone-Acetaminophen Nausea and Vomiting     Acetaminophen is ok

## 2024-02-22 NOTE — PROGRESS NOTES
ANTICOAGULATION  MANAGEMENT: Discharge Review    Oswaldo YAQUELIN Prabhakar chart reviewed for anticoagulation continuity of care    Emergency room visit on 2/21-2/22/24 for unstable angina.    Discharge disposition:  HOSPITAL ADMISSION    Results:    Recent labs: (last 7 days)     02/21/24  0946 02/22/24  0638   INR 2.58* 2.68*        PLAN     Patient not contacted  ACC will resume monitoring upon discharge from the hospital    Anticoagulation Calendar updated    CASEY MANZO RN

## 2024-02-22 NOTE — PLAN OF CARE
Neuro: A&Ox4.   Respiratory: Sating 98 on RA.  GI/: Adequate urine output. BM X1  Diet/appetite: NPO diet.   Activity:  IND.  Pain: pt c/o shoulder pain, no prns given    Skin: No new deficits noted.  LDA's:  PIV removed   Plan: Continue with POC. Notify primary team with changes.        DISCHARGE                         ----------------------------------------------------------------------------  Discharged to: Home  Via: private transportation  Accompanied by: Family  Discharge Instructions: *diet, *activity, medications, follow up appointments, when to call the MD, aftercare instructions.  Prescriptions: No meds  Follow Up Appointments: arranged; information given  Belongings: All sent with pt  IV: d/c'd  Telemetry: d/c'd  Pt exhibits understanding of above discharge instructions; all questions answered.    Discharge Paperwork: Signed, copied, and sent home with patient.

## 2024-02-22 NOTE — DISCHARGE INSTRUCTIONS
Please follow up with Cardiology at the next available appointment. You will need to discuss setting up a Cardiac CT or Cardiac Stress test at that time.     Please resume all your prior to admission medication as you previously would have.

## 2024-02-22 NOTE — UTILIZATION REVIEW
"  Admission Status; Secondary Review Determination         Under the authority of the Utilization Management Committee, the utilization review process indicated a secondary review on the above patient.  The review outcome is based on review of the medical records, discussions with staff, and applying clinical experience noted on the date of the review.          (x) Observation Status Appropriate - This patient does not meet hospital inpatient criteria and is placed in observation status. If this patient's primary payer is Medicare and was admitted as an inpatient, Condition Code 44 should be used and patient status changed to \"observation\".     RATIONALE FOR DETERMINATION       63-year-old male with past medical history of coronary artery disease status post CABG x 4, hyperlipidemia, type 2 diabetes, and chronic pulmonary embolism on Coumadin who was registered to observation after chest pain yesterday.  Workup has been relatively unremarkable on the patient has had troponins following in the normal reference range of 10-11.  This is all consistent with an observation status type care plan and the primary team will be notified.    The severity of illness, intensity of service provided, expected LOS and risk for adverse outcome make the care appropriate for further observation; however, doesn't meet criteria for hospital inpatient admission. Dr  notified of this determination.    This document was produced using voice recognition software.      The information on this document is developed by the utilization review team in order for the business office to ensure compliance.  This only denotes the appropriateness of proper admission status and does not reflect the quality of care rendered.         The definitions of Inpatient Status and Observation Status used in making the determination above are those provided in the CMS Coverage Manual, Chapter 1 and Chapter 6, section 70.4.      Sincerely,     Rajat Harris" DO   Physician Advisor  Utilization Management  [facility].

## 2024-02-22 NOTE — PROGRESS NOTES
No complaint of chest pain. Serial troponin negative. NPO at midnight for possible cards procedure tomorrow.

## 2024-02-23 ENCOUNTER — TELEPHONE (OUTPATIENT)
Dept: CARDIOLOGY | Facility: CLINIC | Age: 64
End: 2024-02-23
Payer: COMMERCIAL

## 2024-02-23 NOTE — TELEPHONE ENCOUNTER
2/23 lvgeni and khris for pt to schedule a hospital follow up w/ Damaris on 2/29 w/ fasting labs prior

## 2024-02-24 ENCOUNTER — PATIENT OUTREACH (OUTPATIENT)
Dept: CARE COORDINATION | Facility: CLINIC | Age: 64
End: 2024-02-24
Payer: COMMERCIAL

## 2024-02-24 NOTE — PROGRESS NOTES
Connected Care Resource Center Contact  [facility]/Voicemail     Clinical Data: Post-Discharge Outreach     Outreach attempted x 2.  Left message on patient's voicemail, providing [facility]'s central phone number of [phone] ([phone]) for questions/concerns and/or to schedule an appt with an [facility] provider, if they do not have a PCP.      Plan:  [facility] will do no further outreaches at this time.       Cele Ricketts  Community Health Worker  [facility] Care Resource [place], [facility]      *Connected Care Resource Team does NOT follow patient ongoing. Referrals are identified based on internal discharge reports and the outreach is to ensure patient has an understanding of their discharge instructions.

## 2024-02-26 NOTE — TELEPHONE ENCOUNTER
Left Voicemail (1st Attempt) and Sent Mychart (1st Attempt) for the patient to call back and schedule the following:    Appointment type: Return Cardiology  Provider: YOHANA  Return date: Next available  Specialty phone number: [phone] option 1  Additional appointment(s) needed: fasting lab prior  Additonal Notes:  2/26 LVM and MOY for pt to schedule Return Cardiology w/ YOHANA w/ fasting lab prior. NICOLÁS

## 2024-02-27 ENCOUNTER — TELEPHONE (OUTPATIENT)
Dept: CARDIOLOGY | Facility: CLINIC | Age: 64
End: 2024-02-27
Payer: COMMERCIAL

## 2024-03-01 NOTE — PROGRESS NOTES
"  [facility]   Cardiology Clinic Note      HPI:   Mr. Oswaldo Prabhakar is a pleasant 63 year old male with medical history pertinent for coronary artery disease s/p CABG x 4 (2020), hyperlipidemia, type 2 diabetes mellitus, and chronic PE on Coumadin . He presents to cardiology clinic for post admission follow up.    Patient was admitted on 2/21/2024 for chest pain evaluation. On the day of admission,  patient presented with sudden onset chest pressure while at work. His associated symptoms were weakness, dizziness, and nausea. Pain was described as pressure/heaviness in central chest, nonradiating. Pain lasted roughly 30 minutes prior to patient walking from [facility] (where he works) to [facility]. Patient had no chest pain recurrence during hospitalization with normal Troponin, normal ECG, and stable echocardiogram. He therefore was discharged in stable condition.     Since discharge, patient reports feeling relatively well. He reports an occasional \"twinge\" in the center of his chest that occurs on a fairly regular basis. This sensation does not feel similar to the pressure he had felt that brought him into ED, but is enough to cause significant anxiety given his lack of symptoms prior to needing CABG. He reports no dyspnea or other concurrent symptoms. He reports his blood pressures tend to run 120s-130s/70s.    Today in clinic, he denies chest pain, palpitations, dizziness, syncope, or lower extremity edema.       PAST MEDICAL HISTORY:  Past Medical History:   Diagnosis Date    Antiplatelet or antithrombotic long-term use     Coronary artery disease     Diaphragmatic hernia without mention of obstruction or gangrene     Heart disease     Hernia, abdominal     History of blood transfusion     History of thrombophlebitis     Hypertension     Nephrolithiasis 4/2010    Other and unspecified hyperlipidemia     Pulmonary embolus and infarction (H)     s/p hemothorax and filter s/p filter removal (2011), now on long term " anti-coagulation    Statin intolerance 2/1/2017    Stented coronary artery 01/07/2020    NIR mid LAD    Unspecified retinal detachment     Childhood trauma, unrepaired       FAMILY HISTORY:  Family History   Problem Relation Age of Onset    Heart Disease Mother     Glaucoma Father     C.A.D. Father         3 vessel CABG, age 70    Cancer Father         bladder cancer    C.A.D. Paternal Grandmother     Hypertension Paternal Grandmother     Alzheimer Disease Paternal Grandmother     Diabetes No family hx of     Thyroid Disease No family hx of     Cancer - colorectal No family hx of        SOCIAL HISTORY:  Social History     Socioeconomic History    Marital status:    Tobacco Use    Smoking status: Never    Smokeless tobacco: Never   Substance and Sexual Activity    Alcohol use: No     Comment: very rare    Drug use: No     Social Determinants of Health     Financial Resource Strain: Low Risk  (11/17/2023)    Financial Resource Strain     Within the past 12 months, have you or your family members you live with been unable to get utilities (heat, electricity) when it was really needed?: No   Food Insecurity: Low Risk  (11/17/2023)    Food Insecurity     Within the past 12 months, did you worry that your food would run out before you got money to buy more?: No     Within the past 12 months, did the food you bought just not last and you didn t have money to get more?: No   Transportation Needs: Low Risk  (11/17/2023)    Transportation Needs     Within the past 12 months, has lack of transportation kept you from medical appointments, getting your medicines, non-medical meetings or appointments, work, or from getting things that you need?: No   Interpersonal Safety: Low Risk  (11/20/2023)    Interpersonal Safety     Do you feel physically and emotionally safe where you currently live?: Yes     Within the past 12 months, have you been hit, slapped, kicked or otherwise physically hurt by someone?: No     Within the  past 12 months, have you been humiliated or emotionally abused in other ways by your partner or ex-partner?: No   Housing Stability: Low Risk  (11/17/2023)    Housing Stability     Do you have housing? : Yes     Are you worried about losing your housing?: No       CURRENT MEDICATIONS:  acetaminophen (TYLENOL) 500 MG tablet, Take 2 tablets (1,000 mg) by mouth 3 times daily as needed for pain  calcium carbonate (TUMS) 500 MG chewable tablet, Take 1 chew tab by mouth daily as needed   Cholecalciferol (VITAMIN D) 2000 UNITS CAPS, Take 2,000 Units by mouth daily  evolocumab (REPATHA SURECLICK) 140 MG/ML prefilled autoinjector, Inject 1 mL (140 mg) Subcutaneous every 14 days  ezetimibe (ZETIA) 10 MG tablet, Take 1 tablet (10 mg) by mouth daily  lisinopril (ZESTRIL) 2.5 MG tablet, Take 1 tablet (2.5 mg) by mouth daily  OMEPRAZOLE PO, Take 20 mg by mouth as needed  ondansetron (ZOFRAN-ODT) 4 MG ODT tab, Take 1 tablet (4 mg) by mouth every 8 hours as needed for nausea  ticagrelor (BRILINTA) 90 MG tablet, Take 1 tablet (90 mg) by mouth 2 times daily  reason aspirin not prescribed, intentional,, Please choose reason not prescribed from choices below.  reason aspirin not prescribed, intentional,, Please choose reason not prescribed from choices below.    bevacizumab (AVASTIN) intravitreal inj 1.25 mg        ROS:   Refer to HPI    EXAM:  /87 (BP Location: Right arm, Patient Position: Chair, Cuff Size: Adult Regular)   Pulse 57   Wt 102.7 kg (226 lb 8 oz)   SpO2 98%   BMI 32.50 kg/m    GENERAL: Appears comfortable, in no acute distress.   HEENT:no discharge or icterus bilaterally. Mucous membranes moist and without lesions.  CV: RRR, +S1S2, no murmur, rub, or gallop.    RESPIRATORY: Respirations regular, even, and unlabored. Lungs CTA throughout.   EXTREMITIES: no peripheral edema  NEUROLOGIC: Alert and oriented x 3.    MUSCULOSKELETAL: No joint swelling or tenderness.   SKIN: No jaundice. No rashes or lesions.  "    Labs, reviewed with patient in clinic today:  CBC RESULTS:  Lab Results   Component Value Date    WBC 7.8 02/21/2024    WBC 9.2 05/10/2021    RBC 5.38 02/21/2024    RBC 5.02 05/10/2021    HGB 14.2 02/21/2024    HGB 13.9 05/10/2021    HCT 45.4 02/21/2024    HCT 42.7 05/10/2021    MCV 84 02/21/2024    MCV 85 05/10/2021    MCH 26.4 (L) 02/21/2024    MCH 27.7 05/10/2021    MCHC 31.3 (L) 02/21/2024    MCHC 32.6 05/10/2021    RDW 13.8 02/21/2024    RDW 13.3 05/10/2021     02/21/2024     05/10/2021       CMP RESULTS:  Lab Results   Component Value Date     02/22/2024     05/10/2021    POTASSIUM 4.0 02/22/2024    POTASSIUM 3.8 04/04/2022    POTASSIUM 3.4 05/10/2021    CHLORIDE 106 02/22/2024    CHLORIDE 106 04/04/2022    CHLORIDE 108 05/10/2021    CO2 22 02/22/2024    CO2 26 04/04/2022    CO2 28 05/10/2021    ANIONGAP 12 02/22/2024    ANIONGAP 8 04/04/2022    ANIONGAP 4 05/10/2021    GLC 99 02/22/2024    GLC 89 04/04/2022    GLC 91 05/10/2021    BUN 16.9 02/22/2024    BUN 13 04/04/2022    BUN 16 05/10/2021    CR 1.03 02/22/2024    CR 1.50 (H) 05/10/2021    GFRESTIMATED 83 02/22/2024    GFRESTIMATED 50 (L) 05/10/2021    GFRESTBLACK 58 (L) 05/10/2021    GONZALES 9.5 02/22/2024    GONZALES 9.3 05/10/2021    BILITOTAL 0.5 02/21/2024    BILITOTAL 0.6 05/10/2021    ALBUMIN 4.5 02/21/2024    ALBUMIN 3.8 04/04/2022    ALBUMIN 3.8 05/10/2021    ALKPHOS 49 02/21/2024    ALKPHOS 55 05/10/2021    ALT 18 02/21/2024    ALT 46 05/10/2021    AST 29 02/21/2024    AST 26 05/10/2021        INR RESULTS:  Lab Results   Component Value Date    INR 2.68 (H) 02/22/2024    INR 2.07 (H) 07/07/2021       Lab Results   Component Value Date    MAG 1.4 (L) 02/22/2024    MAG 1.8 01/06/2020     Lab Results   Component Value Date    NTBNPI 36 02/21/2024    NTBNPI 108 04/27/2017     No results found for: \"NTBNP\"    LIPIDS:  Lab Results   Component Value Date    CHOL 78 08/21/2023    CHOL 83 11/30/2020     Lab Results   Component Value " Date    HDL 44 2023    HDL 44 2020     Lab Results   Component Value Date    LDL 15 2023    LDL 23 2023    LDL 19 2020     Lab Results   Component Value Date    TRIG 97 2023    TRIG 99 2020     Lab Results   Component Value Date    CHOLHDLRATIO 7.0 2015       Echocardiogram:  Recent Results (from the past 4320 hour(s))   Echo Complete   Result Value    LVEF  55-60%    [facility]    [id number]  [id number]  [id number]  [id number]^KATIA^EM^DONALD     [facility],[place]  Echocardiography Laboratory  [address] [place] [address]     Name: RUFINO NICHOLS  MRN: [id number]  : 1960  Study Date: 2024 02:25 PM  Age: 63 yrs  Gender: Male  Patient Location: [facility]  Reason For Study: Chest Pain  Ordering Physician: EM MONTALVO  Performed By: Shama Lewis RDCS     BSA: 2.2 m2  Height: 72 in  Weight: 210 lb  ______________________________________________________________________________  Procedure  Complete Portable Echo Adult. Contrast Optison. Optison (NDC #7440-8396-60)  given intravenously. Patient was given 6 ml mixture of 3 ml Optison and 6 ml  saline. 3 ml wasted.  ______________________________________________________________________________  Interpretation Summary  Global and regional left ventricular function is normal with an EF of 55-60%.  Right ventricular function, chamber size, wall motion, and thickness are  normal.  Pulmonary artery systolic pressure is normal.  The inferior vena cava is normal.  No pericardial effusion is present.  No significant changes noted.  ______________________________________________________________________________  Left Ventricle  Global and regional left ventricular function is normal with an EF of 55-60%.  Left ventricular wall thickness is normal. Left ventricular size is normal.  Grade I or early diastolic dysfunction. No regional wall motion abnormalities  are seen.     Right  Ventricle  Right ventricular function, chamber size, wall motion, and thickness are  normal.     Atria  Both atria appear normal.     Mitral Valve  The mitral valve is normal.     Aortic Valve  Aortic valve sclerosis is present.     Tricuspid Valve  The tricuspid valve is normal. Trace tricuspid insufficiency is present. The  right ventricular systolic pressure is approximated at 16.0 mmHg plus the  right atrial pressure. Pulmonary artery systolic pressure is normal.     Pulmonic Valve  The pulmonic valve is normal.     Vessels  The thoracic aorta is normal. The pulmonary artery and bifurcation cannot be  assessed. The inferior vena cava is normal.     Pericardium  No pericardial effusion is present.     Compared to Previous Study  No significant changes noted.  ______________________________________________________________________________  MMode/2D Measurements & Calculations  IVSd: 1.0 cm  LVIDd: 4.6 cm  LVIDs: 3.2 cm  LVPWd: 0.98 cm  FS: 29.4 %  LV mass(C)d: 159.6 grams  LV mass(C)dI: 73.4 grams/m2  Ao root diam: 3.3 cm  asc Aorta Diam: 3.3 cm  LVOT diam: 2.2 cm  LVOT area: 3.7 cm2  Ao root diam index Ht(cm/m): 1.8  Ao root diam index BSA (cm/m2): 1.5  Asc Ao diam index BSA (cm/m2): 1.5  Asc Ao diam index Ht(cm/m): 1.8  LA Volume (BP): 54.2 ml     LA Volume Index (BP): 24.9 ml/m2  RWT: 0.43     Doppler Measurements & Calculations  MV E max jesus: 62.4 cm/sec  MV A max jesus: 80.5 cm/sec  MV E/A: 0.78  MV dec time: 0.15 sec  TR max jesus: 199.8 cm/sec  TR max P.0 mmHg  E/E' av.1  Lateral E/e': 8.1  Medial E/e': 10.1  RV S Jesus: 8.7 cm/sec     ______________________________________________________________________________  Report approved by: Ayana Jennings 2024 03:34 PM             Coronary Angiogram:    Assessment and Plan:   Mr. Prabhakar is a 63 year old male with a PMH of coronary artery disease s/p CABG x 4 (), hyperlipidemia, type 2 diabetes mellitus, and chronic PE on Coumadin.    # CAD s/p CABG X  4 2014, NIR prox/mid LAD 2020  # Hypertension  # Hyperlipidemia  # Acute Chest pain - Resolved  Extensive cardiac history with presentation for acute chest pain 2/21/24. . Pain resolved on its own ~ 1 hour after initiation. EKG without acute ST changes, initial & repeat troponin negative and BNP negative. ECHO 2/21/2024 unchanged from previous. No recurrence of pain during admission. Most recent lipid panel 3/5/24: TC 92, HDL 37, LDL 26, and   - Continue PTA Brilinta 90 mg BID  - Continue PTA Atorvastatin 80 mg daily + Zetia 10 mg daily  - Continue PTA Lisinopril 2.5 mg daily  - Continue PTA Metoprolol tartrate 12.5 mg BID  - 2 week zio monitor - placed in clinic today  - If no clear cause of chest discomfort noted on zio monitor, proceed with CT angiogram.        Follow up:  Pending zio/CTA workup  Chart review time today: 15 minutes  Visit time today: 15 minutes  Total time spent today: 30 minutes        Sherry Felipe PA-C  General Cardiology   03/07/24

## 2024-03-04 DIAGNOSIS — E78.5 HYPERLIPIDEMIA LDL GOAL <130: ICD-10-CM

## 2024-03-04 DIAGNOSIS — I25.739 CORONARY ARTERY DISEASE INVOLVING NONAUTOLOGOUS BIOLOGICAL CORONARY BYPASS GRAFT WITH ANGINA PECTORIS (H): ICD-10-CM

## 2024-03-04 DIAGNOSIS — Z95.1 S/P CABG X 4: Primary | ICD-10-CM

## 2024-03-05 ENCOUNTER — LAB (OUTPATIENT)
Dept: LAB | Facility: CLINIC | Age: 64
End: 2024-03-05
Attending: INTERNAL MEDICINE
Payer: COMMERCIAL

## 2024-03-05 DIAGNOSIS — E78.5 HYPERLIPIDEMIA LDL GOAL <130: ICD-10-CM

## 2024-03-05 DIAGNOSIS — I25.10 ATHEROSCLEROSIS OF CORONARY ARTERY OF NATIVE HEART WITHOUT ANGINA PECTORIS, UNSPECIFIED VESSEL OR LESION TYPE: ICD-10-CM

## 2024-03-05 DIAGNOSIS — I25.739 CORONARY ARTERY DISEASE INVOLVING NONAUTOLOGOUS BIOLOGICAL CORONARY BYPASS GRAFT WITH ANGINA PECTORIS (H): ICD-10-CM

## 2024-03-05 DIAGNOSIS — Z95.1 S/P CABG (CORONARY ARTERY BYPASS GRAFT): ICD-10-CM

## 2024-03-05 DIAGNOSIS — Z95.1 S/P CABG X 4: ICD-10-CM

## 2024-03-05 DIAGNOSIS — Z78.9 STATIN INTOLERANCE: ICD-10-CM

## 2024-03-05 LAB
CHOLEST SERPL-MCNC: 92 MG/DL
FASTING STATUS PATIENT QL REPORTED: YES
HDLC SERPL-MCNC: 37 MG/DL
LDLC SERPL CALC-MCNC: 26 MG/DL
NONHDLC SERPL-MCNC: 55 MG/DL
TRIGL SERPL-MCNC: 143 MG/DL

## 2024-03-05 PROCEDURE — 80061 LIPID PANEL: CPT | Performed by: PATHOLOGY

## 2024-03-05 PROCEDURE — 36415 COLL VENOUS BLD VENIPUNCTURE: CPT | Performed by: PATHOLOGY

## 2024-03-07 ENCOUNTER — OFFICE VISIT (OUTPATIENT)
Dept: CARDIOLOGY | Facility: CLINIC | Age: 64
End: 2024-03-07
Payer: COMMERCIAL

## 2024-03-07 ENCOUNTER — ORDERS ONLY (AUTO-RELEASED) (OUTPATIENT)
Dept: CARDIOLOGY | Facility: CLINIC | Age: 64
End: 2024-03-07
Payer: COMMERCIAL

## 2024-03-07 ENCOUNTER — TELEPHONE (OUTPATIENT)
Dept: CARDIOLOGY | Facility: CLINIC | Age: 64
End: 2024-03-07
Payer: COMMERCIAL

## 2024-03-07 VITALS
BODY MASS INDEX: 32.5 KG/M2 | WEIGHT: 226.5 LBS | OXYGEN SATURATION: 98 % | HEART RATE: 57 BPM | DIASTOLIC BLOOD PRESSURE: 87 MMHG | SYSTOLIC BLOOD PRESSURE: 136 MMHG

## 2024-03-07 DIAGNOSIS — Z95.1 S/P CABG X 4: ICD-10-CM

## 2024-03-07 DIAGNOSIS — E78.5 HYPERLIPIDEMIA LDL GOAL <130: ICD-10-CM

## 2024-03-07 DIAGNOSIS — R07.89 ATYPICAL CHEST PAIN: Primary | ICD-10-CM

## 2024-03-07 DIAGNOSIS — R07.89 ATYPICAL CHEST PAIN: ICD-10-CM

## 2024-03-07 DIAGNOSIS — E78.1 PURE HYPERGLYCERIDEMIA: ICD-10-CM

## 2024-03-07 DIAGNOSIS — I27.82 CHRONIC PULMONARY EMBOLISM WITHOUT ACUTE COR PULMONALE, UNSPECIFIED PULMONARY EMBOLISM TYPE (H): ICD-10-CM

## 2024-03-07 PROCEDURE — 99214 OFFICE O/P EST MOD 30 MIN: CPT

## 2024-03-07 RX ORDER — ATORVASTATIN CALCIUM 80 MG/1
80 TABLET, FILM COATED ORAL DAILY
Qty: 90 TABLET | Refills: 3 | Status: SHIPPED | OUTPATIENT
Start: 2024-03-07

## 2024-03-07 RX ORDER — FENOFIBRATE 160 MG/1
160 TABLET ORAL DAILY
Qty: 90 TABLET | Refills: 3 | Status: SHIPPED | OUTPATIENT
Start: 2024-03-07

## 2024-03-07 RX ORDER — WARFARIN SODIUM 5 MG/1
TABLET ORAL
Qty: 45 TABLET | Refills: 0 | Status: SHIPPED | OUTPATIENT
Start: 2024-03-07 | End: 2024-08-13

## 2024-03-07 RX ORDER — METOPROLOL TARTRATE 25 MG/1
12.5 TABLET, FILM COATED ORAL 2 TIMES DAILY
Qty: 90 TABLET | Refills: 3 | Status: SHIPPED | OUTPATIENT
Start: 2024-03-07

## 2024-03-07 ASSESSMENT — PAIN SCALES - GENERAL: PAINLEVEL: NO PAIN (0)

## 2024-03-07 NOTE — LETTER
"3/7/2024      RE: Oswaldo Prabhakar  [address]  [place] [address]       Dear Colleague,    Thank you for the opportunity to participate in the care of your patient, Oswaldo Prabhakar, at the [facility] [place] at [facility]. Please see a copy of my visit note below.      [facility] Cardiology   Cardiology Clinic Note      HPI:   Mr. Oswaldo Prabhakar is a pleasant 63 year old male with medical history pertinent for coronary artery disease s/p CABG x 4 (2020), hyperlipidemia, type 2 diabetes mellitus, and chronic PE on Coumadin . He presents to cardiology clinic for post admission follow up.    Patient was admitted on 2/21/2024 for chest pain evaluation. On the day of admission,  patient presented with sudden onset chest pressure while at work. His associated symptoms were weakness, dizziness, and nausea. Pain was described as pressure/heaviness in central chest, nonradiating. Pain lasted roughly 30 minutes prior to patient walking from [facility] (where he works) to [facility]. Patient had no chest pain recurrence during hospitalization with normal Troponin, normal ECG, and stable echocardiogram. He therefore was discharged in stable condition.     Since discharge, patient reports feeling relatively well. He reports an occasional \"twinge\" in the center of his chest that occurs on a fairly regular basis. This sensation does not feel similar to the pressure he had felt that brought him into ED, but is enough to cause significant anxiety given his lack of symptoms prior to needing CABG. He reports no dyspnea or other concurrent symptoms. He reports his blood pressures tend to run 120s-130s/70s.    Today in clinic, he denies chest pain, palpitations, dizziness, syncope, or lower extremity edema.       PAST MEDICAL HISTORY:  Past Medical History:   Diagnosis Date    Antiplatelet or antithrombotic long-term use     Coronary artery disease     Diaphragmatic hernia without " mention of obstruction or gangrene     Heart disease     Hernia, abdominal     History of blood transfusion     History of thrombophlebitis     Hypertension     Nephrolithiasis 4/2010    Other and unspecified hyperlipidemia     Pulmonary embolus and infarction (H)     s/p hemothorax and filter s/p filter removal (2011), now on long term anti-coagulation    Statin intolerance 2/1/2017    Stented coronary artery 01/07/2020    NIR mid LAD    Unspecified retinal detachment     Childhood trauma, unrepaired       FAMILY HISTORY:  Family History   Problem Relation Age of Onset    Heart Disease Mother     Glaucoma Father     C.A.D. Father         3 vessel CABG, age 70    Cancer Father         bladder cancer    C.A.D. Paternal Grandmother     Hypertension Paternal Grandmother     Alzheimer Disease Paternal Grandmother     Diabetes No family hx of     Thyroid Disease No family hx of     Cancer - colorectal No family hx of        SOCIAL HISTORY:  Social History     Socioeconomic History    Marital status:    Tobacco Use    Smoking status: Never    Smokeless tobacco: Never   Substance and Sexual Activity    Alcohol use: No     Comment: very rare    Drug use: No     Social Determinants of Health     Financial Resource Strain: Low Risk  (11/17/2023)    Financial Resource Strain     Within the past 12 months, have you or your family members you live with been unable to get utilities (heat, electricity) when it was really needed?: No   Food Insecurity: Low Risk  (11/17/2023)    Food Insecurity     Within the past 12 months, did you worry that your food would run out before you got money to buy more?: No     Within the past 12 months, did the food you bought just not last and you didn t have money to get more?: No   Transportation Needs: Low Risk  (11/17/2023)    Transportation Needs     Within the past 12 months, has lack of transportation kept you from medical appointments, getting your medicines, non-medical meetings or  appointments, work, or from getting things that you need?: No   Interpersonal Safety: Low Risk  (11/20/2023)    Interpersonal Safety     Do you feel physically and emotionally safe where you currently live?: Yes     Within the past 12 months, have you been hit, slapped, kicked or otherwise physically hurt by someone?: No     Within the past 12 months, have you been humiliated or emotionally abused in other ways by your partner or ex-partner?: No   Housing Stability: Low Risk  (11/17/2023)    Housing Stability     Do you have housing? : Yes     Are you worried about losing your housing?: No       CURRENT MEDICATIONS:  acetaminophen (TYLENOL) 500 MG tablet, Take 2 tablets (1,000 mg) by mouth 3 times daily as needed for pain  calcium carbonate (TUMS) 500 MG chewable tablet, Take 1 chew tab by mouth daily as needed   Cholecalciferol (VITAMIN D) 2000 UNITS CAPS, Take 2,000 Units by mouth daily  evolocumab (REPATHA SURECLICK) 140 MG/ML prefilled autoinjector, Inject 1 mL (140 mg) Subcutaneous every 14 days  ezetimibe (ZETIA) 10 MG tablet, Take 1 tablet (10 mg) by mouth daily  lisinopril (ZESTRIL) 2.5 MG tablet, Take 1 tablet (2.5 mg) by mouth daily  OMEPRAZOLE PO, Take 20 mg by mouth as needed  ondansetron (ZOFRAN-ODT) 4 MG ODT tab, Take 1 tablet (4 mg) by mouth every 8 hours as needed for nausea  ticagrelor (BRILINTA) 90 MG tablet, Take 1 tablet (90 mg) by mouth 2 times daily  reason aspirin not prescribed, intentional,, Please choose reason not prescribed from choices below.  reason aspirin not prescribed, intentional,, Please choose reason not prescribed from choices below.    bevacizumab (AVASTIN) intravitreal inj 1.25 mg        ROS:   Refer to HPI    EXAM:  /87 (BP Location: Right arm, Patient Position: Chair, Cuff Size: Adult Regular)   Pulse 57   Wt 102.7 kg (226 lb 8 oz)   SpO2 98%   BMI 32.50 kg/m    GENERAL: Appears comfortable, in no acute distress.   HEENT:no discharge or icterus bilaterally. Mucous  membranes moist and without lesions.  CV: RRR, +S1S2, no murmur, rub, or gallop.    RESPIRATORY: Respirations regular, even, and unlabored. Lungs CTA throughout.   EXTREMITIES: no peripheral edema  NEUROLOGIC: Alert and oriented x 3.    MUSCULOSKELETAL: No joint swelling or tenderness.   SKIN: No jaundice. No rashes or lesions.     Labs, reviewed with patient in clinic today:  CBC RESULTS:  Lab Results   Component Value Date    WBC 7.8 02/21/2024    WBC 9.2 05/10/2021    RBC 5.38 02/21/2024    RBC 5.02 05/10/2021    HGB 14.2 02/21/2024    HGB 13.9 05/10/2021    HCT 45.4 02/21/2024    HCT 42.7 05/10/2021    MCV 84 02/21/2024    MCV 85 05/10/2021    MCH 26.4 (L) 02/21/2024    MCH 27.7 05/10/2021    MCHC 31.3 (L) 02/21/2024    MCHC 32.6 05/10/2021    RDW 13.8 02/21/2024    RDW 13.3 05/10/2021     02/21/2024     05/10/2021       CMP RESULTS:  Lab Results   Component Value Date     02/22/2024     05/10/2021    POTASSIUM 4.0 02/22/2024    POTASSIUM 3.8 04/04/2022    POTASSIUM 3.4 05/10/2021    CHLORIDE 106 02/22/2024    CHLORIDE 106 04/04/2022    CHLORIDE 108 05/10/2021    CO2 22 02/22/2024    CO2 26 04/04/2022    CO2 28 05/10/2021    ANIONGAP 12 02/22/2024    ANIONGAP 8 04/04/2022    ANIONGAP 4 05/10/2021    GLC 99 02/22/2024    GLC 89 04/04/2022    GLC 91 05/10/2021    BUN 16.9 02/22/2024    BUN 13 04/04/2022    BUN 16 05/10/2021    CR 1.03 02/22/2024    CR 1.50 (H) 05/10/2021    GFRESTIMATED 83 02/22/2024    GFRESTIMATED 50 (L) 05/10/2021    GFRESTBLACK 58 (L) 05/10/2021    GONZALES 9.5 02/22/2024    GONZALES 9.3 05/10/2021    BILITOTAL 0.5 02/21/2024    BILITOTAL 0.6 05/10/2021    ALBUMIN 4.5 02/21/2024    ALBUMIN 3.8 04/04/2022    ALBUMIN 3.8 05/10/2021    ALKPHOS 49 02/21/2024    ALKPHOS 55 05/10/2021    ALT 18 02/21/2024    ALT 46 05/10/2021    AST 29 02/21/2024    AST 26 05/10/2021        INR RESULTS:  Lab Results   Component Value Date    INR 2.68 (H) 02/22/2024    INR 2.07 (H) 07/07/2021  "      Lab Results   Component Value Date    MAG 1.4 (L) 2024    MAG 1.8 2020     Lab Results   Component Value Date    NTBNPI 36 2024    NTBNPI 108 2017     No results found for: \"NTBNP\"    LIPIDS:  Lab Results   Component Value Date    CHOL 78 2023    CHOL 83 2020     Lab Results   Component Value Date    HDL 44 2023    HDL 44 2020     Lab Results   Component Value Date    LDL 15 2023    LDL 23 2023    LDL 19 2020     Lab Results   Component Value Date    TRIG 97 2023    TRIG 99 2020     Lab Results   Component Value Date    CHOLHDLRATIO 7.0 2015       Echocardiogram:  Recent Results (from the past 4320 hour(s))   Echo Complete   Result Value    LVEF  55-60%    Narrative    [id number]  [id number]  [id number]  [id number]^KATIA^EM^DONALD     [facility],[place]  Echocardiography Laboratory  [address] [place] [address]     Name: RUFINO NICHOLS  MRN: [id number]  : 1960  Study Date: 2024 02:25 PM  Age: 63 yrs  Gender: Male  Patient Location: [facility]  Reason For Study: Chest Pain  Ordering Physician: EM MONTALVO  Performed By: Shama Lewis RDCS     BSA: 2.2 m2  Height: 72 in  Weight: 210 lb  ______________________________________________________________________________  Procedure  Complete Portable Echo Adult. Contrast Optison. Optison (NDC #4917-8021-21)  given intravenously. Patient was given 6 ml mixture of 3 ml Optison and 6 ml  saline. 3 ml wasted.  ______________________________________________________________________________  Interpretation Summary  Global and regional left ventricular function is normal with an EF of 55-60%.  Right ventricular function, chamber size, wall motion, and thickness are  normal.  Pulmonary artery systolic pressure is normal.  The inferior vena cava is normal.  No pericardial effusion is present.  No significant changes " noted.  ______________________________________________________________________________  Left Ventricle  Global and regional left ventricular function is normal with an EF of 55-60%.  Left ventricular wall thickness is normal. Left ventricular size is normal.  Grade I or early diastolic dysfunction. No regional wall motion abnormalities  are seen.     Right Ventricle  Right ventricular function, chamber size, wall motion, and thickness are  normal.     Atria  Both atria appear normal.     Mitral Valve  The mitral valve is normal.     Aortic Valve  Aortic valve sclerosis is present.     Tricuspid Valve  The tricuspid valve is normal. Trace tricuspid insufficiency is present. The  right ventricular systolic pressure is approximated at 16.0 mmHg plus the  right atrial pressure. Pulmonary artery systolic pressure is normal.     Pulmonic Valve  The pulmonic valve is normal.     Vessels  The thoracic aorta is normal. The pulmonary artery and bifurcation cannot be  assessed. The inferior vena cava is normal.     Pericardium  No pericardial effusion is present.     Compared to Previous Study  No significant changes noted.  ______________________________________________________________________________  MMode/2D Measurements & Calculations  IVSd: 1.0 cm  LVIDd: 4.6 cm  LVIDs: 3.2 cm  LVPWd: 0.98 cm  FS: 29.4 %  LV mass(C)d: 159.6 grams  LV mass(C)dI: 73.4 grams/m2  Ao root diam: 3.3 cm  asc Aorta Diam: 3.3 cm  LVOT diam: 2.2 cm  LVOT area: 3.7 cm2  Ao root diam index Ht(cm/m): 1.8  Ao root diam index BSA (cm/m2): 1.5  Asc Ao diam index BSA (cm/m2): 1.5  Asc Ao diam index Ht(cm/m): 1.8  LA Volume (BP): 54.2 ml     LA Volume Index (BP): 24.9 ml/m2  RWT: 0.43     Doppler Measurements & Calculations  MV E max jesus: 62.4 cm/sec  MV A max jesus: 80.5 cm/sec  MV E/A: 0.78  MV dec time: 0.15 sec  TR max jesus: 199.8 cm/sec  TR max P.0 mmHg  E/E' av.1  Lateral E/e': 8.1  Medial E/e': 10.1  RV S Jesus: 8.7 cm/sec      ______________________________________________________________________________  Report approved by: Ayana Jennings 02/21/2024 03:34 PM             Coronary Angiogram:    Assessment and Plan:   Mr. Prabhakar is a 63 year old male with a PMH of coronary artery disease s/p CABG x 4 (2020), hyperlipidemia, type 2 diabetes mellitus, and chronic PE on Coumadin.    # CAD s/p CABG X 4 2014, NIR prox/mid LAD 2020  # Hypertension  # Hyperlipidemia  # Acute Chest pain - Resolved  Extensive cardiac history with presentation for acute chest pain 2/21/24. . Pain resolved on its own ~ 1 hour after initiation. EKG without acute ST changes, initial & repeat troponin negative and BNP negative. ECHO 2/21/2024 unchanged from previous. No recurrence of pain during admission. Most recent lipid panel 3/5/24: TC 92, HDL 37, LDL 26, and   - Continue PTA Brilinta 90 mg BID  - Continue PTA Atorvastatin 80 mg daily + Zetia 10 mg daily  - Continue PTA Lisinopril 2.5 mg daily  - Continue PTA Metoprolol tartrate 12.5 mg BID  - 2 week zio monitor - placed in clinic today  - If no clear cause of chest discomfort noted on zio monitor, proceed with CT angiogram.        Follow up:  Pending zio/CTA workup  Chart review time today: 15 minutes  Visit time today: 15 minutes  Total time spent today: 30 minutes        Optimal Vascular Metrics    Blood Pressure   BP < 140/90 Yes    On Aspirin  No: Contraindicated due to: on Brilinta + Warfarin, no need for triple therapy    On Statin  Yes    Tobacco use  No          Please do not hesitate to contact me if you have any questions/concerns.     Sincerely,     Sherry Felipe PA-C

## 2024-03-07 NOTE — TELEPHONE ENCOUNTER
Left Voicemail (1st Attempt) and Sent Mychart (1st Attempt) for the patient to call back and schedule the following:    Appointment type: CT Angiogram Coronary Artery  Provider: GEOVANNY  Return date: 4-6 weeks (4/4/2024-4/18/2024)  Specialty phone number: [phone] option 1  Additional appointment(s) needed: NA  Additonal Notes: 3/7 LVM and MOY for pt to schedule CTA in 4-6 weeks. NICOLÁS

## 2024-03-07 NOTE — PATIENT INSTRUCTIONS
You were seen today in the Cardiovascular Clinic at the [facility] by:       Sherry Felipe PA-C    Your visit summary and instructions are as follows:    Ziopatch for two weeks  CTA in 4-6 weeks    Continue to work toward the recommendation of 150 minutes of moderate intensity exercise/week and maintain a healthy lifestyle (avoid illicit drugs, smoking and moderate alcohol consumption)      Return to cardiology clinic pending results.      Thank you for your visit today!     Please MyChart message me or call my nurse Ly if you have any questions or concerns.      During Business Hours:  [phone], option # 1 (University) then option # 4 (medical questions) and ask to speak with my nurse.     After hours, weekends or holidays:   [phone], Option #4  Ask to speak to the On-Call Cardiologist. Inform them you are a cardiology patient at the [facility].      Pre-procedure instructions - CTA Angiogram of the Heart  Patient Education   Location is [address] Waiting [facility]    How do I prepare for my exam? (Food and drink instructions)  The day before your exam, drink extra fluids-at least six 8-ounce glasses (unless your doctor wants you to restrict your fluids).  No Food and Drink Restrictions  (Other instructions)  If you take Viagra, Levitra or Cialis, stop taking it for 48 hours before your test.  If you are taking the medication for Pulmonary Hypertesnion DO NOT hold.     What should I wear: Please wear loose clothing, such as a sweat suit or jogging clothes. Avoid snaps, zippers and other metal. We may ask you to undress and put on a hospital gown.     How long does the exam take: Please allow two hours for this test.     What should I bring: Bring any scans or X-rays taken at other hospitals, if similar tests were done. It is safest to leave personal items at home.     Do I need a : No  is  needed.     What do I need to tell my doctor?  Be sure to tell your doctor:  * If you have any allergies.  * If there's any chance you are pregnant.  * If you are breastfeeding.  * If you have diabetes as your medication may need to be adjusted for this exam.     What should I do after the exam: No restrictions, You may resume normal activities.     What is this test: This test looks at your heart and heart arteries. A CT (computed tomography) scan is a series of pictures that allows us to look inside your body. Our scanner creates images of the body in cross sections, much like slices of bread. This helps us see any problems more clearly. Before the scan, we will give you contrast fluid (X-ray dye) through an IV (small needle in your arm). The contrast helps your blood vessels show up more clearly on the pictures.     Who should I call with questions: If you have any questions, please call the Imaging Department where you will have your exam. Directions, parking instructions, and other information is available on our website, [email or web address].

## 2024-03-07 NOTE — NURSING NOTE
Chief Complaint   Patient presents with    Follow Up     6 month follow-up (Damaris pt )       Vitals were taken, medications reconciled.    Beulah Lomas, Facilitator   3:42 PM

## 2024-03-07 NOTE — PROGRESS NOTES
Optimal Vascular Metrics    Blood Pressure   BP < 140/90 Yes    On Aspirin  No: Contraindicated due to: on Brilinta + Warfarin, no need for triple therapy    On Statin  Yes    Tobacco use  No

## 2024-03-20 ENCOUNTER — MYC MEDICAL ADVICE (OUTPATIENT)
Dept: ANTICOAGULATION | Facility: CLINIC | Age: 64
End: 2024-03-20
Payer: COMMERCIAL

## 2024-03-28 ENCOUNTER — TELEPHONE (OUTPATIENT)
Dept: ANTICOAGULATION | Facility: CLINIC | Age: 64
End: 2024-03-28
Payer: COMMERCIAL

## 2024-03-28 PROCEDURE — 93248 EXT ECG>7D<15D REV&INTERPJ: CPT | Performed by: INTERNAL MEDICINE

## 2024-03-28 NOTE — TELEPHONE ENCOUNTER
ANTICOAGULATION     Oswaldo Prabhakar is overdue for an INR check.     Spoke with Oswaldo and scheduled lab appointment on 4/2/24    Marielena Chacon RN

## 2024-04-02 ENCOUNTER — LAB (OUTPATIENT)
Dept: LAB | Facility: CLINIC | Age: 64
End: 2024-04-02
Payer: COMMERCIAL

## 2024-04-02 ENCOUNTER — ANTICOAGULATION THERAPY VISIT (OUTPATIENT)
Dept: ANTICOAGULATION | Facility: CLINIC | Age: 64
End: 2024-04-02

## 2024-04-02 DIAGNOSIS — Z79.01 LONG TERM CURRENT USE OF ANTICOAGULANT THERAPY: ICD-10-CM

## 2024-04-02 DIAGNOSIS — I27.82 CHRONIC PULMONARY EMBOLISM WITHOUT ACUTE COR PULMONALE, UNSPECIFIED PULMONARY EMBOLISM TYPE (H): Primary | ICD-10-CM

## 2024-04-02 DIAGNOSIS — I27.82 CHRONIC PULMONARY EMBOLISM WITHOUT ACUTE COR PULMONALE, UNSPECIFIED PULMONARY EMBOLISM TYPE (H): ICD-10-CM

## 2024-04-02 LAB — INR PPP: 2.49 (ref 0.85–1.15)

## 2024-04-02 PROCEDURE — 85610 PROTHROMBIN TIME: CPT | Performed by: PATHOLOGY

## 2024-04-02 PROCEDURE — 36415 COLL VENOUS BLD VENIPUNCTURE: CPT | Performed by: PATHOLOGY

## 2024-04-02 NOTE — PROGRESS NOTES
ANTICOAGULATION MANAGEMENT     Oswaldo Prabhakar 63 year old male is on warfarin with therapeutic INR result. (Goal INR 2.0-3.0)    Recent labs: (last 7 days)     04/02/24  0936   INR 2.49*       ASSESSMENT     Warfarin Lab Questionnaire    Warfarin Doses Last 7 Days      4/2/2024     8:41 AM   Dose in Tablet or Mg   TAB or MG? milligram (mg)     Pt Rptd Dose SUNDAY MONDAY TUESDAY WED THURS FRIDAY SATURDAY 4/2/2024   8:41 AM 5 5 7.5 5 5 7.5 5         4/2/2024   Warfarin Lab Questionnaire   Missed doses within past 14 days? No   Changes in diet or alcohol within past 14 days? No   Medication changes since last result? No   Injuries or illness since last result? No   New shortness of breath, severe headaches or sudden changes in vision since last result? No   Abnormal bleeding since last result? No   Upcoming surgery, procedure? No     Previous result: Therapeutic last 2(+) visits  Additional findings: None       PLAN     Recommended plan for no diet, medication or health factor changes affecting INR     Dosing Instructions: Continue your current warfarin dose with next INR in 4 weeks       Summary  As of 4/2/2024      Full warfarin instructions:  7.5 mg every Tue, Fri; 5 mg all other days   Next INR check:  4/30/2024               Telephone call with Oswaldo who verbalizes understanding and agrees to plan    Lab visit scheduled    Education provided:   Contact [phone] with any changes, questions or concerns.     Plan made per [facility] anticoagulation protocol    Mag De La Cruz, RN  Anticoagulation Clinic  4/2/2024    _______________________________________________________________________     Anticoagulation Episode Summary       Current INR goal:  2.0-3.0   TTR:  81.7% (9.3 mo)   Target end date:  Indefinite   Send INR reminders to:  [facility]    Indications    Pulmonary emboli (H) [I26.99]  Long-term (current) use of anticoagulants [Z79.01] [Z79.01]  Chronic pulmonary embolism without acute cor  pulmonale  unspecified pulmonary embolism type (H) [I27.82]             Comments:               Anticoagulation Care Providers       Provider Role Specialty Phone number    Samm Vazquez MD Referring Internal Medicine - Pediatrics [phone]

## 2024-04-15 ENCOUNTER — HOSPITAL ENCOUNTER (OUTPATIENT)
Dept: CT IMAGING | Facility: CLINIC | Age: 64
Discharge: HOME | End: 2024-04-15
Payer: COMMERCIAL

## 2024-04-15 VITALS — SYSTOLIC BLOOD PRESSURE: 160 MMHG | HEART RATE: 49 BPM | RESPIRATION RATE: 20 BRPM | DIASTOLIC BLOOD PRESSURE: 89 MMHG

## 2024-04-15 DIAGNOSIS — R07.89 ATYPICAL CHEST PAIN: ICD-10-CM

## 2024-04-15 DIAGNOSIS — Z95.1 S/P CABG X 4: ICD-10-CM

## 2024-04-15 DIAGNOSIS — E78.5 HYPERLIPIDEMIA LDL GOAL <130: ICD-10-CM

## 2024-04-15 PROCEDURE — 75574 CT ANGIO HRT W/3D IMAGE: CPT | Mod: 26 | Performed by: INTERNAL MEDICINE

## 2024-04-15 PROCEDURE — 250N000011 HC RX IP 250 OP 636: Performed by: INTERNAL MEDICINE

## 2024-04-15 PROCEDURE — 250N000013 HC RX MED GY IP 250 OP 250 PS 637: Performed by: INTERNAL MEDICINE

## 2024-04-15 PROCEDURE — 75574 CT ANGIO HRT W/3D IMAGE: CPT

## 2024-04-15 RX ORDER — ACYCLOVIR 200 MG/1
0-1 CAPSULE ORAL
Status: DISCONTINUED | OUTPATIENT
Start: 2024-04-15 | End: 2024-04-16 | Stop reason: HOSPADM

## 2024-04-15 RX ORDER — ONDANSETRON 2 MG/ML
4 INJECTION INTRAMUSCULAR; INTRAVENOUS
Status: DISCONTINUED | OUTPATIENT
Start: 2024-04-15 | End: 2024-04-16 | Stop reason: HOSPADM

## 2024-04-15 RX ORDER — METHYLPREDNISOLONE SODIUM SUCCINATE 125 MG/2ML
125 INJECTION, POWDER, LYOPHILIZED, FOR SOLUTION INTRAMUSCULAR; INTRAVENOUS
Status: DISCONTINUED | OUTPATIENT
Start: 2024-04-15 | End: 2024-04-16 | Stop reason: HOSPADM

## 2024-04-15 RX ORDER — IOPAMIDOL 755 MG/ML
120 INJECTION, SOLUTION INTRAVASCULAR ONCE
Status: COMPLETED | OUTPATIENT
Start: 2024-04-15 | End: 2024-04-15

## 2024-04-15 RX ORDER — DIPHENHYDRAMINE HCL 25 MG
25 CAPSULE ORAL
Status: DISCONTINUED | OUTPATIENT
Start: 2024-04-15 | End: 2024-04-16 | Stop reason: HOSPADM

## 2024-04-15 RX ORDER — NITROGLYCERIN 0.4 MG/1
.4-.8 TABLET SUBLINGUAL
Status: DISCONTINUED | OUTPATIENT
Start: 2024-04-15 | End: 2024-04-16 | Stop reason: HOSPADM

## 2024-04-15 RX ORDER — DIPHENHYDRAMINE HYDROCHLORIDE 50 MG/ML
25-50 INJECTION INTRAMUSCULAR; INTRAVENOUS
Status: DISCONTINUED | OUTPATIENT
Start: 2024-04-15 | End: 2024-04-16 | Stop reason: HOSPADM

## 2024-04-15 RX ADMIN — NITROGLYCERIN 0.8 MG: 0.4 TABLET SUBLINGUAL at 13:10

## 2024-04-15 RX ADMIN — IOPAMIDOL 120 ML: 755 INJECTION, SOLUTION INTRAVENOUS at 13:20

## 2024-04-15 NOTE — PROGRESS NOTES
Pt arrived for Coronary CT angiogram. Test, meds and side effects reviewed with pt. Resting HR 49 bpm.  No beta blocker indicated. Administered 0.8 mg SL nitro on CTA table per order. CTA completed. Patient tolerated procedure well and denies symptoms of allergic reaction. Post monitoring completed and VSS. D/C instructions reviewed with pt whom verbalized understanding of need to increase PO fluids today. D/C to gold waiting room accompanied by staff.

## 2024-04-29 ENCOUNTER — LAB (OUTPATIENT)
Dept: LAB | Facility: CLINIC | Age: 64
End: 2024-04-29
Payer: COMMERCIAL

## 2024-04-29 ENCOUNTER — ANTICOAGULATION THERAPY VISIT (OUTPATIENT)
Dept: ANTICOAGULATION | Facility: CLINIC | Age: 64
End: 2024-04-29

## 2024-04-29 DIAGNOSIS — E11.9 DIABETES MELLITUS, TYPE 2 (H): ICD-10-CM

## 2024-04-29 DIAGNOSIS — Z79.01 LONG TERM CURRENT USE OF ANTICOAGULANT THERAPY: ICD-10-CM

## 2024-04-29 DIAGNOSIS — I27.82 CHRONIC PULMONARY EMBOLISM WITHOUT ACUTE COR PULMONALE, UNSPECIFIED PULMONARY EMBOLISM TYPE (H): ICD-10-CM

## 2024-04-29 DIAGNOSIS — I27.82 CHRONIC PULMONARY EMBOLISM WITHOUT ACUTE COR PULMONALE, UNSPECIFIED PULMONARY EMBOLISM TYPE (H): Primary | ICD-10-CM

## 2024-04-29 LAB — INR PPP: 1.5 (ref 0.85–1.15)

## 2024-04-29 PROCEDURE — 85610 PROTHROMBIN TIME: CPT | Performed by: PATHOLOGY

## 2024-04-29 PROCEDURE — 36415 COLL VENOUS BLD VENIPUNCTURE: CPT | Performed by: PATHOLOGY

## 2024-04-29 NOTE — PROGRESS NOTES
ANTICOAGULATION MANAGEMENT     Oswaldo Prabhakar 63 year old male is on warfarin with subtherapeutic INR result. (Goal INR 2.0-3.0)    Recent labs: (last 7 days)     04/29/24  1544   INR 1.50*       ASSESSMENT     Over due for INR, office visit with Cards last month 3/7/24 to follow up chest pain, no new med changes. Zio patch       PLAN     Unable to reach Oswaldo today.    Left message to take a booster dose of warfarin,  7.5 mg tonight. Request call back for assessment.    Follow up required to confirm warfarin dose taken and assess for changes, confirm warfarin dose taken, discuss out of range result , and discuss dosing instructions and confirm understanding of instructions    Rebekah Bhakta RN  Anticoagulation Clinic  4/29/2024

## 2024-04-30 DIAGNOSIS — D31.31 CHOROIDAL NEVUS, RIGHT: Primary | ICD-10-CM

## 2024-04-30 NOTE — PROGRESS NOTES
ANTICOAGULATION MANAGEMENT     Oswaldo Prabhakar 63 year old male is on warfarin with subtherapeutic INR result. (Goal INR 2.0-3.0)    Recent labs: (last 7 days)     04/29/24  1544   INR 1.50*       ASSESSMENT     Source(s): Chart Review  Previous INR was Therapeutic last 2(+) visits  Medication, diet, health changes since last INR chart reviewed; none identified         PLAN     Recommended plan for no diet, medication or health factor changes affecting INR     Dosing Instructions: booster dose then Increase your warfarin dose (12.5% change) with next INR in 1 week       Summary  As of 4/29/2024      Full warfarin instructions:  4/29: 7.5 mg; Otherwise 5 mg every Mon, Wed, Fri; 7.5 mg all other days   Next INR check:  5/6/2024               Attempted to reach patient x2.   Detailed voice message left for Oswaldo with dosing instructions and follow up date.   Sent IntooBR message with dosing and follow up instructions    Contact [phone] to schedule and with any changes, questions or concerns.     Education provided:   Please call back if any changes to your diet, medications or how you've been taking warfarin    Plan made per [facility] anticoagulation protocol    Margarita Cabral RN  Anticoagulation Clinic  4/30/2024    _______________________________________________________________________     Anticoagulation Episode Summary       Current INR goal:  2.0-3.0   TTR:  78.8% (10.2 mo)   Target end date:  Indefinite   Send INR reminders to:  [facility]    Indications    Pulmonary emboli (H) [I26.99]  Long-term (current) use of anticoagulants [Z79.01] [Z79.01]  Chronic pulmonary embolism without acute cor pulmonale  unspecified pulmonary embolism type (H) [I27.82]             Comments:               Anticoagulation Care Providers       Provider Role Specialty Phone number    Samm Vazquez MD Referring Internal Medicine - Pediatrics [phone]

## 2024-05-17 ENCOUNTER — OFFICE VISIT (OUTPATIENT)
Dept: OPHTHALMOLOGY | Facility: CLINIC | Age: 64
End: 2024-05-17
Attending: OPHTHALMOLOGY
Payer: COMMERCIAL

## 2024-05-17 DIAGNOSIS — D31.31 CHOROIDAL NEVUS, RIGHT: ICD-10-CM

## 2024-05-17 PROCEDURE — 99214 OFFICE O/P EST MOD 30 MIN: CPT | Performed by: OPHTHALMOLOGY

## 2024-05-17 PROCEDURE — 99214 OFFICE O/P EST MOD 30 MIN: CPT | Mod: GC | Performed by: OPHTHALMOLOGY

## 2024-05-17 PROCEDURE — 76510 OPH US DX B-SCAN&QUAN A-SCAN: CPT | Performed by: OPHTHALMOLOGY

## 2024-05-17 ASSESSMENT — VISUAL ACUITY
OS_SC: 20/20
OD_SC: LP
METHOD: SNELLEN - LINEAR
OS_SC+: +2

## 2024-05-17 ASSESSMENT — TONOMETRY
OS_IOP_MMHG: 11
IOP_METHOD: TONOPEN
OD_IOP_MMHG: 20

## 2024-05-17 ASSESSMENT — EXTERNAL EXAM - LEFT EYE: OS_EXAM: NORMAL

## 2024-05-17 ASSESSMENT — SLIT LAMP EXAM - LIDS
COMMENTS: NORMAL
COMMENTS: NORMAL

## 2024-05-17 ASSESSMENT — EXTERNAL EXAM - RIGHT EYE: OD_EXAM: NORMAL

## 2024-05-17 NOTE — NURSING NOTE
Chief Complaints and History of Present Illnesses   Patient presents with    Follow Up     Choroidal nevus, right     Chief Complaint(s) and History of Present Illness(es)       Follow Up              Comments: Choroidal nevus, right              Comments    Pt states no change in VA since last visit  States no flashes or floaters   No eye pain or redness    Morelia Ricketts COT 8:51 AM May 17, 2024

## 2024-05-17 NOTE — PROGRESS NOTES
CC -   Choroidal lesion OD    INTERVAL HISTORY - four month follow up. No changes in vision. He is not using any eye drops right now in his right eye. He stopped shortly after last visit with Dr. Savage.    last DFE OS 4/2023    PMH  -   Oswaldo Prabhakar is a 63 year old patient with h/o choroidal lesion OD noted 4/2023 by Modesta.    Came to [facility] for NVG OD 8/2022 seen in ED, had been at [facility], diagnosis with NVG OD and referred to [facility] for CPC and PRP, seen by Adebayo at [facility] and Modesta Savage at [facility].    Poor vision OD since childhood d/t RD not diagnosis until too late to repair    CAD s/p CABG x 4  No h/o cancer    PE on chronic warfarin    PAST OCULAR SURGERY:  CE/IOL OU 2022  Laser pexy OS 2000s  CPC OD 5/17/23 ([facility])  CPC OD 10/25/23    RETINAL IMAGING  OCT 01/12/24   OD - macula diffuse ERM, severe outer atrophy diffuse  OS - (prior) retina normal, PHF attached      FA 4/19/23:  - Right eye: Diffuse pinpoint hyperfluorescence, trace disc leakage.   - Left eye: Trace disc leakage.       U/S OD  A-scan - med/high reflective 5/17/24  B-scan - ST mass with hyperR base, new vitreous hemorrhage?, size 2.27 x 5.88T . 4.42L (4/2023) -> 1.95mm x 6.19T x 5.01L (06/12/23) (6/2023) -> 2.26 x 6.38T x 5.10L (9/12/23)-> 2.29 x 5.13L x 6.56T (1/12/24) - 2.27 x 6.56T x 5.14L. (5/17/24)        ASSESSMENT & PLAN    # choroidal lesion OD   - noted 4/19/232023   - dome shaped anterior, difficult to see on exam d/t IOL edge/capsule & modeate pupil   - U/S not typical for met or CMM, hyper-reflective base but no visible external abnormalities   - too anterior for Optos or OCT     - ?weak transillumination shadow visible (6/2023)   - surrounding DBH 9/2023   - suspect PEHCR but cannot r/o large nevus     - continue to monitor closely as precuation   - stable today on B-scan but there is no view to the fundus today due to hyphema vs vit heme   - recheck 4 months   - if stable then longer intervals      # NVG OD   - sees  Radhaitli   - h/o Avastin ~ 1/2023  (VRS)   - seen by Modesta 4/19/23, no sig ischemia to Tx on FA 4/19/23   - s/p CPC 6/2023     - IOP 20 today despite stopping all glaucoma drops in January   - still with dense NVI and new hyphema today   - could consider anti-VEGF      # VH/hyphema   - suspect bleeding from NVI & VH from hyphema       # h/o RD OD childhood 1970s   - ?exudative vs RRD   - resolved spontanously with poor vision since childhood        Return in about 4 months (around 9/17/2024) for DFE OU, OCT OU, Optos Photo, US OD.     Petra Martinez MD  PGY3 Ophthalmology Resident  [facility]    ATTESTATION     Attending Physician Attestation:      Complete documentation of historical and exam elements from today's encounter can be found in the full encounter summary report (not reduplicated in this progress note).  I personally obtained the chief complaint(s) and history of present illness.  I confirmed and edited as necessary the review of systems, past medical/surgical history, family history, social history, and examination findings as documented by others; and I examined the patient myself.  I personally reviewed the relevant tests, images, and reports as documented above.  I personally reviewed the ophthalmic test(s) associated with this encounter, agree with the interpretation(s) as documented by the resident/fellow, and have edited the corresponding report(s) as necessary.   I formulated and edited as necessary the assessment and plan and discussed the findings and management plan with the patient and family    Maya Isaac MD, PhD  , Vitreoretinal Surgery  Department of Ophthalmology  [facility]

## 2024-05-21 ENCOUNTER — DOCUMENTATION ONLY (OUTPATIENT)
Dept: ANTICOAGULATION | Facility: CLINIC | Age: 64
End: 2024-05-21
Payer: COMMERCIAL

## 2024-05-21 NOTE — PROGRESS NOTES
ANTICOAGULATION     Oswaldo Prabhakar is overdue for an INR check.     Spoke with Oswaldo and scheduled lab appointment on 5/29/24    Morelia Clemens RN

## 2024-05-23 ENCOUNTER — OFFICE VISIT (OUTPATIENT)
Dept: OPHTHALMOLOGY | Facility: CLINIC | Age: 64
End: 2024-05-23
Attending: OPHTHALMOLOGY
Payer: COMMERCIAL

## 2024-05-23 DIAGNOSIS — H40.51X3 NEOVASCULAR GLAUCOMA OF RIGHT EYE, SEVERE STAGE: ICD-10-CM

## 2024-05-23 DIAGNOSIS — D31.31 CHOROIDAL NEVUS, RIGHT: Primary | ICD-10-CM

## 2024-05-23 PROCEDURE — 92012 INTRM OPH EXAM EST PATIENT: CPT | Performed by: OPHTHALMOLOGY

## 2024-05-23 PROCEDURE — 99213 OFFICE O/P EST LOW 20 MIN: CPT | Performed by: OPHTHALMOLOGY

## 2024-05-23 ASSESSMENT — SLIT LAMP EXAM - LIDS
COMMENTS: NORMAL
COMMENTS: NORMAL

## 2024-05-23 ASSESSMENT — TONOMETRY
OD_IOP_MMHG: 11
IOP_METHOD: TONOPEN
OS_IOP_MMHG: 12

## 2024-05-23 ASSESSMENT — VISUAL ACUITY
METHOD: SNELLEN - LINEAR
OS_SC: 20/20
OD_SC: HM

## 2024-05-23 ASSESSMENT — EXTERNAL EXAM - LEFT EYE: OS_EXAM: NORMAL

## 2024-05-23 ASSESSMENT — EXTERNAL EXAM - RIGHT EYE: OD_EXAM: NORMAL

## 2024-05-23 NOTE — NURSING NOTE
Chief Complaints and History of Present Illnesses   Patient presents with    Glaucoma Follow-Up     4 month follow up Neovascular glaucoma of right eye, severe stage     Chief Complaint(s) and History of Present Illness(es)       Glaucoma Follow-Up              Associated symptoms: Negative for eye pain, flashes and floaters    Pain scale: 0/10    Comments: 4 month follow up Neovascular glaucoma of right eye, severe stage              Comments    No VA changes since last visit.   Denies eye pain, or discomfort.   Using AT PRN each eye.  No new concerns.     Elieser Woody 10:33 AM May 23, 2024

## 2024-05-23 NOTE — PROGRESS NOTES
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   - 05/17/2023: s/p right eye TSCPC #20 shots  - S/p repest TSCPC right eye 10/25/23    Today, 05/23/2024, s/p TSCPC right eye 5/17/23, S/p repeat TSCPC 10/25/23. Patient states eye continues to be comfortable and without pain. No changes in left eye vision. He has stopped using all glaucoma drops in the right eye.     Relevant Past Medical/Family/Social History:  Past Medical History:   Diagnosis Date    Antiplatelet or antithrombotic long-term use     Coronary artery disease     Diaphragmatic hernia without mention of obstruction or gangrene     Heart disease     Hernia, abdominal     History of blood transfusion     History of thrombophlebitis     Hypertension     Nephrolithiasis 4/2010    Other and unspecified hyperlipidemia     Pulmonary embolus and infarction (H)     s/p hemothorax and filter s/p filter removal (2011), now on long term anti-coagulation    Statin intolerance 2/1/2017    Stented coronary artery 01/07/2020    NIR mid LAD    Unspecified retinal detachment     Childhood trauma, unrepaired     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[employer]  No family history of glaucoma    Relevant Review of Systems: chronic decreased vision in the right eye, no pain in the right eye, feels like his left eye is at baseline as well     Diagnosis: NVG right eye   Year diagnosis: 2023  Previous glaucoma surgery/laser: CEIOL left eye, PRP left eye, CEIOL right eye  Maximum intraocular pressure 50s  Currently Meds: Diamox 500 mg PO BID, prednisolone BID OD, timolol BID right eye, Brimonidine TID OD  Family history: negative  CCT:  551/548  Gonio:  Refractive status: Pseudophakia  Trauma history: negative  Steroid exposure: positive prednisolone  Vasospastic disease: Migrane/Raynaud phenomenon: negative  A past hemodynamic crisis or Low BP: negative  Meds AEs/intolerance: none  PMHx: Asthma and respiratory problems/Cardiac/Renal/Kidney stones/Sulfa Allergy: kidney stones, bypass surgery  Anticoagulants: Warfarin for complicated PE  - Visual field April 13, 2023  - Right eye - not able to completed  - Left eye - normal, reliable  - OCT Optic Nerve RNFL Spectralis April 13, 2023  - Right Eye: diffuse atrophy, reliable  - Left Eye: trace thinning IT, reliable    Today's testing:  See examination    Additional Ocular History:   Anterior uveitis, chronic, right eye    Hx of spontaneous RD 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  Patient has chronic RD/inflammation and neovascularization in the right eye. He has had one injection int the right eye about 7 months ago. Now s/p repeat right eye TSCPC Subjectively comfortable. will avoid tube shunt, poor visual prognosis and possible CMM.  Saw Dr. Isaac today. 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 Still concerned for intraocular malignancy. Has new hyphema. Recommends holding off on tube. Following up [id number]  Eye drops RIGHT EYE  Continue to hold off Cosopt, Brimonidine and Latanoprost, IOP doing okay     RTC in 3 months VA, IOP ( if on next visit IOP is still good off drops then extend visit to 6 months)      Physician Attestation     Attending Physician Attestation:  Complete documentation of historical and exam elements from today's encounter can be found  in the full encounter summary report (not reduplicated in this progress note). I personally obtained the chief complaint(s) and history of present illness. I confirmed and edited as necessary the review of systems, past medical/surgical history, family history, social history, and examination findings as documented by others; and I examined the patient myself. I personally reviewed the relevant tests, images, and reports as documented above. I formulated and edited as necessary the assessment and plan and discussed the findings and management plan with the patient and any family members present at the time of the visit.  Jay Jay Savage M.D., Glaucoma, May 23, 2024

## 2024-05-30 ENCOUNTER — OFFICE VISIT (OUTPATIENT)
Dept: OPHTHALMOLOGY | Facility: CLINIC | Age: 64
End: 2024-05-30
Attending: OPHTHALMOLOGY
Payer: COMMERCIAL

## 2024-05-30 DIAGNOSIS — H40.50X0 NEOVASCULAR GLAUCOMA, UNSPECIFIED GLAUCOMA STAGE, UNSPECIFIED LATERALITY: Primary | ICD-10-CM

## 2024-05-30 PROCEDURE — 99207 PR SERVICE NOT STAFFED W/SUPERV PROV: CPT | Mod: GC | Performed by: OPHTHALMOLOGY

## 2024-05-30 ASSESSMENT — CONF VISUAL FIELD
OS_SUPERIOR_TEMPORAL_RESTRICTION: 0
OD_INFERIOR_TEMPORAL_RESTRICTION: 1
OD_SUPERIOR_TEMPORAL_RESTRICTION: 1
OS_NORMAL: 1
OD_SUPERIOR_NASAL_RESTRICTION: 1
OS_INFERIOR_TEMPORAL_RESTRICTION: 0
OD_INFERIOR_NASAL_RESTRICTION: 1
OS_SUPERIOR_NASAL_RESTRICTION: 0
OS_INFERIOR_NASAL_RESTRICTION: 0

## 2024-05-30 ASSESSMENT — EXTERNAL EXAM - RIGHT EYE: OD_EXAM: NORMAL

## 2024-05-30 ASSESSMENT — EXTERNAL EXAM - LEFT EYE: OS_EXAM: NORMAL

## 2024-05-30 ASSESSMENT — SLIT LAMP EXAM - LIDS
COMMENTS: NORMAL
COMMENTS: NORMAL

## 2024-05-30 ASSESSMENT — TONOMETRY
IOP_METHOD: TONOPEN
OD_IOP_MMHG: 18
OS_IOP_MMHG: 13

## 2024-05-30 ASSESSMENT — VISUAL ACUITY
OS_SC: 20/20
OD_SC: HM
METHOD: SNELLEN - LINEAR

## 2024-05-30 NOTE — NURSING NOTE
Chief Complaints and History of Present Illnesses   Patient presents with    Hyphema Evaluation     Chief Complaint(s) and History of Present Illness(es)       Hyphema Evaluation              Laterality: right eye    Duration: 1 day              Comments    Pt. States that he was at work today and coworker noticed pooling blood inferior RE. Does have some pressure sensation and a headache today RE.  Ynes Iva COT 2:19 PM May 30, 2024

## 2024-05-30 NOTE — PROGRESS NOTES
CC -   Concern for bleeding in left eye     INTERVAL HISTORY - No changes to vision or symptoms. Denies any pain. A coworker pointed that the bleeding inside his right eye appears slightly worse.       PMH  -   Oswaldo Prabhakar is a 63 year old patient with h/o NVG OD and choroidal lesion OD noted 4/2023 by Modesta.    Came to [facility] for NVG OD 8/2022 seen in ED, had been at [facility], diagnosis with NVG OD and referred to [facility] for CPC and PRP, seen by Adebayo at [facility] and Modesta & Janette at [facility].    Poor vision OD since childhood d/t RD not diagnosis until too late to repair    Earlier today, a coworker noticed that the bleeding in the right eye appeared worse, prompting this visit. Denies any vision changes though poor vision at baseline. Denies any pain.     CAD s/p CABG x 4  No h/o cancer    PE on chronic warfarin    PAST OCULAR SURGERY:  CE/IOL OU 2022  Laser pexy OS 2000s  CPC OD 5/17/23 ([facility])  CPC OD 10/25/23    RETINAL IMAGING  None today       ASSESSMENT & PLAN  # VH/hyphema   - suspect bleeding from NVI & VH from hyphema   - increased from previous evaluation 5/23 but patient appears comfortable     # NVG OD   - sees Janette   - h/o Avastin ~ 1/2023  (VRS)   - seen by Modesta 4/19/23, no sig ischemia to Tx on FA 4/19/23   - s/p CPC 6/2023     - IOP 18 today    - still with dense NVI and hyphema increased in size today   - could consider anti-VEGF though poor seeing eye at baseline and patient comfortable today with pressure WNL    - Will schedule appointment with Dr. Isaac at next available    - Not addressed today -     # choroidal lesion OD   - noted 4/19/23   - dome shaped anterior, difficult to see on exam d/t IOL edge/capsule & modeate pupil   - U/S not typical for met or CMM, hyper-reflective base but no visible external abnormalities   - too anterior for Optos or OCT     - ?weak transillumination shadow visible (6/2023)   - surrounding DBH 9/2023   - suspect PEHCR but cannot r/o large  nevus     - continue to monitor closely as precuation   - stable today on B-scan but there is no view to the fundus today due to hyphema vs vit heme   - recheck 4 months   - if stable then longer intervals    # h/o RD OD childhood 1970s   - ?exudative vs RRD   - resolved spontanously with poor vision since childhood      Follow up with Dr. Isaac 6/7    Patient discussed with Dr. iRcketts.     Stormy David MD  PGY-2  Department of Ophthalmology  May 30, 2024 4:40 PM     Not seen by staff during this visit, available should need have arisen.  Plan appropriate as above.    Baljeet Ricketts MD  , Comprehensive Ophthalmology  Department of Ophthalmology and Visual Neurosciences  [facility]

## 2024-06-06 ENCOUNTER — MYC MEDICAL ADVICE (OUTPATIENT)
Dept: ANTICOAGULATION | Facility: CLINIC | Age: 64
End: 2024-06-06
Payer: COMMERCIAL

## 2024-06-07 ENCOUNTER — OFFICE VISIT (OUTPATIENT)
Dept: OPHTHALMOLOGY | Facility: CLINIC | Age: 64
End: 2024-06-07
Attending: OPHTHALMOLOGY
Payer: COMMERCIAL

## 2024-06-07 DIAGNOSIS — H21.1X1 RUBEOSIS IRIDIS OF RIGHT EYE: ICD-10-CM

## 2024-06-07 DIAGNOSIS — H40.51X3 NEOVASCULAR GLAUCOMA OF RIGHT EYE, SEVERE STAGE: Primary | ICD-10-CM

## 2024-06-07 DIAGNOSIS — H35.9 RETINAL LESION OF RIGHT EYE: ICD-10-CM

## 2024-06-07 DIAGNOSIS — H43.11 VITREOUS HEMORRHAGE, RIGHT (H): ICD-10-CM

## 2024-06-07 PROCEDURE — 76512 OPH US DX B-SCAN: CPT | Performed by: OPHTHALMOLOGY

## 2024-06-07 PROCEDURE — 99213 OFFICE O/P EST LOW 20 MIN: CPT | Mod: 25 | Performed by: OPHTHALMOLOGY

## 2024-06-07 PROCEDURE — 250N000011 HC RX IP 250 OP 636: Mod: JZ | Performed by: OPHTHALMOLOGY

## 2024-06-07 PROCEDURE — 67028 INJECTION EYE DRUG: CPT | Mod: RT | Performed by: OPHTHALMOLOGY

## 2024-06-07 RX ADMIN — Medication 1.25 MG: at 10:08

## 2024-06-07 ASSESSMENT — VISUAL ACUITY
METHOD: SNELLEN - LINEAR
OD_SC: HM
OS_SC: 20/20

## 2024-06-07 ASSESSMENT — TONOMETRY
OD_IOP_MMHG: 16
OS_IOP_MMHG: 8
IOP_METHOD: TONOPEN

## 2024-06-07 ASSESSMENT — CUP TO DISC RATIO: OS_RATIO: 0.5

## 2024-06-07 ASSESSMENT — SLIT LAMP EXAM - LIDS
COMMENTS: NORMAL
COMMENTS: NORMAL

## 2024-06-07 ASSESSMENT — EXTERNAL EXAM - RIGHT EYE: OD_EXAM: EXOTROPIA

## 2024-06-07 ASSESSMENT — EXTERNAL EXAM - LEFT EYE: OS_EXAM: NORMAL

## 2024-06-07 NOTE — PROGRESS NOTES
CC -   Choroidal lesion and NVG OD    INTERVAL HISTORY - three week follow up. He saw Dr. Savage two weeks ago and everything was stable. He then experienced worsening vision in the right eye and it was noticed by a nephrologist at the [facility] that the front part of his eye looked more bloody. He was seen in acute clinic by Dr. David and was noted to have worsening hyphema/vit heme. He is still noticing blood in his eye, no changes since last week.    last DFE OS 06/2024    PMH  -   Oswaldo Prabhakar is a 63 year old patient with h/o choroidal lesion OD noted 4/2023 by Modesta.    Came to [facility] for NVG OD 8/2022 seen in ED, had been at [facility], diagnosis with NVG OD and referred to [facility] for CPC and PRP, seen by Adebayo at [facility] and Modesta Savage at [facility].    Poor vision OD since childhood d/t RD not diagnosis until too late to repair    CAD s/p CABG x 4  No h/o cancer    PE on chronic warfarin    PAST OCULAR SURGERY:  CE/IOL OU 2022  Laser pexy OS 2000s  CPC OD 5/17/23 ([facility])  CPC OD 10/25/23    RETINAL IMAGING  OCT 06/07/24  OD - no view  OS - (prior) retina normal, PHF attached      FA 4/19/23:  - Right eye: Diffuse pinpoint hyperfluorescence, trace disc leakage.   - Left eye: Trace disc leakage.       U/S OD  A-scan - med/high reflective 5/17/24  B-scan - ST mass with hyperR base, new vitreous hemorrhage?, size 2.27 x 5.88T . 4.42L (4/2023) -> 1.95mm x 6.19T x 5.01L (06/12/23) (6/2023) -> 2.26 x 6.38T x 5.10L (9/12/23)-> 2.29 x 5.13L x 6.56T (1/12/24) - 2.27 x 6.56T x 5.14L. (5/17/24)    B-scan right eye 6/7/24: vitreous heme, concern for two tears      ASSESSMENT & PLAN    # choroidal lesion OD   - noted 4/19/232023   - dome shaped anterior, difficult to see on exam d/t IOL edge/capsule & modeate pupil   - U/S not typical for met or CMM, hyper-reflective base but no visible external abnormalities   - too anterior for Optos or OCT     - ?weak transillumination shadow visible (6/2023)   - surrounding DBH  9/2023   - suspect PEHCR but cannot r/o large nevus     - continue to monitor closely as precuation   - stable on B-scan 5/2024  but no view to the fundus\  due to hyphema/VH   - next scan planned 4 month interval (~9/2024)   - if stable then longer intervals      # NVG OD   - sees Janette   - h/o Avastin ~ 1/2023  (VRS)   - seen by Modesta 4/19/23, no sig ischemia to Tx on FA 4/19/23   - s/p CPC 6/2023     - IOP OK  today despite stopping all glaucoma drops in January   - still with dense NVI and new hyphema today   - suspect NVI causing hyphema & spillover VH   - Avastin today 6/7/24     - r/b/a IVT anti-VEGF d/w patient   - infection, blindness, need for surgery   - off-label use of Avastin     - recheck 1 month   - may need scheduled Avastin to prevent recurrence      # VH/hyphema   - suspect bleeding from NVI & VH from hyphema   - on chronic Warfarin due to history of bilateral unprovoked PE   - could consider AC washout/PPV/Avastin to clear VH and evaluate for possible RD given concern for tears however low vision potential       # Possible retinal tears OD   - seen on U/S 6/2024   - has extensive retinal scarring from prior spontaneously resolved RD   - only small superior sector of unscarred retina   - doubt likely to detach   - monitor      # h/o RD OD childhood 1970s   - ?exudative vs RRD   - resolved spontanously with poor vision since childhood        Return in about 1 month (around 7/7/2024) for DFE OD, US OD.     Petra Martinez MD  PGY3 Ophthalmology Resident  [facility]    ATTESTATION     Attending Physician Attestation:      Complete documentation of historical and exam elements from today's encounter can be found in the full encounter summary report (not reduplicated in this progress note).  I personally obtained the chief complaint(s) and history of present illness.  I confirmed and edited as necessary the review of systems, past medical/surgical history, family history, social  history, and examination findings as documented by others; and I examined the patient myself.  I personally reviewed the relevant tests, images, and reports as documented above.  I personally reviewed the ophthalmic test(s) associated with this encounter, agree with the interpretation(s) as documented by the resident/fellow, and have edited the corresponding report(s) as necessary.   I formulated and edited as necessary the assessment and plan and discussed the findings and management plan with the patient and family and No resident or fellow assisted with the procedures performed.  I performed the procedures myself.    Maya Isaac MD, PhD  , Vitreoretinal Surgery  Department of Ophthalmology  [facility]

## 2024-06-07 NOTE — NURSING NOTE
Chief Complaints and History of Present Illnesses   Patient presents with    Follow Up     1 week follow up     Chief Complaint(s) and History of Present Illness(es)       Follow Up              Associated symptoms: Negative for eye pain, flashes and floaters    Treatments tried: artificial tears    Pain scale: 0/10    Comments: 1 week follow up              Comments    Still has blood pooling in right eye since last week.  Denies eye pain or discomfort.    No vision changes either eye.   Using AT PRN for dryness.    Elieser Woody 8:46 AM June 7, 2024

## 2024-06-08 ENCOUNTER — HEALTH MAINTENANCE LETTER (OUTPATIENT)
Age: 64
End: 2024-06-08

## 2024-06-18 ENCOUNTER — DOCUMENTATION ONLY (OUTPATIENT)
Dept: ANTICOAGULATION | Facility: CLINIC | Age: 64
End: 2024-06-18
Payer: COMMERCIAL

## 2024-06-18 NOTE — LETTER
[facility]  [address] [address]  Phone: [phone]  Fax: [phone]   June 18, 2024        Oswaldo Prabhakar  [facility]  [place] [address]            Dear Oswaldo,    You are currently under the care of [facility] for your warfarin (Coumadin , Jantoven ) therapy.  We are contacting you because our records show you were due for an INR on 5/6/2024.    There are potentially serious risks when taking warfarin without careful monitoring and we want to make sure you are safely managed.  Routine lab monitoring is required for warfarin refills.     Please call [phone] as soon as possible to schedule a lab appointment. If it is difficult for you to get to lab, please call us to discuss options.  If there has been a change in your care or other concerns, please let us know so we can help and/or update our records.         Sincerely,       [facility]

## 2024-06-18 NOTE — PROGRESS NOTES
ANTICOAGULATION     Oswaldo Prabhakar is overdue for an INR check.     Reminder letter sent    CASEY MANZO RN

## 2024-06-20 ENCOUNTER — NURSE TRIAGE (OUTPATIENT)
Dept: NURSING | Facility: CLINIC | Age: 64
End: 2024-06-20
Payer: COMMERCIAL

## 2024-06-20 ENCOUNTER — OFFICE VISIT (OUTPATIENT)
Dept: OPHTHALMOLOGY | Facility: CLINIC | Age: 64
End: 2024-06-20
Attending: OPHTHALMOLOGY
Payer: COMMERCIAL

## 2024-06-20 DIAGNOSIS — H43.11 VITREOUS HEMORRHAGE, RIGHT (H): Primary | ICD-10-CM

## 2024-06-20 PROCEDURE — 99213 OFFICE O/P EST LOW 20 MIN: CPT | Mod: GC | Performed by: OPHTHALMOLOGY

## 2024-06-20 PROCEDURE — 99213 OFFICE O/P EST LOW 20 MIN: CPT | Performed by: OPHTHALMOLOGY

## 2024-06-20 PROCEDURE — 76512 OPH US DX B-SCAN: CPT | Performed by: OPHTHALMOLOGY

## 2024-06-20 PROCEDURE — 76512 OPH US DX B-SCAN: CPT | Mod: 26 | Performed by: OPHTHALMOLOGY

## 2024-06-20 ASSESSMENT — TONOMETRY
OS_IOP_MMHG: 12
IOP_METHOD: TONOPEN
OD_IOP_MMHG: 25

## 2024-06-20 ASSESSMENT — CONF VISUAL FIELD
OS_NORMAL: 1
OD_INFERIOR_TEMPORAL_RESTRICTION: 1
OS_SUPERIOR_TEMPORAL_RESTRICTION: 0
OS_SUPERIOR_NASAL_RESTRICTION: 0
OS_INFERIOR_NASAL_RESTRICTION: 0
OD_INFERIOR_NASAL_RESTRICTION: 1
OS_INFERIOR_TEMPORAL_RESTRICTION: 0
OD_SUPERIOR_TEMPORAL_RESTRICTION: 1
OD_SUPERIOR_NASAL_RESTRICTION: 1

## 2024-06-20 ASSESSMENT — VISUAL ACUITY
OS_SC: 20/20
OD_SC: NLP
METHOD: SNELLEN - LINEAR

## 2024-06-20 ASSESSMENT — EXTERNAL EXAM - LEFT EYE: OS_EXAM: NORMAL

## 2024-06-20 ASSESSMENT — EXTERNAL EXAM - RIGHT EYE: OD_EXAM: EXOTROPIA

## 2024-06-20 ASSESSMENT — SLIT LAMP EXAM - LIDS
COMMENTS: NORMAL
COMMENTS: NORMAL

## 2024-06-20 ASSESSMENT — CUP TO DISC RATIO: OS_RATIO: 0.5

## 2024-06-20 NOTE — NURSING NOTE
Chief Complaints and History of Present Illnesses   Patient presents with    Decreased Vision Right Eye     Chief Complaint(s) and History of Present Illness(es)       Decreased Vision Right Eye              Laterality: right eye    Associated symptoms: eye pain, headache (last night mild headach, none this morning) and flashes    Treatments tried: no treatments    Pain scale: 2/10              Comments    Last night he saw a bright flash of light in his right eye and since then he has not had any vision.   Prior to the flash he could see hand motion with that eye.      Josselyn Hyman, COT 9:36 AM  June 20, 2024

## 2024-06-20 NOTE — PROGRESS NOTES
CC: vision loss right eye  First appointment with me  Referred by:   HPI: Oswaldo Prabhakar is a 63 year old year-old patient with history of poor vision right eye since childhood due to RD who presents for complete vision loss in the right eye last night. Seen by Dr. Isaac 6/7/24 and underwent Avastin injection right eye for NVG.     Last night he was sitting still and noticed sudden onset complete vision loss in the right eye. No trauma/bending/lifting. He maybe saw a flash at the time as well. No return of vision since onset.    PMH:  CAD s/p CABG x 4  No h/o cancer  PE on chronic warfarin    Past ocular history:  CE/IOL OU 2022  Laser pexy OS 2000s  CPC OD 5/17/23 ([facility])  CPC OD 10/25/23      Retinal Imaging:  B-scan right eye     Assessment & Plan:  # loss of vision right eye in the setting of   #NVG, right eye  Previoulsly noted optic nerve atrophy  #Hyphema, right eye   #Vit heme, right eye     - follows with Dr. Savage and dr. Isaac   - s/p CPC 6/2023  - s/p Avastin 6/7/24  - vision today NLP  - IOP 25  - B-scan 06/20/24 showed vitreous hemorrhage; choroidal lesion appears stable and concerns for retina tear and peripheral VR traction- all stable   Plan see below    #Possible retinal tears, right eye  - noted on B-scan 6/2024 with Dr. Isaac  - has extensive retinal scarring from prior spontaneously resolved RD     #Choroidal lesion, right eye  - suspect PEHCR vs large nevus    # h/o RD OD childhood 1970s  - ?exudative vs RRD  - resolved spontanously with poor vision since childhood    # monocular patient     PLAN:   Recommend to observe  Could consider surgery but Doubt that Pars plana vitrectomy (PPV) and Anterior chamber wash out will improve vision back to HM.  Observe - patient without  eye pain  Recommend follow up with Dr. Isaac next week  The patient was told to wear polycarbonte glasses at all times to protect the good eye.  he expressed understanding.            Petra  MD Juan  PGY3 Ophthalmology Resident  [facility]    ~~~~~~~~~~~~~~~~~~~~~~~~~~~~~~~~~~   Complete documentation of historical and exam elements from today's encounter can be found in the full encounter summary report (not reduplicated in this progress note).  I personally obtained the chief complaint(s) and history of present illness.  I confirmed and edited as necessary the review of systems, past medical/surgical history, family history, social history, and examination findings as documented by others; and I examined the patient myself.  I personally reviewed the relevant tests, images, and reports as documented above.  I personally reviewed the ophthalmic test(s) associated with this encounter, agree with the interpretation(s) as documented by the resident/fellow, and have edited the corresponding report(s) as necessary.   I formulated and edited as necessary the assessment and plan and discussed the findings and management plan with the patient and family    Anjali Glass MD  Professor of Ophthalmology.   Vitreo-retinal surgeon   Department of Ophthalmology and Visual Neurosciences   [facility]  Phone: [phone]   Fax: [phone]

## 2024-06-20 NOTE — TELEPHONE ENCOUNTER
Scheduled with Dr. Glass at 0920 today    Pt aware of date/time/location at [facility]    Delonte Conley RN 8:38 AM 06/20/24

## 2024-06-20 NOTE — TELEPHONE ENCOUNTER
"Additional Information   Negative: Weakness of the face, arm or leg on one side of the body   Negative: Followed getting substance in the eye   Negative: Foreign body stuck in the eye   Negative: Followed an eye injury   Negative: Followed sun lamp or sun exposure (UV keratitis)   Negative: Yellow or green discharge (pus) in the eye   Negative: Pregnant    Answer Assessment - Initial Assessment Questions  1. DESCRIPTION: \"How has your vision changed?\" (e.g., complete vision loss, blurred vision, double vision, floaters, etc.)      Complete vision loss   2. LOCATION: \"One or both eyes?\" If one, ask: \"Which eye?\"  Right eye  3. SEVERITY: \"Can you see anything?\" If Yes, ask: \"What can you see?\" (e.g., fine print)      nothing  4. ONSET: \"When did this begin?\" \"Did it start suddenly or has this been gradual?\"      Last night  5. PATTERN: \"Does this come and go, or has it been constant since it started?\"      constant  6. PAIN: \"Is there any pain in your eye(s)?\"  (Scale 1-10; or mild, moderate, severe)    - NONE (0): No pain.    - MILD (1-3): Doesn't interfere with normal activities.    - MODERATE (4-7): Interferes with normal activities or awakens from sleep.     - SEVERE (8-10): Excruciating pain, unable to do any normal activities.      mild  7. CONTACTS-GLASSES: \"Do you wear contacts or glasses?\"      glasses  8. CAUSE: \"What do you think is causing this visual problem?\"      Detached retina  9. OTHER SYMPTOMS: \"Do you have any other symptoms?\" (e.g., confusion, headache, arm or leg weakness, speech problems)      no  10. PREGNANCY: \"Is there any chance you are pregnant?\" \"When was your last menstrual period?\"        no    Protocols used: Vision Loss or Change-A-OH    "

## 2024-06-24 ENCOUNTER — OFFICE VISIT (OUTPATIENT)
Dept: OPHTHALMOLOGY | Facility: CLINIC | Age: 64
End: 2024-06-24
Attending: OPHTHALMOLOGY
Payer: COMMERCIAL

## 2024-06-24 DIAGNOSIS — H43.11 VITREOUS HEMORRHAGE, RIGHT (H): Primary | ICD-10-CM

## 2024-06-24 PROCEDURE — 76512 OPH US DX B-SCAN: CPT | Performed by: OPHTHALMOLOGY

## 2024-06-24 PROCEDURE — 99213 OFFICE O/P EST LOW 20 MIN: CPT | Performed by: OPHTHALMOLOGY

## 2024-06-24 ASSESSMENT — TONOMETRY
OD_IOP_MMHG: 27
IOP_METHOD: ICARE
IOP_METHOD: TONOPEN
OD_IOP_MMHG: 33
OS_IOP_MMHG: 13

## 2024-06-24 ASSESSMENT — VISUAL ACUITY
OD_SC: NLP
METHOD: SNELLEN - LINEAR
OS_SC: 20/20

## 2024-06-24 ASSESSMENT — EXTERNAL EXAM - RIGHT EYE: OD_EXAM: EXOTROPIA

## 2024-06-24 ASSESSMENT — SLIT LAMP EXAM - LIDS
COMMENTS: NORMAL
COMMENTS: NORMAL

## 2024-06-24 ASSESSMENT — EXTERNAL EXAM - LEFT EYE: OS_EXAM: NORMAL

## 2024-06-24 NOTE — NURSING NOTE
Chief Complaints and History of Present Illnesses   Patient presents with    Vitreous Hemorrhage Follow Up     Vitreous hemorrhage, right     Chief Complaint(s) and History of Present Illness(es)       Vitreous Hemorrhage Follow Up              Laterality: right eye    Associated symptoms: dryness, eye pain and headache.  Negative for tearing, flashes and floaters    Treatments tried: artificial tears    Pain scale: 2/10    Comments: Vitreous hemorrhage, right              Comments    Pt states vision is the same.  Pain RE 2/10.  No flashes/floaters.  Some occasional dryness.  Pt currently has a headache.  AT's PRN.    JOHN Rich June 24, 2024 8:51 AM

## 2024-06-24 NOTE — PROGRESS NOTES
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    Came to [facility] for NVG OD 8/2022 seen in ED, had been at [facility], diagnosis with NVG OD and referred to [facility] for CPC and PRP, seen by Adebayo at [facility] and Modesta Savage at [facility].    Poor vision OD since childhood d/t RD not diagnosis until too late to repair    CAD s/p CABG x 4  No h/o cancer    PE on chronic warfarin    PAST OCULAR SURGERY:  CE/IOL OU 2022  Laser pexy OS 2000s  CPC OD 5/17/23 (HS)  CPC OD 10/25/23    RETINAL IMAGING  OCT 06/07/24  OD - no view  OS - (prior) retina normal, PHF attached      FA 4/19/23:  - Right eye: Diffuse pinpoint hyperfluorescence, trace disc leakage.   - Left eye: Trace disc leakage.       U/S OD  A-scan - med/high reflective 5/17/24  B-scan - ST mass with hyperR base, new vitreous hemorrhage?, size 2.27 x 5.88T . 4.42L (4/2023) -> 1.95mm x 6.19T x 5.01L (06/12/23) (6/2023) -> 2.26 x 6.38T x 5.10L (9/12/23)-> 2.29 x 5.13L x 6.56T (1/12/24) - 2.27 x 6.56T x 5.14L. (5/17/24)      U/S B-scan 6/24/24  OD - suspected RD OD      ASSESSMENT & PLAN    # RD OD   - suspected based on U/S since 6/20/24   - had prior possible tears   - despite extensive scarring from prior spontaneously resolved RD   - had relatively unaffected superior retinal wedge, now detached     - NLP not expected from RD   - suspect also vision loss from glaucomatous damage     - d/w patient surgery vs observe 6/2024   - given NLP and prior poor vision doubt benefit from surgery   - will observe        # choroidal lesion OD   - noted 4/19/232023   - dome shaped anterior, difficult to see on exam d/t IOL edge/capsule & modeate pupil   - U/S not typical for met or CMM, hyper-reflective base but no visible external abnormalities   - too  anterior for Optos or OCT     - ?weak transillumination shadow visible (6/2023)   - surrounding DBH 9/2023   - suspect PEHCR but cannot r/o large nevus     - continue to monitor closely as precuation   - stable on B-scan 5/2024  but no view to the fundus\  due to hyphema/VH   - next scan planned 4 month interval (~9/2024)   - if stable then longer intervals      # NVG OD   - sees Janette   - h/o Avastin ~ 1/2023  (VRS)   - seen by Modesta 4/19/23, no sig ischemia to Tx on FA 4/19/23   - s/p CPC 6/2023     - 6/7/24 IOP OK but new large NVI given Avastin     - 6/24/24 IOP mod elevated, mod persistent NVI temporal despite Avastin 6/7/24   - NLP now, no pain, monitor        # VH/hyphema   - suspect bleeding from NVI & VH from hyphema   - on chronic Warfarin due to history of bilateral unprovoked PE   - NLP since 6/19/24   - montior for now      # 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       ATTESTATION     Attending Physician Attestation:      Complete documentation of historical and exam elements from today's encounter can be found in the full encounter summary report (not reduplicated in this progress note).  I personally obtained the chief complaint(s) and history of present illness.  I confirmed and edited as necessary the review of systems, past medical/surgical history, family history, social history, and examination findings as documented by others; and I examined the patient myself.  I personally reviewed the relevant tests, images, and reports as documented above.  I formulated and edited as necessary the assessment and plan and discussed the findings and management plan with the patient and family    Maya Isaac MD, PhD  , Vitreoretinal Surgery  Department of Ophthalmology  [facility]

## 2024-06-28 DIAGNOSIS — H43.11 VITREOUS HEMORRHAGE, RIGHT (H): Primary | ICD-10-CM

## 2024-07-03 ENCOUNTER — DOCUMENTATION ONLY (OUTPATIENT)
Dept: ANTICOAGULATION | Facility: CLINIC | Age: 64
End: 2024-07-03
Payer: COMMERCIAL

## 2024-07-03 NOTE — LETTER
[facility]  [address] [address]  Phone: [phone]  Fax: [phone]   July 3, 2024        Oswaldo Prabhakar  [address]  [place] [address]            Dear Oswaldo,    You are currently under the care of [facility] for your warfarin (Coumadin , Jantoven ) therapy.  We are contacting you because our records show you were due for an INR on 5/6/24.    There are potentially serious risks when taking warfarin without careful monitoring and we want to make sure you are safely managed.  Routine lab monitoring is required for warfarin refills.     Please call [phone] as soon as possible to schedule a lab appointment. If it is difficult for you to get to lab, please call us to discuss options.  If there has been a change in your care or other concerns, please let us know so we can help and/or update our records.         Sincerely,       [facility]

## 2024-07-03 NOTE — PROGRESS NOTES
Anticoagulation Clinic Notification    Oswaldo, is past due for an INR. Their last result was 1.5 on 4/29/24 and was due to come back on 5/6/24.    he received phone calls and letters over the last several weeks in attempt to arrange follow up labs. Oswaldo Prabhakar will be contacted again today.     Please contact patient directly to discuss compliance with monitoring or schedule visit to review ongoing anticoagulation therapy.    Thank you,     [facility]

## 2024-08-07 ENCOUNTER — TELEPHONE (OUTPATIENT)
Dept: ANTICOAGULATION | Facility: CLINIC | Age: 64
End: 2024-08-07
Payer: COMMERCIAL

## 2024-08-07 NOTE — LETTER
[facility]  [address] [address]  Phone: [phone]  Fax: [phone]   August 7, 2024        Oswaldo Prabhakar  [address]  [place] [address]            Dear Oswaldo,    You are currently under the care of [facility] for your warfarin (Coumadin , Jantoven ) therapy.  We are contacting you because our records show you were due for an INR on 5/6/24.    There are potentially serious risks when taking warfarin without careful monitoring and we want to make sure you are safely managed.  Routine lab monitoring is required for warfarin refills.     Please call [phone] as soon as possible to schedule a lab appointment. If it is difficult for you to get to lab, please call us to discuss options.  If there has been a change in your care or other concerns, please let us know so we can help and/or update our records.         Sincerely,       [facility]

## 2024-08-07 NOTE — TELEPHONE ENCOUNTER
Anticoagulation Clinic Notification    Oswaldo, is past due for an INR. Their last result was 1.50 on 4/29/24 and was due to come back on 5/6/24.    he received phone calls and letters over the last several weeks in attempt to arrange follow up labs. Oswaldo Prabhakar will be contacted again today.     Please contact patient directly to discuss compliance with monitoring or schedule visit to review ongoing anticoagulation therapy.    Thank you,     [facility]

## 2024-08-08 NOTE — TELEPHONE ENCOUNTER
Called patient and reminded him to get an INR checked- he voiced he is still taking warfarin and will do this tomorrow. Declined needing scheduling info.    Donte Lopez RN on 8/8/2024 at 10:55 AM

## 2024-08-13 ENCOUNTER — MYC REFILL (OUTPATIENT)
Dept: CARDIOLOGY | Facility: CLINIC | Age: 64
End: 2024-08-13
Payer: COMMERCIAL

## 2024-08-13 DIAGNOSIS — I27.82 CHRONIC PULMONARY EMBOLISM WITHOUT ACUTE COR PULMONALE, UNSPECIFIED PULMONARY EMBOLISM TYPE (H): ICD-10-CM

## 2024-08-14 RX ORDER — WARFARIN SODIUM 5 MG/1
TABLET ORAL
Qty: 45 TABLET | Refills: 0 | Status: SHIPPED | OUTPATIENT
Start: 2024-08-14

## 2024-08-28 NOTE — PROGRESS NOTES
Cholesterol values reflect worsening LDL while on Zetia.  Appeal for Praluent therapy will be initiated. HPI:  Patient comes in today for   Chief Complaint   Patient presents with    New Patient    Anxiety     Pt is here to get established. Pts mom states that her anxiety is bad and her anger outburst.    Here to establish has c/o anxiety and has problems with anger sometimes denies depression..      HISTORY:  Past Medical History:   Diagnosis Date    Blocked tear duct     BILATERAL, CLEARED UP ON ITS OWN.    Bronchitis     AT 2 MONTHS OF AGE    Constipation     OCCASIONAL    Dental caries     FTND (full term normal delivery)     vaginal delivery, 8 # 6 oz, no problems    Immunizations up to date     Meconium aspiration     24 HR SLEEP RECOVERY ONLY       Past Surgical History:   Procedure Laterality Date    DENTAL SURGERY  10/31/2016    extractions  x 9 ,  restorations x 10    DENTAL SURGERY  08/20/2018    DENTAL RESTORATIONS X3, EXTRACTIONS X2     [place] UNLISTED PROCEDURE DENTOALVEOLAR STRUCTURES N/A 8/20/2018    DENTAL RESTORATIONS X3, EXTRACTIONS X2 performed by Niall Escalante DDS at [facility] OR        Family History   Problem Relation Age of Onset    No Known Problems Mother     No Known Problems Father        Social History     Socioeconomic History    Marital status: Single     Spouse name: Not on file    Number of children: Not on file    Years of education: Not on file    Highest education level: Not on file   Occupational History    Not on file   Tobacco Use    Smoking status: Never     Passive exposure: Yes    Smokeless tobacco: Never    Tobacco comments:     smokes outside   Vaping Use    Vaping status: Never Used   Substance and Sexual Activity    Alcohol use: No    Drug use: No    Sexual activity: Not on file   Other Topics Concern    Not on file   Social History Narrative    Not on file     Social Determinants of Health     Financial Resource Strain: Not on file   Food Insecurity: No Food Insecurity (1/30/2023)    Received from [facility]    Hunger Screening     Within the past 12 months we  worried whether our food would run out before we got money to buy more.: Never True     Within the past 12 months the food we bought just didn't last and we didn't have money to get more.: Never True   Transportation Needs: Not on file   Physical Activity: Not on file   Stress: Not on file   Social Connections: Not on file   Intimate Partner Violence: Not on file   Housing Stability: Not on file       Current Outpatient Medications   Medication Sig Dispense Refill    melatonin 5 MG TABS tablet Take 1 tablet by mouth nightly (Patient not taking: Reported on 8/28/2024)      magnesium hydroxide (MILK OF MAGNESIA) 400 MG/5ML suspension Take 30 mLs by mouth nightly as needed Mom gives it to her every other day (Patient not taking: Reported on 8/28/2024)      ibuprofen (CHILDRENS ADVIL) 100 MG/5ML suspension Take 5 mLs by mouth every 6 hours as needed for Pain or Fever (Patient not taking: Reported on 11/7/2019) 118 mL 0     No current facility-administered medications for this visit.        No Known Allergies    REVIEW OF SYSTEMS:  General: No fevers, chills.  HEENT: No double vision, blurry vision, runny nose, sore throat, tinnitus  Cardio: No chest pain, palpitations, STEEN, edema, PND  Pulmonary: No cough, hemoptysis, SOB  GI: No nausea, vomiting, dysphagia,diarrhea, constipation.  : No dysuria, hematuria, urgency, incontinence  Musculoskeletal: No muscle or joint aches, no joint swelling  Neuro: No dizziness/lightheadedness, no seizures  Endocrine: No polyuria, polydipsia, polyphagia, no temperature intolerance  Skin: No lesions or itching  Sleep: Poor  Psychiatric: No depression,has anxiety    PHYSICAL EXAM:  VS:  /70 (Site: Left Upper Arm, Position: Sitting, Cuff Size: Medium Adult)   Pulse 96   Ht 1.575 m (5' 2\")   Wt 106.6 kg (235 lb)   SpO2 97%   BMI 42.98 kg/m²   General:  Alert and oriented, NAD  HEENT:  TMs, CINTHIA, EOMI, Conjunctivae clear.Throat currently clear.  NECK:  Supple without adenopathy or

## 2024-09-11 DIAGNOSIS — H43.11 VITREOUS HEMORRHAGE, RIGHT (H): Primary | ICD-10-CM

## 2024-09-16 ENCOUNTER — TELEPHONE (OUTPATIENT)
Dept: ANTICOAGULATION | Facility: CLINIC | Age: 64
End: 2024-09-16
Payer: COMMERCIAL

## 2024-09-16 NOTE — TELEPHONE ENCOUNTER
ANTICOAGULATION  MANAGEMENT    Oswaldo Prabhakar is being discharged from the [facility] (ACC).    Reason for discharge: non-compliance with Anticoagulation Management Program despite outreach.  Last  an INR was done on 4/29/24. Letter mailed to patient advising them to schedule visit with referring provider.       Anticoagulation episode resolved, ACC referral closed, and Standing order discontinued    Patient may be accepted back in ACC program after an office visit to discuss non-compliance and check an INR. Patient must be agreeable to follow the monitoring recommendations of the program and will need a new referral to be re-enrolled.    Josephine Leonard RN

## 2024-09-16 NOTE — LETTER
September 16, 2024        Oswaldo Prabhakar  [address]  [place] [address]            Dear Oswaldo,    You have been under the care of the [facility] for monitoring of your warfarin (Coumadin , Jantoven )  for your Pulmonary Embolism.  Our records indicate that you are either past due to have your blood work checked or have not followed clinic staff recommendations. Your last an INR was on 4/29/24.     We regret to inform you that since you have not responded to our phone calls and letters the [facility] will no longer be able to manage your warfarin therapy. To ensure safe use of your warfarin and to receive additional warfarin refills you will need to make an office visit with Dr. Taylor alvares as soon as possible to discuss your warfarin therapy. Please call [phone] or [phone] to schedule an appointment.       Sincerely,       [facility]

## 2024-09-24 ENCOUNTER — OFFICE VISIT (OUTPATIENT)
Dept: OPHTHALMOLOGY | Facility: CLINIC | Age: 64
End: 2024-09-24
Attending: OPHTHALMOLOGY
Payer: COMMERCIAL

## 2024-09-24 DIAGNOSIS — H40.51X3 NEOVASCULAR GLAUCOMA OF RIGHT EYE, SEVERE STAGE: ICD-10-CM

## 2024-09-24 DIAGNOSIS — H43.11 VITREOUS HEMORRHAGE, RIGHT (H): ICD-10-CM

## 2024-09-24 DIAGNOSIS — H43.11 VITREOUS HEMORRHAGE, RIGHT (H): Primary | ICD-10-CM

## 2024-09-24 PROCEDURE — 999N000103 HC STATISTIC NO CHARGE FACILITY FEE

## 2024-09-24 PROCEDURE — 99207 FUNDUS PHOTOS OS (LEFT EYE): CPT | Mod: 26 | Performed by: OPHTHALMOLOGY

## 2024-09-24 PROCEDURE — 76512 OPH US DX B-SCAN: CPT | Performed by: OPHTHALMOLOGY

## 2024-09-24 PROCEDURE — 92250 FUNDUS PHOTOGRAPHY W/I&R: CPT | Performed by: OPHTHALMOLOGY

## 2024-09-24 PROCEDURE — 99213 OFFICE O/P EST LOW 20 MIN: CPT | Performed by: OPHTHALMOLOGY

## 2024-09-24 PROCEDURE — 92012 INTRM OPH EXAM EST PATIENT: CPT | Performed by: OPHTHALMOLOGY

## 2024-09-24 PROCEDURE — 99214 OFFICE O/P EST MOD 30 MIN: CPT | Performed by: OPHTHALMOLOGY

## 2024-09-24 PROCEDURE — 92134 CPTRZ OPH DX IMG PST SGM RTA: CPT | Performed by: OPHTHALMOLOGY

## 2024-09-24 ASSESSMENT — VISUAL ACUITY
OS_SC: 20/20
OD_SC: NLP
METHOD: ALLEN CARDS
OD_SC: NLP
OS_SC: 20/20
OS_SC+: -1
OS_SC+: -1
METHOD: SNELLEN - LINEAR

## 2024-09-24 ASSESSMENT — CONF VISUAL FIELD
OD_SUPERIOR_NASAL_RESTRICTION: 1
OD_INFERIOR_TEMPORAL_RESTRICTION: 1
OD_INFERIOR_TEMPORAL_RESTRICTION: 1
METHOD: COUNTING FINGERS
OD_INFERIOR_NASAL_RESTRICTION: 1
OD_INFERIOR_NASAL_RESTRICTION: 1
OD_SUPERIOR_TEMPORAL_RESTRICTION: 1
OD_SUPERIOR_NASAL_RESTRICTION: 1
OD_SUPERIOR_TEMPORAL_RESTRICTION: 1

## 2024-09-24 ASSESSMENT — SLIT LAMP EXAM - LIDS
COMMENTS: NORMAL

## 2024-09-24 ASSESSMENT — TONOMETRY
OD_IOP_MMHG: 7
IOP_METHOD: APPLANATION
OD_IOP_MMHG: 11
IOP_METHOD: APPLANATION
OS_IOP_MMHG: 13
OS_IOP_MMHG: 14
IOP_METHOD: APPLANATION
OS_IOP_MMHG: 13
OD_IOP_MMHG: 7

## 2024-09-24 ASSESSMENT — EXTERNAL EXAM - RIGHT EYE
OD_EXAM: EXOTROPIA
OD_EXAM: EXOTROPIA

## 2024-09-24 ASSESSMENT — EXTERNAL EXAM - LEFT EYE
OS_EXAM: NORMAL
OS_EXAM: NORMAL

## 2024-09-24 NOTE — NURSING NOTE
Chief Complaints and History of Present Illnesses   Patient presents with    Glaucoma Follow-Up     Chief Complaint(s) and History of Present Illness(es)       Glaucoma Follow-Up              Associated symptoms: Negative for dryness, eye pain, photophobia, flashes, floaters, itching and burning    Pain scale: 0/10              Comments    Oswaldo is here to follow up on glaucoma and choroidal nevus of right eye. Seeing Dr Isaac today as well. He states since last visit, right eye seems more sensitive to pressure.    Rigoberto Monique COT 8:38 AM September 24, 2024

## 2024-09-24 NOTE — NURSING NOTE
Chief Complaints and History of Present Illnesses   Patient presents with    Vitreous Hemorrhage Follow Up     Chief Complaint(s) and History of Present Illness(es)       Vitreous Hemorrhage Follow Up              Laterality: right eye              Comments    Oswaldo is here to follow up on vitreous hemorrhage, choroidal nevus of right eye. Seeing Dr. Savage today as well. He states since last visit, right eye seems more sensitive to pressure.    Rigoberto Monique COT 8:38 AM September 24, 2024

## 2024-09-24 NOTE — PROGRESS NOTES
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   - 05/17/2023: s/p right eye TSCPC #20 shots  - S/p repest TSCPC right eye 10/25/23    Today, 09/24/2024, s/p TSCPC right eye 5/17/23, S/p repeat TSCPC 10/25/23. Currently, Notcing some slight pain on right eye. Ended up losing complete vision on right eye since having a vit heme. Currently monitored by Dr. Isaac.     Currently Not Taking an medications.     Relevant Past Medical/Family/Social History:  Past Medical History:   Diagnosis Date    Antiplatelet or antithrombotic long-term use     Coronary artery disease     Diaphragmatic hernia without mention of obstruction or gangrene     Heart disease     Hernia, abdominal     History of blood transfusion     History of thrombophlebitis     Hypertension     Nephrolithiasis 4/2010    Other and unspecified hyperlipidemia     Pulmonary embolus and infarction (H)     s/p hemothorax and filter s/p filter removal (2011), now on long term anti-coagulation    Statin intolerance 2/1/2017    Stented coronary artery 01/07/2020    NIR mid LAD    Unspecified retinal detachment     Childhood trauma, unrepaired     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[employer]  No family history of glaucoma    Relevant Review of Systems: chronic decreased vision in the right eye, no pain in the right eye, feels like his left eye is at baseline as well     Diagnosis: NVG right eye, severe stage   Year diagnosis: 2023  Previous glaucoma surgery/laser: CEIOL left eye, PRP left eye, CEIOL right eye  Maximum intraocular pressure 50s  Currently Meds: Diamox 500 mg PO BID, prednisolone BID OD, timolol BID right eye,  Brimonidine TID OD  Family history: negative  CCT: 551/548  Gonio:  Refractive status: Pseudophakia  Trauma history: negative  Steroid exposure: positive prednisolone  Vasospastic disease: Migrane/Raynaud phenomenon: negative  A past hemodynamic crisis or Low BP: negative  Meds AEs/intolerance: none  PMHx: Asthma and respiratory problems/Cardiac/Renal/Kidney stones/Sulfa Allergy: kidney stones, bypass surgery  Anticoagulants: Warfarin for complicated PE  - Visual field April 13, 2023  - Right eye - not able to completed  - Left eye - normal, reliable  - OCT Optic Nerve RNFL Spectralis April 13, 2023  - Right Eye: diffuse atrophy, reliable  - Left Eye: trace thinning IT, reliable    Today's testing:  See examination    Additional Ocular History:   Anterior uveitis, chronic, right eye    Hx of spontaneous RD 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  Patient has chronic RD/inflammation and neovascularization in the right eye. He has had one injection int the right eye about 7 months ago. Now s/p repeat right eye TSCPC Subjectively comfortable. will avoid tube shunt, poor visual prognosis and possible CMM.  Seeing Dr. Isaac today.   IOP remains controlled off glaucoma drops.  Continue to hold off glaucoma drops     RTC in 6 months [facility], IOP      Physician Attestation     Attending Physician Attestation:  Complete documentation of historical and exam elements from today's encounter can be found in the full encounter summary report (not reduplicated in this progress note). I personally obtained the chief complaint(s) and history of present illness. I confirmed and edited as necessary the  review of systems, past medical/surgical history, family history, social history, and examination findings as documented by others; and I examined the patient myself. I personally reviewed the relevant tests, images, and reports as documented above. I formulated and edited as necessary the assessment and plan and discussed the findings and management plan with the patient and any family members present at the time of the visit.  Jay Jay Savage M.D., Glaucoma, September 24, 2024

## 2024-09-24 NOTE — PROGRESS NOTES
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    Came to [facility] for NVG OD 8/2022 seen in ED, had been at [facility], diagnosis with NVG OD and referred to [facility] for CPC and PRP, seen by Adebayo at [facility] and Modesta Savage at [facility].    Poor vision OD since childhood d/t RD not diagnosis until too late to repair    CAD s/p CABG x 4  No h/o cancer    PE on chronic warfarin    PAST OCULAR SURGERY:  CE/IOL OU 2022  Laser pexy OS 2000s  CPC OD 5/17/23 (HS)  CPC OD 10/25/23    RETINAL IMAGING  OCT 06/07/24  OD - no view  OS - (prior) retina normal, PHF attached      FA 4/19/23:  - Right eye: Diffuse pinpoint hyperfluorescence, trace disc leakage.   - Left eye: Trace disc leakage.       U/S OD  A-scan - med/high reflective 5/17/24  B-scan - ST mass with hyperR base, new vitreous hemorrhage?, size 2.27 x 5.88T . 4.42L (4/2023) -> 1.95mm x 6.19T x 5.01L (06/12/23) (6/2023) -> 2.26 x 6.38T x 5.10L (9/12/23)-> 2.29 x 5.13L x 6.56T (1/12/24) - 2.27 x 6.56T x 5.14L. (5/17/24) -> 2.70 vs 2.05 mm x 6.09 L x 5.07T (9/2024)      U/S B-scan 6/24/24  OD - suspected RD OD      ASSESSMENT & PLAN            # choroidal lesion OD   - noted 4/19/232023   - dome shaped anterior, difficult to see on exam d/t IOL edge/capsule & modeate pupil   - U/S not typical for met or CMM, hyper-reflective base but no visible external abnormalities   - too anterior for Optos or OCT     - ?weak transillumination shadow visible (6/2023)   - surrounding DBH 9/2023   - suspect PEHCR but cannot r/o large nevus     - continue to monitor closely as precuation   - stable on B-scan 5/2024  but no view to the fundus\  due to hyphema/VH   - stable on photos and exam 9/2024   - U/S 9/2024 ?larger, but unclear if measurement is accurate, unclear if same cut as prior images     - recheck 4-6 months as  precaution   - if growth confirmed may be CMM and may need removal given NLP vision      # NVG OD   - sees Radhajaye   - h/o Avastin ~ 1/2023  (VRS)   - seen by Modesta 4/19/23, no sig ischemia to Tx on FA 4/19/23   - s/p CPC 6/2023     - 6/7/24 IOP OK but new large NVI given Avastin     - 6/24/24 IOP mod elevated, mod persistent NVI temporal despite Avastin 6/7/24   - NLP now, no pain, monitor        # Possible RD OD   - suspected based on U/S since 6/20/24   - had prior possible tears   - despite extensive scarring from prior spontaneously resolved RD   - had relatively unaffected superior retinal wedge, now may be detached     - NLP not expected from RD   - suspect also vision loss from glaucomatous damage     - d/w patient surgery vs observe 6/2024   - given NLP and prior poor vision doubt benefit from surgery   - will observe    # prior VH/hyphema   - onset 2024   - suspect bleeding from NVI & VH from hyphema   - on chronic Warfarin due to history of bilateral unprovoked PE   - NLP since 6/19/24   - montior for now   - resolved 9/2024      # h/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       ATTESTATION     Attending Physician Attestation:      Complete documentation of historical and exam elements from today's encounter can be found in the full encounter summary report (not reduplicated in this progress note).  I personally obtained the chief complaint(s) and history of present illness.  I confirmed and edited as necessary the review of systems, past medical/surgical history, family history, social history, and examination findings as documented by others; and I examined the patient myself.  I personally reviewed the relevant tests, images, and reports as documented above.  I formulated and edited as necessary the  assessment and plan and discussed the findings and management plan with the patient and family    Maya Isaac MD, PhD  , Vitreoretinal Surgery  Department of Ophthalmology  [facility]

## 2024-10-08 ENCOUNTER — LAB (OUTPATIENT)
Dept: LAB | Facility: CLINIC | Age: 64
End: 2024-10-08
Payer: COMMERCIAL

## 2024-10-08 DIAGNOSIS — Z79.01 LONG TERM CURRENT USE OF ANTICOAGULANT THERAPY: ICD-10-CM

## 2024-10-08 DIAGNOSIS — I27.82 CHRONIC PULMONARY EMBOLISM WITHOUT ACUTE COR PULMONALE, UNSPECIFIED PULMONARY EMBOLISM TYPE (H): ICD-10-CM

## 2024-10-08 DIAGNOSIS — E11.9 DIABETES MELLITUS, TYPE 2 (H): ICD-10-CM

## 2024-10-08 LAB
CREAT UR-MCNC: 300 MG/DL
EST. AVERAGE GLUCOSE BLD GHB EST-MCNC: 143 MG/DL
HBA1C MFR BLD: 6.6 %
INR PPP: 1.3 (ref 0.85–1.15)
MICROALBUMIN UR-MCNC: 23.2 MG/L
MICROALBUMIN/CREAT UR: 7.73 MG/G CR (ref 0–17)

## 2024-10-08 PROCEDURE — 83036 HEMOGLOBIN GLYCOSYLATED A1C: CPT | Performed by: INTERNAL MEDICINE

## 2024-10-08 PROCEDURE — 85610 PROTHROMBIN TIME: CPT | Performed by: PATHOLOGY

## 2024-10-08 PROCEDURE — 99000 SPECIMEN HANDLING OFFICE-LAB: CPT | Performed by: PATHOLOGY

## 2024-10-08 PROCEDURE — 36415 COLL VENOUS BLD VENIPUNCTURE: CPT | Performed by: PATHOLOGY

## 2024-10-08 PROCEDURE — 82043 UR ALBUMIN QUANTITATIVE: CPT | Performed by: INTERNAL MEDICINE

## 2024-11-25 DIAGNOSIS — H35.9 RETINAL LESION OF RIGHT EYE: Primary | ICD-10-CM

## 2024-12-03 ENCOUNTER — OFFICE VISIT (OUTPATIENT)
Dept: OPHTHALMOLOGY | Facility: CLINIC | Age: 64
End: 2024-12-03
Attending: OPHTHALMOLOGY
Payer: COMMERCIAL

## 2024-12-03 DIAGNOSIS — H35.9 RETINAL LESION OF RIGHT EYE: ICD-10-CM

## 2024-12-03 PROCEDURE — 92250 FUNDUS PHOTOGRAPHY W/I&R: CPT | Performed by: OPHTHALMOLOGY

## 2024-12-03 PROCEDURE — 76512 OPH US DX B-SCAN: CPT | Performed by: OPHTHALMOLOGY

## 2024-12-03 PROCEDURE — 92134 CPTRZ OPH DX IMG PST SGM RTA: CPT | Performed by: OPHTHALMOLOGY

## 2024-12-03 ASSESSMENT — CUP TO DISC RATIO: OS_RATIO: 0.5

## 2024-12-03 ASSESSMENT — VISUAL ACUITY
OS_SC: 20/20
METHOD: SNELLEN - LINEAR
OS_SC+: -1
OD_SC: NLP

## 2024-12-03 ASSESSMENT — SLIT LAMP EXAM - LIDS
COMMENTS: NORMAL
COMMENTS: NORMAL

## 2024-12-03 ASSESSMENT — EXTERNAL EXAM - RIGHT EYE: OD_EXAM: EXOTROPIA

## 2024-12-03 ASSESSMENT — TONOMETRY
OD_IOP_MMHG: 06
OS_IOP_MMHG: 18
IOP_METHOD: TONOPEN

## 2024-12-03 ASSESSMENT — EXTERNAL EXAM - LEFT EYE: OS_EXAM: NORMAL

## 2024-12-03 NOTE — NURSING NOTE
Chief Complaints and History of Present Illnesses   Patient presents with    Follow Up     Choroidal lesion and NVG OD     Chief Complaint(s) and History of Present Illness(es)       Follow Up              Comments: Choroidal lesion and NVG OD              Comments    Pt states no change in VA since last visit  Fluctuating eye pain in right eye , lasting about 1 day  No flashes or floaters   No tearing     Morelia Ricketts COT 9:25 AM December 3, 2024

## 2024-12-03 NOTE — PROGRESS NOTES
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    Came to [facility] for NVG OD 8/2022 seen in ED, had been at [facility], diagnosis with NVG OD and referred to [facility] for CPC and PRP, seen by Adebayo at [facility] and Modesta Savage at [facility].    Poor vision OD since childhood d/t RD not diagnosis until too late to repair    CAD s/p CABG x 4  No h/o cancer    PE on chronic warfarin    PAST OCULAR SURGERY:  CE/IOL OU 2022  Laser pexy OS 2000s  CPC OD 5/17/23 (HS)  CPC OD 10/25/23    RETINAL IMAGING  OCT 12/03/24   OD - severe diffuse atropjy  OS -  retina normal, PHF attached      FA 4/19/23:  - Right eye: Diffuse pinpoint hyperfluorescence, trace disc leakage.   - Left eye: Trace disc leakage.       U/S OD  A-scan - med/high reflective 5/17/24  B-scan - ST mass with hyperR base, new vitreous hemorrhage?, size 2.27 x 5.88T . 4.42L (4/2023) -> 1.95mm x 6.19T x 5.01L (06/12/23) (6/2023) -> 2.26 x 6.38T x 5.10L (9/12/23)-> 2.29 x 5.13L x 6.56T (1/12/24) - 2.27 x 6.56T x 5.14L. (5/17/24) -> 2.70 vs 2.05 mm x 6.09 L x 5.07T (9/2024) -> 2.78 x 5.92T x 6.01L (12/2024)      U/S B-scan 6/24/24  OD - suspected RD OD      ASSESSMENT & PLAN    # choroidal lesion OD   - noted 4/19/232023   - dome shaped anterior, difficult to see on exam d/t IOL edge/capsule & modeate pupil   - U/S not typical for met or CMM, hyper-reflective base but no visible external abnormalities   - too anterior for Optos or OCT     - ?weak transillumination shadow visible (6/2023)   - surrounding DBH 9/2023   - suspect PEHCR but cannot r/o large nevus     - continue to monitor closely as precuation   - stable on B-scan 5/2024  but no view to the fundus\  due to hyphema/VH   - stable on photos and exam 9/2024   - U/S 9/2024 ?larger, but unclear if measurement is accurate, unclear if same cut as prior  images   - U/S today stable, likely d/t measuring to outside of reflective band\    -no change today c/t 9/2024     - recheck 6 months as precaution      # NVG OD   - sees Janette   - h/o Avastin ~ 1/2023  (VRS)   - seen by Modesta 4/19/23, no sig ischemia to Tx on FA 4/19/23   - s/p CPC 6/2023     - 6/7/24 IOP OK but new large NVI given Avastin     - 6/24/24 IOP mod elevated, mod persistent NVI temporal despite Avastin 6/7/24   - NLP now, no pain, monitor        # Possible RD OD   - suspected based on U/S since 6/20/24   - had prior possible tears   - despite extensive scarring from prior spontaneously resolved RD   - had relatively unaffected superior retinal wedge, now may be detached     - NLP not expected from RD   - suspect also vision loss from glaucomatous damage     - d/w patient surgery vs observe 6/2024   - given NLP and prior poor vision doubt benefit from surgery   - will observe    # prior VH/hyphema   - onset 2024   - suspect bleeding from NVI & VH from hyphema   - on chronic Warfarin due to history of bilateral unprovoked PE   - NLP since 6/19/24   - montior for now   - resolved 9/2024      # h/o spontaneously recovered RD OD childhood 1970s   - ?exudative vs RRD   - resolved spontanously with poor vision since childhood        Return in about 6 months (around 6/3/2025) for DFE OD, OCT OU, Optos Photo, US OD.       ATTESTATION     Attending Physician Attestation:      Complete documentation of historical and exam elements from today's encounter can be found in the full encounter summary report (not reduplicated in this progress note).  I personally obtained the chief complaint(s) and history of present illness.  I confirmed and edited as necessary the review of systems, past medical/surgical history, family history, social history, and examination findings as documented by others; and I examined the patient myself.  I personally reviewed the relevant tests, images, and reports as documented above.   I formulated and edited as necessary the assessment and plan and discussed the findings and management plan with the patient and family    Maya Isaac MD, PhD  , Vitreoretinal Surgery  Department of Ophthalmology  [facility]

## 2024-12-04 DIAGNOSIS — Z78.9 STATIN INTOLERANCE: ICD-10-CM

## 2024-12-04 DIAGNOSIS — E78.5 HYPERLIPIDEMIA LDL GOAL <130: ICD-10-CM

## 2024-12-04 DIAGNOSIS — I25.739 CORONARY ARTERY DISEASE INVOLVING NONAUTOLOGOUS BIOLOGICAL CORONARY BYPASS GRAFT WITH ANGINA PECTORIS (H): ICD-10-CM

## 2024-12-04 DIAGNOSIS — Z95.1 S/P CABG X 4: ICD-10-CM

## 2024-12-04 RX ORDER — EVOLOCUMAB 140 MG/ML
140 INJECTION, SOLUTION SUBCUTANEOUS
Qty: 6 ML | Refills: 3 | Status: SHIPPED | OUTPATIENT
Start: 2024-12-04

## 2024-12-04 NOTE — TELEPHONE ENCOUNTER
Medication Requested:   Disp Refills Start End FLOR   evolocumab (REPATHA SURECLICK) 140 MG/ML prefilled autoinjector 6 mL 3 12/7/2023 -- No   Sig - Route: Inject 1 mL (140 mg) Subcutaneous every 14 days - Subcutaneous     ----------------------  Last Office Visit : 3/7/2024  [facility] Office visit:  0  ----------------------        Refill decision: Refill pended and routed to the provider for review/determination due to the following criteria not met:     -Medication not on [facility], [facility], [facility] refill protocol

## 2024-12-05 ENCOUNTER — TELEPHONE (OUTPATIENT)
Dept: CARDIOLOGY | Facility: CLINIC | Age: 64
End: 2024-12-05
Payer: COMMERCIAL

## 2024-12-05 NOTE — TELEPHONE ENCOUNTER
Patient Contacted for the patient to call back and schedule the following:    Appointment type: RTN CARDIO  Provider: SARAH  Return date: 03/03/2025  Specialty phone number: [phone] OPT 1  Additional appointment(s) needed: FASTING LABS PRIOR ON 2/28  Additonal Notes: N/A

## 2024-12-28 ENCOUNTER — HEALTH MAINTENANCE LETTER (OUTPATIENT)
Age: 64
End: 2024-12-28

## 2025-01-05 DIAGNOSIS — I10 ESSENTIAL HYPERTENSION: ICD-10-CM

## 2025-01-08 ENCOUNTER — TELEPHONE (OUTPATIENT)
Dept: CARDIOLOGY | Facility: CLINIC | Age: 65
End: 2025-01-08
Payer: COMMERCIAL

## 2025-01-08 NOTE — TELEPHONE ENCOUNTER
Left Voicemail (1st Attempt) and Sent Mychart (1st Attempt) for the patient to call back and schedule the following:    Appointment type: Return Cardio  Provider: Kayla Nicole  Return date: 03/03/2025  Specialty phone number: [phone] opt 1  Additional appointment(s) needed: N/A  Additonal Notes: N/A

## 2025-01-09 RX ORDER — LISINOPRIL 2.5 MG/1
TABLET ORAL
Qty: 90 TABLET | Refills: 0 | Status: SHIPPED | OUTPATIENT
Start: 2025-01-09

## 2025-01-09 NOTE — TELEPHONE ENCOUNTER
lisinopril (ZESTRIL) 2.5 MG tablet   90 tablet 4 11/20/2023     Last Office Visit : 11-  Future Office visit:  none    ACE Inhibitors (Including Combos) Protocol Elgeuj0301/05/2025 07:13 AM   Protocol Details Most recent blood pressure under 140/90 in past 12 months- Clinicial or Patient Reported   2/21/2024  11:22 PM 2/22/2024  9:21 AM 3/7/2024  3:39 PM 4/15/2024  12:21 PM 4/15/2024  1:18 PM   Vital Signs        Systolic 141 !  143 !  136  162 !  160 !    Diastolic 77 !  91 (H)  87  92 (H)  89 !    Pulse 57  60  57  49 (L)          Recent (12 mo) or future (90 days) visit within the authorizing provider's specialty     Potassium   Date Value Ref Range Status   02/22/2024 4.0 3.4 - 5.3 mmol/L Final   04/04/2022 3.8 3.4 - 5.3 mmol/L Final   05/10/2021 3.4 3.4 - 5.3 mmol/L Final     Labs completed on :   2-  GFR Estimate  >60 mL/min/1.73m2 83

## 2025-01-24 ENCOUNTER — ORDERS ONLY (AUTO-RELEASED) (OUTPATIENT)
Dept: INTERNAL MEDICINE | Facility: CLINIC | Age: 65
End: 2025-01-24

## 2025-01-24 ENCOUNTER — LAB (OUTPATIENT)
Dept: LAB | Facility: CLINIC | Age: 65
End: 2025-01-24
Payer: COMMERCIAL

## 2025-01-24 ENCOUNTER — OFFICE VISIT (OUTPATIENT)
Dept: INTERNAL MEDICINE | Facility: CLINIC | Age: 65
End: 2025-01-24
Payer: COMMERCIAL

## 2025-01-24 VITALS
HEART RATE: 56 BPM | BODY MASS INDEX: 30.64 KG/M2 | TEMPERATURE: 97.8 F | SYSTOLIC BLOOD PRESSURE: 168 MMHG | HEIGHT: 72 IN | WEIGHT: 226.2 LBS | RESPIRATION RATE: 16 BRPM | DIASTOLIC BLOOD PRESSURE: 85 MMHG | OXYGEN SATURATION: 98 %

## 2025-01-24 DIAGNOSIS — Z12.5 SCREENING FOR PROSTATE CANCER: ICD-10-CM

## 2025-01-24 DIAGNOSIS — I25.810 CORONARY ARTERY DISEASE INVOLVING AUTOLOGOUS ARTERY CORONARY BYPASS GRAFT WITHOUT ANGINA PECTORIS: ICD-10-CM

## 2025-01-24 DIAGNOSIS — I27.82 CHRONIC PULMONARY EMBOLISM WITHOUT ACUTE COR PULMONALE, UNSPECIFIED PULMONARY EMBOLISM TYPE (H): Primary | ICD-10-CM

## 2025-01-24 DIAGNOSIS — Z13.0 SCREENING FOR DEFICIENCY ANEMIA: ICD-10-CM

## 2025-01-24 DIAGNOSIS — Z12.11 SPECIAL SCREENING FOR MALIGNANT NEOPLASMS, COLON: ICD-10-CM

## 2025-01-24 DIAGNOSIS — I10 ESSENTIAL HYPERTENSION: ICD-10-CM

## 2025-01-24 DIAGNOSIS — I27.82 CHRONIC PULMONARY EMBOLISM WITHOUT ACUTE COR PULMONALE, UNSPECIFIED PULMONARY EMBOLISM TYPE (H): ICD-10-CM

## 2025-01-24 LAB
ALBUMIN SERPL BCG-MCNC: 4 G/DL (ref 3.5–5.2)
ALP SERPL-CCNC: 62 U/L (ref 40–150)
ALT SERPL W P-5'-P-CCNC: 23 U/L (ref 0–70)
ANION GAP SERPL CALCULATED.3IONS-SCNC: 9 MMOL/L (ref 7–15)
AST SERPL W P-5'-P-CCNC: 19 U/L (ref 0–45)
BILIRUB SERPL-MCNC: 0.7 MG/DL
BUN SERPL-MCNC: 15.6 MG/DL (ref 8–23)
CALCIUM SERPL-MCNC: 9.3 MG/DL (ref 8.8–10.4)
CHLORIDE SERPL-SCNC: 103 MMOL/L (ref 98–107)
CHOLEST SERPL-MCNC: 69 MG/DL
CREAT SERPL-MCNC: 1.15 MG/DL (ref 0.67–1.17)
EGFRCR SERPLBLD CKD-EPI 2021: 72 ML/MIN/1.73M2
ERYTHROCYTE [DISTWIDTH] IN BLOOD BY AUTOMATED COUNT: 13.2 % (ref 10–15)
FASTING STATUS PATIENT QL REPORTED: ABNORMAL
FASTING STATUS PATIENT QL REPORTED: ABNORMAL
GLUCOSE SERPL-MCNC: 114 MG/DL (ref 70–99)
HCO3 SERPL-SCNC: 28 MMOL/L (ref 22–29)
HCT VFR BLD AUTO: 44.2 % (ref 40–53)
HDLC SERPL-MCNC: 33 MG/DL
HGB BLD-MCNC: 14.1 G/DL (ref 13.3–17.7)
INR PPP: 1.05 (ref 0.85–1.15)
LDLC SERPL CALC-MCNC: 9 MG/DL
MCH RBC QN AUTO: 27 PG (ref 26.5–33)
MCHC RBC AUTO-ENTMCNC: 31.9 G/DL (ref 31.5–36.5)
MCV RBC AUTO: 85 FL (ref 78–100)
NONHDLC SERPL-MCNC: 36 MG/DL
PLATELET # BLD AUTO: 285 10E3/UL (ref 150–450)
POTASSIUM SERPL-SCNC: 4 MMOL/L (ref 3.4–5.3)
PROT SERPL-MCNC: 7.1 G/DL (ref 6.4–8.3)
PSA SERPL DL<=0.01 NG/ML-MCNC: 1.47 NG/ML (ref 0–4.5)
RBC # BLD AUTO: 5.22 10E6/UL (ref 4.4–5.9)
SODIUM SERPL-SCNC: 140 MMOL/L (ref 135–145)
TRIGL SERPL-MCNC: 134 MG/DL
WBC # BLD AUTO: 7.7 10E3/UL (ref 4–11)

## 2025-01-24 PROCEDURE — 36415 COLL VENOUS BLD VENIPUNCTURE: CPT | Performed by: PATHOLOGY

## 2025-01-24 PROCEDURE — 99214 OFFICE O/P EST MOD 30 MIN: CPT

## 2025-01-24 PROCEDURE — 80061 LIPID PANEL: CPT | Performed by: PATHOLOGY

## 2025-01-24 PROCEDURE — 80053 COMPREHEN METABOLIC PANEL: CPT | Performed by: PATHOLOGY

## 2025-01-24 PROCEDURE — G0103 PSA SCREENING: HCPCS | Performed by: PATHOLOGY

## 2025-01-24 PROCEDURE — 85610 PROTHROMBIN TIME: CPT | Performed by: PATHOLOGY

## 2025-01-24 PROCEDURE — 85027 COMPLETE CBC AUTOMATED: CPT | Performed by: PATHOLOGY

## 2025-01-24 RX ORDER — LISINOPRIL 5 MG/1
5 TABLET ORAL DAILY
Qty: 90 TABLET | Refills: 1 | Status: SHIPPED | OUTPATIENT
Start: 2025-01-24

## 2025-01-24 RX ORDER — WARFARIN SODIUM 5 MG/1
TABLET ORAL
Qty: 45 TABLET | Refills: 0 | Status: SHIPPED | OUTPATIENT
Start: 2025-01-24

## 2025-01-24 NOTE — PROGRESS NOTES
Assessment & Plan     Chronic pulmonary embolism without acute cor pulmonale, unspecified pulmonary embolism type (H)  History of unprovoked PE, off warfarin for months. Recommend restarting warfarin. Discussed he can see hematology again if he would like to further discuss possible other anticoagulation options, though he defers at this time. Will refer back to anticoagulation clinic for dosing management.   - warfarin ANTICOAGULANT (COUMADIN) 5 MG tablet  Dispense: 45 tablet; Refill: 0  - Anticoagulation Clinic Referral  - INR    Coronary artery disease involving autologous artery coronary bypass graft without angina pectoris  Brilinta refilled. Due for a lipid panel, will complete today.   - ticagrelor (BRILINTA) 90 MG tablet  Dispense: 180 tablet; Refill: 1  - Lipid panel reflex to direct LDL Non-fasting    Essential hypertension  BP elevated, will increase lisinopril to 5 mg daily. Will need repeat BMP in 2-4 weeks. Plan to check BP intermittently at home and send a log to clinic in 2-3 weeks for possible continue dose increase.  - Comprehensive metabolic panel (BMP + Alb, Alk Phos, ALT, AST, Total. Bili, TP)  - lisinopril (ZESTRIL) 5 MG tablet  Dispense: 90 tablet; Refill: 1  - Basic metabolic panel  (Ca, Cl, CO2, Creat, Gluc, K, Na, BUN)    Screening for deficiency anemia  Ordered.   - CBC with platelets    Screening for prostate cancer  Ordered.   - PSA, screen    Special screening for malignant neoplasms, colon  Discussed colon cancer screening options. He elects cologuard at this time, ordered.   - COLOGUARD([employer])      No follow-ups on file.    Salena Chacon is a 64 year old, presenting for the following health issues:  Follow Up (No new health concerns) and Medication Refill      1/24/2025     1:14 PM   Additional Questions   Roomed by helen     Medication Refill    History of Present Illness       Hyperlipidemia:  He presents for follow up of hyperlipidemia.   He is taking medication to  lower cholesterol. He is not having myalgia or other side effects to statin medications.    He eats 0-1 servings of fruits and vegetables daily.He consumes 1 sweetened beverage(s) daily.He exercises with enough effort to increase his heart rate 20 to 29 minutes per day.  He exercises with enough effort to increase his heart rate 3 or less days per week.   He is taking medications regularly.     Oswaldo Prabhakar presents to the clinic today for a follow-up visit, medication refills. He has a history of CAD, hyperlipidemia, hypertension, CABG x4, PE, type 2 DM, kidney stones, glaucoma, and retinal detachment.     Right knee pain, medially. No injury. Previous runner in high school. Going down stairs can be bothersome. No issues otherwise. No swelling or redness.     He notes he ran out of warfarin, ran out of this a couple months ago. He notes nosebleeds on occasion, had one last week. Lasted ~5 minutes. No known trauma.     A1c 6.6 on 10/08/24  LDL 26, HDL 37 on 3/05/24; on repatha  Creatinine 1.03, eGFR 83 on 2/22/24    Colonoscopy 2013, repeat due    Review of Systems  Constitutional, HEENT, cardiovascular, pulmonary, gi and gu systems are negative, except as otherwise noted.      Objective    BP (!) 168/85 (BP Location: Right arm, Patient Position: Sitting, Cuff Size: Adult Large)   Pulse 56   Temp 97.8  F (36.6  C) (Oral)   Resp 16   Ht 1.829 m (6')   Wt 102.6 kg (226 lb 3.2 oz)   SpO2 98%   BMI 30.68 kg/m    Body mass index is 30.68 kg/m .  Physical Exam   GENERAL: alert and no distress  RESP: lungs clear to auscultation - no rales, rhonchi or wheezes  CV: regular rate and rhythm, normal S1 S2, no S3 or S4, no murmur, click or rub, no peripheral edema  MS: no gross musculoskeletal defects noted, no edema  PSYCH: mentation appears normal, affect normal/bright            Signed Electronically by: Donya Ortiz NP

## 2025-01-24 NOTE — PATIENT INSTRUCTIONS
Labs today  Increase lisinopril to 5 mg daily  Check blood pressure a few times per week, update me of blood pressures by Khadra in 2-3 weeks  Repeat a metabolic panel lab in ~2 weeks after increasing lisinopril  Continue to take warfarin, follow with anticoagulation clinic

## 2025-01-26 ENCOUNTER — HEALTH MAINTENANCE LETTER (OUTPATIENT)
Age: 65
End: 2025-01-26

## 2025-01-28 ENCOUNTER — ANCILLARY PROCEDURE (OUTPATIENT)
Dept: GENERAL RADIOLOGY | Facility: CLINIC | Age: 65
End: 2025-01-28
Attending: FAMILY MEDICINE
Payer: COMMERCIAL

## 2025-01-28 ENCOUNTER — OFFICE VISIT (OUTPATIENT)
Dept: URGENT CARE | Facility: URGENT CARE | Age: 65
End: 2025-01-28
Payer: COMMERCIAL

## 2025-01-28 VITALS
RESPIRATION RATE: 18 BRPM | SYSTOLIC BLOOD PRESSURE: 156 MMHG | WEIGHT: 226.1 LBS | DIASTOLIC BLOOD PRESSURE: 88 MMHG | HEART RATE: 58 BPM | BODY MASS INDEX: 30.66 KG/M2 | OXYGEN SATURATION: 96 % | TEMPERATURE: 97.3 F

## 2025-01-28 DIAGNOSIS — R05.1 ACUTE COUGH: ICD-10-CM

## 2025-01-28 DIAGNOSIS — I27.82 CHRONIC PULMONARY EMBOLISM WITHOUT ACUTE COR PULMONALE, UNSPECIFIED PULMONARY EMBOLISM TYPE (H): ICD-10-CM

## 2025-01-28 DIAGNOSIS — R07.89 ATYPICAL CHEST PAIN: Primary | ICD-10-CM

## 2025-01-28 LAB
D DIMER PPP FEU-MCNC: 0.45 UG/ML FEU (ref 0–0.5)
FLUAV AG SPEC QL IA: NEGATIVE
FLUBV AG SPEC QL IA: NEGATIVE

## 2025-01-28 PROCEDURE — 87635 SARS-COV-2 COVID-19 AMP PRB: CPT | Performed by: FAMILY MEDICINE

## 2025-01-28 PROCEDURE — 93000 ELECTROCARDIOGRAM COMPLETE: CPT | Performed by: FAMILY MEDICINE

## 2025-01-28 PROCEDURE — 71046 X-RAY EXAM CHEST 2 VIEWS: CPT | Mod: TC | Performed by: RADIOLOGY

## 2025-01-28 PROCEDURE — 36415 COLL VENOUS BLD VENIPUNCTURE: CPT | Performed by: FAMILY MEDICINE

## 2025-01-28 PROCEDURE — 85379 FIBRIN DEGRADATION QUANT: CPT | Performed by: FAMILY MEDICINE

## 2025-01-28 PROCEDURE — 99214 OFFICE O/P EST MOD 30 MIN: CPT | Performed by: FAMILY MEDICINE

## 2025-01-28 PROCEDURE — 87804 INFLUENZA ASSAY W/OPTIC: CPT | Performed by: FAMILY MEDICINE

## 2025-01-28 RX ORDER — ZOSTER VACCINE RECOMBINANT, ADJUVANTED 50 MCG/0.5
KIT INTRAMUSCULAR
COMMUNITY
Start: 2024-02-02

## 2025-01-28 NOTE — PROGRESS NOTES
SUBJECTIVE:   Oswaldo Prabhakar is a 64 year old male presenting with a chief complaint of cough with phlegm, chest pain last night.    Doing well until last night, is exposed to a lot of people in work.    Developed cough when lay down.  Started to get more pink phlegm and had more chest pain.  No fever.  Has underlying CAD, stents.  No current chest pain.    Just started on coumadin, was off coumadin for several months.  Is suppose to be on coumadin life long due to unprovoked PE.  Plan for INR check in 2 days    Had flu vaccination  Has not obtain home rapid COVID test      Past Medical History:   Diagnosis Date    Antiplatelet or antithrombotic long-term use     Coronary artery disease     Diaphragmatic hernia without mention of obstruction or gangrene     Heart disease     Hernia, abdominal     History of blood transfusion     History of thrombophlebitis     Hypertension     Nephrolithiasis 4/2010    Other and unspecified hyperlipidemia     Pulmonary embolus and infarction (H)     s/p hemothorax and filter s/p filter removal (2011), now on long term anti-coagulation    Statin intolerance 2/1/2017    Stented coronary artery 01/07/2020    NIR mid LAD    Unspecified retinal detachment     Childhood trauma, unrepaired     Current Outpatient Medications   Medication Sig Dispense Refill    acetaminophen (TYLENOL) 500 MG tablet Take 2 tablets (1,000 mg) by mouth 3 times daily as needed for pain      atorvastatin (LIPITOR) 80 MG tablet Take 1 tablet (80 mg) by mouth daily 90 tablet 3    calcium carbonate (TUMS) 500 MG chewable tablet Take 1 chew tab by mouth daily as needed       Cholecalciferol (VITAMIN D) 2000 UNITS CAPS Take 2,000 Units by mouth daily      ezetimibe (ZETIA) 10 MG tablet Take 1 tablet (10 mg) by mouth daily 90 tablet 3    fenofibrate (TRIGLIDE/LOFIBRA) 160 MG tablet Take 1 tablet (160 mg) by mouth daily 90 tablet 3    lisinopril (ZESTRIL) 5 MG tablet Take 1 tablet (5 mg) by mouth daily. 90 tablet 1     metoprolol tartrate (LOPRESSOR) 25 MG tablet Take 0.5 tablets (12.5 mg) by mouth 2 times daily 90 tablet 3    OMEPRAZOLE PO Take 20 mg by mouth as needed      REPATHA SURECLICK 140 MG/ML prefilled autoinjector INJECT 1 ML (140 MG) SUBCUTANEOUS EVERY 14 DAYS 6 mL 3    SHINGRIX injection       ticagrelor (BRILINTA) 90 MG tablet Take 1 tablet (90 mg) by mouth 2 times daily. 180 tablet 1    warfarin ANTICOAGULANT (COUMADIN) 5 MG tablet Take 1.5 tablets (7.5 mg) on Mondays and Thursdays and take 1 tablet (5 mg) all other days or as directed by the INR clinic 45 tablet 0    ondansetron (ZOFRAN-ODT) 4 MG ODT tab Take 1 tablet (4 mg) by mouth every 8 hours as needed for nausea (Patient not taking: Reported on 1/28/2025) 10 tablet 0    reason aspirin not prescribed, intentional, Please choose reason not prescribed from choices below.       Social History     Tobacco Use    Smoking status: Never    Smokeless tobacco: Never   Substance Use Topics    Alcohol use: No     Comment: very rare       ROS:  Review of systems negative except as stated above.    OBJECTIVE:  BP (!) 156/88   Pulse 58   Temp 97.3  F (36.3  C) (Tympanic)   Resp 18   Wt 102.6 kg (226 lb 1.6 oz)   SpO2 96%   BMI 30.66 kg/m    GENERAL APPEARANCE: healthy, alert and no distress  EYES: EOMI,  PERRL, conjunctiva clear  RESP: lungs with decrease BS left, no crackle or wheezes  CV: regular rates and rhythm, normal S1 S2, no murmur noted  PSYCH: mentation appears normal and affect normal/bright    CXR - no acute infiltrate, no pleural effusion, no pneumothorax personally viewed by me    EKG - sinus shane, rate 54, T inversion V1-V3, similar to prior EKG    Results for orders placed or performed in visit on 01/28/25   Influenza A & B Antigen - Clinic Collect     Status: Normal    Specimen: Nose; Swab   Result Value Ref Range    Influenza A antigen Negative Negative    Influenza B antigen Negative Negative    Narrative    Test results must be correlated with  clinical data. If necessary, results should be confirmed by a molecular assay or viral culture.         ASSESSMENT/PLAN:  (R07.89) Atypical chest pain  (primary encounter diagnosis)  Plan: EKG 12-lead complete w/read - Clinics, XR Chest        2 Views, D dimer quantitative            (R05.1) Acute cough  Plan: XR Chest 2 Views, Influenza A & B Antigen -         Clinic Collect, D dimer quantitative, COVID-19         Virus (Coronavirus) by PCR Nose            (I27.82) Chronic pulmonary embolism without acute cor pulmonale, unspecified pulmonary embolism type (H)  Plan: D dimer quantitative            Patient with complex medical history with CAD, h/o stents, chronic PE, subtherapeutic INR, recently restarted coumadin here today in UC with concerns of cough and atypical chest pain.  EKG obtain which is unchanged from previous EKG, CXR unremarkable.  Discussed limitation of work up in Urgent Care setting and reviewed that may require CT scan for potential PE.  Will obtain D-dimer and if elevated, may need to be seen in ER vs Acute Diagnositic Services.      COVID screen obtained, patient is high risk and symptoms overlap with COVID infection.    Follow up with primary provider within 1 week  Follow up with Cardiology within a few weeks    Jose Acharya MD  January 28, 2025 12:35 PM

## 2025-01-29 ENCOUNTER — TELEPHONE (OUTPATIENT)
Dept: CARDIOLOGY | Facility: CLINIC | Age: 65
End: 2025-01-29
Payer: COMMERCIAL

## 2025-01-29 LAB — SARS-COV-2 RNA RESP QL NAA+PROBE: NEGATIVE

## 2025-01-29 NOTE — TELEPHONE ENCOUNTER
Left Voicemail (2nd Attempt) and Sent Mychart (2nd Attempt) for the patient to call back and schedule the following:    Appointment type: RTN CARDIO  Provider: MADELINE OR NIKOLAI  Return date: NEXT AVAILABLE  Specialty phone number: [phone] OPT 1  Additional appointment(s) needed: LABS PRIOR  Additonal Notes: SARAH NO LONGER SEEING GENERAL. NEED TO RESCHEDULE 3/3 APPT WITH HER TO NEXT AVAILABLE WITH NIKOLAI OR MADELINE.

## 2025-01-30 ENCOUNTER — ANTICOAGULATION THERAPY VISIT (OUTPATIENT)
Dept: ANTICOAGULATION | Facility: CLINIC | Age: 65
End: 2025-01-30

## 2025-01-30 ENCOUNTER — LAB (OUTPATIENT)
Dept: LAB | Facility: CLINIC | Age: 65
End: 2025-01-30
Payer: COMMERCIAL

## 2025-01-30 DIAGNOSIS — I27.82 CHRONIC PULMONARY EMBOLISM WITHOUT ACUTE COR PULMONALE, UNSPECIFIED PULMONARY EMBOLISM TYPE (H): Primary | ICD-10-CM

## 2025-01-30 DIAGNOSIS — I27.82 CHRONIC PULMONARY EMBOLISM WITHOUT ACUTE COR PULMONALE, UNSPECIFIED PULMONARY EMBOLISM TYPE (H): ICD-10-CM

## 2025-01-30 LAB — INR BLD: 2.4 (ref 0.9–1.1)

## 2025-01-30 PROCEDURE — 85610 PROTHROMBIN TIME: CPT | Performed by: PATHOLOGY

## 2025-01-30 PROCEDURE — 36415 COLL VENOUS BLD VENIPUNCTURE: CPT | Performed by: PATHOLOGY

## 2025-01-30 NOTE — PROGRESS NOTES
ANTICOAGULATION MANAGEMENT     Oswaldo Prabhakar 64 year old male is on warfarin with therapeutic INR result. (Goal INR 2.0-3.0)    Recent labs: (last 7 days)     01/30/25  1155   INR 2.4*       ASSESSMENT     Source(s): Chart Review and Patient/Caregiver Call     Warfarin doses taken: Warfarin taken as instructed  Diet: No new diet changes identified  Medication/supplement changes: None noted  New illness, injury, or hospitalization: No  Signs or symptoms of bleeding or clotting: No  Previous result: Subtherapeutic  Additional findings: Restart on day 7 of warfarin       PLAN     Recommended plan for no diet, medication or health factor changes affecting INR     Dosing Instructions: Continue your current warfarin dose with next INR in 4 days       Summary  As of 1/30/2025      Full warfarin instructions:  5 mg every Mon, Wed, Fri; 7.5 mg all other days   Next INR check:  2/3/2025               Telephone call with Oswaldo who verbalizes understanding and agrees to plan    Lab visit scheduled    Education provided: Contact [phone] with any changes, questions or concerns.     Plan made per [facility] anticoagulation protocol    Marielena Chacon RN  1/30/2025  Anticoagulation Clinic  [employer] for routing messages: p [facility] patient phone line: [phone]        _______________________________________________________________________     Anticoagulation Episode Summary       Current INR goal:  2.0-3.0   TTR:  --   Target end date:  Indefinite   Send INR reminders to:  [facility]    Indications    Chronic pulmonary embolism without acute cor pulmonale  unspecified pulmonary embolism type (H) [I27.82]             Comments:  --             Anticoagulation Care Providers       Provider Role Specialty Phone number    Donya Ortiz NP Referring  [phone]

## 2025-02-03 ENCOUNTER — ANTICOAGULATION THERAPY VISIT (OUTPATIENT)
Dept: ANTICOAGULATION | Facility: CLINIC | Age: 65
End: 2025-02-03

## 2025-02-03 ENCOUNTER — LAB (OUTPATIENT)
Dept: LAB | Facility: CLINIC | Age: 65
End: 2025-02-03
Payer: COMMERCIAL

## 2025-02-03 DIAGNOSIS — E83.52 SERUM CALCIUM ELEVATED: ICD-10-CM

## 2025-02-03 DIAGNOSIS — I27.82 CHRONIC PULMONARY EMBOLISM WITHOUT ACUTE COR PULMONALE, UNSPECIFIED PULMONARY EMBOLISM TYPE (H): ICD-10-CM

## 2025-02-03 DIAGNOSIS — I10 ESSENTIAL HYPERTENSION: ICD-10-CM

## 2025-02-03 DIAGNOSIS — I27.82 CHRONIC PULMONARY EMBOLISM WITHOUT ACUTE COR PULMONALE, UNSPECIFIED PULMONARY EMBOLISM TYPE (H): Primary | ICD-10-CM

## 2025-02-03 LAB
ANION GAP SERPL CALCULATED.3IONS-SCNC: 12 MMOL/L (ref 7–15)
BUN SERPL-MCNC: 17.6 MG/DL (ref 8–23)
CALCIUM SERPL-MCNC: 11 MG/DL (ref 8.8–10.4)
CHLORIDE SERPL-SCNC: 104 MMOL/L (ref 98–107)
CREAT SERPL-MCNC: 1.3 MG/DL (ref 0.67–1.17)
EGFRCR SERPLBLD CKD-EPI 2021: 62 ML/MIN/1.73M2
GLUCOSE SERPL-MCNC: 126 MG/DL (ref 70–99)
HCO3 SERPL-SCNC: 24 MMOL/L (ref 22–29)
INR BLD: 4.9 (ref 0.9–1.1)
POTASSIUM SERPL-SCNC: 3.8 MMOL/L (ref 3.4–5.3)
SODIUM SERPL-SCNC: 140 MMOL/L (ref 135–145)

## 2025-02-03 PROCEDURE — 85610 PROTHROMBIN TIME: CPT | Performed by: PATHOLOGY

## 2025-02-03 PROCEDURE — 80048 BASIC METABOLIC PNL TOTAL CA: CPT | Performed by: PATHOLOGY

## 2025-02-03 PROCEDURE — 36415 COLL VENOUS BLD VENIPUNCTURE: CPT | Performed by: PATHOLOGY

## 2025-02-03 NOTE — PROGRESS NOTES
ANTICOAGULATION MANAGEMENT     Oswaldo Prabhakar 64 year old male is on warfarin with supratherapeutic INR result. (Goal INR 2.0-3.0)    Recent labs: (last 7 days)     02/03/25  1449   INR 4.9*       ASSESSMENT     Source(s): Chart Review and Patient/Caregiver Call     Warfarin doses taken: Warfarin taken as instructed  Diet: No new diet changes identified  Medication/supplement changes: None noted  New illness, injury, or hospitalization: No  Signs or symptoms of bleeding or clotting: No  Previous result: Therapeutic last visit; previously outside of goal range  Additional findings: New start on day 11 of warfarin       PLAN     Recommended plan for ongoing change(s) affecting INR     Dosing Instructions: hold dose then decrease your warfarin dose (16.7% change) with next INR in 9 days       Summary  As of 2/3/2025      Full warfarin instructions:  2/3: Hold; Otherwise 7.5 mg every Sun; 5 mg all other days   Next INR check:  2/12/2025               Telephone call with Oswaldo who verbalizes understanding and agrees to plan and who agrees to plan and repeated back plan correctly    Lab visit scheduled    Education provided: Please call back if any changes to your diet, medications or how you've been taking warfarin    Plan made with [facility] Pharmacist Jessica Cabral RN  2/3/2025  Anticoagulation Clinic  [employer] for routing messages: [facility]  [facility] patient phone line: [phone]        _______________________________________________________________________     Anticoagulation Episode Summary       Current INR goal:  2.0-3.0   TTR:  --   Target end date:  Indefinite   Send INR reminders to:  [facility]    Indications    Chronic pulmonary embolism without acute cor pulmonale  unspecified pulmonary embolism type (H) [I27.82]             Comments:  --             Anticoagulation Care Providers       Provider Role Specialty Phone number    Donya Ortiz NP Referring  [phone]

## 2025-02-06 DIAGNOSIS — R79.89 INCREASE IN SERUM CREATININE FROM PRIOR MEASUREMENT: ICD-10-CM

## 2025-02-06 DIAGNOSIS — E83.52 SERUM CALCIUM ELEVATED: Primary | ICD-10-CM

## 2025-02-06 LAB — VIT D+METAB SERPL-MCNC: 43 NG/ML (ref 20–50)

## 2025-02-10 ENCOUNTER — ALLIED HEALTH/NURSE VISIT (OUTPATIENT)
Dept: OPHTHALMOLOGY | Facility: CLINIC | Age: 65
End: 2025-02-10
Attending: OPHTHALMOLOGY
Payer: COMMERCIAL

## 2025-02-10 DIAGNOSIS — H40.50X0 NEOVASCULAR GLAUCOMA, UNSPECIFIED GLAUCOMA STAGE, UNSPECIFIED LATERALITY: Primary | ICD-10-CM

## 2025-02-10 PROCEDURE — 92015 DETERMINE REFRACTIVE STATE: CPT

## 2025-02-10 ASSESSMENT — REFRACTION_MANIFEST
OS_AXIS: 010
OD_SPHERE: BALANCE
OS_ADD: +2.50
OS_SPHERE: -0.25
OS_CYLINDER: +0.25

## 2025-02-10 ASSESSMENT — VISUAL ACUITY
OS_SC+: -2
OS_SC: 20/15
OD_SC: NLP
METHOD: SNELLEN - LINEAR

## 2025-02-10 ASSESSMENT — TONOMETRY
OS_IOP_MMHG: 12
OD_IOP_MMHG: 4
IOP_METHOD: ICARE

## 2025-02-12 ENCOUNTER — LAB (OUTPATIENT)
Dept: LAB | Facility: CLINIC | Age: 65
End: 2025-02-12
Payer: COMMERCIAL

## 2025-02-12 ENCOUNTER — ANTICOAGULATION THERAPY VISIT (OUTPATIENT)
Dept: ANTICOAGULATION | Facility: CLINIC | Age: 65
End: 2025-02-12

## 2025-02-12 DIAGNOSIS — I27.82 CHRONIC PULMONARY EMBOLISM WITHOUT ACUTE COR PULMONALE, UNSPECIFIED PULMONARY EMBOLISM TYPE (H): ICD-10-CM

## 2025-02-12 DIAGNOSIS — I27.82 CHRONIC PULMONARY EMBOLISM WITHOUT ACUTE COR PULMONALE, UNSPECIFIED PULMONARY EMBOLISM TYPE (H): Primary | ICD-10-CM

## 2025-02-12 LAB — INR BLD: 4.4 (ref 0.9–1.1)

## 2025-02-12 PROCEDURE — 36416 COLLJ CAPILLARY BLOOD SPEC: CPT | Performed by: PATHOLOGY

## 2025-02-12 PROCEDURE — 85610 PROTHROMBIN TIME: CPT | Performed by: PATHOLOGY

## 2025-02-12 NOTE — PROGRESS NOTES
ANTICOAGULATION MANAGEMENT     Oswaldo Prabhakar 64 year old male is on warfarin with supratherapeutic INR result. (Goal INR 2.0-3.0)    Recent labs: (last 7 days)     02/12/25  1117   INR 4.4*       ASSESSMENT     Source(s): Chart Review and Patient/Caregiver Call     Warfarin doses taken: Warfarin taken as instructed  Diet: No new diet changes identified--he does not eat green vegetables very often.  Suggested he try to eat one to two servings of greens/week to help stabilize INR.  He does not take multivitamin  Medication/supplement changes: None noted  New illness, injury, or hospitalization: No--no illness, no tylenol use  Signs or symptoms of bleeding or clotting: Yes: slight bloody nose x 1; stopped within 5 minutes  Previous result: Supratherapeutic  Additional findings:  restarted warfarin 1/24/25       PLAN     Recommended plan for no diet, medication or health factor changes affecting INR     Dosing Instructions: hold dose then decrease your warfarin dose (13% change) with next INR in 5 days       Summary  As of 2/12/2025      Full warfarin instructions:  2/12: Hold; Otherwise 2.5 mg every Mon; 5 mg all other days   Next INR check:  2/17/2025               Telephone call with Oswaldo who verbalizes understanding and agrees to plan and who agrees to plan and repeated back plan correctly    Lab visit scheduled    Education provided: Dietary considerations: importance of consistent vitamin K intake  Symptom monitoring: monitoring for bleeding signs and symptoms and when to seek medical attention/emergency care    Plan made per [facility] anticoagulation protocol    Marielena Chacon, RN  2/12/2025  Anticoagulation Clinic  [employer] for routing messages: [facility]  [facility] patient phone line: [phone]        _______________________________________________________________________     Anticoagulation Episode Summary       Current INR goal:  2.0-3.0   TTR:  0.0% (1.3 wk)   Target end date:  Indefinite   Send INR  reminders to:  [facility]    Indications    Chronic pulmonary embolism without acute cor pulmonale  unspecified pulmonary embolism type (H) [I27.82]             Comments:  --             Anticoagulation Care Providers       Provider Role Specialty Phone number    Donya Ortiz NP Referring  [phone]

## 2025-02-17 ENCOUNTER — LAB (OUTPATIENT)
Dept: LAB | Facility: CLINIC | Age: 65
End: 2025-02-17
Payer: COMMERCIAL

## 2025-02-17 ENCOUNTER — ANTICOAGULATION THERAPY VISIT (OUTPATIENT)
Dept: ANTICOAGULATION | Facility: CLINIC | Age: 65
End: 2025-02-17

## 2025-02-17 DIAGNOSIS — I27.82 CHRONIC PULMONARY EMBOLISM WITHOUT ACUTE COR PULMONALE, UNSPECIFIED PULMONARY EMBOLISM TYPE (H): Primary | ICD-10-CM

## 2025-02-17 DIAGNOSIS — I27.82 CHRONIC PULMONARY EMBOLISM WITHOUT ACUTE COR PULMONALE, UNSPECIFIED PULMONARY EMBOLISM TYPE (H): ICD-10-CM

## 2025-02-17 LAB — INR BLD: 2.2 (ref 0.9–1.1)

## 2025-02-17 PROCEDURE — 36415 COLL VENOUS BLD VENIPUNCTURE: CPT | Performed by: PATHOLOGY

## 2025-02-17 PROCEDURE — 85610 PROTHROMBIN TIME: CPT | Performed by: PATHOLOGY

## 2025-02-17 RX ORDER — WARFARIN SODIUM 5 MG/1
TABLET ORAL
Qty: 90 TABLET | Refills: 1 | Status: SHIPPED | OUTPATIENT
Start: 2025-02-17

## 2025-02-17 NOTE — PROGRESS NOTES
ANTICOAGULATION MANAGEMENT     Oswaldo Prabhakar 64 year old male is on warfarin with therapeutic INR result. (Goal INR 2.0-3.0)    Recent labs: (last 7 days)     02/17/25  1140   INR 2.2*       ASSESSMENT     Warfarin Lab Questionnaire    Warfarin Doses Last 7 Days      2/17/2025     8:19 AM   Dose in Tablet or Mg   TAB or MG? milligram (mg)     Pt Rptd Dose SUNDAY MONDAY TUESDAY WED THURS FRIDAY SATURDAY 2/17/2025   8:19 AM 5 5 5 0 5 5 5         2/17/2025   Warfarin Lab Questionnaire   Missed doses within past 14 days? No   Changes in diet or alcohol within past 14 days? No   Medication changes since last result? No   Injuries or illness since last result? No   New shortness of breath, severe headaches or sudden changes in vision since last result? No   Abnormal bleeding since last result? No   Upcoming surgery, procedure? No     Previous result: Supratherapeutic  Additional findings: None       PLAN     Recommended plan for temporary change(s) affecting INR     Dosing Instructions: Continue your current warfarin dose with next INR in 1 week       Summary  As of 2/17/2025      Full warfarin instructions:  2.5 mg every Wed; 5 mg all other days   Next INR check:  2/24/2025               Telephone call with Oswaldo who verbalizes understanding and agrees to plan and who agrees to plan and repeated back plan correctly    Lab visit scheduled    Education provided: Please call back if any changes to your diet, medications or how you've been taking warfarin    Plan made per [facility] anticoagulation protocol    Margarita Cabral RN  2/17/2025  Anticoagulation Clinic  [employer] for routing messages: p [facility] patient phone line: [phone]        _______________________________________________________________________     Anticoagulation Episode Summary       Current INR goal:  2.0-3.0   TTR:  12.6% (2 wk)   Target end date:  Indefinite   Send INR reminders to:  [facility]    Indications    Chronic pulmonary  embolism without acute cor pulmonale  unspecified pulmonary embolism type (H) [I27.82]             Comments:  --             Anticoagulation Care Providers       Provider Role Specialty Phone number    Donya Ortiz NP Referring Internal Medicine [phone]

## 2025-02-19 ENCOUNTER — TELEPHONE (OUTPATIENT)
Dept: CARDIOLOGY | Facility: CLINIC | Age: 65
End: 2025-02-19
Payer: COMMERCIAL

## 2025-02-19 NOTE — TELEPHONE ENCOUNTER
[facility]  Fax:[phone]  Spec: [phone]  MO: [phone]

## 2025-02-20 NOTE — TELEPHONE ENCOUNTER
Prior Authorization Approval    Authorization Effective Date: 1/21/2025  Authorization Expiration Date: 2/20/2026  Medication: Repatha sureclick 140mg/ml soaj  Approved Dose/Quantity:   Reference #:     Insurance Company: [employer] - Phone [phone] Fax [phone]  Expected CoPay:       CoPay Card Available:      Foundation Assistance Needed:    Which Pharmacy is filling the prescription (Not needed for infusion/clinic administered): [facility] - [place], [place] - [address]  Pharmacy Notified:  yes  Patient Notified:  yes- Pharmacy will contact patient when ready to /ship

## 2025-02-20 NOTE — TELEPHONE ENCOUNTER
Central Prior Authorization Team   Phone: [phone]    PA Initiation    Medication: Repatha sureclick 140mg/ml soaj  Insurance Company: [email or web address] - Phone [phone] Fax [phone]  Pharmacy Filling the Rx: [facility] - [place], [place] - [facility] [address]  Filling Pharmacy Phone: [phone]  Filling Pharmacy Fax:    Start Date: 2/20/2025

## 2025-02-22 LAB — NONINV COLON CA DNA+OCC BLD SCRN STL QL: NEGATIVE

## 2025-02-24 ENCOUNTER — ANTICOAGULATION THERAPY VISIT (OUTPATIENT)
Dept: ANTICOAGULATION | Facility: CLINIC | Age: 65
End: 2025-02-24

## 2025-02-24 ENCOUNTER — LAB (OUTPATIENT)
Dept: LAB | Facility: CLINIC | Age: 65
End: 2025-02-24
Payer: COMMERCIAL

## 2025-02-24 DIAGNOSIS — I27.82 CHRONIC PULMONARY EMBOLISM WITHOUT ACUTE COR PULMONALE, UNSPECIFIED PULMONARY EMBOLISM TYPE (H): ICD-10-CM

## 2025-02-24 DIAGNOSIS — I27.82 CHRONIC PULMONARY EMBOLISM WITHOUT ACUTE COR PULMONALE, UNSPECIFIED PULMONARY EMBOLISM TYPE (H): Primary | ICD-10-CM

## 2025-02-24 LAB — INR BLD: 1.9 (ref 0.9–1.1)

## 2025-02-24 PROCEDURE — 85610 PROTHROMBIN TIME: CPT | Performed by: PATHOLOGY

## 2025-02-24 PROCEDURE — 36415 COLL VENOUS BLD VENIPUNCTURE: CPT | Performed by: PATHOLOGY

## 2025-02-24 NOTE — PROGRESS NOTES
ANTICOAGULATION MANAGEMENT     Oswaldo Prabhakar 64 year old male is on warfarin with subtherapeutic INR result. (Goal INR 2.0-3.0)    Recent labs: (last 7 days)     02/24/25  1618   INR 1.9*       ASSESSMENT     Source(s): Chart Review and Patient/Caregiver Call     Warfarin doses taken: Warfarin taken as instructed  Diet: Increased greens/vitamin K in diet; ongoing change, increase in eating salads, would like to continue with increase in greens  Medication/supplement changes: None noted  New illness, injury, or hospitalization: No  Signs or symptoms of bleeding or clotting: No  Previous result: Therapeutic last visit; previously outside of goal range  Additional findings: None       PLAN     Recommended plan for ongoing change(s) affecting INR     Dosing Instructions: Increase your warfarin dose (7.7% change) with next INR in 1 week       Summary  As of 2/24/2025      Full warfarin instructions:  5 mg every day   Next INR check:  3/3/2025               Telephone call with Oswaldo who verbalizes understanding and agrees to plan    Lab visit scheduled    Education provided: Please call back if any changes to your diet, medications or how you've been taking warfarin  Goal range and lab monitoring: goal range and significance of current result, Importance of therapeutic range, and Importance of following up at instructed interval  Dietary considerations: importance of consistent vitamin K intake    Plan made per [facility] anticoagulation protocol    Rebekah Bhakta RN  2/24/2025  Anticoagulation Clinic  [employer] for routing messages: jose [facility] patient phone line: [phone]        _______________________________________________________________________     Anticoagulation Episode Summary       Current INR goal:  2.0-3.0   TTR:  30.5% (3 wk)   Target end date:  Indefinite   Send INR reminders to:  [facility]    Indications    Chronic pulmonary embolism without acute cor pulmonale  unspecified pulmonary  embolism type (H) [I27.82]             Comments:  --             Anticoagulation Care Providers       Provider Role Specialty Phone number    Donya Ortiz NP Referring Internal Medicine [phone]

## 2025-03-04 ENCOUNTER — LAB (OUTPATIENT)
Dept: LAB | Facility: CLINIC | Age: 65
End: 2025-03-04
Payer: COMMERCIAL

## 2025-03-04 ENCOUNTER — ANTICOAGULATION THERAPY VISIT (OUTPATIENT)
Dept: ANTICOAGULATION | Facility: CLINIC | Age: 65
End: 2025-03-04

## 2025-03-04 DIAGNOSIS — I27.82 CHRONIC PULMONARY EMBOLISM WITHOUT ACUTE COR PULMONALE, UNSPECIFIED PULMONARY EMBOLISM TYPE (H): ICD-10-CM

## 2025-03-04 DIAGNOSIS — I27.82 CHRONIC PULMONARY EMBOLISM WITHOUT ACUTE COR PULMONALE, UNSPECIFIED PULMONARY EMBOLISM TYPE (H): Primary | ICD-10-CM

## 2025-03-04 LAB — INR BLD: 2.6 (ref 0.9–1.1)

## 2025-03-04 PROCEDURE — 85610 PROTHROMBIN TIME: CPT | Performed by: PATHOLOGY

## 2025-03-04 PROCEDURE — 36416 COLLJ CAPILLARY BLOOD SPEC: CPT | Performed by: PATHOLOGY

## 2025-03-04 NOTE — PROGRESS NOTES
ANTICOAGULATION MANAGEMENT     Oswaldo Prabhakar 64 year old male is on warfarin with therapeutic INR result. (Goal INR 2.0-3.0)    Recent labs: (last 7 days)     03/04/25  1141   INR 2.6*       ASSESSMENT     Warfarin Lab Questionnaire    Warfarin Doses Last 7 Days      3/3/2025    10:02 AM   Dose in Tablet or Mg   TAB or MG? milligram (mg)     Pt Rptd Dose KENNY MONDAY TUESDAY WED THURS FRIDAY SATURDAY   3/3/2025  10:02 AM 5 5 5 5 5 5 5         3/3/2025   Warfarin Lab Questionnaire   Missed doses within past 14 days? No   Changes in diet or alcohol within past 14 days? No   Medication changes since last result? No   Injuries or illness since last result? No   New shortness of breath, severe headaches or sudden changes in vision since last result? No   Abnormal bleeding since last result? No   Upcoming surgery, procedure? No     Previous result: Subtherapeutic  Additional findings: None       PLAN     Recommended plan for no diet, medication or health factor changes affecting INR     Dosing Instructions: Continue your current warfarin dose with next INR in 2 weeks       Summary  As of 3/4/2025      Full warfarin instructions:  5 mg every day   Next INR check:  3/18/2025               Telephone call with Oswaldo who verbalizes understanding and agrees to plan and who agrees to plan and repeated back plan correctly    Lab visit scheduled    Education provided: Please call back if any changes to your diet, medications or how you've been taking warfarin    Plan made per [facility] anticoagulation protocol    Margarita Cabral RN  3/4/2025  Anticoagulation Clinic  [employer] for routing messages: jose [facility] patient phone line: [phone]        _______________________________________________________________________     Anticoagulation Episode Summary       Current INR goal:  2.0-3.0   TTR:  45.1% (4.1 wk)   Target end date:  Indefinite   Send INR reminders to:  [facility]    Indications    Chronic pulmonary  embolism without acute cor pulmonale  unspecified pulmonary embolism type (H) [I27.82]             Comments:  --             Anticoagulation Care Providers       Provider Role Specialty Phone number    Donya Ortiz NP Referring Internal Medicine [phone]

## 2025-03-08 DIAGNOSIS — E78.5 HYPERLIPIDEMIA LDL GOAL <130: ICD-10-CM

## 2025-03-13 RX ORDER — FENOFIBRATE 160 MG/1
160 TABLET ORAL DAILY
Qty: 90 TABLET | Refills: 0 | Status: SHIPPED | OUTPATIENT
Start: 2025-03-13

## 2025-03-13 NOTE — TELEPHONE ENCOUNTER
Last Written Prescription: fenofibrate (TRIGLIDE/LOFIBRA) 160 MG tablet      fenofibrate (TRIGLIDE/LOFIBRA) 160 MG tablet 90 tablet 3 3/7/2024 -- No   Sig - Route: Take 1 tablet (160 mg) by mouth daily - Ora     ----------------------  Last Visit Date: 3/7/24 Felipe  Future Visit Date: None  ----------------------      Refill decision: Medication refilled per  Medication Refill in Ambulatory Care  policy. 30 day valencia refill sent to the pharmacy - including instructions for patient to call the clinic and schedule an appointment.         Request from pharmacy:  Requested Prescriptions   Pending Prescriptions Disp Refills    fenofibrate (TRIGLIDE/LOFIBRA) 160 MG tablet 90 tablet 3     Sig: Take 1 tablet (160 mg) by mouth daily.       Antihyperlipidemic agents Failed - 3/13/2025  2:32 PM        Failed - Recent (12 mo) or future (90 days) visit within the authorizing provider's specialty     The patient must have completed an in-person or virtual visit within the past 12 months or has a future visit scheduled within the next 90 days with the authorizing provider s specialty.  Urgent care and e-visits do not qualify as an office visit for this protocol.          Passed - LDL on file in the past 12 months        Passed - Medication is active on med list and the sig matches. RN to manually verify dose and sig if red X/fail.     If the protocol passes (green check), you do not need to verify med dose and sig.    A prescription matches if they are the same clinical intention.    For Example: once daily and every morning are the same.    The protocol can not identify upper and lower case letters as matching and will fail.     For Example: Take 1 tablet (50 mg) by mouth daily     TAKE 1 TABLET (50 MG) BY MOUTH DAILY    For all fails (red x), verify dose and sig.    If the refill does match what is on file, the RN can still proceed to approve the refill request.       If they do not match, route to the appropriate provider.              Passed - Patient is age 18 years or older

## 2025-03-18 ENCOUNTER — LAB (OUTPATIENT)
Dept: LAB | Facility: CLINIC | Age: 65
End: 2025-03-18
Payer: COMMERCIAL

## 2025-03-18 ENCOUNTER — ANTICOAGULATION THERAPY VISIT (OUTPATIENT)
Dept: ANTICOAGULATION | Facility: CLINIC | Age: 65
End: 2025-03-18

## 2025-03-18 DIAGNOSIS — D31.31 CHOROIDAL NEVUS, RIGHT: Primary | ICD-10-CM

## 2025-03-18 DIAGNOSIS — I27.82 CHRONIC PULMONARY EMBOLISM WITHOUT ACUTE COR PULMONALE, UNSPECIFIED PULMONARY EMBOLISM TYPE (H): ICD-10-CM

## 2025-03-18 DIAGNOSIS — I27.82 CHRONIC PULMONARY EMBOLISM WITHOUT ACUTE COR PULMONALE, UNSPECIFIED PULMONARY EMBOLISM TYPE (H): Primary | ICD-10-CM

## 2025-03-18 LAB — INR BLD: 2.1 (ref 0.9–1.1)

## 2025-03-18 PROCEDURE — 85610 PROTHROMBIN TIME: CPT | Performed by: PATHOLOGY

## 2025-03-18 PROCEDURE — 36415 COLL VENOUS BLD VENIPUNCTURE: CPT | Performed by: PATHOLOGY

## 2025-03-18 NOTE — PROGRESS NOTES
ANTICOAGULATION MANAGEMENT     Oswaldo Prabhakar 64 year old male is on warfarin with therapeutic INR result. (Goal INR 2.0-3.0)    Recent labs: (last 7 days)     03/18/25  1114   INR 2.1*       ASSESSMENT     Source(s): Chart Review and Patient/Caregiver Call     Warfarin doses taken: Warfarin taken as instructed  Diet: No new diet changes identified  Medication/supplement changes: None noted  New illness, injury, or hospitalization: No  Signs or symptoms of bleeding or clotting: No  Previous result: Therapeutic last visit; previously outside of goal range  Additional findings: None       PLAN     Recommended plan for no diet, medication or health factor changes affecting INR     Dosing Instructions: Continue your current warfarin dose with next INR in 2 weeks       Summary  As of 3/18/2025      Full warfarin instructions:  5 mg every day   Next INR check:  4/1/2025               Telephone call with Oswaldo who verbalizes understanding and agrees to plan    Patient offered & declined to schedule next visit    Education provided: Contact [phone] with any changes, questions or concerns.     Plan made per [facility] anticoagulation protocol    Lorena Katz RN  3/18/2025  Anticoagulation Clinic  [employer] for routing messages: jose [facility] patient phone line: [phone]        _______________________________________________________________________     Anticoagulation Episode Summary       Current INR goal:  2.0-3.0   TTR:  62.8% (1.4 mo)   Target end date:  Indefinite   Send INR reminders to:  [facility]    Indications    Chronic pulmonary embolism without acute cor pulmonale  unspecified pulmonary embolism type (H) [I27.82]             Comments:  --             Anticoagulation Care Providers       Provider Role Specialty Phone number    Donya Ortiz NP Referring Internal Medicine [phone]

## 2025-03-25 ENCOUNTER — OFFICE VISIT (OUTPATIENT)
Dept: OPHTHALMOLOGY | Facility: CLINIC | Age: 65
End: 2025-03-25
Attending: OPHTHALMOLOGY
Payer: COMMERCIAL

## 2025-03-25 DIAGNOSIS — D31.31 CHOROIDAL NEVUS, RIGHT: ICD-10-CM

## 2025-03-25 DIAGNOSIS — H43.11 VITREOUS HEMORRHAGE, RIGHT (H): ICD-10-CM

## 2025-03-25 DIAGNOSIS — H40.51X3 NEOVASCULAR GLAUCOMA OF RIGHT EYE, SEVERE STAGE: Primary | ICD-10-CM

## 2025-03-25 PROCEDURE — 92250 FUNDUS PHOTOGRAPHY W/I&R: CPT | Performed by: OPHTHALMOLOGY

## 2025-03-25 PROCEDURE — 76512 OPH US DX B-SCAN: CPT | Performed by: OPHTHALMOLOGY

## 2025-03-25 PROCEDURE — 99211 OFF/OP EST MAY X REQ PHY/QHP: CPT | Performed by: OPHTHALMOLOGY

## 2025-03-25 PROCEDURE — 99213 OFFICE O/P EST LOW 20 MIN: CPT | Performed by: OPHTHALMOLOGY

## 2025-03-25 PROCEDURE — 999N000103 HC STATISTIC NO CHARGE FACILITY FEE

## 2025-03-25 PROCEDURE — 92134 CPTRZ OPH DX IMG PST SGM RTA: CPT | Performed by: OPHTHALMOLOGY

## 2025-03-25 ASSESSMENT — CONF VISUAL FIELD
METHOD: COUNTING FINGERS
OD_SUPERIOR_NASAL_RESTRICTION: 1
OS_INFERIOR_NASAL_RESTRICTION: 0
OD_INFERIOR_TEMPORAL_RESTRICTION: 1
OS_INFERIOR_NASAL_RESTRICTION: 0
OD_SUPERIOR_TEMPORAL_RESTRICTION: 1
OD_SUPERIOR_TEMPORAL_RESTRICTION: 1
OS_SUPERIOR_NASAL_RESTRICTION: 0
OS_SUPERIOR_NASAL_RESTRICTION: 0
OS_INFERIOR_TEMPORAL_RESTRICTION: 0
OD_INFERIOR_NASAL_RESTRICTION: 1
OS_NORMAL: 1
OD_INFERIOR_TEMPORAL_RESTRICTION: 1
OS_INFERIOR_TEMPORAL_RESTRICTION: 0
OS_SUPERIOR_TEMPORAL_RESTRICTION: 0
METHOD: COUNTING FINGERS
OD_INFERIOR_NASAL_RESTRICTION: 1
OD_SUPERIOR_NASAL_RESTRICTION: 1
OS_SUPERIOR_TEMPORAL_RESTRICTION: 0
OS_NORMAL: 1

## 2025-03-25 ASSESSMENT — EXTERNAL EXAM - LEFT EYE
OS_EXAM: NORMAL
OS_EXAM: NORMAL

## 2025-03-25 ASSESSMENT — TONOMETRY
OS_IOP_MMHG: 11
OD_IOP_MMHG: 05
IOP_METHOD: APPLANATION
IOP_METHOD: TONOPEN
OS_IOP_MMHG: 13
OS_IOP_MMHG: 13
OD_IOP_MMHG: 05
OS_IOP_MMHG: 14
IOP_METHOD: TONOPEN
IOP_METHOD: APPLANATION
OS_IOP_MMHG: 14
OD_IOP_MMHG: 05
OD_IOP_MMHG: 3
IOP_METHOD: APPLANATION
OD_IOP_MMHG: 05

## 2025-03-25 ASSESSMENT — EXTERNAL EXAM - RIGHT EYE
OD_EXAM: EXOTROPIA
OD_EXAM: EXOTROPIA

## 2025-03-25 ASSESSMENT — CUP TO DISC RATIO
OS_RATIO: 0.5
OS_RATIO: 0.5

## 2025-03-25 ASSESSMENT — SLIT LAMP EXAM - LIDS
COMMENTS: NORMAL

## 2025-03-25 ASSESSMENT — VISUAL ACUITY
METHOD: SNELLEN - LINEAR
OD_SC: NLP
OS_SC: 20/20
OS_SC: 20/20
METHOD: SNELLEN - LINEAR
OD_SC: NLP

## 2025-03-25 NOTE — NURSING NOTE
Chief Complaints and History of Present Illnesses   Patient presents with    Follow Up     Follow up for choroidal lesion Right eye and hyphema/ VH     Chief Complaint(s) and History of Present Illness(es)       Follow Up              Laterality: right eye    Associated symptoms: Negative for dryness and itching    Treatments tried: no treatments    Pain scale: 0/10    Comments: Follow up for choroidal lesion Right eye and hyphema/ VH              Comments    The patient notes that for the last couple of weeks he has Right eye discomfort while touching eye.  He has no eye pain now.    Catalina Laguna, COA, COA 8:10 AM 03/25/2025

## 2025-03-25 NOTE — PROGRESS NOTES
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    Came to [facility] for NVG OD 8/2022 seen in ED, had been at [facility], diagnosis with NVG OD and referred to [facility] for CPC and PRP, seen by Adebayo at [facility] and Modesta Savage at [facility].    Poor vision OD since childhood d/t RD not diagnosis until too late to repair    CAD s/p CABG x 4  No h/o cancer    PE on chronic warfarin    PAST OCULAR SURGERY:  CE/IOL OU 2022  Laser pexy OS 2000s  CPC OD 5/17/23 ([facility])  CPC OD 10/25/23    RETINAL IMAGING  OCT 03/25/25    OD - no view  OS - retina normal, PHF attached    FA 4/19/23:  - Right eye: Diffuse pinpoint hyperfluorescence, trace disc leakage.   - Left eye: Trace disc leakage.     U/S OD  A-scan - med/high reflective 5/17/24  B-scan - ST mass with hyperR base, new vitreous hemorrhage?, size 2.27 x 5.88T . 4.42L (4/2023) -> 1.95mm x 6.19T x 5.01L (06/12/23) (6/2023) -> 2.26 x 6.38T x 5.10L (9/12/23)-> 2.29 x 5.13L x 6.56T (1/12/24) - 2.27 x 6.56T x 5.14L. (5/17/24) -> 2.70 vs 2.05 mm x 6.09 L x 5.07T (9/2024) -> 2.78 x 5.92T x 6.01L (12/2024) -> 2.76 vs 2.07 x 7.19T x 5.64L (3/2025)    U/S B-scan 6/24/24  OD - suspected RD OD      ASSESSMENT & PLAN    # choroidal lesion OD   - noted 4/19/232023   - dome shaped anterior, difficult to see on exam d/t IOL edge/capsule & modeate pupil   - U/S not typical for met or CMM, hyper-reflective base but no visible external abnormalities   - too anterior for Optos or OCT     - ?weak transillumination shadow visible (6/2023)   - surrounding DBH 9/2023   - DDx  PEHCR vs  large nevus     - ?increased size on U/S 9/2204 c/t prior but stable since   - may reflect differnt measurement boundaries     - stable today likely     - continue to monitor closely as precuation   - stable on B-scan   but no view to the fundus due to  VH       - recheck 6 months as precaution      # NVG OD   - sees Janette   - h/o Avastin ~ 1/2023  (VRS)   - seen by Modesta 4/19/23, no sig ischemia to Tx on FA 4/19/23   - s/p CPC 6/2023     - 6/7/24 IOP OK but new large NVI given Avastin     - 6/24/24 IOP mod elevated, mod persistent NVI temporal despite Avastin 6/7/24   - NLP now, no pain, monitor      # Possible RD OD   - suspected based on U/S since 6/20/24   - had prior possible tears   - despite extensive scarring from prior spontaneously resolved RD   - had relatively unaffected superior retinal wedge, now may be detached     - NLP not expected from RD   - suspect also vision loss from glaucomatous damage     - d/w patient surgery vs observe 6/2024   - given NLP and prior poor vision doubt benefit from surgery   - will observe    # VH/hyphema   - onset 2024   - suspect bleeding from NVI & VH from hyphema   - on chronic Warfarin due to history of bilateral unprovoked PE   - NLP since 6/19/24   - montior for now   - resolved 9/2024   - recurrent VH 3/2025        # 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     Kvng De Los Santos MD  Resident Physician, PGY-3  Department of Ophthalmology       ATTESTATION     Attending Physician Attestation:      Complete documentation of historical and exam elements from today's encounter can be found in the full encounter summary report (not reduplicated in this progress note).  I personally obtained the chief complaint(s) and history of present illness.  I confirmed and edited as necessary the review of systems, past medical/surgical history, family history, social history, and examination findings as documented by others; and I  examined the patient myself.  I personally reviewed the relevant tests, images, and reports as documented above.  I personally reviewed the ophthalmic test(s) associated with this encounter, agree with the interpretation(s) as documented by the resident/fellow, and have edited the corresponding report(s) as necessary.   I formulated and edited as necessary the assessment and plan and discussed the findings and management plan with the patient and family    Maya Isaac MD, PhD  , Vitreoretinal Surgery  Department of Ophthalmology  [facility]

## 2025-03-25 NOTE — NURSING NOTE
Chief Complaints and History of Present Illnesses   Patient presents with    Follow Up     Glaucoma follow up.       Chief Complaint(s) and History of Present Illness(es)       Follow Up              Laterality: right eye    Associated symptoms: Negative for dryness and itching    Treatments tried: no treatments    Pain scale: 0/10    Comments: Glaucoma follow up.                Comments    The patient notes that for the last couple of weeks he has Right eye discomfort while touching eye.  He has no eye pain now.    Catalina Laguna, COA, COA 8:10 AM 03/25/2025

## 2025-03-25 NOTE — PROGRESS NOTES
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   - 05/17/2023: s/p right eye TSCPC #20 shots  - S/p repest TSCPC right eye 10/25/23    Today, 03/25/2025, s/p TSCPC right eye 5/17/23, S/p repeat TSCPC 10/25/23. Currently, Notcing some slight pain on right eye. Ended up losing complete vision on right eye since having a vit heme. Currently monitored by Dr. Isaac.     Currently Not Taking an medications.     Relevant Past Medical/Family/Social History:  Past Medical History:   Diagnosis Date    Antiplatelet or antithrombotic long-term use     Coronary artery disease     Diaphragmatic hernia without mention of obstruction or gangrene     Heart disease     Hernia, abdominal     History of blood transfusion     History of thrombophlebitis     Hypertension     Nephrolithiasis 4/2010    Other and unspecified hyperlipidemia     Pulmonary embolus and infarction (H)     s/p hemothorax and filter s/p filter removal (2011), now on long term anti-coagulation    Statin intolerance 2/1/2017    Stented coronary artery 01/07/2020    NIR mid LAD    Unspecified retinal detachment     Childhood trauma, unrepaired     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[employer]  No family history of glaucoma    Relevant Review of Systems: chronic decreased vision in the right eye, no pain in the right eye, feels like his left eye is at baseline as well     Diagnosis: NVG right eye, severe stage   Year diagnosis: 2023  Previous glaucoma surgery/laser: CEIOL left eye, PRP left eye, CEIOL right eye  Maximum intraocular pressure 50s  Currently Meds: Diamox 500 mg PO BID, prednisolone BID OD, timolol BID right eye,  Brimonidine TID OD  Family history: negative  CCT: 551/548  Gonio:  Refractive status: Pseudophakia  Trauma history: negative  Steroid exposure: positive prednisolone  Vasospastic disease: Migrane/Raynaud phenomenon: negative  A past hemodynamic crisis or Low BP: negative  Meds AEs/intolerance: none  PMHx: Asthma and respiratory problems/Cardiac/Renal/Kidney stones/Sulfa Allergy: kidney stones, bypass surgery  Anticoagulants: Warfarin for complicated PE  - Visual field April 13, 2023  - Right eye - not able to completed  - Left eye - normal, reliable  - OCT Optic Nerve RNFL Spectralis April 13, 2023  - Right Eye: diffuse atrophy, reliable  - Left Eye: trace thinning IT, reliable    Today's testing:  See examination    Additional Ocular History:   Anterior uveitis, chronic, right eye    Hx of spontaneous RD 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  IOP acceptable and patient asymptomatic   Patient has chronic RD/inflammation and neovascularization in the right eye. Also sees Dr. Isaac. Now s/p repeat right eye TSCPC Subjectively comfortable. will avoid tube shunt, poor visual prognosis and possible CMM.  Seeing Dr. Isaac today.   IOP remains controlled off glaucoma drops.  Continue to hold off glaucoma drops     RTC in 1 year [facility], IOP    Stormy David MD  PGY-3  Department of Ophthalmology  March 25, 2025 8:44 AM      Physician Attestation     Attending Physician Attestation:  Complete documentation of historical and exam elements from today's encounter can be found in the full encounter summary report (not reduplicated in this progress note). I personally obtained the chief  complaint(s) and history of present illness. I confirmed and edited as necessary the review of systems, past medical/surgical history, family history, social history, and examination findings as documented by others; and I examined the patient myself. I personally reviewed the relevant tests, images, and reports as documented above. I formulated and edited as necessary the assessment and plan and discussed the findings and management plan with the patient and any family members present at the time of the visit.  Jay Jay Savage M.D., Glaucoma, March 25, 2025

## 2025-04-01 ENCOUNTER — LAB (OUTPATIENT)
Dept: LAB | Facility: CLINIC | Age: 65
End: 2025-04-01
Payer: COMMERCIAL

## 2025-04-01 ENCOUNTER — ANTICOAGULATION THERAPY VISIT (OUTPATIENT)
Dept: ANTICOAGULATION | Facility: CLINIC | Age: 65
End: 2025-04-01

## 2025-04-01 DIAGNOSIS — I27.82 CHRONIC PULMONARY EMBOLISM WITHOUT ACUTE COR PULMONALE, UNSPECIFIED PULMONARY EMBOLISM TYPE (H): ICD-10-CM

## 2025-04-01 DIAGNOSIS — I27.82 CHRONIC PULMONARY EMBOLISM WITHOUT ACUTE COR PULMONALE, UNSPECIFIED PULMONARY EMBOLISM TYPE (H): Primary | ICD-10-CM

## 2025-04-01 LAB — INR BLD: 1.6 (ref 0.9–1.1)

## 2025-04-01 PROCEDURE — 85610 PROTHROMBIN TIME: CPT | Performed by: PATHOLOGY

## 2025-04-01 PROCEDURE — 36416 COLLJ CAPILLARY BLOOD SPEC: CPT | Performed by: PATHOLOGY

## 2025-04-01 NOTE — PROGRESS NOTES
ANTICOAGULATION MANAGEMENT     Oswaldo Prabhakar 64 year old male is on warfarin with subtherapeutic INR result. (Goal INR 2.0-3.0)    Recent labs: (last 7 days)     04/01/25  1637   INR 1.6*       ASSESSMENT     Source(s): Chart Review and Patient/Caregiver Call     Warfarin doses taken: Warfarin taken as instructed  Diet: Increased greens/vitamin K in diet; ongoing change - Oswaldo has been having more salads and plans to continue to keep up this increase in Vit K  Medication/supplement changes: None noted, 2 ophthalmology appts since last INR  New illness, injury, or hospitalization: No  Signs or symptoms of bleeding or clotting: No  Previous result: Therapeutic last 2(+) visits  Additional findings: Only able to stay within protocol with a more aggressive dosing increase as noted below. Therefore advised recheck in 1 week.        PLAN     Recommended plan for ongoing change(s) affecting INR     Dosing Instructions: Increase your warfarin dose (14.3% change) with next INR in 1 week       Summary  As of 4/1/2025      Full warfarin instructions:  7.5 mg every Tue, Fri; 5 mg all other days   Next INR check:  4/8/2025               Telephone call with Oswaldo who verbalizes understanding and agrees to plan    Lab visit scheduled    Education provided: Please call back if any changes to your diet, medications or how you've been taking warfarin    Plan made per [facility] anticoagulation protocol    Zahraa Osorio RN  4/1/2025  Anticoagulation Clinic  [employer] for routing messages: p [facility] patient phone line: [phone]        _______________________________________________________________________     Anticoagulation Episode Summary       Current INR goal:  2.0-3.0   TTR:  52.2% (1.9 mo)   Target end date:  Indefinite   Send INR reminders to:  [facility]    Indications    Chronic pulmonary embolism without acute cor pulmonale  unspecified pulmonary embolism type (H) [I27.82]              Comments:  --             Anticoagulation Care Providers       Provider Role Specialty Phone number    Donya Ortiz NP Referring Internal Medicine [phone]

## 2025-04-08 ENCOUNTER — LAB (OUTPATIENT)
Dept: LAB | Facility: CLINIC | Age: 65
End: 2025-04-08
Payer: COMMERCIAL

## 2025-04-08 ENCOUNTER — ANTICOAGULATION THERAPY VISIT (OUTPATIENT)
Dept: ANTICOAGULATION | Facility: CLINIC | Age: 65
End: 2025-04-08

## 2025-04-08 DIAGNOSIS — I27.82 CHRONIC PULMONARY EMBOLISM WITHOUT ACUTE COR PULMONALE, UNSPECIFIED PULMONARY EMBOLISM TYPE (H): ICD-10-CM

## 2025-04-08 DIAGNOSIS — I27.82 CHRONIC PULMONARY EMBOLISM WITHOUT ACUTE COR PULMONALE, UNSPECIFIED PULMONARY EMBOLISM TYPE (H): Primary | ICD-10-CM

## 2025-04-08 LAB — INR BLD: 3.4 (ref 0.9–1.1)

## 2025-04-08 PROCEDURE — 85610 PROTHROMBIN TIME: CPT

## 2025-04-08 PROCEDURE — 36416 COLLJ CAPILLARY BLOOD SPEC: CPT

## 2025-04-08 NOTE — PROGRESS NOTES
ANTICOAGULATION MANAGEMENT     Oswaldo Prabhakar 64 year old male is on warfarin with supratherapeutic INR result. (Goal INR 2.0-3.0)    Recent labs: (last 7 days)     25  1547   INR 3.4*       ASSESSMENT     Source(s): Chart Review and Patient/Caregiver Call     Warfarin doses taken: Warfarin taken as instructed - maintenance dose increased at last INR visit could be impacting INR today.   Diet: No new diet changes identified  Medication/supplement changes: None noted  New illness, injury, or hospitalization: No  Signs or symptoms of bleeding or clottin nose bleed with past week, able to get it stop without difficulty.  Previous result: Subtherapeutic  Additional findings: None       PLAN     Recommended plan for no diet, medication or health factor changes affecting INR     Dosing Instructions: decrease your warfarin dose (6.2% change) with next INR in 1-2 weeks       Summary  As of 2025      Full warfarin instructions:  7.5 mg every Fri; 5 mg all other days   Next INR check:  2025               Telephone call with Oswaldo who verbalizes understanding and agrees to plan    Lab visit scheduled    Education provided: Goal range and lab monitoring: goal range and significance of current result, Importance of therapeutic range, and Importance of following up at instructed interval  Symptom monitoring: monitoring for bleeding signs and symptoms and when to seek medical attention/emergency care    Plan made per [facility] anticoagulation protocol    Amrik Pineda RN  2025  Anticoagulation Clinic  [email or web address] for routing messages: jose [facility] patient phone line: [phone]        _______________________________________________________________________     Anticoagulation Episode Summary       Current INR goal:  2.0-3.0   TTR:  52.6% (2.1 mo)   Target end date:  Indefinite   Send INR reminders to:  [facility]    Indications    Chronic pulmonary embolism without acute cor pulmonale  unspecified  pulmonary embolism type (H) [I27.82]             Comments:  --             Anticoagulation Care Providers       Provider Role Specialty Phone number    Donya Ortiz NP Referring Internal Medicine [phone]

## 2025-04-24 ENCOUNTER — MYC MEDICAL ADVICE (OUTPATIENT)
Dept: ANTICOAGULATION | Facility: CLINIC | Age: 65
End: 2025-04-24
Payer: COMMERCIAL

## 2025-05-04 ENCOUNTER — HEALTH MAINTENANCE LETTER (OUTPATIENT)
Age: 65
End: 2025-05-04

## 2025-05-13 ENCOUNTER — RESULTS FOLLOW-UP (OUTPATIENT)
Dept: ANTICOAGULATION | Facility: CLINIC | Age: 65
End: 2025-05-13

## 2025-05-13 ENCOUNTER — ANTICOAGULATION THERAPY VISIT (OUTPATIENT)
Dept: ANTICOAGULATION | Facility: CLINIC | Age: 65
End: 2025-05-13

## 2025-05-13 ENCOUNTER — LAB (OUTPATIENT)
Dept: LAB | Facility: CLINIC | Age: 65
End: 2025-05-13
Payer: COMMERCIAL

## 2025-05-13 DIAGNOSIS — I27.82 CHRONIC PULMONARY EMBOLISM WITHOUT ACUTE COR PULMONALE, UNSPECIFIED PULMONARY EMBOLISM TYPE (H): Primary | ICD-10-CM

## 2025-05-13 DIAGNOSIS — R79.89 INCREASE IN SERUM CREATININE FROM PRIOR MEASUREMENT: ICD-10-CM

## 2025-05-13 DIAGNOSIS — E83.52 SERUM CALCIUM ELEVATED: ICD-10-CM

## 2025-05-13 DIAGNOSIS — I27.82 CHRONIC PULMONARY EMBOLISM WITHOUT ACUTE COR PULMONALE, UNSPECIFIED PULMONARY EMBOLISM TYPE (H): ICD-10-CM

## 2025-05-13 LAB
ANION GAP SERPL CALCULATED.3IONS-SCNC: 12 MMOL/L (ref 7–15)
BUN SERPL-MCNC: 14 MG/DL (ref 8–23)
CA-I BLD-MCNC: 4.6 MG/DL (ref 4.4–5.2)
CALCIUM SERPL-MCNC: 9.2 MG/DL (ref 8.8–10.4)
CHLORIDE SERPL-SCNC: 105 MMOL/L (ref 98–107)
CREAT SERPL-MCNC: 1.03 MG/DL (ref 0.67–1.17)
EGFRCR SERPLBLD CKD-EPI 2021: 82 ML/MIN/1.73M2
GLUCOSE SERPL-MCNC: 109 MG/DL (ref 70–99)
HCO3 SERPL-SCNC: 21 MMOL/L (ref 22–29)
INR BLD: 1.9 (ref 0.9–1.1)
POTASSIUM SERPL-SCNC: 3.9 MMOL/L (ref 3.4–5.3)
PTH-INTACT SERPL-MCNC: 24 PG/ML (ref 15–65)
SODIUM SERPL-SCNC: 138 MMOL/L (ref 135–145)

## 2025-05-13 PROCEDURE — 83970 ASSAY OF PARATHORMONE: CPT | Performed by: PATHOLOGY

## 2025-05-13 PROCEDURE — 36415 COLL VENOUS BLD VENIPUNCTURE: CPT | Performed by: PATHOLOGY

## 2025-05-13 PROCEDURE — 82330 ASSAY OF CALCIUM: CPT | Performed by: PATHOLOGY

## 2025-05-13 PROCEDURE — 85610 PROTHROMBIN TIME: CPT | Performed by: PATHOLOGY

## 2025-05-13 PROCEDURE — 80048 BASIC METABOLIC PNL TOTAL CA: CPT | Performed by: PATHOLOGY

## 2025-05-13 NOTE — PROGRESS NOTES
ANTICOAGULATION MANAGEMENT     Oswaldo Prabhakar 64 year old male is on warfarin with subtherapeutic INR result. (Goal INR 2.0-3.0)    Recent labs: (last 7 days)     05/13/25  1634   INR 1.9*       ASSESSMENT     Warfarin Lab Questionnaire    Warfarin Doses Last 7 Days      5/13/2025     3:20 PM   Dose in Tablet or Mg   TAB or MG? milligram (mg)     Pt Rptd Dose SUNDAY MONDAY TUESDAY WED THURS FRIDAY SATURDAY 5/13/2025   3:20 PM 5 5 5 5 5 7.5 5         5/13/2025   Warfarin Lab Questionnaire   Missed doses within past 14 days? No   Changes in diet or alcohol within past 14 days? No - pt has been having more greens in diet, this is an ongoing change pt    Medication changes since last result? No   Injuries or illness since last result? No   New shortness of breath, severe headaches or sudden changes in vision since last result? No   Abnormal bleeding since last result? No   Upcoming surgery, procedure? No     Previous result: Therapeutic last visit; previously outside of goal range  Additional findings: None       PLAN     Recommended plan for ongoing change(s) affecting INR     Dosing Instructions: Increase your warfarin dose (6.7% change) with next INR in 2 weeks       Summary  As of 5/13/2025      Full warfarin instructions:  7.5 mg every Tue, Fri; 5 mg all other days   Next INR check:  5/28/2025               Telephone call with Oswaldo who verbalizes understanding and agrees to plan    Patient offered & declined to schedule next visit    Education provided: Goal range and lab monitoring: goal range and significance of current result and Importance of therapeutic range  Dietary considerations: importance of consistent vitamin K intake and impact of vitamin K foods on INR    Plan made per [facility] anticoagulation protocol    Amrik Pineda, RN  5/13/2025  Anticoagulation Clinic  [employer] for routing messages: jose [facility]  [facility] patient phone line:  [phone]        _______________________________________________________________________     Anticoagulation Episode Summary       Current INR goal:  2.0-3.0   TTR:  58.6% (3.3 mo)   Target end date:  Indefinite   Send INR reminders to:  [facility]    Indications    Chronic pulmonary embolism without acute cor pulmonale  unspecified pulmonary embolism type (H) [I27.82]             Comments:  --             Anticoagulation Care Providers       Provider Role Specialty Phone number    Donya Ortiz NP Referring Internal Medicine [phone]

## 2025-06-03 ENCOUNTER — OFFICE VISIT (OUTPATIENT)
Dept: OPHTHALMOLOGY | Facility: CLINIC | Age: 65
End: 2025-06-03
Attending: OPHTHALMOLOGY
Payer: COMMERCIAL

## 2025-06-03 DIAGNOSIS — H40.51X3 NEOVASCULAR GLAUCOMA OF RIGHT EYE, SEVERE STAGE: ICD-10-CM

## 2025-06-03 DIAGNOSIS — H35.9 RETINAL LESION OF RIGHT EYE: Primary | ICD-10-CM

## 2025-06-03 DIAGNOSIS — H43.11 VITREOUS HEMORRHAGE, RIGHT (H): ICD-10-CM

## 2025-06-03 PROCEDURE — 76512 OPH US DX B-SCAN: CPT | Performed by: OPHTHALMOLOGY

## 2025-06-03 PROCEDURE — 99207 FUNDUS PHOTOS OU (BOTH EYES): CPT | Mod: 26 | Performed by: OPHTHALMOLOGY

## 2025-06-03 PROCEDURE — 99213 OFFICE O/P EST LOW 20 MIN: CPT | Performed by: OPHTHALMOLOGY

## 2025-06-03 PROCEDURE — 99214 OFFICE O/P EST MOD 30 MIN: CPT | Performed by: OPHTHALMOLOGY

## 2025-06-03 PROCEDURE — 92134 CPTRZ OPH DX IMG PST SGM RTA: CPT | Performed by: OPHTHALMOLOGY

## 2025-06-03 PROCEDURE — 92250 FUNDUS PHOTOGRAPHY W/I&R: CPT | Performed by: OPHTHALMOLOGY

## 2025-06-03 ASSESSMENT — TONOMETRY
OS_IOP_MMHG: 15
OD_IOP_MMHG: 8
IOP_METHOD: TONOPEN

## 2025-06-03 ASSESSMENT — CONF VISUAL FIELD
OD_SUPERIOR_TEMPORAL_RESTRICTION: 1
OD_INFERIOR_TEMPORAL_RESTRICTION: 1
OD_INFERIOR_NASAL_RESTRICTION: 1
OD_SUPERIOR_NASAL_RESTRICTION: 1

## 2025-06-03 ASSESSMENT — SLIT LAMP EXAM - LIDS
COMMENTS: NORMAL
COMMENTS: NORMAL

## 2025-06-03 ASSESSMENT — EXTERNAL EXAM - LEFT EYE: OS_EXAM: NORMAL

## 2025-06-03 ASSESSMENT — VISUAL ACUITY
METHOD: SNELLEN - LINEAR
OD_SC: NLP
OS_SC: 20/20

## 2025-06-03 ASSESSMENT — EXTERNAL EXAM - RIGHT EYE: OD_EXAM: EXOTROPIA

## 2025-06-03 ASSESSMENT — CUP TO DISC RATIO: OS_RATIO: 0.5

## 2025-06-03 NOTE — PROGRESS NOTES
CC -   Choroidal lesion and NVG OD    INTERVAL HISTORY -  3/2025    Chief Complaint(s) and History of Present Illness(es)     choroidal nevus od            Comments    Vision stable os.     Denies new flashes, floaters, curtain, pain.     Gtts: AT prn     Josselyn Clarke [facility], [place] 3, 2025 9:21 AM                           last DFE OS 3/2025    PMH  -   Oswaldo Prabhakar is a 64 year old patient with h/o choroidal lesion OD noted 4/2023 by Modesta.    Came to [facility] for NVG OD 8/2022 seen in ED, had been at [facility], diagnosis with NVG OD and referred to [facility] for CPC and PRP, seen by Adebayo at [facility] and Modesta Savage at [facility].    Poor vision OD since childhood d/t RD not diagnosis until too late to repair    CAD s/p CABG x 4  No h/o cancer    PE on chronic warfarin    PAST OCULAR SURGERY:  CE/IOL OU 2022  Laser pexy OS 2000s  CPC OD 5/17/23 ([facility])  CPC OD 10/25/23    RETINAL IMAGING  OCT 06/03/25    OD - no view  OS - retina normal, PHF attached    FA 4/19/23:  - Right eye: Diffuse pinpoint hyperfluorescence, trace disc leakage.   - Left eye: Trace disc leakage.     U/S OD  A-scan - med/high reflective 5/17/24  B-scan - ST mass with hyperR base, new vitreous hemorrhage?, size 2.27 x 5.88T . 4.42L (4/2023) -> 1.95mm x 6.19T x 5.01L (06/12/23) (6/2023) -> 2.26 x 6.38T x 5.10L (9/12/23)-> 2.29 x 5.13L x 6.56T (1/12/24) - 2.27 x 6.56T x 5.14L. (5/17/24) -> 2.70 vs 2.05 mm x 6.09 L x 5.07T (9/2024) -> 2.78 x 5.92T x 6.01L (12/2024) -> 2.76 vs 2.07 x 7.19T x 5.64L (3/2025) -> 2.22x 5.84T x 5.67L (05/2025)    U/S B-scan 6/24/24  OD - suspected RD OD      ASSESSMENT & PLAN    # choroidal lesion OD   - noted 4/19/232023   - dome shaped anterior, difficult to see on exam d/t IOL edge/capsule & modeate pupil   - U/S not typical for met or CMM, hyper-reflective base but no visible external abnormalities   - too anterior for Optos or OCT     - ?weak transillumination shadow visible (6/2023)   - surrounding DBH 9/2023   - DDx   PEHCR vs  large nevus     - ?increased size on U/S 9/2204 c/t prior but stable since   - may reflect differnt measurement boundaries     - stable today likely vs smaller     - continue to monitor closely as precuation   - stable on B-scan   but no view to the fundus due to VH       - recheck 6 months as precaution       # NVG OD   - sees Janette   - h/o Avastin ~ 1/2023  (VRS)   - seen by Modesta 4/19/23, no sig ischemia to Tx on FA 4/19/23   - s/p CPC 6/2023     - 6/7/24 IOP OK but new large NVI given Avastin     - 6/24/24 IOP mod elevated, mod persistent NVI temporal despite Avastin 6/7/24   - vascularized retrolenticular membrane noted OD 3/2025, not damion retina based on U/S     - NLP now, no pain, monitor      # Possible RD OD   - suspected based on U/S since 6/20/24   - had prior possible tears   - despite extensive scarring from prior spontaneously resolved RD   - had relatively unaffected superior retinal wedge, now may be detached     - NLP not expected from RD   - suspect also vision loss from glaucomatous damage     - d/w patient surgery vs observe 6/2024   - given NLP and prior poor vision doubt benefit from surgery   - vascularized membrane OD behind IOL not damion [place], not likely funnel based on U/S     - will observe    # VH/hyphema   - onset 2024   - suspect bleeding from NVI & VH from hyphema   - on chronic Warfarin due to history of bilateral unprovoked PE   - NLP since 6/19/24   - montior for now   - resolved 9/2024   - recurrent VH 3/2025     - stable today        # h/o spontaneously recovered RD OD childhood 1970s   - ?exudative vs RRD   - resolved spontanously with poor vision since childhood    # pseudophakia left eye   - PCO nasal to visual axis   - Monitor    # Monocular status   - Discussed importance of full time glasses wear       Return in about 6 months (around 12/3/2025) for DFE OS, OCT OU, US OD, Optos Photo.       ATTESTATION     Attending Physician Attestation:      Complete  documentation of historical and exam elements from today's encounter can be found in the full encounter summary report (not reduplicated in this progress note).  I personally obtained the chief complaint(s) and history of present illness.  I confirmed and edited as necessary the review of systems, past medical/surgical history, family history, social history, and examination findings as documented by others; and I examined the patient myself.  I personally reviewed the relevant tests, images, and reports as documented above.  I formulated and edited as necessary the assessment and plan and discussed the findings and management plan with the patient and family    Maya Isaac MD, PhD  , Vitreoretinal Surgery  Department of Ophthalmology  [facility]

## 2025-06-03 NOTE — NURSING NOTE
Chief Complaints and History of Present Illnesses   Patient presents with    choroidal nevus od      Chief Complaint(s) and History of Present Illness(es)       choroidal nevus od                Comments    Vision stable os.     Denies new flashes, floaters, curtain, pain.     Gtts: AT [facility]     [facility], [place] 3, 2025 9:21 AM

## 2025-06-10 ENCOUNTER — ANTICOAGULATION THERAPY VISIT (OUTPATIENT)
Dept: ANTICOAGULATION | Facility: CLINIC | Age: 65
End: 2025-06-10

## 2025-06-10 ENCOUNTER — RESULTS FOLLOW-UP (OUTPATIENT)
Dept: ANTICOAGULATION | Facility: CLINIC | Age: 65
End: 2025-06-10

## 2025-06-10 ENCOUNTER — LAB (OUTPATIENT)
Dept: LAB | Facility: CLINIC | Age: 65
End: 2025-06-10
Payer: COMMERCIAL

## 2025-06-10 DIAGNOSIS — I27.82 CHRONIC PULMONARY EMBOLISM WITHOUT ACUTE COR PULMONALE, UNSPECIFIED PULMONARY EMBOLISM TYPE (H): Primary | ICD-10-CM

## 2025-06-10 DIAGNOSIS — I27.82 CHRONIC PULMONARY EMBOLISM WITHOUT ACUTE COR PULMONALE, UNSPECIFIED PULMONARY EMBOLISM TYPE (H): ICD-10-CM

## 2025-06-10 LAB — INR BLD: 3.9 (ref 0.9–1.1)

## 2025-06-10 PROCEDURE — 36415 COLL VENOUS BLD VENIPUNCTURE: CPT | Performed by: PATHOLOGY

## 2025-06-10 PROCEDURE — 85610 PROTHROMBIN TIME: CPT | Performed by: PATHOLOGY

## 2025-06-10 NOTE — PROGRESS NOTES
ANTICOAGULATION MANAGEMENT     Oswaldo Prabhakar 64 year old male is on warfarin with supratherapeutic INR result. (Goal INR 2.0-3.0)    Recent labs: (last 7 days)     06/10/25  1644   INR 3.9*       ASSESSMENT     Source(s): Chart Review and Patient/Caregiver Call     Warfarin doses taken: Warfarin taken as instructed  Diet: No new diet changes identified  Medication/supplement changes: None noted  New illness, injury, or hospitalization: Yes: Last weekend was dehydrated and not feeling well - cold/head ache, feeling better now  Signs or symptoms of bleeding or clotting: No  Previous result: Subtherapeutic - previous [facility] encounter 5/13 had 6.7% dosing increase  Additional findings: None       PLAN     Recommended plan for temporary change(s) affecting INR     Dosing Instructions: partial hold then continue your current warfarin dose with next INR in 10 days - Oswaldo said he will recheck 6/19 or 6/20      Summary  As of 6/10/2025      Full warfarin instructions:  6/10: 5 mg; Otherwise 7.5 mg every Tue, Fri; 5 mg all other days   Next INR check:  6/20/2025               Telephone call with Oswaldo who verbalizes understanding and agrees to plan    Patient offered & declined to schedule next visit    Education provided: Goal range and lab monitoring: goal range and significance of current result  Symptom monitoring: monitoring for bleeding signs and symptoms, when to seek medical attention/emergency care, and if you hit your head or have a bad fall seek emergency care  Contact [phone] with any changes, questions or concerns.     Plan made per [facility] anticoagulation protocol    Argenis Briones RN  6/10/2025  Anticoagulation Clinic  [employer] for routing messages: jose [facility]: [phone]        _______________________________________________________________________     Anticoagulation Episode Summary       Current INR goal:  2.0-3.0   TTR:  56.8% (4.2 mo)   Target end date:  Indefinite    Send INR reminders to:  [facility]    Indications    Chronic pulmonary embolism without acute cor pulmonale  unspecified pulmonary embolism type (H) [I27.82]             Comments:  --             Anticoagulation Care Providers       Provider Role Specialty Phone number    Donya Ortiz NP Referring Internal Medicine [phone]

## 2025-06-23 ENCOUNTER — RESULTS FOLLOW-UP (OUTPATIENT)
Dept: ANTICOAGULATION | Facility: CLINIC | Age: 65
End: 2025-06-23

## 2025-06-23 ENCOUNTER — LAB (OUTPATIENT)
Dept: LAB | Facility: CLINIC | Age: 65
End: 2025-06-23
Payer: COMMERCIAL

## 2025-06-23 ENCOUNTER — ANTICOAGULATION THERAPY VISIT (OUTPATIENT)
Dept: ANTICOAGULATION | Facility: CLINIC | Age: 65
End: 2025-06-23

## 2025-06-23 DIAGNOSIS — I27.82 CHRONIC PULMONARY EMBOLISM WITHOUT ACUTE COR PULMONALE, UNSPECIFIED PULMONARY EMBOLISM TYPE (H): ICD-10-CM

## 2025-06-23 DIAGNOSIS — I27.82 CHRONIC PULMONARY EMBOLISM WITHOUT ACUTE COR PULMONALE, UNSPECIFIED PULMONARY EMBOLISM TYPE (H): Primary | ICD-10-CM

## 2025-06-23 LAB — INR BLD: 2.4 (ref 0.9–1.1)

## 2025-06-23 PROCEDURE — 36415 COLL VENOUS BLD VENIPUNCTURE: CPT | Performed by: PATHOLOGY

## 2025-06-23 PROCEDURE — 85610 PROTHROMBIN TIME: CPT | Performed by: PATHOLOGY

## 2025-06-23 NOTE — PROGRESS NOTES
ANTICOAGULATION MANAGEMENT     Oswaldo Prabhakar 64 year old male is on warfarin with therapeutic INR result. (Goal INR 2.0-3.0)    Recent labs: (last 7 days)     06/23/25  1312   INR 2.4*       ASSESSMENT     Source(s): Chart Review and Patient/Caregiver Call     Warfarin doses taken: Warfarin taken as instructed  Diet: No new diet changes identified  Medication/supplement changes: None noted  New illness, injury, or hospitalization: No  Signs or symptoms of bleeding or clotting: No  Previous result: Supratherapeutic  Additional findings: None       PLAN     Recommended plan for no diet, medication or health factor changes affecting INR     Dosing Instructions: Continue your current warfarin dose with next INR in 3 weeks       Summary  As of 6/23/2025      Full warfarin instructions:  7.5 mg every Tue, Fri; 5 mg all other days   Next INR check:  7/14/2025               Telephone call with Oswaldo who verbalizes understanding and agrees to plan    Patient offered & declined to schedule next visit    Education provided: Contact [phone] with any changes, questions or concerns.     Plan made per [facility] anticoagulation protocol    Mag De La Cruz RN  6/23/2025  Anticoagulation Clinic  [employer] for routing messages: p [facility] patient phone line: [phone]        _______________________________________________________________________     Anticoagulation Episode Summary       Current INR goal:  2.0-3.0   TTR:  55.2% (4.7 mo)   Target end date:  Indefinite   Send INR reminders to:  [facility]    Indications    Chronic pulmonary embolism without acute cor pulmonale  unspecified pulmonary embolism type (H) [I27.82]             Comments:  --             Anticoagulation Care Providers       Provider Role Specialty Phone number    Donya Ortiz NP Referring Internal Medicine [phone]

## 2025-06-30 ENCOUNTER — ANCILLARY PROCEDURE (OUTPATIENT)
Dept: GENERAL RADIOLOGY | Facility: CLINIC | Age: 65
End: 2025-06-30
Attending: FAMILY MEDICINE
Payer: COMMERCIAL

## 2025-06-30 ENCOUNTER — HOSPITAL ENCOUNTER (EMERGENCY)
Facility: CLINIC | Age: 65
Discharge: HOME | End: 2025-06-30
Attending: EMERGENCY MEDICINE | Admitting: EMERGENCY MEDICINE
Payer: COMMERCIAL

## 2025-06-30 ENCOUNTER — OFFICE VISIT (OUTPATIENT)
Dept: URGENT CARE | Facility: URGENT CARE | Age: 65
End: 2025-06-30
Payer: COMMERCIAL

## 2025-06-30 VITALS
SYSTOLIC BLOOD PRESSURE: 147 MMHG | DIASTOLIC BLOOD PRESSURE: 91 MMHG | WEIGHT: 220 LBS | RESPIRATION RATE: 16 BRPM | HEIGHT: 71 IN | OXYGEN SATURATION: 96 % | HEART RATE: 86 BPM | BODY MASS INDEX: 30.8 KG/M2 | TEMPERATURE: 98.1 F

## 2025-06-30 VITALS
RESPIRATION RATE: 18 BRPM | TEMPERATURE: 97 F | OXYGEN SATURATION: 95 % | WEIGHT: 232.59 LBS | HEART RATE: 75 BPM | BODY MASS INDEX: 32.56 KG/M2 | HEIGHT: 71 IN | DIASTOLIC BLOOD PRESSURE: 88 MMHG | SYSTOLIC BLOOD PRESSURE: 140 MMHG

## 2025-06-30 DIAGNOSIS — M70.41 PREPATELLAR BURSITIS OF RIGHT KNEE: ICD-10-CM

## 2025-06-30 DIAGNOSIS — M25.561 ACUTE PAIN OF RIGHT KNEE: Primary | ICD-10-CM

## 2025-06-30 DIAGNOSIS — L03.115 CELLULITIS OF RIGHT KNEE: ICD-10-CM

## 2025-06-30 DIAGNOSIS — M25.469 REDNESS AND SWELLING OF KNEE: ICD-10-CM

## 2025-06-30 DIAGNOSIS — R23.8 REDNESS AND SWELLING OF KNEE: ICD-10-CM

## 2025-06-30 LAB
ANION GAP SERPL CALCULATED.3IONS-SCNC: 11 MMOL/L (ref 7–15)
BASOPHILS # BLD AUTO: 0 10E3/UL (ref 0–0.2)
BASOPHILS NFR BLD AUTO: 0 %
BUN SERPL-MCNC: 12.4 MG/DL (ref 8–23)
CALCIUM SERPL-MCNC: 9.7 MG/DL (ref 8.8–10.4)
CHLORIDE SERPL-SCNC: 100 MMOL/L (ref 98–107)
CREAT SERPL-MCNC: 1.15 MG/DL (ref 0.67–1.17)
EGFRCR SERPLBLD CKD-EPI 2021: 72 ML/MIN/1.73M2
EOSINOPHIL # BLD AUTO: 0.4 10E3/UL (ref 0–0.7)
EOSINOPHIL NFR BLD AUTO: 5 %
ERYTHROCYTE [DISTWIDTH] IN BLOOD BY AUTOMATED COUNT: 13.6 % (ref 10–15)
GLUCOSE SERPL-MCNC: 101 MG/DL (ref 70–99)
HCO3 SERPL-SCNC: 26 MMOL/L (ref 22–29)
HCT VFR BLD AUTO: 41.3 % (ref 40–53)
HGB BLD-MCNC: 13.6 G/DL (ref 13.3–17.7)
HOLD SPECIMEN: NORMAL
IMM GRANULOCYTES # BLD: 0 10E3/UL
IMM GRANULOCYTES NFR BLD: 0 %
INR PPP: 1.41 (ref 0.85–1.15)
LYMPHOCYTES # BLD AUTO: 1.8 10E3/UL (ref 0.8–5.3)
LYMPHOCYTES NFR BLD AUTO: 20 %
MCH RBC QN AUTO: 27.3 PG (ref 26.5–33)
MCHC RBC AUTO-ENTMCNC: 32.9 G/DL (ref 31.5–36.5)
MCV RBC AUTO: 83 FL (ref 78–100)
MONOCYTES # BLD AUTO: 0.9 10E3/UL (ref 0–1.3)
MONOCYTES NFR BLD AUTO: 10 %
NEUTROPHILS # BLD AUTO: 5.9 10E3/UL (ref 1.6–8.3)
NEUTROPHILS NFR BLD AUTO: 65 %
PLATELET # BLD AUTO: 261 10E3/UL (ref 150–450)
POTASSIUM SERPL-SCNC: 4 MMOL/L (ref 3.4–5.3)
PROTHROMBIN TIME: 17.3 SECONDS (ref 11.8–14.8)
RBC # BLD AUTO: 4.98 10E6/UL (ref 4.4–5.9)
SODIUM SERPL-SCNC: 137 MMOL/L (ref 135–145)
URATE SERPL-MCNC: 5.7 MG/DL (ref 3.4–7)
WBC # BLD AUTO: 9.1 10E3/UL (ref 4–11)

## 2025-06-30 PROCEDURE — 84550 ASSAY OF BLOOD/URIC ACID: CPT | Performed by: FAMILY MEDICINE

## 2025-06-30 PROCEDURE — 99284 EMERGENCY DEPT VISIT MOD MDM: CPT

## 2025-06-30 PROCEDURE — 73562 X-RAY EXAM OF KNEE 3: CPT | Mod: TC | Performed by: RADIOLOGY

## 2025-06-30 PROCEDURE — 3077F SYST BP >= 140 MM HG: CPT | Performed by: FAMILY MEDICINE

## 2025-06-30 PROCEDURE — 3080F DIAST BP >= 90 MM HG: CPT | Performed by: FAMILY MEDICINE

## 2025-06-30 PROCEDURE — 85610 PROTHROMBIN TIME: CPT | Performed by: EMERGENCY MEDICINE

## 2025-06-30 PROCEDURE — 80048 BASIC METABOLIC PNL TOTAL CA: CPT | Performed by: EMERGENCY MEDICINE

## 2025-06-30 PROCEDURE — 99215 OFFICE O/P EST HI 40 MIN: CPT | Performed by: FAMILY MEDICINE

## 2025-06-30 PROCEDURE — 85025 COMPLETE CBC W/AUTO DIFF WBC: CPT | Performed by: FAMILY MEDICINE

## 2025-06-30 PROCEDURE — 36415 COLL VENOUS BLD VENIPUNCTURE: CPT | Performed by: FAMILY MEDICINE

## 2025-06-30 PROCEDURE — 36415 COLL VENOUS BLD VENIPUNCTURE: CPT | Performed by: EMERGENCY MEDICINE

## 2025-06-30 RX ORDER — DOXYCYCLINE 100 MG/1
100 CAPSULE ORAL 2 TIMES DAILY
Qty: 20 CAPSULE | Refills: 0 | Status: SHIPPED | OUTPATIENT
Start: 2025-06-30 | End: 2025-07-10

## 2025-06-30 RX ORDER — CEPHALEXIN 500 MG/1
500 CAPSULE ORAL 4 TIMES DAILY
Qty: 40 CAPSULE | Refills: 0 | Status: SHIPPED | OUTPATIENT
Start: 2025-06-30 | End: 2025-07-10

## 2025-06-30 ASSESSMENT — COLUMBIA-SUICIDE SEVERITY RATING SCALE - C-SSRS
6. HAVE YOU EVER DONE ANYTHING, STARTED TO DO ANYTHING, OR PREPARED TO DO ANYTHING TO END YOUR LIFE?: NO
2. HAVE YOU ACTUALLY HAD ANY THOUGHTS OF KILLING YOURSELF IN THE PAST MONTH?: NO
1. IN THE PAST MONTH, HAVE YOU WISHED YOU WERE DEAD OR WISHED YOU COULD GO TO SLEEP AND NOT WAKE UP?: NO

## 2025-06-30 ASSESSMENT — ACTIVITIES OF DAILY LIVING (ADL)
ADLS_ACUITY_SCORE: 50
ADLS_ACUITY_SCORE: 50

## 2025-06-30 NOTE — ED TRIAGE NOTES
Pt comes in with a red, swollen right knee.  He states that he noticed the swelling last night but that it is worse this morning.  He was sent to the ED from  for a possible septic joint.  He denies any trauma to the knee.       Triage Assessment (Adult)       Row Name 06/30/25 1503          Triage Assessment    Airway WDL WDL        Respiratory WDL    Respiratory WDL WDL        Cardiac WDL    Cardiac WDL WDL

## 2025-06-30 NOTE — PROGRESS NOTES
Chief Complaint   Patient presents with    Urgent Care     Pt states starting yesterday his right knee developed pain,swelling and redness.     Oswaldo was seen today for urgent care.    Diagnoses and all orders for this visit:    Acute pain of right knee  -     CBC with platelets and differential; Future  -     Uric acid; Future  -     XR Knee Right 3 Views    Redness and swelling of knee      D/d  Knee strain, sprain, contusion, and septic arthritis , cellulitis     Results for orders placed or performed in visit on 06/30/25   CBC with platelets and differential     Status: None   Result Value Ref Range    WBC Count 9.1 4.0 - 11.0 10e3/uL    RBC Count 4.98 4.40 - 5.90 10e6/uL    Hemoglobin 13.6 13.3 - 17.7 g/dL    Hematocrit 41.3 40.0 - 53.0 %    MCV 83 78 - 100 fL    MCH 27.3 26.5 - 33.0 pg    MCHC 32.9 31.5 - 36.5 g/dL    RDW 13.6 10.0 - 15.0 %    Platelet Count 261 150 - 450 10e3/uL    % Neutrophils 65 %    % Lymphocytes 20 %    % Monocytes 10 %    % Eosinophils 5 %    % Basophils 0 %    % Immature Granulocytes 0 %    Absolute Neutrophils 5.9 1.6 - 8.3 10e3/uL    Absolute Lymphocytes 1.8 0.8 - 5.3 10e3/uL    Absolute Monocytes 0.9 0.0 - 1.3 10e3/uL    Absolute Eosinophils 0.4 0.0 - 0.7 10e3/uL    Absolute Basophils 0.0 0.0 - 0.2 10e3/uL    Absolute Immature Granulocytes 0.0 <=0.4 10e3/uL   CBC with platelets and differential     Status: None    Narrative    The following orders were created for panel order CBC with platelets and differential.  Procedure                               Abnormality         Status                     ---------                               -----------         ------                     CBC with platelets and ...[[id number]]                      Final result                 Please view results for these tests on the individual orders.       PLAN:  TO ER as concerned about septic arthritis   Patient with h/o DM of presents with acute swollen, red , tender knee and there is significant  amount of pain when at rest and concerned about possible joint infection reviewed lab  findings with patient was recommended to go to ER for further evaluation and treatment.  See orders in EpicCare    SUBJECTIVE:  Oswaldo Prabhakar is a 64 year old male with h/o DM , on coumadin ,s/p CBBG times 4 , no h/o gout who is here with acute right knee pain , swelling and redness  1 days ago. There is no injury , he has scabs noted in the knee and lower ext from itching   Denies any systemic symptoms .. Symptoms have been sudden since that time. Prior history of related problems: no prior problems with this area in the past.  He is able to walk but pain is there all the time  , pain is worse with knee flexion     OBJECTIVE:  Vital signs as noted above.  Appearance: in no apparent distress.  Knee exam: soft tissue tenderness over right knee  erythema, swelling, warmth, and tenderness over the anterior aspect of the right knee , there is a tiny scab noted in the right knee inferior aspect .  X-ray: no fracture or dislocation noted, soft tissue swelling.    Michelle Younger MD

## 2025-06-30 NOTE — ED PROVIDER NOTES
ED APC SUPERVISION NOTE:   I evaluated this patient in conjunction with Elise Perales PA-C  I have participated in the care of the patient and personally performed key elements of the history, exam, and medical decision making.      HPI:   Oswaldo Prabhakar is a 64 year old male on Coumadin with a history of chronic pulmonary embolism, DM2, HTN, and HLD presenting to the ED for right knee swelling. He was sent from  with reports the onset of localized anterior right knee swelling and pain since this morning. It is difficult to bear weight, though he notes that there is no increased pain upon movement. Denies fever, chills, or nausea. Denies history of knee replacement.     Independent Historian:   None    Review of External Notes:       EXAM:   Constitutional: Well appearing.  HEENT: Atraumatic.  Moist mucous membranes.  Respiratory:  No respiratory distress.    Musculoskeletal: Right knee exhibits anterior erythema with some mild prepatellar edema.  No obvious effusion of the right knee.  Has full active and passive range of motion fully without pain.  No edema or rash of the lower or upper leg.  Bears weight without difficulty with a normal gait.  Normal range of motion.  There is an area of scabbed wound just inferior to the anterior knee.  Neurologic: Alert and oriented x3.  Normal tone and bulk.  5/5 strength in right lower.  Sensation to light touch intact throughout the right leg.  Normal gait.  Skin: No rashes.  No edema.  Psych: Normal affect.  Normal behavior.      Independent Interpretation (X-rays, CTs, rhythm strip):  None    Consultations/Discussion of Management or Tests:  None     MIPS:      None    MEDICAL DECISION MAKING/ASSESSMENT AND PLAN:   Oswaldo Prabhakar is a 64-year-old male who is afebrile and hemodynamically stable.  He has erythema and warmth and some mild swelling over the anterior bursa of the right knee.  There is no obvious effusion of the knee on exam and the erythema does not go  circumferentially and just anteriorly.  Right knee x-ray done outpatient shows no bony abnormality.  There is a scabbed area that could be a potential area of breakdown of skin that could have led to a cellulitis and patch of septic bursitis.  He can fully range actively and passively his right knee is ambulate without difficulty.  We discussed septic joint versus cellulitis and septic bursitis.  We discussed an arthrocentesis, however, given the lack of obvious fluid seen on exam and the erythema just localized anteriorly and he does not have pain with range of motion, after shared decision made discussion, we discussed holding off on arthrocentesis and we will treat with oral antibiotics for cellulitis and potential septic bursitis.  I asked him to have a low threshold for which to return again red flag symptoms were to return for worsening infection and potential arthrocentesis and he is in agreement.  He feels comfortable foregoing the arthrocentesis at this time as we would not want to induce an infection in the knee joint itself.  He has no fever or leukocytosis.  He has no signs of systemic infection and appears safe for outpatient management.  He is in agreement his questions were answered.  Strict return precautions were given and I discussed close primary care orthopedic follow-up in the next 1 to 2 days and he is understanding and agreement.  He was in no distress at time of discharge.     DIAGNOSIS:     ICD-10-CM    1. Prepatellar bursitis of right knee  M70.41       2. Cellulitis of right knee  L03.115             Scribe Disclosure:  I, Chin Maloney, am serving as a scribe at 4:25 PM on 6/30/2025 to document services personally performed by Harlan Gaffney MD based on my observations and the provider's statements to me.   6/30/2025  [facility] EMERGENCY DEPT     Harlan Gaffney MD  07/01/25 0134

## 2025-06-30 NOTE — PROGRESS NOTES
Urgent Care Clinic Visit    Chief Complaint   Patient presents with    Urgent Care     Pt states starting yesterday his right knee developed pain,swelling and redness.         6/30/2025     1:09 PM   Additional Questions   Roomed by Fartun

## 2025-06-30 NOTE — ED PROVIDER NOTES
Emergency Department Note      History of Present Illness     Chief Complaint   Joint Swelling      HPI   Oswaldo Prabhakar is a 64 year old male history HTN, CABG anticoagulated on warfarin presents with right knee pain and swelling. Patient woke up today with erythema and edema localized to right anterior knee. Was working on deck kneeling yesterday but was kneeling on a foam pad the entire time. Denies trauma or recent travel. No posterior knee pain, fevers, history gout or arthritis. No urinary changes or vision changes. No shortness of breath or bug bites. Has not taken anything for pain. Initially went to urgent care today, they took a knee xray which was unremarkable. Sent patient to ED for evaluation of possible septic joint. Patient reports he did not take warfarin yet today but otherwise has not missed any doses.     Independent Historian   None    Review of External Notes   6/30/25 [facility] note    Past Medical History     Medical History and Problem List   Past Medical History:   Diagnosis Date    Antiplatelet or antithrombotic long-term use     Coronary artery disease     Diaphragmatic hernia without mention of obstruction or gangrene     Heart disease     Hernia, abdominal     History of blood transfusion     History of thrombophlebitis     Hypertension     Nephrolithiasis 4/2010    Other and unspecified hyperlipidemia     Pulmonary embolus and infarction (H)     Statin intolerance 2/1/2017    Stented coronary artery 01/07/2020    Unspecified retinal detachment        Medications   cephALEXin (KEFLEX) 500 MG capsule  doxycycline hyclate (VIBRAMYCIN) 100 MG capsule  acetaminophen (TYLENOL) 500 MG tablet  atorvastatin (LIPITOR) 80 MG tablet  calcium carbonate (TUMS) 500 MG chewable tablet  Cholecalciferol (VITAMIN D) 2000 UNITS CAPS  ezetimibe (ZETIA) 10 MG tablet  fenofibrate (TRIGLIDE/LOFIBRA) 160 MG tablet  lisinopril (ZESTRIL) 5 MG tablet  metoprolol tartrate (LOPRESSOR) 25 MG tablet  OMEPRAZOLE  PO  ondansetron (ZOFRAN-ODT) 4 MG ODT tab  reason aspirin not prescribed, intentional,  REPATHA SURECLICK 140 MG/ML prefilled autoinjector  SHINGRIX injection  ticagrelor (BRILINTA) 90 MG tablet  warfarin ANTICOAGULANT (COUMADIN) 5 MG tablet  warfarin ANTICOAGULANT (COUMADIN) 5 MG tablet        Surgical History   Past Surgical History:   Procedure Laterality Date    BYPASS GRAFT ARTERY CORONARY  04/04/2014    Procedure: BYPASS GRAFT ARTERY CORONARY;  Median Sternotomy, Coronary Artery Bypass Bypass x 4 using Left Internal Mammary, Left Saphenous Vein, on pump oxygenation;  Surgeon: Sherry Armando MD;  Location: [facility] OR    CATARACT IOL, RT/LT      CLOSED REDUCTION, PERCUTANEOUS PINNING  HAND, COMBINED Left 11/05/2015    Procedure: COMBINED CLOSED REDUCTION, PERCUTANEOUS PINNING HAND;  Surgeon: Carmella Love MD;  [facility]    COLONOSCOPY  03/15/2013    Procedure: COLONOSCOPY;;  Surgeon: Racheal Shipman MD;  Location: [facility] GI    COMBINED CYSTOSCOPY, INSERT STENT URETER(S) Right 01/10/2020    Procedure: Cystoscopy, right retrograde pyelogram, interpretation of fluoroscopic images, placement of 6 x 26 double-J ureteral stent;  Surgeon: Nguyễn Woodard MD;  Location: [facility] OR    COMBINED CYSTOSCOPY, RETROGRADES, URETEROSCOPY, LASER HOLMIUM LITHOTRIPSY URETER(S), INSERT STENT Right 02/05/2020    Procedure: Cystoscopy, right ureteral stent exchange, right ureteroscopy with holmium lithotripsy and stone basketing, right retrograde pyelogram, interpretation of fluoroscopic images.;  Surgeon: Nguyễn Woodard MD;  Location: [facility] OR    CV ANGIOGRAM CORONARY GRAFT N/A 01/07/2020    Procedure: Angiogram Coronary Graft;  Surgeon: Chato Odonnell MD;  Location:  HEART CARDIAC CATH LAB    CV CORONARY ANGIOGRAM  01/07/2020    Procedure: CV CORONARY ANGIOGRAM;  Surgeon: Chato Odonnell MD;  Location:  HEART CARDIAC CATH LAB    CV PCI STENT DRUG ELUTING N/A  "01/07/2020    Procedure: PCI Stent Drug Eluting;  Surgeon: Chato Odonnell MD;  Location:  HEART CARDIAC CATH LAB    CYCLOPHOTOCOAGULATION TRANSSCLERAL WITH MICROPULSE LASER Right 5/17/2023    Procedure: RIGHT EYE CYCLOPHOTOCOAGULATION TRANSSCLERAL WITH Gprobe LASER;  Surgeon: Jay Jay Savage MD;  Location: [facility] OR    CYCLOPHOTOCOAGULATION TRANSSCLERAL WITH MICROPULSE LASER Right 10/25/2023    Procedure: RIGHT EYE CYCLOPHOTOCOAGULATION TRANSSCLERAL WITH GPROBE LASER;  Surgeon: Jay Jay Savage MD;  Location: [facility] OR    CYSTOSCOPY      ESOPHAGOSCOPY, GASTROSCOPY, DUODENOSCOPY (EGD), COMBINED N/A 12/9/2022    Procedure: ESOPHAGOGASTRODUODENOSCOPY (EGD);  Surgeon: Beata George MD;  Location: [facility] GI    LAPAROSCOPIC CHOLECYSTECTOMY N/A 08/06/2018    Procedure: LAPAROSCOPIC CHOLECYSTECTOMY;  LAPAROSCOPIC CHOLECYSTECTOMY ;  Surgeon: José Miguel Stuart MD;  Location:  OR    LITHOTRIPSY  04/2010    RETINAL REATTACHMENT      THORACIC SURGERY      lung surgery, thoracotomy, lung adhesion    ZZC NONSPECIFIC PROCEDURE      Spermatocele resection       Physical Exam     Patient Vitals for the past 24 hrs:   BP Temp Temp src Pulse Resp SpO2 Height Weight   06/30/25 1734 (!) 140/88 -- -- 75 18 95 % -- --   06/30/25 1504 (!) 172/119 97  F (36.1  C) Temporal 88 18 97 % 1.803 m (5' 11\") 105.5 kg (232 lb 9.4 oz)     Physical Exam  General: Nontoxic appearing   CV:  Normal rate  Normal rhythm   Resp:  Lungs are clear    There is no tachypnea    No wheezing   MS:  Normal muscular tone    Symmetric motor strength  Right anterior knee erythema, edema, small scab present   Negative ballotment right knee   No obvious bakers cyst or TTP of posterior right knee  FROM right knee, ankle  Skin:  Skin is warm and dry  Right anterior knee erythema, edema, small scab, no rash or ulcerations  Neuro:  Speech is normal and fluent    No motor deficits  Psych: Awake. Alert.      Normal affect    Diagnostics     Lab Results "   Labs Ordered and Resulted from Time of ED Arrival to Time of ED Departure   INR - Abnormal       Result Value    INR 1.41 (*)     PT 17.3 (*)    BASIC METABOLIC PANEL - Abnormal    Sodium 137      Potassium 4.0      Chloride 100      Carbon Dioxide (CO2) 26      Anion Gap 11      Urea Nitrogen 12.4      Creatinine 1.15      GFR Estimate 72      Calcium 9.7      Glucose 101 (*)        Imaging   No orders to display       Independent Interpretation   None    ED Course      Medications Administered   Medications - No data to display    Procedures   Procedures     Discussion of Management   None    ED Course   ED Course as of 06/30/25 1843   Mon Jun 30, 2025   1625 I evaluated patient, obtained history and performed physical exam     1638 I obtained history and examined the patient as noted above.         Additional Documentation  None    Medical Decision Making / Diagnosis     CMS Diagnoses: None    MIPS   None               MDM   Oswaldo Prabhakar is a 64 year old male history HTN, CABG anticoagulated on warfarin presents with right knee pain and swelling. Considered gout, septic arthritis, bursitis, Reiters syndrome, among others. I reviewed the right knee xray from urgent care, no evidence of fracture. On physical exam minimal edema, had FROM, able to bear weight. No red streaking. Afebrile. Discussed possibility of attempt to drain fluid however ultimately shared decision making to forgo, as minimal amount of fluid present, may cause more damage or introduce infection. Based on exam unlikely to be infectious. Of note, patient's INR was 1.4, this is subtherapeutic however patient did not take his warfarin dose yet today. He has a routine recheck scheduled week of July 19th. Encouraged patient to discuss this with his clinic. Discharged with keflex and doxycycline to treat prepatellar bursitis with possible early cellulitis. Strict ED return precautions discussed with patient including new fever, unable to weight bear  or bend the knee, significantly increased swelling or pain, or any other concerning symptoms. Patient understands and agrees to outpatient management.     Disposition   The patient was discharged.     Diagnosis     ICD-10-CM    1. Prepatellar bursitis of right knee  M70.41       2. Cellulitis of right knee  L03.115            Discharge Medications   Discharge Medication List as of 6/30/2025  6:23 PM        START taking these medications    Details   cephALEXin (KEFLEX) 500 MG capsule Take 1 capsule (500 mg) by mouth 4 times daily for 10 days., Disp-40 capsule, R-0, Local Print      doxycycline hyclate (VIBRAMYCIN) 100 MG capsule Take 1 capsule (100 mg) by mouth 2 times daily for 10 days., Disp-20 capsule, R-0, Local Print               MARVIN Anthony Allison, PA-C  06/30/25 184

## 2025-07-01 ENCOUNTER — ANTICOAGULATION THERAPY VISIT (OUTPATIENT)
Dept: ANTICOAGULATION | Facility: CLINIC | Age: 65
End: 2025-07-01
Payer: COMMERCIAL

## 2025-07-01 ENCOUNTER — RESULTS FOLLOW-UP (OUTPATIENT)
Dept: URGENT CARE | Facility: URGENT CARE | Age: 65
End: 2025-07-01

## 2025-07-01 DIAGNOSIS — I27.82 CHRONIC PULMONARY EMBOLISM WITHOUT ACUTE COR PULMONALE, UNSPECIFIED PULMONARY EMBOLISM TYPE (H): Primary | ICD-10-CM

## 2025-07-01 NOTE — PROGRESS NOTES
ANTICOAGULATION MANAGEMENT     Oswaldo Prabhakar 64 year old male is on warfarin with subtherapeutic INR result. (Goal INR 2.0-3.0)    Recent labs: (last 7 days)     06/30/25  1657   INR 1.41*       ASSESSMENT     Source(s): Chart Review and Patient/Caregiver Call     Warfarin doses taken: Warfarin taken as instructed  Diet: No new diet changes identified  Medication/supplement changes: Patient was started on a 10 day course of keflex and doxycycline for bursitis  New illness, injury, or hospitalization: Yes: Patient was in the ER and diagnosed with bursitis  Signs or symptoms of bleeding or clotting: No  Previous result: Therapeutic last visit; previously outside of goal range  Additional findings: Patient does not think he missed a dose of warfarin but always a possibility.        PLAN     Recommended plan for ongoing change(s) affecting INR     Dosing Instructions: Continue your current warfarin dose with next INR in 2 days       Summary  As of 7/1/2025      Full warfarin instructions:  7/1: 10 mg; Otherwise 7.5 mg every Tue, Fri; 5 mg all other days   Next INR check:  7/3/2025               Telephone call with Oswaldo who verbalizes understanding and agrees to plan and who agrees to plan and repeated back plan correctly    Patient able to walk into lab    Education provided: Taking warfarin: Importance of taking warfarin as instructed  Goal range and lab monitoring: goal range and significance of current result, Importance of therapeutic range, and Importance of following up at instructed interval  Interaction IS anticipated between warfarin and possibly antibiotics    Plan made per [facility] anticoagulation protocol    Kristina Wright, RN  7/1/2025  Anticoagulation Clinic  [email or web address] for routing messages: jose [facility]  [facility] patient phone line: [phone]        _______________________________________________________________________     Anticoagulation Episode Summary       Current INR goal:  2.0-3.0   TTR:  54.5%  (4.9 mo)   Target end date:  Indefinite   Send INR reminders to:  [facility]    Indications    Chronic pulmonary embolism without acute cor pulmonale  unspecified pulmonary embolism type (H) [I27.82]             Comments:  --             Anticoagulation Care Providers       Provider Role Specialty Phone number    Donya Ortiz NP Referring Internal Medicine [phone]

## 2025-07-03 ENCOUNTER — ANTICOAGULATION THERAPY VISIT (OUTPATIENT)
Dept: ANTICOAGULATION | Facility: CLINIC | Age: 65
End: 2025-07-03

## 2025-07-03 ENCOUNTER — LAB (OUTPATIENT)
Dept: LAB | Facility: CLINIC | Age: 65
End: 2025-07-03
Payer: COMMERCIAL

## 2025-07-03 ENCOUNTER — RESULTS FOLLOW-UP (OUTPATIENT)
Dept: ANTICOAGULATION | Facility: CLINIC | Age: 65
End: 2025-07-03

## 2025-07-03 DIAGNOSIS — I27.82 CHRONIC PULMONARY EMBOLISM WITHOUT ACUTE COR PULMONALE, UNSPECIFIED PULMONARY EMBOLISM TYPE (H): ICD-10-CM

## 2025-07-03 DIAGNOSIS — I27.82 CHRONIC PULMONARY EMBOLISM WITHOUT ACUTE COR PULMONALE, UNSPECIFIED PULMONARY EMBOLISM TYPE (H): Primary | ICD-10-CM

## 2025-07-03 LAB — INR BLD: 2.1 (ref 0.9–1.1)

## 2025-07-03 NOTE — PROGRESS NOTES
ANTICOAGULATION MANAGEMENT     Oswaldo Prabhakar 64 year old male is on warfarin with therapeutic INR result. (Goal INR 2.0-3.0)    Recent labs: (last 7 days)     07/03/25  1235   INR 2.1*       ASSESSMENT     Source(s): Chart Review and Patient/Caregiver Call     Warfarin doses taken: Booster dose(s) recently taken which may be affecting INR  Diet: No new diet changes identified  Medication/supplement changes:   10 day course [6/30-7/10/25] of doxycycline and keflex (both may increase INR and risk of bleeding)   Denies taking pain medication  New illness, injury, or hospitalization: Yes: ED visit 6/30/25 with dx of bursitis of right knee. States swelling and redness have improved since starting antibx.  Signs or symptoms of bleeding or clotting: No  Previous result: Subtherapeutic  Additional findings: Will be out of town from 7/6-13/24 in [place], [place].       PLAN     Recommended plan for temporary change(s) affecting INR     Dosing Instructions: Continue your current warfarin dose with next INR in 1 week. Patient states he is unable to recheck next week d/t being out of town. States he will get INR rechecked as soon as he is back in town on 7/14/25       Summary  As of 7/3/2025      Full warfarin instructions:  7.5 mg every Tue, Fri; 5 mg all other days   Next INR check:  7/14/2025               Telephone call with Oswaldo who agrees to plan and repeated back plan correctly  Sent [employer] message with dosing and follow up instructions    Patient offered & declined to schedule next visit. States he works near [facility] and will get INR rechecked on 7/14/25.    Education provided: Please call back if any changes to your diet, medications or how you've been taking warfarin  Goal range and lab monitoring: goal range and significance of current result, Importance of therapeutic range, and Importance of following up at instructed interval  Interaction IS anticipated between warfarin and doxycycline and keflex  Symptom monitoring:  monitoring for bleeding signs and symptoms and monitoring for clotting signs and symptoms  Importance of notifying anticoagulation clinic for: changes in medications; a sooner lab recheck maybe needed and diarrhea, nausea/vomiting, reduced intake, cold/flu, and/or infections; a sooner lab recheck maybe needed  Written instructions provided  Contact [phone] with any changes, questions or concerns.     Plan made per [facility] anticoagulation protocol    Saige Mcclain RN  7/3/2025  Anticoagulation Clinic  [employer] for routing messages: jose [facility] patient phone line: [phone]        _______________________________________________________________________     Anticoagulation Episode Summary       Current INR goal:  2.0-3.0   TTR:  53.8% (5 mo)   Target end date:  Indefinite   Send INR reminders to:  [facility]    Indications    Chronic pulmonary embolism without acute cor pulmonale  unspecified pulmonary embolism type (H) [I27.82]             Comments:  --             Anticoagulation Care Providers       Provider Role Specialty Phone number    Donya Ortiz NP Referring Internal Medicine [phone]

## 2025-07-15 ENCOUNTER — ANTICOAGULATION THERAPY VISIT (OUTPATIENT)
Dept: ANTICOAGULATION | Facility: CLINIC | Age: 65
End: 2025-07-15

## 2025-07-15 ENCOUNTER — LAB (OUTPATIENT)
Dept: LAB | Facility: CLINIC | Age: 65
End: 2025-07-15
Payer: COMMERCIAL

## 2025-07-15 DIAGNOSIS — I27.82 CHRONIC PULMONARY EMBOLISM WITHOUT ACUTE COR PULMONALE, UNSPECIFIED PULMONARY EMBOLISM TYPE (H): Primary | ICD-10-CM

## 2025-07-15 DIAGNOSIS — I27.82 CHRONIC PULMONARY EMBOLISM WITHOUT ACUTE COR PULMONALE, UNSPECIFIED PULMONARY EMBOLISM TYPE (H): ICD-10-CM

## 2025-07-15 LAB — INR BLD: 2.6 (ref 0.9–1.1)

## 2025-07-15 PROCEDURE — 85610 PROTHROMBIN TIME: CPT | Performed by: PATHOLOGY

## 2025-07-15 PROCEDURE — 36416 COLLJ CAPILLARY BLOOD SPEC: CPT | Performed by: PATHOLOGY

## 2025-07-15 NOTE — PROGRESS NOTES
ANTICOAGULATION MANAGEMENT     Oswaldo Prabhakar 64 year old male is on warfarin with therapeutic INR result. (Goal INR 2.0-3.0)    Recent labs: (last 7 days)     07/15/25  1146   INR 2.6*       ASSESSMENT     Source(s): Chart Review and Patient/Caregiver Call     Warfarin doses taken: Warfarin taken as instructed  Diet: No new diet changes identified  Medication/supplement changes: Completed course of doxycycline and keflex on 7/10/25.   New illness, injury, or hospitalization: No  Signs or symptoms of bleeding or clotting: No  Previous result: Therapeutic last visit; previously outside of goal range  Additional findings: None       PLAN     Recommended plan for no diet, medication or health factor changes affecting INR     Dosing Instructions: Continue your current warfarin dose with next INR in 2 weeks       Summary  As of 7/15/2025      Full warfarin instructions:  7.5 mg every Tue, Fri; 5 mg all other days   Next INR check:  7/29/2025               Telephone call with Oswaldo who verbalizes understanding and agrees to plan    Patient offered & declined to schedule next visit    Education provided: Goal range and lab monitoring: goal range and significance of current result and Importance of therapeutic range    Plan made per [facility] anticoagulation protocol    Amrik Pineda RN  7/15/2025  Anticoagulation Clinic  [employer] for routing messages: jose [facility] patient phone line: [phone]        _______________________________________________________________________     Anticoagulation Episode Summary       Current INR goal:  2.0-3.0   TTR:  57.2% (5.4 mo)   Target end date:  Indefinite   Send INR reminders to:  [employer]    Indications    Chronic pulmonary embolism without acute cor pulmonale  unspecified pulmonary embolism type (H) [I27.82]             Comments:  --             Anticoagulation Care Providers       Provider Role Specialty Phone number    Donya Ortiz NP Referring Internal Medicine  [phone]

## 2025-07-30 ENCOUNTER — MYC MEDICAL ADVICE (OUTPATIENT)
Dept: CARDIOLOGY | Facility: CLINIC | Age: 65
End: 2025-07-30
Payer: COMMERCIAL

## 2025-07-30 NOTE — TELEPHONE ENCOUNTER
Spoke with patient and scheduled [facility] return cardiology appointment on 8/11/25.    Mani Caruso   [facility]- Cardiology   [address] [place] [address]  Hours: 8:00am-4:30pm

## 2025-08-01 ENCOUNTER — TELEPHONE (OUTPATIENT)
Dept: CARDIOLOGY | Facility: CLINIC | Age: 65
End: 2025-08-01

## 2025-08-01 DIAGNOSIS — I25.10 CAD (CORONARY ARTERY DISEASE): Primary | ICD-10-CM

## 2025-08-11 ENCOUNTER — LAB (OUTPATIENT)
Dept: LAB | Facility: CLINIC | Age: 65
End: 2025-08-11
Payer: COMMERCIAL

## 2025-08-11 ENCOUNTER — OFFICE VISIT (OUTPATIENT)
Dept: CARDIOLOGY | Facility: CLINIC | Age: 65
End: 2025-08-11
Attending: INTERNAL MEDICINE
Payer: COMMERCIAL

## 2025-08-11 VITALS
BODY MASS INDEX: 32.57 KG/M2 | OXYGEN SATURATION: 97 % | HEART RATE: 65 BPM | SYSTOLIC BLOOD PRESSURE: 154 MMHG | WEIGHT: 233.5 LBS | DIASTOLIC BLOOD PRESSURE: 90 MMHG

## 2025-08-11 DIAGNOSIS — E78.5 HYPERLIPIDEMIA LDL GOAL <130: Primary | ICD-10-CM

## 2025-08-11 DIAGNOSIS — I25.10 CAD (CORONARY ARTERY DISEASE): ICD-10-CM

## 2025-08-11 DIAGNOSIS — I27.82 CHRONIC PULMONARY EMBOLISM WITHOUT ACUTE COR PULMONALE, UNSPECIFIED PULMONARY EMBOLISM TYPE (H): ICD-10-CM

## 2025-08-11 DIAGNOSIS — Z95.1 S/P CABG X 4: ICD-10-CM

## 2025-08-11 DIAGNOSIS — I10 ESSENTIAL HYPERTENSION: ICD-10-CM

## 2025-08-11 LAB
ALBUMIN SERPL BCG-MCNC: 4.1 G/DL (ref 3.5–5.2)
ALP SERPL-CCNC: 49 U/L (ref 40–150)
ALT SERPL W P-5'-P-CCNC: 28 U/L (ref 0–70)
ANION GAP SERPL CALCULATED.3IONS-SCNC: 13 MMOL/L (ref 7–15)
AST SERPL W P-5'-P-CCNC: 27 U/L (ref 0–45)
BILIRUB SERPL-MCNC: 0.4 MG/DL
BUN SERPL-MCNC: 13.5 MG/DL (ref 8–23)
CALCIUM SERPL-MCNC: 9.3 MG/DL (ref 8.8–10.4)
CHLORIDE SERPL-SCNC: 103 MMOL/L (ref 98–107)
CHOLEST SERPL-MCNC: 100 MG/DL
CREAT SERPL-MCNC: 1.25 MG/DL (ref 0.67–1.17)
EGFRCR SERPLBLD CKD-EPI 2021: 65 ML/MIN/1.73M2
EST. AVERAGE GLUCOSE BLD GHB EST-MCNC: 140 MG/DL
FASTING STATUS PATIENT QL REPORTED: ABNORMAL
FASTING STATUS PATIENT QL REPORTED: ABNORMAL
GLUCOSE SERPL-MCNC: 128 MG/DL (ref 70–99)
HBA1C MFR BLD: 6.5 %
HCO3 SERPL-SCNC: 21 MMOL/L (ref 22–29)
HDLC SERPL-MCNC: 31 MG/DL
LDLC SERPL CALC-MCNC: 17 MG/DL
LDLC SERPL DIRECT ASSAY-MCNC: 38 MG/DL
NONHDLC SERPL-MCNC: 69 MG/DL
POTASSIUM SERPL-SCNC: 3.8 MMOL/L (ref 3.4–5.3)
PROT SERPL-MCNC: 7.1 G/DL (ref 6.4–8.3)
SODIUM SERPL-SCNC: 137 MMOL/L (ref 135–145)
TRIGL SERPL-MCNC: 262 MG/DL

## 2025-08-11 PROCEDURE — 99213 OFFICE O/P EST LOW 20 MIN: CPT | Performed by: INTERNAL MEDICINE

## 2025-08-11 PROCEDURE — 80053 COMPREHEN METABOLIC PANEL: CPT | Performed by: PATHOLOGY

## 2025-08-11 PROCEDURE — 1126F AMNT PAIN NOTED NONE PRSNT: CPT | Performed by: INTERNAL MEDICINE

## 2025-08-11 PROCEDURE — 83721 ASSAY OF BLOOD LIPOPROTEIN: CPT | Performed by: INTERNAL MEDICINE

## 2025-08-11 PROCEDURE — 80061 LIPID PANEL: CPT | Performed by: PATHOLOGY

## 2025-08-11 PROCEDURE — 99000 SPECIMEN HANDLING OFFICE-LAB: CPT | Performed by: PATHOLOGY

## 2025-08-11 PROCEDURE — 93005 ELECTROCARDIOGRAM TRACING: CPT

## 2025-08-11 PROCEDURE — 3079F DIAST BP 80-89 MM HG: CPT | Performed by: INTERNAL MEDICINE

## 2025-08-11 PROCEDURE — 3077F SYST BP >= 140 MM HG: CPT | Performed by: INTERNAL MEDICINE

## 2025-08-11 PROCEDURE — 36415 COLL VENOUS BLD VENIPUNCTURE: CPT | Performed by: PATHOLOGY

## 2025-08-11 PROCEDURE — 83036 HEMOGLOBIN GLYCOSYLATED A1C: CPT | Performed by: INTERNAL MEDICINE

## 2025-08-11 ASSESSMENT — PAIN SCALES - GENERAL: PAINLEVEL_OUTOF10: NO PAIN (0)

## 2025-08-12 LAB
ATRIAL RATE - MUSE: 68 BPM
DIASTOLIC BLOOD PRESSURE - MUSE: NORMAL MMHG
INTERPRETATION ECG - MUSE: NORMAL
P AXIS - MUSE: -4 DEGREES
PR INTERVAL - MUSE: 168 MS
QRS DURATION - MUSE: 86 MS
QT - MUSE: 388 MS
QTC - MUSE: 412 MS
R AXIS - MUSE: 26 DEGREES
SYSTOLIC BLOOD PRESSURE - MUSE: NORMAL MMHG
T AXIS - MUSE: 70 DEGREES
VENTRICULAR RATE- MUSE: 68 BPM

## 2025-08-18 ENCOUNTER — MYC MEDICAL ADVICE (OUTPATIENT)
Dept: ANTICOAGULATION | Facility: CLINIC | Age: 65
End: 2025-08-18
Payer: COMMERCIAL

## 2025-08-26 DIAGNOSIS — I27.82 CHRONIC PULMONARY EMBOLISM WITHOUT ACUTE COR PULMONALE, UNSPECIFIED PULMONARY EMBOLISM TYPE (H): ICD-10-CM

## 2025-08-26 RX ORDER — WARFARIN SODIUM 5 MG/1
5-7.5 TABLET ORAL EVERY EVENING
Qty: 135 TABLET | Refills: 1 | Status: SHIPPED | OUTPATIENT
Start: 2025-08-26

## (undated) DEVICE — GLOVE PROTEXIS MICRO 7.0  2D73PM70

## (undated) DEVICE — CATH ANGIO SUPERTORQUE PLUS JL4 6FRX100CM 533620

## (undated) DEVICE — GUIDEWIRE URO STR STIFF .035"X150CM NITINOL 150NSS35

## (undated) DEVICE — DRSG GAUZE 4X4" 3033

## (undated) DEVICE — CATH BALLOON EMERGE 2.5X20MM H7493918920250

## (undated) DEVICE — LINEN TOWEL PACK X5 5464

## (undated) DEVICE — TUBING IRRIG TUR Y TYPE 96" LF 6543-01

## (undated) DEVICE — LINEN POUCH DBL 5427

## (undated) DEVICE — BAG CLEAR TRASH 1.3M 39X33" P4040C

## (undated) DEVICE — APPLICATOR COTTON TIP 3" PKG OF 10 34831010

## (undated) DEVICE — BAG URINARY DRAIN 4000ML LF 153509

## (undated) DEVICE — CATH FOLEY COUNCIL 20FR 5ML LATEX 0196SI20

## (undated) DEVICE — LINEN FULL SHEET 5511

## (undated) DEVICE — GLOVE PROTEXIS POWDER FREE SMT 7.5  2D72PT75X

## (undated) DEVICE — SOL WATER IRRIG 1000ML BOTTLE 2F7114

## (undated) DEVICE — PACK CYSTO CUSTOM RIDGES

## (undated) DEVICE — MANIFOLD KIT ANGIO AUTOMATED 014613

## (undated) DEVICE — PAD CHUX UNDERPAD 23X24" 7136

## (undated) DEVICE — EYE NDL RETROBULBAR ATKINSON 25GA 1.5" 581637

## (undated) DEVICE — RAD RX ISOVUE 300 (50ML) 61% IOPAMIDOL CHARGE PER ML

## (undated) DEVICE — EYE SHIELD PLASTIC

## (undated) DEVICE — ESU GROUND PAD ADULT W/CORD E7507

## (undated) DEVICE — SOL WATER IRRIG 3000ML BAG 2B7117

## (undated) DEVICE — SYR 30ML LL W/O NDL 302832

## (undated) DEVICE — LINEN HALF SHEET 5512

## (undated) DEVICE — DRSG GAUZE 4X4" TRAY 6939

## (undated) DEVICE — Device

## (undated) DEVICE — EYE PREP BETADINE 5% SOLUTION 30ML 0065-0411-30

## (undated) DEVICE — SYR 01ML 27GA 0.5" NDL TBC 309623

## (undated) DEVICE — STATLOCK PSI DEVICE

## (undated) DEVICE — BOWL 32OZ STERILE 01232

## (undated) DEVICE — LASER FIBER HOLMIUM 365UM HTB-365

## (undated) DEVICE — SOL NACL 0.9% IRRIG 3000ML BAG 2B7477

## (undated) DEVICE — CATH ANGIO INFINITI 3DRC 6FRX100CM 534676T

## (undated) DEVICE — NDL 30GA 0.5" 305106

## (undated) DEVICE — CATH GUIDING BLUE YELLOW PTFE XB3 6FRX100CM 67005200

## (undated) DEVICE — ENDO TROCAR BLUNT TIP KII BALLOON 12X100MM C0R47

## (undated) DEVICE — PREP TECHNI-CARE CHLOROXYLENOL 3% 4OZ BOTTLE C222-4ZWO

## (undated) DEVICE — ESU CORD MONOPOLAR 10'  E0510

## (undated) DEVICE — KIT HAND CONTROL ACIST 014644 AR-P54

## (undated) DEVICE — CLIP APPLIER ENDO 5MM M/L LIGAMAX EL5ML

## (undated) DEVICE — CATH ANGIO SUPERTORQUE IM 6FRX100CM 533660

## (undated) DEVICE — SUCTION CANISTER MEDIVAC LINER 3000ML W/LID 65651-530

## (undated) DEVICE — SUCTION IRR STRYKERFLOW II W/TIP 250-070-520

## (undated) DEVICE — EYE PROBE CYCLO G6 LASER G-PROBE 15980

## (undated) DEVICE — PACK HEART LEFT CUSTOM

## (undated) DEVICE — EYE DRSG PAD OVAL

## (undated) DEVICE — FLEXIVA 200

## (undated) DEVICE — NDL 22GA 1.5"

## (undated) DEVICE — SOL NACL 0.9% IRRIG 1000ML BOTTLE 2F7124

## (undated) DEVICE — TAPE MICROPORE 1"X1.5YD 1530S-1

## (undated) DEVICE — SYR 50ML CATH TIP W/O NDL 309620

## (undated) DEVICE — ESU ELEC BLADE 2.75" COATED/INSULATED E1455

## (undated) DEVICE — CATH ANGIO LCB 6FR 100CM ST+

## (undated) DEVICE — ENDO TROCAR SLEEVE KII ADV FIXATION 05X100MM CFS02

## (undated) DEVICE — KIT ENDO TURNOVER/PROCEDURE W/CLEAN A SCOPE LINERS 103888

## (undated) DEVICE — SU VICRYL 4-0 P-3 18" UND J494G

## (undated) DEVICE — ENDO POUCH GOLD 10MM ECATCH 173050G

## (undated) DEVICE — GUIDEWIRE VASC 0.014INX180CM RUNTHROUGH 25-1011

## (undated) DEVICE — KIT ACCESSORY INTRO INFLATION SYS 20/30 PRIORITY 1000186-115

## (undated) DEVICE — 6FR X 100CM SUPER TORQUE PLUS DIAGNOSTIC CATHETER W/SOFT TIP, MULTIPURPOSE, MPA 1, FEMORAL SELECTIVE, 0.038IN MAX GUIDEWIRE (EA/1)

## (undated) DEVICE — SU VICRYL 0 CT-2 CR 8X18" J727D

## (undated) DEVICE — CATH ANGIO INFINITI PIGTAIL 6FRX110CM 6 SH 534650S

## (undated) DEVICE — COVER FOOTSWITCH W/CINCH 20X24" 923267

## (undated) DEVICE — SLEEVE TR BAND RADIAL COMPRESSION DEVICE 29CM XX-RF06L

## (undated) DEVICE — TUBING PRESSURE 30"

## (undated) DEVICE — SHEATH URETERAL ACCESS NAVIGATOR 13/15FRX36CM 250-209

## (undated) DEVICE — 0.035IN X 260CM, EMERALD DIAGNOSTIC GUIDEWIRE, FIXED-CORE PTFE COATED, STANDARD, 3MM EXCHANGE J-TIP (EA/1)

## (undated) DEVICE — CATH URETERAL FLEX TIP TIGERTAIL 06FRX70CM 139006

## (undated) DEVICE — DRAPE STERI APERATURE 51X33" 1030

## (undated) DEVICE — VALVE HEMOSTASIS .096" COPILOT MECH 1003331

## (undated) DEVICE — GLOVE PROTEXIS POWDER FREE 8.0 ORTHOPEDIC 2D73ET80

## (undated) DEVICE — INTRO GLIDESHEATH SLENDER 6FR 10X45CM 60-1060

## (undated) DEVICE — ESU PENCIL W/HOLSTER E2350H

## (undated) DEVICE — ENDO TROCAR FIRST ENTRY KII FIOS Z-THRD 05X100MM CTF03

## (undated) RX ORDER — ATROPA BELLADONNA AND OPIUM 16.2; 3 MG/1; MG/1
SUPPOSITORY RECTAL
Status: DISPENSED
Start: 2020-01-10

## (undated) RX ORDER — NITROGLYCERIN 5 MG/ML
VIAL (ML) INTRAVENOUS
Status: DISPENSED
Start: 2020-01-07

## (undated) RX ORDER — METOPROLOL TARTRATE 1 MG/ML
INJECTION, SOLUTION INTRAVENOUS
Status: DISPENSED
Start: 2017-04-28

## (undated) RX ORDER — PROPOFOL 10 MG/ML
INJECTION, EMULSION INTRAVENOUS
Status: DISPENSED
Start: 2020-01-29

## (undated) RX ORDER — FENTANYL CITRATE 50 UG/ML
INJECTION, SOLUTION INTRAMUSCULAR; INTRAVENOUS
Status: DISPENSED
Start: 2020-02-05

## (undated) RX ORDER — PROPOFOL 10 MG/ML
INJECTION, EMULSION INTRAVENOUS
Status: DISPENSED
Start: 2023-05-17

## (undated) RX ORDER — NITROGLYCERIN 0.4 MG/1
TABLET SUBLINGUAL
Status: DISPENSED
Start: 2020-12-16

## (undated) RX ORDER — ONDANSETRON 2 MG/ML
INJECTION INTRAMUSCULAR; INTRAVENOUS
Status: DISPENSED
Start: 2020-01-07

## (undated) RX ORDER — LIDOCAINE HYDROCHLORIDE 10 MG/ML
INJECTION, SOLUTION EPIDURAL; INFILTRATION; INTRACAUDAL; PERINEURAL
Status: DISPENSED
Start: 2019-03-05

## (undated) RX ORDER — GLYCOPYRROLATE 0.2 MG/ML
INJECTION INTRAMUSCULAR; INTRAVENOUS
Status: DISPENSED
Start: 2020-01-10

## (undated) RX ORDER — CEFAZOLIN SODIUM 2 G/100ML
INJECTION, SOLUTION INTRAVENOUS
Status: DISPENSED
Start: 2020-01-10

## (undated) RX ORDER — ONDANSETRON 2 MG/ML
INJECTION INTRAMUSCULAR; INTRAVENOUS
Status: DISPENSED
Start: 2020-02-05

## (undated) RX ORDER — TRIAMCINOLONE ACETONIDE 40 MG/ML
INJECTION, SUSPENSION INTRA-ARTICULAR; INTRAMUSCULAR
Status: DISPENSED
Start: 2019-03-05

## (undated) RX ORDER — FENTANYL CITRATE 50 UG/ML
INJECTION, SOLUTION INTRAMUSCULAR; INTRAVENOUS
Status: DISPENSED
Start: 2018-08-06

## (undated) RX ORDER — EPHEDRINE SULFATE 50 MG/ML
INJECTION, SOLUTION INTRAMUSCULAR; INTRAVENOUS; SUBCUTANEOUS
Status: DISPENSED
Start: 2020-02-05

## (undated) RX ORDER — LIDOCAINE HYDROCHLORIDE 10 MG/ML
INJECTION, SOLUTION EPIDURAL; INFILTRATION; INTRACAUDAL; PERINEURAL
Status: DISPENSED
Start: 2020-02-05

## (undated) RX ORDER — LIDOCAINE HYDROCHLORIDE 10 MG/ML
INJECTION, SOLUTION EPIDURAL; INFILTRATION; INTRACAUDAL; PERINEURAL
Status: DISPENSED
Start: 2020-01-10

## (undated) RX ORDER — ATROPA BELLADONNA AND OPIUM 16.2; 3 MG/1; MG/1
SUPPOSITORY RECTAL
Status: DISPENSED
Start: 2020-02-05

## (undated) RX ORDER — ADENOSINE 3 MG/ML
INJECTION, SOLUTION INTRAVENOUS
Status: DISPENSED
Start: 2020-01-07

## (undated) RX ORDER — OXYCODONE HYDROCHLORIDE 5 MG/1
TABLET ORAL
Status: DISPENSED
Start: 2018-08-06

## (undated) RX ORDER — ONDANSETRON 2 MG/ML
INJECTION INTRAMUSCULAR; INTRAVENOUS
Status: DISPENSED
Start: 2018-08-06

## (undated) RX ORDER — NEOSTIGMINE METHYLSULFATE 1 MG/ML
VIAL (ML) INJECTION
Status: DISPENSED
Start: 2018-08-06

## (undated) RX ORDER — FENTANYL CITRATE 50 UG/ML
INJECTION, SOLUTION INTRAMUSCULAR; INTRAVENOUS
Status: DISPENSED
Start: 2023-10-25

## (undated) RX ORDER — NICARDIPINE HYDROCHLORIDE 2.5 MG/ML
INJECTION INTRAVENOUS
Status: DISPENSED
Start: 2020-01-07

## (undated) RX ORDER — OXYCODONE HYDROCHLORIDE 5 MG/1
TABLET ORAL
Status: DISPENSED
Start: 2018-08-07

## (undated) RX ORDER — DEXAMETHASONE SODIUM PHOSPHATE 4 MG/ML
INJECTION, SOLUTION INTRA-ARTICULAR; INTRALESIONAL; INTRAMUSCULAR; INTRAVENOUS; SOFT TISSUE
Status: DISPENSED
Start: 2023-05-17

## (undated) RX ORDER — GLYCOPYRROLATE 0.2 MG/ML
INJECTION INTRAMUSCULAR; INTRAVENOUS
Status: DISPENSED
Start: 2018-08-06

## (undated) RX ORDER — FENTANYL CITRATE 50 UG/ML
INJECTION, SOLUTION INTRAMUSCULAR; INTRAVENOUS
Status: DISPENSED
Start: 2020-01-07

## (undated) RX ORDER — LIDOCAINE HYDROCHLORIDE 10 MG/ML
INJECTION, SOLUTION EPIDURAL; INFILTRATION; INTRACAUDAL; PERINEURAL
Status: DISPENSED
Start: 2018-08-06

## (undated) RX ORDER — GLYCOPYRROLATE 0.2 MG/ML
INJECTION INTRAMUSCULAR; INTRAVENOUS
Status: DISPENSED
Start: 2020-01-29

## (undated) RX ORDER — CEFAZOLIN SODIUM 2 G/100ML
INJECTION, SOLUTION INTRAVENOUS
Status: DISPENSED
Start: 2018-08-06

## (undated) RX ORDER — REGADENOSON 0.08 MG/ML
INJECTION, SOLUTION INTRAVENOUS
Status: DISPENSED
Start: 2020-01-15

## (undated) RX ORDER — OXYCODONE HYDROCHLORIDE 5 MG/1
TABLET ORAL
Status: DISPENSED
Start: 2023-10-25

## (undated) RX ORDER — DEXAMETHASONE SODIUM PHOSPHATE 4 MG/ML
INJECTION, SOLUTION INTRA-ARTICULAR; INTRALESIONAL; INTRAMUSCULAR; INTRAVENOUS; SOFT TISSUE
Status: DISPENSED
Start: 2020-01-10

## (undated) RX ORDER — DEXAMETHASONE SODIUM PHOSPHATE 4 MG/ML
INJECTION, SOLUTION INTRA-ARTICULAR; INTRALESIONAL; INTRAMUSCULAR; INTRAVENOUS; SOFT TISSUE
Status: DISPENSED
Start: 2020-01-29

## (undated) RX ORDER — ONDANSETRON 2 MG/ML
INJECTION INTRAMUSCULAR; INTRAVENOUS
Status: DISPENSED
Start: 2023-05-17

## (undated) RX ORDER — HYDROMORPHONE HYDROCHLORIDE 1 MG/ML
INJECTION, SOLUTION INTRAMUSCULAR; INTRAVENOUS; SUBCUTANEOUS
Status: DISPENSED
Start: 2018-08-06

## (undated) RX ORDER — DOBUTAMINE HYDROCHLORIDE 200 MG/100ML
INJECTION INTRAVENOUS
Status: DISPENSED
Start: 2017-04-28

## (undated) RX ORDER — PROPOFOL 10 MG/ML
INJECTION, EMULSION INTRAVENOUS
Status: DISPENSED
Start: 2020-02-05

## (undated) RX ORDER — PROPOFOL 10 MG/ML
INJECTION, EMULSION INTRAVENOUS
Status: DISPENSED
Start: 2020-01-10

## (undated) RX ORDER — ACETAMINOPHEN 325 MG/1
TABLET ORAL
Status: DISPENSED
Start: 2023-10-25

## (undated) RX ORDER — FENTANYL CITRATE 50 UG/ML
INJECTION, SOLUTION INTRAMUSCULAR; INTRAVENOUS
Status: DISPENSED
Start: 2023-05-17

## (undated) RX ORDER — LIDOCAINE HYDROCHLORIDE 10 MG/ML
INJECTION, SOLUTION EPIDURAL; INFILTRATION; INTRACAUDAL; PERINEURAL
Status: DISPENSED
Start: 2020-01-29

## (undated) RX ORDER — FENTANYL CITRATE 50 UG/ML
INJECTION, SOLUTION INTRAMUSCULAR; INTRAVENOUS
Status: DISPENSED
Start: 2020-01-29

## (undated) RX ORDER — BUPIVACAINE HYDROCHLORIDE AND EPINEPHRINE 5; 5 MG/ML; UG/ML
INJECTION, SOLUTION EPIDURAL; INTRACAUDAL; PERINEURAL
Status: DISPENSED
Start: 2018-08-06

## (undated) RX ORDER — ONDANSETRON 2 MG/ML
INJECTION INTRAMUSCULAR; INTRAVENOUS
Status: DISPENSED
Start: 2020-01-29

## (undated) RX ORDER — OXYCODONE HYDROCHLORIDE 5 MG/1
TABLET ORAL
Status: DISPENSED
Start: 2020-02-05

## (undated) RX ORDER — CEFAZOLIN SODIUM 2 G/100ML
INJECTION, SOLUTION INTRAVENOUS
Status: DISPENSED
Start: 2020-01-29

## (undated) RX ORDER — ONDANSETRON 2 MG/ML
INJECTION INTRAMUSCULAR; INTRAVENOUS
Status: DISPENSED
Start: 2020-01-10

## (undated) RX ORDER — HEPARIN SODIUM 1000 [USP'U]/ML
INJECTION, SOLUTION INTRAVENOUS; SUBCUTANEOUS
Status: DISPENSED
Start: 2020-01-07

## (undated) RX ORDER — EPHEDRINE SULFATE 50 MG/ML
INJECTION, SOLUTION INTRAMUSCULAR; INTRAVENOUS; SUBCUTANEOUS
Status: DISPENSED
Start: 2018-08-06

## (undated) RX ORDER — DEXAMETHASONE SODIUM PHOSPHATE 4 MG/ML
INJECTION, SOLUTION INTRA-ARTICULAR; INTRALESIONAL; INTRAMUSCULAR; INTRAVENOUS; SOFT TISSUE
Status: DISPENSED
Start: 2020-02-05

## (undated) RX ORDER — FENTANYL CITRATE 50 UG/ML
INJECTION, SOLUTION INTRAMUSCULAR; INTRAVENOUS
Status: DISPENSED
Start: 2020-01-10

## (undated) RX ORDER — PROPOFOL 10 MG/ML
INJECTION, EMULSION INTRAVENOUS
Status: DISPENSED
Start: 2018-08-06